# Patient Record
Sex: FEMALE | Race: WHITE | NOT HISPANIC OR LATINO | Employment: OTHER | URBAN - METROPOLITAN AREA
[De-identification: names, ages, dates, MRNs, and addresses within clinical notes are randomized per-mention and may not be internally consistent; named-entity substitution may affect disease eponyms.]

---

## 2017-03-06 ENCOUNTER — HOSPITAL ENCOUNTER (EMERGENCY)
Facility: HOSPITAL | Age: 72
Discharge: HOME | End: 2017-03-06
Attending: EMERGENCY MEDICINE | Admitting: EMERGENCY MEDICINE

## 2017-03-06 VITALS
HEART RATE: 73 BPM | SYSTOLIC BLOOD PRESSURE: 128 MMHG | WEIGHT: 155 LBS | HEIGHT: 64 IN | TEMPERATURE: 98.7 F | BODY MASS INDEX: 26.46 KG/M2 | RESPIRATION RATE: 18 BRPM | OXYGEN SATURATION: 97 % | DIASTOLIC BLOOD PRESSURE: 84 MMHG

## 2017-03-06 DIAGNOSIS — E11.65 TYPE 2 DIABETES MELLITUS WITH HYPERGLYCEMIA, UNSPECIFIED LONG TERM INSULIN USE STATUS: Primary | ICD-10-CM

## 2017-03-06 DIAGNOSIS — F10.920 ALCOHOL INTOXICATION, UNCOMPLICATED (HCC): ICD-10-CM

## 2017-03-06 LAB
ALBUMIN SERPL-MCNC: 4 G/DL (ref 3.5–5.2)
ALBUMIN/GLOB SERPL: 1.3 G/DL
ALP SERPL-CCNC: 72 U/L (ref 39–117)
ALT SERPL W P-5'-P-CCNC: 13 U/L (ref 1–33)
ANION GAP SERPL CALCULATED.3IONS-SCNC: 18 MMOL/L
AST SERPL-CCNC: 19 U/L (ref 1–32)
BASOPHILS # BLD AUTO: 0.01 10*3/MM3 (ref 0–0.2)
BASOPHILS NFR BLD AUTO: 0.1 % (ref 0–1.5)
BILIRUB SERPL-MCNC: 0.2 MG/DL (ref 0.1–1.2)
BILIRUB UR QL STRIP: NEGATIVE
BUN BLD-MCNC: 13 MG/DL (ref 8–23)
BUN/CREAT SERPL: 15.3 (ref 7–25)
CALCIUM SPEC-SCNC: 9.6 MG/DL (ref 8.6–10.5)
CHLORIDE SERPL-SCNC: 102 MMOL/L (ref 98–107)
CLARITY UR: CLEAR
CO2 SERPL-SCNC: 24 MMOL/L (ref 22–29)
COLOR UR: YELLOW
CREAT BLD-MCNC: 0.85 MG/DL (ref 0.57–1)
DEPRECATED RDW RBC AUTO: 39.1 FL (ref 37–54)
EOSINOPHIL # BLD AUTO: 0.04 10*3/MM3 (ref 0–0.7)
EOSINOPHIL NFR BLD AUTO: 0.5 % (ref 0.3–6.2)
ERYTHROCYTE [DISTWIDTH] IN BLOOD BY AUTOMATED COUNT: 12.2 % (ref 11.7–13)
ETHANOL BLD-MCNC: 132 MG/DL (ref 0–10)
ETHANOL UR QL: 0.13 %
GFR SERPL CREATININE-BSD FRML MDRD: 66 ML/MIN/1.73
GLOBULIN UR ELPH-MCNC: 3 GM/DL
GLUCOSE BLD-MCNC: 289 MG/DL (ref 65–99)
GLUCOSE BLDC GLUCOMTR-MCNC: 165 MG/DL (ref 70–130)
GLUCOSE BLDC GLUCOMTR-MCNC: 272 MG/DL (ref 70–130)
GLUCOSE UR STRIP-MCNC: ABNORMAL MG/DL
HCT VFR BLD AUTO: 36.5 % (ref 35.6–45.5)
HGB BLD-MCNC: 12.2 G/DL (ref 11.9–15.5)
HGB UR QL STRIP.AUTO: NEGATIVE
IMM GRANULOCYTES # BLD: 0 10*3/MM3 (ref 0–0.03)
IMM GRANULOCYTES NFR BLD: 0 % (ref 0–0.5)
KETONES UR QL STRIP: ABNORMAL
LEUKOCYTE ESTERASE UR QL STRIP.AUTO: NEGATIVE
LYMPHOCYTES # BLD AUTO: 1.85 10*3/MM3 (ref 0.9–4.8)
LYMPHOCYTES NFR BLD AUTO: 23.9 % (ref 19.6–45.3)
MCH RBC QN AUTO: 29.1 PG (ref 26.9–32)
MCHC RBC AUTO-ENTMCNC: 33.4 G/DL (ref 32.4–36.3)
MCV RBC AUTO: 87.1 FL (ref 80.5–98.2)
MONOCYTES # BLD AUTO: 0.51 10*3/MM3 (ref 0.2–1.2)
MONOCYTES NFR BLD AUTO: 6.6 % (ref 5–12)
NEUTROPHILS # BLD AUTO: 5.32 10*3/MM3 (ref 1.9–8.1)
NEUTROPHILS NFR BLD AUTO: 68.9 % (ref 42.7–76)
NITRITE UR QL STRIP: NEGATIVE
PH UR STRIP.AUTO: 6 [PH] (ref 5–8)
PLATELET # BLD AUTO: 306 10*3/MM3 (ref 140–500)
PMV BLD AUTO: 10.1 FL (ref 6–12)
POTASSIUM BLD-SCNC: 3.8 MMOL/L (ref 3.5–5.2)
PROT SERPL-MCNC: 7 G/DL (ref 6–8.5)
PROT UR QL STRIP: NEGATIVE
RBC # BLD AUTO: 4.19 10*6/MM3 (ref 3.9–5.2)
SODIUM BLD-SCNC: 144 MMOL/L (ref 136–145)
SP GR UR STRIP: 1.01 (ref 1–1.03)
UROBILINOGEN UR QL STRIP: ABNORMAL
WBC NRBC COR # BLD: 7.73 10*3/MM3 (ref 4.5–10.7)

## 2017-03-06 PROCEDURE — 96360 HYDRATION IV INFUSION INIT: CPT

## 2017-03-06 PROCEDURE — 96361 HYDRATE IV INFUSION ADD-ON: CPT

## 2017-03-06 PROCEDURE — 82962 GLUCOSE BLOOD TEST: CPT

## 2017-03-06 PROCEDURE — 85025 COMPLETE CBC W/AUTO DIFF WBC: CPT | Performed by: EMERGENCY MEDICINE

## 2017-03-06 PROCEDURE — 81003 URINALYSIS AUTO W/O SCOPE: CPT | Performed by: EMERGENCY MEDICINE

## 2017-03-06 PROCEDURE — 99283 EMERGENCY DEPT VISIT LOW MDM: CPT

## 2017-03-06 PROCEDURE — 80053 COMPREHEN METABOLIC PANEL: CPT | Performed by: EMERGENCY MEDICINE

## 2017-03-06 PROCEDURE — 80307 DRUG TEST PRSMV CHEM ANLYZR: CPT | Performed by: EMERGENCY MEDICINE

## 2017-03-06 PROCEDURE — 90791 PSYCH DIAGNOSTIC EVALUATION: CPT

## 2017-03-06 RX ORDER — FLUOXETINE HYDROCHLORIDE 20 MG/1
20 CAPSULE ORAL DAILY
COMMUNITY
End: 2022-12-01 | Stop reason: SDUPTHER

## 2017-03-06 RX ORDER — GLIPIZIDE 10 MG/1
10 TABLET ORAL
COMMUNITY
End: 2021-04-10

## 2017-03-06 RX ORDER — SODIUM CHLORIDE 9 MG/ML
125 INJECTION, SOLUTION INTRAVENOUS CONTINUOUS
Status: DISCONTINUED | OUTPATIENT
Start: 2017-03-06 | End: 2017-03-06 | Stop reason: HOSPADM

## 2017-03-06 RX ORDER — ATORVASTATIN CALCIUM 80 MG/1
80 TABLET, FILM COATED ORAL DAILY
COMMUNITY
End: 2021-04-10

## 2017-03-06 RX ORDER — ALENDRONATE SODIUM 70 MG/1
70 TABLET ORAL
COMMUNITY
End: 2021-04-10

## 2017-03-06 RX ORDER — LISINOPRIL AND HYDROCHLOROTHIAZIDE 20; 12.5 MG/1; MG/1
1 TABLET ORAL DAILY
COMMUNITY
End: 2021-04-10

## 2017-03-06 RX ORDER — ALPRAZOLAM 2 MG/1
2 TABLET ORAL 3 TIMES DAILY PRN
Status: ON HOLD | COMMUNITY
End: 2022-03-04 | Stop reason: SDUPTHER

## 2017-03-06 RX ORDER — METOPROLOL TARTRATE 25 MG/1
25 TABLET, FILM COATED ORAL 2 TIMES DAILY
COMMUNITY
End: 2021-04-10

## 2017-03-06 RX ORDER — GEMFIBROZIL 600 MG/1
600 TABLET, FILM COATED ORAL 2 TIMES DAILY
COMMUNITY
End: 2021-04-10

## 2017-03-06 RX ADMIN — SODIUM CHLORIDE 125 ML/HR: 9 INJECTION, SOLUTION INTRAVENOUS at 04:10

## 2017-03-06 RX ADMIN — SODIUM CHLORIDE 1000 ML: 9 INJECTION, SOLUTION INTRAVENOUS at 01:40
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
## 2017-03-06 NOTE — ED NOTES
Pt to ER via EMS from home. Pt's daughter called EMS reporting that her mom has high blood sugar, is intoxicated and has also taken her nightly xanax.      Olga Mcgee RN  03/06/17 0024

## 2018-01-10 ENCOUNTER — HOSPITAL ENCOUNTER (OUTPATIENT)
Dept: OTHER | Facility: HOSPITAL | Age: 73
Setting detail: SPECIMEN
Discharge: HOME | End: 2018-01-10
Attending: NURSE PRACTITIONER | Admitting: NURSE PRACTITIONER

## 2018-01-10 LAB
ALBUMIN SERPL-MCNC: 3.8 G/DL (ref 3.5–4.8)
ALBUMIN/GLOB SERPL: 1.1 {RATIO} (ref 1–1.7)
ALP SERPL-CCNC: 50 IU/L (ref 32–91)
ALT SERPL-CCNC: 14 IU/L (ref 14–54)
ANION GAP SERPL CALC-SCNC: 12.8 MMOL/L (ref 10–20)
AST SERPL-CCNC: 19 IU/L (ref 15–41)
BACTERIA SPEC AEROBE CULT: NORMAL
BILIRUB SERPL-MCNC: 0.8 MG/DL (ref 0.3–1.2)
BUN SERPL-MCNC: 15 MG/DL (ref 8–20)
BUN/CREAT SERPL: 18.8 (ref 5.4–26.2)
CALCIUM SERPL-MCNC: 9.7 MG/DL (ref 8.9–10.3)
CHLORIDE SERPL-SCNC: 106 MMOL/L (ref 101–111)
CHOLEST SERPL-MCNC: 217 MG/DL
CHOLEST/HDLC SERPL: 3.5 {RATIO}
CONV CO2: 25 MMOL/L (ref 22–32)
CONV LDL CHOLESTEROL DIRECT: 128 MG/DL (ref 0–100)
CONV MICROALBUM.,U,RANDOM: 5 MG/L
CONV TOTAL PROTEIN: 7.2 G/DL (ref 6.1–7.9)
CREAT 24H UR-MCNC: 72.9 MG/DL
CREAT UR-MCNC: 0.8 MG/DL (ref 0.4–1)
GLOBULIN UR ELPH-MCNC: 3.4 G/DL (ref 2.5–3.8)
GLUCOSE SERPL-MCNC: ABNORMAL MG/DL (ref 65–99)
HDLC SERPL-MCNC: 63 MG/DL
LDLC/HDLC SERPL: 2 {RATIO}
LIPID INTERPRETATION: ABNORMAL
Lab: NORMAL
MICRO REPORT STATUS: NORMAL
MICROALBUMIN/CREAT UR: 6.9 UG/MG
POTASSIUM SERPL-SCNC: 3.8 MMOL/L (ref 3.6–5.1)
SODIUM SERPL-SCNC: 140 MMOL/L (ref 136–144)
SPECIMEN SOURCE: NORMAL
TRIGL SERPL-MCNC: 104 MG/DL
VLDLC SERPL CALC-MCNC: 26.3 MG/DL

## 2018-06-21 ENCOUNTER — HOSPITAL ENCOUNTER (OUTPATIENT)
Dept: OTHER | Facility: HOSPITAL | Age: 73
Setting detail: SPECIMEN
Discharge: HOME | End: 2018-06-21
Attending: NURSE PRACTITIONER | Admitting: NURSE PRACTITIONER

## 2018-06-21 LAB
BACTERIA SPEC AEROBE CULT: NORMAL
Lab: NORMAL
MICRO REPORT STATUS: NORMAL
SPECIMEN SOURCE: NORMAL

## 2020-02-01 ENCOUNTER — HOSPITAL ENCOUNTER (EMERGENCY)
Facility: HOSPITAL | Age: 75
Discharge: HOME | End: 2020-02-01
Attending: EMERGENCY MEDICINE | Admitting: EMERGENCY MEDICINE

## 2020-02-01 VITALS
OXYGEN SATURATION: 96 % | WEIGHT: 170 LBS | DIASTOLIC BLOOD PRESSURE: 70 MMHG | HEART RATE: 78 BPM | SYSTOLIC BLOOD PRESSURE: 145 MMHG | RESPIRATION RATE: 16 BRPM | TEMPERATURE: 98 F | HEIGHT: 63 IN | BODY MASS INDEX: 30.12 KG/M2

## 2020-02-01 DIAGNOSIS — L81.9 DISCOLORATION OF SKIN OF HAND: ICD-10-CM
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
## 2020-02-01 LAB
ANION GAP SERPL CALCULATED.3IONS-SCNC: 12.7 MMOL/L (ref 5–15)
APTT PPP: 27.2 SECONDS (ref 22.7–35.4)
BASOPHILS # BLD AUTO: 0.04 10*3/MM3 (ref 0–0.2)
BASOPHILS NFR BLD AUTO: 0.4 % (ref 0–1.5)
BUN BLD-MCNC: 27 MG/DL (ref 8–23)
BUN/CREAT SERPL: 25.2 (ref 7–25)
CALCIUM SPEC-SCNC: 8.8 MG/DL (ref 8.6–10.5)
CHLORIDE SERPL-SCNC: 105 MMOL/L (ref 98–107)
CO2 SERPL-SCNC: 27.3 MMOL/L (ref 22–29)
CREAT BLD-MCNC: 1.07 MG/DL (ref 0.57–1)
DEPRECATED RDW RBC AUTO: 37.2 FL (ref 37–54)
EOSINOPHIL # BLD AUTO: 0.28 10*3/MM3 (ref 0–0.4)
EOSINOPHIL NFR BLD AUTO: 3 % (ref 0.3–6.2)
ERYTHROCYTE [DISTWIDTH] IN BLOOD BY AUTOMATED COUNT: 12 % (ref 12.3–15.4)
GFR SERPL CREATININE-BSD FRML MDRD: 50 ML/MIN/1.73
GLUCOSE BLD-MCNC: 81 MG/DL (ref 65–99)
GLUCOSE BLDC GLUCOMTR-MCNC: 106 MG/DL (ref 70–130)
HCT VFR BLD AUTO: 31.9 % (ref 34–46.6)
HGB BLD-MCNC: 10.7 G/DL (ref 12–15.9)
IMM GRANULOCYTES # BLD AUTO: 0.03 10*3/MM3 (ref 0–0.05)
IMM GRANULOCYTES NFR BLD AUTO: 0.3 % (ref 0–0.5)
INR PPP: 1 (ref 0.9–1.1)
LYMPHOCYTES # BLD AUTO: 3.01 10*3/MM3 (ref 0.7–3.1)
LYMPHOCYTES NFR BLD AUTO: 32.4 % (ref 19.6–45.3)
MCH RBC QN AUTO: 28.7 PG (ref 26.6–33)
MCHC RBC AUTO-ENTMCNC: 33.5 G/DL (ref 31.5–35.7)
MCV RBC AUTO: 85.5 FL (ref 79–97)
MONOCYTES # BLD AUTO: 0.8 10*3/MM3 (ref 0.1–0.9)
MONOCYTES NFR BLD AUTO: 8.6 % (ref 5–12)
NEUTROPHILS # BLD AUTO: 5.14 10*3/MM3 (ref 1.7–7)
NEUTROPHILS NFR BLD AUTO: 55.3 % (ref 42.7–76)
NRBC BLD AUTO-RTO: 0 /100 WBC (ref 0–0.2)
PLATELET # BLD AUTO: 266 10*3/MM3 (ref 140–450)
PMV BLD AUTO: 10 FL (ref 6–12)
POTASSIUM BLD-SCNC: 3.8 MMOL/L (ref 3.5–5.2)
PROTHROMBIN TIME: 12.9 SECONDS (ref 11.7–14.2)
RBC # BLD AUTO: 3.73 10*6/MM3 (ref 3.77–5.28)
SODIUM BLD-SCNC: 145 MMOL/L (ref 136–145)
WBC NRBC COR # BLD: 9.3 10*3/MM3 (ref 3.4–10.8)

## 2020-02-01 PROCEDURE — 25010000002 TDAP 5-2.5-18.5 LF-MCG/0.5 SUSPENSION: Performed by: EMERGENCY MEDICINE

## 2020-02-01 PROCEDURE — 90715 TDAP VACCINE 7 YRS/> IM: CPT | Performed by: EMERGENCY MEDICINE

## 2020-02-01 PROCEDURE — 90471 IMMUNIZATION ADMIN: CPT | Performed by: EMERGENCY MEDICINE

## 2020-02-01 PROCEDURE — 82962 GLUCOSE BLOOD TEST: CPT

## 2020-02-01 PROCEDURE — 99283 EMERGENCY DEPT VISIT LOW MDM: CPT

## 2020-02-01 PROCEDURE — 85610 PROTHROMBIN TIME: CPT | Performed by: EMERGENCY MEDICINE

## 2020-02-01 PROCEDURE — 85730 THROMBOPLASTIN TIME PARTIAL: CPT | Performed by: EMERGENCY MEDICINE

## 2020-02-01 PROCEDURE — 85025 COMPLETE CBC W/AUTO DIFF WBC: CPT | Performed by: EMERGENCY MEDICINE

## 2020-02-01 PROCEDURE — 80048 BASIC METABOLIC PNL TOTAL CA: CPT | Performed by: EMERGENCY MEDICINE

## 2020-02-01 RX ORDER — FUROSEMIDE 20 MG/1
20 TABLET ORAL DAILY
Status: ON HOLD | COMMUNITY
End: 2022-09-16 | Stop reason: SDUPTHER

## 2020-02-01 RX ORDER — LISINOPRIL 20 MG/1
20 TABLET ORAL DAILY
Status: ON HOLD | COMMUNITY
End: 2022-02-27

## 2020-02-01 RX ORDER — ARIPIPRAZOLE 5 MG/1
5 TABLET ORAL DAILY
COMMUNITY
End: 2021-04-10

## 2020-02-01 RX ORDER — ATORVASTATIN CALCIUM 40 MG/1
40 TABLET, FILM COATED ORAL NIGHTLY
COMMUNITY

## 2020-02-01 RX ORDER — CEPHALEXIN 500 MG/1
500 CAPSULE ORAL ONCE
Status: COMPLETED | OUTPATIENT
Start: 2020-02-01 | End: 2020-02-01

## 2020-02-01 RX ORDER — ASPIRIN 81 MG/1
81 TABLET ORAL DAILY
COMMUNITY
End: 2022-08-10 | Stop reason: HOSPADM

## 2020-02-01 RX ORDER — CEPHALEXIN 500 MG/1
500 CAPSULE ORAL 3 TIMES DAILY
Qty: 21 CAPSULE | Refills: 0 | Status: SHIPPED | OUTPATIENT
Start: 2020-02-01 | End: 2021-04-10

## 2020-02-01 RX ORDER — POTASSIUM CHLORIDE 1500 MG/1
20 TABLET, FILM COATED, EXTENDED RELEASE ORAL DAILY
COMMUNITY
End: 2022-12-09

## 2020-02-01 RX ORDER — METOPROLOL TARTRATE 50 MG/1
50 TABLET, FILM COATED ORAL 2 TIMES DAILY
Status: ON HOLD | COMMUNITY
End: 2022-02-27

## 2020-02-01 RX ORDER — PRIMIDONE 50 MG/1
25 TABLET ORAL NIGHTLY
COMMUNITY
End: 2021-04-10

## 2020-02-01 RX ADMIN — TETANUS TOXOID, REDUCED DIPHTHERIA TOXOID AND ACELLULAR PERTUSSIS VACCINE, ADSORBED 0.5 ML: 5; 2.5; 8; 8; 2.5 SUSPENSION INTRAMUSCULAR at 17:45

## 2020-02-01 RX ADMIN — CEPHALEXIN 500 MG: 500 CAPSULE ORAL at 20:00
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
## 2020-02-01 NOTE — ED NOTES
Pt daughter reports pt hasn't eaten since breakfast and she is afraid her blood sugar is low     Degonda, Janet, RN  02/01/20 [id number]

## 2020-02-01 NOTE — ED TRIAGE NOTES
Pt reports  She was scratched by a dog yesterday and reports today she has noticed a discoloration in her right hand. Pt denies pain.

## 2020-02-02 NOTE — DISCHARGE INSTRUCTIONS
Take medication as prescribed.  Follow-up with your primary care provider in the next 2 to 3 days.  Return to the emergency department for fever, chills, increased redness around wound, numbness/tingling in your hand, or other concern.

## 2021-04-10 ENCOUNTER — APPOINTMENT (OUTPATIENT)
Dept: GENERAL RADIOLOGY | Facility: HOSPITAL | Age: 76
End: 2021-04-10

## 2021-04-10 ENCOUNTER — APPOINTMENT (OUTPATIENT)
Dept: CT IMAGING | Facility: HOSPITAL | Age: 76
End: 2021-04-10

## 2021-04-10 ENCOUNTER — HOSPITAL ENCOUNTER (OUTPATIENT)
Facility: HOSPITAL | Age: 76
Setting detail: OBSERVATION
Discharge: HOME | End: 2021-04-11
Attending: EMERGENCY MEDICINE | Admitting: INTERNAL MEDICINE

## 2021-04-10 DIAGNOSIS — R07.9 CHEST PAIN, UNSPECIFIED TYPE: Primary | ICD-10-CM

## 2021-04-10 PROBLEM — R07.1 CHEST PAIN ON BREATHING: Status: ACTIVE | Noted: 2021-04-10

## 2021-04-10 LAB
ANION GAP SERPL CALCULATED.3IONS-SCNC: 12 MMOL/L (ref 5–15)
APTT PPP: 22.1 SECONDS (ref 24–31)
BASOPHILS # BLD AUTO: 0.1 10*3/MM3 (ref 0–0.2)
BASOPHILS NFR BLD AUTO: 0.4 % (ref 0–1.5)
BUN SERPL-MCNC: 31 MG/DL (ref 8–23)
BUN/CREAT SERPL: 31.6 (ref 7–25)
CALCIUM SPEC-SCNC: 8.8 MG/DL (ref 8.6–10.5)
CHLORIDE SERPL-SCNC: 101 MMOL/L (ref 98–107)
CO2 SERPL-SCNC: 23 MMOL/L (ref 22–29)
CREAT SERPL-MCNC: 0.98 MG/DL (ref 0.57–1)
D DIMER PPP FEU-MCNC: 1.04 MG/L (FEU) (ref 0–0.59)
DEPRECATED RDW RBC AUTO: 40.7 FL (ref 37–54)
EOSINOPHIL # BLD AUTO: 0 10*3/MM3 (ref 0–0.4)
EOSINOPHIL NFR BLD AUTO: 0 % (ref 0.3–6.2)
ERYTHROCYTE [DISTWIDTH] IN BLOOD BY AUTOMATED COUNT: 13.3 % (ref 12.3–15.4)
GFR SERPL CREATININE-BSD FRML MDRD: 55 ML/MIN/1.73
GLUCOSE BLDC GLUCOMTR-MCNC: 169 MG/DL (ref 70–105)
GLUCOSE BLDC GLUCOMTR-MCNC: 183 MG/DL (ref 70–105)
GLUCOSE BLDC GLUCOMTR-MCNC: 263 MG/DL (ref 70–105)
GLUCOSE SERPL-MCNC: 256 MG/DL (ref 65–99)
HCT VFR BLD AUTO: 36.5 % (ref 34–46.6)
HGB BLD-MCNC: 12.3 G/DL (ref 12–15.9)
HOLD SPECIMEN: NORMAL
INR PPP: 0.93 (ref 0.93–1.1)
LYMPHOCYTES # BLD AUTO: 1.7 10*3/MM3 (ref 0.7–3.1)
LYMPHOCYTES NFR BLD AUTO: 11.8 % (ref 19.6–45.3)
MCH RBC QN AUTO: 29.1 PG (ref 26.6–33)
MCHC RBC AUTO-ENTMCNC: 33.8 G/DL (ref 31.5–35.7)
MCV RBC AUTO: 86.2 FL (ref 79–97)
MONOCYTES # BLD AUTO: 1.3 10*3/MM3 (ref 0.1–0.9)
MONOCYTES NFR BLD AUTO: 9.3 % (ref 5–12)
NEUTROPHILS NFR BLD AUTO: 11 10*3/MM3 (ref 1.7–7)
NEUTROPHILS NFR BLD AUTO: 78.5 % (ref 42.7–76)
NRBC BLD AUTO-RTO: 0.1 /100 WBC (ref 0–0.2)
PLATELET # BLD AUTO: 302 10*3/MM3 (ref 140–450)
PMV BLD AUTO: 8.8 FL (ref 6–12)
POTASSIUM SERPL-SCNC: 4 MMOL/L (ref 3.5–5.2)
PROTHROMBIN TIME: 10.3 SECONDS (ref 9.6–11.7)
RBC # BLD AUTO: 4.23 10*6/MM3 (ref 3.77–5.28)
SARS-COV-2 ORF1AB RESP QL NAA+PROBE: NOT DETECTED
SODIUM SERPL-SCNC: 136 MMOL/L (ref 136–145)
TROPONIN T SERPL-MCNC: <0.01 NG/ML (ref 0–0.03)
TROPONIN T SERPL-MCNC: <0.01 NG/ML (ref 0–0.03)
WBC # BLD AUTO: 14 10*3/MM3 (ref 3.4–10.8)

## 2021-04-10 PROCEDURE — 96374 THER/PROPH/DIAG INJ IV PUSH: CPT

## 2021-04-10 PROCEDURE — 63710000001 INSULIN LISPRO (HUMAN) PER 5 UNITS: Performed by: INTERNAL MEDICINE

## 2021-04-10 PROCEDURE — 84484 ASSAY OF TROPONIN QUANT: CPT | Performed by: INTERNAL MEDICINE

## 2021-04-10 PROCEDURE — 85025 COMPLETE CBC W/AUTO DIFF WBC: CPT | Performed by: EMERGENCY MEDICINE

## 2021-04-10 PROCEDURE — 85730 THROMBOPLASTIN TIME PARTIAL: CPT | Performed by: EMERGENCY MEDICINE

## 2021-04-10 PROCEDURE — G0378 HOSPITAL OBSERVATION PER HR: HCPCS

## 2021-04-10 PROCEDURE — 0 IOPAMIDOL PER 1 ML: Performed by: INTERNAL MEDICINE

## 2021-04-10 PROCEDURE — C9803 HOPD COVID-19 SPEC COLLECT: HCPCS

## 2021-04-10 PROCEDURE — 85379 FIBRIN DEGRADATION QUANT: CPT | Performed by: INTERNAL MEDICINE

## 2021-04-10 PROCEDURE — 25010000002 KETOROLAC TROMETHAMINE PER 15 MG: Performed by: INTERNAL MEDICINE

## 2021-04-10 PROCEDURE — 82962 GLUCOSE BLOOD TEST: CPT

## 2021-04-10 PROCEDURE — 63710000001 INSULIN ISOPHANE & REGULAR PER 5 UNITS: Performed by: INTERNAL MEDICINE

## 2021-04-10 PROCEDURE — 96376 TX/PRO/DX INJ SAME DRUG ADON: CPT

## 2021-04-10 PROCEDURE — 85610 PROTHROMBIN TIME: CPT | Performed by: EMERGENCY MEDICINE

## 2021-04-10 PROCEDURE — 71045 X-RAY EXAM CHEST 1 VIEW: CPT

## 2021-04-10 PROCEDURE — 93005 ELECTROCARDIOGRAM TRACING: CPT | Performed by: EMERGENCY MEDICINE

## 2021-04-10 PROCEDURE — 84484 ASSAY OF TROPONIN QUANT: CPT | Performed by: EMERGENCY MEDICINE

## 2021-04-10 PROCEDURE — U0004 COV-19 TEST NON-CDC HGH THRU: HCPCS | Performed by: EMERGENCY MEDICINE

## 2021-04-10 PROCEDURE — 99284 EMERGENCY DEPT VISIT MOD MDM: CPT

## 2021-04-10 PROCEDURE — 80048 BASIC METABOLIC PNL TOTAL CA: CPT | Performed by: EMERGENCY MEDICINE

## 2021-04-10 PROCEDURE — 99220 PR INITIAL OBSERVATION CARE/DAY 70 MINUTES: CPT | Performed by: INTERNAL MEDICINE

## 2021-04-10 PROCEDURE — 71275 CT ANGIOGRAPHY CHEST: CPT

## 2021-04-10 RX ORDER — LIDOCAINE 50 MG/G
1 PATCH TOPICAL
Status: DISCONTINUED | OUTPATIENT
Start: 2021-04-10 | End: 2021-04-11 | Stop reason: HOSPADM

## 2021-04-10 RX ORDER — INSULIN LISPRO 100 [IU]/ML
0-7 INJECTION, SOLUTION INTRAVENOUS; SUBCUTANEOUS
Status: DISCONTINUED | OUTPATIENT
Start: 2021-04-10 | End: 2021-04-11 | Stop reason: HOSPADM

## 2021-04-10 RX ORDER — KETOROLAC TROMETHAMINE 15 MG/ML
15 INJECTION, SOLUTION INTRAMUSCULAR; INTRAVENOUS EVERY 6 HOURS PRN
Status: COMPLETED | OUTPATIENT
Start: 2021-04-10 | End: 2021-04-11

## 2021-04-10 RX ORDER — SODIUM CHLORIDE 9 MG/ML
50 INJECTION, SOLUTION INTRAVENOUS CONTINUOUS
Status: DISCONTINUED | OUTPATIENT
Start: 2021-04-10 | End: 2021-04-11 | Stop reason: HOSPADM

## 2021-04-10 RX ORDER — SODIUM CHLORIDE 0.9 % (FLUSH) 0.9 %
10 SYRINGE (ML) INJECTION AS NEEDED
Status: DISCONTINUED | OUTPATIENT
Start: 2021-04-10 | End: 2021-04-11 | Stop reason: HOSPADM

## 2021-04-10 RX ORDER — EMPAGLIFLOZIN 10 MG/1
1 TABLET, FILM COATED ORAL EVERY MORNING
COMMUNITY

## 2021-04-10 RX ORDER — INSULIN LISPRO 100 [IU]/ML
0-7 INJECTION, SOLUTION INTRAVENOUS; SUBCUTANEOUS AS NEEDED
Status: DISCONTINUED | OUTPATIENT
Start: 2021-04-10 | End: 2021-04-11 | Stop reason: HOSPADM

## 2021-04-10 RX ORDER — METOPROLOL TARTRATE 50 MG/1
50 TABLET, FILM COATED ORAL EVERY 12 HOURS SCHEDULED
Status: DISCONTINUED | OUTPATIENT
Start: 2021-04-10 | End: 2021-04-11 | Stop reason: HOSPADM

## 2021-04-10 RX ORDER — CHOLECALCIFEROL (VITAMIN D3) 125 MCG
5 CAPSULE ORAL NIGHTLY PRN
Status: DISCONTINUED | OUTPATIENT
Start: 2021-04-10 | End: 2021-04-11 | Stop reason: HOSPADM

## 2021-04-10 RX ORDER — NICOTINE POLACRILEX 4 MG
15 LOZENGE BUCCAL
Status: DISCONTINUED | OUTPATIENT
Start: 2021-04-10 | End: 2021-04-11 | Stop reason: HOSPADM

## 2021-04-10 RX ORDER — POTASSIUM CHLORIDE 20 MEQ/1
20 TABLET, EXTENDED RELEASE ORAL DAILY
Status: DISCONTINUED | OUTPATIENT
Start: 2021-04-10 | End: 2021-04-11 | Stop reason: HOSPADM

## 2021-04-10 RX ORDER — ONDANSETRON 4 MG/1
4 TABLET, FILM COATED ORAL EVERY 6 HOURS PRN
Status: DISCONTINUED | OUTPATIENT
Start: 2021-04-10 | End: 2021-04-11 | Stop reason: HOSPADM

## 2021-04-10 RX ORDER — METHOCARBAMOL 500 MG/1
500 TABLET, FILM COATED ORAL EVERY 12 HOURS PRN
Status: DISCONTINUED | OUTPATIENT
Start: 2021-04-10 | End: 2021-04-11 | Stop reason: HOSPADM

## 2021-04-10 RX ORDER — DEXAMETHASONE 0.5 MG/1
1 TABLET ORAL
Status: DISCONTINUED | OUTPATIENT
Start: 2021-04-11 | End: 2021-04-11 | Stop reason: HOSPADM

## 2021-04-10 RX ORDER — ACETAMINOPHEN 325 MG/1
650 TABLET ORAL EVERY 4 HOURS PRN
Status: DISCONTINUED | OUTPATIENT
Start: 2021-04-10 | End: 2021-04-11 | Stop reason: HOSPADM

## 2021-04-10 RX ORDER — ACETAMINOPHEN 650 MG/1
650 SUPPOSITORY RECTAL EVERY 4 HOURS PRN
Status: DISCONTINUED | OUTPATIENT
Start: 2021-04-10 | End: 2021-04-11 | Stop reason: HOSPADM

## 2021-04-10 RX ORDER — DEXAMETHASONE 1 MG
1 TABLET ORAL 2 TIMES DAILY WITH MEALS
Status: ON HOLD | COMMUNITY
End: 2022-02-27

## 2021-04-10 RX ORDER — FUROSEMIDE 40 MG/1
40 TABLET ORAL
Status: DISCONTINUED | OUTPATIENT
Start: 2021-04-10 | End: 2021-04-11 | Stop reason: HOSPADM

## 2021-04-10 RX ORDER — DEXTROSE MONOHYDRATE 25 G/50ML
25 INJECTION, SOLUTION INTRAVENOUS
Status: DISCONTINUED | OUTPATIENT
Start: 2021-04-10 | End: 2021-04-11 | Stop reason: HOSPADM

## 2021-04-10 RX ORDER — ALPRAZOLAM 1 MG/1
2 TABLET ORAL 3 TIMES DAILY PRN
Status: DISCONTINUED | OUTPATIENT
Start: 2021-04-10 | End: 2021-04-11 | Stop reason: HOSPADM

## 2021-04-10 RX ORDER — ACETAMINOPHEN 160 MG/5ML
650 SOLUTION ORAL EVERY 4 HOURS PRN
Status: DISCONTINUED | OUTPATIENT
Start: 2021-04-10 | End: 2021-04-11 | Stop reason: HOSPADM

## 2021-04-10 RX ORDER — CEPHALEXIN 500 MG/1
500 CAPSULE ORAL 2 TIMES DAILY
COMMUNITY
End: 2021-04-11 | Stop reason: HOSPADM

## 2021-04-10 RX ORDER — SODIUM CHLORIDE 0.9 % (FLUSH) 0.9 %
10 SYRINGE (ML) INJECTION EVERY 12 HOURS SCHEDULED
Status: DISCONTINUED | OUTPATIENT
Start: 2021-04-10 | End: 2021-04-11 | Stop reason: HOSPADM

## 2021-04-10 RX ORDER — LISINOPRIL 20 MG/1
20 TABLET ORAL DAILY
Status: DISCONTINUED | OUTPATIENT
Start: 2021-04-10 | End: 2021-04-11 | Stop reason: HOSPADM

## 2021-04-10 RX ORDER — ASPIRIN 81 MG/1
81 TABLET ORAL DAILY
Status: DISCONTINUED | OUTPATIENT
Start: 2021-04-10 | End: 2021-04-11 | Stop reason: HOSPADM

## 2021-04-10 RX ORDER — FLUOXETINE HYDROCHLORIDE 20 MG/1
40 CAPSULE ORAL DAILY
Status: DISCONTINUED | OUTPATIENT
Start: 2021-04-10 | End: 2021-04-11 | Stop reason: HOSPADM

## 2021-04-10 RX ORDER — ATORVASTATIN CALCIUM 40 MG/1
40 TABLET, FILM COATED ORAL NIGHTLY
Status: DISCONTINUED | OUTPATIENT
Start: 2021-04-10 | End: 2021-04-11 | Stop reason: HOSPADM

## 2021-04-10 RX ORDER — HYDRALAZINE HYDROCHLORIDE 10 MG/1
10 TABLET, FILM COATED ORAL EVERY 6 HOURS PRN
Status: DISCONTINUED | OUTPATIENT
Start: 2021-04-10 | End: 2021-04-11 | Stop reason: HOSPADM

## 2021-04-10 RX ORDER — DEXAMETHASONE 0.5 MG/1
1 TABLET ORAL 2 TIMES DAILY WITH MEALS
Status: COMPLETED | OUTPATIENT
Start: 2021-04-10 | End: 2021-04-10

## 2021-04-10 RX ORDER — IBUPROFEN 200 MG
200 TABLET ORAL EVERY 6 HOURS PRN
COMMUNITY
End: 2022-03-04 | Stop reason: HOSPADM

## 2021-04-10 RX ORDER — CEPHALEXIN 500 MG/1
500 CAPSULE ORAL EVERY 12 HOURS SCHEDULED
Status: COMPLETED | OUTPATIENT
Start: 2021-04-10 | End: 2021-04-11

## 2021-04-10 RX ADMIN — LISINOPRIL 20 MG: 20 TABLET ORAL at 13:18

## 2021-04-10 RX ADMIN — ALPRAZOLAM 2 MG: 1 TABLET ORAL at 13:17

## 2021-04-10 RX ADMIN — FLUOXETINE 40 MG: 20 CAPSULE ORAL at 13:18

## 2021-04-10 RX ADMIN — Medication 10 ML: at 13:19

## 2021-04-10 RX ADMIN — INSULIN HUMAN 10 UNITS: 100 INJECTION, SUSPENSION SUBCUTANEOUS at 17:31

## 2021-04-10 RX ADMIN — CEPHALEXIN 500 MG: 500 CAPSULE ORAL at 20:15

## 2021-04-10 RX ADMIN — ALPRAZOLAM 2 MG: 1 TABLET ORAL at 23:18

## 2021-04-10 RX ADMIN — POTASSIUM CHLORIDE 20 MEQ: 1500 TABLET, EXTENDED RELEASE ORAL at 13:18

## 2021-04-10 RX ADMIN — DEXAMETHASONE 1 MG: 0.5 TABLET ORAL at 14:22

## 2021-04-10 RX ADMIN — CEPHALEXIN 500 MG: 500 CAPSULE ORAL at 14:22

## 2021-04-10 RX ADMIN — TICAGRELOR 90 MG: 90 TABLET ORAL at 20:15

## 2021-04-10 RX ADMIN — FUROSEMIDE 40 MG: 40 TABLET ORAL at 13:17

## 2021-04-10 RX ADMIN — ATORVASTATIN CALCIUM 40 MG: 40 TABLET, FILM COATED ORAL at 20:15

## 2021-04-10 RX ADMIN — METOPROLOL TARTRATE 50 MG: 50 TABLET, FILM COATED ORAL at 13:17

## 2021-04-10 RX ADMIN — Medication 10 ML: at 10:10

## 2021-04-10 RX ADMIN — DEXAMETHASONE 1 MG: 0.5 TABLET ORAL at 17:30

## 2021-04-10 RX ADMIN — LIDOCAINE 1 PATCH: 50 PATCH TOPICAL at 13:19

## 2021-04-10 RX ADMIN — INSULIN LISPRO 2 UNITS: 100 INJECTION, SOLUTION INTRAVENOUS; SUBCUTANEOUS at 13:18

## 2021-04-10 RX ADMIN — ASPIRIN 81 MG: 81 TABLET, COATED ORAL at 13:18

## 2021-04-10 RX ADMIN — KETOROLAC TROMETHAMINE 15 MG: 15 INJECTION, SOLUTION INTRAMUSCULAR; INTRAVENOUS at 13:18

## 2021-04-10 RX ADMIN — FUROSEMIDE 40 MG: 40 TABLET ORAL at 17:30

## 2021-04-10 RX ADMIN — KETOROLAC TROMETHAMINE 15 MG: 15 INJECTION, SOLUTION INTRAMUSCULAR; INTRAVENOUS at 20:15

## 2021-04-10 RX ADMIN — TICAGRELOR 90 MG: 90 TABLET ORAL at 13:17

## 2021-04-10 RX ADMIN — SODIUM CHLORIDE 50 ML/HR: 9 INJECTION, SOLUTION INTRAVENOUS at 13:17

## 2021-04-10 RX ADMIN — IOPAMIDOL 81 ML: 755 INJECTION, SOLUTION INTRAVENOUS at 15:11

## 2021-04-10 RX ADMIN — Medication 10 ML: at 20:15

## 2021-04-10 RX ADMIN — METHOCARBAMOL 500 MG: 500 TABLET, FILM COATED ORAL at 13:18

## 2021-04-10 RX ADMIN — METOPROLOL TARTRATE 50 MG: 50 TABLET, FILM COATED ORAL at 20:15

## 2021-04-10 RX ADMIN — INSULIN LISPRO 4 UNITS: 100 INJECTION, SOLUTION INTRAVENOUS; SUBCUTANEOUS at 17:31

## 2021-04-10 NOTE — ED PROVIDER NOTES
"Subjective   Chief complaint: Chest pain    75-year-old female with a history of coronary artery disease and CHF presents with chest pain.  Patient states the pain has been intermittent over the past 2 months.  She has had associated shortness of breath.  She denies any diaphoresis, dizziness, palpitations, nausea.  She denies any alleviating or exacerbating factors.  Pain is in the right side of the chest and radiates to her back.  Pain is described as moderate in intensity.      History provided by:  Patient      Review of Systems   Constitutional: Negative for fever.   HENT: Negative for congestion and sore throat.    Eyes: Negative for redness.   Respiratory: Positive for shortness of breath. Negative for cough.    Cardiovascular: Positive for chest pain.   Gastrointestinal: Negative for abdominal pain, diarrhea and vomiting.   Genitourinary: Negative for dysuria.   Musculoskeletal: Negative for back pain.   Skin: Negative for rash.   Neurological: Negative for dizziness and headaches.   Psychiatric/Behavioral: Negative for confusion.       Past Medical History:   Diagnosis Date   • Anxiety    • CHF (congestive heart failure) (CMS/[facility])    • Depression    • Diabetes mellitus (CMS/[facility])    • Hyperlipidemia    • Hypertension    • Panic disorder     \"panic attacks\"   • Tremors of nervous system     \"essential tremors\"       Allergies   Allergen Reactions   • Gabapentin Hallucinations   • Morphine Hallucinations       Past Surgical History:   Procedure Laterality Date   • CORONARY ANGIOPLASTY WITH STENT PLACEMENT         Family History   Problem Relation Age of Onset   • Depression Sister    • Suicidality Other         suicided   • Alcohol abuse Other    • Alcohol abuse Daughter    • Depression Other        Social History     Socioeconomic History   • Marital status:      Spouse name: Not on file   • Number of children: Not on file   • Years of education: Not on file   • Highest education level: Not on file " "  Tobacco Use   • Smoking status: Never Smoker   Substance and Sexual Activity   • Alcohol use: Yes     Comment: occasional, says she found a bottle of vodka that her  had hidden tonight and drank some with her beditime medications so she could go to sleep.    • Drug use: No   • Sexual activity: Defer       /50   Pulse 58   Temp 97.6 °F (36.4 °C) (Oral)   Resp 14   Ht 160 cm (63\")   Wt 77.6 kg (171 lb)   SpO2 94%   BMI 30.29 kg/m²       Objective   Physical Exam  Vitals and nursing note reviewed.   Constitutional:       Appearance: She is well-developed.   HENT:      Head: Normocephalic and atraumatic.   Eyes:      Extraocular Movements: Extraocular movements intact.      Pupils: Pupils are equal, round, and reactive to light.   Cardiovascular:      Rate and Rhythm: Normal rate and regular rhythm.      Heart sounds: Normal heart sounds.   Pulmonary:      Effort: Pulmonary effort is normal. No respiratory distress.      Breath sounds: Normal breath sounds.   Abdominal:      General: Bowel sounds are normal.      Palpations: Abdomen is soft.      Tenderness: There is no abdominal tenderness.   Musculoskeletal:         General: Normal range of motion.      Cervical back: Normal range of motion and neck supple.   Skin:     General: Skin is warm and dry.   Neurological:      Mental Status: She is alert and oriented to person, place, and time.         Procedures           ED Course      My interpretation of EKG shows sinus rhythm, rate of 61, left bundle branch block     Results for orders placed or performed during the hospital encounter of 04/10/21   Basic Metabolic Panel    Specimen: Hand, Left; Blood   Result Value Ref Range    Glucose 256 (H) 65 - 99 mg/dL    BUN 31 (H) 8 - 23 mg/dL    Creatinine 0.98 0.57 - 1.00 mg/dL    Sodium 136 136 - 145 mmol/L    Potassium 4.0 3.5 - 5.2 mmol/L    Chloride 101 98 - 107 mmol/L    CO2 23.0 22.0 - 29.0 mmol/L    Calcium 8.8 8.6 - 10.5 mg/dL    eGFR Non African " Amer 55 (L) >60 mL/min/1.73    BUN/Creatinine Ratio 31.6 (H) 7.0 - 25.0    Anion Gap 12.0 5.0 - 15.0 mmol/L   Protime-INR    Specimen: Hand, Left; Blood   Result Value Ref Range    Protime 10.3 9.6 - 11.7 Seconds    INR 0.93 0.93 - 1.10   aPTT    Specimen: Hand, Left; Blood   Result Value Ref Range    PTT 22.1 (L) 24.0 - 31.0 seconds   Troponin    Specimen: Hand, Left; Blood   Result Value Ref Range    Troponin T <0.010 0.000 - 0.030 ng/mL   CBC Auto Differential    Specimen: Hand, Left; Blood   Result Value Ref Range    WBC 14.00 (H) 3.40 - 10.80 10*3/mm3    RBC 4.23 3.77 - 5.28 10*6/mm3    Hemoglobin 12.3 12.0 - 15.9 g/dL    Hematocrit 36.5 34.0 - 46.6 %    MCV 86.2 79.0 - 97.0 fL    MCH 29.1 26.6 - 33.0 pg    MCHC 33.8 31.5 - 35.7 g/dL    RDW 13.3 12.3 - 15.4 %    RDW-SD 40.7 37.0 - 54.0 fl    MPV 8.8 6.0 - 12.0 fL    Platelets 302 140 - 450 10*3/mm3    Neutrophil % 78.5 (H) 42.7 - 76.0 %    Lymphocyte % 11.8 (L) 19.6 - 45.3 %    Monocyte % 9.3 5.0 - 12.0 %    Eosinophil % 0.0 (L) 0.3 - 6.2 %    Basophil % 0.4 0.0 - 1.5 %    Neutrophils, Absolute 11.00 (H) 1.70 - 7.00 10*3/mm3    Lymphocytes, Absolute 1.70 0.70 - 3.10 10*3/mm3    Monocytes, Absolute 1.30 (H) 0.10 - 0.90 10*3/mm3    Eosinophils, Absolute 0.00 0.00 - 0.40 10*3/mm3    Basophils, Absolute 0.10 0.00 - 0.20 10*3/mm3    nRBC 0.1 0.0 - 0.2 /100 WBC   ECG 12 Lead   Result Value Ref Range    QT Interval 471 ms     XR Chest 1 View    Result Date: 4/10/2021   1. No acute disease in the chest  Electronically Signed By-Pramod Malik MD On:4/10/2021 10:21 AM This report was finalized on [id number] by  Pramod Malik MD.                                    MDM   Patient had the above evaluation.  Results were discussed with the patient.  Patient remained well-appearing in the emergency room.  Work-up has been unremarkable so far.  Chest x-ray shows no acute disease.  EKG shows no acute ischemia.  Troponin is negative.  Patient will be admitted  for further evaluation of her chest pain.  I discussed with the hospitalist who agreed to admit.      Final diagnoses:   Chest pain, unspecified type       ED Disposition  ED Disposition     ED Disposition Condition Comment    Decision to Admit  Level of Care: Telemetry [5]   Diagnosis: Chest pain, unspecified type [[id number]]   Admitting Physician: CASPER MATTHEW [[id number]]            No follow-up provider specified.       Medication List      No changes were made to your prescriptions during this visit.          Issa Taylor MD  04/10/21 1111

## 2021-04-10 NOTE — H&P
"[facility] HOSPITALIST     Kaela Layton, PAM    CHIEF COMPLAINT:     Chest pain with inspiration right-sided    HISTORY OF PRESENT ILLNESS:    75-year-old female past medical history of coronary disease has a remote history of stenting 4 years ago HFpEF presented to the ER for right-sided chest pain.  She reports that the right-sided chest pain has been ongoing for about 2 months.  Is mainly associated with taking deep breaths and moving her right arm.  She reports when she coughs the chest pain hurts worse.  She reports that she is very anxious about the chest pain.  She seems anxious during the examination.  She denies any really diaphoresis dizziness palpitations nausea.  Sometimes when the pain is severe it will make her short of breath. It became more predominant and nagging today so she came to the ER for evaluation.In the ER she had troponins checked and EKG that were normal and she was admitted for further evaluation.      Past Medical History:   Diagnosis Date   • Anxiety    • CHF (congestive heart failure) (CMS/[facility])    • Depression    • Diabetes mellitus (CMS/[facility])    • Hyperlipidemia    • Hypertension    • Panic disorder     \"panic attacks\"   • Tremors of nervous system     \"essential tremors\"     Past Surgical History:   Procedure Laterality Date   • CORONARY ANGIOPLASTY WITH STENT PLACEMENT       Family History   Problem Relation Age of Onset   • Depression Sister    • Suicidality Other         suicided   • Alcohol abuse Other    • Alcohol abuse Daughter    • Depression Other      Social History     Tobacco Use   • Smoking status: Never Smoker   Substance Use Topics   • Alcohol use: Not Currently   • Drug use: No     Medications Prior to Admission   Medication Sig Dispense Refill Last Dose   • ALPRAZolam (XANAX) 2 MG tablet Take 2 mg by mouth 3 (Three) Times a Day As Needed for anxiety.   4/10/2021 at 03:30   • aspirin 81 MG EC tablet Take 81 mg by mouth Daily.   4/9/2021 at Unknown " time   • atorvastatin (LIPITOR) 40 MG tablet Take 40 mg by mouth Daily.   4/9/2021 at Unknown time   • cephalexin (KEFLEX) 500 MG capsule Take 500 mg by mouth 2 (Two) Times a Day. Need 2 doses today and last dose due in am 4-11-21      • dexamethasone (DECADRON) 1 MG tablet Take 1 mg by mouth 2 (Two) Times a Day With Meals. Tapering dose needs 2 times today (4-10-21) then 1 daily x 3 days      • Empagliflozin (Jardiance) 10 MG tablet Take 1 tablet by mouth Daily.   4/9/2021 at Unknown time   • FLUoxetine (PROzac) 40 MG capsule Take 40 mg by mouth Daily.   4/9/2021 at Unknown time   • furosemide (LASIX) 40 MG tablet Take 40 mg by mouth 2 (Two) Times a Day.   4/9/2021 at Unknown time   • ibuprofen (ADVIL,MOTRIN) 200 MG tablet Take 200 mg by mouth Every 6 (Six) Hours As Needed for Mild Pain .      • insulin NPH-insulin regular (humuLIN 70/30,novoLIN 70/30) (70-30) 100 UNIT/ML injection Inject 20 Units under the skin into the appropriate area as directed 2 (Two) Times a Day With Meals.   4/9/2021 at Unknown time   • lisinopril (PRINIVIL,ZESTRIL) 20 MG tablet Take 20 mg by mouth Daily.   4/9/2021 at Unknown time   • metoprolol tartrate (LOPRESSOR) 50 MG tablet Take 50 mg by mouth 2 (Two) Times a Day.   4/9/2021 at Unknown time   • potassium chloride (K-DUR) 10 MEQ CR tablet Take 20 mEq by mouth Daily.   4/9/2021 at Unknown time   • ticagrelor (BRILINTA) 90 MG tablet tablet Take 90 mg by mouth 2 (Two) Times a Day.   4/9/2021 at Unknown time     Allergies:  Gabapentin and Morphine    Immunization History   Administered Date(s) Administered   • Tdap 02/01/2020           REVIEW OF SYSTEMS:  Please see the above history of present illness for pertinent positives and negatives.  The remainder of the patient's systems have been reviewed and are negative.     Objective     Vital Signs  Temp:  [97.6 °F (36.4 °C)-97.7 °F (36.5 °C)] 97.7 °F (36.5 °C)  Heart Rate:  [58-69] 60  Resp:  [14-18] 18  BP: (161-191)/(50-82)  "183/73    Flowsheet Rows      First Filed Value   Admission Height  160 cm (63\") Documented at 04/10/2021 0923   Admission Weight  77.6 kg (171 lb) Documented at 04/10/2021 0923           Physical Exam:  Physical Exam  Vitals reviewed.       Gen: NAD  HEENT: EOMI, no icterus, PERRL  Neck: No JVD  Heart: RRR, no murmur  Lung: CTA b/l, adequate air movement  ABD: soft, NT, ND, no rebound or guarding  MSK: Somewhat tender to palpate over the right chest wall and underneath right breast  Neuro: AO x 3, CN II-XII intact  Psych: Flat affect and seems moderately anxious  Skin: warm, dry, intact  Extremities:  No edema    Results Review:    I reviewed the patient's new clinical results.  Lab Results (most recent)     Procedure Component Value Units Date/Time    COVID PRE-OP / PRE-PROCEDURE SCREENING ORDER (NO ISOLATION) - Swab, Nasopharynx [[id number]] Collected: 04/10/21 1143    Specimen: Swab from Nasopharynx Updated: 04/10/21 1145    Narrative:      The following orders were created for panel order COVID PRE-OP / PRE-PROCEDURE SCREENING ORDER (NO ISOLATION) - Swab, Nasopharynx.  Procedure                               Abnormality         Status                     ---------                               -----------         ------                     COVID-19,APTIMA PANTHER,...[[id number]]                      In process                   Please view results for these tests on the individual orders.    COVID-19,APTIMA PANTHERTIKIU IN-HOUSE, NP/OP SWAB IN UTM/VTM/SALINE TRANSPORT MEDIA,24 HR TAT - Swab, Nasopharynx [[id number]] Collected: 04/10/21 1143    Specimen: Swab from Nasopharynx Updated: 04/10/21 1145    Extra Tubes [[id number]] Collected: 04/10/21 1011    Specimen: Blood from Hand, Left Updated: 04/10/21 1115    Narrative:      The following orders were created for panel order Extra Tubes.  Procedure                               Abnormality         Status                     ---------                               " -----------         ------                     Gold Top - SST[[id number]]                                   Final result                 Please view results for these tests on the individual orders.    Gold Top - SST [[id number]] Collected: 04/10/21 1011    Specimen: Blood from Hand, Left Updated: 04/10/21 1115     Extra Tube Hold for add-ons.     Comment: Auto resulted.       Basic Metabolic Panel [[id number]]  (Abnormal) Collected: 04/10/21 1011    Specimen: Blood from Hand, Left Updated: 04/10/21 1046     Glucose 256 mg/dL      BUN 31 mg/dL      Creatinine 0.98 mg/dL      Sodium 136 mmol/L      Potassium 4.0 mmol/L      Chloride 101 mmol/L      CO2 23.0 mmol/L      Calcium 8.8 mg/dL      eGFR Non African Amer 55 mL/min/1.73      BUN/Creatinine Ratio 31.6     Anion Gap 12.0 mmol/L     Narrative:      GFR Normal >60  Chronic Kidney Disease <60  Kidney Failure <15      Troponin [[id number]]  (Normal) Collected: 04/10/21 1011    Specimen: Blood from Hand, Left Updated: 04/10/21 1044     Troponin T <0.010 ng/mL     Narrative:      Troponin T Reference Range:  <= 0.03 ng/mL-   Negative for AMI  >0.03 ng/mL-     Abnormal for myocardial necrosis.  Clinicians would have to utilize clinical acumen, EKG, Troponin and serial changes to determine if it is an Acute Myocardial Infarction or myocardial injury due to an underlying chronic condition.       Results may be falsely decreased if patient taking Biotin.      Protime-INR [[id number]]  (Normal) Collected: 04/10/21 1011    Specimen: Blood from Hand, Left Updated: 04/10/21 1039     Protime 10.3 Seconds      INR 0.93    aPTT [[id number]]  (Abnormal) Collected: 04/10/21 1011    Specimen: Blood from Hand, Left Updated: 04/10/21 1039     PTT 22.1 seconds     CBC & Differential [[id number]]  (Abnormal) Collected: 04/10/21 1011    Specimen: Blood from Hand, Left Updated: 04/10/21 1029    Narrative:      The following orders were created for panel order CBC & Differential.  Procedure                                Abnormality         Status                     ---------                               -----------         ------                     CBC Auto Differential[[id number]]        Abnormal            Final result                 Please view results for these tests on the individual orders.    CBC Auto Differential [[id number]]  (Abnormal) Collected: 04/10/21 1011    Specimen: Blood from Hand, Left Updated: 04/10/21 1029     WBC 14.00 10*3/mm3      RBC 4.23 10*6/mm3      Hemoglobin 12.3 g/dL      Hematocrit 36.5 %      MCV 86.2 fL      MCH 29.1 pg      MCHC 33.8 g/dL      RDW 13.3 %      RDW-SD 40.7 fl      MPV 8.8 fL      Platelets 302 10*3/mm3      Neutrophil % 78.5 %      Lymphocyte % 11.8 %      Monocyte % 9.3 %      Eosinophil % 0.0 %      Basophil % 0.4 %      Neutrophils, Absolute 11.00 10*3/mm3      Lymphocytes, Absolute 1.70 10*3/mm3      Monocytes, Absolute 1.30 10*3/mm3      Eosinophils, Absolute 0.00 10*3/mm3      Basophils, Absolute 0.10 10*3/mm3      nRBC 0.1 /100 WBC           Imaging Results (Most Recent)     Procedure Component Value Units Date/Time    XR Chest 1 View [[id number]] Collected: 04/10/21 1021     Updated: 04/10/21 1023    Narrative:      DATE OF EXAM:  4/10/2021 10:00 AM     PROCEDURE:  XR CHEST 1 VW-     INDICATIONS:  chest pain today     COMPARISON:  No Comparisons Available     TECHNIQUE:   Single radiographic AP view of the chest was obtained.     FINDINGS:  Single frontal view chest reveals heart and mediastinum are normal. No  evidence of infiltrate or effusion or pneumothorax or mass right or left  lungs        Impression:         1. No acute disease in the chest     Electronically Signed By-Pramod Malik MD On:4/10/2021 10:21 AM  This report was finalized on [id number] by  Pramod Malik MD.        reviewed    ECG/EMG Results (most recent)     Procedure Component Value Units Date/Time    ECG 12 Lead [[id number]] Collected: 04/10/21 0925      Updated: 04/10/21 0937     QT Interval 471 ms     Narrative:      HEART RATE= 61  bpm  RR Interval= 980  ms  MI Interval= 170  ms  P Horizontal Axis= 2  deg  P Front Axis= 56  deg  QRSD Interval= 157  ms  QT Interval= 471  ms  QRS Axis= -26  deg  T Wave Axis= 79  deg  - ABNORMAL ECG -  Sinus rhythm  ST elevation secondary to IVCD  When compared with ECG of 01-Apr-2019 19:48:11,  No significant change  Electronically Signed By:   Date and Time of Study: 2021-04-10 09:35:18        reviewed      Assessment/Plan     Active Hospital Problems:  Active Hospital Problems    Diagnosis  POA   • **Chest pain on breathing [R07.1]  Yes      Resolved Hospital Problems   No resolved problems to display.     Atypical chest pain  -will rule out ACS but seems more consistent with musculoskeletal pain versus anxiety  -Serial troponin  -EKG reviewed personally  -Check D-dimer if abnormal will do CT PE protocol, if D-dimer normal will do CT chest without contrast  -Toradol, muscle relaxers, lidocaine patch for pain    Coronary artery disease status post stent hypertension  -Stent reported 4 years ago  -Continue aspirin Brilinta metoprolol atorvastatin    Type 2 diabetes  -Moderate control  -Continue home 70/30 insulin decreased to 10 units  -SSI, adjust prn    Anxiety  -Continue as needed Xanax    Recent procedure essential tremor  -Continue steroid taper    Recent knee cellulitis  -Continue Keflex should finish tomorrow    High risk    DVT ppx-SCD    This patient has been examined wearing appropriate Personal Protective Equipment . 04/10/21      Electronically signed by Jack Singh DO, 04/10/21, 12:39 EDT.

## 2021-04-10 NOTE — PLAN OF CARE
Goal Outcome Evaluation:  Plan of Care Reviewed With: patient, daughter  Progress: no change  Outcome Summary: new admit for CP with elevated D-Dimer, await CT PE protocol

## 2021-04-11 VITALS
HEART RATE: 66 BPM | BODY MASS INDEX: 30.86 KG/M2 | DIASTOLIC BLOOD PRESSURE: 70 MMHG | HEIGHT: 63 IN | OXYGEN SATURATION: 97 % | RESPIRATION RATE: 12 BRPM | TEMPERATURE: 97.6 F | WEIGHT: 174.16 LBS | SYSTOLIC BLOOD PRESSURE: 163 MMHG

## 2021-04-11 LAB
ANION GAP SERPL CALCULATED.3IONS-SCNC: 11 MMOL/L (ref 5–15)
BUN SERPL-MCNC: 34 MG/DL (ref 8–23)
BUN/CREAT SERPL: 30.9 (ref 7–25)
CALCIUM SPEC-SCNC: 8.1 MG/DL (ref 8.6–10.5)
CHLORIDE SERPL-SCNC: 100 MMOL/L (ref 98–107)
CO2 SERPL-SCNC: 24 MMOL/L (ref 22–29)
CREAT SERPL-MCNC: 1.1 MG/DL (ref 0.57–1)
DEPRECATED RDW RBC AUTO: 40.3 FL (ref 37–54)
ERYTHROCYTE [DISTWIDTH] IN BLOOD BY AUTOMATED COUNT: 13.1 % (ref 12.3–15.4)
GFR SERPL CREATININE-BSD FRML MDRD: 48 ML/MIN/1.73
GLUCOSE BLDC GLUCOMTR-MCNC: 185 MG/DL (ref 70–105)
GLUCOSE BLDC GLUCOMTR-MCNC: 211 MG/DL (ref 70–105)
GLUCOSE SERPL-MCNC: 210 MG/DL (ref 65–99)
HCT VFR BLD AUTO: 37.7 % (ref 34–46.6)
HGB BLD-MCNC: 12.4 G/DL (ref 12–15.9)
MCH RBC QN AUTO: 28.9 PG (ref 26.6–33)
MCHC RBC AUTO-ENTMCNC: 32.8 G/DL (ref 31.5–35.7)
MCV RBC AUTO: 88.2 FL (ref 79–97)
PLATELET # BLD AUTO: 253 10*3/MM3 (ref 140–450)
PMV BLD AUTO: 9.3 FL (ref 6–12)
POTASSIUM SERPL-SCNC: 4.2 MMOL/L (ref 3.5–5.2)
QT INTERVAL: 471 MS
RBC # BLD AUTO: 4.28 10*6/MM3 (ref 3.77–5.28)
SODIUM SERPL-SCNC: 135 MMOL/L (ref 136–145)
WBC # BLD AUTO: 11.9 10*3/MM3 (ref 3.4–10.8)

## 2021-04-11 PROCEDURE — 80048 BASIC METABOLIC PNL TOTAL CA: CPT | Performed by: INTERNAL MEDICINE

## 2021-04-11 PROCEDURE — G0378 HOSPITAL OBSERVATION PER HR: HCPCS

## 2021-04-11 PROCEDURE — 82962 GLUCOSE BLOOD TEST: CPT

## 2021-04-11 PROCEDURE — 63710000001 INSULIN ISOPHANE & REGULAR PER 5 UNITS: Performed by: INTERNAL MEDICINE

## 2021-04-11 PROCEDURE — 63710000001 INSULIN LISPRO (HUMAN) PER 5 UNITS: Performed by: INTERNAL MEDICINE

## 2021-04-11 PROCEDURE — 85027 COMPLETE CBC AUTOMATED: CPT | Performed by: INTERNAL MEDICINE

## 2021-04-11 PROCEDURE — 96376 TX/PRO/DX INJ SAME DRUG ADON: CPT

## 2021-04-11 PROCEDURE — 99217 PR OBSERVATION CARE DISCHARGE MANAGEMENT: CPT | Performed by: INTERNAL MEDICINE

## 2021-04-11 PROCEDURE — 25010000002 KETOROLAC TROMETHAMINE PER 15 MG: Performed by: INTERNAL MEDICINE

## 2021-04-11 RX ORDER — METHOCARBAMOL 500 MG/1
500 TABLET, FILM COATED ORAL NIGHTLY PRN
Qty: 6 TABLET | Refills: 0 | Status: ON HOLD | OUTPATIENT
Start: 2021-04-11 | End: 2022-02-27

## 2021-04-11 RX ORDER — SUCRALFATE 1 G/1
1 TABLET ORAL
Status: DISCONTINUED | OUTPATIENT
Start: 2021-04-11 | End: 2021-04-11 | Stop reason: HOSPADM

## 2021-04-11 RX ORDER — ONDANSETRON 4 MG/1
4 TABLET, ORALLY DISINTEGRATING ORAL EVERY 8 HOURS PRN
Qty: 20 TABLET | Refills: 0 | Status: ON HOLD | OUTPATIENT
Start: 2021-04-11 | End: 2022-02-27

## 2021-04-11 RX ORDER — LIDOCAINE 50 MG/G
1 PATCH TOPICAL
Qty: 6 EACH | Refills: 0 | Status: ON HOLD | OUTPATIENT
Start: 2021-04-12 | End: 2022-02-27

## 2021-04-11 RX ORDER — SUCRALFATE 1 G/1
1 TABLET ORAL
Qty: 90 TABLET | Refills: 0 | Status: ON HOLD | OUTPATIENT
Start: 2021-04-11 | End: 2022-02-27

## 2021-04-11 RX ADMIN — DEXAMETHASONE 1 MG: 0.5 TABLET ORAL at 08:54

## 2021-04-11 RX ADMIN — METOPROLOL TARTRATE 50 MG: 50 TABLET, FILM COATED ORAL at 08:54

## 2021-04-11 RX ADMIN — FLUOXETINE 40 MG: 20 CAPSULE ORAL at 08:54

## 2021-04-11 RX ADMIN — POTASSIUM CHLORIDE 20 MEQ: 1500 TABLET, EXTENDED RELEASE ORAL at 08:54

## 2021-04-11 RX ADMIN — METHOCARBAMOL 500 MG: 500 TABLET, FILM COATED ORAL at 08:54

## 2021-04-11 RX ADMIN — CEPHALEXIN 500 MG: 500 CAPSULE ORAL at 08:54

## 2021-04-11 RX ADMIN — LISINOPRIL 20 MG: 20 TABLET ORAL at 08:54

## 2021-04-11 RX ADMIN — Medication 10 ML: at 08:52

## 2021-04-11 RX ADMIN — TICAGRELOR 90 MG: 90 TABLET ORAL at 08:54

## 2021-04-11 RX ADMIN — FUROSEMIDE 40 MG: 40 TABLET ORAL at 08:54

## 2021-04-11 RX ADMIN — SUCRALFATE 1 G: 1 TABLET ORAL at 11:44

## 2021-04-11 RX ADMIN — INSULIN LISPRO 2 UNITS: 100 INJECTION, SOLUTION INTRAVENOUS; SUBCUTANEOUS at 11:44

## 2021-04-11 RX ADMIN — ASPIRIN 81 MG: 81 TABLET, COATED ORAL at 08:54

## 2021-04-11 RX ADMIN — INSULIN LISPRO 2 UNITS: 100 INJECTION, SOLUTION INTRAVENOUS; SUBCUTANEOUS at 08:53

## 2021-04-11 RX ADMIN — LIDOCAINE 1 PATCH: 50 PATCH TOPICAL at 08:58

## 2021-04-11 RX ADMIN — HYDRALAZINE HYDROCHLORIDE 10 MG: 10 TABLET, FILM COATED ORAL at 06:37

## 2021-04-11 RX ADMIN — ALPRAZOLAM 2 MG: 1 TABLET ORAL at 08:54

## 2021-04-11 RX ADMIN — INSULIN HUMAN 10 UNITS: 100 INJECTION, SUSPENSION SUBCUTANEOUS at 08:52

## 2021-04-11 RX ADMIN — KETOROLAC TROMETHAMINE 15 MG: 15 INJECTION, SOLUTION INTRAMUSCULAR; INTRAVENOUS at 06:37

## 2021-04-11 NOTE — PLAN OF CARE
Problem: Adult Inpatient Plan of Care  Goal: Plan of Care Review  Outcome: Ongoing, Progressing  Flowsheets (Taken 4/11/2021 0515)  Progress: improving  Plan of Care Reviewed With: patient  Outcome Summary: Patient has rested through the evening complaints of pain have been realted to chronic health issues, nothing acute at this time.   Goal Outcome Evaluation:  Plan of Care Reviewed With: patient  Progress: improving  Outcome Summary: Patient has rested through the evening complaints of pain have been realted to chronic health issues, nothing acute at this time.

## 2021-04-11 NOTE — PLAN OF CARE
Goal Outcome Evaluation:  Plan of Care Reviewed With: patient, daughter  Progress: improving  Outcome Summary: discharge pending

## 2021-04-11 NOTE — DISCHARGE SUMMARY
Date of Admission: 4/10/2021    Date of Discharge:  4/11/2021    Length of stay:  LOS: 0 days     Presenting Problem:   Chest pain, unspecified type [R07.9]      Principal and Active Diagnosis During Hospital Stay: Discharge diagnosis     Active Hospital Problems    Diagnosis  POA   • **Chest pain on breathing [R07.1]  Yes      Resolved Hospital Problems   No resolved problems to display.       Atypical right sided/epigastric chest pain  -ACS ruled out, seems more consistent with musculoskeletal pain versus anxiety versus possible GI  -Serial troponin  -EKG no acute ST changes  -CT PE protocol revealed no PE but did reveal some thickening of the esophagus, as well as gas in the stomach which could relate to the patient's symptoms  -Start Carafate for GI PPx  -Lidocaine patch pain control at home  -If persist can have further work-up as an outpatient     Coronary artery disease status post stent hypertension  -Stent reported 4 years ago  -Continue aspirin Brilinta metoprolol atorvastatin  -Stopped going to cardiologist recommend to resume cardiology follow-up as an outpatient     Type 2 diabetes  -Moderate control  -Continue home 70/30 insulin   -SSI, adjust prn     Anxiety  -Continue as needed Xanax     Recent procedure essential tremor  -Continue steroid taper     Recent knee cellulitis  -Has finished p.o. Keflex       Hospital Course  Patient is a 75 y.o. female presented with atypical right-sided chest pain as noted above.  Pain had actually been ongoing for several months.  She was admitted ACS was ruled out.  She underwent CT PE protocol that showed no pulmonary embolism.  At this point she was felt stable to discharge home on pain control regimen and will have close outpatient follow-up with her primary..        Procedures Performed:as above         Consults:   Consults     Date and Time Order Name Status Description    4/10/2021 10:48 AM Hospitalist (on-call MD unless specified) Completed            Pertinent Test Results:     Results from last 7 days   Lab Units 04/11/21  0318 04/10/21  1011   WBC 10*3/mm3 11.90* 14.00*   HEMOGLOBIN g/dL 12.4 12.3   HEMATOCRIT % 37.7 36.5   PLATELETS 10*3/mm3 253 302     Results from last 7 days   Lab Units 04/11/21  0318 04/10/21  1011   SODIUM mmol/L 135* 136   POTASSIUM mmol/L 4.2 4.0   CHLORIDE mmol/L 100 101   CO2 mmol/L 24.0 23.0   BUN mg/dL 34* 31*   CREATININE mg/dL 1.10* 0.98   CALCIUM mg/dL 8.1* 8.8   GLUCOSE mg/dL 210* 256*       Microbiology Results (last 10 days)     Procedure Component Value - Date/Time    COVID PRE-OP / PRE-PROCEDURE SCREENING ORDER (NO ISOLATION) - Swab, Nasopharynx [[id number]]  (Normal) Collected: 04/10/21 1143    Lab Status: Final result Specimen: Swab from Nasopharynx Updated: 04/10/21 2214    Narrative:      The following orders were created for panel order COVID PRE-OP / PRE-PROCEDURE SCREENING ORDER (NO ISOLATION) - Swab, Nasopharynx.  Procedure                               Abnormality         Status                     ---------                               -----------         ------                     COVID-19,APTIMA PANTHER,...[[id number]]  Normal              Final result                 Please view results for these tests on the individual orders.    COVID-19,APTIMA PANTHER,[facility] IN-HOUSE, NP/OP SWAB IN UTM/VTM/SALINE TRANSPORT MEDIA,24 HR TAT - Swab, Nasopharynx [[id number]]  (Normal) Collected: 04/10/21 1143    Lab Status: Final result Specimen: Swab from Nasopharynx Updated: 04/10/21 2214     COVID19 Not Detected    Narrative:      Fact sheet for providers: https://www.fda.gov/media/191610/download     Fact sheet for patients: https://www.fda.gov/media/439833/download    Test performed by RT PCR.              Imaging Results (All)     Procedure Component Value Units Date/Time    CT Chest Pulmonary Embolism [[id number]] Collected: 04/10/21 1650     Updated: 04/10/21 1656    Narrative:         DATE OF EXAM:  4/10/2021 3:04 PM      PROCEDURE:  CT CHEST PULMONARY EMBOLISM-     INDICATIONS:   PE suspected, low/intermediate prob, positive D-dimer; R07.9-Chest pain,  unspecified chest pain today. Shortness of breath today     COMPARISON:   No Comparisons Available     TECHNIQUE:  Routine transaxial slices were obtained through chest after  administration of intravenous 81 ml of Isovue 370. Reconstructed coronal  and sagittal images were also obtained. Automated exposure control and  iterative reconstruction methods were used.      FINDINGS:  CT the chest with contrast PE protocol reveals no evidence of mass or  adenopathy in the base the neck or in the periclavicular soft tissues or  the axillary soft tissues. No evidence of mass or adenopathy in the  mediastinum or in the right or left pulmonary kathy. The heart size  normal. No evidence of pericardial effusion. There is a questionable  thickening of the wall the mid and inferior thoracic esophagus. This  might be caused by contraction or possibly inflammation or infection.  Clinical correlation would BE helpful. The stomach is moderately  distended with gas and food and fluid. No evidence of mass or adenopathy  or fluid in the superior aspect of the abdomen. No evidence of thrombus  or embolus in the right or left pulmonary artery branches. No evidence  of infiltrate or effusion or pneumothorax or mass in the right or left  lungs.        Impression:         1. No evidence of thrombus or embolus in the right or left pulmonary  artery branches.  2. No acute disease in the chest.  3. Questionable thickening of the wall of the midthoracic esophagus and  inferior thoracic esophagus of uncertain etiology might be caused by  anatomical variation or contraction or inflammation or infection.  Clinical correlation would BE helpful.  4. The stomach is moderately distended with gas and food and fluid.     Electronically Signed By-Pramod Malik MD On:4/10/2021 4:54 PM  This report was finalized on  [id number] by  Pramod Malik MD.    XR Chest 1 View [[id number]] Collected: 04/10/21 1021     Updated: 04/10/21 1023    Narrative:      DATE OF EXAM:  4/10/2021 10:00 AM     PROCEDURE:  XR CHEST 1 VW-     INDICATIONS:  chest pain today     COMPARISON:  No Comparisons Available     TECHNIQUE:   Single radiographic AP view of the chest was obtained.     FINDINGS:  Single frontal view chest reveals heart and mediastinum are normal. No  evidence of infiltrate or effusion or pneumothorax or mass right or left  lungs        Impression:         1. No acute disease in the chest     Electronically Signed By-Pramod Malik MD On:4/10/2021 10:21 AM  This report was finalized on [id number] by  Pramod Malik MD.            Condition on Discharge: Chronically ill but stable    Vital Signs  Temp:  [97.5 °F (36.4 °C)-97.8 °F (36.6 °C)] 97.7 °F (36.5 °C)  Heart Rate:  [60-69] 60  Resp:  [12-18] 18  BP: (160-191)/(66-83) 178/78    Physical Exam:  Physical Exam  Constitutional:       General: She is not in acute distress.  Cardiovascular:      Rate and Rhythm: Normal rate.   Pulmonary:      Effort: Pulmonary effort is normal.   Abdominal:      General: There is no distension.      Palpations: Abdomen is soft.   Musculoskeletal:      Comments: Somewhat tender to palpate over the right chest wall   Skin:     General: Skin is warm.   Neurological:      Mental Status: She is alert and oriented to person, place, and time.   Psychiatric:      Comments: Extremely anxious         Discharge Disposition  Home or Self Care    Discharge Medications     Discharge Medications      New Medications      Instructions Start Date   lidocaine 5 %  Commonly known as: LIDODERM   1 patch, Transdermal, Every 24 Hours Scheduled, Remove & Discard patch within 12 hours or as directed by MD   Start Date: April 12, 2021     methocarbamol 500 MG tablet  Commonly known as: ROBAXIN   500 mg, Oral, Nightly PRN      ondansetron ODT 4 MG  disintegrating tablet  Commonly known as: Zofran ODT   4 mg, Translingual, Every 8 Hours PRN      sucralfate 1 g tablet  Commonly known as: CARAFATE   1 g, Oral, 3 Times Daily Before Meals         Continue These Medications      Instructions Start Date   ALPRAZolam 2 MG tablet  Commonly known as: XANAX   2 mg, Oral, 3 Times Daily PRN      aspirin 81 MG EC tablet   81 mg, Oral, Daily      atorvastatin 40 MG tablet  Commonly known as: LIPITOR   40 mg, Oral, Daily      dexamethasone 1 MG tablet  Commonly known as: DECADRON   1 mg, Oral, 2 Times Daily With Meals, Tapering dose needs 2 times today (4-10-21) then 1 daily x 3 days       FLUoxetine 40 MG capsule  Commonly known as: PROzac   40 mg, Oral, Daily      furosemide 40 MG tablet  Commonly known as: LASIX   40 mg, Oral, 2 Times Daily      ibuprofen 200 MG tablet  Commonly known as: ADVIL,MOTRIN   200 mg, Oral, Every 6 Hours PRN      insulin NPH-insulin regular (70-30) 100 UNIT/ML injection  Commonly known as: humuLIN 70/30,novoLIN 70/30   20 Units, Subcutaneous, 2 Times Daily With Meals      Jardiance 10 MG tablet  Generic drug: Empagliflozin   1 tablet, Oral, Daily      lisinopril 20 MG tablet  Commonly known as: PRINIVIL,ZESTRIL   20 mg, Oral, Daily      metoprolol tartrate 50 MG tablet  Commonly known as: LOPRESSOR   50 mg, Oral, 2 Times Daily      potassium chloride 10 MEQ CR tablet   20 mEq, Oral, Daily      ticagrelor 90 MG tablet tablet  Commonly known as: BRILINTA   90 mg, Oral, 2 Times Daily         Stop These Medications    cephalexin 500 MG capsule  Commonly known as: KEFLEX            Discharge Diet:   Diet Instructions     Diet: Cardiac      Discharge Diet: Cardiac          Activity at Discharge:   Activity Instructions     Gradually Increase Activity Until at Pre-Hospitalization Level            Follow-up Appointments  No future appointments.  Additional Instructions for the Follow-ups that You Need to Schedule     Discharge Follow-up with PCP   As  directed       Currently Documented PCP:    Kaela Layton APRN    PCP Phone Number:    [phone]     Follow Up Details: one week               Test Results Pending at Discharge       Risk for Readmission (LACE) Score: 1 (4/11/2021  6:01 AM)      This patient has been examined wearing appropriate Personal Protective Equipment . 04/11/21        Electronically signed by Jack Singh DO, 04/11/21, 10:16 EDT.

## 2021-04-12 NOTE — PROGRESS NOTES
Case Management Discharge Note                Selected Continued Care - Discharged on 4/11/2021 Admission date: 4/10/2021 - Discharge disposition: Home or Self Care                 Final Discharge Disposition Code: 01 - home or self-care

## 2021-09-26 ENCOUNTER — APPOINTMENT (OUTPATIENT)
Dept: GENERAL RADIOLOGY | Facility: HOSPITAL | Age: 76
End: 2021-09-26

## 2021-09-26 ENCOUNTER — HOSPITAL ENCOUNTER (EMERGENCY)
Facility: HOSPITAL | Age: 76
Discharge: HOME | End: 2021-09-26
Admitting: EMERGENCY MEDICINE

## 2021-09-26 VITALS
OXYGEN SATURATION: 90 % | TEMPERATURE: 98.1 F | WEIGHT: 160 LBS | HEART RATE: 87 BPM | DIASTOLIC BLOOD PRESSURE: 104 MMHG | SYSTOLIC BLOOD PRESSURE: 163 MMHG | BODY MASS INDEX: 28.35 KG/M2 | RESPIRATION RATE: 18 BRPM | HEIGHT: 63 IN

## 2021-09-26 DIAGNOSIS — Z91.14 VARIABLE COMPLIANCE WITH MEDICATION THERAPY: ICD-10-CM

## 2021-09-26 DIAGNOSIS — E16.2 HYPOGLYCEMIA: Primary | ICD-10-CM

## 2021-09-26 LAB
ALBUMIN SERPL-MCNC: 3.8 G/DL (ref 3.5–5.2)
ALBUMIN/GLOB SERPL: 1.5 G/DL
ALP SERPL-CCNC: 101 U/L (ref 39–117)
ALT SERPL W P-5'-P-CCNC: 11 U/L (ref 1–33)
ANION GAP SERPL CALCULATED.3IONS-SCNC: 13 MMOL/L (ref 5–15)
AST SERPL-CCNC: 17 U/L (ref 1–32)
BASOPHILS # BLD AUTO: 0.1 10*3/MM3 (ref 0–0.2)
BASOPHILS NFR BLD AUTO: 0.8 % (ref 0–1.5)
BILIRUB SERPL-MCNC: 0.4 MG/DL (ref 0–1.2)
BILIRUB UR QL STRIP: NEGATIVE
BUN SERPL-MCNC: 11 MG/DL (ref 8–23)
BUN/CREAT SERPL: 12.6 (ref 7–25)
CALCIUM SPEC-SCNC: 8.8 MG/DL (ref 8.6–10.5)
CHLORIDE SERPL-SCNC: 105 MMOL/L (ref 98–107)
CLARITY UR: CLEAR
CO2 SERPL-SCNC: 27 MMOL/L (ref 22–29)
COLOR UR: YELLOW
CREAT SERPL-MCNC: 0.87 MG/DL (ref 0.57–1)
DEPRECATED RDW RBC AUTO: 40.7 FL (ref 37–54)
EOSINOPHIL # BLD AUTO: 0.3 10*3/MM3 (ref 0–0.4)
EOSINOPHIL NFR BLD AUTO: 2.8 % (ref 0.3–6.2)
ERYTHROCYTE [DISTWIDTH] IN BLOOD BY AUTOMATED COUNT: 13.7 % (ref 12.3–15.4)
GFR SERPL CREATININE-BSD FRML MDRD: 63 ML/MIN/1.73
GLOBULIN UR ELPH-MCNC: 2.5 GM/DL
GLUCOSE BLDC GLUCOMTR-MCNC: 66 MG/DL (ref 70–105)
GLUCOSE BLDC GLUCOMTR-MCNC: 78 MG/DL (ref 70–105)
GLUCOSE BLDC GLUCOMTR-MCNC: 83 MG/DL (ref 70–105)
GLUCOSE SERPL-MCNC: 70 MG/DL (ref 65–99)
GLUCOSE UR STRIP-MCNC: ABNORMAL MG/DL
HCT VFR BLD AUTO: 36.5 % (ref 34–46.6)
HGB BLD-MCNC: 12.4 G/DL (ref 12–15.9)
HGB UR QL STRIP.AUTO: NEGATIVE
KETONES UR QL STRIP: NEGATIVE
LEUKOCYTE ESTERASE UR QL STRIP.AUTO: NEGATIVE
LYMPHOCYTES # BLD AUTO: 1.7 10*3/MM3 (ref 0.7–3.1)
LYMPHOCYTES NFR BLD AUTO: 18.7 % (ref 19.6–45.3)
MCH RBC QN AUTO: 28.9 PG (ref 26.6–33)
MCHC RBC AUTO-ENTMCNC: 34 G/DL (ref 31.5–35.7)
MCV RBC AUTO: 85 FL (ref 79–97)
MONOCYTES # BLD AUTO: 0.7 10*3/MM3 (ref 0.1–0.9)
MONOCYTES NFR BLD AUTO: 7.6 % (ref 5–12)
NEUTROPHILS NFR BLD AUTO: 6.5 10*3/MM3 (ref 1.7–7)
NEUTROPHILS NFR BLD AUTO: 70.1 % (ref 42.7–76)
NITRITE UR QL STRIP: NEGATIVE
NRBC BLD AUTO-RTO: 0 /100 WBC (ref 0–0.2)
PH UR STRIP.AUTO: 7 [PH] (ref 5–8)
PLATELET # BLD AUTO: 236 10*3/MM3 (ref 140–450)
PMV BLD AUTO: 9 FL (ref 6–12)
POTASSIUM SERPL-SCNC: 3.7 MMOL/L (ref 3.5–5.2)
PROT SERPL-MCNC: 6.3 G/DL (ref 6–8.5)
PROT UR QL STRIP: NEGATIVE
RBC # BLD AUTO: 4.29 10*6/MM3 (ref 3.77–5.28)
SARS-COV-2 RNA PNL SPEC NAA+PROBE: NOT DETECTED
SODIUM SERPL-SCNC: 145 MMOL/L (ref 136–145)
SP GR UR STRIP: 1.01 (ref 1–1.03)
UROBILINOGEN UR QL STRIP: ABNORMAL
WBC # BLD AUTO: 9.3 10*3/MM3 (ref 3.4–10.8)

## 2021-09-26 PROCEDURE — 96374 THER/PROPH/DIAG INJ IV PUSH: CPT

## 2021-09-26 PROCEDURE — 99283 EMERGENCY DEPT VISIT LOW MDM: CPT

## 2021-09-26 PROCEDURE — 25010000002 LORAZEPAM PER 2 MG: Performed by: NURSE PRACTITIONER

## 2021-09-26 PROCEDURE — 87635 SARS-COV-2 COVID-19 AMP PRB: CPT | Performed by: NURSE PRACTITIONER

## 2021-09-26 PROCEDURE — 71045 X-RAY EXAM CHEST 1 VIEW: CPT

## 2021-09-26 PROCEDURE — 82962 GLUCOSE BLOOD TEST: CPT

## 2021-09-26 PROCEDURE — 80053 COMPREHEN METABOLIC PANEL: CPT | Performed by: NURSE PRACTITIONER

## 2021-09-26 PROCEDURE — 81003 URINALYSIS AUTO W/O SCOPE: CPT | Performed by: NURSE PRACTITIONER

## 2021-09-26 PROCEDURE — 85025 COMPLETE CBC W/AUTO DIFF WBC: CPT | Performed by: NURSE PRACTITIONER

## 2021-09-26 RX ORDER — LORAZEPAM 2 MG/ML
1 INJECTION INTRAMUSCULAR ONCE
Status: COMPLETED | OUTPATIENT
Start: 2021-09-26 | End: 2021-09-26

## 2021-09-26 RX ADMIN — SODIUM CHLORIDE 500 ML: 9 INJECTION, SOLUTION INTRAVENOUS at 15:46

## 2021-09-26 RX ADMIN — LORAZEPAM 1 MG: 2 INJECTION INTRAMUSCULAR; INTRAVENOUS at 17:26

## 2021-09-26 NOTE — DISCHARGE INSTRUCTIONS
Please call primary care physician tomorrow to schedule an appointment to discuss and go over medication regimen.  Also please discuss medication administration assistance with [facility] and inquire whether they may help her administer her medications.  If they are unable to assist perhaps more skilled nursing care as needed to assist patient with medication administration and adherence.  Return to ER if worsening

## 2021-09-26 NOTE — ED NOTES
Pt says she feels like she has flegm in her throat. Pt says she used to wear O2 after she had a stent placed but guzman not wear it at home now.     Mario Pruitt RN  09/26/21 7119

## 2021-09-26 NOTE — ED NOTES
Pt given box lunch and water. Pt put on bedside toilet.      Mario Pruitt, ROXANNA  09/26/21 3498

## 2021-09-26 NOTE — ED PROVIDER NOTES
"Subjective   75-year-old  female presents to the emergency room with complaint of elevated and declining and very sporadic glucose levels.  Patient states she takes Metformin and insulin injections and patient admits that that sometimes she forgets to take her insulin or forgets that she is taken her insulin.  Patient denies fever, cough, congestion, vomiting, diarrhea, abdominal pain.  Onset: Last few days  Location: Generalized weakness  Duration: Few days  Character: Ups and downs  Aggravating/Alleviating Factors: noncompliance with medication/none  Radiation:none  Severity:moderate            Review of Systems   Constitutional: Negative for activity change, appetite change and fever.   HENT: Negative.    Eyes: Negative.    Respiratory: Negative for cough, chest tightness and shortness of breath.    Cardiovascular: Negative for chest pain, palpitations and leg swelling.   Gastrointestinal: Negative for abdominal pain, diarrhea, nausea and vomiting.   Endocrine: Negative.    Genitourinary: Negative for dysuria.   Musculoskeletal: Negative for arthralgias, gait problem, joint swelling and myalgias.   Skin: Negative for rash and wound.   Allergic/Immunologic: Negative.    Neurological: Positive for dizziness, weakness and light-headedness.   Hematological: Negative.    Psychiatric/Behavioral: The patient is nervous/anxious.        Past Medical History:   Diagnosis Date   • Anxiety    • CHF (congestive heart failure) (CMS/HCC)    • Depression    • Diabetes mellitus (CMS/HCC)    • Hyperlipidemia    • Hypertension    • Panic disorder     \"panic attacks\"   • Tremors of nervous system     \"essential tremors\"       Allergies   Allergen Reactions   • Gabapentin Hallucinations   • Morphine Hallucinations       Past Surgical History:   Procedure Laterality Date   • CORONARY ANGIOPLASTY WITH STENT PLACEMENT         Family History   Problem Relation Age of Onset   • Depression Sister    • Suicidality Other         " suicided   • Alcohol abuse Other    • Alcohol abuse Daughter    • Depression Other        Social History     Socioeconomic History   • Marital status:      Spouse name: Not on file   • Number of children: Not on file   • Years of education: Not on file   • Highest education level: Not on file   Tobacco Use   • Smoking status: Never Smoker   Substance and Sexual Activity   • Alcohol use: Not Currently   • Drug use: No   • Sexual activity: Defer           Objective   Physical Exam  Constitutional:       General: She is not in acute distress.     Appearance: She is not ill-appearing or toxic-appearing.   HENT:      Head: Normocephalic and atraumatic.      Mouth/Throat:      Mouth: Mucous membranes are moist.   Eyes:      Extraocular Movements: Extraocular movements intact.      Conjunctiva/sclera: Conjunctivae normal.      Pupils: Pupils are equal, round, and reactive to light.   Cardiovascular:      Rate and Rhythm: Normal rate.      Pulses: Normal pulses.   Pulmonary:      Effort: Pulmonary effort is normal.   Abdominal:      General: Bowel sounds are normal. There is no distension.      Palpations: Abdomen is soft. There is no mass.      Tenderness: There is no abdominal tenderness. There is no guarding or rebound.      Hernia: No hernia is present.   Musculoskeletal:         General: Normal range of motion.      Cervical back: Normal range of motion and neck supple.   Skin:     General: Skin is warm and dry.      Capillary Refill: Capillary refill takes less than 2 seconds.      Coloration: Skin is pale.   Neurological:      General: No focal deficit present.      Mental Status: She is alert and oriented to person, place, and time.   Psychiatric:         Mood and Affect: Mood normal.         Behavior: Behavior normal.         Thought Content: Thought content normal.         Judgment: Judgment normal.         Procedures           ED Course      Labs Reviewed   URINALYSIS W/ CULTURE IF INDICATED - Abnormal;  Notable for the following components:       Result Value    Glucose,  mg/dL (1+) (*)     All other components within normal limits    Narrative:     Urine microscopic not indicated.   CBC WITH AUTO DIFFERENTIAL - Abnormal; Notable for the following components:    Lymphocyte % 18.7 (*)     All other components within normal limits   POCT GLUCOSE FINGERSTICK - Abnormal; Notable for the following components:    Glucose 66 (*)     All other components within normal limits   COVID-19,CEPHEID/RODRIGUE/BDMAX,COR/CELIA/PAD/[facility] IN-HOUSE,NP SWAB IN TRANSPORT MEDIA 3-4 HR TAT, RT-PCR - Normal    Narrative:     Fact sheet for providers: https://www.fda.gov/media/234781/download     Fact sheet for patients: https://www.fda.gov/media/524556/download  Fact sheet for providers: https://www.fda.gov/media/296548/download    Fact sheet for patients: https://www.[employer].gov/media/733241/download    Test performed by PCR.   POCT GLUCOSE FINGERSTICK - Normal   POCT GLUCOSE FINGERSTICK - Normal   COMPREHENSIVE METABOLIC PANEL    Narrative:     GFR Normal >60  Chronic Kidney Disease <60  Kidney Failure <15     POCT GLUCOSE FINGERSTICK   CBC AND DIFFERENTIAL    Narrative:     The following orders were created for panel order CBC & Differential.  Procedure                               Abnormality         Status                     ---------                               -----------         ------                     CBC Auto Differential[[id number]]        Abnormal            Final result                 Please view results for these tests on the individual orders.     Medications   sodium chloride 0.9 % bolus 500 mL (0 mL Intravenous Stopped 9/26/21 1616)   LORazepam (ATIVAN) injection 1 mg (1 mg Intravenous Given 9/26/21 1726)     XR Chest 1 View    Result Date: 9/26/2021  There is no significant change when compared to the prior study. There is no evidence for acute cardiopulmonary process.  Electronically Signed By-Constantine Shrestha MD  On:9/26/2021 4:11 PM This report was finalized on [id number] by  Constantine Shrestha MD.                             Vitals:    09/26/21 1901   BP: (!) 163/104   Pulse: 87   Resp: 18   Temp:    SpO2: 90%     Patient placed on cardiac monitor duration level.  Patient remains nontoxic in appearance during ER stay and she is given 500 cc bolus of normal saline.  Patient presents with hypoglycemia and patient is given 2 cups of orange juice with added sugar and a meal while she is here and reports feeling better.  Patient also states that she has a history of anxiety and requests something for anxiety during ER visit.  1 mg of Ativan given via peripheral IV.  Otherwise normal work-up and ER stay.  Encouraged patient and family member that patient may need to upgrade care to skilled nursing to help patient administer her medications.  Both patient and daughter are amenable to this plan and daughter states that she plans to make an appointment for patient with her primary care physician for the next day or 2 and discussion with [facility] Case management about medication 's to patient        MDM  Number of Diagnoses or Management Options     Amount and/or Complexity of Data Reviewed  Clinical lab tests: reviewed  Tests in the radiology section of CPT®: reviewed    Discharge instructions:  Please call primary care physician tomorrow to schedule an appointment to discuss and go over medication regimen.  Also please discuss medication administration assistance with [facility] and inquire whether they may help her administer her medications.  If they are unable to assist perhaps more skilled nursing care as needed to assist patient with medication administration and adherence.  Return to ER if worsening      Final diagnoses:   Hypoglycemia   Variable compliance with medication therapy       ED Disposition  ED Disposition     ED Disposition Condition Comment    Discharge Stable           Bedan  Kaela Shah, APRN  [address] [address] [address]  [phone]    Schedule an appointment as soon as possible for a visit in 1 day  As needed, If symptoms worsen         Medication List      No changes were made to your prescriptions during this visit.          Marleny Lewis, APRN  09/26/21 2043

## 2021-09-26 NOTE — ED NOTES
Pt from [facility] and reports missing some doses of her insulin, usually taking insulin before breakfast and dinner. Pt says she's had diarrhea, denies nausea and vomiting. Pt says she felt bad last night and was hyperglycemic. Pt has a hard time remembering to take insulin some days and some days she takes insulin but doesn't eat.      Mario Pruitt, RN  09/26/21 3116

## 2021-09-26 NOTE — ED NOTES
Pt says she feels like she is having a panic attack, takes Xanax at home. Provider notified.     Mario Pruitt RN  09/26/21 4174

## 2022-01-01 ENCOUNTER — INPATIENT HOSPITAL (OUTPATIENT)
Dept: URBAN - METROPOLITAN AREA HOSPITAL 84 | Facility: HOSPITAL | Age: 77
End: 2022-01-01

## 2022-01-01 ENCOUNTER — ON CAMPUS - OUTPATIENT (OUTPATIENT)
Dept: URBAN - METROPOLITAN AREA HOSPITAL 85 | Facility: HOSPITAL | Age: 77
End: 2022-01-01
Payer: MEDICAID

## 2022-01-01 ENCOUNTER — ON CAMPUS - OUTPATIENT (OUTPATIENT)
Dept: URBAN - METROPOLITAN AREA HOSPITAL 85 | Facility: HOSPITAL | Age: 77
End: 2022-01-01

## 2022-01-01 DIAGNOSIS — R94.5 ABNORMAL RESULTS OF LIVER FUNCTION STUDIES: ICD-10-CM

## 2022-01-01 DIAGNOSIS — R10.11 RIGHT UPPER QUADRANT PAIN: ICD-10-CM

## 2022-01-01 DIAGNOSIS — K44.9 DIAPHRAGMATIC HERNIA WITHOUT OBSTRUCTION OR GANGRENE: ICD-10-CM

## 2022-01-01 DIAGNOSIS — K82.9 DISEASE OF GALLBLADDER, UNSPECIFIED: ICD-10-CM

## 2022-01-01 DIAGNOSIS — K59.00 CONSTIPATION, UNSPECIFIED: ICD-10-CM

## 2022-01-01 DIAGNOSIS — R10.31 RIGHT LOWER QUADRANT PAIN: ICD-10-CM

## 2022-01-01 DIAGNOSIS — K29.70 GASTRITIS, UNSPECIFIED, WITHOUT BLEEDING: ICD-10-CM

## 2022-01-01 DIAGNOSIS — K20.80 OTHER ESOPHAGITIS WITHOUT BLEEDING: ICD-10-CM

## 2022-01-01 DIAGNOSIS — R93.2 ABNORMAL FINDINGS ON DIAGNOSTIC IMAGING OF LIVER AND BILIARY: ICD-10-CM

## 2022-01-01 DIAGNOSIS — R93.3 ABNORMAL FINDINGS ON DIAGNOSTIC IMAGING OF OTHER PARTS OF DI: ICD-10-CM

## 2022-01-01 DIAGNOSIS — R19.4 CHANGE IN BOWEL HABIT: ICD-10-CM

## 2022-01-01 PROCEDURE — 43239 EGD BIOPSY SINGLE/MULTIPLE: CPT | Performed by: INTERNAL MEDICINE

## 2022-01-01 PROCEDURE — 99222 1ST HOSP IP/OBS MODERATE 55: CPT | Performed by: NURSE PRACTITIONER

## 2022-01-01 PROCEDURE — 99213 OFFICE O/P EST LOW 20 MIN: CPT | Performed by: NURSE PRACTITIONER

## 2022-02-26 ENCOUNTER — APPOINTMENT (OUTPATIENT)
Dept: GENERAL RADIOLOGY | Facility: HOSPITAL | Age: 77
End: 2022-02-26

## 2022-02-26 ENCOUNTER — HOSPITAL ENCOUNTER (INPATIENT)
Facility: HOSPITAL | Age: 77
LOS: 6 days | Discharge: NURSING, REHAB OR LONG-TERM CARE | End: 2022-03-04
Attending: EMERGENCY MEDICINE | Admitting: INTERNAL MEDICINE

## 2022-02-26 DIAGNOSIS — I50.9 ACUTE CONGESTIVE HEART FAILURE, UNSPECIFIED HEART FAILURE TYPE: ICD-10-CM

## 2022-02-26 DIAGNOSIS — F41.9 ANXIETY: ICD-10-CM

## 2022-02-26 DIAGNOSIS — J96.01 ACUTE RESPIRATORY FAILURE WITH HYPOXIA: Primary | ICD-10-CM

## 2022-02-26 DIAGNOSIS — I16.1 HYPERTENSIVE EMERGENCY: ICD-10-CM

## 2022-02-26 PROBLEM — I16.9 HYPERTENSIVE CRISIS: Status: ACTIVE | Noted: 2022-02-26

## 2022-02-26 LAB
ALBUMIN SERPL-MCNC: 3.8 G/DL (ref 3.5–5.2)
ALBUMIN/GLOB SERPL: 1.1 G/DL
ALP SERPL-CCNC: 128 U/L (ref 39–117)
ALT SERPL W P-5'-P-CCNC: 66 U/L (ref 1–33)
ANION GAP SERPL CALCULATED.3IONS-SCNC: 17 MMOL/L (ref 5–15)
APTT PPP: 24.7 SECONDS (ref 24–31)
ARTERIAL PATENCY WRIST A: NEGATIVE
AST SERPL-CCNC: 21 U/L (ref 1–32)
ATMOSPHERIC PRESS: ABNORMAL MM[HG]
BASE EXCESS BLDA CALC-SCNC: -2.2 MMOL/L (ref 0–3)
BDY SITE: ABNORMAL
BILIRUB SERPL-MCNC: 0.7 MG/DL (ref 0–1.2)
BUN SERPL-MCNC: 9 MG/DL (ref 8–23)
BUN/CREAT SERPL: 7.8 (ref 7–25)
CALCIUM SPEC-SCNC: 8.7 MG/DL (ref 8.6–10.5)
CHLORIDE SERPL-SCNC: 104 MMOL/L (ref 98–107)
CO2 BLDA-SCNC: 25.6 MMOL/L (ref 22–29)
CO2 SERPL-SCNC: 21 MMOL/L (ref 22–29)
CREAT SERPL-MCNC: 1.16 MG/DL (ref 0.57–1)
D-LACTATE SERPL-SCNC: 3.5 MMOL/L (ref 0.5–2)
DEPRECATED RDW RBC AUTO: 48.1 FL (ref 37–54)
ERYTHROCYTE [DISTWIDTH] IN BLOOD BY AUTOMATED COUNT: 15.3 % (ref 12.3–15.4)
GFR SERPL CREATININE-BSD FRML MDRD: 45 ML/MIN/1.73
GLOBULIN UR ELPH-MCNC: 3.4 GM/DL
GLUCOSE SERPL-MCNC: 223 MG/DL (ref 65–99)
HCO3 BLDA-SCNC: 24.2 MMOL/L (ref 21–28)
HCT VFR BLD AUTO: 41.7 % (ref 34–46.6)
HEMODILUTION: NO
HGB BLD-MCNC: 13.7 G/DL (ref 12–15.9)
INHALED O2 CONCENTRATION: 100 %
INR PPP: 1.07 (ref 0.93–1.1)
LYMPHOCYTES # BLD MANUAL: 8.47 10*3/MM3 (ref 0.7–3.1)
LYMPHOCYTES NFR BLD MANUAL: 1 % (ref 5–12)
MAGNESIUM SERPL-MCNC: 1.8 MG/DL (ref 1.6–2.4)
MCH RBC QN AUTO: 29.1 PG (ref 26.6–33)
MCHC RBC AUTO-ENTMCNC: 32.9 G/DL (ref 31.5–35.7)
MCV RBC AUTO: 88.5 FL (ref 79–97)
MODALITY: ABNORMAL
MONOCYTES # BLD: 0.17 10*3/MM3 (ref 0.1–0.9)
NEUTROPHILS # BLD AUTO: 7.8 10*3/MM3 (ref 1.7–7)
NEUTROPHILS NFR BLD MANUAL: 42 % (ref 42.7–76)
NEUTS BAND NFR BLD MANUAL: 5 % (ref 0–5)
NT-PROBNP SERPL-MCNC: 8134 PG/ML (ref 0–1800)
PATHOLOGY REVIEW: YES
PCO2 BLDA: 46.3 MM HG (ref 35–48)
PEEP RESPIRATORY: 8 CM[H2O]
PH BLDA: 7.33 PH UNITS (ref 7.35–7.45)
PLAT MORPH BLD: NORMAL
PLATELET # BLD AUTO: 395 10*3/MM3 (ref 140–450)
PMV BLD AUTO: 9 FL (ref 6–12)
PO2 BLDA: 244.1 MM HG (ref 83–108)
POTASSIUM SERPL-SCNC: 3.6 MMOL/L (ref 3.5–5.2)
PROLYMPHOCYTES NFR BLD MANUAL: 1 % (ref 0–0)
PROT SERPL-MCNC: 7.2 G/DL (ref 6–8.5)
PROTHROMBIN TIME: 11.8 SECONDS (ref 9.6–11.7)
RBC # BLD AUTO: 4.72 10*6/MM3 (ref 3.77–5.28)
RBC MORPH BLD: NORMAL
RESPIRATORY RATE: 24
SAO2 % BLDCOA: 99.8 % (ref 94–98)
SCAN SLIDE: NORMAL
SODIUM SERPL-SCNC: 142 MMOL/L (ref 136–145)
TOTAL RATE: 42 BREATHS/MINUTE
TROPONIN T SERPL-MCNC: <0.01 NG/ML (ref 0–0.03)
VARIANT LYMPHS NFR BLD MANUAL: 1 % (ref 0–5)
VARIANT LYMPHS NFR BLD MANUAL: 50 % (ref 19.6–45.3)
VT ON VENT VENT: 450 ML
WBC MORPH BLD: NORMAL
WBC NRBC COR # BLD: 16.6 10*3/MM3 (ref 3.4–10.8)

## 2022-02-26 PROCEDURE — 80143 DRUG ASSAY ACETAMINOPHEN: CPT | Performed by: NURSE PRACTITIONER

## 2022-02-26 PROCEDURE — 99222 1ST HOSP IP/OBS MODERATE 55: CPT | Performed by: NURSE PRACTITIONER

## 2022-02-26 PROCEDURE — 93005 ELECTROCARDIOGRAM TRACING: CPT | Performed by: EMERGENCY MEDICINE

## 2022-02-26 PROCEDURE — 99284 EMERGENCY DEPT VISIT MOD MDM: CPT

## 2022-02-26 PROCEDURE — 83880 ASSAY OF NATRIURETIC PEPTIDE: CPT | Performed by: EMERGENCY MEDICINE

## 2022-02-26 PROCEDURE — 36600 WITHDRAWAL OF ARTERIAL BLOOD: CPT

## 2022-02-26 PROCEDURE — 82803 BLOOD GASES ANY COMBINATION: CPT

## 2022-02-26 PROCEDURE — 84145 PROCALCITONIN (PCT): CPT | Performed by: NURSE PRACTITIONER

## 2022-02-26 PROCEDURE — 94799 UNLISTED PULMONARY SVC/PX: CPT

## 2022-02-26 PROCEDURE — 85025 COMPLETE CBC W/AUTO DIFF WBC: CPT | Performed by: EMERGENCY MEDICINE

## 2022-02-26 PROCEDURE — 83605 ASSAY OF LACTIC ACID: CPT

## 2022-02-26 PROCEDURE — 83735 ASSAY OF MAGNESIUM: CPT | Performed by: EMERGENCY MEDICINE

## 2022-02-26 PROCEDURE — 84484 ASSAY OF TROPONIN QUANT: CPT | Performed by: EMERGENCY MEDICINE

## 2022-02-26 PROCEDURE — 80053 COMPREHEN METABOLIC PANEL: CPT | Performed by: EMERGENCY MEDICINE

## 2022-02-26 PROCEDURE — 71045 X-RAY EXAM CHEST 1 VIEW: CPT

## 2022-02-26 PROCEDURE — 25010000002 FUROSEMIDE PER 20 MG

## 2022-02-26 PROCEDURE — 85007 BL SMEAR W/DIFF WBC COUNT: CPT | Performed by: EMERGENCY MEDICINE

## 2022-02-26 PROCEDURE — 25010000002 FENTANYL CITRATE (PF) 50 MCG/ML SOLUTION: Performed by: EMERGENCY MEDICINE

## 2022-02-26 PROCEDURE — 85730 THROMBOPLASTIN TIME PARTIAL: CPT | Performed by: EMERGENCY MEDICINE

## 2022-02-26 PROCEDURE — 85610 PROTHROMBIN TIME: CPT | Performed by: EMERGENCY MEDICINE

## 2022-02-26 PROCEDURE — 25010000002 ONDANSETRON PER 1 MG: Performed by: EMERGENCY MEDICINE

## 2022-02-26 PROCEDURE — 87040 BLOOD CULTURE FOR BACTERIA: CPT | Performed by: EMERGENCY MEDICINE

## 2022-02-26 PROCEDURE — 94660 CPAP INITIATION&MGMT: CPT

## 2022-02-26 RX ORDER — ONDANSETRON 2 MG/ML
4 INJECTION INTRAMUSCULAR; INTRAVENOUS ONCE
Status: COMPLETED | OUTPATIENT
Start: 2022-02-26 | End: 2022-02-26

## 2022-02-26 RX ORDER — NITROGLYCERIN 20 MG/100ML
10-50 INJECTION INTRAVENOUS
Status: DISCONTINUED | OUTPATIENT
Start: 2022-02-26 | End: 2022-02-27

## 2022-02-26 RX ORDER — FUROSEMIDE 10 MG/ML
INJECTION INTRAMUSCULAR; INTRAVENOUS
Status: COMPLETED
Start: 2022-02-26 | End: 2022-02-26

## 2022-02-26 RX ORDER — FUROSEMIDE 10 MG/ML
80 INJECTION INTRAMUSCULAR; INTRAVENOUS ONCE
Status: COMPLETED | OUTPATIENT
Start: 2022-02-26 | End: 2022-02-26

## 2022-02-26 RX ORDER — FENTANYL CITRATE 50 UG/ML
50 INJECTION, SOLUTION INTRAMUSCULAR; INTRAVENOUS ONCE
Status: COMPLETED | OUTPATIENT
Start: 2022-02-26 | End: 2022-02-26

## 2022-02-26 RX ORDER — NITROGLYCERIN 20 MG/100ML
INJECTION INTRAVENOUS
Status: COMPLETED
Start: 2022-02-26 | End: 2022-02-26

## 2022-02-26 RX ADMIN — FUROSEMIDE 80 MG: 10 INJECTION INTRAMUSCULAR; INTRAVENOUS at 21:59

## 2022-02-26 RX ADMIN — NITROGLYCERIN 10 MCG/MIN: 20 INJECTION INTRAVENOUS at 21:52

## 2022-02-26 RX ADMIN — FENTANYL CITRATE 50 MCG: 50 INJECTION, SOLUTION INTRAMUSCULAR; INTRAVENOUS at 22:32

## 2022-02-26 RX ADMIN — ONDANSETRON 4 MG: 2 INJECTION INTRAMUSCULAR; INTRAVENOUS at 22:32

## 2022-02-27 ENCOUNTER — APPOINTMENT (OUTPATIENT)
Dept: CARDIOLOGY | Facility: HOSPITAL | Age: 77
End: 2022-02-27

## 2022-02-27 PROBLEM — J18.9 PNEUMONIA, UNSPECIFIED ORGANISM: Status: ACTIVE | Noted: 2022-02-16

## 2022-02-27 PROBLEM — A41.9 SEPSIS, UNSPECIFIED ORGANISM (HCC): Status: ACTIVE | Noted: 2022-02-16

## 2022-02-27 PROBLEM — I25.5 ISCHEMIC CARDIOMYOPATHY: Chronic | Status: ACTIVE | Noted: 2022-02-27

## 2022-02-27 PROBLEM — I77.9 CAROTID ARTERY DISEASE (HCC): Status: ACTIVE | Noted: 2022-02-27

## 2022-02-27 PROBLEM — E78.2 HYPERLIPIDEMIA, MIXED: Status: ACTIVE | Noted: 2018-04-09

## 2022-02-27 PROBLEM — L03.119 CELLULITIS OF FOOT: Status: ACTIVE | Noted: 2018-05-09

## 2022-02-27 PROBLEM — J96.01 ACUTE RESPIRATORY FAILURE WITH HYPOXIA: Status: ACTIVE | Noted: 2022-02-16

## 2022-02-27 PROBLEM — H35.00 RETINOPATHY, BILATERAL: Status: ACTIVE | Noted: 2021-05-03

## 2022-02-27 PROBLEM — J18.9 SEPSIS DUE TO PNEUMONIA (HCC): Status: ACTIVE | Noted: 2022-02-16

## 2022-02-27 PROBLEM — I42.9 CARDIOMYOPATHY: Status: ACTIVE | Noted: 2018-07-23

## 2022-02-27 PROBLEM — I25.10 ATHEROSCLEROTIC HEART DISEASE OF NATIVE CORONARY ARTERY WITHOUT ANGINA PECTORIS: Status: ACTIVE | Noted: 2022-02-16

## 2022-02-27 PROBLEM — G25.0 DISABLING ESSENTIAL TREMOR: Status: ACTIVE | Noted: 2021-04-05

## 2022-02-27 PROBLEM — Z95.5 S/P RIGHT CORONARY ARTERY (RCA) STENT PLACEMENT: Status: ACTIVE | Noted: 2018-07-23

## 2022-02-27 PROBLEM — R79.89 ELEVATED LFTS: Status: ACTIVE | Noted: 2022-02-27

## 2022-02-27 PROBLEM — R53.83 FATIGUE: Status: ACTIVE | Noted: 2018-06-21

## 2022-02-27 PROBLEM — F03.90 DEMENTIA: Status: ACTIVE | Noted: 2022-02-27

## 2022-02-27 PROBLEM — D64.9 ANEMIA: Status: ACTIVE | Noted: 2022-02-16

## 2022-02-27 PROBLEM — I50.9 CONGESTIVE HEART FAILURE (HCC): Status: ACTIVE | Noted: 2018-06-12

## 2022-02-27 PROBLEM — J96.21 ACUTE AND CHRONIC RESPIRATORY FAILURE WITH HYPOXIA: Status: ACTIVE | Noted: 2022-02-16

## 2022-02-27 PROBLEM — F32.1 CURRENT MODERATE EPISODE OF MAJOR DEPRESSIVE DISORDER WITHOUT PRIOR EPISODE: Status: ACTIVE | Noted: 2021-02-01

## 2022-02-27 PROBLEM — F32.A DEPRESSION, UNSPECIFIED: Status: ACTIVE | Noted: 2022-02-16

## 2022-02-27 PROBLEM — M54.9 BACK PAIN: Status: ACTIVE | Noted: 2018-01-10

## 2022-02-27 PROBLEM — Z86.73 PERSONAL HISTORY OF TRANSIENT ISCHEMIC ATTACK (TIA), AND CEREBRAL INFARCTION WITHOUT RESIDUAL DEFICITS: Status: ACTIVE | Noted: 2022-02-16

## 2022-02-27 PROBLEM — H53.453 PERIPHERAL VISION LOSS, BILATERAL: Status: ACTIVE | Noted: 2021-05-03

## 2022-02-27 LAB
ALBUMIN SERPL-MCNC: 3.3 G/DL (ref 3.5–5.2)
ALBUMIN/GLOB SERPL: 1.1 G/DL
ALP SERPL-CCNC: 101 U/L (ref 39–117)
ALT SERPL W P-5'-P-CCNC: 51 U/L (ref 1–33)
AMMONIA BLD-SCNC: 14 UMOL/L (ref 11–51)
ANION GAP SERPL CALCULATED.3IONS-SCNC: 15 MMOL/L (ref 5–15)
APAP SERPL-MCNC: <5 MCG/ML (ref 0–30)
ARTERIAL PATENCY WRIST A: POSITIVE
AST SERPL-CCNC: 16 U/L (ref 1–32)
ATMOSPHERIC PRESS: ABNORMAL MM[HG]
B PARAPERT DNA SPEC QL NAA+PROBE: NOT DETECTED
B PERT DNA SPEC QL NAA+PROBE: NOT DETECTED
BACTERIA UR QL AUTO: ABNORMAL /HPF
BASE EXCESS BLDA CALC-SCNC: 2.4 MMOL/L (ref 0–3)
BASOPHILS # BLD AUTO: 0 10*3/MM3 (ref 0–0.2)
BASOPHILS NFR BLD AUTO: 0.2 % (ref 0–1.5)
BDY SITE: ABNORMAL
BH CV ECHO MEAS - ACS: 1.17 CM
BH CV ECHO MEAS - AO MAX PG: 18 MMHG
BH CV ECHO MEAS - AO MEAN PG: 7.9 MMHG
BH CV ECHO MEAS - AO ROOT DIAM: 2.6 CM
BH CV ECHO MEAS - AO V2 MAX: 212.1 CM/SEC
BH CV ECHO MEAS - AO V2 VTI: 40.8 CM
BH CV ECHO MEAS - AVA(I,D): 1.43 CM2
BH CV ECHO MEAS - EDV(CUBED): 72.1 ML
BH CV ECHO MEAS - EDV(MOD-SP4): 108.8 ML
BH CV ECHO MEAS - EF(MOD-SP4): 42.1 %
BH CV ECHO MEAS - ESV(CUBED): 41.8 ML
BH CV ECHO MEAS - ESV(MOD-SP4): 63 ML
BH CV ECHO MEAS - FS: 16.7 %
BH CV ECHO MEAS - IVS/LVPW: 1.19 CM
BH CV ECHO MEAS - IVSD: 1.28 CM
BH CV ECHO MEAS - LA DIMENSION(2D): 3.7 CM
BH CV ECHO MEAS - LV DIASTOLIC VOL/BSA (35-75): 62.6 CM2
BH CV ECHO MEAS - LV MASS(C)D: 170.4 GRAMS
BH CV ECHO MEAS - LV MAX PG: 3.1 MMHG
BH CV ECHO MEAS - LV MEAN PG: 1.5 MMHG
BH CV ECHO MEAS - LV SYSTOLIC VOL/BSA (12-30): 36.2 CM2
BH CV ECHO MEAS - LV V1 MAX: 87.8 CM/SEC
BH CV ECHO MEAS - LV V1 VTI: 18.5 CM
BH CV ECHO MEAS - LVIDD: 4.2 CM
BH CV ECHO MEAS - LVIDS: 3.5 CM
BH CV ECHO MEAS - LVOT AREA: 3.2 CM2
BH CV ECHO MEAS - LVOT DIAM: 2 CM
BH CV ECHO MEAS - LVPWD: 1.07 CM
BH CV ECHO MEAS - MR MAX PG: 76.9 MMHG
BH CV ECHO MEAS - MR MAX VEL: 438.5 CM/SEC
BH CV ECHO MEAS - MR MEAN PG: 46.5 MMHG
BH CV ECHO MEAS - MR MEAN VEL: 320 CM/SEC
BH CV ECHO MEAS - MR VTI: 168.1 CM
BH CV ECHO MEAS - MV A MAX VEL: 110.5 CM/SEC
BH CV ECHO MEAS - MV DEC SLOPE: 394.4 CM/SEC2
BH CV ECHO MEAS - MV DEC TIME: 0.21 MSEC
BH CV ECHO MEAS - MV E MAX VEL: 83.3 CM/SEC
BH CV ECHO MEAS - MV E/A: 0.75
BH CV ECHO MEAS - MV MAX PG: 5.9 MMHG
BH CV ECHO MEAS - MV MEAN PG: 2.38 MMHG
BH CV ECHO MEAS - MV V2 VTI: 24.2 CM
BH CV ECHO MEAS - MVA(VTI): 2.4 CM2
BH CV ECHO MEAS - PA ACC TIME: 0.09 SEC
BH CV ECHO MEAS - PA PR(ACCEL): 39.2 MMHG
BH CV ECHO MEAS - PA V2 MAX: 120.9 CM/SEC
BH CV ECHO MEAS - RAP SYSTOLE: 15 MMHG
BH CV ECHO MEAS - RV MAX PG: 4.8 MMHG
BH CV ECHO MEAS - RV V1 MAX: 109.3 CM/SEC
BH CV ECHO MEAS - RV V1 VTI: 19.3 CM
BH CV ECHO MEAS - RVDD: 2.9 CM
BH CV ECHO MEAS - RVSP: 40.5 MMHG
BH CV ECHO MEAS - SI(MOD-SP4): 26.3 ML/M2
BH CV ECHO MEAS - SV(LVOT): 58.3 ML
BH CV ECHO MEAS - SV(MOD-SP4): 45.8 ML
BH CV ECHO MEAS - TR MAX PG: 25.5 MMHG
BH CV ECHO MEAS - TR MAX VEL: 252.5 CM/SEC
BILIRUB SERPL-MCNC: 0.8 MG/DL (ref 0–1.2)
BILIRUB UR QL STRIP: NEGATIVE
BUN SERPL-MCNC: 11 MG/DL (ref 8–23)
BUN/CREAT SERPL: 10.4 (ref 7–25)
C PNEUM DNA NPH QL NAA+NON-PROBE: NOT DETECTED
CALCIUM SPEC-SCNC: 8.3 MG/DL (ref 8.6–10.5)
CHLORIDE SERPL-SCNC: 104 MMOL/L (ref 98–107)
CLARITY UR: CLEAR
CO2 BLDA-SCNC: 28.7 MMOL/L (ref 22–29)
CO2 SERPL-SCNC: 24 MMOL/L (ref 22–29)
COLOR UR: YELLOW
CREAT SERPL-MCNC: 1.06 MG/DL (ref 0.57–1)
D-LACTATE SERPL-SCNC: 2.2 MMOL/L (ref 0.5–2)
D-LACTATE SERPL-SCNC: 3.6 MMOL/L (ref 0.5–2)
D-LACTATE SERPL-SCNC: 4.9 MMOL/L (ref 0.5–2)
DEPRECATED RDW RBC AUTO: 45.1 FL (ref 37–54)
EOSINOPHIL # BLD AUTO: 0 10*3/MM3 (ref 0–0.4)
EOSINOPHIL NFR BLD AUTO: 0 % (ref 0.3–6.2)
ERYTHROCYTE [DISTWIDTH] IN BLOOD BY AUTOMATED COUNT: 14.8 % (ref 12.3–15.4)
ETHANOL UR QL: <0.01 %
FLUAV SUBTYP SPEC NAA+PROBE: NOT DETECTED
FLUBV RNA ISLT QL NAA+PROBE: NOT DETECTED
GFR SERPL CREATININE-BSD FRML MDRD: 50 ML/MIN/1.73
GLOBULIN UR ELPH-MCNC: 3.1 GM/DL
GLUCOSE BLDC GLUCOMTR-MCNC: 209 MG/DL (ref 70–105)
GLUCOSE BLDC GLUCOMTR-MCNC: 238 MG/DL (ref 70–105)
GLUCOSE BLDC GLUCOMTR-MCNC: 293 MG/DL (ref 70–105)
GLUCOSE BLDC GLUCOMTR-MCNC: 402 MG/DL (ref 70–105)
GLUCOSE SERPL-MCNC: 223 MG/DL (ref 65–99)
GLUCOSE UR STRIP-MCNC: ABNORMAL MG/DL
HADV DNA SPEC NAA+PROBE: NOT DETECTED
HAV IGM SERPL QL IA: NORMAL
HBV CORE IGM SERPL QL IA: NORMAL
HBV SURFACE AG SERPL QL IA: NORMAL
HCO3 BLDA-SCNC: 27.4 MMOL/L (ref 21–28)
HCOV 229E RNA SPEC QL NAA+PROBE: NOT DETECTED
HCOV HKU1 RNA SPEC QL NAA+PROBE: NOT DETECTED
HCOV NL63 RNA SPEC QL NAA+PROBE: NOT DETECTED
HCOV OC43 RNA SPEC QL NAA+PROBE: NOT DETECTED
HCT VFR BLD AUTO: 39.6 % (ref 34–46.6)
HCV AB SER DONR QL: NORMAL
HEMODILUTION: NO
HGB BLD-MCNC: 13.1 G/DL (ref 12–15.9)
HGB UR QL STRIP.AUTO: ABNORMAL
HMPV RNA NPH QL NAA+NON-PROBE: NOT DETECTED
HPIV1 RNA ISLT QL NAA+PROBE: NOT DETECTED
HPIV2 RNA SPEC QL NAA+PROBE: NOT DETECTED
HPIV3 RNA NPH QL NAA+PROBE: NOT DETECTED
HPIV4 P GENE NPH QL NAA+PROBE: NOT DETECTED
HYALINE CASTS UR QL AUTO: ABNORMAL /LPF
INHALED O2 CONCENTRATION: 50 %
KETONES UR QL STRIP: NEGATIVE
L PNEUMO1 AG UR QL IA: NEGATIVE
LEUKOCYTE ESTERASE UR QL STRIP.AUTO: ABNORMAL
LYMPHOCYTES # BLD AUTO: 0.7 10*3/MM3 (ref 0.7–3.1)
LYMPHOCYTES NFR BLD AUTO: 8 % (ref 19.6–45.3)
M PNEUMO IGG SER IA-ACNC: NOT DETECTED
MAGNESIUM SERPL-MCNC: 1.5 MG/DL (ref 1.6–2.4)
MAGNESIUM SERPL-MCNC: 2.6 MG/DL (ref 1.6–2.4)
MAXIMAL PREDICTED HEART RATE: 144 BPM
MCH RBC QN AUTO: 29.1 PG (ref 26.6–33)
MCHC RBC AUTO-ENTMCNC: 33 G/DL (ref 31.5–35.7)
MCV RBC AUTO: 88.1 FL (ref 79–97)
MODALITY: ABNORMAL
MONOCYTES # BLD AUTO: 0.1 10*3/MM3 (ref 0.1–0.9)
MONOCYTES NFR BLD AUTO: 0.6 % (ref 5–12)
MRSA DNA SPEC QL NAA+PROBE: ABNORMAL
NEUTROPHILS NFR BLD AUTO: 7.7 10*3/MM3 (ref 1.7–7)
NEUTROPHILS NFR BLD AUTO: 91.2 % (ref 42.7–76)
NITRITE UR QL STRIP: NEGATIVE
NRBC BLD AUTO-RTO: 0.1 /100 WBC (ref 0–0.2)
NT-PROBNP SERPL-MCNC: ABNORMAL PG/ML (ref 0–1800)
PCO2 BLDA: 42.9 MM HG (ref 35–48)
PEEP RESPIRATORY: 8 CM[H2O]
PH BLDA: 7.41 PH UNITS (ref 7.35–7.45)
PH UR STRIP.AUTO: 7 [PH] (ref 5–8)
PLATELET # BLD AUTO: 274 10*3/MM3 (ref 140–450)
PMV BLD AUTO: 8.9 FL (ref 6–12)
PO2 BLDA: 81.2 MM HG (ref 83–108)
POTASSIUM SERPL-SCNC: 3.2 MMOL/L (ref 3.5–5.2)
POTASSIUM SERPL-SCNC: 3.5 MMOL/L (ref 3.5–5.2)
PROCALCITONIN SERPL-MCNC: 0.04 NG/ML (ref 0–0.25)
PROT SERPL-MCNC: 6.4 G/DL (ref 6–8.5)
PROT UR QL STRIP: NEGATIVE
QT INTERVAL: 342 MS
QT INTERVAL: 477 MS
QT INTERVAL: 501 MS
RBC # BLD AUTO: 4.49 10*6/MM3 (ref 3.77–5.28)
RBC # UR STRIP: ABNORMAL /HPF
REF LAB TEST METHOD: ABNORMAL
RESPIRATORY RATE: 24
RHINOVIRUS RNA SPEC NAA+PROBE: NOT DETECTED
RSV RNA NPH QL NAA+NON-PROBE: NOT DETECTED
S PNEUM AG SPEC QL LA: NEGATIVE
SAO2 % BLDCOA: 96 % (ref 94–98)
SARS-COV-2 RNA NPH QL NAA+NON-PROBE: NOT DETECTED
SARS-COV-2 RNA PNL SPEC NAA+PROBE: NOT DETECTED
SODIUM SERPL-SCNC: 143 MMOL/L (ref 136–145)
SP GR UR STRIP: 1.01 (ref 1–1.03)
SQUAMOUS #/AREA URNS HPF: ABNORMAL /HPF
STRESS TARGET HR: 122 BPM
TOBRAMYCIN RAND SERPL-MCNC: 8.5 MCG/ML (ref 0.5–10)
TOTAL RATE: 24 BREATHS/MINUTE
TROPONIN T SERPL-MCNC: <0.01 NG/ML (ref 0–0.03)
TROPONIN T SERPL-MCNC: <0.01 NG/ML (ref 0–0.03)
UROBILINOGEN UR QL STRIP: ABNORMAL
VT ON VENT VENT: 450 ML
WBC # UR STRIP: ABNORMAL /HPF
WBC NRBC COR # BLD: 8.4 10*3/MM3 (ref 3.4–10.8)

## 2022-02-27 PROCEDURE — 93005 ELECTROCARDIOGRAM TRACING: CPT | Performed by: NURSE PRACTITIONER

## 2022-02-27 PROCEDURE — 25010000002 CEFEPIME PER 500 MG: Performed by: NURSE PRACTITIONER

## 2022-02-27 PROCEDURE — 63710000001 INSULIN ISOPHANE & REGULAR PER 5 UNITS: Performed by: HOSPITALIST

## 2022-02-27 PROCEDURE — 94660 CPAP INITIATION&MGMT: CPT

## 2022-02-27 PROCEDURE — 82962 GLUCOSE BLOOD TEST: CPT

## 2022-02-27 PROCEDURE — 85025 COMPLETE CBC W/AUTO DIFF WBC: CPT | Performed by: NURSE PRACTITIONER

## 2022-02-27 PROCEDURE — 25010000002 SULFUR HEXAFLUORIDE MICROSPH 60.7-25 MG RECONSTITUTED SUSPENSION: Performed by: HOSPITALIST

## 2022-02-27 PROCEDURE — 94799 UNLISTED PULMONARY SVC/PX: CPT

## 2022-02-27 PROCEDURE — 93306 TTE W/DOPPLER COMPLETE: CPT

## 2022-02-27 PROCEDURE — 83880 ASSAY OF NATRIURETIC PEPTIDE: CPT | Performed by: HOSPITALIST

## 2022-02-27 PROCEDURE — 87899 AGENT NOS ASSAY W/OPTIC: CPT | Performed by: NURSE PRACTITIONER

## 2022-02-27 PROCEDURE — 25010000002 METHYLPREDNISOLONE PER 40 MG: Performed by: NURSE PRACTITIONER

## 2022-02-27 PROCEDURE — 25010000002 CEFTRIAXONE PER 250 MG: Performed by: EMERGENCY MEDICINE

## 2022-02-27 PROCEDURE — 82140 ASSAY OF AMMONIA: CPT | Performed by: NURSE PRACTITIONER

## 2022-02-27 PROCEDURE — P9612 CATHETERIZE FOR URINE SPEC: HCPCS

## 2022-02-27 PROCEDURE — 25010000002 FUROSEMIDE PER 20 MG: Performed by: NURSE PRACTITIONER

## 2022-02-27 PROCEDURE — 25010000002 TOBRAMYCIN PER 80 MG: Performed by: NURSE PRACTITIONER

## 2022-02-27 PROCEDURE — 25010000002 VANCOMYCIN 10 G RECONSTITUTED SOLUTION: Performed by: HOSPITALIST

## 2022-02-27 PROCEDURE — 82803 BLOOD GASES ANY COMBINATION: CPT

## 2022-02-27 PROCEDURE — 36600 WITHDRAWAL OF ARTERIAL BLOOD: CPT

## 2022-02-27 PROCEDURE — 84484 ASSAY OF TROPONIN QUANT: CPT | Performed by: NURSE PRACTITIONER

## 2022-02-27 PROCEDURE — 84132 ASSAY OF SERUM POTASSIUM: CPT | Performed by: HOSPITALIST

## 2022-02-27 PROCEDURE — 83605 ASSAY OF LACTIC ACID: CPT | Performed by: NURSE PRACTITIONER

## 2022-02-27 PROCEDURE — 0 POTASSIUM CHLORIDE 10 MEQ/100ML SOLUTION: Performed by: NURSE PRACTITIONER

## 2022-02-27 PROCEDURE — 80053 COMPREHEN METABOLIC PANEL: CPT | Performed by: NURSE PRACTITIONER

## 2022-02-27 PROCEDURE — 83605 ASSAY OF LACTIC ACID: CPT

## 2022-02-27 PROCEDURE — 25010000002 MAGNESIUM SULFATE 2 GM/50ML SOLUTION: Performed by: NURSE PRACTITIONER

## 2022-02-27 PROCEDURE — 36415 COLL VENOUS BLD VENIPUNCTURE: CPT | Performed by: NURSE PRACTITIONER

## 2022-02-27 PROCEDURE — 80200 ASSAY OF TOBRAMYCIN: CPT | Performed by: NURSE PRACTITIONER

## 2022-02-27 PROCEDURE — 83735 ASSAY OF MAGNESIUM: CPT | Performed by: HOSPITALIST

## 2022-02-27 PROCEDURE — 81001 URINALYSIS AUTO W/SCOPE: CPT | Performed by: NURSE PRACTITIONER

## 2022-02-27 PROCEDURE — 63710000001 INSULIN LISPRO (HUMAN) PER 5 UNITS: Performed by: NURSE PRACTITIONER

## 2022-02-27 PROCEDURE — 83036 HEMOGLOBIN GLYCOSYLATED A1C: CPT | Performed by: NURSE PRACTITIONER

## 2022-02-27 PROCEDURE — 87641 MR-STAPH DNA AMP PROBE: CPT | Performed by: NURSE PRACTITIONER

## 2022-02-27 PROCEDURE — 0202U NFCT DS 22 TRGT SARS-COV-2: CPT | Performed by: NURSE PRACTITIONER

## 2022-02-27 PROCEDURE — 94640 AIRWAY INHALATION TREATMENT: CPT

## 2022-02-27 PROCEDURE — 87635 SARS-COV-2 COVID-19 AMP PRB: CPT | Performed by: EMERGENCY MEDICINE

## 2022-02-27 PROCEDURE — 99233 SBSQ HOSP IP/OBS HIGH 50: CPT | Performed by: HOSPITALIST

## 2022-02-27 PROCEDURE — 93306 TTE W/DOPPLER COMPLETE: CPT | Performed by: INTERNAL MEDICINE

## 2022-02-27 PROCEDURE — 63710000001 INSULIN LISPRO (HUMAN) PER 5 UNITS: Performed by: HOSPITALIST

## 2022-02-27 PROCEDURE — 83735 ASSAY OF MAGNESIUM: CPT | Performed by: NURSE PRACTITIONER

## 2022-02-27 PROCEDURE — 93010 ELECTROCARDIOGRAM REPORT: CPT | Performed by: INTERNAL MEDICINE

## 2022-02-27 PROCEDURE — 82077 ASSAY SPEC XCP UR&BREATH IA: CPT | Performed by: NURSE PRACTITIONER

## 2022-02-27 PROCEDURE — 25010000002 VANCOMYCIN PER 500 MG: Performed by: HOSPITALIST

## 2022-02-27 PROCEDURE — 80074 ACUTE HEPATITIS PANEL: CPT | Performed by: NURSE PRACTITIONER

## 2022-02-27 RX ORDER — PANTOPRAZOLE SODIUM 40 MG/10ML
40 INJECTION, POWDER, LYOPHILIZED, FOR SOLUTION INTRAVENOUS
Status: DISCONTINUED | OUTPATIENT
Start: 2022-02-27 | End: 2022-02-27

## 2022-02-27 RX ORDER — OLANZAPINE 10 MG/2ML
1 INJECTION, POWDER, LYOPHILIZED, FOR SOLUTION INTRAMUSCULAR AS NEEDED
Status: DISCONTINUED | OUTPATIENT
Start: 2022-02-27 | End: 2022-03-04 | Stop reason: HOSPADM

## 2022-02-27 RX ORDER — MAGNESIUM SULFATE HEPTAHYDRATE 40 MG/ML
2 INJECTION, SOLUTION INTRAVENOUS AS NEEDED
Status: DISCONTINUED | OUTPATIENT
Start: 2022-02-27 | End: 2022-03-04 | Stop reason: HOSPADM

## 2022-02-27 RX ORDER — MAGNESIUM SULFATE HEPTAHYDRATE 40 MG/ML
4 INJECTION, SOLUTION INTRAVENOUS AS NEEDED
Status: DISCONTINUED | OUTPATIENT
Start: 2022-02-27 | End: 2022-03-04 | Stop reason: HOSPADM

## 2022-02-27 RX ORDER — METOPROLOL SUCCINATE 50 MG/1
50 TABLET, EXTENDED RELEASE ORAL EVERY MORNING
COMMUNITY

## 2022-02-27 RX ORDER — FUROSEMIDE 10 MG/ML
40 INJECTION INTRAMUSCULAR; INTRAVENOUS EVERY 8 HOURS
Status: DISCONTINUED | OUTPATIENT
Start: 2022-02-27 | End: 2022-02-27

## 2022-02-27 RX ORDER — FLUDROCORTISONE ACETATE 0.1 MG/1
0.1 TABLET ORAL DAILY
Status: DISCONTINUED | OUTPATIENT
Start: 2022-02-27 | End: 2022-03-04 | Stop reason: HOSPADM

## 2022-02-27 RX ORDER — CALCIUM GLUCONATE 20 MG/ML
2 INJECTION, SOLUTION INTRAVENOUS AS NEEDED
Status: DISCONTINUED | OUTPATIENT
Start: 2022-02-27 | End: 2022-03-04 | Stop reason: HOSPADM

## 2022-02-27 RX ORDER — ONDANSETRON 4 MG/1
4 TABLET, FILM COATED ORAL EVERY 6 HOURS PRN
Status: DISCONTINUED | OUTPATIENT
Start: 2022-02-27 | End: 2022-03-04 | Stop reason: HOSPADM

## 2022-02-27 RX ORDER — CALCIUM GLUCONATE 20 MG/ML
1 INJECTION, SOLUTION INTRAVENOUS AS NEEDED
Status: DISCONTINUED | OUTPATIENT
Start: 2022-02-27 | End: 2022-03-04 | Stop reason: HOSPADM

## 2022-02-27 RX ORDER — IPRATROPIUM BROMIDE AND ALBUTEROL SULFATE 2.5; .5 MG/3ML; MG/3ML
3 SOLUTION RESPIRATORY (INHALATION)
Status: DISCONTINUED | OUTPATIENT
Start: 2022-02-27 | End: 2022-03-02

## 2022-02-27 RX ORDER — ONDANSETRON 2 MG/ML
4 INJECTION INTRAMUSCULAR; INTRAVENOUS EVERY 6 HOURS PRN
Status: DISCONTINUED | OUTPATIENT
Start: 2022-02-27 | End: 2022-03-04 | Stop reason: HOSPADM

## 2022-02-27 RX ORDER — NICOTINE POLACRILEX 4 MG
15 LOZENGE BUCCAL
Status: DISCONTINUED | OUTPATIENT
Start: 2022-02-27 | End: 2022-03-04 | Stop reason: HOSPADM

## 2022-02-27 RX ORDER — SODIUM CHLORIDE 0.9 % (FLUSH) 0.9 %
10 SYRINGE (ML) INJECTION AS NEEDED
Status: DISCONTINUED | OUTPATIENT
Start: 2022-02-27 | End: 2022-03-04 | Stop reason: HOSPADM

## 2022-02-27 RX ORDER — DEXTROSE MONOHYDRATE 25 G/50ML
25 INJECTION, SOLUTION INTRAVENOUS
Status: DISCONTINUED | OUTPATIENT
Start: 2022-02-27 | End: 2022-03-04 | Stop reason: HOSPADM

## 2022-02-27 RX ORDER — FENTANYL/ROPIVACAINE/NS/PF 2-625MCG/1
15 PLASTIC BAG, INJECTION (ML) EPIDURAL AS NEEDED
Status: DISCONTINUED | OUTPATIENT
Start: 2022-02-27 | End: 2022-03-04 | Stop reason: HOSPADM

## 2022-02-27 RX ORDER — INSULIN LISPRO 100 [IU]/ML
0-14 INJECTION, SOLUTION INTRAVENOUS; SUBCUTANEOUS AS NEEDED
Status: DISCONTINUED | OUTPATIENT
Start: 2022-02-27 | End: 2022-02-27

## 2022-02-27 RX ORDER — ACETAMINOPHEN 325 MG/1
650 TABLET ORAL EVERY 4 HOURS PRN
Status: DISCONTINUED | OUTPATIENT
Start: 2022-02-27 | End: 2022-03-04 | Stop reason: HOSPADM

## 2022-02-27 RX ORDER — FUROSEMIDE 10 MG/ML
40 INJECTION INTRAMUSCULAR; INTRAVENOUS EVERY 12 HOURS
Status: DISCONTINUED | OUTPATIENT
Start: 2022-02-28 | End: 2022-03-01

## 2022-02-27 RX ORDER — NITROGLYCERIN 20 MG/100ML
10-50 INJECTION INTRAVENOUS
Status: DISCONTINUED | OUTPATIENT
Start: 2022-02-27 | End: 2022-02-27

## 2022-02-27 RX ORDER — ASPIRIN 81 MG/1
81 TABLET ORAL DAILY
Status: DISCONTINUED | OUTPATIENT
Start: 2022-02-27 | End: 2022-03-04 | Stop reason: HOSPADM

## 2022-02-27 RX ORDER — ALPRAZOLAM 1 MG/1
2 TABLET ORAL 3 TIMES DAILY PRN
Status: DISCONTINUED | OUTPATIENT
Start: 2022-02-27 | End: 2022-03-04 | Stop reason: HOSPADM

## 2022-02-27 RX ORDER — SODIUM CHLORIDE 0.9 % (FLUSH) 0.9 %
10 SYRINGE (ML) INJECTION EVERY 12 HOURS SCHEDULED
Status: DISCONTINUED | OUTPATIENT
Start: 2022-02-27 | End: 2022-03-04 | Stop reason: HOSPADM

## 2022-02-27 RX ORDER — ACETAMINOPHEN 650 MG/1
650 SUPPOSITORY RECTAL EVERY 4 HOURS PRN
Status: DISCONTINUED | OUTPATIENT
Start: 2022-02-27 | End: 2022-03-04 | Stop reason: HOSPADM

## 2022-02-27 RX ORDER — INSULIN LISPRO 100 [IU]/ML
0-14 INJECTION, SOLUTION INTRAVENOUS; SUBCUTANEOUS EVERY 6 HOURS SCHEDULED
Status: DISCONTINUED | OUTPATIENT
Start: 2022-02-27 | End: 2022-02-27

## 2022-02-27 RX ORDER — POTASSIUM CHLORIDE 20 MEQ/1
20 TABLET, EXTENDED RELEASE ORAL DAILY
Status: DISCONTINUED | OUTPATIENT
Start: 2022-02-27 | End: 2022-03-01

## 2022-02-27 RX ORDER — NITROGLYCERIN 0.4 MG/1
0.4 TABLET SUBLINGUAL
Status: DISCONTINUED | OUTPATIENT
Start: 2022-02-27 | End: 2022-03-04 | Stop reason: HOSPADM

## 2022-02-27 RX ORDER — FLUOXETINE HYDROCHLORIDE 20 MG/1
20 CAPSULE ORAL DAILY
Status: DISCONTINUED | OUTPATIENT
Start: 2022-02-27 | End: 2022-03-01

## 2022-02-27 RX ORDER — METHYLPREDNISOLONE SODIUM SUCCINATE 40 MG/ML
40 INJECTION, POWDER, LYOPHILIZED, FOR SOLUTION INTRAMUSCULAR; INTRAVENOUS EVERY 8 HOURS
Status: DISCONTINUED | OUTPATIENT
Start: 2022-02-27 | End: 2022-02-27

## 2022-02-27 RX ORDER — TIZANIDINE 2 MG/1
2 TABLET ORAL EVERY 8 HOURS PRN
COMMUNITY
End: 2022-08-10 | Stop reason: HOSPADM

## 2022-02-27 RX ORDER — METOPROLOL SUCCINATE 50 MG/1
50 TABLET, EXTENDED RELEASE ORAL DAILY
Status: DISCONTINUED | OUTPATIENT
Start: 2022-02-27 | End: 2022-03-04 | Stop reason: HOSPADM

## 2022-02-27 RX ORDER — FUROSEMIDE 20 MG/1
20 TABLET ORAL DAILY
Status: DISCONTINUED | OUTPATIENT
Start: 2022-02-27 | End: 2022-02-27

## 2022-02-27 RX ORDER — FLUDROCORTISONE ACETATE 0.1 MG/1
0.1 TABLET ORAL DAILY
Status: ON HOLD | COMMUNITY
End: 2022-10-07

## 2022-02-27 RX ORDER — INSULIN LISPRO 100 [IU]/ML
0-14 INJECTION, SOLUTION INTRAVENOUS; SUBCUTANEOUS
Status: DISCONTINUED | OUTPATIENT
Start: 2022-02-27 | End: 2022-03-04 | Stop reason: HOSPADM

## 2022-02-27 RX ORDER — ATORVASTATIN CALCIUM 40 MG/1
40 TABLET, FILM COATED ORAL NIGHTLY
Status: DISCONTINUED | OUTPATIENT
Start: 2022-02-27 | End: 2022-03-04 | Stop reason: HOSPADM

## 2022-02-27 RX ORDER — INSULIN LISPRO 100 [IU]/ML
0-14 INJECTION, SOLUTION INTRAVENOUS; SUBCUTANEOUS AS NEEDED
Status: DISCONTINUED | OUTPATIENT
Start: 2022-02-27 | End: 2022-03-04 | Stop reason: HOSPADM

## 2022-02-27 RX ORDER — POTASSIUM CHLORIDE 7.45 MG/ML
10 INJECTION INTRAVENOUS
Status: DISCONTINUED | OUTPATIENT
Start: 2022-02-27 | End: 2022-03-01

## 2022-02-27 RX ORDER — TIZANIDINE 2 MG/1
2 TABLET ORAL EVERY 8 HOURS PRN
Status: DISCONTINUED | OUTPATIENT
Start: 2022-02-27 | End: 2022-03-04 | Stop reason: HOSPADM

## 2022-02-27 RX ORDER — ACETAMINOPHEN 160 MG/5ML
650 SOLUTION ORAL EVERY 4 HOURS PRN
Status: DISCONTINUED | OUTPATIENT
Start: 2022-02-27 | End: 2022-03-04 | Stop reason: HOSPADM

## 2022-02-27 RX ADMIN — NITROGLYCERIN 10 MCG/MIN: 20 INJECTION INTRAVENOUS at 04:55

## 2022-02-27 RX ADMIN — POTASSIUM CHLORIDE 10 MEQ: 10 INJECTION, SOLUTION INTRAVENOUS at 11:44

## 2022-02-27 RX ADMIN — MAGNESIUM SULFATE HEPTAHYDRATE 2 G: 2 INJECTION, SOLUTION INTRAVENOUS at 09:11

## 2022-02-27 RX ADMIN — ALPRAZOLAM 2 MG: 1 TABLET ORAL at 16:07

## 2022-02-27 RX ADMIN — INSULIN LISPRO 5 UNITS: 100 INJECTION, SOLUTION INTRAVENOUS; SUBCUTANEOUS at 19:43

## 2022-02-27 RX ADMIN — IPRATROPIUM BROMIDE AND ALBUTEROL SULFATE 3 ML: .5; 3 SOLUTION RESPIRATORY (INHALATION) at 23:13

## 2022-02-27 RX ADMIN — IPRATROPIUM BROMIDE AND ALBUTEROL SULFATE 3 ML: .5; 3 SOLUTION RESPIRATORY (INHALATION) at 14:52

## 2022-02-27 RX ADMIN — INSULIN LISPRO 8 UNITS: 100 INJECTION, SOLUTION INTRAVENOUS; SUBCUTANEOUS at 13:27

## 2022-02-27 RX ADMIN — TOBRAMYCIN SULFATE 300 MG: 40 INJECTION, SOLUTION INTRAMUSCULAR; INTRAVENOUS at 04:54

## 2022-02-27 RX ADMIN — MAGNESIUM SULFATE HEPTAHYDRATE 2 G: 2 INJECTION, SOLUTION INTRAVENOUS at 11:44

## 2022-02-27 RX ADMIN — METHYLPREDNISOLONE SODIUM SUCCINATE 40 MG: 40 INJECTION, POWDER, FOR SOLUTION INTRAMUSCULAR; INTRAVENOUS at 06:44

## 2022-02-27 RX ADMIN — EMPAGLIFLOZIN 10 MG: 10 TABLET, FILM COATED ORAL at 16:07

## 2022-02-27 RX ADMIN — POTASSIUM CHLORIDE 20 MEQ: 1500 TABLET, EXTENDED RELEASE ORAL at 21:56

## 2022-02-27 RX ADMIN — METOPROLOL SUCCINATE 50 MG: 50 TABLET, EXTENDED RELEASE ORAL at 16:07

## 2022-02-27 RX ADMIN — ALPRAZOLAM 2 MG: 1 TABLET ORAL at 22:00

## 2022-02-27 RX ADMIN — IPRATROPIUM BROMIDE AND ALBUTEROL SULFATE 3 ML: .5; 3 SOLUTION RESPIRATORY (INHALATION) at 11:23

## 2022-02-27 RX ADMIN — ATORVASTATIN CALCIUM 40 MG: 40 TABLET, FILM COATED ORAL at 20:35

## 2022-02-27 RX ADMIN — IPRATROPIUM BROMIDE AND ALBUTEROL SULFATE 3 ML: .5; 3 SOLUTION RESPIRATORY (INHALATION) at 06:46

## 2022-02-27 RX ADMIN — INSULIN HUMAN 20 UNITS: 100 INJECTION, SUSPENSION SUBCUTANEOUS at 17:09

## 2022-02-27 RX ADMIN — INSULIN LISPRO 14 UNITS: 100 INJECTION, SOLUTION INTRAVENOUS; SUBCUTANEOUS at 17:08

## 2022-02-27 RX ADMIN — CEFEPIME HYDROCHLORIDE 2 G: 2 INJECTION, POWDER, FOR SOLUTION INTRAVENOUS at 16:07

## 2022-02-27 RX ADMIN — FLUOXETINE 20 MG: 20 CAPSULE ORAL at 16:08

## 2022-02-27 RX ADMIN — POTASSIUM CHLORIDE 10 MEQ: 10 INJECTION, SOLUTION INTRAVENOUS at 09:12

## 2022-02-27 RX ADMIN — FLUDROCORTISONE ACETATE 0.1 MG: 0.1 TABLET ORAL at 16:07

## 2022-02-27 RX ADMIN — POTASSIUM CHLORIDE 10 MEQ: 10 INJECTION, SOLUTION INTRAVENOUS at 14:25

## 2022-02-27 RX ADMIN — Medication 10 ML: at 08:34

## 2022-02-27 RX ADMIN — Medication 10 ML: at 20:35

## 2022-02-27 RX ADMIN — IPRATROPIUM BROMIDE AND ALBUTEROL SULFATE 3 ML: .5; 3 SOLUTION RESPIRATORY (INHALATION) at 03:00

## 2022-02-27 RX ADMIN — MAGNESIUM SULFATE HEPTAHYDRATE 2 G: 2 INJECTION, SOLUTION INTRAVENOUS at 12:50

## 2022-02-27 RX ADMIN — PANTOPRAZOLE SODIUM 40 MG: 40 INJECTION, POWDER, FOR SOLUTION INTRAVENOUS at 06:45

## 2022-02-27 RX ADMIN — TICAGRELOR 90 MG: 90 TABLET ORAL at 20:35

## 2022-02-27 RX ADMIN — IPRATROPIUM BROMIDE AND ALBUTEROL SULFATE 3 ML: .5; 3 SOLUTION RESPIRATORY (INHALATION) at 20:15

## 2022-02-27 RX ADMIN — INSULIN LISPRO 5 UNITS: 100 INJECTION, SOLUTION INTRAVENOUS; SUBCUTANEOUS at 06:46

## 2022-02-27 RX ADMIN — ASPIRIN 81 MG: 81 TABLET, COATED ORAL at 16:07

## 2022-02-27 RX ADMIN — WATER 1 G: 1000 INJECTION, SOLUTION INTRAVENOUS at 00:13

## 2022-02-27 RX ADMIN — DOXYCYCLINE 100 MG: 100 INJECTION, POWDER, LYOPHILIZED, FOR SOLUTION INTRAVENOUS at 00:11

## 2022-02-27 RX ADMIN — SULFUR HEXAFLUORIDE 3 ML: KIT at 09:37

## 2022-02-27 RX ADMIN — POTASSIUM CHLORIDE 10 MEQ: 10 INJECTION, SOLUTION INTRAVENOUS at 12:50

## 2022-02-27 RX ADMIN — CEFEPIME HYDROCHLORIDE 2 G: 2 INJECTION, POWDER, FOR SOLUTION INTRAVENOUS at 06:42

## 2022-02-27 RX ADMIN — FUROSEMIDE 40 MG: 10 INJECTION, SOLUTION INTRAVENOUS at 06:44

## 2022-02-27 RX ADMIN — VANCOMYCIN HYDROCHLORIDE 1500 MG: 10 INJECTION, POWDER, LYOPHILIZED, FOR SOLUTION INTRAVENOUS at 14:10

## 2022-02-27 RX ADMIN — FUROSEMIDE 40 MG: 10 INJECTION, SOLUTION INTRAVENOUS at 13:27

## 2022-02-28 ENCOUNTER — APPOINTMENT (OUTPATIENT)
Dept: NUCLEAR MEDICINE | Facility: HOSPITAL | Age: 77
End: 2022-02-28

## 2022-02-28 LAB
ALBUMIN SERPL-MCNC: 3.2 G/DL (ref 3.5–5.2)
ALBUMIN/GLOB SERPL: 1.2 G/DL
ALP SERPL-CCNC: 87 U/L (ref 39–117)
ALT SERPL W P-5'-P-CCNC: 39 U/L (ref 1–33)
ANION GAP SERPL CALCULATED.3IONS-SCNC: 13 MMOL/L (ref 5–15)
ARTERIAL PATENCY WRIST A: POSITIVE
AST SERPL-CCNC: 16 U/L (ref 1–32)
ATMOSPHERIC PRESS: ABNORMAL MM[HG]
BASE EXCESS BLDA CALC-SCNC: 3.6 MMOL/L (ref 0–3)
BASOPHILS # BLD AUTO: 0 10*3/MM3 (ref 0–0.2)
BASOPHILS NFR BLD AUTO: 0.2 % (ref 0–1.5)
BDY SITE: ABNORMAL
BH CV NUCLEAR PRIOR STUDY: 3
BH CV REST NUCLEAR ISOTOPE DOSE: 7.5 MCI
BH CV STRESS BP STAGE 1: NORMAL
BH CV STRESS BP STAGE 2: NORMAL
BH CV STRESS COMMENTS STAGE 1: NORMAL
BH CV STRESS COMMENTS STAGE 2: NORMAL
BH CV STRESS DOSE REGADENOSON STAGE 1: 0.4
BH CV STRESS DURATION MIN STAGE 1: 0
BH CV STRESS DURATION MIN STAGE 2: 4
BH CV STRESS DURATION SEC STAGE 1: 10
BH CV STRESS DURATION SEC STAGE 2: 0
BH CV STRESS HR STAGE 1: 85
BH CV STRESS HR STAGE 2: 84
BH CV STRESS NUCLEAR ISOTOPE DOSE: 21.9 MCI
BH CV STRESS PROTOCOL 1: NORMAL
BH CV STRESS RECOVERY BP: NORMAL MMHG
BH CV STRESS RECOVERY HR: 84 BPM
BH CV STRESS STAGE 1: 1
BH CV STRESS STAGE 2: 2
BILIRUB SERPL-MCNC: 0.5 MG/DL (ref 0–1.2)
BUN SERPL-MCNC: 23 MG/DL (ref 8–23)
BUN/CREAT SERPL: 15 (ref 7–25)
CALCIUM SPEC-SCNC: 8 MG/DL (ref 8.6–10.5)
CHLORIDE SERPL-SCNC: 100 MMOL/L (ref 98–107)
CO2 BLDA-SCNC: 26.2 MMOL/L (ref 22–29)
CO2 SERPL-SCNC: 26 MMOL/L (ref 22–29)
CREAT SERPL-MCNC: 1.53 MG/DL (ref 0.57–1)
DEPRECATED RDW RBC AUTO: 46.8 FL (ref 37–54)
EOSINOPHIL # BLD AUTO: 0 10*3/MM3 (ref 0–0.4)
EOSINOPHIL NFR BLD AUTO: 0 % (ref 0.3–6.2)
ERYTHROCYTE [DISTWIDTH] IN BLOOD BY AUTOMATED COUNT: 15.3 % (ref 12.3–15.4)
GFR SERPL CREATININE-BSD FRML MDRD: 33 ML/MIN/1.73
GLOBULIN UR ELPH-MCNC: 2.6 GM/DL
GLUCOSE BLDC GLUCOMTR-MCNC: 114 MG/DL (ref 70–105)
GLUCOSE BLDC GLUCOMTR-MCNC: 118 MG/DL (ref 70–105)
GLUCOSE BLDC GLUCOMTR-MCNC: 119 MG/DL (ref 70–105)
GLUCOSE BLDC GLUCOMTR-MCNC: 165 MG/DL (ref 70–105)
GLUCOSE BLDC GLUCOMTR-MCNC: 172 MG/DL (ref 70–105)
GLUCOSE SERPL-MCNC: 168 MG/DL (ref 65–99)
HBA1C MFR BLD: 7.5 % (ref 3.5–5.6)
HCO3 BLDA-SCNC: 25.3 MMOL/L (ref 21–28)
HCT VFR BLD AUTO: 33.5 % (ref 34–46.6)
HEMODILUTION: NO
HGB BLD-MCNC: 11.2 G/DL (ref 12–15.9)
INHALED O2 CONCENTRATION: 45 %
LAB AP CASE REPORT: NORMAL
LV EF NUC BP: 39 %
LYMPHOCYTES # BLD AUTO: 1.2 10*3/MM3 (ref 0.7–3.1)
LYMPHOCYTES NFR BLD AUTO: 7.8 % (ref 19.6–45.3)
MAGNESIUM SERPL-MCNC: 2.4 MG/DL (ref 1.6–2.4)
MAXIMAL PREDICTED HEART RATE: 144 BPM
MCH RBC QN AUTO: 28.9 PG (ref 26.6–33)
MCHC RBC AUTO-ENTMCNC: 33.3 G/DL (ref 31.5–35.7)
MCV RBC AUTO: 86.8 FL (ref 79–97)
MODALITY: ABNORMAL
MONOCYTES # BLD AUTO: 0.6 10*3/MM3 (ref 0.1–0.9)
MONOCYTES NFR BLD AUTO: 4.3 % (ref 5–12)
NEUTROPHILS NFR BLD AUTO: 12.9 10*3/MM3 (ref 1.7–7)
NEUTROPHILS NFR BLD AUTO: 87.7 % (ref 42.7–76)
NRBC BLD AUTO-RTO: 0.1 /100 WBC (ref 0–0.2)
PATH REPORT.FINAL DX SPEC: NORMAL
PCO2 BLDA: 28.5 MM HG (ref 35–48)
PERCENT MAX PREDICTED HR: 59.03 %
PH BLDA: 7.55 PH UNITS (ref 7.35–7.45)
PHOSPHATE SERPL-MCNC: 4.2 MG/DL (ref 2.5–4.5)
PLATELET # BLD AUTO: 273 10*3/MM3 (ref 140–450)
PMV BLD AUTO: 9.1 FL (ref 6–12)
PO2 BLDA: 47.4 MM HG (ref 83–108)
POTASSIUM SERPL-SCNC: 3.2 MMOL/L (ref 3.5–5.2)
PROT SERPL-MCNC: 5.8 G/DL (ref 6–8.5)
RBC # BLD AUTO: 3.86 10*6/MM3 (ref 3.77–5.28)
SAO2 % BLDCOA: 88.9 % (ref 94–98)
SODIUM SERPL-SCNC: 139 MMOL/L (ref 136–145)
STRESS BASELINE BP: NORMAL MMHG
STRESS BASELINE HR: 82 BPM
STRESS PERCENT HR: 69 %
STRESS POST PEAK BP: NORMAL MMHG
STRESS POST PEAK HR: 85 BPM
STRESS TARGET HR: 122 BPM
VANCOMYCIN SERPL-MCNC: 9.2 MCG/ML (ref 5–40)
WBC NRBC COR # BLD: 14.8 10*3/MM3 (ref 3.4–10.8)

## 2022-02-28 PROCEDURE — 94660 CPAP INITIATION&MGMT: CPT

## 2022-02-28 PROCEDURE — A9502 TC99M TETROFOSMIN: HCPCS | Performed by: INTERNAL MEDICINE

## 2022-02-28 PROCEDURE — 82803 BLOOD GASES ANY COMBINATION: CPT

## 2022-02-28 PROCEDURE — 94799 UNLISTED PULMONARY SVC/PX: CPT

## 2022-02-28 PROCEDURE — 36415 COLL VENOUS BLD VENIPUNCTURE: CPT | Performed by: NURSE PRACTITIONER

## 2022-02-28 PROCEDURE — 25010000002 FUROSEMIDE PER 20 MG: Performed by: HOSPITALIST

## 2022-02-28 PROCEDURE — 85025 COMPLETE CBC W/AUTO DIFF WBC: CPT | Performed by: NURSE PRACTITIONER

## 2022-02-28 PROCEDURE — 93018 CV STRESS TEST I&R ONLY: CPT | Performed by: INTERNAL MEDICINE

## 2022-02-28 PROCEDURE — 94761 N-INVAS EAR/PLS OXIMETRY MLT: CPT

## 2022-02-28 PROCEDURE — 78452 HT MUSCLE IMAGE SPECT MULT: CPT | Performed by: INTERNAL MEDICINE

## 2022-02-28 PROCEDURE — 78452 HT MUSCLE IMAGE SPECT MULT: CPT

## 2022-02-28 PROCEDURE — 25010000002 VANCOMYCIN HCL 1.25 G RECONSTITUTED SOLUTION 1 EACH VIAL: Performed by: HOSPITALIST

## 2022-02-28 PROCEDURE — 82962 GLUCOSE BLOOD TEST: CPT

## 2022-02-28 PROCEDURE — 99233 SBSQ HOSP IP/OBS HIGH 50: CPT | Performed by: INTERNAL MEDICINE

## 2022-02-28 PROCEDURE — 36600 WITHDRAWAL OF ARTERIAL BLOOD: CPT

## 2022-02-28 PROCEDURE — 99222 1ST HOSP IP/OBS MODERATE 55: CPT | Performed by: INTERNAL MEDICINE

## 2022-02-28 PROCEDURE — 93017 CV STRESS TEST TRACING ONLY: CPT

## 2022-02-28 PROCEDURE — 25010000002 REGADENOSON 0.4 MG/5ML SOLUTION: Performed by: INTERNAL MEDICINE

## 2022-02-28 PROCEDURE — 0 TECHNETIUM TETROFOSMIN KIT: Performed by: INTERNAL MEDICINE

## 2022-02-28 PROCEDURE — 84100 ASSAY OF PHOSPHORUS: CPT | Performed by: HOSPITALIST

## 2022-02-28 PROCEDURE — 63710000001 INSULIN ISOPHANE & REGULAR PER 5 UNITS: Performed by: HOSPITALIST

## 2022-02-28 PROCEDURE — 63710000001 INSULIN LISPRO (HUMAN) PER 5 UNITS: Performed by: HOSPITALIST

## 2022-02-28 PROCEDURE — 80053 COMPREHEN METABOLIC PANEL: CPT | Performed by: NURSE PRACTITIONER

## 2022-02-28 PROCEDURE — 25010000002 CEFEPIME PER 500 MG: Performed by: NURSE PRACTITIONER

## 2022-02-28 PROCEDURE — 80202 ASSAY OF VANCOMYCIN: CPT | Performed by: HOSPITALIST

## 2022-02-28 PROCEDURE — 83735 ASSAY OF MAGNESIUM: CPT | Performed by: NURSE PRACTITIONER

## 2022-02-28 PROCEDURE — 97162 PT EVAL MOD COMPLEX 30 MIN: CPT

## 2022-02-28 RX ADMIN — FLUDROCORTISONE ACETATE 0.1 MG: 0.1 TABLET ORAL at 10:09

## 2022-02-28 RX ADMIN — FLUOXETINE 20 MG: 20 CAPSULE ORAL at 10:09

## 2022-02-28 RX ADMIN — Medication 10 ML: at 10:11

## 2022-02-28 RX ADMIN — FUROSEMIDE 40 MG: 10 INJECTION, SOLUTION INTRAVENOUS at 01:46

## 2022-02-28 RX ADMIN — EMPAGLIFLOZIN 10 MG: 10 TABLET, FILM COATED ORAL at 10:10

## 2022-02-28 RX ADMIN — ALPRAZOLAM 2 MG: 1 TABLET ORAL at 07:09

## 2022-02-28 RX ADMIN — METOPROLOL SUCCINATE 50 MG: 50 TABLET, EXTENDED RELEASE ORAL at 10:10

## 2022-02-28 RX ADMIN — VANCOMYCIN HYDROCHLORIDE 1250 MG: 1.25 INJECTION, POWDER, LYOPHILIZED, FOR SOLUTION INTRAVENOUS at 12:56

## 2022-02-28 RX ADMIN — CEFEPIME HYDROCHLORIDE 2 G: 2 INJECTION, POWDER, FOR SOLUTION INTRAVENOUS at 05:07

## 2022-02-28 RX ADMIN — IPRATROPIUM BROMIDE AND ALBUTEROL SULFATE 3 ML: .5; 3 SOLUTION RESPIRATORY (INHALATION) at 08:00

## 2022-02-28 RX ADMIN — INSULIN HUMAN 20 UNITS: 100 INJECTION, SUSPENSION SUBCUTANEOUS at 10:10

## 2022-02-28 RX ADMIN — ALPRAZOLAM 2 MG: 1 TABLET ORAL at 16:03

## 2022-02-28 RX ADMIN — REGADENOSON 0.4 MG: 0.08 INJECTION, SOLUTION INTRAVENOUS at 11:50

## 2022-02-28 RX ADMIN — TETROFOSMIN 1 DOSE: 1.38 INJECTION, POWDER, LYOPHILIZED, FOR SOLUTION INTRAVENOUS at 11:50

## 2022-02-28 RX ADMIN — Medication 10 ML: at 20:29

## 2022-02-28 RX ADMIN — ASPIRIN 81 MG: 81 TABLET, COATED ORAL at 10:09

## 2022-02-28 RX ADMIN — INSULIN LISPRO 3 UNITS: 100 INJECTION, SOLUTION INTRAVENOUS; SUBCUTANEOUS at 13:59

## 2022-02-28 RX ADMIN — INSULIN LISPRO 3 UNITS: 100 INJECTION, SOLUTION INTRAVENOUS; SUBCUTANEOUS at 10:10

## 2022-02-28 RX ADMIN — TICAGRELOR 90 MG: 90 TABLET ORAL at 10:09

## 2022-02-28 RX ADMIN — IPRATROPIUM BROMIDE AND ALBUTEROL SULFATE 3 ML: .5; 3 SOLUTION RESPIRATORY (INHALATION) at 03:35

## 2022-02-28 RX ADMIN — TICAGRELOR 90 MG: 90 TABLET ORAL at 20:29

## 2022-02-28 RX ADMIN — ATORVASTATIN CALCIUM 40 MG: 40 TABLET, FILM COATED ORAL at 20:29

## 2022-02-28 RX ADMIN — TETROFOSMIN 1 DOSE: 1.38 INJECTION, POWDER, LYOPHILIZED, FOR SOLUTION INTRAVENOUS at 10:38

## 2022-02-28 RX ADMIN — FUROSEMIDE 40 MG: 10 INJECTION, SOLUTION INTRAVENOUS at 12:56

## 2022-02-28 RX ADMIN — POTASSIUM CHLORIDE 20 MEQ: 1500 TABLET, EXTENDED RELEASE ORAL at 10:09

## 2022-03-01 ENCOUNTER — APPOINTMENT (OUTPATIENT)
Dept: GENERAL RADIOLOGY | Facility: HOSPITAL | Age: 77
End: 2022-03-01

## 2022-03-01 PROBLEM — F41.1 GENERALIZED ANXIETY DISORDER: Chronic | Status: ACTIVE | Noted: 2022-03-01

## 2022-03-01 PROBLEM — F13.20 BENZODIAZEPINE DEPENDENCE, CONTINUOUS: Chronic | Status: ACTIVE | Noted: 2022-03-01

## 2022-03-01 PROBLEM — F03.918: Chronic | Status: ACTIVE | Noted: 2022-02-27

## 2022-03-01 PROBLEM — F05: Chronic | Status: ACTIVE | Noted: 2022-02-27

## 2022-03-01 LAB
ALBUMIN SERPL-MCNC: 3.6 G/DL (ref 3.5–5.2)
ALBUMIN/GLOB SERPL: 1.2 G/DL
ALP SERPL-CCNC: 107 U/L (ref 39–117)
ALT SERPL W P-5'-P-CCNC: 37 U/L (ref 1–33)
ANION GAP SERPL CALCULATED.3IONS-SCNC: 12 MMOL/L (ref 5–15)
AST SERPL-CCNC: 18 U/L (ref 1–32)
BASOPHILS # BLD AUTO: 0 10*3/MM3 (ref 0–0.2)
BASOPHILS NFR BLD AUTO: 0.3 % (ref 0–1.5)
BILIRUB SERPL-MCNC: 0.6 MG/DL (ref 0–1.2)
BUN SERPL-MCNC: 26 MG/DL (ref 8–23)
BUN/CREAT SERPL: 16.8 (ref 7–25)
CALCIUM SPEC-SCNC: 8.6 MG/DL (ref 8.6–10.5)
CHLORIDE SERPL-SCNC: 98 MMOL/L (ref 98–107)
CO2 SERPL-SCNC: 32 MMOL/L (ref 22–29)
CREAT SERPL-MCNC: 1.55 MG/DL (ref 0.57–1)
DEPRECATED RDW RBC AUTO: 48.1 FL (ref 37–54)
EGFRCR SERPLBLD CKD-EPI 2021: 34.6 ML/MIN/1.73
EOSINOPHIL # BLD AUTO: 0.2 10*3/MM3 (ref 0–0.4)
EOSINOPHIL NFR BLD AUTO: 1.1 % (ref 0.3–6.2)
ERYTHROCYTE [DISTWIDTH] IN BLOOD BY AUTOMATED COUNT: 15.4 % (ref 12.3–15.4)
GLOBULIN UR ELPH-MCNC: 3 GM/DL
GLUCOSE BLDC GLUCOMTR-MCNC: 180 MG/DL (ref 70–105)
GLUCOSE BLDC GLUCOMTR-MCNC: 194 MG/DL (ref 70–105)
GLUCOSE BLDC GLUCOMTR-MCNC: 241 MG/DL (ref 70–105)
GLUCOSE BLDC GLUCOMTR-MCNC: 251 MG/DL (ref 70–105)
GLUCOSE SERPL-MCNC: 206 MG/DL (ref 65–99)
HCT VFR BLD AUTO: 37.1 % (ref 34–46.6)
HGB BLD-MCNC: 12.2 G/DL (ref 12–15.9)
LYMPHOCYTES # BLD AUTO: 2.3 10*3/MM3 (ref 0.7–3.1)
LYMPHOCYTES NFR BLD AUTO: 16.8 % (ref 19.6–45.3)
MAGNESIUM SERPL-MCNC: 2 MG/DL (ref 1.6–2.4)
MCH RBC QN AUTO: 28.6 PG (ref 26.6–33)
MCHC RBC AUTO-ENTMCNC: 32.8 G/DL (ref 31.5–35.7)
MCV RBC AUTO: 87.2 FL (ref 79–97)
MONOCYTES # BLD AUTO: 0.6 10*3/MM3 (ref 0.1–0.9)
MONOCYTES NFR BLD AUTO: 4.6 % (ref 5–12)
NEUTROPHILS NFR BLD AUTO: 10.6 10*3/MM3 (ref 1.7–7)
NEUTROPHILS NFR BLD AUTO: 77.2 % (ref 42.7–76)
NRBC BLD AUTO-RTO: 0.1 /100 WBC (ref 0–0.2)
PLATELET # BLD AUTO: 292 10*3/MM3 (ref 140–450)
PMV BLD AUTO: 8.8 FL (ref 6–12)
POTASSIUM SERPL-SCNC: 2.9 MMOL/L (ref 3.5–5.2)
PROT SERPL-MCNC: 6.6 G/DL (ref 6–8.5)
RBC # BLD AUTO: 4.26 10*6/MM3 (ref 3.77–5.28)
SODIUM SERPL-SCNC: 142 MMOL/L (ref 136–145)
VANCOMYCIN SERPL-MCNC: 6.2 MCG/ML (ref 5–40)
WBC NRBC COR # BLD: 13.8 10*3/MM3 (ref 3.4–10.8)

## 2022-03-01 PROCEDURE — 25010000002 CEFEPIME PER 500 MG: Performed by: INTERNAL MEDICINE

## 2022-03-01 PROCEDURE — 94799 UNLISTED PULMONARY SVC/PX: CPT

## 2022-03-01 PROCEDURE — 97116 GAIT TRAINING THERAPY: CPT

## 2022-03-01 PROCEDURE — 63710000001 INSULIN ISOPHANE & REGULAR PER 5 UNITS: Performed by: HOSPITALIST

## 2022-03-01 PROCEDURE — 92611 MOTION FLUOROSCOPY/SWALLOW: CPT

## 2022-03-01 PROCEDURE — 80053 COMPREHEN METABOLIC PANEL: CPT | Performed by: NURSE PRACTITIONER

## 2022-03-01 PROCEDURE — 80202 ASSAY OF VANCOMYCIN: CPT | Performed by: HOSPITALIST

## 2022-03-01 PROCEDURE — 99223 1ST HOSP IP/OBS HIGH 75: CPT

## 2022-03-01 PROCEDURE — 93005 ELECTROCARDIOGRAM TRACING: CPT

## 2022-03-01 PROCEDURE — 83735 ASSAY OF MAGNESIUM: CPT | Performed by: NURSE PRACTITIONER

## 2022-03-01 PROCEDURE — 93010 ELECTROCARDIOGRAM REPORT: CPT | Performed by: INTERNAL MEDICINE

## 2022-03-01 PROCEDURE — 94660 CPAP INITIATION&MGMT: CPT

## 2022-03-01 PROCEDURE — 25010000002 FUROSEMIDE PER 20 MG: Performed by: HOSPITALIST

## 2022-03-01 PROCEDURE — 25010000002 ONDANSETRON PER 1 MG: Performed by: NURSE PRACTITIONER

## 2022-03-01 PROCEDURE — 63710000001 INSULIN LISPRO (HUMAN) PER 5 UNITS: Performed by: HOSPITALIST

## 2022-03-01 PROCEDURE — 97166 OT EVAL MOD COMPLEX 45 MIN: CPT

## 2022-03-01 PROCEDURE — 92610 EVALUATE SWALLOWING FUNCTION: CPT

## 2022-03-01 PROCEDURE — 99233 SBSQ HOSP IP/OBS HIGH 50: CPT | Performed by: INTERNAL MEDICINE

## 2022-03-01 PROCEDURE — 99232 SBSQ HOSP IP/OBS MODERATE 35: CPT | Performed by: INTERNAL MEDICINE

## 2022-03-01 PROCEDURE — 82962 GLUCOSE BLOOD TEST: CPT

## 2022-03-01 PROCEDURE — 97530 THERAPEUTIC ACTIVITIES: CPT

## 2022-03-01 PROCEDURE — 93005 ELECTROCARDIOGRAM TRACING: CPT | Performed by: INTERNAL MEDICINE

## 2022-03-01 PROCEDURE — 36415 COLL VENOUS BLD VENIPUNCTURE: CPT | Performed by: NURSE PRACTITIONER

## 2022-03-01 PROCEDURE — 85025 COMPLETE CBC W/AUTO DIFF WBC: CPT | Performed by: NURSE PRACTITIONER

## 2022-03-01 PROCEDURE — 97535 SELF CARE MNGMENT TRAINING: CPT

## 2022-03-01 PROCEDURE — 74230 X-RAY XM SWLNG FUNCJ C+: CPT

## 2022-03-01 PROCEDURE — 94761 N-INVAS EAR/PLS OXIMETRY MLT: CPT

## 2022-03-01 RX ORDER — FLUOXETINE HYDROCHLORIDE 20 MG/1
40 CAPSULE ORAL DAILY
Status: DISCONTINUED | OUTPATIENT
Start: 2022-03-02 | End: 2022-03-04 | Stop reason: HOSPADM

## 2022-03-01 RX ORDER — BUSPIRONE HYDROCHLORIDE 5 MG/1
10 TABLET ORAL EVERY 12 HOURS SCHEDULED
Status: DISCONTINUED | OUTPATIENT
Start: 2022-03-01 | End: 2022-03-04 | Stop reason: HOSPADM

## 2022-03-01 RX ORDER — DOXYCYCLINE 100 MG/1
100 TABLET ORAL EVERY 12 HOURS SCHEDULED
Status: DISCONTINUED | OUTPATIENT
Start: 2022-03-01 | End: 2022-03-04 | Stop reason: HOSPADM

## 2022-03-01 RX ORDER — POTASSIUM CHLORIDE 7.45 MG/ML
10 INJECTION INTRAVENOUS
Status: DISCONTINUED | OUTPATIENT
Start: 2022-03-01 | End: 2022-03-04 | Stop reason: HOSPADM

## 2022-03-01 RX ORDER — POTASSIUM CHLORIDE 1.5 G/1.77G
40 POWDER, FOR SOLUTION ORAL AS NEEDED
Status: DISCONTINUED | OUTPATIENT
Start: 2022-03-01 | End: 2022-03-04 | Stop reason: HOSPADM

## 2022-03-01 RX ORDER — POTASSIUM CHLORIDE 20 MEQ/1
40 TABLET, EXTENDED RELEASE ORAL AS NEEDED
Status: DISCONTINUED | OUTPATIENT
Start: 2022-03-01 | End: 2022-03-04 | Stop reason: HOSPADM

## 2022-03-01 RX ORDER — POLYETHYLENE GLYCOL 3350 17 G/17G
17 POWDER, FOR SOLUTION ORAL DAILY
Status: DISCONTINUED | OUTPATIENT
Start: 2022-03-01 | End: 2022-03-04 | Stop reason: HOSPADM

## 2022-03-01 RX ORDER — ARIPIPRAZOLE 2 MG/1
2 TABLET ORAL NIGHTLY
Status: DISCONTINUED | OUTPATIENT
Start: 2022-03-01 | End: 2022-03-02

## 2022-03-01 RX ORDER — DOCUSATE SODIUM 100 MG/1
100 CAPSULE, LIQUID FILLED ORAL 2 TIMES DAILY
Status: DISCONTINUED | OUTPATIENT
Start: 2022-03-01 | End: 2022-03-04 | Stop reason: HOSPADM

## 2022-03-01 RX ORDER — CEFDINIR 300 MG/1
300 CAPSULE ORAL
Status: DISCONTINUED | OUTPATIENT
Start: 2022-03-01 | End: 2022-03-02

## 2022-03-01 RX ADMIN — BARIUM SULFATE 50 ML: 400 SUSPENSION ORAL at 12:58

## 2022-03-01 RX ADMIN — INSULIN LISPRO 8 UNITS: 100 INJECTION, SOLUTION INTRAVENOUS; SUBCUTANEOUS at 21:32

## 2022-03-01 RX ADMIN — EMPAGLIFLOZIN 10 MG: 10 TABLET, FILM COATED ORAL at 08:28

## 2022-03-01 RX ADMIN — DOCUSATE SODIUM 100 MG: 100 CAPSULE, LIQUID FILLED ORAL at 09:35

## 2022-03-01 RX ADMIN — TICAGRELOR 90 MG: 90 TABLET ORAL at 20:56

## 2022-03-01 RX ADMIN — DOCUSATE SODIUM 100 MG: 100 CAPSULE, LIQUID FILLED ORAL at 20:56

## 2022-03-01 RX ADMIN — POTASSIUM CHLORIDE 20 MEQ: 1500 TABLET, EXTENDED RELEASE ORAL at 08:28

## 2022-03-01 RX ADMIN — POTASSIUM CHLORIDE 40 MEQ: 1500 TABLET, EXTENDED RELEASE ORAL at 09:33

## 2022-03-01 RX ADMIN — Medication 10 ML: at 08:28

## 2022-03-01 RX ADMIN — FLUDROCORTISONE ACETATE 0.1 MG: 0.1 TABLET ORAL at 08:28

## 2022-03-01 RX ADMIN — DOXYCYCLINE 100 MG: 100 TABLET ORAL at 09:46

## 2022-03-01 RX ADMIN — DOXYCYCLINE 100 MG: 100 TABLET ORAL at 20:56

## 2022-03-01 RX ADMIN — Medication 10 ML: at 20:56

## 2022-03-01 RX ADMIN — POLYETHYLENE GLYCOL 3350 17 G: 17 POWDER, FOR SOLUTION ORAL at 09:46

## 2022-03-01 RX ADMIN — ALPRAZOLAM 2 MG: 1 TABLET ORAL at 16:10

## 2022-03-01 RX ADMIN — CEFEPIME HYDROCHLORIDE 2 G: 2 INJECTION, POWDER, FOR SOLUTION INTRAVENOUS at 04:41

## 2022-03-01 RX ADMIN — ALPRAZOLAM 2 MG: 1 TABLET ORAL at 08:27

## 2022-03-01 RX ADMIN — FLUOXETINE 20 MG: 20 CAPSULE ORAL at 08:28

## 2022-03-01 RX ADMIN — TICAGRELOR 90 MG: 90 TABLET ORAL at 08:28

## 2022-03-01 RX ADMIN — CEFDINIR 300 MG: 300 CAPSULE ORAL at 09:46

## 2022-03-01 RX ADMIN — METOPROLOL SUCCINATE 50 MG: 50 TABLET, EXTENDED RELEASE ORAL at 08:28

## 2022-03-01 RX ADMIN — IPRATROPIUM BROMIDE AND ALBUTEROL SULFATE 3 ML: .5; 3 SOLUTION RESPIRATORY (INHALATION) at 19:32

## 2022-03-01 RX ADMIN — FUROSEMIDE 40 MG: 10 INJECTION, SOLUTION INTRAVENOUS at 01:11

## 2022-03-01 RX ADMIN — ONDANSETRON 4 MG: 2 INJECTION INTRAMUSCULAR; INTRAVENOUS at 10:59

## 2022-03-01 RX ADMIN — ATORVASTATIN CALCIUM 40 MG: 40 TABLET, FILM COATED ORAL at 20:56

## 2022-03-01 RX ADMIN — INSULIN HUMAN 20 UNITS: 100 INJECTION, SUSPENSION SUBCUTANEOUS at 18:55

## 2022-03-01 RX ADMIN — ASPIRIN 81 MG: 81 TABLET, COATED ORAL at 08:28

## 2022-03-01 RX ADMIN — ALPRAZOLAM 2 MG: 1 TABLET ORAL at 20:56

## 2022-03-01 RX ADMIN — IPRATROPIUM BROMIDE AND ALBUTEROL SULFATE 3 ML: .5; 3 SOLUTION RESPIRATORY (INHALATION) at 08:50

## 2022-03-01 RX ADMIN — IPRATROPIUM BROMIDE AND ALBUTEROL SULFATE 3 ML: .5; 3 SOLUTION RESPIRATORY (INHALATION) at 12:11

## 2022-03-01 RX ADMIN — IPRATROPIUM BROMIDE AND ALBUTEROL SULFATE 3 ML: .5; 3 SOLUTION RESPIRATORY (INHALATION) at 16:47

## 2022-03-01 RX ADMIN — BUSPIRONE HYDROCHLORIDE 10 MG: 5 TABLET ORAL at 20:56

## 2022-03-01 RX ADMIN — IPRATROPIUM BROMIDE AND ALBUTEROL SULFATE 3 ML: .5; 3 SOLUTION RESPIRATORY (INHALATION) at 23:44

## 2022-03-01 RX ADMIN — INSULIN HUMAN 20 UNITS: 100 INJECTION, SUSPENSION SUBCUTANEOUS at 09:32

## 2022-03-01 RX ADMIN — ARIPIPRAZOLE 2 MG: 2 TABLET ORAL at 21:30

## 2022-03-01 RX ADMIN — BARIUM SULFATE 1 TEASPOON(S): 0.6 CREAM ORAL at 13:18

## 2022-03-01 NOTE — PLAN OF CARE
Goal Outcome Evaluation:  Plan of Care Reviewed With: patient           Outcome Summary: Pt is a 76-year-old female admitted complaints of dyspnea, hypertensive crisis. PMHX signicant for CAD, ischemic cardiomyopathy, disabling tremor status post left thalamic high-frequency MRI guided ultrasound ablation brain for tremor right upper extremity-4/4/2021 at [place], hyperlipidemia and TIA. Pt recently d/c from [facility] following admission for PNA. At baseline, patient resides at [facility] and has herminia twith IADLs. Pt utilizes cane and rollator intermittently, and states she is Mod I with ADLs within apartment. Patient supine in bed with sister-in-law present. Agreeable to work with OT. Oriented x4, but with questionable safety awareness. Mod A for bed mobility. Min A for sit<>stand and bed<>chair transfers. Pt with famliar tremor noted throughout session, primarily in LUE and BLEs. Max A LB ADLs secondary to impaired balance. Pt demos posterior lean throughout session, unable to maintain EOB sitting balance without assist. Pt up in chair when lunch tray arrived. NSG aware pt in chair with alarm in place. Will require IP rehab at discharge.

## 2022-03-01 NOTE — PLAN OF CARE
Goal Outcome Evaluation:  Plan of Care Reviewed With: patient        Progress: no change  Outcome Summary: Patient received pm medications, except insulin. Patient is on 3L nasal cannula with a sitter at bedside.

## 2022-03-01 NOTE — CONSULTS
"Referring Provider: Kraig Luis MD  Reason for Consultation: anxiety and hallucinations    Chief Complaint: Anxiety    Subjective .     History of Present Illness:  The patient is a 76 y.o. female who was admitted secondary to hypertensive crisis, and was recently diagnosed with dementia. Psychiatry was consulted due to significant anxiety and hallucinations after hypertension had been controlled. Pt presents very sedated. Doesn't feel depressed but admits to significant anxiety. Denies AVH, but nursing reports demonstrated AVH previously. Pt admits to hallucinating after her  passed away 3 years ago, but denies any since that time. She is very somnulent and becomes dysarthric and falls alseep when answering questions that are more than one word answers. She isn't able to confirm any orientation questions due to sedation, but nursing reports varying levels of orientation at different times. Pt denies SI/HI.    Review of Systems   Review of systems could not be obtained due to   patient sedation status.    History  -Past Psychiatric History: Anxiety and Depression with treatment including chronic benzodiazapine use.  -Psychiatric Hospitalizations: Unable to obtain due to sedation.  -Suicide Attempts: Unable to obtain due to sedation.  Past Medical History:   Diagnosis Date   • Anxiety    • CHF (congestive heart failure) ([facility])    • Depression    • Diabetes mellitus ([facility])    • Hyperlipidemia    • Hypertension    • Panic disorder     \"panic attacks\"   • Tremors of nervous system     \"essential tremors\"     Family History   Problem Relation Age of Onset   • Depression Sister    • Suicidality Other         suicided   • Alcohol abuse Other    • Alcohol abuse Daughter    • Depression Other      Social History     Tobacco Use   • Smoking status: Never Smoker   • Smokeless tobacco: Not on file   Substance Use Topics   • Alcohol use: Not Currently   • Drug use: No     Medications Prior to Admission   Medication Sig " Dispense Refill Last Dose   • fludrocortisone 0.1 MG tablet Take 0.1 mg by mouth Daily.      • linaclotide (LINZESS) 145 MCG capsule capsule Take 145 mcg by mouth Every Morning Before Breakfast.      • metoprolol succinate XL (TOPROL-XL) 50 MG 24 hr tablet Take 50 mg by mouth Daily.      • tiZANidine (ZANAFLEX) 2 MG half tablet Take 2 mg by mouth Every 8 (Eight) Hours As Needed for Muscle Spasms.      • ALPRAZolam (XANAX) 2 MG tablet Take 2 mg by mouth 3 (Three) Times a Day As Needed for anxiety.      • aspirin 81 MG EC tablet Take 81 mg by mouth Daily.      • atorvastatin (LIPITOR) 40 MG tablet Take 40 mg by mouth Daily.      • Empagliflozin (Jardiance) 10 MG tablet Take 1 tablet by mouth Daily.      • FLUoxetine (PROzac) 20 MG capsule Take 20 mg by mouth Daily.      • furosemide (LASIX) 20 MG tablet Take 20 mg by mouth Daily.      • ibuprofen (ADVIL,MOTRIN) 200 MG tablet Take 200 mg by mouth Every 6 (Six) Hours As Needed for Mild Pain .      • insulin NPH-insulin regular (humuLIN 70/30,novoLIN 70/30) (70-30) 100 UNIT/ML injection Inject 20 Units under the skin into the appropriate area as directed 2 (Two) Times a Day With Meals.      • potassium chloride ER (K-TAB) 20 MEQ tablet controlled-release ER tablet Take 20 mEq by mouth Daily.      • ticagrelor (BRILINTA) 90 MG tablet tablet Take 90 mg by mouth 2 (Two) Times a Day.         Scheduled Meds:  ARIPiprazole, 2 mg, Oral, Nightly  aspirin, 81 mg, Oral, Daily  atorvastatin, 40 mg, Oral, Nightly  busPIRone, 10 mg, Oral, Q12H  cefdinir, 300 mg, Oral, Q24H  docusate sodium, 100 mg, Oral, BID  doxycycline, 100 mg, Oral, Q12H  empagliflozin, 10 mg, Oral, Daily  fludrocortisone, 0.1 mg, Oral, Daily  [START ON 3/2/2022] FLUoxetine, 40 mg, Oral, Daily  insulin lispro, 0-14 Units, Subcutaneous, 4x Daily With Meals & Nightly  insulin NPH-insulin regular, 20 Units, Subcutaneous, BID With Meals  ipratropium-albuterol, 3 mL, Nebulization, Q4H - RT  metoprolol succinate XL, 50  "mg, Oral, Daily  polyethylene glycol, 17 g, Oral, Daily  sodium chloride, 10 mL, Intravenous, Q12H  ticagrelor, 90 mg, Oral, BID      Continuous Infusions:     PRN Meds:  •  acetaminophen **OR** acetaminophen **OR** acetaminophen  •  ALPRAZolam  •  Calcium Gluconate-NaCl **AND** calcium gluconate **AND** Calcium, Ionized  •  dextrose  •  dextrose  •  glucagon (human recombinant)  •  insulin lispro **AND** insulin lispro  •  magnesium sulfate **OR** magnesium sulfate **OR** magnesium sulfate  •  nitroglycerin  •  ondansetron **OR** ondansetron  •  phenol  •  potassium chloride **OR** potassium chloride **OR** potassium chloride  •  potassium phosphate infusion greater than 15 mMoles **OR** potassium phosphate infusion greater than 15 mMoles **OR** potassium phosphate **OR** sodium phosphate IVPB **OR** sodium phosphate IVPB **OR** sodium phosphate IVPB  •  sodium chloride  •  tiZANidine   Allergies:  Gabapentin and Morphine  Objective     Physical Exam:  Vital Signs:   /65   Pulse 79   Temp 98.8 °F (37.1 °C) (Oral)   Resp 16   Ht 157.5 cm (62\")   Wt 73.8 kg (162 lb 11.2 oz)   SpO2 94%   BMI 29.76 kg/m²     General Appearance:  Well-developed, well-nourished, with NAD and sedated.   Head:  Normocephalic, without obvious deformity, atraumatic.   Eyes:          Lids and lashes normal, conjunctivae and sclerae normal, no   icterus, no pallor, corneas clear.   Skin:  Neurologic:  Musculoskeletal: No bleeding, bruising or rash.  Cranial nerves 2 - 12 grossly intact, sensation intact.  Muscle strength/tone: Unable to determine due to sedation.  Abnormal Movements: No tremors or abnormal involuntary movements  Gait: Deferred, in bed     Mental Status Exam:   Orientation:  Unable to evaluate  Memory: Recent and remote memory Deficits  Mood/Affect: Depressed and Restricted  Hopelessness: Denies  Suicidal Ideations: None  Homicidal Ideations:  None  Hallucinations: Not demonstrated today  Delusions:  Unable to " demonstrate  Obsessions: None  Behavior and Psychomotor Activity: Slow  Speech:  Minimal, Monotone and Dysarthria when falling asleep.  Thought Process: Circum and Unable to demonstrate  Thought Content: Unable to assess  Associations:  Unable to assess  Language: Normal  Concentration and computation: Impaired  Attention Span: Impaired  Fund of Knowledge: Impaired  Reliability: fair  Insight into mental health: Fair  Judgement: Impaired  Impulse Control:  Impaired  Hygiene: fair  Cooperation:  Cooperative  Eye Contact:  Poor mostly closed  Physical/Medical Issues:  Yes    Medications and allergies reviewed.    Lab Results   Component Value Date    GLUCOSE 206 (H) 03/01/2022    CALCIUM 8.6 03/01/2022     03/01/2022    K 2.9 (L) 03/01/2022    CO2 32.0 (H) 03/01/2022    CL 98 03/01/2022    BUN 26 (H) 03/01/2022    CREATININE 1.55 (H) 03/01/2022    EGFRIFNONA 33 (L) 02/28/2022    BCR 16.8 03/01/2022    ANIONGAP 12.0 03/01/2022     Last Urine Toxicity     LAST URINE TOXICITY RESULTS Latest Ref Rng & Units 1/10/2018    CREATININE UR mg/dl 72.9        No results found for: PHENYTOIN, PHENOBARB, VALPROATE, CBMZ  Lab Results   Component Value Date     03/01/2022    BUN 26 (H) 03/01/2022    CREATININE 1.55 (H) 03/01/2022    TSH 1.600 02/01/2021    WBC 13.80 (H) 03/01/2022     Brief Urine Lab Results  (Last result in the past 365 days)      Color   Clarity   Blood   Leuk Est   Nitrite   Protein   CREAT   Urine HCG        02/27/22 0749 Yellow   Clear   Trace   Trace   Negative   Negative               Assessment/Plan     Hypertensive crisis    Acute respiratory failure with hypoxia (HCC)    Anxiety    Atherosclerotic heart disease of native coronary artery without angina pectoris    Acute on chronic congestive heart failure (HCC)    Dementia (HCC)    Depression, unspecified    Disabling essential tremor    Hyperlipidemia, mixed    Personal history of transient ischemic attack (TIA), and cerebral infarction without  residual deficits    HCAP (healthcare-associated pneumonia)    S/P right coronary artery (RCA) stent placement    Sepsis due to pneumonia (HCC)    Uncontrolled type II diabetes mellitus with peripheral artery disease (HCC)    Ischemic cardiomyopathy    Elevated LFTs     EKG Results: Reviewed.  QTc: 462 (3/1/22; AM); 464 (3/1/22, PM)    LABS: Reviewed.  Na: 142 (3/1/22)    Assessment:   Dementia related delirium with hallucinations  Generalized Anxiety Disorder  Benzodiazepine dependence    Treatment Plan: Pt excessively sedated, calm, able to answer some questions. Nursing reports previous episodes of confusion and demonstrated hallucinations. Nursing was even told by the pt she was hallucinating earlier today. QTc is borderline prolonged, but due to minimal Abilify risk at QTc prolongation and benefit most likely to outweigh the ris, it will be attempted.    -Start 2mg Abilify nightly for likely dementia associated hallucinations reported by nursing.  -^ to 40mg fluoxetine for anxiety.  -Start 10mg buspirone BID to be continued upon discharge for anxiety.  -AM EKG ordered for QTc monitoring.  -Will monitor Na for decreases.  -Will continue to follow and provide support.    Treatment Plan discussed with: Patient and nursing    I discussed the patients findings and my recommendations with patient and nursing staff    I have reviewed and approved the behavioral health treatment plans and problem list. Yes    Thank you for the consult  Referring MD has access to consult report and progress notes in EMR  Patient was examined wearing appropriate PPE.    Rick Alejo PA-C  03/01/22  19:29 EST    EMR Dragon transcription disclaimer:  Part of this note may be an electronic transcription/translation of spoken language to printed text using the Dragon Dictation System.

## 2022-03-01 NOTE — PLAN OF CARE
Goal Outcome Evaluation:  Pt observed upright in the lateral projection and was given trials of NTL by cup x2 NTL by cup w/ chin tuck x2, puree x2, mechanical ground x2, HTL by cup x2, HTL by cup w/ chin tuck x2, thin liquids by cp x2, and thin liquids by cup w/chin tuck x2. Reduced hyolaryngeal elevation and epiglottic deflection observed across all trials/consistenies resulting in silent aspiration w/ thin liquids, deep silent penetration of NTL and shallow penetration with HTL. Cued chin tuck with all liquid consistencies does not eliminate penetration. No penetration/aspiration observed with puree or mechanical ground trials. Recommend pt initiate a mechanical ground and HTL diet. Pt should follow safe swallow strategies/general aspiration precautions. ST will continue to follow and ensure diet tolerance.

## 2022-03-01 NOTE — CONSULTS
Pt was calm and pleasant during  visit. She stated that she experienced fear and confusion when she awoke believing she was somewhere else other than the hospital; she did not experience that confusion during the visit. She told  she did not experience these bouts of confusion before her   three years ago. After 54 years of marriage, she is still grieving his loss. She is [employer] and has not been to [employer] since her  passed and her limited physical health.   She enjoyed the  visit but would like visit from . Her ayah is important to her, especially during this time. She had mentioned two daughters she had an argument with, in passing, but did not elaborate if it was real or she dreamt it.    prayed according to her tradition and prayed the Our Father in Latin and then English and she was joyful.    will notify  to visit pt per her request. There were no other needs at this time.  will follow up tomorrow to assess further spiritual care and concerns.

## 2022-03-01 NOTE — PLAN OF CARE
Assessment: Nadege Barrow presents with functional mobility impairments which indicate the need for skilled intervention. Pt currently on 4L O2 via NC, O2 sats were stable. Pt required Judi with bed mobility. Upon each standing, pt had LOB posteriorly requiring assistance from PT to regain. Pt requires ModA with Rwx for STS and ambulation of 30 feet and 20 feet. Pt required therapeutic rest between walks. Pt demonstrates unsteadiness, balance and coordination deficits with ambulation. Pt voiced feeling fatigue and weak after ambulation. Pt remains a risk for falls and will benefit from IP rehab upon d/c to address stated deficits. Tolerating session today without incident. Will continue to follow and progress as tolerated.     Plan/Recommendations:   Pt would benefit from Inpatient Rehabilitation placement at discharge from facility and requires no DME at discharge.   Pt desires Inpatient Rehabilitation placement at discharge. Pt cooperative; agreeable to therapeutic recommendations and plan of care.

## 2022-03-01 NOTE — PLAN OF CARE
Goal Outcome Evaluation:      Pt appears more confused this shift.  Pt required one dose of zofran this AM after breakfast.  Pt VSS and will continue to monitor.

## 2022-03-01 NOTE — THERAPY EVALUATION
"Patient Name: Nadege Barrow  : 1945    MRN: [id number]                              Today's Date: 3/1/2022       Admit Date: 2022    Visit Dx:     ICD-10-CM ICD-9-CM   1. Acute respiratory failure with hypoxia ([facility])  J96.01 518.81   2. Hypertensive emergency  I16.1 401.9   3. Acute congestive heart failure, unspecified heart failure type ([facility])  I50.9 428.0     Patient Active Problem List   Diagnosis   • Chest pain on breathing   • Hypertensive crisis   • Acute respiratory failure with hypoxia ([facility])   • Anemia   • Anxiety   • Atherosclerotic heart disease of native coronary artery without angina pectoris   • Back pain   • Cardiomyopathy ([facility])   • Carotid artery disease ([facility])   • Cellulitis of foot   • Acute on chronic congestive heart failure ([facility])   • Current moderate episode of major depressive disorder without prior episode ([facility])   • Dementia ([facility])   • Depression, unspecified   • Disabling essential tremor   • Fatigue   • Hyperlipidemia, mixed   • Peripheral vision loss, bilateral   • Personal history of transient ischemic attack (TIA), and cerebral infarction without residual deficits   • HCAP (healthcare-associated pneumonia)   • Polypharmacy   • Retinopathy, bilateral   • S/P right coronary artery (RCA) stent placement   • Sepsis due to pneumonia ([facility])   • Uncontrolled type II diabetes mellitus with peripheral artery disease ([facility])   • Ischemic cardiomyopathy   • Elevated LFTs     Past Medical History:   Diagnosis Date   • Anxiety    • CHF (congestive heart failure) ([facility])    • Depression    • Diabetes mellitus ([facility])    • Hyperlipidemia    • Hypertension    • Panic disorder     \"panic attacks\"   • Tremors of nervous system     \"essential tremors\"     Past Surgical History:   Procedure Laterality Date   • CORONARY ANGIOPLASTY WITH STENT PLACEMENT        General Information     Row Name 22 1530          OT Time and Intention    Document Type evaluation  -ES     Mode of Treatment occupational " "therapy  -ES     Row Name 03/01/22 1530          General Information    Patient Profile Reviewed yes  -ES     Existing Precautions/Restrictions fall; oxygen therapy device and L/min; other (see comments)  Aspiration precaution  -ES     Row Name 03/01/22 1530          Occupational Profile    Reason for Services/Referral (Occupational Profile) Pt is a 76-year-old female admitted complaints of dyspnea, hypertensive crisis. PMHX signicant for CAD, ischemic cardiomyopathy, disabling tremor status post left thalamic high-frequency MRI guided ultrasound ablation brain for tremor right upper extremity-4/4/2021 at [place], hyperlipidemia and TIA.  Pt recently d/c from [facility] following admission for PNA. At baseline, patient resides at [facility] and has herminia twith IADLs. Pt utilizes cane and rollator intermittently, and states she is Mod I with ADLs within apartment.  -ES     Successful Occupations (Occupational Profile) 2 daughters - Divya \"Ana\" and Jena  -ES     Row Name 03/01/22 1530          Living Environment    Lives With facility resident  [facility] [address]  -ES     Row Name 03/01/22 1530          Cognition    Orientation Status (Cognition) oriented x 3; verbal cues/prompts needed for orientation  Not oriented to time  -ES     Row Name 03/01/22 1530          Safety Issues, Functional Mobility    Impairments Affecting Function (Mobility) balance; cognition; coordination; endurance/activity tolerance; strength; other (see comments)  Tremulous in all extremities  -ES     Cognitive Impairments, Mobility Safety/Performance attention; impulsivity; insight into deficits/self-awareness; problem-solving/reasoning  -ES           User Key  (r) = Recorded By, (t) = Taken By, (c) = Cosigned By    Initials Name Provider Type    ES Divya Lima OT Occupational Therapist                 Mobility/ADL's     Row Name 03/01/22 1532          Bed Mobility    Bed Mobility bed mobility (all) activities  -ES     All " Activities, [place] (Bed Mobility) minimum assist (75% patient effort)  -ES     Assistive Device (Bed Mobility) bed rails; draw sheet; head of bed elevated  -ES     Row Name 03/01/22 1532          Transfers    Transfers bed-chair transfer; sit-stand transfer  -ES     Bed-Chair [place] (Transfers) minimum assist (75% patient effort)  -ES     Sit-Stand [place] (Transfers) minimum assist (75% patient effort); verbal cues  -ES     Row Name 03/01/22 1532          Sit-Stand Transfer    Assistive Device (Sit-Stand Transfers) walker, front-wheeled  -ES     Row Name 03/01/22 1532          Activities of Daily Living    BADL Assessment/Intervention upper body dressing; lower body dressing  -ES     Row Name 03/01/22 1532          Upper Body Dressing Assessment/Training    [place] Level (Upper Body Dressing) minimum assist (75% patient effort)  -ES     Row Name 03/01/22 1532          Lower Body Dressing Assessment/Training    [place] Level (Lower Body Dressing) maximum assist (25% patient effort)  -ES           User Key  (r) = Recorded By, (t) = Taken By, (c) = Cosigned By    Initials Name Provider Type    ES Divya Lima OT Occupational Therapist               Obj/Interventions     Row Name 03/01/22 1533          Sensory Assessment (Somatosensory)    Sensory Assessment (Somatosensory) sensation intact  neuropathy  -ES     Row Name 03/01/22 1533          Vision Assessment/Intervention    Visual Impairment/Limitations corrective lenses full-time  -ES     Row Name 03/01/22 1533          Range of Motion Comprehensive    General Range of Motion bilateral upper extremity ROM WNL  -ES     Row Name 03/01/22 1533          Strength Comprehensive (MMT)    Comment, General Manual Muscle Testing (MMT) Assessment BUE 4-/5  -ES     Row Name 03/01/22 1533          Motor Skills    Motor Skills coordination  -ES     Coordination fine motor deficit; bilateral  Patient with familiar tremor.  -ES     Row Name  03/01/22 1533          Balance    Balance Assessment sitting static balance; sitting dynamic balance; standing static balance; standing dynamic balance  -ES     Static Sitting Balance mild impairment  -ES     Dynamic Sitting Balance moderate impairment  -ES     Static Standing Balance moderate impairment  -ES     Dynamic Standing Balance severe impairment  -ES     Balance Interventions sitting; standing; static; dynamic  -ES     Comment, Balance posterior lean  -ES           User Key  (r) = Recorded By, (t) = Taken By, (c) = Cosigned By    Initials Name Provider Type    ES Divya Lima, OT Occupational Therapist               Goals/Plan     Row Name 03/01/22 1538          Bathing Goal 1 (OT)    Activity/Device (Bathing Goal 1, OT) bathing skills, all  -ES     Kelliher Level/Cues Needed (Bathing Goal 1, OT) minimum assist (75% or more patient effort)  -ES     Time Frame (Bathing Goal 1, OT) 2 weeks  -ES     Row Name 03/01/22 1538          Dressing Goal 1 (OT)    Activity/Device (Dressing Goal 1, OT) dressing skills, all  -ES     Kelliher/Cues Needed (Dressing Goal 1, OT) minimum assist (75% or more patient effort)  -ES     Time Frame (Dressing Goal 1, OT) 2 weeks  -ES     Row Name 03/01/22 1538          Toileting Goal 1 (OT)    Activity/Device (Toileting Goal 1, OT) toileting skills, all  -ES     Kelliher Level/Cues Needed (Toileting Goal 1, OT) minimum assist (75% or more patient effort)  -ES     Time Frame (Toileting Goal 1, OT) 2 weeks  -ES     Row Name 03/01/22 1538          Therapy Assessment/Plan (OT)    Planned Therapy Interventions (OT) activity tolerance training; BADL retraining; cognitive/visual perception retraining; IADL retraining; neuromuscular control/coordination retraining; occupation/activity based interventions; passive ROM/stretching; patient/caregiver education/training; ROM/therapeutic exercise; strengthening exercise  -ES           User Key  (r) = Recorded By, (t) = Taken By,  (c) = Cosigned By    Initials Name Provider Type    Divya Ward OT Occupational Therapist               Clinical Impression     Row Name 03/01/22 1536          Pain Scale: Numbers Pre/Post-Treatment    Pretreatment Pain Rating 0/10 - no pain  -ES     Posttreatment Pain Rating 0/10 - no pain  -ES     Row Name 03/01/22 1536          Plan of Care Review    Plan of Care Reviewed With patient  -ES     Outcome Summary Pt is a 76-year-old female admitted complaints of dyspnea, hypertensive crisis. PMHX signicant for CAD, ischemic cardiomyopathy, disabling tremor status post left thalamic high-frequency MRI guided ultrasound ablation brain for tremor right upper extremity-4/4/2021 at [place], hyperlipidemia and TIA. Pt recently d/c from [facility] following admission for PNA. At baseline, patient resides at [facility] and has herminia twith IADLs. Pt utilizes cane and rollator intermittently, and states she is Mod I with ADLs within apartment. Patient supine in bed with sister-in-law present. Agreeable to work with OT. Oriented x4, but with questionable safety awareness. Mod A for bed mobility. Min A for sit<>stand and bed<>chair transfers. Pt with famliar tremor noted throughout session, primarily in LUE and BLEs. Max A LB ADLs secondary to impaired balance. Pt demos posterior lean throughout session, unable to maintain EOB sitting balance without assist. Pt up in chair when lunch tray arrived. NSG aware pt in chair with alarm in place. Will require IP rehab at discharge.  -ES     Row Name 03/01/22 1531          Therapy Assessment/Plan (OT)    Rehab Potential (OT) good, to achieve stated therapy goals  -ES     Therapy Frequency (OT) 3 times/wk  -ES     Row Name 03/01/22 0427          Therapy Plan Review/Discharge Plan (OT)    Anticipated Discharge Disposition (OT) inpatient rehabilitation facility  -ES     Row Name 03/01/22 6152          Vital Signs    Pre SpO2 (%) 98  -ES     O2 Delivery Pre Treatment  supplemental O2  3L  -ES     Intra SpO2 (%) 95  -ES     O2 Delivery Intra Treatment supplemental O2  -ES     Post SpO2 (%) 96  -ES     O2 Delivery Post Treatment supplemental O2  -ES     Pre Patient Position Supine  -ES     Intra Patient Position Standing  -ES     Post Patient Position Sitting  -ES     Row Name 03/01/22 1536          Positioning and Restraints    Pre-Treatment Position in bed  -ES     Post Treatment Position chair  -ES     In Chair notified nsg; call light within reach; exit alarm on; encouraged to call for assist  -ES           User Key  (r) = Recorded By, (t) = Taken By, (c) = Cosigned By    Initials Name Provider Type    Divya Ward OT Occupational Therapist               Outcome Measures     Row Name 03/01/22 1539          How much help from another is currently needed...    Putting on and taking off regular lower body clothing? 2  -ES     Bathing (including washing, rinsing, and drying) 2  -ES     Toileting (which includes using toilet bed pan or urinal) 2  -ES     Putting on and taking off regular upper body clothing 3  -ES     Taking care of personal grooming (such as brushing teeth) 3  -ES     Eating meals 3  -ES     AM-PAC 6 Clicks Score (OT) 15  -ES     Row Name 03/01/22 1539          Functional Assessment    Outcome Measure Options AM-PAC 6 Clicks Daily Activity (OT)  -ES           User Key  (r) = Recorded By, (t) = Taken By, (c) = Cosigned By    Initials Name Provider Type    Divya Ward OT Occupational Therapist                Occupational Therapy Education                 Title: PT OT SLP Therapies (In Progress)     Topic: Occupational Therapy (In Progress)     Point: ADL training (Done)     Description:   Instruct learner(s) on proper safety adaptation and remediation techniques during self care or transfers.   Instruct in proper use of assistive devices.              Learning Progress Summary           Patient Acceptance, E,TB, VU,NR by JOAN at 3/1/2022 5810    Family Acceptance, E,TB, VU,NR by ES at 3/1/2022 1541                   Point: Home exercise program (Not Started)     Description:   Instruct learner(s) on appropriate technique for monitoring, assisting and/or progressing therapeutic exercises/activities.              Learner Progress:  Not documented in this visit.          Point: Precautions (Done)     Description:   Instruct learner(s) on prescribed precautions during self-care and functional transfers.              Learning Progress Summary           Patient Acceptance, E,TB, VU,NR by ES at 3/1/2022 1541   Family Acceptance, E,TB, VU,NR by ES at 3/1/2022 1541                   Point: Body mechanics (Done)     Description:   Instruct learner(s) on proper positioning and spine alignment during self-care, functional mobility activities and/or exercises.              Learning Progress Summary           Patient Acceptance, E,TB, VU,NR by ES at 3/1/2022 1541   Family Acceptance, E,TB, VU,NR by ES at 3/1/2022 1541                               User Key     Initials Effective Dates Name Provider Type Discipline     06/16/21 -  Divya Lima OT Occupational Therapist OT              OT Recommendation and Plan  Planned Therapy Interventions (OT): activity tolerance training, BADL retraining, cognitive/visual perception retraining, IADL retraining, neuromuscular control/coordination retraining, occupation/activity based interventions, passive ROM/stretching, patient/caregiver education/training, ROM/therapeutic exercise, strengthening exercise  Therapy Frequency (OT): 3 times/wk  Plan of Care Review  Plan of Care Reviewed With: patient  Outcome Summary: Pt is a 76-year-old female admitted complaints of dyspnea, hypertensive crisis. PMHX signicant for CAD, ischemic cardiomyopathy, disabling tremor status post left thalamic high-frequency MRI guided ultrasound ablation brain for tremor right upper extremity-4/4/2021 at [place], hyperlipidemia and TIA. Pt  recently d/c from [facility] following admission for PNA. At baseline, patient resides at [facility] and has herminia twith IADLs. Pt utilizes cane and rollator intermittently, and states she is Mod I with ADLs within apartment. Patient supine in bed with sister-in-law present. Agreeable to work with OT. Oriented x4, but with questionable safety awareness. Mod A for bed mobility. Min A for sit<>stand and bed<>chair transfers. Pt with famliar tremor noted throughout session, primarily in LUE and BLEs. Max A LB ADLs secondary to impaired balance. Pt demos posterior lean throughout session, unable to maintain EOB sitting balance without assist. Pt up in chair when lunch tray arrived. NSG aware pt in chair with alarm in place. Will require IP rehab at discharge.     Time Calculation:    Time Calculation- OT     Row Name 03/01/22 1540             Time Calculation- OT    OT Start Time 1435  -ES      OT Stop Time 1514  -ES      OT Time Calculation (min) 39 min  -ES      Total Timed Code Minutes- OT 10 minute(s)  -ES      OT Received On 03/01/22  -ES      OT - Next Appointment 03/02/22  -ES      OT Goal Re-Cert Due Date 03/15/22  -ES            User Key  (r) = Recorded By, (t) = Taken By, (c) = Cosigned By    Initials Name Provider Type    Divya Ward OT Occupational Therapist              Therapy Charges for Today     Code Description Service Date Service Provider Modifiers Qty    12912255033 HC OT SELF CARE/MGMT/TRAIN EA 15 MIN 3/1/2022 Divya Lima OT GO 1    90924425609 HC OT EVAL MOD COMPLEXITY 3 3/1/2022 Divya Lima OT GO 1               Divya Lima OT  3/1/2022

## 2022-03-01 NOTE — PROGRESS NOTES
[facility] Medicine Services Daily Progress Note    Patient Name: Nadege Barrow  : 1945  MRN: [id number]  Primary Care Physician:  Kaela Layton APRN  Date of admission: 2022      Subjective      Chief Complaint:     Short of breath    Subjective   2022  Patient seen and examined  Denies any chest pain or shortness of breath  Had Myoview stress test today which was negative for ischemia    3/1/2022  Patient was reportedly confused over the night  Sitter placed in the room  On cefepime which was discontinued today as it can cause altered mental status    Review of Systems   Constitutional: Negative for chills and fever.   HENT: Negative for congestion.    Eyes: Negative for blurred vision.   Cardiovascular: Negative for chest pain.   Respiratory: Negative for shortness of breath.    Endocrine: Negative for cold intolerance and heat intolerance.   Gastrointestinal: Negative for abdominal pain, nausea and vomiting.   Genitourinary: Negative for dysuria.   Neurological: Positive for weakness.          Objective      Vitals:   Temp:  [97.5 °F (36.4 °C)-98.3 °F (36.8 °C)] 98 °F (36.7 °C)  Heart Rate:  [74-89] 84  Resp:  [16-20] 18  BP: (117-145)/() 132/59  Flow (L/min):  [2-3] 2    Physical Exam  Vitals reviewed.   Constitutional:       General: She is not in acute distress.     Appearance: She is well-developed.   HENT:      Head: Normocephalic and atraumatic.      Nose: Nose normal.      Mouth/Throat:      Mouth: Mucous membranes are moist.   Eyes:      Extraocular Movements: Extraocular movements intact.      Conjunctiva/sclera: Conjunctivae normal.      Pupils: Pupils are equal, round, and reactive to light.   Neck:      Thyroid: No thyromegaly.      Vascular: No JVD.      Trachea: No tracheal deviation.   Cardiovascular:      Rate and Rhythm: Normal rate and regular rhythm.   Pulmonary:      Breath sounds: No rales.   Abdominal:      General: Bowel sounds are normal.       Palpations: Abdomen is soft.      Tenderness: There is no abdominal tenderness.   Musculoskeletal:      Cervical back: Neck supple. No muscular tenderness.      Comments: Degenerative changes   Skin:     General: Skin is warm and dry.   Neurological:      Mental Status: She is alert. Mental status is at baseline.      Motor: No abnormal muscle tone.   Psychiatric:         Mood and Affect: Mood normal.         Behavior: Behavior normal.                 Result Review    Result Review:  I have personally reviewed the results from the time of this admission to 3/1/2022 09:31 EST and agree with these findings:  [x]  Laboratory  [x]  Microbiology  [x]  Radiology  []  EKG/Telemetry   []  Cardiology/Vascular   []  Pathology  []  Old records  []  Other:  Most notable findings include:           Assessment/Plan      Brief Patient Summary:  76-year-old female with multiple comorbidities including CAD, ischemic cardiomyopathy, disabling tremor status post left thalamic high-frequency MRI guided ultrasound ablation brain for tremor right upper extremity-4/4/2021 at [place], hyperlipidemia and TIA.  Presented to [facility] ED with complaints of dyspnea.  Of note is that patient was discharged a week from [facility] after treatment for pneumonia.    After evaluating in the ED, patient was admitted for further care    aspirin, 81 mg, Oral, Daily  atorvastatin, 40 mg, Oral, Nightly  cefdinir, 300 mg, Oral, Q24H  docusate sodium, 100 mg, Oral, BID  doxycycline, 100 mg, Oral, Q12H  empagliflozin, 10 mg, Oral, Daily  fludrocortisone, 0.1 mg, Oral, Daily  FLUoxetine, 20 mg, Oral, Daily  insulin lispro, 0-14 Units, Subcutaneous, 4x Daily With Meals & Nightly  insulin NPH-insulin regular, 20 Units, Subcutaneous, BID With Meals  ipratropium-albuterol, 3 mL, Nebulization, Q4H - RT  metoprolol succinate XL, 50 mg, Oral, Daily  polyethylene glycol, 17 g, Oral, Daily  sodium chloride, 10 mL, Intravenous,  Q12H  ticagrelor, 90 mg, Oral, BID             Active Hospital Problems:  Active Hospital Problems    Diagnosis    • **Hypertensive crisis    • Dementia (HCC)    • Ischemic cardiomyopathy    • Elevated LFTs    • Acute respiratory failure with hypoxia (HCC)    • Atherosclerotic heart disease of native coronary artery without angina pectoris    • Depression, unspecified    • Personal history of transient ischemic attack (TIA), and cerebral infarction without residual deficits    • HCAP (healthcare-associated pneumonia)    • Sepsis due to pneumonia (HCC)    • Disabling essential tremor    • S/P right coronary artery (RCA) stent placement    • Acute on chronic congestive heart failure (HCC)    • Hyperlipidemia, mixed    • Anxiety    • Uncontrolled type II diabetes mellitus with peripheral artery disease (HCC)      Plan:   Hypertensive crisis improved.  Upon arrival to the ED /146  Patient treated in the ED with nitroglycerin continuous IV infusion  d/c nitro drip  Blood pressure has improved  On  Metoprolol    Chest pain/shortness of breath  High blood pressure on presentation  History of CAD status post stent placement and on medical management with aspirin, beta-blocker, Brilinta and statin  Cardiologist following  Recommended Myoview stress test 2/20/2022-no evidence of ischemia-consistent with low risk study      Possible Healthcare acquired pneumonia/acute respiratory failure with hypoxia:  Patient recently discharged from [facility], admission for pneumonia  Chest x-ray reviewed, ABG reviewed  Patient treated in the ED with IV Rocephin, IV doxycycline, BiPAP  Blood culture-no growth   Respiratory viral panel including COVID-19-negative  Urine antigens negative  Was on on cefepime and vancomycin  Antibiotics deescalated to Omnicef and doxycycline  Respiratory care with bronchodilators  Oxygen therapy and titration     Acute systolic heart failure /ischemic cardiomyopathy  Secondary to ischemic  cardiomyopathy  Currently compensated  Echocardiogram revealed EF around 40%  On diuretic    Sepsis ruled out      Dementia:  Continue on home medical  Fall precautions     Disabling essential tremor:  S/P left thalamic high frequency MR guided ultrasound ablation brain for tremor RUE, done 4/4/21 in [place]     Mixed hyperlipidemia:  On statin     DM type II:  Continue on present insulin regimen  The blood sugar     Anxiety/depression:  On Xanax, Prozac     History of TIA:  On statin and Plavix    Hypokalemia-on replacement protocol  Check serum magnesium          DVT prophylaxis:  Mechanical DVT prophylaxis orders are present.       DVT prophylaxis:  Mechanical DVT prophylaxis orders are present.    CODE STATUS:    Code Status (Patient has no pulse and is not breathing): CPR (Attempt to Resuscitate)  Medical Interventions (Patient has pulse or is breathing): Full Support      Disposition: Pending clinical progress    This patient has been examined wearing appropriate Personal Protective Equipment and discussed with hospital infection control department. 03/01/22      Electronically signed by Kraig Luis MD, 03/01/22, 09:31 EST.  [facility] Hospitalist Team

## 2022-03-01 NOTE — PROGRESS NOTES
Referring Provider: Hospitalist    Reason for follow-up: Patient high blood pressure     Patient Care Team:  Kaela Layton APRN as PCP - General (Nurse Practitioner)  Hesham Acosta MD as PCP - Family Medicine    Subjective .  Mental status changes now    Objective  In bed with distress     Review of Systems   Unable to perform ROS: acuity of condition       Gabapentin and Morphine    Scheduled Meds:aspirin, 81 mg, Oral, Daily  atorvastatin, 40 mg, Oral, Nightly  cefdinir, 300 mg, Oral, Q24H  docusate sodium, 100 mg, Oral, BID  doxycycline, 100 mg, Oral, Q12H  empagliflozin, 10 mg, Oral, Daily  fludrocortisone, 0.1 mg, Oral, Daily  FLUoxetine, 20 mg, Oral, Daily  insulin lispro, 0-14 Units, Subcutaneous, 4x Daily With Meals & Nightly  insulin NPH-insulin regular, 20 Units, Subcutaneous, BID With Meals  ipratropium-albuterol, 3 mL, Nebulization, Q4H - RT  metoprolol succinate XL, 50 mg, Oral, Daily  polyethylene glycol, 17 g, Oral, Daily  sodium chloride, 10 mL, Intravenous, Q12H  ticagrelor, 90 mg, Oral, BID      Continuous Infusions:   PRN Meds:.•  acetaminophen **OR** acetaminophen **OR** acetaminophen  •  ALPRAZolam  •  Calcium Gluconate-NaCl **AND** calcium gluconate **AND** Calcium, Ionized  •  dextrose  •  dextrose  •  glucagon (human recombinant)  •  insulin lispro **AND** insulin lispro  •  magnesium sulfate **OR** magnesium sulfate **OR** magnesium sulfate  •  nitroglycerin  •  ondansetron **OR** ondansetron  •  phenol  •  potassium chloride **OR** potassium chloride **OR** potassium chloride  •  potassium phosphate infusion greater than 15 mMoles **OR** potassium phosphate infusion greater than 15 mMoles **OR** potassium phosphate **OR** sodium phosphate IVPB **OR** sodium phosphate IVPB **OR** sodium phosphate IVPB  •  sodium chloride  •  tiZANidine        VITAL SIGNS  Vitals:    03/01/22 0855 03/01/22 1100 03/01/22 1211 03/01/22 1213   BP:  138/84     Pulse: 84 83 85 85   Resp: 18 18 18 18   Temp:  " 99.1 °F (37.3 °C)     TempSrc:  Oral     SpO2: 93% 93% 93% 93%   Weight:       Height:           Flowsheet Rows      First Filed Value   Admission Height 157.5 cm (62\") Documented at 02/26/2022 2202   Admission Weight 72.6 kg (160 lb) Documented at 02/26/2022 2202           TELEMETRY: Sinus rhythm    Physical Exam:  Constitutional:       Appearance: Well-developed.   Eyes:      General: No scleral icterus.     Conjunctiva/sclera: Conjunctivae normal.   HENT:      Head: Normocephalic and atraumatic.   Neck:      Vascular: No carotid bruit or JVD.   Pulmonary:      Effort: Pulmonary effort is normal.      Breath sounds: Normal breath sounds. No wheezing. No rales.   Cardiovascular:      Normal rate. Regular rhythm.   Pulses:     Intact distal pulses.   Abdominal:      General: Bowel sounds are normal.      Palpations: Abdomen is soft.   Musculoskeletal:      Cervical back: Normal range of motion and neck supple. Skin:     General: Skin is warm and dry.      Findings: No rash.   Neurological:      Mental Status: Alert.          Results Review:   I reviewed the patient's new clinical results.  Lab Results (last 24 hours)     Procedure Component Value Units Date/Time    POC Glucose Once [[id number]]  (Abnormal) Collected: 03/01/22 1151    Specimen: Blood Updated: 03/01/22 1152     Glucose 180 mg/dL      Comment: Serial Number: [id number]:  [id number]       POC Glucose Once [[id number]]  (Abnormal) Collected: 03/01/22 0726    Specimen: Blood Updated: 03/01/22 0903     Glucose 241 mg/dL      Comment: Serial Number: [id number]:  [id number]       Vancomycin, Random [[id number]]  (Normal) Collected: 03/01/22 0430    Specimen: Blood Updated: 03/01/22 0538     Vancomycin Random 6.20 mcg/mL     Narrative:      Therapeutic Ranges for Vancomycin    Vancomycin Random   5.0-40.0 mcg/mL  Vancomycin Trough   5.0-20.0 mcg/mL  Vancomycin Peak     20.0-40.0 mcg/mL    Comprehensive Metabolic Panel [[id number]]  (Abnormal) " Collected: 03/01/22 0430    Specimen: Blood Updated: 03/01/22 0523     Glucose 206 mg/dL      BUN 26 mg/dL      Creatinine 1.55 mg/dL      Sodium 142 mmol/L      Potassium 2.9 mmol/L      Chloride 98 mmol/L      CO2 32.0 mmol/L      Calcium 8.6 mg/dL      Total Protein 6.6 g/dL      Albumin 3.60 g/dL      ALT (SGPT) 37 U/L      AST (SGOT) 18 U/L      Alkaline Phosphatase 107 U/L      Total Bilirubin 0.6 mg/dL      Globulin 3.0 gm/dL      A/G Ratio 1.2 g/dL      BUN/Creatinine Ratio 16.8     Anion Gap 12.0 mmol/L      eGFR 34.6 mL/min/1.73      Comment: National Kidney Foundation and American Society of Nephrology (ASN) Task Force recommended calculation based on the Chronic Kidney Disease Epidemiology Collaboration (CKD-EPI) equation refit without adjustment for race.       Narrative:      GFR Normal >60  Chronic Kidney Disease <60  Kidney Failure <15      Magnesium [[id number]]  (Normal) Collected: 03/01/22 0430    Specimen: Blood Updated: 03/01/22 0523     Magnesium 2.0 mg/dL     CBC & Differential [[id number]]  (Abnormal) Collected: 03/01/22 0430    Specimen: Blood Updated: 03/01/22 0503    Narrative:      The following orders were created for panel order CBC & Differential.  Procedure                               Abnormality         Status                     ---------                               -----------         ------                     CBC Auto Differential[[id number]]        Abnormal            Final result                 Please view results for these tests on the individual orders.    CBC Auto Differential [[id number]]  (Abnormal) Collected: 03/01/22 0430    Specimen: Blood Updated: 03/01/22 0503     WBC 13.80 10*3/mm3      RBC 4.26 10*6/mm3      Hemoglobin 12.2 g/dL      Hematocrit 37.1 %      MCV 87.2 fL      MCH 28.6 pg      MCHC 32.8 g/dL      RDW 15.4 %      RDW-SD 48.1 fl      MPV 8.8 fL      Platelets 292 10*3/mm3      Neutrophil % 77.2 %      Lymphocyte % 16.8 %      Monocyte % 4.6 %       Eosinophil % 1.1 %      Basophil % 0.3 %      Neutrophils, Absolute 10.60 10*3/mm3      Lymphocytes, Absolute 2.30 10*3/mm3      Monocytes, Absolute 0.60 10*3/mm3      Eosinophils, Absolute 0.20 10*3/mm3      Basophils, Absolute 0.00 10*3/mm3      nRBC 0.1 /100 WBC     Blood Culture - Blood, Hand, Left [[id number]]  (Normal) Collected: 02/26/22 2306    Specimen: Blood from Hand, Left Updated: 02/28/22 2315     Blood Culture No growth at 2 days    Blood Culture - Blood, Arm, Right [[id number]]  (Normal) Collected: 02/26/22 2155    Specimen: Blood from Arm, Right Updated: 02/28/22 2200     Blood Culture No growth at 2 days    POC Glucose Once [[id number]]  (Abnormal) Collected: 02/28/22 1944    Specimen: Blood Updated: 02/1945     Glucose 119 mg/dL      Comment: Serial Number: [id number]:  [id number]       POC Glucose Once [[id number]]  (Abnormal) Collected: 02/28/22 1737    Specimen: Blood Updated: 02/28/22 1738     Glucose 114 mg/dL      Comment: Serial Number: [id number]:  [id number]             Imaging Results (Last 24 Hours)     ** No results found for the last 24 hours. **          EKG      I personally viewed and interpreted the patient's EKG/Telemetry data:    ECHOCARDIOGRAM:    STRESS MYOVIEW:    CARDIAC CATHETERIZATION:    OTHER:         Assessment/Plan     Principal Problem:    Hypertensive crisis  Active Problems:    Acute respiratory failure with hypoxia (HCC)    Anxiety    Atherosclerotic heart disease of native coronary artery without angina pectoris    Acute on chronic congestive heart failure (HCC)    Dementia (HCC)    Depression, unspecified    Disabling essential tremor    Hyperlipidemia, mixed    Personal history of transient ischemic attack (TIA), and cerebral infarction without residual deficits    HCAP (healthcare-associated pneumonia)    S/P right coronary artery (RCA) stent placement    Sepsis due to pneumonia ([facility])    Uncontrolled type II diabetes mellitus with peripheral artery  disease (HCC)    Ischemic cardiomyopathy    Elevated LFTs       Presented with chest pain shortness of breath and is ruled out for MI by get enzymes  Patient had a stress Myoview did not show any ischemia  Patient blood pressure was high initially but is better now  Patient has mental status changes now and has history of dementia and is having work-up done  Patient had an echocardiogram which showed a EF of 40% and will continue her on medical therapy only for now    I discussed the patients findings and my recommendations with patient's nurse    Cristóbal Castillo MD  03/01/22  12:56 EST

## 2022-03-01 NOTE — THERAPY EVALUATION
"Acute Care - Speech Language Pathology   Swallow Initial Evaluation [facility]     Patient Name: Nadege Barrow  : 1945  MRN: [id number]  Today's Date: 3/1/2022               Admit Date: 2022    Visit Dx:     ICD-10-CM ICD-9-CM   1. Acute respiratory failure with hypoxia ([facility])  J96.01 518.81   2. Hypertensive emergency  I16.1 401.9   3. Acute congestive heart failure, unspecified heart failure type ([facility])  I50.9 428.0     Patient Active Problem List   Diagnosis   • Chest pain on breathing   • Hypertensive crisis   • Acute respiratory failure with hypoxia ([facility])   • Anemia   • Anxiety   • Atherosclerotic heart disease of native coronary artery without angina pectoris   • Back pain   • Cardiomyopathy ([facility])   • Carotid artery disease ([facility])   • Cellulitis of foot   • Acute on chronic congestive heart failure ([facility])   • Current moderate episode of major depressive disorder without prior episode ([facility])   • Dementia ([facility])   • Depression, unspecified   • Disabling essential tremor   • Fatigue   • Hyperlipidemia, mixed   • Peripheral vision loss, bilateral   • Personal history of transient ischemic attack (TIA), and cerebral infarction without residual deficits   • HCAP (healthcare-associated pneumonia)   • Polypharmacy   • Retinopathy, bilateral   • S/P right coronary artery (RCA) stent placement   • Sepsis due to pneumonia ([facility])   • Uncontrolled type II diabetes mellitus with peripheral artery disease ([facility])   • Ischemic cardiomyopathy   • Elevated LFTs     Past Medical History:   Diagnosis Date   • Anxiety    • CHF (congestive heart failure) ([facility])    • Depression    • Diabetes mellitus ([facility])    • Hyperlipidemia    • Hypertension    • Panic disorder     \"panic attacks\"   • Tremors of nervous system     \"essential tremors\"     Past Surgical History:   Procedure Laterality Date   • CORONARY ANGIOPLASTY WITH STENT PLACEMENT         SLP Recommendation and Plan  SLP Swallowing Diagnosis: functional oral phase, R/O pharyngeal " dysphagia (03/01/22 1300)  SLP Diet Recommendation: NPO (03/01/22 1300)              Recommended Diagnostics: reassess via VFSS (MBS) (03/01/22 1300)        Rehab Potential/Prognosis, Swallowing: good, to achieve stated therapy goals (03/01/22 1300)  Therapy Frequency (Swallow): PRN (03/01/22 1300)  Predicted Duration Therapy Intervention (Days): until discharge (03/01/22 1300)                         PPE: gown, gloves, surgical mask, eye protection                SWALLOW EVALUATION (last 72 hours)     SLP Adult Swallow Evaluation     Row Name 03/01/22 1300       Rehab Evaluation    Document Type evaluation  -EC    Subjective Information complains of  globus sensation, frequent choking at home  -EC    Patient Observations alert; cooperative; agree to therapy  -EC    Patient Effort good  -EC    Symptoms Noted During/After Treatment none  -EC            General Information    Patient Profile Reviewed yes  -EC    Pertinent History Of Current Problem 76-year-old female with multiple comorbidities including CAD, ischemic cardiomyopathy, disabling tremor status post left thalamic high-frequency MRI guided ultrasound ablation brain for tremor right upper extremity-4/4/2021 at [place], hyperlipidemia and TIA. Pt presented to [facility] ED with complaints of dyspnea. Of note is that patient was discharged a week from [facility] after treatment for pneumonia. Swallow eval ordered today after RN reported pt choking on oatmeal.  -EC    Current Method of Nutrition NPO  made NPO after choking incident  -EC    Precautions/Limitations, Vision WFL; for purposes of eval  -EC    Precautions/Limitations, Hearing WFL; for purposes of eval  -EC    Prior Level of Function-Communication WFL  -EC    Prior Level of Function-Swallowing regular textures; thin liquids  -EC    Plans/Goals Discussed with patient  -EC    Patient's Goals for Discharge patient did not state  -EC            Pain    Additional Documentation  --  no pain reported at eval  -EC            Oral Motor Structure and Function    Dentition Assessment natural, present and adequate  -EC    Secretion Management WNL/WFL  -EC    Mucosal Quality moist, healthy  -EC            Oral Musculature and Cranial Nerve Assessment    Oral Motor General Assessment WFL  -EC            General Eating/Swallowing Observations    Respiratory Support Currently in Use nasal cannula  4L, increased from 2L after choking incident, per RN  -EC    Eating/Swallowing Skills self-fed; fed by SLP; appropriate self-feeding skills observed  -EC    Positioning During Eating upright 90 degree; upright in bed  -EC    Utensils Used spoon; straw  -EC    Consistencies Trialed regular textures; pureed; chopped; mixed consistency; thin liquids  -EC            Clinical Swallow Eval    Clinical Swallow Evaluation Summary Pt awake, alert and amenable to PO trials for evaluation. Pt c/o globus sensation and frequent choking at home. Pt administered trials of thin liquid, puree applesauce, mechanical soft mixed consistency peaches and regular solid peanut butter crackers. Oral transit appears WFL for all consistencies. No overt clinical s/s of aspiration demonstrated though will proceed w/rec of instrumental evaluation of swallow d/t pt c/o globus sensation, frequent choking, RN witnessed choking incident this AM, recent tx for pna and CXR 2/26 showing possible multifocal pna.  -EC            SLP Evaluation Clinical Impression    SLP Swallowing Diagnosis functional oral phase; R/O pharyngeal dysphagia  -EC    Functional Impact risk of aspiration/pneumonia  -EC    Rehab Potential/Prognosis, Swallowing good, to achieve stated therapy goals  -EC            SLP Treatment Clinical Impressions    Care Plan Review evaluation/treatment results reviewed; care plan/treatment goals reviewed; patient/other agree to care plan  -EC            Recommendations    Therapy Frequency (Swallow) PRN  -EC    Predicted Duration  Therapy Intervention (Days) until discharge  -EC    SLP Diet Recommendation NPO  -EC    Recommended Diagnostics reassess via VFSS (MBS)  -EC            Swallow Goals (SLP)    Oral Nutrition/Hydration Goal Selection (SLP) oral nutrition/hydration, SLP goal 1; oral nutrition/hydration, SLP goal 2  -EC            Oral Nutrition/Hydration Goal 1 (SLP)    Oral Nutrition/Hydration Goal 1, SLP Pt to have instrumental assessment of swallow w/further recommendations as indicated.  -EC    Time Frame (Oral Nutrition/Hydration Goal 1, SLP) short term goal (STG)  -EC            Oral Nutrition/Hydration Goal 2 (SLP)    Oral Nutrition/Hydration Goal 2, SLP Pt will maximixe swallow function for least restrictive PO diet, exhibiting no complication associated with dysphagia, adequate PO intake, and demonstrating independent use of swallow compensations  -EC    Time Frame (Oral Nutrition/Hydration Goal 2, SLP) by discharge  -EC          User Key  (r) = Recorded By, (t) = Taken By, (c) = Cosigned By    Initials Name Effective Dates    EC Divya Benson 06/16/21 -                 EDUCATION  The patient has been educated in the following areas:   Dysphagia (Swallowing Impairment).        SLP GOALS     Row Name 03/01/22 1300             Oral Nutrition/Hydration Goal 1 (SLP)    Oral Nutrition/Hydration Goal 1, SLP Pt to have instrumental assessment of swallow w/further recommendations as indicated.  -EC      Time Frame (Oral Nutrition/Hydration Goal 1, SLP) short term goal (STG)  -EC              Oral Nutrition/Hydration Goal 2 (SLP)    Oral Nutrition/Hydration Goal 2, SLP Pt will maximixe swallow function for least restrictive PO diet, exhibiting no complication associated with dysphagia, adequate PO intake, and demonstrating independent use of swallow compensations  -EC      Time Frame (Oral Nutrition/Hydration Goal 2, SLP) by discharge  -EC            User Key  (r) = Recorded By, (t) = Taken By, (c) = Cosigned By    Initials Name  Provider Type    Divya Ford Speech and Language Pathologist                   Time Calculation:                Divya Benson  3/1/2022

## 2022-03-01 NOTE — CASE MANAGEMENT/SOCIAL WORK
Continued Stay Note  DAVIDE Guicho     Patient Name: Nadege Barrow  MRN: [id number]  Today's Date: 3/1/2022    Admit Date: 2/26/2022     Discharge Plan     Row Name 03/01/22 1425       Plan    Plan D/C Plan: [facility] accepted pending precert. Precert started 3/1. No PASRR required for [facility].    Plan Comments CM spoke to patient's daughter, Ana, on the phone who requested [facility] referral. Per [facility] liaison, will submit for precert for acute. CM called patient's daughter and updated per on rehab precert pending. CM updated bedside RN. Barrier to D/C: psych consult, IV abx.                Expected Discharge Date and Time     Expected Discharge Date Expected Discharge Time    Mar 3, 2022             Marissa Madison

## 2022-03-01 NOTE — THERAPY TREATMENT NOTE
Subjective: Pt agreeable to therapeutic plan of care. Pt sitting on bsc with nsg. Pt eager to walk.    Objective:     Bed mobility - Min-A  Transfers - Mod-A and with rolling walker  Ambulation - 30 feet and 20 feet Mod-A and with rolling walker. Therapeutic rest between walks.    Pain: 0 VAS  Education: Provided education on importance of mobility and skilled verbal / tactile cueing throughout intervention.     Assessment: Nadege Barrow presents with functional mobility impairments which indicate the need for skilled intervention. Pt currently on 4L O2 via NC, O2 sats were stable. Pt required Judi with bed mobility. Upon each standing, pt had LOB posteriorly requiring assistance from PT to regain. Pt requires ModA with Rwx for STS and ambulation of 30 feet and 20 feet. Pt required therapeutic rest between walks. Pt demonstrates unsteadiness, balance and coordination deficits with ambulation. Pt voiced feeling fatigue and weak after ambulation. Pt remains a risk for falls and will benefit from IP rehab upon d/c to address stated deficits. Tolerating session today without incident. Will continue to follow and progress as tolerated.     Plan/Recommendations:   Pt would benefit from Inpatient Rehabilitation placement at discharge from facility and requires no DME at discharge.   Pt desires Inpatient Rehabilitation placement at discharge. Pt cooperative; agreeable to therapeutic recommendations and plan of care.     Basic Mobility 6-click:  Rollin = Total, A lot = 2, A little = 3; 4 = None  Supine>Sit:   1 = Total, A lot = 2, A little = 3; 4 = None   Sit>Stand with arms:  1 = Total, A lot = 2, A little = 3; 4 = None  Bed>Chair:   1 = Total, A lot = 2, A little = 3; 4 = None  Ambulate in room:  1 = Total, A lot = 2, A little = 3; 4 = None  3-5 Steps with railin = Total, A lot = 2, A little = 3; 4 = None  Score: 13    Modified Judah: 4 = Moderately severe disability (Unable to attend to own bodily needs  without assistance, and unable to walk unassisted)     Post-Tx Position: Supine with HOB Elevated, Staff Present, Alarms activated and Call light and personal items within reach  PPE: gloves, surgical mask, eyewear protection

## 2022-03-01 NOTE — MBS/VFSS/FEES
"Acute Care - Speech Language Pathology   Swallow Initial Evaluation  [place]     Patient Name: Nadege Barrow  : 1945  MRN: [id number]  Today's Date: 3/1/2022               Admit Date: 2022    Visit Dx:     ICD-10-CM ICD-9-CM   1. Acute respiratory failure with hypoxia ([facility])  J96.01 518.81   2. Hypertensive emergency  I16.1 401.9   3. Acute congestive heart failure, unspecified heart failure type ([facility])  I50.9 428.0     Patient Active Problem List   Diagnosis   • Chest pain on breathing   • Hypertensive crisis   • Acute respiratory failure with hypoxia ([facility])   • Anemia   • Anxiety   • Atherosclerotic heart disease of native coronary artery without angina pectoris   • Back pain   • Cardiomyopathy ([facility])   • Carotid artery disease ([facility])   • Cellulitis of foot   • Acute on chronic congestive heart failure ([facility])   • Current moderate episode of major depressive disorder without prior episode ([facility])   • Dementia ([facility])   • Depression, unspecified   • Disabling essential tremor   • Fatigue   • Hyperlipidemia, mixed   • Peripheral vision loss, bilateral   • Personal history of transient ischemic attack (TIA), and cerebral infarction without residual deficits   • HCAP (healthcare-associated pneumonia)   • Polypharmacy   • Retinopathy, bilateral   • S/P right coronary artery (RCA) stent placement   • Sepsis due to pneumonia ([facility])   • Uncontrolled type II diabetes mellitus with peripheral artery disease ([facility])   • Ischemic cardiomyopathy   • Elevated LFTs     Past Medical History:   Diagnosis Date   • Anxiety    • CHF (congestive heart failure) ([facility])    • Depression    • Diabetes mellitus ([facility])    • Hyperlipidemia    • Hypertension    • Panic disorder     \"panic attacks\"   • Tremors of nervous system     \"essential tremors\"     Past Surgical History:   Procedure Laterality Date   • CORONARY ANGIOPLASTY WITH STENT PLACEMENT         SLP Recommendation and Plan  SLP Swallowing Diagnosis: functional oral phase, mod-severe, " pharyngeal dysphagia (03/01/22 1500)  SLP Diet Recommendation: ground, honey thick liquids (03/01/22 1500)  Recommended Precautions and Strategies: upright posture during/after eating, small bites of food and sips of liquid, general aspiration precautions (03/01/22 1500)  SLP Rec. for Method of Medication Administration: meds whole, meds crushed, with pudding or applesauce (03/01/22 1500)     Monitor for Signs of Aspiration: yes, notify SLP if any concerns (03/01/22 1500)  Recommended Diagnostics: reassess via VFSS (MBS) (03/01/22 1500)  Swallow Criteria for Skilled Therapeutic Interventions Met: demonstrates skilled criteria (03/01/22 1500)     Rehab Potential/Prognosis, Swallowing: good, to achieve stated therapy goals (03/01/22 1500)  Therapy Frequency (Swallow): PRN (03/01/22 1500)  Predicted Duration Therapy Intervention (Days): until discharge (03/01/22 1500)          Patient was not wearing a face mask during this therapy encounter. Therapist used appropriate personal protective equipment including mask, eye protection and gloves.  Mask used was standard procedure mask. Appropriate PPE was worn during the entire therapy session. Hand hygiene was completed before and after therapy session. Patient is not in enhanced droplet precautions.               SWALLOW EVALUATION (last 72 hours)     SLP Adult Swallow Evaluation     Row Name 03/01/22 1500       Rehab Evaluation    Document Type evaluation  -LF    Patient Observations alert; cooperative; agree to therapy  -LF    Patient Effort good  -LF    Symptoms Noted During/After Treatment none  -LF            General Information    Patient Profile Reviewed yes  -LF          MBS/VFSS    Utensils Used spoon; cup; straw  -LF    Consistencies Trialed mixed consistency; pureed; thin liquids; nectar/syrup-thick liquids; honey-thick liquids  -LF            MBS/VFSS Interpretation    VFSS Summary Pt observed upright in the lateral projection and was given trials of NTL by cup  x2 NTL by cup w/ chin tuck x2, puree x2, mechanical ground x2, HTL by cup x2, HTL by cup w/ chin tuck x2, thin liquids by cp x2, and thin liquids by cup w/chin tuck x2.    NECTAR THICK LIQUIDS  Reduced bolus control with premature spillage to the pyriforms. Deep,silent, penetration to the vocal cords observed during the swallow. Mild to moderate pharyngeal residue. Pt cued to implement chin tuck with NTL by cup x2. Chin tuck eliminated penetration with 1 out of 2 trials. Deep penetration during the swallow observed with second trial by chin tuck.     HONEY THICK LIQUIDS  Reduced bolus control with premature spillage to the vallecula. Shallow penetration observed during the swallow with initial trial. Penetration appears to clear after the swallow. No penetration or aspiration observed with second trial. Pt cued to implement chin tuck with HTL by cup x2. Reduced bolus control with premature spillage to the vallecula. No penetration or aspiration observed at any time. Trace pharyngeal residue.    THIN LIQUIDS  Reduced bolus control w/ premature spillage to the pyriforms with all trials. Deep penetration during the swallow with rapid consecutive sips with eventual aspiration. Pt senses aspirated material with cough reaction. Pt cued to implement chin tuck with thins by cup x2. Deep penetration observed during the swallow with both trials.    PUREE  Reduced bolus control with premature spillage to the vallecula. No penetration or aspiration observed at any time. Mild pharyngeal residue.    MECHANICAL GROUND  Adequate mastication with reduced bolus control and premature spillage of peach juice to the pyriforms. No penetration or aspiration observed at any time. Trace pharyngeal residue.     OVERALL  Reduced hyolaryngeal elevation and epiglottic deflection observed across all trials/consistenies.    -LF            Initiation of Pharyngeal Swallow    Pharyngeal Phase impaired pharyngeal phase of swallowing  -LF     Penetration During the Swallow thin liquids; nectar-thick liquids; honey-thick liquids  -    Aspiration During the Swallow thin liquids  -LF    Response to Penetration deep; shallow; no response  -LF    Response to Aspiration cough  -LF    Rosenbek's Scale thin:; 7--->level 7; nectar:; 4--->level 4; 5--->level 5; honey:; 2--->level 2  -LF            SLP Evaluation Clinical Impression    SLP Swallowing Diagnosis functional oral phase; mod-severe; pharyngeal dysphagia  -    Functional Impact risk of aspiration/pneumonia  -    Rehab Potential/Prognosis, Swallowing good, to achieve stated therapy goals  -    Swallow Criteria for Skilled Therapeutic Interventions Met demonstrates skilled criteria  -LF            SLP Treatment Clinical Impressions    Care Plan Review evaluation/treatment results reviewed; care plan/treatment goals reviewed; risks/benefits reviewed; current/potential barriers reviewed; patient/other agree to care plan  -            Recommendations    Therapy Frequency (Swallow) PRN  -    Predicted Duration Therapy Intervention (Days) until discharge  -    SLP Diet Recommendation ground; honey thick liquids  -    Recommended Diagnostics reassess via VFSS (MBS)  -    Recommended Precautions and Strategies upright posture during/after eating; small bites of food and sips of liquid; general aspiration precautions  -    Oral Care Recommendations Oral Care BID/PRN; Oral Care before breakfast, after meals and PRN  -    SLP Rec. for Method of Medication Administration meds whole; meds crushed; with pudding or applesauce  -    Monitor for Signs of Aspiration yes; notify SLP if any concerns  -            Swallow Goals (SLP)    Oral Nutrition/Hydration Goal Selection (SLP) oral nutrition/hydration, SLP goal 1; oral nutrition/hydration, SLP goal 2; oral nutrition/hydration, SLP goal (free text)  -            Oral Nutrition/Hydration Goal 1 (SLP)    Oral Nutrition/Hydration Goal 1, SLP Pt to  have instrumental assessment of swallow w/further recommendations as indicated.  -LF    Time Frame (Oral Nutrition/Hydration Goal 1, SLP) short term goal (STG)  -LF    Barriers (Oral Nutrition/Hydration Goal 1, SLP) see above note  -LF    Progress/Outcomes (Oral Nutrition/Hydration Goal 1, SLP) goal met  -LF            Oral Nutrition/Hydration Goal 2 (SLP)    Oral Nutrition/Hydration Goal 2, SLP Pt will maximixe swallow function for least restrictive PO diet, exhibiting no complication associated with dysphagia, adequate PO intake, and demonstrating independent use of swallow compensations  -LF    Time Frame (Oral Nutrition/Hydration Goal 2, SLP) by discharge  -LF    Barriers (Oral Nutrition/Hydration Goal 2, SLP) see above note  -LF    Progress/Outcomes (Oral Nutrition/Hydration Goal 2, SLP) goal ongoing  -LF            Oral Nutrition/Hydration Goal (SLP)    Oral Nutrition/Hydration Goal, SLP The patient will participate in a full meal assessment to determine safety and adequacy of recommended diet, independent use of safe swallow compensations, and additional goals/recommendations to follow  -LF    Time Frame (Oral Nutrition/Hydration Goal, SLP) 2 days  -LF          User Key  (r) = Recorded By, (t) = Taken By, (c) = Cosigned By    Initials Name Effective Dates    EC Divya Benson 06/16/21 -     LF Iris Perla SLP 06/16/21 -                 EDUCATION  The patient has been educated in the following areas:   Dysphagia (Swallowing Impairment) Oral Care/Hydration Modified Diet Instruction.     VFSS review and education was conducted this date. Individuals educated included: patient. Education methods/means included verbal review face to face.  Education barriers: none identified Further follow up recommended while in house and at next level of care.     SLP GOALS     Row Name 03/01/22 1500       Oral Nutrition/Hydration Goal 1 (SLP)    Oral Nutrition/Hydration Goal 1, SLP Pt to have instrumental assessment of  swallow w/further recommendations as indicated.  -LF    Time Frame (Oral Nutrition/Hydration Goal 1, SLP) short term goal (STG)  -LF    Barriers (Oral Nutrition/Hydration Goal 1, SLP) see above note  -LF    Progress/Outcomes (Oral Nutrition/Hydration Goal 1, SLP) goal met  -LF           Oral Nutrition/Hydration Goal 2 (SLP)    Oral Nutrition/Hydration Goal 2, SLP Pt will maximixe swallow function for least restrictive PO diet, exhibiting no complication associated with dysphagia, adequate PO intake, and demonstrating independent use of swallow compensations  -LF    Time Frame (Oral Nutrition/Hydration Goal 2, SLP) by discharge  -LF    Barriers (Oral Nutrition/Hydration Goal 2, SLP) see above note  -LF    Progress/Outcomes (Oral Nutrition/Hydration Goal 2, SLP) goal ongoing  -LF           Oral Nutrition/Hydration Goal (SLP)    Oral Nutrition/Hydration Goal, SLP The patient will participate in a full meal assessment to determine safety and adequacy of recommended diet, independent use of safe swallow compensations, and additional goals/recommendations to follow  -LF    Time Frame (Oral Nutrition/Hydration Goal, SLP) 2 days  -LF          User Key  (r) = Recorded By, (t) = Taken By, (c) = Cosigned By    Initials Name Provider Type    Divya Ford Speech and Language Pathologist    Iris Marshall, SLP Speech and Language Pathologist                   Time Calculation:                RACHEL Baca  3/1/2022

## 2022-03-01 NOTE — DISCHARGE PLACEMENT REQUEST
"Nadege Barrow (76 y.o. Female)             Date of Birth Social Security Number Address Home Phone MRN    1945  [address] [address] [phone] [id number]    Druze Marital Status             Worship        Admission Date Admission Type Admitting Provider Attending Provider Department, Room/Bed    2/26/22 Emergency Kraig Luis MD Ozor, [place]chen[place], MD [facility], 2128/1    Discharge Date Discharge Disposition Discharge Destination                         Attending Provider: Kraig Luis MD    Allergies: Gabapentin, Morphine    Isolation: Contact   Infection: MRSA (02/27/22)   Code Status: CPR   Advance Care Planning Activity    Ht: 157.5 cm (62\")   Wt: 73.8 kg (162 lb 11.2 oz)    Admission Cmt: None   Principal Problem: Hypertensive crisis [I16.9]                 Active Insurance as of 2/26/2022     Primary Coverage     Payor Plan Insurance Group Employer/Plan Group    HUMANA MEDICARE REPLACEMENT HU[employer]NA MEDICARE REPLACEMENT [id number]     Payor Plan Address Payor Plan Phone Number Payor Plan Fax Number Effective Dates    [address] [address] [phone]  1/1/2016 - None Entered    [facility] [id number]       Subscriber Name Subscriber Birth Date Member ID       NADEGE BARROW 1945 [id number]                 Emergency Contacts      (Rel.) Home Phone Work Phone Mobile Phone    Ana Barrow (Daughter) [phone] -- [phone]          "

## 2022-03-02 LAB
ALBUMIN SERPL-MCNC: 3 G/DL (ref 3.5–5.2)
ALBUMIN/GLOB SERPL: 1 G/DL
ALP SERPL-CCNC: 80 U/L (ref 39–117)
ALT SERPL W P-5'-P-CCNC: 25 U/L (ref 1–33)
ANION GAP SERPL CALCULATED.3IONS-SCNC: 9 MMOL/L (ref 5–15)
AST SERPL-CCNC: 15 U/L (ref 1–32)
BASOPHILS # BLD AUTO: 0.1 10*3/MM3 (ref 0–0.2)
BASOPHILS NFR BLD AUTO: 0.6 % (ref 0–1.5)
BILIRUB SERPL-MCNC: 1.2 MG/DL (ref 0–1.2)
BUN SERPL-MCNC: 21 MG/DL (ref 8–23)
BUN/CREAT SERPL: 22.8 (ref 7–25)
CALCIUM SPEC-SCNC: 8.4 MG/DL (ref 8.6–10.5)
CHLORIDE SERPL-SCNC: 102 MMOL/L (ref 98–107)
CO2 SERPL-SCNC: 30 MMOL/L (ref 22–29)
CREAT SERPL-MCNC: 0.92 MG/DL (ref 0.57–1)
DEPRECATED RDW RBC AUTO: 46.8 FL (ref 37–54)
EGFRCR SERPLBLD CKD-EPI 2021: 64.7 ML/MIN/1.73
EOSINOPHIL # BLD AUTO: 0.3 10*3/MM3 (ref 0–0.4)
EOSINOPHIL NFR BLD AUTO: 2.2 % (ref 0.3–6.2)
ERYTHROCYTE [DISTWIDTH] IN BLOOD BY AUTOMATED COUNT: 15.2 % (ref 12.3–15.4)
GLOBULIN UR ELPH-MCNC: 3 GM/DL
GLUCOSE BLDC GLUCOMTR-MCNC: 107 MG/DL (ref 70–105)
GLUCOSE BLDC GLUCOMTR-MCNC: 133 MG/DL (ref 70–105)
GLUCOSE BLDC GLUCOMTR-MCNC: 139 MG/DL (ref 70–105)
GLUCOSE BLDC GLUCOMTR-MCNC: 84 MG/DL (ref 70–105)
GLUCOSE BLDC GLUCOMTR-MCNC: 92 MG/DL (ref 70–105)
GLUCOSE SERPL-MCNC: 62 MG/DL (ref 65–99)
HCT VFR BLD AUTO: 34.4 % (ref 34–46.6)
HGB BLD-MCNC: 11.2 G/DL (ref 12–15.9)
LYMPHOCYTES # BLD AUTO: 2.3 10*3/MM3 (ref 0.7–3.1)
LYMPHOCYTES NFR BLD AUTO: 16.1 % (ref 19.6–45.3)
MCH RBC QN AUTO: 28.4 PG (ref 26.6–33)
MCHC RBC AUTO-ENTMCNC: 32.6 G/DL (ref 31.5–35.7)
MCV RBC AUTO: 87.1 FL (ref 79–97)
MONOCYTES # BLD AUTO: 1.1 10*3/MM3 (ref 0.1–0.9)
MONOCYTES NFR BLD AUTO: 7.7 % (ref 5–12)
NEUTROPHILS NFR BLD AUTO: 10.3 10*3/MM3 (ref 1.7–7)
NEUTROPHILS NFR BLD AUTO: 73.4 % (ref 42.7–76)
NRBC BLD AUTO-RTO: 0.1 /100 WBC (ref 0–0.2)
PLATELET # BLD AUTO: 269 10*3/MM3 (ref 140–450)
PMV BLD AUTO: 8.9 FL (ref 6–12)
POTASSIUM SERPL-SCNC: 2.9 MMOL/L (ref 3.5–5.2)
POTASSIUM SERPL-SCNC: 4 MMOL/L (ref 3.5–5.2)
PROT SERPL-MCNC: 6 G/DL (ref 6–8.5)
QT INTERVAL: 460 MS
RBC # BLD AUTO: 3.95 10*6/MM3 (ref 3.77–5.28)
SODIUM SERPL-SCNC: 141 MMOL/L (ref 136–145)
WBC NRBC COR # BLD: 14.1 10*3/MM3 (ref 3.4–10.8)

## 2022-03-02 PROCEDURE — 80053 COMPREHEN METABOLIC PANEL: CPT | Performed by: NURSE PRACTITIONER

## 2022-03-02 PROCEDURE — 94660 CPAP INITIATION&MGMT: CPT

## 2022-03-02 PROCEDURE — 99233 SBSQ HOSP IP/OBS HIGH 50: CPT | Performed by: INTERNAL MEDICINE

## 2022-03-02 PROCEDURE — 85025 COMPLETE CBC W/AUTO DIFF WBC: CPT | Performed by: NURSE PRACTITIONER

## 2022-03-02 PROCEDURE — 94799 UNLISTED PULMONARY SVC/PX: CPT

## 2022-03-02 PROCEDURE — 63710000001 INSULIN ISOPHANE & REGULAR PER 5 UNITS: Performed by: HOSPITALIST

## 2022-03-02 PROCEDURE — 97530 THERAPEUTIC ACTIVITIES: CPT

## 2022-03-02 PROCEDURE — 93005 ELECTROCARDIOGRAM TRACING: CPT

## 2022-03-02 PROCEDURE — 82962 GLUCOSE BLOOD TEST: CPT

## 2022-03-02 PROCEDURE — 25010000002 ENOXAPARIN PER 10 MG: Performed by: INTERNAL MEDICINE

## 2022-03-02 PROCEDURE — 99231 SBSQ HOSP IP/OBS SF/LOW 25: CPT | Performed by: PSYCHIATRY & NEUROLOGY

## 2022-03-02 PROCEDURE — 92526 ORAL FUNCTION THERAPY: CPT

## 2022-03-02 PROCEDURE — 97116 GAIT TRAINING THERAPY: CPT

## 2022-03-02 PROCEDURE — 99232 SBSQ HOSP IP/OBS MODERATE 35: CPT | Performed by: INTERNAL MEDICINE

## 2022-03-02 PROCEDURE — 84132 ASSAY OF SERUM POTASSIUM: CPT | Performed by: INTERNAL MEDICINE

## 2022-03-02 PROCEDURE — 93010 ELECTROCARDIOGRAM REPORT: CPT | Performed by: INTERNAL MEDICINE

## 2022-03-02 RX ORDER — CEFDINIR 300 MG/1
300 CAPSULE ORAL EVERY 12 HOURS SCHEDULED
Status: DISCONTINUED | OUTPATIENT
Start: 2022-03-02 | End: 2022-03-04 | Stop reason: HOSPADM

## 2022-03-02 RX ORDER — IPRATROPIUM BROMIDE AND ALBUTEROL SULFATE 2.5; .5 MG/3ML; MG/3ML
3 SOLUTION RESPIRATORY (INHALATION)
Status: DISCONTINUED | OUTPATIENT
Start: 2022-03-02 | End: 2022-03-04 | Stop reason: HOSPADM

## 2022-03-02 RX ADMIN — BUSPIRONE HYDROCHLORIDE 10 MG: 5 TABLET ORAL at 09:23

## 2022-03-02 RX ADMIN — Medication 10 ML: at 09:27

## 2022-03-02 RX ADMIN — CEFDINIR 300 MG: 300 CAPSULE ORAL at 09:23

## 2022-03-02 RX ADMIN — DOCUSATE SODIUM 100 MG: 100 CAPSULE, LIQUID FILLED ORAL at 09:23

## 2022-03-02 RX ADMIN — EMPAGLIFLOZIN 10 MG: 10 TABLET, FILM COATED ORAL at 09:23

## 2022-03-02 RX ADMIN — BUSPIRONE HYDROCHLORIDE 10 MG: 5 TABLET ORAL at 21:16

## 2022-03-02 RX ADMIN — INSULIN HUMAN 20 UNITS: 100 INJECTION, SUSPENSION SUBCUTANEOUS at 18:34

## 2022-03-02 RX ADMIN — ATORVASTATIN CALCIUM 40 MG: 40 TABLET, FILM COATED ORAL at 21:16

## 2022-03-02 RX ADMIN — POTASSIUM CHLORIDE 40 MEQ: 1500 TABLET, EXTENDED RELEASE ORAL at 14:33

## 2022-03-02 RX ADMIN — INSULIN HUMAN 20 UNITS: 100 INJECTION, SUSPENSION SUBCUTANEOUS at 09:23

## 2022-03-02 RX ADMIN — DOXYCYCLINE 100 MG: 100 TABLET ORAL at 21:16

## 2022-03-02 RX ADMIN — DOCUSATE SODIUM 100 MG: 100 CAPSULE, LIQUID FILLED ORAL at 21:16

## 2022-03-02 RX ADMIN — TICAGRELOR 90 MG: 90 TABLET ORAL at 21:16

## 2022-03-02 RX ADMIN — DOXYCYCLINE 100 MG: 100 TABLET ORAL at 09:23

## 2022-03-02 RX ADMIN — ALPRAZOLAM 2 MG: 1 TABLET ORAL at 21:26

## 2022-03-02 RX ADMIN — ENOXAPARIN SODIUM 40 MG: 40 INJECTION SUBCUTANEOUS at 15:20

## 2022-03-02 RX ADMIN — POTASSIUM CHLORIDE 40 MEQ: 1500 TABLET, EXTENDED RELEASE ORAL at 09:26

## 2022-03-02 RX ADMIN — CEFDINIR 300 MG: 300 CAPSULE ORAL at 21:16

## 2022-03-02 RX ADMIN — ASPIRIN 81 MG: 81 TABLET, COATED ORAL at 09:23

## 2022-03-02 RX ADMIN — IPRATROPIUM BROMIDE AND ALBUTEROL SULFATE 3 ML: .5; 3 SOLUTION RESPIRATORY (INHALATION) at 07:01

## 2022-03-02 RX ADMIN — IPRATROPIUM BROMIDE AND ALBUTEROL SULFATE 3 ML: 2.5; .5 SOLUTION RESPIRATORY (INHALATION) at 11:05

## 2022-03-02 RX ADMIN — Medication 10 ML: at 21:18

## 2022-03-02 RX ADMIN — FLUOXETINE 40 MG: 20 CAPSULE ORAL at 09:23

## 2022-03-02 RX ADMIN — METOPROLOL SUCCINATE 50 MG: 50 TABLET, EXTENDED RELEASE ORAL at 09:23

## 2022-03-02 RX ADMIN — FLUDROCORTISONE ACETATE 0.1 MG: 0.1 TABLET ORAL at 09:23

## 2022-03-02 RX ADMIN — TICAGRELOR 90 MG: 90 TABLET ORAL at 09:23

## 2022-03-02 RX ADMIN — POLYETHYLENE GLYCOL 3350 17 G: 17 POWDER, FOR SOLUTION ORAL at 09:23

## 2022-03-02 NOTE — PROGRESS NOTES
[facility] Daily Progress Note    Patient Name: Nadege Barrow  : 1945  MRN: [id number]  Primary Care Physician:  Kaela Layton APRN  Date of admission: 2022      Subjective      Chief Complaint:     Short of breath    Subjective   2022  Patient seen and examined  Denies any chest pain or shortness of breath  Had Myoview stress test today which was negative for ischemia    3/1/2022  Patient was reportedly confused over the night  Sitter placed in the room  On cefepime which was discontinued today as it can cause altered mental status    3/2/2022  Patient seen and examined  No new complaints  Awaiting rehab placement  Very much alert and oriented x3.      Review of Systems   Constitutional: Negative for chills and fever.   HENT: Negative for congestion.    Eyes: Negative for blurred vision.   Cardiovascular: Negative for chest pain.   Respiratory: Negative for shortness of breath.    Endocrine: Negative for cold intolerance and heat intolerance.   Gastrointestinal: Negative for abdominal pain, nausea and vomiting.   Genitourinary: Negative for dysuria.   Neurological: Positive for weakness.          Objective      Vitals:   Temp:  [98 °F (36.7 °C)-99.6 °F (37.6 °C)] 98.5 °F (36.9 °C)  Heart Rate:  [70-83] 82  Resp:  [16-21] 18  BP: (104-143)/(49-72) 122/49  Flow (L/min):  [3-4] 3    Physical Exam  Vitals reviewed.   Constitutional:       General: She is not in acute distress.     Appearance: She is well-developed.   HENT:      Head: Normocephalic and atraumatic.      Nose: Nose normal.      Mouth/Throat:      Mouth: Mucous membranes are moist.   Eyes:      Extraocular Movements: Extraocular movements intact.      Conjunctiva/sclera: Conjunctivae normal.      Pupils: Pupils are equal, round, and reactive to light.   Neck:      Thyroid: No thyromegaly.      Vascular: No JVD.      Trachea: No tracheal deviation.   Cardiovascular:      Rate and Rhythm: Normal  rate and regular rhythm.   Pulmonary:      Breath sounds: No rales.   Abdominal:      General: Bowel sounds are normal.      Palpations: Abdomen is soft.      Tenderness: There is no abdominal tenderness.   Musculoskeletal:      Cervical back: Neck supple. No muscular tenderness.      Comments: Degenerative changes   Skin:     General: Skin is warm and dry.   Neurological:      Mental Status: She is alert. Mental status is at baseline.      Motor: No abnormal muscle tone.   Psychiatric:         Mood and Affect: Mood normal.         Behavior: Behavior normal.                 Result Review    Result Review:  I have personally reviewed the results from the time of this admission to 3/2/2022 13:38 EST and agree with these findings:  [x]  Laboratory  [x]  Microbiology  [x]  Radiology  []  EKG/Telemetry   []  Cardiology/Vascular   []  Pathology  []  Old records  []  Other:  Most notable findings include:           Assessment/Plan      Brief Patient Summary:  76-year-old female with multiple comorbidities including CAD, ischemic cardiomyopathy, disabling tremor status post left thalamic high-frequency MRI guided ultrasound ablation brain for tremor right upper extremity-4/4/2021 at [place], hyperlipidemia and TIA.  Presented to [facility] ED with complaints of dyspnea.  Of note is that patient was discharged a week from [facility] after treatment for pneumonia.    After evaluating in the ED, patient was admitted for further care    ARIPiprazole, 2 mg, Oral, Nightly  aspirin, 81 mg, Oral, Daily  atorvastatin, 40 mg, Oral, Nightly  busPIRone, 10 mg, Oral, Q12H  cefdinir, 300 mg, Oral, Q24H  docusate sodium, 100 mg, Oral, BID  doxycycline, 100 mg, Oral, Q12H  empagliflozin, 10 mg, Oral, Daily  enoxaparin, 40 mg, Subcutaneous, Q24H  fludrocortisone, 0.1 mg, Oral, Daily  FLUoxetine, 40 mg, Oral, Daily  insulin lispro, 0-14 Units, Subcutaneous, 4x Daily With Meals & Nightly  insulin NPH-insulin  regular, 20 Units, Subcutaneous, BID With Meals  ipratropium-albuterol, 3 mL, Nebulization, Q6H - RT  metoprolol succinate XL, 50 mg, Oral, Daily  polyethylene glycol, 17 g, Oral, Daily  sodium chloride, 10 mL, Intravenous, Q12H  ticagrelor, 90 mg, Oral, BID             Active Hospital Problems:  Active Hospital Problems    Diagnosis    • Generalized anxiety disorder    • Benzodiazepine dependence, continuous (HCC)    • Senile dementia, delirium, with behavioral disturbance (HCC)    • Ischemic cardiomyopathy    • Elevated LFTs    • Hypertensive crisis    • Acute respiratory failure with hypoxia (HCC)    • Atherosclerotic heart disease of native coronary artery without angina pectoris    • Depression, unspecified    • Personal history of transient ischemic attack (TIA), and cerebral infarction without residual deficits    • HCAP (healthcare-associated pneumonia)    • Sepsis due to pneumonia ([facility])    • Disabling essential tremor    • S/P right coronary artery (RCA) stent placement    • Acute on chronic congestive heart failure (HCC)    • Hyperlipidemia, mixed    • Anxiety    • Uncontrolled type II diabetes mellitus with peripheral artery disease (HCC)      Plan:   Hypertensive crisis improved.  Upon arrival to the ED /146  Patient treated in the ED with nitroglycerin continuous IV infusion  d/c nitro drip  Blood pressure has improved  On  Metoprolol    Chest pain/shortness of breath  High blood pressure on presentation  History of CAD status post stent placement and on medical management with aspirin, beta-blocker, Brilinta and statin  Cardiologist following  Recommended Myoview stress test 2/20/2022-no evidence of ischemia-consistent with low risk study      Possible Healthcare acquired pneumonia/acute respiratory failure with hypoxia:  Patient recently discharged from [facility], admission for pneumonia  Chest x-ray reviewed, ABG reviewed  Patient treated in the ED with IV Rocephin, IV  doxycycline, BiPAP  Blood culture-no growth   Respiratory viral panel including COVID-19-negative  Urine antigens negative  Was on on cefepime and vancomycin  Antibiotics deescalated to Omnicef and doxycycline  Respiratory care with bronchodilators  Oxygen therapy and titration     Acute systolic heart failure /ischemic cardiomyopathy  Secondary to ischemic cardiomyopathy  Currently compensated  Echocardiogram revealed EF around 40%    Sepsis ruled out      Dementia:  Continue on home medical  Fall precautions     Disabling essential tremor:  S/P left thalamic high frequency MR guided ultrasound ablation brain for tremor RUE, done 4/4/21 in [place]     Mixed hyperlipidemia:  On statin     DM type II:  Continue on present insulin regimen  Monitor blood sugar and adjust insulin as needed     Anxiety/depression:  On Xanax, Prozac     History of TIA:  On statin and Plavix    Hypokalemia-on replacement protocol  serum magnesium okay           DVT prophylaxis:  Medical and mechanical DVT prophylaxis orders are present.    CODE STATUS:    Code Status (Patient has no pulse and is not breathing): CPR (Attempt to Resuscitate)  Medical Interventions (Patient has pulse or is breathing): Full Support      Disposition: Pending clinical progress    This patient has been examined wearing appropriate Personal Protective Equipment and discussed with hospital infection control department. 03/02/22      Electronically signed by Kraig Luis MD, 03/02/22, 13:38 EST.  [facility] Hospitalist Team

## 2022-03-02 NOTE — PROGRESS NOTES
"Heart Failure Program  Nurse Navigator  Discharge Planning    Patient Name:Nadege Barrow  :1945  Cardiologist:Jonathan  Current Admission Date: 2022   Previous Admission: [facility] 1 week ago  Admission frequency: 1 admissions in 6 months    Heart Failure history per record:    Symptoms on admission:c/o SOA, edema. Pt advises she was recently in [facility] for pneumonia.       Admission Weight:  Flowsheet Rows      First Filed Value   Admission Height 157.5 cm (62\") Documented at 2022   Admission Weight 72.6 kg (160 lb) Documented at 2022            Current Home Medications:  Prior to Admission medications    Medication Sig Start Date End Date Taking? Authorizing Provider   fludrocortisone 0.1 MG tablet Take 0.1 mg by mouth Daily.   Yes Shira Rockwell MD   linaclotide (LINZESS) 145 MCG capsule capsule Take 145 mcg by mouth Every Morning Before Breakfast.   Yes Shira Rockwell MD   metoprolol succinate XL (TOPROL-XL) 50 MG 24 hr tablet Take 50 mg by mouth Daily.   Yes Shira Rockwell MD   tiZANidine (ZANAFLEX) 2 MG half tablet Take 2 mg by mouth Every 8 (Eight) Hours As Needed for Muscle Spasms.   Yes Shira Rockwell MD   ALPRAZolam (XANAX) 2 MG tablet Take 2 mg by mouth 3 (Three) Times a Day As Needed for anxiety.    Shira Rockwell MD   aspirin 81 MG EC tablet Take 81 mg by mouth Daily.    Shira Rockwell MD   atorvastatin (LIPITOR) 40 MG tablet Take 40 mg by mouth Daily.    Shira Rockwell MD   Empagliflozin (Jardiance) 10 MG tablet Take 1 tablet by mouth Daily.    Shira Rockwell MD   FLUoxetine (PROzac) 20 MG capsule Take 20 mg by mouth Daily.    Shira Rockwell MD   furosemide (LASIX) 20 MG tablet Take 20 mg by mouth Daily.    Shira Rockwell MD   ibuprofen (ADVIL,MOTRIN) 200 MG tablet Take 200 mg by mouth Every 6 (Six) Hours As Needed for Mild Pain .    Shira Rockwell MD   insulin NPH-insulin " regular (humuLIN 70/30,novoLIN 70/30) (70-30) 100 UNIT/ML injection Inject 20 Units under the skin into the appropriate area as directed 2 (Two) Times a Day With Meals.    Provider, MD Shira   potassium chloride ER (K-TAB) 20 MEQ tablet controlled-release ER tablet Take 20 mEq by mouth Daily.    Provider, MD Shira   ticagrelor (BRILINTA) 90 MG tablet tablet Take 90 mg by mouth 2 (Two) Times a Day.    Provider, MD Shira       Social history:   Pt resident at [facility], was recently at [facility].     Smoking status:     Diagnostics Testing:  proBNP level: 10284    Echocardiogram:Results for orders placed during the hospital encounter of 02/26/22    Adult Transthoracic Echo Complete w/ Color, Spectral and Contrast if necessary per protocol    Interpretation Summary  · Left ventricular ejection fraction appears to be 41 - 45%.  · The right ventricular cavity is mildly dilated.  · Mild aortic valve stenosis is present.  · Moderate tricuspid valve regurgitation is present.  · No pericardial effusion noted        Patient Assessment:   Py lyiing bed, resp even and unlabored, resp even and unlabored. denies SOA, no edema legs currently. Pt alert and oriented, rambles in speech at times unsure if completely comprehending    Current O2: 2L NC  Home O2:      Education provided to patient:  yes- Heart Failure disease education  yes -Symptom identification/management  yes -Daily Weights  yes- Diet education  na- Fluid restriction (if ordered)  yes- Activity education  yes- Medication education  na- Smoking cessation  yes- Follow-up Appointments  yes-Provided information on how to access [facility] My HF Guide/Heart Failure Interactive workbook    Acceptance of learning: acceptance, ask questions, teachback    Heart Failure education interactive teaching session time: 30 minutes    GWTG: >40%    Identified needs/barriers:   Pending further, I&O, daily weights,    Intervention:   RN

## 2022-03-02 NOTE — THERAPY TREATMENT NOTE
Acute Care - Speech Language Pathology   Swallow Treatment Note  [place]     Patient Name: Nadege Barrow  : 1945  MRN: [id number]  Today's Date: 3/2/2022               Admit Date: 2022  Patient was not wearing a face mask during this therapy encounter. Therapist used appropriate personal protective equipment including gown, mask and gloves.  Mask used was standard procedure mask. Appropriate PPE was worn during the entire therapy session. Hand hygiene was completed before and after therapy session. Patient is not in enhanced droplet precautions.             Visit Dx:     ICD-10-CM ICD-9-CM   1. Acute respiratory failure with hypoxia ([facility])  J96.01 518.81   2. Hypertensive emergency  I16.1 401.9   3. Acute congestive heart failure, unspecified heart failure type ([facility])  I50.9 428.0     Patient Active Problem List   Diagnosis   • Chest pain on breathing   • Hypertensive crisis   • Acute respiratory failure with hypoxia ([facility])   • Anemia   • Anxiety   • Atherosclerotic heart disease of native coronary artery without angina pectoris   • Back pain   • Cardiomyopathy ([facility])   • Carotid artery disease ([facility])   • Cellulitis of foot   • Acute on chronic congestive heart failure ([facility])   • Current moderate episode of major depressive disorder without prior episode ([facility])   • Senile dementia, delirium, with behavioral disturbance ([facility])   • Depression, unspecified   • Disabling essential tremor   • Fatigue   • Hyperlipidemia, mixed   • Peripheral vision loss, bilateral   • Personal history of transient ischemic attack (TIA), and cerebral infarction without residual deficits   • HCAP (healthcare-associated pneumonia)   • Polypharmacy   • Retinopathy, bilateral   • S/P right coronary artery (RCA) stent placement   • Sepsis due to pneumonia ([facility])   • Uncontrolled type II diabetes mellitus with peripheral artery disease ([facility])   • Ischemic cardiomyopathy   • Elevated LFTs   • Generalized anxiety disorder   • Benzodiazepine  "dependence, continuous (HCC)     Past Medical History:   Diagnosis Date   • Anxiety    • CHF (congestive heart failure) ([facility])    • Depression    • Diabetes mellitus ([facility])    • Hyperlipidemia    • Hypertension    • Panic disorder     \"panic attacks\"   • Tremors of nervous system     \"essential tremors\"     Past Surgical History:   Procedure Laterality Date   • CORONARY ANGIOPLASTY WITH STENT PLACEMENT         SLP Recommendation and Plan            EDUCATION  The patient has been educated in the following areas:   Dysphagia (Swallowing Impairment) Oral Care/Hydration.        SLP GOALS     Row Name 03/02/22 1400          Oral Nutrition/Hydration Goal 1, SLP Pt to have instrumental assessment of swallow w/further recommendations as indicated.  -CB    Time Frame (Oral Nutrition/Hydration Goal 1, SLP) short term goal (STG)  -CB    Barriers (Oral Nutrition/Hydration Goal 1, SLP) --    Progress/Outcomes (Oral Nutrition/Hydration Goal 1, SLP) --          Oral Nutrition/Hydration Goal 2, SLP Pt will maximixe swallow function for least restrictive PO diet, exhibiting no complication associated with dysphagia, adequate PO intake, and demonstrating independent use of swallow compensations  -CB    Time Frame (Oral Nutrition/Hydration Goal 2, SLP) by discharge  -CB    Barriers (Oral Nutrition/Hydration Goal 2, SLP) --    Progress/Outcomes (Oral Nutrition/Hydration Goal 2, SLP) goal ongoing  -CB          Oral Nutrition/Hydration Goal, SLP The patient will participate in a full meal assessment to determine safety and adequacy of recommended diet, independent use of safe swallow compensations, and additional goals/recommendations to follow  -CB    Time Frame (Oral Nutrition/Hydration Goal, SLP) 2 days  -CB    Barriers (Oral Nutrition/Hydration Goal, SLP) Patient was seen for DT/meal at lunch. Patient currently receives mechanical ground with honey thick liquids. Recent VFSS was completed. Results indicated aspiration of thins, deep " silent  penetration with nectar and shallow penetration with honey thick. Chin tuck did not help with minimizing aspiration or penetration risk. Patient is on 5L of oxygen. Patient has her own natural teeth with some missing. Patient was properly positioned upright in bed for meal.  Patient was observed to demonstrate adequate rotary chewing with ground meat. Patient cleared oral cavity effectively. Patient was observed drinking honey thick liquids given successive swallows via straw with no overt s/s of aspiration. Provided trials with soft to chew item. Patient demonstrated adequate rotary chewing with only trace residue at times. Patient kept c/o thickened liquids and the inability to have ice with it. Explained the rationale for that.  Encouraged her to ask nursing to refrigerate drinks for her if necessary. ST will continue to follow to assure safety and tolerance of recommended diet. ST will continue to provide trials of upgrade as well during treatments.  -CB    Progress/Outcomes (Oral Nutrition/Hydration Goal, SLP) goal ongoing  -CB          User Key  (r) = Recorded By, (t) = Taken By, (c) = Cosigned By    Initials Name Provider Type                CB Nicky Beebe SLP Speech and Language Pathologist                   Time Calculation:                RACHEL Boateng  3/2/2022

## 2022-03-02 NOTE — PLAN OF CARE
Nadege Barrow presents with functional mobility impairments which indicate the need for skilled intervention. Tolerating session today without incident. Pt exhibits poor awareness of impaired balance and high risk for falls due to impaired balance with functional mobility.  Pt requiring Blessing-modA for safety with ambulation due to poor ability to safely navigate RW in straight line during ambulation and impaired balance espeically with directional turns.  Pt not safe to return to [facility] and will need IP rehab.  Will continue to follow and progress as tolerated.     Plan/Recommendations:   Pt would benefit from Inpatient Rehabilitation placement at discharge from facility and requires no DME at discharge.   Pt desires Inpatient Rehabilitation placement at discharge. Pt cooperative; agreeable to therapeutic recommendations and plan of care.

## 2022-03-02 NOTE — CASE MANAGEMENT/SOCIAL WORK
Continued Stay Note  [facility]     Patient Name: Nadege Barrow  MRN: [id number]  Today's Date: 3/2/2022    Admit Date: 2/26/2022     Discharge Plan     Row Name 03/02/22 1411       Plan    Plan Referral to [facility], pending acceptance. Precert required. 2nd choice [place]. Cannot return to [facility] [place] until she goes through IP rehab.    Plan Comments Met with pt in room.  Pt not agreeable to pay $275/day for first 7 days at [facility].  Informed [facility] by text message.  She would like referral to [facility] first and [place] second.  [facility] referral sent in Epic, and Roman sandoval notified by text message.  Pending acceptance.  Pt is from [place] [address] and she cannot return until she goes through IP rehab, per conversation via phone with Bee RIVER at [facility].                    Expected Discharge Date and Time     Expected Discharge Date Expected Discharge Time    Mar 4, 2022             Olga De La Cruz RN

## 2022-03-02 NOTE — PROGRESS NOTES
Referring Provider: Hospitalist    Reason for follow-up: Patient high blood pressure     Patient Care Team:  Kaela Layton APRN as PCP - General (Nurse Practitioner)  Hesham Acosta MD as PCP - Family Medicine    Subjective .  Mental status changes now    Objective  In bed with distress     Review of Systems   Unable to perform ROS: acuity of condition       Gabapentin and Morphine    Scheduled Meds:ARIPiprazole, 2 mg, Oral, Nightly  aspirin, 81 mg, Oral, Daily  atorvastatin, 40 mg, Oral, Nightly  busPIRone, 10 mg, Oral, Q12H  cefdinir, 300 mg, Oral, Q24H  docusate sodium, 100 mg, Oral, BID  doxycycline, 100 mg, Oral, Q12H  empagliflozin, 10 mg, Oral, Daily  fludrocortisone, 0.1 mg, Oral, Daily  FLUoxetine, 40 mg, Oral, Daily  insulin lispro, 0-14 Units, Subcutaneous, 4x Daily With Meals & Nightly  insulin NPH-insulin regular, 20 Units, Subcutaneous, BID With Meals  ipratropium-albuterol, 3 mL, Nebulization, Q6H - RT  metoprolol succinate XL, 50 mg, Oral, Daily  polyethylene glycol, 17 g, Oral, Daily  sodium chloride, 10 mL, Intravenous, Q12H  ticagrelor, 90 mg, Oral, BID      Continuous Infusions:   PRN Meds:.•  acetaminophen **OR** acetaminophen **OR** acetaminophen  •  ALPRAZolam  •  Calcium Gluconate-NaCl **AND** calcium gluconate **AND** Calcium, Ionized  •  dextrose  •  dextrose  •  glucagon (human recombinant)  •  insulin lispro **AND** insulin lispro  •  magnesium sulfate **OR** magnesium sulfate **OR** magnesium sulfate  •  nitroglycerin  •  ondansetron **OR** ondansetron  •  phenol  •  potassium chloride **OR** potassium chloride **OR** potassium chloride  •  potassium phosphate infusion greater than 15 mMoles **OR** potassium phosphate infusion greater than 15 mMoles **OR** potassium phosphate **OR** sodium phosphate IVPB **OR** sodium phosphate IVPB **OR** sodium phosphate IVPB  •  sodium chloride  •  tiZANidine        VITAL SIGNS  Vitals:    03/02/22 0704 03/02/22 0923 03/02/22 1105 03/02/22 1108  "  BP:  143/62     BP Location:       Patient Position:       Pulse: 70  83 82   Resp: 20  18 18   Temp:       TempSrc:       SpO2: 96%  96% 98%   Weight:       Height:           Flowsheet Rows      First Filed Value   Admission Height 157.5 cm (62\") Documented at 02/26/2022 2202   Admission Weight 72.6 kg (160 lb) Documented at 02/26/2022 2202           TELEMETRY: Sinus rhythm    Physical Exam:  Constitutional:       Appearance: Well-developed.   Eyes:      General: No scleral icterus.     Conjunctiva/sclera: Conjunctivae normal.   HENT:      Head: Normocephalic and atraumatic.   Neck:      Vascular: No carotid bruit or JVD.   Pulmonary:      Effort: Pulmonary effort is normal.      Breath sounds: Normal breath sounds. No wheezing. No rales.   Cardiovascular:      Normal rate. Regular rhythm.   Pulses:     Intact distal pulses.   Abdominal:      General: Bowel sounds are normal.      Palpations: Abdomen is soft.   Musculoskeletal:      Cervical back: Normal range of motion and neck supple. Skin:     General: Skin is warm and dry.      Findings: No rash.   Neurological:      Mental Status: Alert.          Results Review:   I reviewed the patient's new clinical results.  Lab Results (last 24 hours)     Procedure Component Value Units Date/Time    POC Glucose Once [[id number]]  (Normal) Collected: 03/02/22 0727    Specimen: Blood Updated: 03/02/22 0728     Glucose 92 mg/dL      Comment: Serial Number: [id number]:  [id number]       POC Glucose Once [[id number]]  (Normal) Collected: 03/02/22 0547    Specimen: Blood Updated: 03/02/22 0549     Glucose 84 mg/dL      Comment: Serial Number: [id number]:  [id number]       Comprehensive Metabolic Panel [[id number]]  (Abnormal) Collected: 03/02/22 0418    Specimen: Blood Updated: 03/02/22 0545     Glucose 62 mg/dL      BUN 21 mg/dL      Creatinine 0.92 mg/dL      Sodium 141 mmol/L      Potassium 2.9 mmol/L      Comment: Slight hemolysis detected by analyzer. Results may " be affected.        Chloride 102 mmol/L      CO2 30.0 mmol/L      Calcium 8.4 mg/dL      Total Protein 6.0 g/dL      Albumin 3.00 g/dL      ALT (SGPT) 25 U/L      AST (SGOT) 15 U/L      Alkaline Phosphatase 80 U/L      Total Bilirubin 1.2 mg/dL      Globulin 3.0 gm/dL      A/G Ratio 1.0 g/dL      BUN/Creatinine Ratio 22.8     Anion Gap 9.0 mmol/L      eGFR 64.7 mL/min/1.73      Comment: National Kidney Foundation and American Society of Nephrology (ASN) Task Force recommended calculation based on the Chronic Kidney Disease Epidemiology Collaboration (CKD-EPI) equation refit without adjustment for race.       Narrative:      GFR Normal >60  Chronic Kidney Disease <60  Kidney Failure <15      CBC & Differential [[id number]]  (Abnormal) Collected: 03/02/22 0418    Specimen: Blood Updated: 03/02/22 0504    Narrative:      The following orders were created for panel order CBC & Differential.  Procedure                               Abnormality         Status                     ---------                               -----------         ------                     CBC Auto Differential[[id number]]        Abnormal            Final result                 Please view results for these tests on the individual orders.    CBC Auto Differential [[id number]]  (Abnormal) Collected: 03/02/22 0418    Specimen: Blood Updated: 03/02/22 0504     WBC 14.10 10*3/mm3      RBC 3.95 10*6/mm3      Hemoglobin 11.2 g/dL      Hematocrit 34.4 %      MCV 87.1 fL      MCH 28.4 pg      MCHC 32.6 g/dL      RDW 15.2 %      RDW-SD 46.8 fl      MPV 8.9 fL      Platelets 269 10*3/mm3      Neutrophil % 73.4 %      Lymphocyte % 16.1 %      Monocyte % 7.7 %      Eosinophil % 2.2 %      Basophil % 0.6 %      Neutrophils, Absolute 10.30 10*3/mm3      Lymphocytes, Absolute 2.30 10*3/mm3      Monocytes, Absolute 1.10 10*3/mm3      Eosinophils, Absolute 0.30 10*3/mm3      Basophils, Absolute 0.10 10*3/mm3      nRBC 0.1 /100 WBC     Blood Culture - Blood, Hand,  Left [[id number]]  (Normal) Collected: 02/26/22 2306    Specimen: Blood from Hand, Left Updated: 03/01/22 2315     Blood Culture No growth at 3 days    SCANNED - LABS [[id number]] Resulted: 02/26/22     Updated: 03/01/22 2308    Blood Culture - Blood, Arm, Right [[id number]]  (Normal) Collected: 02/26/22 2155    Specimen: Blood from Arm, Right Updated: 03/01/22 2200     Blood Culture No growth at 3 days    POC Glucose Once [[id number]]  (Abnormal) Collected: 03/01/22 2130    Specimen: Blood Updated: 03/01/22 2131     Glucose 251 mg/dL      Comment: Serial Number: [id number]:  [id number]       POC Glucose Once [[id number]]  (Abnormal) Collected: 03/01/22 1823    Specimen: Blood Updated: 03/01/22 1824     Glucose 194 mg/dL      Comment: Serial Number: [id number]:  [id number]       POC Glucose Once [[id number]]  (Abnormal) Collected: 03/01/22 1151    Specimen: Blood Updated: 03/01/22 1152     Glucose 180 mg/dL      Comment: Serial Number: [id number]:  [id number]             Imaging Results (Last 24 Hours)     Procedure Component Value Units Date/Time    SCANNED - IMAGING [[id number]] Resulted: 02/26/22     Updated: 03/01/22 2220    SCANNED - IMAGING [[id number]] Resulted: 02/26/22     Updated: 03/01/22 2220    SCANNED - IMAGING [[id number]] Resulted: 02/26/22     Updated: 03/01/22 2220    SCANNED - IMAGING [[id number]] Resulted: 02/26/22     Updated: 03/01/22 2220    SCANNED - IMAGING [[id number]] Resulted: 02/26/22     Updated: 03/01/22 2220    SCANNED - IMAGING [[id number]] Resulted: 02/26/22     Updated: 03/01/22 2220    FL Video Swallow With Speech Single Contrast [[id number]] Collected: 03/01/22 1306     Updated: 03/01/22 1309    Narrative:      DATE OF EXAM:  3/1/2022 12:30 PM     PROCEDURE:  FL VIDEO SWALLOW W SPEECH SINGLE-CONTRAST-     INDICATIONS:  dysphagia; J96.01-Acute respiratory failure with hypoxia;  I16.1-Hypertensive emergency; I50.9-Heart failure, unspecified     COMPARISON:  No Comparisons  Available     TECHNIQUE:   This examination was performed in conjunction with speech pathology.  Lateral video fluoroscopic evaluation of the swallowing mechanism was  performed while correlate administering to the patient various  consistency food items mixed with barium.     Fluoroscopic Time:   3.2 minutes     FINDINGS:  Patient ingested a variety of consistencies mixed with barium with  speech therapist present. No spot fluoroscopic images were performed. 15  cine images were performed. Aspiration occurred with thin liquid.  Penetration was seen with other consistencies. There was some  improvement with chin tuck positioning. Please see speech therapist's  recommendations.        Impression:      Please see speech therapist's recommendations. There was aspiration with  thin consistency.     Electronically Signed By-Mckenzie Murillo MD On:3/1/2022 1:07 PM  This report was finalized on [id number] by  Mckenzie Murillo MD.          EKG      I personally viewed and interpreted the patient's EKG/Telemetry data:    ECHOCARDIOGRAM:    STRESS MYOVIEW:    CARDIAC CATHETERIZATION:    OTHER:         Assessment/Plan     Active Problems:    Hypertensive crisis    Acute respiratory failure with hypoxia (HCC)    Anxiety    Atherosclerotic heart disease of native coronary artery without angina pectoris    Acute on chronic congestive heart failure (HCC)    Senile dementia, delirium, with behavioral disturbance (HCC)    Depression, unspecified    Disabling essential tremor    Hyperlipidemia, mixed    Personal history of transient ischemic attack (TIA), and cerebral infarction without residual deficits    HCAP (healthcare-associated pneumonia)    S/P right coronary artery (RCA) stent placement    Sepsis due to pneumonia ([facility])    Uncontrolled type II diabetes mellitus with peripheral artery disease (HCC)    Ischemic cardiomyopathy    Elevated LFTs    Generalized anxiety disorder    Benzodiazepine dependence, continuous (HCC)        Presented with chest pain shortness of breath and is ruled out for MI by get enzymes  Patient had a stress Myoview did not show any ischemia  Patient blood pressure was high initially but is better now  Patient has mental status changes now and has history of dementia and is having work-up done  Patient had an echocardiogram which showed a EF of 40% and will continue her on medical therapy only for now    I discussed the patients findings and my recommendations with patient's nurse    Cristóbal Castillo MD  03/02/22  11:47 EST

## 2022-03-02 NOTE — DISCHARGE PLACEMENT REQUEST
"Nadege Barrow (76 y.o. Female)             Date of Birth Social Security Number Address Home Phone MRN    1945  [id number] [phone] [id number]    [employer] Marital Status             Jew        Admission Date Admission Type Admitting Provider Attending Provider Department, Room/Bed    2/26/22 Emergency Kraig Luis MD Ozor, [place], MD [facility], 2128/1    Discharge Date Discharge Disposition Discharge Destination                         Attending Provider: Kraig Luis MD    Allergies: Gabapentin, Morphine    Isolation: Contact   Infection: MRSA (02/27/22)   Code Status: CPR   Advance Care Planning Activity    Ht: 157.5 cm (62\")   Wt: 77 kg (169 lb 12.1 oz)    Admission Cmt: None   Principal Problem: None                Active Insurance as of 2/26/2022     Primary Coverage     Payor Plan Insurance Group Employer/Plan Group    HUMANA MEDICARE REPLACEMENT HUMANA MEDICARE REPLACEMENT [id number]     Payor Plan Address Payor Plan Phone Number Payor Plan Fax Number Effective Dates    [address] [address] [phone]  1/1/2016 - None Entered    [facility] [address]       Subscriber Name Subscriber Birth Date Member ID       NADEGE BARROW 1945 [id number]                 Emergency Contacts      (Rel.) Home Phone Work Phone Mobile Phone    Ana Barrow (Daughter) [phone] -- [phone]              "

## 2022-03-02 NOTE — THERAPY TREATMENT NOTE
Subjective: Pt agreeable to therapeutic plan of care.    Orientation: Pt oriented to month, year, place; Confusion noted throughout treatment session, poor safety awareness    Objective:     Bed mobility - Min-A and Mod-A  Transfers - Min-A, Mod-A and with rolling walker  Ambulation - 40 ft, 50 ft Min-A, Mod-A and with rolling walker   Sitting balance: Pt able to sit EOB with SBA but requires continuous verbal cues due to impaired balance with right lateral and posterior lean.  Standing balance: Pt exhibiting right lateral lean requiring Blessing for postural cues.    O2 on 3L and measured 94-96% after each ambulation distance     Pain: 0 VAS  Education: Provided education on importance of mobility and skilled verbal / tactile cueing throughout intervention.     Assessment: Nadege Barrow presents with functional mobility impairments which indicate the need for skilled intervention. Tolerating session today without incident. Pt exhibits poor awareness of impaired balance and high risk for falls due to impaired balance with functional mobility.  Pt requiring Blessing-modA for safety with ambulation due to poor ability to safely navigate RW in straight line during ambulation and impaired balance espeically with directional turns.  Pt not safe to return to [facility] and will need IP rehab.  Will continue to follow and progress as tolerated.     Plan/Recommendations:   Pt would benefit from Inpatient Rehabilitation placement at discharge from facility and requires no DME at discharge.   Pt desires Inpatient Rehabilitation placement at discharge. Pt cooperative; agreeable to therapeutic recommendations and plan of care.     Basic Mobility 6-click:  Rollin = Total, A lot = 2, A little = 3; 4 = None  Supine>Sit:   1 = Total, A lot = 2, A little = 3; 4 = None   Sit>Stand with arms:  1 = Total, A lot = 2, A little = 3; 4 = None  Bed>Chair:   1 = Total, A lot = 2, A little = 3; 4 = None  Ambulate in room:  1 = Total, A lot = 2, A  little = 3; 4 = None  3-5 Steps with railin = Total, A lot = 2, A little = 3; 4 = None  Score: 11    Modified [place]: 4 = Moderately severe disability (Unable to attend to own bodily needs without assistance, and unable to walk unassisted)     Post-Tx Position: Supine with HOB Elevated, Alarms activated and Call light and personal items within reach  PPE: gloves, surgical mask, eyewear protection

## 2022-03-02 NOTE — DISCHARGE PLACEMENT REQUEST
"Nadege Barrow (76 y.o. Female)             Date of Birth Social Security Number Address Home Phone MRN    1945  [id number] [id number] [phone] [id number]    Spiritism Marital Status             Yazidism        Admission Date Admission Type Admitting Provider Attending Provider Department, Room/Bed    2/26/22 Emergency Kraig Luis MD Ozor, Uchenna, MD [facility], 2128/1    Discharge Date Discharge Disposition Discharge Destination                         Attending Provider: Kraig Luis MD    Allergies: Gabapentin, Morphine    Isolation: Contact   Infection: MRSA (02/27/22)   Code Status: CPR   Advance Care Planning Activity    Ht: 157.5 cm (62\")   Wt: 77 kg (169 lb 12.1 oz)    Admission Cmt: None   Principal Problem: None                Active Insurance as of 2/26/2022     Primary Coverage     Payor Plan Insurance Group Employer/Plan Group    HUMANA MEDICARE REPLACEMENT HUMANA MEDICARE REPLACEMENT [id number]     Payor Plan Address Payor Plan Phone Number Payor Plan Fax Number Effective Dates    [address] [address] [phone]  1/1/2016 - None Entered    [facility] [id number]       Subscriber Name Subscriber Birth Date Member ID       NADEGE BARROW 1945 [id number]                 Emergency Contacts      (Rel.) Home Phone Work Phone Mobile Phone    Ana Barrow (Daughter) [phone] -- [phone]          "

## 2022-03-02 NOTE — CASE MANAGEMENT/SOCIAL WORK
Continued Stay Note  [facility]     Patient Name: Nadege Barrow  MRN: [id number]  Today's Date: 3/2/2022    Admit Date: 2/26/2022     Discharge Plan     Row Name 03/02/22 1614       Plan    Plan Referral to [facility], pending acceptance. Precert required. Cannot return to [facility] [address] [place] until she goes through IP rehab.    Plan Comments Pt OON with [facility].  Referral sent in [employer] to [facility], and lori Leone notified by text.    Row Name 03/02/22 1411       Plan    Plan Referral to [facility], pending acceptance. Precert required. 2nd choice [facility]. Cannot return to [facility] [address] [facility] until she goes through IP rehab.    Plan Comments Met with pt in room.  Pt not agreeable to pay $275/day for first 7 days at [facility].  Informed [facility] by text message.  She would like referral to [facility] first and [facility] second.  [facility] referral sent in Epic, and Roman sandoval notified by text message.  Pending acceptance.  Pt is from [place] on [facility] and she cannot return until she goes through IP rehab, per conversation via phone with Bee RIVER at [facility].                    Expected Discharge Date and Time     Expected Discharge Date Expected Discharge Time    Mar 4, 2022             Olga De La Cruz RN

## 2022-03-02 NOTE — PROGRESS NOTES
"  Chief complaint \" fatigue but slept well\"     Subjective .     History of present illness:  The patient is a 76 y.o. female who was admitted secondary to hypertensive crisis, and was recently diagnosed with dementia.   PMHx: CHF, DM, depression, anxiety, essential tremor.  Psych consult was requested due to significant anxiety and hallucinations after hypertension had been controlled.   Today the patient acknowledged history of depression and anxiety, insomnia,  denies AVH, however the patient reported good sleep last night when she was feeling more rested in the morning.  The patient  had been hallucinating since her  passed away 3 years ago, but denies any since that time. Pt denies SI/HI.      Review of Systems   All systems were reviewed and negative except for:  Constitution:  positive for fevers  Respiratory: positive for  shortness of air  Musculoskeletal: positive for  muscle weakness  Behavioral/Psych: positive for  anxiety and depression    History       Medications Prior to Admission   Medication Sig Dispense Refill Last Dose   • fludrocortisone 0.1 MG tablet Take 0.1 mg by mouth Daily.      • linaclotide (LINZESS) 145 MCG capsule capsule Take 145 mcg by mouth Every Morning Before Breakfast.      • metoprolol succinate XL (TOPROL-XL) 50 MG 24 hr tablet Take 50 mg by mouth Daily.      • tiZANidine (ZANAFLEX) 2 MG half tablet Take 2 mg by mouth Every 8 (Eight) Hours As Needed for Muscle Spasms.      • ALPRAZolam (XANAX) 2 MG tablet Take 2 mg by mouth 3 (Three) Times a Day As Needed for anxiety.      • aspirin 81 MG EC tablet Take 81 mg by mouth Daily.      • atorvastatin (LIPITOR) 40 MG tablet Take 40 mg by mouth Daily.      • Empagliflozin (Jardiance) 10 MG tablet Take 1 tablet by mouth Daily.      • FLUoxetine (PROzac) 20 MG capsule Take 20 mg by mouth Daily.      • furosemide (LASIX) 20 MG tablet Take 20 mg by mouth Daily.      • ibuprofen (ADVIL,MOTRIN) 200 MG tablet Take 200 mg by mouth Every " "6 (Six) Hours As Needed for Mild Pain .      • insulin NPH-insulin regular (humuLIN 70/30,novoLIN 70/30) (70-30) 100 UNIT/ML injection Inject 20 Units under the skin into the appropriate area as directed 2 (Two) Times a Day With Meals.      • potassium chloride ER (K-TAB) 20 MEQ tablet controlled-release ER tablet Take 20 mEq by mouth Daily.      • ticagrelor (BRILINTA) 90 MG tablet tablet Take 90 mg by mouth 2 (Two) Times a Day.           Scheduled Meds:  ARIPiprazole, 2 mg, Oral, Nightly  aspirin, 81 mg, Oral, Daily  atorvastatin, 40 mg, Oral, Nightly  busPIRone, 10 mg, Oral, Q12H  cefdinir, 300 mg, Oral, Q12H  docusate sodium, 100 mg, Oral, BID  doxycycline, 100 mg, Oral, Q12H  empagliflozin, 10 mg, Oral, Daily  enoxaparin, 40 mg, Subcutaneous, Q24H  fludrocortisone, 0.1 mg, Oral, Daily  FLUoxetine, 40 mg, Oral, Daily  insulin lispro, 0-14 Units, Subcutaneous, 4x Daily With Meals & Nightly  insulin NPH-insulin regular, 20 Units, Subcutaneous, BID With Meals  ipratropium-albuterol, 3 mL, Nebulization, Q6H - RT  metoprolol succinate XL, 50 mg, Oral, Daily  polyethylene glycol, 17 g, Oral, Daily  sodium chloride, 10 mL, Intravenous, Q12H  ticagrelor, 90 mg, Oral, BID            Allergies:  Gabapentin and Morphine      Objective     Vital Signs   /81   Pulse 86   Temp 98.1 °F (36.7 °C) (Oral)   Resp 20   Ht 157.5 cm (62\")   Wt 77 kg (169 lb 12.1 oz)   SpO2 98%   BMI 31.05 kg/m²     Physical Exam:     General Appearance:   Somnolent, but arousable     Mental Status Exam:    Hygiene:   good  Cooperation:  Cooperative  Eye Contact:  Good  Psychomotor Behavior:  Appropriate  Affect:  Blunted   Mood : \"fair enough\"   Hopelessness: Denies  Speech:  Monotone  Thought Progress:  Goal directed and Linear  Thought Content:  Mood congruent  Suicidal:  None  Homicidal:  None  Hallucinations:  Not demonstrated today  Delusion:  None  Memory:  decreased   Orientation:  Person, Place and Situation  Reliability:  " fair  Insight:  Fair  Judgement:  Fair  Impulse Control:  Fair  Physical/Medical Issues:  Yes      Medications and allergies reviewed     Lab Results   Component Value Date    GLUCOSE 62 (L) 03/02/2022    CALCIUM 8.4 (L) 03/02/2022     03/02/2022    K 2.9 (L) 03/02/2022    CO2 30.0 (H) 03/02/2022     03/02/2022    BUN 21 03/02/2022    CREATININE 0.92 03/02/2022    EGFRIFNONA 33 (L) 02/28/2022    BCR 22.8 03/02/2022    ANIONGAP 9.0 03/02/2022       Last Urine Toxicity     LAST URINE TOXICITY RESULTS Latest Ref Rng & Units 1/10/2018    CREATININE UR mg/dl 72.9          No results found for: PHENYTOIN, PHENOBARB, VALPROATE, CBMZ    Lab Results   Component Value Date     03/02/2022    BUN 21 03/02/2022    CREATININE 0.92 03/02/2022    TSH 1.600 02/01/2021    WBC 14.10 (H) 03/02/2022       Brief Urine Lab Results  (Last result in the past 365 days)      Color   Clarity   Blood   Leuk Est   Nitrite   Protein   CREAT   Urine HCG        02/27/22 0749 Yellow   Clear   Trace   Trace   Negative   Negative              on 3/2/22 QTc 524 (HR 78)     Assessment/Plan       Hypertensive crisis    Acute respiratory failure with hypoxia (HCC)    Anxiety    Atherosclerotic heart disease of native coronary artery without angina pectoris    Acute on chronic congestive heart failure (HCC)    Senile dementia, delirium, with behavioral disturbance (HCC)    Depression, unspecified    Disabling essential tremor    Hyperlipidemia, mixed    Personal history of transient ischemic attack (TIA), and cerebral infarction without residual deficits    HCAP (healthcare-associated pneumonia)    S/P right coronary artery (RCA) stent placement    Sepsis due to pneumonia (HCC)    Uncontrolled type II diabetes mellitus with peripheral artery disease (HCC)    Ischemic cardiomyopathy    Elevated LFTs    Generalized anxiety disorder    Benzodiazepine dependence, continuous ([facility])           Assessment: delirium due to medical condition  (TME)   Generalized Anxiety Disorder  Benzodiazepine dependence  Treatment Plan:   Cont prozac and buspirone on current dose, Na is 141 0n 3/2/22    qTc is 524 - d/c abilify   EKG tomorrow AM   Will follow     Treatment Plan discussed with: Patient and nursing         I discussed the patients findings and my recommendations with patient and nursing staff    I have reviewed and approved the behavioral health treatment plans and problem list. Yes     Referring MD has access to consult report and progress notes in EMR     Cristal Alvarez MD  03/02/22  15:10 EST

## 2022-03-02 NOTE — PLAN OF CARE
Goal Outcome Evaluation:    Pt able to answer orientation questions appropriately but is pleasantly confused this shift. Replacing potassium per protocol. Remains in contact isolation; MRSA nares. Vitals stable; falls precautions in place.

## 2022-03-03 LAB
ANION GAP SERPL CALCULATED.3IONS-SCNC: 8 MMOL/L (ref 5–15)
BACTERIA SPEC AEROBE CULT: NORMAL
BACTERIA SPEC AEROBE CULT: NORMAL
BUN SERPL-MCNC: 17 MG/DL (ref 8–23)
BUN/CREAT SERPL: 18.7 (ref 7–25)
CALCIUM SPEC-SCNC: 8.7 MG/DL (ref 8.6–10.5)
CHLORIDE SERPL-SCNC: 105 MMOL/L (ref 98–107)
CO2 SERPL-SCNC: 27 MMOL/L (ref 22–29)
CREAT SERPL-MCNC: 0.91 MG/DL (ref 0.57–1)
EGFRCR SERPLBLD CKD-EPI 2021: 65.5 ML/MIN/1.73
GLUCOSE BLDC GLUCOMTR-MCNC: 100 MG/DL (ref 70–105)
GLUCOSE BLDC GLUCOMTR-MCNC: 207 MG/DL (ref 70–105)
GLUCOSE BLDC GLUCOMTR-MCNC: 63 MG/DL (ref 70–105)
GLUCOSE BLDC GLUCOMTR-MCNC: 91 MG/DL (ref 70–105)
GLUCOSE BLDC GLUCOMTR-MCNC: 94 MG/DL (ref 70–105)
GLUCOSE SERPL-MCNC: 114 MG/DL (ref 65–99)
POTASSIUM SERPL-SCNC: 3.8 MMOL/L (ref 3.5–5.2)
QT INTERVAL: 432 MS
SODIUM SERPL-SCNC: 140 MMOL/L (ref 136–145)

## 2022-03-03 PROCEDURE — 97116 GAIT TRAINING THERAPY: CPT

## 2022-03-03 PROCEDURE — 63710000001 INSULIN ISOPHANE & REGULAR PER 5 UNITS: Performed by: HOSPITALIST

## 2022-03-03 PROCEDURE — 92526 ORAL FUNCTION THERAPY: CPT

## 2022-03-03 PROCEDURE — 94799 UNLISTED PULMONARY SVC/PX: CPT

## 2022-03-03 PROCEDURE — 93010 ELECTROCARDIOGRAM REPORT: CPT | Performed by: INTERNAL MEDICINE

## 2022-03-03 PROCEDURE — 99232 SBSQ HOSP IP/OBS MODERATE 35: CPT | Performed by: INTERNAL MEDICINE

## 2022-03-03 PROCEDURE — 63710000001 INSULIN LISPRO (HUMAN) PER 5 UNITS: Performed by: HOSPITALIST

## 2022-03-03 PROCEDURE — 94660 CPAP INITIATION&MGMT: CPT

## 2022-03-03 PROCEDURE — 80048 BASIC METABOLIC PNL TOTAL CA: CPT | Performed by: INTERNAL MEDICINE

## 2022-03-03 PROCEDURE — 82962 GLUCOSE BLOOD TEST: CPT

## 2022-03-03 PROCEDURE — 25010000002 ENOXAPARIN PER 10 MG: Performed by: INTERNAL MEDICINE

## 2022-03-03 PROCEDURE — 93005 ELECTROCARDIOGRAM TRACING: CPT | Performed by: PSYCHIATRY & NEUROLOGY

## 2022-03-03 RX ADMIN — FLUOXETINE 40 MG: 20 CAPSULE ORAL at 08:52

## 2022-03-03 RX ADMIN — DOXYCYCLINE 100 MG: 100 TABLET ORAL at 20:36

## 2022-03-03 RX ADMIN — METOPROLOL SUCCINATE 50 MG: 50 TABLET, EXTENDED RELEASE ORAL at 08:52

## 2022-03-03 RX ADMIN — FLUDROCORTISONE ACETATE 0.1 MG: 0.1 TABLET ORAL at 08:52

## 2022-03-03 RX ADMIN — INSULIN HUMAN 20 UNITS: 100 INJECTION, SUSPENSION SUBCUTANEOUS at 08:52

## 2022-03-03 RX ADMIN — CEFDINIR 300 MG: 300 CAPSULE ORAL at 20:36

## 2022-03-03 RX ADMIN — ALPRAZOLAM 2 MG: 1 TABLET ORAL at 20:36

## 2022-03-03 RX ADMIN — DOCUSATE SODIUM 100 MG: 100 CAPSULE, LIQUID FILLED ORAL at 08:52

## 2022-03-03 RX ADMIN — ENOXAPARIN SODIUM 40 MG: 40 INJECTION SUBCUTANEOUS at 15:32

## 2022-03-03 RX ADMIN — POLYETHYLENE GLYCOL 3350 17 G: 17 POWDER, FOR SOLUTION ORAL at 08:52

## 2022-03-03 RX ADMIN — ALPRAZOLAM 2 MG: 1 TABLET ORAL at 09:04

## 2022-03-03 RX ADMIN — TICAGRELOR 90 MG: 90 TABLET ORAL at 08:52

## 2022-03-03 RX ADMIN — Medication 10 ML: at 08:53

## 2022-03-03 RX ADMIN — TICAGRELOR 90 MG: 90 TABLET ORAL at 20:36

## 2022-03-03 RX ADMIN — EMPAGLIFLOZIN 10 MG: 10 TABLET, FILM COATED ORAL at 08:52

## 2022-03-03 RX ADMIN — BUSPIRONE HYDROCHLORIDE 10 MG: 5 TABLET ORAL at 20:36

## 2022-03-03 RX ADMIN — Medication 10 ML: at 20:36

## 2022-03-03 RX ADMIN — ATORVASTATIN CALCIUM 40 MG: 40 TABLET, FILM COATED ORAL at 20:36

## 2022-03-03 RX ADMIN — IPRATROPIUM BROMIDE AND ALBUTEROL SULFATE 3 ML: 2.5; .5 SOLUTION RESPIRATORY (INHALATION) at 01:19

## 2022-03-03 RX ADMIN — ASPIRIN 81 MG: 81 TABLET, COATED ORAL at 08:52

## 2022-03-03 RX ADMIN — IPRATROPIUM BROMIDE AND ALBUTEROL SULFATE 3 ML: 2.5; .5 SOLUTION RESPIRATORY (INHALATION) at 11:34

## 2022-03-03 RX ADMIN — IPRATROPIUM BROMIDE AND ALBUTEROL SULFATE 3 ML: 2.5; .5 SOLUTION RESPIRATORY (INHALATION) at 20:42

## 2022-03-03 RX ADMIN — BUSPIRONE HYDROCHLORIDE 10 MG: 5 TABLET ORAL at 08:52

## 2022-03-03 RX ADMIN — INSULIN HUMAN 20 UNITS: 100 INJECTION, SUSPENSION SUBCUTANEOUS at 17:16

## 2022-03-03 RX ADMIN — CEFDINIR 300 MG: 300 CAPSULE ORAL at 08:52

## 2022-03-03 RX ADMIN — DOCUSATE SODIUM 100 MG: 100 CAPSULE, LIQUID FILLED ORAL at 20:36

## 2022-03-03 RX ADMIN — DOXYCYCLINE 100 MG: 100 TABLET ORAL at 08:52

## 2022-03-03 RX ADMIN — INSULIN LISPRO 5 UNITS: 100 INJECTION, SOLUTION INTRAVENOUS; SUBCUTANEOUS at 12:25

## 2022-03-03 NOTE — PROGRESS NOTES
Heart Failure Program  Nurse Navigator  Discharge Planning: Follow-up Note    Patient Name:Nadege Barrow  :1945  Current Admission Date: 2022       Last 3 Weights:  Wt Readings from Last 3 Encounters:   22 77.6 kg (171 lb 1.2 oz)   21 72.6 kg (160 lb)   21 79 kg (174 lb 2.6 oz)       Intake and Output totals: I/O last 3 completed shifts:  In: 780 [P.O.:780]  Out: 1600 [Urine:1600]  I/O this shift:  In: 240 [P.O.:240]  Out: -           Patient Assessment:   pt sitting up in chair, resp even and unalbored, no SOA with conversation. No edema. Pt pleasantly confused at times with conversation.      Patient Education:   pt able to recall part of HF education from yesterday    Review HF Education provided to patient:  yes-Symptoms worsening  yes-Prescribed medications  yes-HF self-care  yes-Follow-up Appointments       Acceptance of learning: acceptance, cooperative    Heart Failure education interactive teaching session time: 15 minutes      Identified needs/barriers:   New lower EF - followed by Dr. Castillo, I&O, daily weights.     Intervention follow-up:

## 2022-03-03 NOTE — PLAN OF CARE
Assessment: Nadege Barrow presents with functional mobility impairments which indicate the need for skilled intervention. Pt requires Min/ModA with most functional mobility. Pt able to ambulate 30 feet and 50 feet Judi with Rwx and required therapeutic rest breaks between each walk. No LOB episodes despite noticeable tremors and balance deficits. Pt remains confused at times and constant redirection of task at hand. Pt is currently on 2L O2, sats were stable. Pt remains a risk for falls and far from baseline, will require IP rehab at d/c. Tolerating session today without incident. Will continue to follow and progress as tolerated.     Plan/Recommendations:   Pt would benefit from Inpatient Rehabilitation placement at discharge from facility and requires no DME at discharge.   Pt desires Inpatient Rehabilitation placement at discharge. Pt cooperative; agreeable to therapeutic recommendations and plan of care.

## 2022-03-03 NOTE — PLAN OF CARE
Goal Outcome Evaluation:    Pt pleasantly confused and able to be reoriented. Video swallow tomorrow per ST; changed to [place] water protocol. Plan for [facility] for rehab at discharge. Falls precautions in place for safety. VSS.

## 2022-03-03 NOTE — THERAPY TREATMENT NOTE
Acute Care - Speech Language Pathology   Swallow Treatment Note  [place]     Patient Name: Nadege Barrow  : 1945  MRN: [id number]  Today's Date: 3/3/2022               Admit Date: 2022  Patient was not wearing a face mask during this therapy encounter. Therapist used appropriate personal protective equipment including mask, eye protection and gloves.  Mask used was standard procedure mask. Appropriate PPE was worn during the entire therapy session. Hand hygiene was completed before and after therapy session. Patient is not in enhanced droplet precautions.     Visit Dx:     ICD-10-CM ICD-9-CM   1. Acute respiratory failure with hypoxia ([facility])  J96.01 518.81   2. Hypertensive emergency  I16.1 401.9   3. Acute congestive heart failure, unspecified heart failure type ([facility])  I50.9 428.0     Patient Active Problem List   Diagnosis   • Chest pain on breathing   • Hypertensive crisis   • Acute respiratory failure with hypoxia ([facility])   • Anemia   • Anxiety   • Atherosclerotic heart disease of native coronary artery without angina pectoris   • Back pain   • Cardiomyopathy ([facility])   • Carotid artery disease ([facility])   • Cellulitis of foot   • Acute on chronic congestive heart failure ([facility])   • Current moderate episode of major depressive disorder without prior episode ([facility])   • Senile dementia, delirium, with behavioral disturbance ([facility])   • Depression, unspecified   • Disabling essential tremor   • Fatigue   • Hyperlipidemia, mixed   • Peripheral vision loss, bilateral   • Personal history of transient ischemic attack (TIA), and cerebral infarction without residual deficits   • HCAP (healthcare-associated pneumonia)   • Polypharmacy   • Retinopathy, bilateral   • S/P right coronary artery (RCA) stent placement   • Sepsis due to pneumonia ([facility])   • Uncontrolled type II diabetes mellitus with peripheral artery disease ([facility])   • Ischemic cardiomyopathy   • Elevated LFTs   • Generalized anxiety disorder   • Benzodiazepine  "dependence, continuous (HCC)     Past Medical History:   Diagnosis Date   • Anxiety    • CHF (congestive heart failure) (HCC)    • Depression    • Diabetes mellitus (HCC)    • Hyperlipidemia    • Hypertension    • Panic disorder     \"panic attacks\"   • Tremors of nervous system     \"essential tremors\"     Past Surgical History:   Procedure Laterality Date   • CORONARY ANGIOPLASTY WITH STENT PLACEMENT            SWALLOW EVALUATION (last 72 hours)     SLP Adult Swallow Evaluation     Row Name 03/03/22 1300          Document Type therapy note (daily note)  -CP    Subjective Information complains of  thirst  -CP    Patient Observations alert; cooperative  -CP    Patient/Family/Caregiver Comments/Observations Pt was alert and responsive. She was able to follwo all commands and answer questions appropriately. Pt c/o disliking HTL and not really drinking them.  -CP    Patient Effort good  -CP    Comment Skilled st targeting dysphagia was conducted today. Pt given trials of water by cup for possible addition of the FWP as pt is disliking HTL. Pt had no difficulty holding cup and pulling water from the cup edge. Digital palpation suggests timely swallow. After the swallow, pt had clear vocal quality and no overt s/s of aspiration. Pt has a hx of silent aspiration, however feels stronger per her report. Discussed follow-up VFSS with pt in the am and she agreed that she feels she is ready for liquid upgrade. Itis rec that pt continue HTL at meals but will add FWP b/t meals after oral care. It is also rec that pt have VFSS in am for further eval of swallow. Education provided to pt on the FWP, and she verbalized understanding. ST will follow up with recs from VFSS.  -CP          User Key  (r) = Recorded By, (t) = Taken By, (c) = Cosigned By    Initials Name Effective Dates    Payton Hartley, RACHEL 06/16/21 -                 EDUCATION  The patient has been educated in the following areas:   Dysphagia (Swallowing Impairment) " Modified Diet Instruction.        SLP GOALS     Row Name 03/03/22 1400 03/02/22 1400 03/01/22 1500       Oral Nutrition/Hydration Goal 1 (SLP)    Oral Nutrition/Hydration Goal 1, SLP Pt to have instrumental assessment of swallow w/further recommendations as indicated.  -CP Pt to have instrumental assessment of swallow w/further recommendations as indicated.  -CB Pt to have instrumental assessment of swallow w/further recommendations as indicated.  -LF    Time Frame (Oral Nutrition/Hydration Goal 1, SLP) short term goal (STG)  -CP short term goal (STG)  -CB short term goal (STG)  -LF    Barriers (Oral Nutrition/Hydration Goal 1, SLP) Will have follow-up VFSS in am  -CP -- see above note  -LF    Progress/Outcomes (Oral Nutrition/Hydration Goal 1, SLP) goal ongoing  -CP -- goal met  -LF       Oral Nutrition/Hydration Goal 2 (SLP)    Oral Nutrition/Hydration Goal 2, SLP Pt will maximixe swallow function for least restrictive PO diet, exhibiting no complication associated with dysphagia, adequate PO intake, and demonstrating independent use of swallow compensations  -CP Pt will maximixe swallow function for least restrictive PO diet, exhibiting no complication associated with dysphagia, adequate PO intake, and demonstrating independent use of swallow compensations  -CB Pt will maximixe swallow function for least restrictive PO diet, exhibiting no complication associated with dysphagia, adequate PO intake, and demonstrating independent use of swallow compensations  -LF    Time Frame (Oral Nutrition/Hydration Goal 2, SLP) by discharge  -CP by discharge  -CB by discharge  -LF    Barriers (Oral Nutrition/Hydration Goal 2, SLP) see above note  -CP -- see above note  -LF    Progress/Outcomes (Oral Nutrition/Hydration Goal 2, SLP) goal ongoing  -CP goal ongoing  -CB goal ongoing  -LF       Oral Nutrition/Hydration Goal (SLP)    Oral Nutrition/Hydration Goal, SLP The patient will participate in a full meal assessment to  determine safety and adequacy of recommended diet, independent use of safe swallow compensations, and additional goals/recommendations to follow  -CP The patient will participate in a full meal assessment to determine safety and adequacy of recommended diet, independent use of safe swallow compensations, and additional goals/recommendations to follow  -CB The patient will participate in a full meal assessment to determine safety and adequacy of recommended diet, independent use of safe swallow compensations, and additional goals/recommendations to follow  -LF    Time Frame (Oral Nutrition/Hydration Goal, SLP) 2 days  -CP 2 days  -CB 2 days  -LF    Barriers (Oral Nutrition/Hydration Goal, SLP) See above note  -CP Patient was seen for DT/meal at lunch. Patient currently receives mechanical ground with honey thick liquids. Recent VFSS was completed. Results indicated aspiration of thins, deep silent  penetration with nectar and shallow penetration with honey thick. Chin tuck did not help with minimizing aspiration or penetration risk. Patient is on 5L of oxygen. Patient has her own natural teeth with some missing. Patient was observed to demonstrate adequate rotary chewing with ground meat. Patient cleared oral cavity effectively. Patient was observed drinking honey thick liquids given successive swallows via straw with no overt s/s of aspiration. Provided trials with soft to chew item. Patient demonstrated adequate rotary chewing with only trace residue at times. Patient kept c/o thickened liquids and the inability to have ice with it. Encouraged her to ask nursing to refrigerate drinks for her if necessary. ST will continue to follow to assure safety and tolerance of recommended diet. ST will continue to provide trials of upgrade as well during treatments.  -CB --    Progress/Outcomes (Oral Nutrition/Hydration Goal, SLP) goal ongoing  -CP goal ongoing  -CB --    Row Name 03/01/22 1300             Oral  Nutrition/Hydration Goal 1 (SLP)    Oral Nutrition/Hydration Goal 1, SLP Pt to have instrumental assessment of swallow w/further recommendations as indicated.  -EC      Time Frame (Oral Nutrition/Hydration Goal 1, SLP) short term goal (STG)  -EC              Oral Nutrition/Hydration Goal 2 (SLP)    Oral Nutrition/Hydration Goal 2, SLP Pt will maximixe swallow function for least restrictive PO diet, exhibiting no complication associated with dysphagia, adequate PO intake, and demonstrating independent use of swallow compensations  -EC      Time Frame (Oral Nutrition/Hydration Goal 2, SLP) by discharge  -EC            User Key  (r) = Recorded By, (t) = Taken By, (c) = Cosigned By    Initials Name Provider Type    Payton Hartley, SLP Speech and Language Pathologist    Divya Ford Speech and Language Pathologist    Iris Marshall, SLP Speech and Language Pathologist    Nicky Lane, SLP Speech and Language Pathologist                   Time Calculation:                RACHEL Castrejon  3/3/2022

## 2022-03-03 NOTE — PLAN OF CARE
Goal Outcome Evaluation:   Skilled st targeting dysphagia was conducted today. Pt given trials of water by cup for possible addition of the FWP as pt is disliking HTL. Pt had no difficulty holding cup and pulling water from the cup edge. Digital palpation suggests timely swallow. After the swallow, pt had clear vocal quality and no overt s/s of aspiration. Pt has a hx of silent aspiration, however feels stronger per her report. Discussed follow-up VFSS with pt in the am and she agreed that she feels she is ready for liquid upgrade. Itis rec that pt continue HTL at meals but will add FWP b/t meals after oral care. It is also rec that pt have VFSS in am for further eval of swallow. Education provided to pt on the FWP, and she verbalized understanding. ST will follow up with recs from VFSS.

## 2022-03-03 NOTE — CASE MANAGEMENT/SOCIAL WORK
Continued Stay Note   [place]     Patient Name: Nadege Barrow  MRN: [id number]  Today's Date: 3/3/2022    Admit Date: 2/26/2022     Discharge Plan     Row Name 03/03/22 1541       Plan    Plan Referral to [place] pending acceptance , precert requried.    Plan Comments Referral to [place] pending acceptance and precert.  Cannot return to [facility] on [facility] until she goes through IP rehab.                         Expected Discharge Date and Time     Expected Discharge Date Expected Discharge Time    Mar 4, 2022             Divya Lewis RN   Phone communication or documentation only - no physical contact with patient or family.

## 2022-03-03 NOTE — PROGRESS NOTES
Referring Provider: Hospitalist    Reason for follow-up: Patient high blood pressure     Patient Care Team:  Kaela Layton APRN as PCP - General (Nurse Practitioner)  Hesham Acosta MD as PCP - Family Medicine    Subjective .  Mental status changes now    Objective  In bed with distress     Review of Systems   Unable to perform ROS: acuity of condition       Gabapentin and Morphine    Scheduled Meds:aspirin, 81 mg, Oral, Daily  atorvastatin, 40 mg, Oral, Nightly  busPIRone, 10 mg, Oral, Q12H  cefdinir, 300 mg, Oral, Q12H  docusate sodium, 100 mg, Oral, BID  doxycycline, 100 mg, Oral, Q12H  empagliflozin, 10 mg, Oral, Daily  enoxaparin, 40 mg, Subcutaneous, Q24H  fludrocortisone, 0.1 mg, Oral, Daily  FLUoxetine, 40 mg, Oral, Daily  insulin lispro, 0-14 Units, Subcutaneous, 4x Daily With Meals & Nightly  insulin NPH-insulin regular, 20 Units, Subcutaneous, BID With Meals  ipratropium-albuterol, 3 mL, Nebulization, Q6H - RT  metoprolol succinate XL, 50 mg, Oral, Daily  polyethylene glycol, 17 g, Oral, Daily  sodium chloride, 10 mL, Intravenous, Q12H  ticagrelor, 90 mg, Oral, BID      Continuous Infusions:   PRN Meds:.•  acetaminophen **OR** acetaminophen **OR** acetaminophen  •  ALPRAZolam  •  Calcium Gluconate-NaCl **AND** calcium gluconate **AND** Calcium, Ionized  •  dextrose  •  dextrose  •  glucagon (human recombinant)  •  insulin lispro **AND** insulin lispro  •  magnesium sulfate **OR** magnesium sulfate **OR** magnesium sulfate  •  nitroglycerin  •  ondansetron **OR** ondansetron  •  phenol  •  potassium chloride **OR** potassium chloride **OR** potassium chloride  •  potassium phosphate infusion greater than 15 mMoles **OR** potassium phosphate infusion greater than 15 mMoles **OR** potassium phosphate **OR** sodium phosphate IVPB **OR** sodium phosphate IVPB **OR** sodium phosphate IVPB  •  sodium chloride  •  tiZANidine        VITAL SIGNS  Vitals:    03/03/22 0605 03/03/22 1036 03/03/22 1134 03/03/22  "1139   BP: 159/82 125/52     BP Location:       Patient Position:       Pulse: 83 72 88 77   Resp: 17 22 18 18   Temp: 98.5 °F (36.9 °C) 98 °F (36.7 °C)     TempSrc: Oral Oral     SpO2: 98% 96% 96% 99%   Weight:       Height:           Flowsheet Rows      First Filed Value   Admission Height 157.5 cm (62\") Documented at 02/26/2022 2202   Admission Weight 72.6 kg (160 lb) Documented at 02/26/2022 2202           TELEMETRY: Sinus rhythm    Physical Exam:  Constitutional:       Appearance: Well-developed.   Eyes:      General: No scleral icterus.     Conjunctiva/sclera: Conjunctivae normal.   HENT:      Head: Normocephalic and atraumatic.   Neck:      Vascular: No carotid bruit or JVD.   Pulmonary:      Effort: Pulmonary effort is normal.      Breath sounds: Normal breath sounds. No wheezing. No rales.   Cardiovascular:      Normal rate. Regular rhythm.   Pulses:     Intact distal pulses.   Abdominal:      General: Bowel sounds are normal.      Palpations: Abdomen is soft.   Musculoskeletal:      Cervical back: Normal range of motion and neck supple. Skin:     General: Skin is warm and dry.      Findings: No rash.   Neurological:      Mental Status: Alert.          Results Review:   I reviewed the patient's new clinical results.  Lab Results (last 24 hours)     Procedure Component Value Units Date/Time    POC Glucose Once [[id number]]  (Abnormal) Collected: 03/03/22 1147    Specimen: Blood Updated: 03/03/22 1200     Glucose 207 mg/dL      Comment: Serial Number: [id number]:  [id number]       POC Glucose Once [[id number]]  (Normal) Collected: 03/03/22 0655    Specimen: Blood Updated: 03/03/22 0656     Glucose 100 mg/dL      Comment: Serial Number: [id number]:  [id number]       Basic Metabolic Panel [[id number]]  (Abnormal) Collected: 03/03/22 0441    Specimen: Blood Updated: 03/03/22 0510     Glucose 114 mg/dL      BUN 17 mg/dL      Creatinine 0.91 mg/dL      Sodium 140 mmol/L      Potassium 3.8 mmol/L      " Chloride 105 mmol/L      CO2 27.0 mmol/L      Calcium 8.7 mg/dL      BUN/Creatinine Ratio 18.7     Anion Gap 8.0 mmol/L      eGFR 65.5 mL/min/1.73      Comment: National Kidney Foundation and American Society of Nephrology (ASN) Task Force recommended calculation based on the Chronic Kidney Disease Epidemiology Collaboration (CKD-EPI) equation refit without adjustment for race.       Narrative:      GFR Normal >60  Chronic Kidney Disease <60  Kidney Failure <15      Blood Culture - Blood, Hand, Left [[id number]]  (Normal) Collected: 02/26/22 2306    Specimen: Blood from Hand, Left Updated: 03/02/22 2315     Blood Culture No growth at 4 days    Blood Culture - Blood, Arm, Right [[id number]]  (Normal) Collected: 02/26/22 2155    Specimen: Blood from Arm, Right Updated: 03/02/22 2200     Blood Culture No growth at 4 days    POC Glucose Once [[id number]]  (Abnormal) Collected: 03/02/22 2040    Specimen: Blood Updated: 03/02/22 2046     Glucose 139 mg/dL      Comment: Serial Number: [id number]:  [id number]       Potassium [[id number]]  (Normal) Collected: 03/02/22 1948    Specimen: Blood Updated: 03/02/22 2033     Potassium 4.0 mmol/L     POC Glucose Once [[id number]]  (Abnormal) Collected: 03/02/22 1751    Specimen: Blood Updated: 03/02/22 1803     Glucose 133 mg/dL      Comment: Serial Number: [id number]:  [id number]             Imaging Results (Last 24 Hours)     ** No results found for the last 24 hours. **          EKG      I personally viewed and interpreted the patient's EKG/Telemetry data:    ECHOCARDIOGRAM:    STRESS MYOVIEW:    CARDIAC CATHETERIZATION:    OTHER:         Assessment/Plan     Active Problems:    Hypertensive crisis    Acute respiratory failure with hypoxia (HCC)    Anxiety    Atherosclerotic heart disease of native coronary artery without angina pectoris    Acute on chronic congestive heart failure (HCC)    Senile dementia, delirium, with behavioral disturbance (HCC)    Depression,  unspecified    Disabling essential tremor    Hyperlipidemia, mixed    Personal history of transient ischemic attack (TIA), and cerebral infarction without residual deficits    HCAP (healthcare-associated pneumonia)    S/P right coronary artery (RCA) stent placement    Sepsis due to pneumonia (HCC)    Uncontrolled type II diabetes mellitus with peripheral artery disease (HCC)    Ischemic cardiomyopathy    Elevated LFTs    Generalized anxiety disorder    Benzodiazepine dependence, continuous (HCC)       Presented with chest pain shortness of breath and is ruled out for MI by get enzymes  Patient had a stress Myoview did not show any ischemia  Patient blood pressure was high initially but is better now  Patient has mental status changes now and has history of dementia and is having work-up done  Patient had an echocardiogram which showed a EF of 40% and will continue her on medical therapy only for now  We will follow as an outpatient    I discussed the patients findings and my recommendations with patient's nurse    Cristóbal Castillo MD  03/03/22  12:21 EST

## 2022-03-03 NOTE — THERAPY TREATMENT NOTE
Subjective: Pt agreeable to therapeutic plan of care. Pt supine in bed, no signs of distress.    Objective:     Bed mobility - Min-A and Mod-A  Transfers - Min-A and Mod-A  Ambulation - 30 feet and 50 Min-A and with rolling walker     Sitting EOB - x3 minutes, SBA/CGA with VC/TCs for upright posture.     Pain: 0 VAS  Education: Provided education on importance of mobility and skilled verbal / tactile cueing throughout intervention.     Assessment: Nadege Barrow presents with functional mobility impairments which indicate the need for skilled intervention. Pt requires Min/ModA with most functional mobility. Pt able to ambulate 30 feet and 50 feet Judi with Rwx and required therapeutic rest breaks between each walk. No LOB episodes despite noticeable tremors and balance deficits. Pt remains confused at times and constant redirection of task at hand. Pt is currently on 2L O2, sats were stable. Pt remains a risk for falls and far from baseline, will require IP rehab at d/c. Tolerating session today without incident. Will continue to follow and progress as tolerated.     Plan/Recommendations:   Pt would benefit from Inpatient Rehabilitation placement at discharge from facility and requires no DME at discharge.   Pt desires Inpatient Rehabilitation placement at discharge. Pt cooperative; agreeable to therapeutic recommendations and plan of care.     Basic Mobility 6-click:  Rollin = Total, A lot = 2, A little = 3; 4 = None  Supine>Sit:   1 = Total, A lot = 2, A little = 3; 4 = None   Sit>Stand with arms:  1 = Total, A lot = 2, A little = 3; 4 = None  Bed>Chair:   1 = Total, A lot = 2, A little = 3; 4 = None  Ambulate in room:  1 = Total, A lot = 2, A little = 3; 4 = None  3-5 Steps with railin = Total, A lot = 2, A little = 3; 4 = None  Score: 12    Modified [place]: 4 = Moderately severe disability (Unable to attend to own bodily needs without assistance, and unable to walk unassisted)     Post-Tx Position:  Up in Chair, Alarms activated and Call light and personal items within reach  PPE: gloves, surgical mask, eyewear protection

## 2022-03-03 NOTE — PROGRESS NOTES
[facility] Medicine Services Daily Progress Note    Patient Name: Nadege Barrow  : 1945  MRN: [id number]  Primary Care Physician:  aKela Layton APRN  Date of admission: 2022      Subjective      Chief Complaint:     Short of breath    Subjective   2022  Patient seen and examined  Denies any chest pain or shortness of breath  Had Myoview stress test today which was negative for ischemia    3/1/2022  Patient was reportedly confused over the night  Sitter placed in the room  On cefepime which was discontinued today as it can cause altered mental status    3/2/2022  Patient seen and examined  No new complaints  Awaiting rehab placement  Very much alert and oriented x3.    3/3/2022  No new complaints  Awaiting rehab placement      Review of Systems   Constitutional: Negative for chills and fever.   HENT: Negative for congestion.    Eyes: Negative for blurred vision.   Cardiovascular: Negative for chest pain.   Respiratory: Negative for shortness of breath.    Endocrine: Negative for cold intolerance and heat intolerance.   Gastrointestinal: Negative for abdominal pain, nausea and vomiting.   Genitourinary: Negative for dysuria.   Neurological: Positive for weakness.          Objective      Vitals:   Temp:  [97.9 °F (36.6 °C)-98.5 °F (36.9 °C)] 98.5 °F (36.9 °C)  Heart Rate:  [76-86] 83  Resp:  [17-20] 17  BP: (122-159)/(49-82) 159/82  Flow (L/min):  [3] 3    Physical Exam  Vitals reviewed.   Constitutional:       General: She is not in acute distress.     Appearance: She is well-developed.   HENT:      Head: Normocephalic and atraumatic.      Nose: Nose normal.      Mouth/Throat:      Mouth: Mucous membranes are moist.   Eyes:      Extraocular Movements: Extraocular movements intact.      Conjunctiva/sclera: Conjunctivae normal.      Pupils: Pupils are equal, round, and reactive to light.   Neck:      Thyroid: No thyromegaly.      Vascular: No JVD.      Trachea: No tracheal  deviation.   Cardiovascular:      Rate and Rhythm: Normal rate and regular rhythm.   Pulmonary:      Breath sounds: No rales.   Abdominal:      General: Bowel sounds are normal.      Palpations: Abdomen is soft.      Tenderness: There is no abdominal tenderness.   Musculoskeletal:      Cervical back: Neck supple. No muscular tenderness.      Comments: Degenerative changes   Skin:     General: Skin is warm and dry.   Neurological:      Mental Status: She is alert. Mental status is at baseline.      Motor: No abnormal muscle tone.   Psychiatric:         Mood and Affect: Mood normal.         Behavior: Behavior normal.                 Result Review    Result Review:  I have personally reviewed the results from the time of this admission to 3/3/2022 09:07 EST and agree with these findings:  [x]  Laboratory  [x]  Microbiology  [x]  Radiology  []  EKG/Telemetry   []  Cardiology/Vascular   []  Pathology  []  Old records  []  Other:  Most notable findings include:           Assessment/Plan      Brief Patient Summary:  76-year-old female with multiple comorbidities including CAD, ischemic cardiomyopathy, disabling tremor status post left thalamic high-frequency MRI guided ultrasound ablation brain for tremor right upper extremity-4/4/2021 at [place], hyperlipidemia and TIA.  Presented to [facility] ED with complaints of dyspnea.  Of note is that patient was discharged a week from [facility] after treatment for pneumonia.    After evaluating in the ED, patient was admitted for further care    aspirin, 81 mg, Oral, Daily  atorvastatin, 40 mg, Oral, Nightly  busPIRone, 10 mg, Oral, Q12H  cefdinir, 300 mg, Oral, Q12H  docusate sodium, 100 mg, Oral, BID  doxycycline, 100 mg, Oral, Q12H  empagliflozin, 10 mg, Oral, Daily  enoxaparin, 40 mg, Subcutaneous, Q24H  fludrocortisone, 0.1 mg, Oral, Daily  FLUoxetine, 40 mg, Oral, Daily  insulin lispro, 0-14 Units, Subcutaneous, 4x Daily With Meals &  Nightly  insulin NPH-insulin regular, 20 Units, Subcutaneous, BID With Meals  ipratropium-albuterol, 3 mL, Nebulization, Q6H - RT  metoprolol succinate XL, 50 mg, Oral, Daily  polyethylene glycol, 17 g, Oral, Daily  sodium chloride, 10 mL, Intravenous, Q12H  ticagrelor, 90 mg, Oral, BID             Active Hospital Problems:  Active Hospital Problems    Diagnosis    • Generalized anxiety disorder    • Benzodiazepine dependence, continuous (HCC)    • Senile dementia, delirium, with behavioral disturbance (HCC)    • Ischemic cardiomyopathy    • Elevated LFTs    • Hypertensive crisis    • Acute respiratory failure with hypoxia ([facility])    • Atherosclerotic heart disease of native coronary artery without angina pectoris    • Depression, unspecified    • Personal history of transient ischemic attack (TIA), 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  Upon arrival to the ED /146  Patient treated in the ED with nitroglycerin continuous IV infusion  d/c nitro drip  Blood pressure has improved  On  Metoprolol    Chest pain/shortness of breath  High blood pressure on presentation  History of CAD status post stent placement and on medical management with aspirin, beta-blocker, Brilinta and statin  Cardiologist following  Recommended Myoview stress test 2/20/2022-no evidence of ischemia-consistent with low risk study      Possible Healthcare acquired pneumonia/acute respiratory failure with hypoxia:  Patient recently discharged from [facility], admission for pneumonia  Chest x-ray reviewed, ABG reviewed  Patient treated in the ED  with IV Rocephin, IV doxycycline, BiPAP  Blood culture-no growth   Respiratory viral panel including COVID-19-negative  Urine antigens negative  Was on on cefepime and vancomycin  Antibiotics deescalated to Omnicef and doxycycline  Respiratory care with bronchodilators  Oxygen therapy and titration     Acute systolic heart failure /ischemic cardiomyopathy  Secondary to ischemic cardiomyopathy  Currently compensated  Echocardiogram revealed EF around 40%    Sepsis ruled out      Dementia:  Continue on home medical  Fall precautions     Disabling essential tremor:  S/P left thalamic high frequency MR guided ultrasound ablation brain for tremor RUE, done 4/4/21 in [place]     Mixed hyperlipidemia:  On statin     DM type II:  Continue on present insulin regimen  Monitor blood sugar and adjust insulin as needed     Anxiety/depression:  On Xanax, Prozac     History of TIA:  On statin and Plavix    Hypokalemia-on replacement protocol  serum magnesium okay           DVT prophylaxis:  Medical and mechanical DVT prophylaxis orders are present.    CODE STATUS:    Code Status (Patient has no pulse and is not breathing): CPR (Attempt to Resuscitate)  Medical Interventions (Patient has pulse or is breathing): Full Support      Disposition: Pending clinical progress    This patient has been examined wearing appropriate Personal Protective Equipment and discussed with hospital infection control department. 03/03/22      Electronically signed by Kraig Luis MD, 03/03/22, 09:07 EST.  [facility] Hospitalist Team

## 2022-03-04 ENCOUNTER — APPOINTMENT (OUTPATIENT)
Dept: GENERAL RADIOLOGY | Facility: HOSPITAL | Age: 77
End: 2022-03-04

## 2022-03-04 VITALS
OXYGEN SATURATION: 100 % | WEIGHT: 170.86 LBS | SYSTOLIC BLOOD PRESSURE: 148 MMHG | BODY MASS INDEX: 31.44 KG/M2 | RESPIRATION RATE: 20 BRPM | HEIGHT: 62 IN | TEMPERATURE: 97.6 F | DIASTOLIC BLOOD PRESSURE: 83 MMHG | HEART RATE: 88 BPM

## 2022-03-04 LAB
GLUCOSE BLDC GLUCOMTR-MCNC: 113 MG/DL (ref 70–105)
GLUCOSE BLDC GLUCOMTR-MCNC: 214 MG/DL (ref 70–105)
GLUCOSE BLDC GLUCOMTR-MCNC: 270 MG/DL (ref 70–105)
GLUCOSE BLDC GLUCOMTR-MCNC: 54 MG/DL (ref 70–105)
GLUCOSE BLDC GLUCOMTR-MCNC: 84 MG/DL (ref 70–105)

## 2022-03-04 PROCEDURE — 25010000002 ENOXAPARIN PER 10 MG: Performed by: INTERNAL MEDICINE

## 2022-03-04 PROCEDURE — 94799 UNLISTED PULMONARY SVC/PX: CPT

## 2022-03-04 PROCEDURE — 63710000001 INSULIN LISPRO (HUMAN) PER 5 UNITS: Performed by: HOSPITALIST

## 2022-03-04 PROCEDURE — 97530 THERAPEUTIC ACTIVITIES: CPT

## 2022-03-04 PROCEDURE — 97116 GAIT TRAINING THERAPY: CPT

## 2022-03-04 PROCEDURE — 74230 X-RAY XM SWLNG FUNCJ C+: CPT

## 2022-03-04 PROCEDURE — 97535 SELF CARE MNGMENT TRAINING: CPT

## 2022-03-04 PROCEDURE — 82962 GLUCOSE BLOOD TEST: CPT

## 2022-03-04 PROCEDURE — 99239 HOSP IP/OBS DSCHRG MGMT >30: CPT | Performed by: INTERNAL MEDICINE

## 2022-03-04 PROCEDURE — 94660 CPAP INITIATION&MGMT: CPT

## 2022-03-04 PROCEDURE — 63710000001 INSULIN ISOPHANE & REGULAR PER 5 UNITS: Performed by: HOSPITALIST

## 2022-03-04 PROCEDURE — 92611 MOTION FLUOROSCOPY/SWALLOW: CPT

## 2022-03-04 RX ORDER — ALPRAZOLAM 2 MG/1
2 TABLET ORAL 3 TIMES DAILY PRN
Qty: 15 TABLET | Refills: 0 | Status: SHIPPED | OUTPATIENT
Start: 2022-03-04 | End: 2022-05-02 | Stop reason: SDUPTHER

## 2022-03-04 RX ORDER — DOXYCYCLINE 100 MG/1
100 TABLET ORAL EVERY 12 HOURS SCHEDULED
Qty: 9 TABLET | Refills: 0 | Status: SHIPPED | OUTPATIENT
Start: 2022-03-04 | End: 2022-03-09

## 2022-03-04 RX ORDER — CEFDINIR 300 MG/1
300 CAPSULE ORAL EVERY 12 HOURS SCHEDULED
Qty: 9 CAPSULE | Refills: 0 | Status: SHIPPED | OUTPATIENT
Start: 2022-03-04 | End: 2022-03-09

## 2022-03-04 RX ORDER — BUSPIRONE HYDROCHLORIDE 10 MG/1
10 TABLET ORAL EVERY 12 HOURS SCHEDULED
Start: 2022-03-04 | End: 2022-06-07 | Stop reason: SINTOL

## 2022-03-04 RX ADMIN — CEFDINIR 300 MG: 300 CAPSULE ORAL at 09:21

## 2022-03-04 RX ADMIN — POLYETHYLENE GLYCOL 3350 17 G: 17 POWDER, FOR SOLUTION ORAL at 09:21

## 2022-03-04 RX ADMIN — IPRATROPIUM BROMIDE AND ALBUTEROL SULFATE 3 ML: 2.5; .5 SOLUTION RESPIRATORY (INHALATION) at 06:56

## 2022-03-04 RX ADMIN — DOXYCYCLINE 100 MG: 100 TABLET ORAL at 09:21

## 2022-03-04 RX ADMIN — BUSPIRONE HYDROCHLORIDE 10 MG: 5 TABLET ORAL at 09:21

## 2022-03-04 RX ADMIN — TICAGRELOR 90 MG: 90 TABLET ORAL at 09:21

## 2022-03-04 RX ADMIN — INSULIN LISPRO 8 UNITS: 100 INJECTION, SOLUTION INTRAVENOUS; SUBCUTANEOUS at 12:43

## 2022-03-04 RX ADMIN — Medication 10 ML: at 09:22

## 2022-03-04 RX ADMIN — FLUOXETINE 40 MG: 20 CAPSULE ORAL at 09:21

## 2022-03-04 RX ADMIN — ASPIRIN 81 MG: 81 TABLET, COATED ORAL at 09:21

## 2022-03-04 RX ADMIN — METOPROLOL SUCCINATE 50 MG: 50 TABLET, EXTENDED RELEASE ORAL at 09:21

## 2022-03-04 RX ADMIN — BARIUM SULFATE 50 ML: 400 SUSPENSION ORAL at 10:52

## 2022-03-04 RX ADMIN — INSULIN HUMAN 20 UNITS: 100 INJECTION, SUSPENSION SUBCUTANEOUS at 17:10

## 2022-03-04 RX ADMIN — ENOXAPARIN SODIUM 40 MG: 40 INJECTION SUBCUTANEOUS at 17:09

## 2022-03-04 RX ADMIN — ALPRAZOLAM 2 MG: 1 TABLET ORAL at 09:21

## 2022-03-04 RX ADMIN — DOCUSATE SODIUM 100 MG: 100 CAPSULE, LIQUID FILLED ORAL at 09:21

## 2022-03-04 RX ADMIN — INSULIN LISPRO 5 UNITS: 100 INJECTION, SOLUTION INTRAVENOUS; SUBCUTANEOUS at 17:10

## 2022-03-04 RX ADMIN — EMPAGLIFLOZIN 10 MG: 10 TABLET, FILM COATED ORAL at 09:21

## 2022-03-04 RX ADMIN — FLUDROCORTISONE ACETATE 0.1 MG: 0.1 TABLET ORAL at 09:21

## 2022-03-04 NOTE — THERAPY TREATMENT NOTE
Subjective: Pt agreeable to therapeutic plan of care.  Cognition: disoriented to Situation, arousal/alertness: Distracted, safety/judgement: fair and awareness of deficits: fair awareness of safety precautions and fair awareness of deficits    Objective:   Bed Mobility: Mod-A  Functional Transfers: Min-A, Mod-A, with rolling walker and utilizing compensatory strategy  Functional Ambulation: Min-A and with rolling walker    Lower Body Dressing: Max-A  ADL Position: edge of bed sitting    Toileting: Mod-A, with adaptive equipment and with rolling walker  ADL Position: supported standing and unsupported sitting    Pain: 2 VAS    Education: Provided education on importance of mobility and skilled verbal / tactile cueing throughout intervention.     Assessment: Pt tolerated OT tx well this date, req min A to stand from bed, mod A for commode transfer with RW and grab bars, very anxious and upset about [facility]. Pt completed toileting with mod A. Pt req mod  A for static sitting balance x10 min lean to the R and unable to self correct. Cont OT POC. REC JERAMIE at DC.    Modified [place]: 4 = Moderately severe disability (Unable to attend to own bodily needs without assistance, and unable to walk unassisted)     Post-Tx Position: Supine with HOB Elevated, Alarms activated and Call light and personal items within reach

## 2022-03-04 NOTE — MBS/VFSS/FEES
"Acute Care - Speech Language Pathology   Swallow Initial Evaluation  [place]     Patient Name: Nadege Barrow  : 1945  MRN: [id number]  Today's Date: 3/4/2022               Admit Date: 2022    Visit Dx:     ICD-10-CM ICD-9-CM   1. Acute respiratory failure with hypoxia (Formerly [facility])  J96.01 518.81   2. Hypertensive emergency  I16.1 401.9   3. Acute congestive heart failure, unspecified heart failure type (Formerly [facility])  I50.9 428.0     Patient Active Problem List   Diagnosis   • Chest pain on breathing   • Hypertensive crisis   • Acute respiratory failure with hypoxia (Formerly [facility])   • Anemia   • Anxiety   • Atherosclerotic heart disease of native coronary artery without angina pectoris   • Back pain   • Cardiomyopathy (Formerly [facility])   • Carotid artery disease (Formerly [facility])   • Cellulitis of foot   • Acute on chronic congestive heart failure (Formerly [facility])   • Current moderate episode of major depressive disorder without prior episode (Formerly [facility])   • Senile dementia, delirium, with behavioral disturbance (Formerly [facility])   • Depression, unspecified   • Disabling essential tremor   • Fatigue   • Hyperlipidemia, mixed   • Peripheral vision loss, bilateral   • Personal history of transient ischemic attack (TIA), and cerebral infarction without residual deficits   • HCAP (healthcare-associated pneumonia)   • Polypharmacy   • Retinopathy, bilateral   • S/P right coronary artery (RCA) stent placement   • Sepsis due to pneumonia (Formerly [facility])   • Uncontrolled type II diabetes mellitus with peripheral artery disease (Formerly [facility])   • Ischemic cardiomyopathy   • Elevated LFTs   • Generalized anxiety disorder   • Benzodiazepine dependence, continuous (Formerly [facility])     Past Medical History:   Diagnosis Date   • Anxiety    • CHF (congestive heart failure) (Formerly [facility])    • Depression    • Diabetes mellitus (Formerly [facility])    • Hyperlipidemia    • Hypertension    • Panic disorder     \"panic attacks\"   • Tremors of nervous system     \"essential tremors\"     Past Surgical History:   Procedure Laterality Date   • CORONARY " ANGIOPLASTY WITH STENT PLACEMENT         SLP Recommendation and Plan  SLP Swallowing Diagnosis: (P) mild, oral dysphagia, moderate, pharyngeal dysphagia (03/04/22 1100)  SLP Diet Recommendation: (P) ground, nectar thick liquids (03/04/22 1100)  Recommended Precautions and Strategies: (P) upright posture during/after eating, small bites of food and sips of liquid, general aspiration precautions (03/04/22 1100)  SLP Rec. for Method of Medication Administration: (P) meds whole, meds crushed, with pudding or applesauce (03/04/22 1100)     Monitor for Signs of Aspiration: (P) yes, notify SLP if any concerns, cough, elevated WBC count, gurgly voice, throat clearing, fever, upper respiratory, pneumonia, right lower lobe infiltrates (03/04/22 1100)  Recommended Diagnostics: (P) reassess via VFSS ([facility]), other (see comments) (if indicated) (03/04/22 1100)  Swallow Criteria for Skilled Therapeutic Interventions Met: (P) demonstrates skilled criteria (03/04/22 1100)     Rehab Potential/Prognosis, Swallowing: (P) good, to achieve stated therapy goals (03/04/22 1100)  Therapy Frequency (Swallow): (P) PRN (03/04/22 1100)  Predicted Duration Therapy Intervention (Days): (P) until discharge (03/04/22 1100)       Patient was not wearing a face mask during this therapy encounter. The patient's mask was removed to allow po trials to be administered for purposes of this assessment. The patient's mask was replaced prior to being transported back up to their room. Therapist used appropriate personal protective equipment including mask, eye protection and gloves.  Mask used was standard procedure mask. Appropriate PPE was worn during the entire therapy session. Hand hygiene was completed before and after therapy session. Patient is not in enhanced droplet precautions.       SWALLOW EVALUATION (last 72 hours)     SLP Adult Swallow Evaluation     Row Name 03/04/22 1100          Document Type evaluation (P)   -RZ    Subjective Information  complains of (P)   sore throat (has been going on for a couple weeks)  -RZ    Patient Observations alert; cooperative (P)   -RZ    Patient/Family/Caregiver Comments/Observations pt was alert and responsive. She was able to follow all commands and answer questions. (P)   -RZ    Patient Effort good (P)   -RZ    Comment --    Symptoms Noted During/After Treatment --              Patient Profile Reviewed yes (P)   -RZ    Pertinent History Of Current Problem pt is a 76 y.o. female with multiple comorbidities including CAD, ischemic cardiomyopathy, disabling tremor status post left thalamic high-frequency MRI guided ultrasound ablation brain for tremor right upper extremity-4/4/2021 at [facility], hyperlipidemia and TIA. Pt presented to [facility] ED with complaints of dyspnea. Of note is that patient was discharged a week from [facility] after treatment for pneumonia. Swallow eval ordered today after RN reported pt choking on oatmeal.(P)   -RZ    Current Method of Nutrition mechanical soft, no mixed consistencies; honey-thick liquids (P)   -RZ    Precautions/Limitations, Vision WFL; for purposes of eval (P)   -RZ    Precautions/Limitations, Hearing WFL; for purposes of eval (P)   -RZ    Prior Level of Function-Communication WFL (P)   -RZ    Prior Level of Function-Swallowing regular textures; thin liquids (P)   -RZ    Plans/Goals Discussed with patient; other (see comments) (P)   pt's nurse  -RZ    Patient's Goals for Discharge --              Additional Documentation Pain Scale: FACES Pre/Post-Treatment (Group) (P)   -RZ              Pain: FACES Scale, Pretreatment 2-->hurts little bit (P)   -RZ    Posttreatment Pain Rating 2-->hurts little bit (P)   -RZ              Dentition Assessment natural, present and adequate; missing teeth (P)   -RZ    Secretion Management WNL/WFL (P)   -RZ    Mucosal Quality moist, healthy (P)   -RZ    Volitional Swallow delayed (P)   -RZ              Oral Motor  "General Assessment --              Respiratory Support Currently in Use nasal cannula; other (see comments) (P)   2L  -RZ    Eating/Swallowing Skills self-fed; other (see comments) (P)   mimimal assistance from ST student  -RZ    Positioning During Eating upright 90 degree; upright in chair (P)   -RZ    Utensils Used --    Consistencies Trialed --              Clinical Swallow Evaluation Summary --              Utensils Used spoon; cup (P)   -RZ    Consistencies Trialed nectar/syrup-thick liquids; thin liquids; pureed; mixed consistency (P)   -RZ              VFSS Summary VFSS was completed on today's date with pt seated upright at 90 degrees in a chair and viewed in lateral projection. Pt was pleasant and cooperative throughout the whole evaluation. Pt was able to follow all commands and answer questions. Pt is currently on 2L of O2 via NC. Pt's current diet is mechanical soft and honey thick liquids.Pt complains of honey thick liquid and says it \"coats my throat and makes me choke more\". The following consistencies were trialed during this instrumental evaluation: Nectar thick liquid (by cup x2), puree (applesauce x2), mixed mech soft (peaches x2), and thin liquids (single sips , single sip with compensation, and consecutive sips). Pt was independent with feeding on all trials. Pt only required minimal assistance from SLP student to hand her the spoon.     Labial seal and ability to pull from spoon and cup were adequate across all trials completed.     NTL: (x2 by cup)   During these trials premature spillage to the pyriform sinuses on the first trials was noted and premature spillage to the vallecular space was noted on the second trial. No penetration or aspiration noted (a 1 on Roasenbek Penetration Aspiration Scale). Adequate epiglottic deflections, laryngeal vestibular closure, and anterior hyoid excursion was noted. No residue was noted.     PUREE: (applesauce x2)  During these trials premature spillage to " the vallecular space and the laryngeal surface of the epiglottis was noted. D/t this premature spillage with part of the bolus on the laryngeal surface, when the swallow was initiate, transient penetration was noted. Penetration cleared spontaneously. (a 2 Rosenbek Penetration Aspiration Scale). Mild to moderate residue noted after these trials. Pt was cued to dry swallow. Pt had trouble completing a dry swallow, however was able to finally initiate one. After this delayed dry swallow, some residue cleared by mild residue remained.    MIXED MECH SOFT: (peaches x2)  Mastication appeared timely and functional. Premature spillage to the pyriform sinuses was noted. No penetration/aspiration was noted on these trials (a 1 on Rosebek Penetration Aspiration Scale). Adequate epiglottic deflections, laryngeal vestibular closure, and anterior hyoid excursion was noted.    THIN LIQUIDS: (thin x4 by cup, x2 by cup with chin tuck, x1 rapid consecutive sips)  During thins by cup deep silent penetration before an initiation of swallow was noted, indicating a mistimed swallow. Premature spillage to the pyriforms was noted. (a min of 2 max of 5  Rosenbek Penetration Aspiration Scale).    Chin tuck was trailed with thins by cup to see if this would eliminate deep penetration. During these compensation trials, penetration appeared more shallow; however it was not eliminated (a min of 2 max of 5  Rosenbek Penetration Aspiration Scale).     During rapid consecutive sips trial pt displayed with deep silent aspiration during the swallow  (a 8 Rosenbek Penetration Aspiration Scale).    IMPRESSION: Pt displays with mild-moderate oral dysphagia and moderate pharyngeal dysphagia as noted with above deficits. Base of tongue appears weak which leads to reduced bolus control/premature spillage.     ST RECOMMENDATIONS:  ST recommends that pt initiate a nectar thick liquid and mech soft diet at this time. Pt should remain upright during  meals/medications, monitor s/s of aspiration, and take small bites/sips. [place] Protocol between meals and after oral care. ST will continue to follow pt to ensure that this is the safest/least restrictive diet for pt. Plan discussed with pt and pt's nurse. All in agreement. Further goals/recommendations to follow.(P)   -RZ              Pharyngeal Phase --    Penetration During the Swallow --    Aspiration During the Swallow --    Response to Penetration --    Response to Aspiration --    Rosenbek's Scale --              SLP Swallowing Diagnosis mild; oral dysphagia; moderate; pharyngeal dysphagia (P)   -RZ    Functional Impact risk of aspiration/pneumonia (P)   -RZ    Rehab Potential/Prognosis, Swallowing good, to achieve stated therapy goals (P)   -RZ    Swallow Criteria for Skilled Therapeutic Interventions Met demonstrates skilled criteria (P)   -RZ              Care Plan Review --              Therapy Frequency (Swallow) PRN (P)   -RZ    Predicted Duration Therapy Intervention (Days) until discharge (P)   -RZ    SLP Diet Recommendation ground; nectar thick liquids (P)   -RZ    Recommended Diagnostics reassess via VFSS ([facility]); other (see comments) (P)   if indicated  -RZ    Recommended Precautions and Strategies upright posture during/after eating; small bites of food and sips of liquid; general aspiration precautions (P)   -RZ    Oral Care Recommendations Before ice/water (P)   -RZ    SLP Rec. for Method of Medication Administration meds whole; meds crushed; with pudding or applesauce (P)   -RZ    Monitor for Signs of Aspiration yes; notify SLP if any concerns; cough; elevated WBC count; gurgly voice; throat clearing; fever; upper respiratory; pneumonia; right lower lobe infiltrates (P)   -RZ              Oral Nutrition/Hydration Goal Selection (SLP) oral nutrition/hydration, SLP goal 1; oral nutrition/hydration, SLP goal 2; oral nutrition/hydration, SLP goal (free text) (P)   -RZ              Oral  Nutrition/Hydration Goal 1, SLP Pt to have instrumental assessment of swallow w/further recommendations as indicated. (P)   -RZ    Time Frame (Oral Nutrition/Hydration Goal 1, SLP) short term goal (STG) (P)   -RZ    Barriers (Oral Nutrition/Hydration Goal 1, SLP) see above VFSS eval note (P)   -RZ    Progress/Outcomes (Oral Nutrition/Hydration Goal 1, SLP) goal ongoing (P)   -RZ              Oral Nutrition/Hydration Goal 2, SLP Pt will maximixe swallow function for least restrictive PO diet, exhibiting no complication associated with dysphagia, adequate PO intake, and demonstrating independent use of swallow compensations (P)   -RZ    Time Frame (Oral Nutrition/Hydration Goal 2, SLP) by discharge (P)   -RZ    Barriers (Oral Nutrition/Hydration Goal 2, SLP) see above VFSS eval note (P)   -RZ    Progress/Outcomes (Oral Nutrition/Hydration Goal 2, SLP) goal ongoing (P)   -RZ              Oral Nutrition/Hydration Goal, SLP The patient will participate in a full meal assessment to determine safety and adequacy of recommended diet, independent use of safe swallow compensations, and additional goals/recommendations to follow (P)   -RZ    Time Frame (Oral Nutrition/Hydration Goal, SLP) --          User Key  (r) = Recorded By, (t) = Taken By, (c) = Cosigned By    Initials Name Effective Dates    CP Payton Lizama, SLP 06/16/21 -     Divya Ford 06/16/21 -     Iris Marshall, SLP 06/16/21 -     Darci Jerry, Speech Therapy Student 01/10/22 -                 EDUCATION  The patient has been educated in the following areas:   Dysphagia (Swallowing Impairment).        SLP GOALS     Row Name 03/04/22 1100          Oral Nutrition/Hydration Goal 1, SLP Pt to have instrumental assessment of swallow w/further recommendations as indicated. (P)   -RZ    Time Frame (Oral Nutrition/Hydration Goal 1, SLP) short term goal (STG) (P)   -RZ    Barriers (Oral Nutrition/Hydration Goal 1, SLP) see above VFSS eval note (P)    -RZ    Progress/Outcomes (Oral Nutrition/Hydration Goal 1, SLP) goal ongoing (P)   -RZ          Oral Nutrition/Hydration Goal 2, SLP Pt will maximixe swallow function for least restrictive PO diet, exhibiting no complication associated with dysphagia, adequate PO intake, and demonstrating independent use of swallow compensations (P)   -RZ    Time Frame (Oral Nutrition/Hydration Goal 2, SLP) by discharge (P)   -RZ    Barriers (Oral Nutrition/Hydration Goal 2, SLP) see above VFSS eval note (P)   -RZ    Progress/Outcomes (Oral Nutrition/Hydration Goal 2, SLP) goal ongoing (P)   -RZ          Oral Nutrition/Hydration Goal, SLP The patient will participate in a full meal assessment to determine safety and adequacy of recommended diet, independent use of safe swallow compensations, and additional goals/recommendations to follow (P)   -RZ    Time Frame (Oral Nutrition/Hydration Goal, SLP) --    Barriers (Oral Nutrition/Hydration Goal, SLP) --    Progress/Outcomes (Oral Nutrition/Hydration Goal, SLP) --    Row Name 03/01/22 1500 03/01/22 1300          Oral Nutrition/Hydration Goal 1 (SLP)    Oral Nutrition/Hydration Goal 1, SLP Pt to have instrumental assessment of swallow w/further recommendations as indicated.  -LF Pt to have instrumental assessment of swallow w/further recommendations as indicated.  -EC     Time Frame (Oral Nutrition/Hydration Goal 1, SLP) short term goal (STG)  -LF short term goal (STG)  -EC     Barriers (Oral Nutrition/Hydration Goal 1, SLP) see above note  -LF --     Progress/Outcomes (Oral Nutrition/Hydration Goal 1, SLP) goal met  -LF --            Oral Nutrition/Hydration Goal 2 (SLP)    Oral Nutrition/Hydration Goal 2, SLP Pt will maximixe swallow function for least restrictive PO diet, exhibiting no complication associated with dysphagia, adequate PO intake, and demonstrating independent use of swallow compensations  -LF Pt will maximixe swallow function for least restrictive PO diet, exhibiting  no complication associated with dysphagia, adequate PO intake, and demonstrating independent use of swallow compensations  -EC     Time Frame (Oral Nutrition/Hydration Goal 2, SLP) by discharge  -LF by discharge  -EC     Barriers (Oral Nutrition/Hydration Goal 2, SLP) see above note  -LF --     Progress/Outcomes (Oral Nutrition/Hydration Goal 2, SLP) goal ongoing  -LF --            Oral Nutrition/Hydration Goal (SLP)    Oral Nutrition/Hydration Goal, SLP The patient will participate in a full meal assessment to determine safety and adequacy of recommended diet, independent use of safe swallow compensations, and additional goals/recommendations to follow  -LF --     Time Frame (Oral Nutrition/Hydration Goal, SLP) 2 days  -LF --           User Key  (r) = Recorded By, (t) = Taken By, (c) = Cosigned By    Initials Name Provider Type    CP Payton Lizama, SLP Speech and Language Pathologist    Divya Ford Speech and Language Pathologist    Iris Marshall, SLP Speech and Language Pathologist    Nicky Lane, SLP Speech and Language Pathologist    Darci Jerry, Speech Therapy Student Speech Therapy Student                   Time Calculation:                Darci Barber, Speech Therapy Student  3/4/2022

## 2022-03-04 NOTE — PLAN OF CARE
Goal Outcome Evaluation:    Pt stable, on room air or 2L as needed. [facility] Pt to be discharged to [place]. VSS. Falls precautions in place.

## 2022-03-04 NOTE — DISCHARGE SUMMARY
[facility] Medicine Services  DISCHARGE SUMMARY    Patient Name: Nadege Barrow  : 1945  MRN: [id number]    Date of Admission: 2022  Discharge Diagnosis:     Hypertensive crisis improved.  Upon arrival to the ED /146  Patient treated in the ED with nitroglycerin continuous IV infusion  d/c nitro drip  Blood pressure has improved  On  Metoprolol     Chest pain/shortness of breath  High blood pressure on presentation  History of CAD status post stent placement and on medical management with aspirin, beta-blocker, Brilinta and statin  Cardiologist followed while inpatient  Recommended Myoview stress test 2022-no evidence of ischemia-consistent with low risk study        Possible Healthcare acquired pneumonia/acute respiratory failure with hypoxia:  Patient recently discharged from [facility], admission for pneumonia  Chest x-ray reviewed, ABG reviewed  Patient treated in the ED with IV Rocephin, IV doxycycline, BiPAP  Blood culture-no growth   Respiratory viral panel including COVID-19-negative  Urine antigens negative  Was on on cefepime and vancomycin  Antibiotics deescalated to Omnicef and doxycycline  Respiratory care with bronchodilators  Oxygen therapy and titration     Acute systolic heart failure /ischemic cardiomyopathy  Secondary to ischemic cardiomyopathy  Currently compensated  Echocardiogram revealed EF around 40%  On diuretic     Sepsis ruled out      Dementia:  Continue on home medical  Fall precautions     Disabling essential tremor:  S/P left thalamic high frequency MR guided ultrasound ablation brain for tremor RUE, done 21 in [place]     Mixed hyperlipidemia:  On statin     DM type II:  Continue home insulin regimen NPI/insulin regular and Jardiance    Anxiety/depression:  On Xanax, Prozac     History of TIA:  On statin and Plavix     Hypokalemia-replaced  Serum magnesium okay              Date of Discharge: 3/4/2022.  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] Yes   • Ischemic cardiomyopathy [I25.5] Yes   • Elevated LFTs [R79.89] Yes   • Hypertensive crisis [I16.9] Yes   • Acute respiratory failure with hypoxia (HCC) [J96.01] Yes   • Atherosclerotic heart disease of native coronary artery without angina pectoris [I25.10] Yes   • Depression, unspecified [F32.A] Yes   • Personal history of transient ischemic attack (TIA), and cerebral infarction without residual deficits [Z86.73] Not Applicable   • HCAP (healthcare-associated pneumonia) [J18.9] Yes   • Sepsis due to pneumonia (HCC) [J18.9, A41.9] Yes   • Disabling essential tremor [G25.0] Yes   • S/P right coronary artery (RCA) stent placement [Z95.5] Not Applicable   • Acute on chronic congestive heart failure (HCC) [I50.9] Yes   • Hyperlipidemia, mixed [E78.2] Yes   • Anxiety [F41.9] Yes   • Uncontrolled type II diabetes mellitus with peripheral artery disease (HCC) [E11.51, E11.65] Yes      Resolved Hospital Problems   No resolved problems to display.         Hospital Course     Hospital Course:  76-year-old female with multiple comorbidities including CAD, ischemic cardiomyopathy, disabling tremor status post left thalamic high-frequency MRI guided ultrasound ablation brain for tremor right upper extremity-4/4/2021 at [place], hyperlipidemia and TIA.  Presented to [facility] ED with complaints of dyspnea.  Of note is that patient was discharged a week from [facility] after  treatment for pneumonia.     After evaluating in the ED, patient was admitted for further care     Conditions addressed while inpatient are as stated below    Hypertensive crisis improved.  Upon arrival to the ED /146  Patient treated in the ED with nitroglycerin continuous IV infusion  d/c nitro drip  Blood pressure has improved  On  Metoprolol     Chest pain/shortness of breath  High blood pressure on presentation  History of CAD status post stent placement and on medical management with aspirin, beta-blocker, Brilinta and statin  Cardiologist followed while inpatient  Recommended Myoview stress test 2/28/2022-no evidence of ischemia-consistent with low risk study        Possible Healthcare acquired pneumonia/acute respiratory failure with hypoxia:  Patient recently discharged from [facility], admission for pneumonia  Chest x-ray reviewed, ABG reviewed  Patient treated in the ED with IV Rocephin, IV doxycycline, BiPAP  Blood culture-no growth   Respiratory viral panel including COVID-19-negative  Urine antigens negative  Was on on cefepime and vancomycin  Antibiotics deescalated to Omnicef and doxycycline  Respiratory care with bronchodilators  Oxygen therapy and titration     Acute systolic heart failure /ischemic cardiomyopathy  Secondary to ischemic cardiomyopathy  Currently compensated  Echocardiogram revealed EF around 40%  On diuretic     Sepsis ruled out      Dementia:  Continue on home medical  Fall precautions     Disabling essential tremor:  S/P left thalamic high frequency MR guided ultrasound ablation brain for tremor RUE, done 4/4/21 in [place]     Mixed hyperlipidemia:  On statin     DM type II:  Continue home insulin regimen NPI/insulin regular and Jardiance    Anxiety/depression:  On Xanax, Prozac     History of TIA:  On statin and Plavix     Hypokalemia-replaced  Serum magnesium okay      Condition at discharge-stable       DISCHARGE Follow Up Recommendations for labs and  diagnostics:       Reasons For Change In Medications and Indications for New Medications:      Day of Discharge     Vital Signs:  Temp:  [97.7 °F (36.5 °C)-98.5 °F (36.9 °C)] 97.7 °F (36.5 °C)  Heart Rate:  [78-91] 84  Resp:  [16-20] 20  BP: (128-155)/(50-93) 147/71  Flow (L/min):  [2] 2    Physical Exam:  Physical Exam  Vitals reviewed.   Constitutional:       General: She is not in acute distress.  HENT:      Head: Normocephalic.      Nose: Nose normal.      Mouth/Throat:      Mouth: Mucous membranes are moist.   Eyes:      Extraocular Movements: Extraocular movements intact.      Conjunctiva/sclera: Conjunctivae normal.      Pupils: Pupils are equal, round, and reactive to light.   Cardiovascular:      Rate and Rhythm: Normal rate and regular rhythm.   Pulmonary:      Effort: No respiratory distress.      Breath sounds: Normal breath sounds.   Abdominal:      General: Bowel sounds are normal.      Palpations: Abdomen is soft.      Tenderness: There is no abdominal tenderness.   Musculoskeletal:      Cervical back: Neck supple.      Comments: Degenerative changes   Skin:     General: Skin is warm and dry.   Neurological:      Mental Status: She is alert. Mental status is at baseline.   Psychiatric:         Mood and Affect: Mood normal.           Pertinent  and/or Most Recent Results     LAB RESULTS:      Lab 03/02/22  0418 03/01/22  0430 02/28/22  0419 02/27/22 2014 02/27/22  1342 02/27/22  0828 02/27/22  0749 02/26/22  2156 02/26/22  2155   WBC 14.10* 13.80* 14.80*  --   --  8.40  --   --  16.60*   HEMOGLOBIN 11.2* 12.2 11.2*  --   --  13.1  --   --  13.7   HEMATOCRIT 34.4 37.1 33.5*  --   --  39.6  --   --  41.7   PLATELETS 269 292 273  --   --  274  --   --  395   NEUTROS ABS 10.30* 10.60* 12.90*  --   --  7.70*  --   --  7.80*   LYMPHS ABS 2.30 2.30 1.20  --   --  0.70  --   --   --    MONOS ABS 1.10* 0.60 0.60  --   --  0.10  --   --   --    EOS ABS 0.30 0.20 0.00  --   --  0.00  --   --   --    MCV 87.1  87.2 86.8  --   --  88.1  --   --  88.5   PROCALCITONIN  --   --   --   --   --   --   --   --  0.04   LACTATE  --   --   --  4.9* 3.6*  --  2.2* 3.5*  --    PROTIME  --   --   --   --   --   --   --   --  11.8*   APTT  --   --   --   --   --   --   --   --  24.7         Lab 03/03/22 0441 03/02/22 1948 03/02/22 0418 03/01/22 0430 02/28/22 0419 02/27/22 2014 02/27/22 2014 02/27/22 0828 02/27/22 0828 02/26/22 2155 02/26/22 2155   SODIUM 140  --  141 142 139  --   --   --  143   < > 142   POTASSIUM 3.8 4.0 2.9* 2.9* 3.2*   < > 3.5   < > 3.2*   < > 3.6   CHLORIDE 105  --  102 98 100  --   --   --  104   < > 104   CO2 27.0  --  30.0* 32.0* 26.0  --   --   --  24.0   < > 21.0*   ANION GAP 8.0  --  9.0 12.0 13.0  --   --   --  15.0   < > 17.0*   BUN 17  --  21 26* 23  --   --   --  11   < > 9   CREATININE 0.91  --  0.92 1.55* 1.53*  --   --   --  1.06*   < > 1.16*   EGFR 65.5  --  64.7 34.6*  --   --   --   --   --   --   --    GLUCOSE 114*  --  62* 206* 168*  --   --   --  223*   < > 223*   CALCIUM 8.7  --  8.4* 8.6 8.0*  --   --   --  8.3*   < > 8.7   MAGNESIUM  --   --   --  2.0 2.4  --  2.6*  --  1.5*  --  1.8   PHOSPHORUS  --   --   --   --  4.2  --   --   --   --   --   --    HEMOGLOBIN A1C  --   --   --   --   --   --   --   --  7.5*  --   --     < > = values in this interval not displayed.         Lab 03/02/22  0418 03/01/22  0430 02/28/22  0419 02/27/22  0828 02/26/22  2155   TOTAL PROTEIN 6.0 6.6 5.8* 6.4 7.2   ALBUMIN 3.00* 3.60 3.20* 3.30* 3.80   GLOBULIN 3.0 3.0 2.6 3.1 3.4   ALT (SGPT) 25 37* 39* 51* 66*   AST (SGOT) 15 18 16 16 21   BILIRUBIN 1.2 0.6 0.5 0.8 0.7   ALK PHOS 80 107 87 101 128*         Lab 02/27/22  1342 02/27/22  0828 02/26/22  2155   PROBNP 16,090.0*  --  8,134.0*   TROPONIN T <0.010 <0.010 <0.010   PROTIME  --   --  11.8*   INR  --   --  1.07                 Lab 02/28/22  0817 02/27/22  0303 02/26/22  2201   PH, ARTERIAL 7.555* 7.414 7.327*   PCO2, ARTERIAL 28.5* 42.9 46.3   PO2  ART 47.4* 81.2* 244.1*   O2 SATURATION ART 88.9* 96.0 99.8*   FIO2 45 50 100   HCO3 ART 25.3 27.4 24.2   BASE EXCESS ART 3.6* 2.4 -2.2*     Brief Urine Lab Results  (Last result in the past 365 days)      Color   Clarity   Blood   Leuk Est   Nitrite   Protein   CREAT   Urine HCG        02/27/22 0749 Yellow   Clear   Trace   Trace   Negative   Negative               Microbiology Results (last 10 days)     Procedure Component Value - Date/Time    Legionella Antigen, Urine - Urine, Urine, Catheter [[id number]]  (Normal) Collected: 02/27/22 0749    Lab Status: Final result Specimen: Urine, Catheter Updated: 02/27/22 0812     LEGIONELLA ANTIGEN, URINE Negative    S. Pneumo Ag Urine or CSF - Urine, Urine, Catheter [[id number]]  (Normal) Collected: 02/27/22 0749    Lab Status: Final result Specimen: Urine, Catheter Updated: 02/27/22 0812     Strep Pneumo Ag Negative    Respiratory Panel PCR w/COVID-19(SARS-CoV-2) BEBA/WILFRED/CELIA/PAD/COR/MAD/MIGUEL In-House, NP Swab in UTM/VTM, 3-4 HR TAT - Swab, Nasopharynx [[id number]]  (Normal) Collected: 02/27/22 0749    Lab Status: Final result Specimen: Swab from Nasopharynx Updated: 02/27/22 0849     ADENOVIRUS, PCR Not Detected     Coronavirus 229E Not Detected     Coronavirus HKU1 Not Detected     Coronavirus NL63 Not Detected     Coronavirus OC43 Not Detected     COVID19 Not Detected     Human Metapneumovirus Not Detected     Human Rhinovirus/Enterovirus Not Detected     Influenza A PCR Not Detected     Influenza B PCR Not Detected     Parainfluenza Virus 1 Not Detected     Parainfluenza Virus 2 Not Detected     Parainfluenza Virus 3 Not Detected     Parainfluenza Virus 4 Not Detected     RSV, PCR Not Detected     Bordetella pertussis pcr Not Detected     Bordetella parapertussis PCR Not Detected     Chlamydophila pneumoniae PCR Not Detected     Mycoplasma pneumo by PCR Not Detected    Narrative:      In the setting of a positive respiratory panel with a viral infection PLUS a negative  procalcitonin without other underlying concern for bacterial infection, consider observing off antibiotics or discontinuation of antibiotics and continue supportive care. If the respiratory panel is positive for atypical bacterial infection (Bordetella pertussis, Chlamydophila pneumoniae, or Mycoplasma pneumoniae), consider antibiotic de-escalation to target atypical bacterial infection.    MRSA Screen, PCR (Inpatient) - Swab, Nares [[id number]]  (Abnormal) Collected: 02/27/22 0749    Lab Status: Final result Specimen: Swab from Nares Updated: 02/27/22 0905     MRSA PCR MRSA Detected    COVID-19,CEPHEID/RODRIGUE,COR/CELIA/PAD/KATIE IN-HOUSE(OR EMERGENT/ADD-ON),NP SWAB IN TRANSPORT MEDIA 3-4 HR TAT, RT-PCR - Swab, Nasopharynx [[id number]]  (Normal) Collected: 02/27/22 0016    Lab Status: Final result Specimen: Swab from Nasopharynx Updated: 02/27/22 0100     COVID19 Not Detected    Narrative:      Fact sheet for providers: https://www.fda.gov/media/123883/download     Fact sheet for patients: https://www.fda.gov/media/991461/download  Fact sheet for providers: https://www.fda.gov/media/699230/download    Fact sheet for patients: https://www.fda.gov/media/703560/download    Test performed by PCR.    Blood Culture - Blood, Hand, Left [[id number]]  (Normal) Collected: 02/26/22 2306    Lab Status: Final result Specimen: Blood from Hand, Left Updated: 03/03/22 2315     Blood Culture No growth at 5 days    Blood Culture - Blood, Arm, Right [[id number]]  (Normal) Collected: 02/26/22 2155    Lab Status: Final result Specimen: Blood from Arm, Right Updated: 03/03/22 2200     Blood Culture No growth at 5 days          FL Video Swallow With Speech Single Contrast    Result Date: 3/4/2022  Impression: Modified barium swallow study with fluoroscopy. Please refer to the speech pathologist report for findings and dietary recommendations.  Electronically Signed By-María Lauren MD On:3/4/2022 10:56 AM This report was finalized on  [id number] by  María Lauren MD.    FL Video Swallow With Speech Single Contrast    Result Date: 3/1/2022  Impression: Please see speech therapist's recommendations. There was aspiration with thin consistency.  Electronically Signed By-Mckenzie Murillo MD On:3/1/2022 1:07 PM This report was finalized on [id number] by  Mckenzie Murillo MD.    XR Chest 1 View    Result Date: 2/27/2022  Impression: Multifocal ill-defined infiltrates bilaterally with associated interstitial changes. The findings suggest developing atypical/viral infection or multifocal pneumonia. Covid 19 may be considered in the differential. Changes of pulmonary edema could also be considered. Recommend correlation for signs or symptoms of acute infection and follow-up to ensure improvement/resolution.  Electronically Signed By-Constantine Shrestha MD On:2/27/2022 8:28 AM This report was finalized on [id number] by  Constantine Shrestha MD.              Results for orders placed during the hospital encounter of 02/26/22    Adult Transthoracic Echo Complete w/ Color, Spectral and Contrast if necessary per protocol    Interpretation Summary  · Left ventricular ejection fraction appears to be 41 - 45%.  · The right ventricular cavity is mildly dilated.  · Mild aortic valve stenosis is present.  · Moderate tricuspid valve regurgitation is present.  · No pericardial effusion noted      Labs Pending at Discharge:      Procedures Performed           Consults:   Consults     Date and Time Order Name Status Description    3/1/2022 10:05 AM Inpatient Psychiatrist Consult Completed     2/27/2022  1:33 AM Inpatient Cardiology Consult Completed     2/26/2022 11:36 PM Hospitalist (on-call MD unless specified)              Discharge Details        Discharge Medications      New Medications      Instructions Start Date   busPIRone 10 MG tablet  Commonly known as: BUSPAR   10 mg, Oral, Every 12 Hours Scheduled      cefdinir 300 MG capsule  Commonly known as: OMNICEF   300 mg,  Oral, Every 12 Hours Scheduled      doxycycline 100 MG tablet  Commonly known as: ADOXA   100 mg, Oral, Every 12 Hours Scheduled         Continue These Medications      Instructions Start Date   ALPRAZolam 2 MG tablet  Commonly known as: XANAX   2 mg, Oral, 3 Times Daily PRN      aspirin 81 MG EC tablet   81 mg, Oral, Daily      atorvastatin 40 MG tablet  Commonly known as: LIPITOR   40 mg, Oral, Daily      fludrocortisone 0.1 MG tablet   0.1 mg, Oral, Daily      FLUoxetine 20 MG capsule  Commonly known as: PROzac   20 mg, Oral, Daily      furosemide 20 MG tablet  Commonly known as: LASIX   20 mg, Oral, Daily      insulin NPH-insulin regular (70-30) 100 UNIT/ML injection  Commonly known as: humuLIN 70/30,novoLIN 70/30   20 Units, Subcutaneous, 2 Times Daily With Meals      Jardiance 10 MG tablet tablet  Generic drug: empagliflozin   1 tablet, Oral, Daily      linaclotide 145 MCG capsule capsule  Commonly known as: LINZESS   145 mcg, Oral, Every Morning Before Breakfast      metoprolol succinate XL 50 MG 24 hr tablet  Commonly known as: TOPROL-XL   50 mg, Oral, Daily      potassium chloride ER 20 MEQ tablet controlled-release ER tablet  Commonly known as: K-TAB   20 mEq, Oral, Daily      ticagrelor 90 MG tablet tablet  Commonly known as: BRILINTA   90 mg, Oral, 2 Times Daily      tiZANidine 2 MG half tablet  Commonly known as: ZANAFLEX   2 mg, Oral, Every 8 Hours PRN         Stop These Medications    ibuprofen 200 MG tablet  Commonly known as: ADVIL,MOTRIN            Allergies   Allergen Reactions   • Gabapentin Hallucinations   • Morphine Hallucinations         Discharge Disposition: Rehab Facility or Unit (DC - External)    Diet:  Hospital:  Diet Order   Procedures   • Diet Texture; Mechanical Ground; Thickened Liquids (Nectar)         Discharge Activity:   Activity Instructions     Gradually Increase Activity Until at Pre-Hospitalization Level              CODE STATUS:  Code Status and Medical Interventions:    Ordered at: 02/27/22 0133     Code Status (Patient has no pulse and is not breathing):    CPR (Attempt to Resuscitate)     Medical Interventions (Patient has pulse or is breathing):    Full Support         No future appointments.    Additional Instructions for the Follow-ups that You Need to Schedule     Discharge Follow-up with PCP   As directed       Currently Documented PCP:    Kaela Layton APRN    PCP Phone Number:    [phone]     Follow Up Details: Follow-up with PCP in 1 to 2 weeks               Time spent on Discharge including face to face service:  35 minutes    This patient has been examined appropriate PPE . 03/04/22      Signature: Electronically signed by Kraig Luis MD, 03/04/22, 3:50 PM EST.

## 2022-03-04 NOTE — PROGRESS NOTES
[facility] Medicine Services Daily Progress Note    Patient Name: Nadege Barrow  : 1945  MRN: [id number]  Primary Care Physician:  Kaela Layton APRN  Date of admission: 2022      Subjective      Chief Complaint:     Short of breath    Subjective   2022  Patient seen and examined  Denies any chest pain or shortness of breath  Had Myoview stress test today which was negative for ischemia    3/1/2022  Patient was reportedly confused over the night  Sitter placed in the room  On cefepime which was discontinued today as it can cause altered mental status    3/2/2022  Patient seen and examined  No new complaints  Awaiting rehab placement  Very much alert and oriented x3.    3/3/2022  No new complaints  Awaiting rehab placement    3/4/2022  Patient seen and examined  Awaiting placement    Review of Systems   Constitutional: Negative for chills and fever.   HENT: Negative for congestion.    Eyes: Negative for blurred vision.   Cardiovascular: Negative for chest pain.   Respiratory: Negative for shortness of breath.    Endocrine: Negative for cold intolerance and heat intolerance.   Gastrointestinal: Negative for abdominal pain, nausea and vomiting.   Genitourinary: Negative for dysuria.   Neurological: Positive for weakness.          Objective      Vitals:   Temp:  [98 °F (36.7 °C)-98.5 °F (36.9 °C)] 98.2 °F (36.8 °C)  Heart Rate:  [78-91] 84  Resp:  [16-20] 20  BP: (128-155)/(50-93) 128/50  Flow (L/min):  [2] 2    Physical Exam  Vitals reviewed.   Constitutional:       General: She is not in acute distress.     Appearance: She is well-developed.   HENT:      Head: Normocephalic and atraumatic.      Nose: Nose normal.      Mouth/Throat:      Mouth: Mucous membranes are moist.   Eyes:      Extraocular Movements: Extraocular movements intact.      Conjunctiva/sclera: Conjunctivae normal.      Pupils: Pupils are equal, round, and reactive to light.   Neck:      Thyroid: No  thyromegaly.      Vascular: No JVD.      Trachea: No tracheal deviation.   Cardiovascular:      Rate and Rhythm: Normal rate and regular rhythm.   Pulmonary:      Breath sounds: No rales.   Abdominal:      General: Bowel sounds are normal.      Palpations: Abdomen is soft.      Tenderness: There is no abdominal tenderness.   Musculoskeletal:      Cervical back: Neck supple. No muscular tenderness.      Comments: Degenerative changes   Skin:     General: Skin is warm and dry.   Neurological:      Mental Status: She is alert. Mental status is at baseline.      Motor: No abnormal muscle tone.   Psychiatric:         Mood and Affect: Mood normal.         Behavior: Behavior normal.                 Result Review    Result Review:  I have personally reviewed the results from the time of this admission to 3/4/2022 14:38 EST and agree with these findings:  [x]  Laboratory  [x]  Microbiology  [x]  Radiology  []  EKG/Telemetry   []  Cardiology/Vascular   []  Pathology  []  Old records  []  Other:  Most notable findings include:           Assessment/Plan      Brief Patient Summary:  76-year-old female with multiple comorbidities including CAD, ischemic cardiomyopathy, disabling tremor status post left thalamic high-frequency MRI guided ultrasound ablation brain for tremor right upper extremity-4/4/2021 at [place], hyperlipidemia and TIA.  Presented to [facility] ED with complaints of dyspnea.  Of note is that patient was discharged a week from [facility] after treatment for pneumonia.    After evaluating in the ED, patient was admitted for further care    aspirin, 81 mg, Oral, Daily  atorvastatin, 40 mg, Oral, Nightly  busPIRone, 10 mg, Oral, Q12H  cefdinir, 300 mg, Oral, Q12H  docusate sodium, 100 mg, Oral, BID  doxycycline, 100 mg, Oral, Q12H  empagliflozin, 10 mg, Oral, Daily  enoxaparin, 40 mg, Subcutaneous, Q24H  fludrocortisone, 0.1 mg, Oral, Daily  FLUoxetine, 40 mg, Oral, Daily  insulin  lispro, 0-14 Units, Subcutaneous, 4x Daily With Meals & Nightly  insulin NPH-insulin regular, 20 Units, Subcutaneous, BID With Meals  ipratropium-albuterol, 3 mL, Nebulization, Q6H - RT  metoprolol succinate XL, 50 mg, Oral, Daily  polyethylene glycol, 17 g, Oral, Daily  sodium chloride, 10 mL, Intravenous, Q12H  ticagrelor, 90 mg, Oral, BID             Active Hospital Problems:  Active Hospital Problems    Diagnosis    • Generalized anxiety disorder    • Benzodiazepine dependence, continuous (HCC)    • Senile dementia, delirium, with behavioral disturbance (HCC)    • Ischemic cardiomyopathy    • Elevated LFTs    • Hypertensive crisis    • Acute respiratory failure with hypoxia ([facility])    • Atherosclerotic heart disease of native coronary artery without angina pectoris    • Depression, unspecified    • Personal history of transient ischemic attack (TIA), and cerebral infarction without residual deficits    • HCAP (healthcare-associated pneumonia)    • Sepsis due to pneumonia ([facility])    • Disabling essential tremor    • S/P right coronary artery (RCA) stent placement    • Acute on chronic congestive heart failure (HCC)    • Hyperlipidemia, mixed    • Anxiety    • Uncontrolled type II diabetes mellitus with peripheral artery disease ([facility])      Plan:   Hypertensive crisis improved.  Upon arrival to the ED /146  Patient treated in the ED with nitroglycerin continuous IV infusion  d/c nitro drip  Blood pressure has improved  On  Metoprolol    Chest pain/shortness of breath  High blood pressure on presentation  History of CAD status post stent placement and on medical management with aspirin, beta-blocker, Brilinta and statin  Cardiologist following  Recommended Myoview stress test 2/20/2022-no evidence of ischemia-consistent with low risk study      Possible Healthcare acquired pneumonia/acute respiratory failure with hypoxia:  Patient recently discharged from [facility], admission for pneumonia  Chest  x-ray reviewed, ABG reviewed  Patient treated in the ED with IV Rocephin, IV doxycycline, BiPAP  Blood culture-no growth   Respiratory viral panel including COVID-19-negative  Urine antigens negative  Was on on cefepime and vancomycin  Antibiotics deescalated to Omnicef and doxycycline  Respiratory care with bronchodilators  Oxygen therapy and titration     Acute systolic heart failure /ischemic cardiomyopathy  Secondary to ischemic cardiomyopathy  Currently compensated  Echocardiogram revealed EF around 40%    Sepsis ruled out      Dementia:  Continue on home medical  Fall precautions     Disabling essential tremor:  S/P left thalamic high frequency MR guided ultrasound ablation brain for tremor RUE, done 4/4/21 in [place]     Mixed hyperlipidemia:  On statin     DM type II:  Continue on present insulin regimen  Monitor blood sugar and adjust insulin as needed     Anxiety/depression:  On Xanax, Prozac     History of TIA:  On statin and Plavix    Hypokalemia-on replacement protocol  serum magnesium okay           DVT prophylaxis:  Medical and mechanical DVT prophylaxis orders are present.    CODE STATUS:    Code Status (Patient has no pulse and is not breathing): CPR (Attempt to Resuscitate)  Medical Interventions (Patient has pulse or is breathing): Full Support      Disposition: Pending clinical progress    This patient has been examined wearing appropriate Personal Protective Equipment and discussed with hospital infection control department. 03/04/22      Electronically signed by Kraig Luis MD, 03/04/22, 14:38 EST.  [facility] Hospitalist Team

## 2022-03-04 NOTE — THERAPY TREATMENT NOTE
Subjective: Pt agreeable to therapeutic plan of care.    Objective:     Bed mobility - N/A or Not attempted.  Pt up on bs commode upon arrival  Transfers - Min-A and with rolling walker  Ambulation - 38 feet Min-A and with rolling walker    Pain: 0 VAS  Education: Provided education on importance of mobility and skilled verbal / tactile cueing throughout intervention.     Assessment: Nadege Barrow presents with functional mobility impairments which indicate the need for skilled intervention. Pt gets very distracted and needs a lot of cues to stay on task.  Pt is make slow but consistent progress toward goals.  Pt has good potential to recover her mobility with continued physical therapy services. Tolerating session today without incident. Will continue to follow and progress as tolerated.     Plan/Recommendations:   Pt would benefit from Inpatient Rehabilitation placement at discharge from facility  Pt desires Inpatient Rehabilitation placement at discharge. Pt cooperative; agreeable to therapeutic recommendations and plan of care.     Basic Mobility 6-click:  Rollin = Total, A lot = 2, A little = 3; 4 = None  Supine>Sit:   1 = Total, A lot = 2, A little = 3; 4 = None   Sit>Stand with arms:  1 = Total, A lot = 2, A little = 3; 4 = None  Bed>Chair:   1 = Total, A lot = 2, A little = 3; 4 = None  Ambulate in room:  1 = Total, A lot = 2, A little = 3; 4 = None  3-5 Steps with railin = Total, A lot = 2, A little = 3; 4 = None  Score: 12    Modified Dorris: 4 = Moderately severe disability (Unable to attend to own bodily needs without assistance, and unable to walk unassisted)     Post-Tx Position: Up in Chair, Alarms activated and Call light and personal items within reach  PPE: gloves, surgical mask, eyewear protection and gown

## 2022-03-04 NOTE — PLAN OF CARE
Pt tolerated OT tx well this date, req min A to stand from bed, mod A for commode transfer with RW and grab bars, very anxious and upset [facility]. Pt completed toileting with mod A. Pt req mod  A for static sitting balance x10 min lean to the R and unable to self correct. Cont OT POC. REC JERAMIE at SD.    Jennifer Barrera, OTD, OTR

## 2022-03-04 NOTE — PLAN OF CARE
Goal Outcome Evaluation:   Pt seen for VFSS for further evaluation of swallow:  IMPRESSION: Pt displays with mild-moderate oral dysphagia and moderate pharyngeal dysphagia as noted with above deficits. Base of tongue appears weak which leads to reduced bolus control/premature spillage.      ST RECOMMENDATIONS:  ST recommends that pt initiate a nectar thick liquid and mech soft diet at this time. Pt should remain upright during meals/medications, monitor s/s of aspiration, and take small bites/sips. [place] Protocol between meals and after oral care. ST will continue to follow pt to ensure that this is the safest/least restrictive diet for pt. Plan discussed with pt and pt's nurse. All in agreement. Further goals/recommendations to follow.

## 2022-03-04 NOTE — CASE MANAGEMENT/SOCIAL WORK
Continued Stay Note   [facility]     Patient Name: Nadege Barrow  MRN: [id number]  Today's Date: 3/4/2022    Admit Date: 2/26/2022     Discharge Plan     Row Name 03/04/22 1458       Plan    Plan [facility] accepted, pending precert. Precert started 3/3. Pt cannot return to [facility] per DON until going through rehab.    Plan Comments Per lori Leone with [facility], precert is still pending.                    Expected Discharge Date and Time     Expected Discharge Date Expected Discharge Time    Mar 7, 2022             Olga De La Cruz RN

## 2022-03-04 NOTE — CASE MANAGEMENT/SOCIAL WORK
Continued Stay Note  [facility]     Patient Name: Nadege Barrow  MRN: [id number]  Today's Date: 3/4/2022    Admit Date: 2/26/2022     Discharge Plan     Row Name 03/04/22 1529       Plan    Plan [facility] accepted, precert approved 3/4. Patient can transfer today. Pt can't return to [facility] per DON until attending rehab.    Plan Comments Received text message from lori Leone with [facility] that precert approved and can transfer to facility today.  MD and RN notified by secure chat.  Pharmacy udpated in Face-Me for [facility].  Called pt room to update and spoke with patient daughter, and updated her on acceptance.    Row Name 03/04/22 6742       Plan    Plan [facility] accepted, pending precert. Precert started 3/3. Pt cannot return to [facility] per DON until going through rehab.    Plan Comments Per lori Leone with [facility], precert is still pending.                    Expected Discharge Date and Time     Expected Discharge Date Expected Discharge Time    Mar 4, 2022             Olga De La Cruz RN

## 2022-03-04 NOTE — PLAN OF CARE
Goal Outcome Evaluation:        Bed mobility - N/A or Not attempted.  Pt up on bs commode upon arrival  Transfers - Min-A and with rolling walker  Ambulation - 38 feet Min-A and with rolling walker       Pt would benefit from Inpatient Rehabilitation placement at discharge from facility  Pt desires Inpatient Rehabilitation placement at discharge. Pt cooperative; agreeable to therapeutic recommendations and plan of care.

## 2022-03-04 NOTE — PLAN OF CARE
Goal Outcome Evaluation:  Pt currently on 2L NC and satting in upper 90s/100%. Pt experiencing mild confusion but responds appropriately to neuro assessment questions. Pt frequently climbing out of bed despite being repeatedly asked to hit call light for assistance. Blood sugar required correction after two hypoglycemic episodes w/levels dropping into the 50s. Pt w/no c/o pain or discomfort and resting well throughout course of shift. Other vitals stable. Will continue to monitor.

## 2022-03-05 LAB — QT INTERVAL: 436 MS

## 2022-03-07 ENCOUNTER — APPOINTMENT (OUTPATIENT)
Dept: GENERAL RADIOLOGY | Facility: HOSPITAL | Age: 77
End: 2022-03-07

## 2022-03-07 ENCOUNTER — HOSPITAL ENCOUNTER (EMERGENCY)
Facility: HOSPITAL | Age: 77
Discharge: HOME | End: 2022-03-07
Attending: EMERGENCY MEDICINE | Admitting: EMERGENCY MEDICINE

## 2022-03-07 VITALS
RESPIRATION RATE: 18 BRPM | OXYGEN SATURATION: 95 % | TEMPERATURE: 97.8 F | HEIGHT: 62 IN | DIASTOLIC BLOOD PRESSURE: 74 MMHG | HEART RATE: 82 BPM | WEIGHT: 175 LBS | BODY MASS INDEX: 32.2 KG/M2 | SYSTOLIC BLOOD PRESSURE: 161 MMHG

## 2022-03-07 DIAGNOSIS — R06.00 DYSPNEA, UNSPECIFIED TYPE: Primary | ICD-10-CM

## 2022-03-07 DIAGNOSIS — J98.01 BRONCHOSPASM: ICD-10-CM

## 2022-03-07 LAB
ALBUMIN SERPL-MCNC: 3.6 G/DL (ref 3.5–5.2)
ALBUMIN/GLOB SERPL: 1.2 G/DL
ALP SERPL-CCNC: 97 U/L (ref 39–117)
ALT SERPL W P-5'-P-CCNC: 19 U/L (ref 1–33)
ANION GAP SERPL CALCULATED.3IONS-SCNC: 14 MMOL/L (ref 5–15)
ARTERIAL PATENCY WRIST A: POSITIVE
AST SERPL-CCNC: 28 U/L (ref 1–32)
ATMOSPHERIC PRESS: ABNORMAL MM[HG]
B PARAPERT DNA SPEC QL NAA+PROBE: NOT DETECTED
B PERT DNA SPEC QL NAA+PROBE: NOT DETECTED
BASE EXCESS BLDA CALC-SCNC: -0.8 MMOL/L (ref 0–3)
BASOPHILS # BLD AUTO: 0.1 10*3/MM3 (ref 0–0.2)
BASOPHILS NFR BLD AUTO: 0.6 % (ref 0–1.5)
BDY SITE: ABNORMAL
BILIRUB SERPL-MCNC: 0.6 MG/DL (ref 0–1.2)
BUN SERPL-MCNC: 16 MG/DL (ref 8–23)
BUN/CREAT SERPL: 14.4 (ref 7–25)
C PNEUM DNA NPH QL NAA+NON-PROBE: NOT DETECTED
CALCIUM SPEC-SCNC: 8.7 MG/DL (ref 8.6–10.5)
CHLORIDE SERPL-SCNC: 106 MMOL/L (ref 98–107)
CO2 BLDA-SCNC: 25.5 MMOL/L (ref 22–29)
CO2 SERPL-SCNC: 22 MMOL/L (ref 22–29)
CREAT SERPL-MCNC: 1.11 MG/DL (ref 0.57–1)
DEPRECATED RDW RBC AUTO: 45.1 FL (ref 37–54)
EGFRCR SERPLBLD CKD-EPI 2021: 51.6 ML/MIN/1.73
EOSINOPHIL # BLD AUTO: 0.3 10*3/MM3 (ref 0–0.4)
EOSINOPHIL NFR BLD AUTO: 2.6 % (ref 0.3–6.2)
ERYTHROCYTE [DISTWIDTH] IN BLOOD BY AUTOMATED COUNT: 14.9 % (ref 12.3–15.4)
FLUAV SUBTYP SPEC NAA+PROBE: NOT DETECTED
FLUBV RNA ISLT QL NAA+PROBE: NOT DETECTED
GLOBULIN UR ELPH-MCNC: 3 GM/DL
GLUCOSE SERPL-MCNC: 128 MG/DL (ref 65–99)
HADV DNA SPEC NAA+PROBE: NOT DETECTED
HCO3 BLDA-SCNC: 24.2 MMOL/L (ref 21–28)
HCOV 229E RNA SPEC QL NAA+PROBE: NOT DETECTED
HCOV HKU1 RNA SPEC QL NAA+PROBE: NOT DETECTED
HCOV NL63 RNA SPEC QL NAA+PROBE: NOT DETECTED
HCOV OC43 RNA SPEC QL NAA+PROBE: NOT DETECTED
HCT VFR BLD AUTO: 36.9 % (ref 34–46.6)
HEMODILUTION: NO
HGB BLD-MCNC: 12.4 G/DL (ref 12–15.9)
HMPV RNA NPH QL NAA+NON-PROBE: NOT DETECTED
HPIV1 RNA ISLT QL NAA+PROBE: NOT DETECTED
HPIV2 RNA SPEC QL NAA+PROBE: NOT DETECTED
HPIV3 RNA NPH QL NAA+PROBE: NOT DETECTED
HPIV4 P GENE NPH QL NAA+PROBE: NOT DETECTED
INHALED O2 CONCENTRATION: <21 %
LYMPHOCYTES # BLD AUTO: 2.1 10*3/MM3 (ref 0.7–3.1)
LYMPHOCYTES NFR BLD AUTO: 18.5 % (ref 19.6–45.3)
M PNEUMO IGG SER IA-ACNC: NOT DETECTED
MCH RBC QN AUTO: 29.1 PG (ref 26.6–33)
MCHC RBC AUTO-ENTMCNC: 33.5 G/DL (ref 31.5–35.7)
MCV RBC AUTO: 86.8 FL (ref 79–97)
MODALITY: ABNORMAL
MONOCYTES # BLD AUTO: 1.1 10*3/MM3 (ref 0.1–0.9)
MONOCYTES NFR BLD AUTO: 9.4 % (ref 5–12)
NEUTROPHILS NFR BLD AUTO: 68.9 % (ref 42.7–76)
NEUTROPHILS NFR BLD AUTO: 8 10*3/MM3 (ref 1.7–7)
NRBC BLD AUTO-RTO: 0 /100 WBC (ref 0–0.2)
NT-PROBNP SERPL-MCNC: ABNORMAL PG/ML (ref 0–1800)
PCO2 BLDA: 40.6 MM HG (ref 35–48)
PH BLDA: 7.38 PH UNITS (ref 7.35–7.45)
PLATELET # BLD AUTO: 285 10*3/MM3 (ref 140–450)
PMV BLD AUTO: 9.2 FL (ref 6–12)
PO2 BLDA: 101.1 MM HG (ref 83–108)
POTASSIUM SERPL-SCNC: 3.6 MMOL/L (ref 3.5–5.2)
PROT SERPL-MCNC: 6.6 G/DL (ref 6–8.5)
RBC # BLD AUTO: 4.25 10*6/MM3 (ref 3.77–5.28)
RHINOVIRUS RNA SPEC NAA+PROBE: NOT DETECTED
RSV RNA NPH QL NAA+NON-PROBE: NOT DETECTED
SAO2 % BLDCOA: 97.7 % (ref 94–98)
SARS-COV-2 RNA NPH QL NAA+NON-PROBE: NOT DETECTED
SODIUM SERPL-SCNC: 142 MMOL/L (ref 136–145)
TROPONIN T SERPL-MCNC: <0.01 NG/ML (ref 0–0.03)
WBC NRBC COR # BLD: 11.6 10*3/MM3 (ref 3.4–10.8)

## 2022-03-07 PROCEDURE — 96375 TX/PRO/DX INJ NEW DRUG ADDON: CPT

## 2022-03-07 PROCEDURE — 83880 ASSAY OF NATRIURETIC PEPTIDE: CPT | Performed by: EMERGENCY MEDICINE

## 2022-03-07 PROCEDURE — 85025 COMPLETE CBC W/AUTO DIFF WBC: CPT | Performed by: EMERGENCY MEDICINE

## 2022-03-07 PROCEDURE — 25010000002 FUROSEMIDE PER 20 MG: Performed by: EMERGENCY MEDICINE

## 2022-03-07 PROCEDURE — 96374 THER/PROPH/DIAG INJ IV PUSH: CPT

## 2022-03-07 PROCEDURE — 36600 WITHDRAWAL OF ARTERIAL BLOOD: CPT

## 2022-03-07 PROCEDURE — 94640 AIRWAY INHALATION TREATMENT: CPT

## 2022-03-07 PROCEDURE — 82803 BLOOD GASES ANY COMBINATION: CPT

## 2022-03-07 PROCEDURE — 93005 ELECTROCARDIOGRAM TRACING: CPT | Performed by: EMERGENCY MEDICINE

## 2022-03-07 PROCEDURE — 25010000002 METHYLPREDNISOLONE PER 125 MG: Performed by: EMERGENCY MEDICINE

## 2022-03-07 PROCEDURE — 94799 UNLISTED PULMONARY SVC/PX: CPT

## 2022-03-07 PROCEDURE — 71045 X-RAY EXAM CHEST 1 VIEW: CPT

## 2022-03-07 PROCEDURE — 84484 ASSAY OF TROPONIN QUANT: CPT | Performed by: EMERGENCY MEDICINE

## 2022-03-07 PROCEDURE — 99284 EMERGENCY DEPT VISIT MOD MDM: CPT

## 2022-03-07 PROCEDURE — 0202U NFCT DS 22 TRGT SARS-COV-2: CPT | Performed by: EMERGENCY MEDICINE

## 2022-03-07 PROCEDURE — 80053 COMPREHEN METABOLIC PANEL: CPT | Performed by: EMERGENCY MEDICINE

## 2022-03-07 RX ORDER — IPRATROPIUM BROMIDE AND ALBUTEROL SULFATE 2.5; .5 MG/3ML; MG/3ML
3 SOLUTION RESPIRATORY (INHALATION) ONCE
Status: COMPLETED | OUTPATIENT
Start: 2022-03-07 | End: 2022-03-07

## 2022-03-07 RX ORDER — FUROSEMIDE 10 MG/ML
80 INJECTION INTRAMUSCULAR; INTRAVENOUS ONCE
Status: COMPLETED | OUTPATIENT
Start: 2022-03-07 | End: 2022-03-07

## 2022-03-07 RX ORDER — SODIUM CHLORIDE 0.9 % (FLUSH) 0.9 %
10 SYRINGE (ML) INJECTION AS NEEDED
Status: DISCONTINUED | OUTPATIENT
Start: 2022-03-07 | End: 2022-03-07 | Stop reason: HOSPADM

## 2022-03-07 RX ORDER — ACETAMINOPHEN 500 MG
1000 TABLET ORAL ONCE
Status: COMPLETED | OUTPATIENT
Start: 2022-03-07 | End: 2022-03-07

## 2022-03-07 RX ORDER — METHYLPREDNISOLONE SODIUM SUCCINATE 125 MG/2ML
125 INJECTION, POWDER, LYOPHILIZED, FOR SOLUTION INTRAMUSCULAR; INTRAVENOUS ONCE
Status: COMPLETED | OUTPATIENT
Start: 2022-03-07 | End: 2022-03-07

## 2022-03-07 RX ORDER — PREDNISONE 20 MG/1
20 TABLET ORAL DAILY
Qty: 5 TABLET | Refills: 0 | Status: SHIPPED | OUTPATIENT
Start: 2022-03-07 | End: 2022-08-10 | Stop reason: HOSPADM

## 2022-03-07 RX ADMIN — IPRATROPIUM BROMIDE AND ALBUTEROL SULFATE 3 ML: .5; 3 SOLUTION RESPIRATORY (INHALATION) at 03:55

## 2022-03-07 RX ADMIN — ACETAMINOPHEN 1000 MG: 500 TABLET, FILM COATED ORAL at 05:43

## 2022-03-07 RX ADMIN — FUROSEMIDE 80 MG: 10 INJECTION, SOLUTION INTRAMUSCULAR; INTRAVENOUS at 04:08

## 2022-03-07 RX ADMIN — METHYLPREDNISOLONE SODIUM SUCCINATE 125 MG: 125 INJECTION, POWDER, FOR SOLUTION INTRAMUSCULAR; INTRAVENOUS at 04:08

## 2022-03-07 NOTE — ED PROVIDER NOTES
"Subjective   Patient is a 76-year-old female complaint of shortness of breath that developed over the past several hours.  The shortness of breath is mild to moderate and constant.  Denies cough fever chest pain vomiting or other associated complaints          Review of Systems   Negative for headache earache sore throat cough fever chest pain abdominal pain vomiting diarrhea dysuria achiness weight loss or other complaint.  A complete review system was obtained and is otherwise negative    Past Medical History:   Diagnosis Date   • Anxiety    • CHF (congestive heart failure) ([facility], [facility])    • Depression    • Diabetes mellitus (HCC)    • Hyperlipidemia    • Hypertension    • Panic disorder     \"panic attacks\"   • Tremors of nervous system     \"essential tremors\"       Allergies   Allergen Reactions   • Gabapentin Hallucinations   • Morphine Hallucinations       Past Surgical History:   Procedure Laterality Date   • CORONARY ANGIOPLASTY WITH STENT PLACEMENT         Family History   Problem Relation Age of Onset   • Depression Sister    • Suicidality Other         suicided   • Alcohol abuse Other    • Alcohol abuse Daughter    • Depression Other        Social History     Socioeconomic History   • Marital status:    Tobacco Use   • Smoking status: Never Smoker   Substance and Sexual Activity   • Alcohol use: Not Currently   • Drug use: No   • Sexual activity: Defer           Objective   Physical Exam  HEENT exam shows TMs to be clear.  Oropharynx clear moist.  Sclera nonicteric.  Neck has no adenopathy JVD or bruits.  Lungs have expiratory wheezes at the base.  Heart has a regular rate rhythm without murmur rub or gallop.  Chest is nontender.  Abdomen is soft nontender.  Extremity exam is no cyanosis or edema.  Procedures     My EKG interpretation shows normal sinus rhythm at a rate of 87 no acute ST change.  Patient has left bundle branch block which is unchanged from previous tracing.      ED Course          "   Results for orders placed or performed during the hospital encounter of 03/07/22   Respiratory Panel PCR w/COVID-19(SARS-CoV-2) [facility]/CELIA/PAD/COR/MAD/MIGUEL In-House, NP Swab in UTM/VTM, 3-4 HR TAT - Swab, Nasopharynx    Specimen: Nasopharynx; Swab   Result Value Ref Range    ADENOVIRUS, PCR Not Detected Not Detected    Coronavirus 229E Not Detected Not Detected    Coronavirus HKU1 Not Detected Not Detected    Coronavirus NL63 Not Detected Not Detected    Coronavirus OC43 Not Detected Not Detected    COVID19 Not Detected Not Detected - Ref. Range    Human Metapneumovirus Not Detected Not Detected    Human Rhinovirus/Enterovirus Not Detected Not Detected    Influenza A PCR Not Detected Not Detected    Influenza B PCR Not Detected Not Detected    Parainfluenza Virus 1 Not Detected Not Detected    Parainfluenza Virus 2 Not Detected Not Detected    Parainfluenza Virus 3 Not Detected Not Detected    Parainfluenza Virus 4 Not Detected Not Detected    RSV, PCR Not Detected Not Detected    Bordetella pertussis pcr Not Detected Not Detected    Bordetella parapertussis PCR Not Detected Not Detected    Chlamydophila pneumoniae PCR Not Detected Not Detected    Mycoplasma pneumo by PCR Not Detected Not Detected   Comprehensive Metabolic Panel    Specimen: Blood   Result Value Ref Range    Glucose 128 (H) 65 - 99 mg/dL    BUN 16 8 - 23 mg/dL    Creatinine 1.11 (H) 0.57 - 1.00 mg/dL    Sodium 142 136 - 145 mmol/L    Potassium 3.6 3.5 - 5.2 mmol/L    Chloride 106 98 - 107 mmol/L    CO2 22.0 22.0 - 29.0 mmol/L    Calcium 8.7 8.6 - 10.5 mg/dL    Total Protein 6.6 6.0 - 8.5 g/dL    Albumin 3.60 3.50 - 5.20 g/dL    ALT (SGPT) 19 1 - 33 U/L    AST (SGOT) 28 1 - 32 U/L    Alkaline Phosphatase 97 39 - 117 U/L    Total Bilirubin 0.6 0.0 - 1.2 mg/dL    Globulin 3.0 gm/dL    A/G Ratio 1.2 g/dL    BUN/Creatinine Ratio 14.4 7.0 - 25.0    Anion Gap 14.0 5.0 - 15.0 mmol/L    eGFR 51.6 (L) >60.0 mL/min/1.73   BNP    Specimen: Blood   Result  Value Ref Range    proBNP 14,752.0 (H) 0.0 - 1,800.0 pg/mL   Troponin    Specimen: Blood   Result Value Ref Range    Troponin T <0.010 0.000 - 0.030 ng/mL   CBC Auto Differential    Specimen: Blood   Result Value Ref Range    WBC 11.60 (H) 3.40 - 10.80 10*3/mm3    RBC 4.25 3.77 - 5.28 10*6/mm3    Hemoglobin 12.4 12.0 - 15.9 g/dL    Hematocrit 36.9 34.0 - 46.6 %    MCV 86.8 79.0 - 97.0 fL    MCH 29.1 26.6 - 33.0 pg    MCHC 33.5 31.5 - 35.7 g/dL    RDW 14.9 12.3 - 15.4 %    RDW-SD 45.1 37.0 - 54.0 fl    MPV 9.2 6.0 - 12.0 fL    Platelets 285 140 - 450 10*3/mm3    Neutrophil % 68.9 42.7 - 76.0 %    Lymphocyte % 18.5 (L) 19.6 - 45.3 %    Monocyte % 9.4 5.0 - 12.0 %    Eosinophil % 2.6 0.3 - 6.2 %    Basophil % 0.6 0.0 - 1.5 %    Neutrophils, Absolute 8.00 (H) 1.70 - 7.00 10*3/mm3    Lymphocytes, Absolute 2.10 0.70 - 3.10 10*3/mm3    Monocytes, Absolute 1.10 (H) 0.10 - 0.90 10*3/mm3    Eosinophils, Absolute 0.30 0.00 - 0.40 10*3/mm3    Basophils, Absolute 0.10 0.00 - 0.20 10*3/mm3    nRBC 0.0 0.0 - 0.2 /100 WBC   Blood Gas, Arterial -    Specimen: Arterial Blood   Result Value Ref Range    Site Right Radial     Shayan's Test Positive     pH, Arterial 7.384 7.350 - 7.450 pH units    pCO2, Arterial 40.6 35.0 - 48.0 mm Hg    pO2, Arterial 101.1 83.0 - 108.0 mm Hg    HCO3, Arterial 24.2 21.0 - 28.0 mmol/L    Base Excess, Arterial -0.8 (L) 0.0 - 3.0 mmol/L    O2 Saturation, Arterial 97.7 94.0 - 98.0 %    CO2 Content 25.5 22 - 29 mmol/L    Barometric Pressure for Blood Gas      Modality Cannula     FIO2 <21 %    Hemodilution No      No radiology results for the last day                                          MDM  Number of Diagnoses or Management Options  Diagnosis management comments: My chest x-rays are interpretation shows no cardiomegaly fusion or infiltrate.  Patient has no evidence acute infectious process including pneumonia or viral syndrome based on respiratory panel.  Patient was wheezing initially given a breathing  treatment with steroids.  On reexam her lungs are clear and she feels much improved.  Patient has no evidence of acute cardiac abnormality.  Her BNP is at her baseline.  She will be discharged patient will be placed on prednisone.  She will see MD for recheck.       Amount and/or Complexity of Data Reviewed  Clinical lab tests: reviewed    Risk of Complications, Morbidity, and/or Mortality  Presenting problems: high  Diagnostic procedures: high  Management options: high    Patient Progress  Patient progress: stable      Final diagnoses:   Dyspnea, unspecified type   Bronchospasm       ED Disposition  ED Disposition     ED Disposition   Discharge    Condition   Stable    Comment   --             No follow-up provider specified.       Medication List      New Prescriptions    predniSONE 20 MG tablet  Commonly known as: DELTASONE  Take 1 tablet by mouth Daily.           Where to Get Your Medications      Information about where to get these medications is not yet available    Ask your nurse or doctor about these medications  · predniSONE 20 MG tablet          Dwayne Madsen MD  03/07/22 [id number]

## 2022-03-07 NOTE — CASE MANAGEMENT/SOCIAL WORK
Case Management Discharge Note      Final Note: [place]    Provided Post Acute Provider List?: Yes  Post Acute Provider List: Inpatient Rehab  Delivered To: Patient  Method of Delivery: In person    Selected Continued Care - Discharged on 3/4/2022 Admission date: 2/26/2022 - Discharge disposition: Rehab Facility or Unit (DC - External)    Destination Coordination complete.    Service Provider Selected Services Address Phone Fax Patient Preferred    [facility] [address], located within [facility] [place] [address] [phone] --              Final Discharge Disposition Code: 03 - skilled nursing facility (SNF)

## 2022-03-07 NOTE — NURSING NOTE
Tried calling report back to the facility [facility]  was on hold for five minutes will try back in a few minutes

## 2022-03-08 LAB — QT INTERVAL: 427 MS

## 2022-03-09 LAB — QT INTERVAL: 425 MS

## 2022-05-02 ENCOUNTER — HOSPITAL ENCOUNTER (EMERGENCY)
Facility: HOSPITAL | Age: 77
Discharge: HOME | End: 2022-05-02
Attending: EMERGENCY MEDICINE | Admitting: EMERGENCY MEDICINE

## 2022-05-02 VITALS
WEIGHT: 165.5 LBS | OXYGEN SATURATION: 97 % | BODY MASS INDEX: 29.32 KG/M2 | TEMPERATURE: 97.5 F | DIASTOLIC BLOOD PRESSURE: 82 MMHG | HEIGHT: 63 IN | SYSTOLIC BLOOD PRESSURE: 179 MMHG | RESPIRATION RATE: 17 BRPM | HEART RATE: 79 BPM

## 2022-05-02 DIAGNOSIS — F41.9 ANXIETY: ICD-10-CM

## 2022-05-02 DIAGNOSIS — F41.9 ANXIETY RELATED TREMOR: Primary | ICD-10-CM

## 2022-05-02 DIAGNOSIS — Z76.0 ENCOUNTER FOR MEDICATION REFILL: ICD-10-CM

## 2022-05-02 DIAGNOSIS — R25.1 ANXIETY RELATED TREMOR: Primary | ICD-10-CM

## 2022-05-02 PROCEDURE — 99283 EMERGENCY DEPT VISIT LOW MDM: CPT

## 2022-05-02 RX ORDER — ALPRAZOLAM 1 MG/1
2 TABLET ORAL ONCE
Status: COMPLETED | OUTPATIENT
Start: 2022-05-02 | End: 2022-05-02

## 2022-05-02 RX ORDER — ALPRAZOLAM 2 MG/1
2 TABLET ORAL 3 TIMES DAILY PRN
Qty: 12 TABLET | Refills: 0 | Status: SHIPPED | OUTPATIENT
Start: 2022-05-02 | End: 2022-06-07 | Stop reason: DRUGHIGH

## 2022-05-02 RX ADMIN — ALPRAZOLAM 2 MG: 1 TABLET ORAL at 16:09

## 2022-05-02 NOTE — ED PROVIDER NOTES
"Subjective   76-year-old anxious female presents with daughter at bedside for complaint of tremors due to missing 2 of her psychiatry appointments and \"being cut off\" for her Xanax refills.  She reports she has been out of her Xanax since 4/28/2022.  She reports she takes this for a tremor that she has had since the age of 30.  She denies history of epilepsy.  She reports that she does have a headache at the vertex described as \"sore\".  She denies head trauma.  She does take Brilinta due to MI with stent placement.  She denies being followed by neurology for her tremor.  She does have primary care established and she has not followed up with her for refills.    1. Location: Generalized  2. Quality: Anxious  3. Severity: Severe  4. Worsening factors: Out of Xanax  5. Alleviating factors: Xanax  6. Onset: 4 days  7. Radiation: Denies  8. Frequency: Constant  9. Co-morbidities: Past Medical History:  No date: Anxiety  No date: CHF (congestive heart failure) ([facility])  No date: Depression  No date: Diabetes mellitus ([facility])  No date: Hyperlipidemia  No date: Hypertension  No date: Panic disorder      Comment:  \"panic attacks\"  No date: Tremors of nervous system      Comment:  \"essential tremors\"  10. Source: Patient and daughter            Review of Systems   Constitutional: Negative for chills, diaphoresis and fever.   HENT: Negative for ear discharge and rhinorrhea.    Eyes: Negative for photophobia, pain and visual disturbance.   Respiratory: Negative for shortness of breath.    Cardiovascular: Negative for chest pain.   Gastrointestinal: Negative for nausea and vomiting.   Musculoskeletal: Negative for gait problem.   Skin: Negative for color change, pallor and rash.   Neurological: Positive for headaches. Negative for dizziness, speech difficulty, weakness and numbness.   Psychiatric/Behavioral: Negative for agitation and confusion. The patient is nervous/anxious.    All other systems reviewed and are negative.      Past " "Medical History:   Diagnosis Date   • Anxiety    • CHF (congestive heart failure) ([facility])    • Depression    • Diabetes mellitus ([facility])    • Hyperlipidemia    • Hypertension    • Panic disorder     \"panic attacks\"   • Tremors of nervous system     \"essential tremors\"       Allergies   Allergen Reactions   • Gabapentin Hallucinations   • Morphine Hallucinations       Past Surgical History:   Procedure Laterality Date   • CORONARY ANGIOPLASTY WITH STENT PLACEMENT         Family History   Problem Relation Age of Onset   • Depression Sister    • Suicidality Other         suicided   • Alcohol abuse Other    • Alcohol abuse Daughter    • Depression Other        Social History     Socioeconomic History   • Marital status:    Tobacco Use   • Smoking status: Never Smoker   Substance and Sexual Activity   • Alcohol use: Not Currently   • Drug use: No   • Sexual activity: Defer           Objective   Physical Exam  Vitals and nursing note reviewed.   Constitutional:       General: She is awake. She is in acute distress (Very anxious).      Appearance: Normal appearance. She is well-developed and normal weight.   HENT:      Head: Normocephalic and atraumatic. No raccoon eyes or Venegas's sign.      Right Ear: External ear normal. No drainage.      Left Ear: External ear normal. No drainage.      Nose: Nose normal. No rhinorrhea.      Mouth/Throat:      Lips: Pink. No lesions.      Pharynx: Oropharynx is clear. Uvula midline.   Eyes:      General: Lids are normal. Vision grossly intact. Gaze aligned appropriately.      Extraocular Movements: Extraocular movements intact.      Conjunctiva/sclera: Conjunctivae normal.      Pupils: Pupils are equal, round, and reactive to light.      Slit lamp exam:     Right eye: No hyphema.      Left eye: No hyphema.   Neck:      Trachea: Trachea and phonation normal.   Cardiovascular:      Rate and Rhythm: Normal rate and regular rhythm.      Pulses: Normal pulses.           Radial pulses are " 2+ on the right side and 2+ on the left side.        Dorsalis pedis pulses are 2+ on the right side and 2+ on the left side.        Posterior tibial pulses are 2+ on the right side and 2+ on the left side.      Heart sounds: Normal heart sounds, S1 normal and S2 normal. Heart sounds not distant. No murmur heard.    No friction rub. No gallop.   Pulmonary:      Effort: Pulmonary effort is normal.      Breath sounds: Normal breath sounds and air entry.   Abdominal:      General: Abdomen is flat. Bowel sounds are normal.      Palpations: Abdomen is soft.   Musculoskeletal:         General: Normal range of motion.      Cervical back: Full passive range of motion without pain, normal range of motion and neck supple. No rigidity. No spinous process tenderness. Normal range of motion.   Skin:     General: Skin is warm and dry.      Capillary Refill: Capillary refill takes less than 2 seconds.   Neurological:      General: No focal deficit present.      Mental Status: She is alert and oriented to person, place, and time.      GCS: GCS eye subscore is 4. GCS verbal subscore is 5. GCS motor subscore is 6.      Cranial Nerves: Cranial nerves are intact. No cranial nerve deficit.      Sensory: Sensation is intact. No sensory deficit.      Motor: Tremor present. No weakness or abnormal muscle tone.      Coordination: Coordination is intact. Coordination normal. Finger-Nose-Finger Test and Heel to Shin Test normal.      Deep Tendon Reflexes: Reflexes normal.   Psychiatric:         Attention and Perception: She is inattentive.         Mood and Affect: Mood is anxious.         Speech: Speech is tangential.         Behavior: Behavior normal. Behavior is cooperative.         Thought Content: Thought content normal.         Judgment: Judgment normal.         Procedures           ED Course    No radiology results for the last day  Medications   ALPRAZolam (XANAX) tablet 2 mg (2 mg Oral Given 5/2/22 4208)     Labs Reviewed - No data to  "display                                               MDM  Number of Diagnoses or Management Options  Anxiety related tremor  Encounter for medication refill  Diagnosis management comments: Chart Review: 4/26/2022 patient was seen by primary care for follow-up from pneumonia and anxiety.  Comorbidity:Past Medical History:  No date: Anxiety  No date: CHF (congestive heart failure) ([facility])  No date: Depression  No date: Diabetes mellitus ([facility])  No date: Hyperlipidemia  No date: Hypertension  No date: Panic disorder      Comment:  \"panic attacks\"  No date: Tremors of nervous system      Comment:  \"essential tremors\"    Patient undressed and placed in gown for exam.  Appropriate PPE worn during patient exam.  Patient is very anxious on exam.  Nothing focal on exam.  Offered lab work-up and imaging, patient declined requesting only to have medication administration, and refill of medication.  Patient was given 1 dose of Xanax 2 mg p.o.  She is given a prescription for the same.  She was given referral for neurology.  She was given referral for psychiatry by her primary care physician and was encouraged to keep this scheduled follow-up.    Disposition/Treatment: Discussed results with patient, verbalized understanding.  Discussed reasons to return to the ER, patient verbalized understanding.  Agreeable with plan of care.  Patient was stable upon discharge.       Part of this note may be an electronic transcription/translation of spoken language to printed text using the Dragon Dictation System.            Amount and/or Complexity of Data Reviewed  Obtain history from someone other than the patient: (Inspect performed per this provider yielding the following results: Xanax 2 mg quantity of 45 from 3/14/2022)  Discuss the patient with other providers: (Spoke with Briseyda Prajapati,  for Kaela Purdy NP.  She reviewed chart and confirmed that patient did not receive the prescription for Xanax.)    Patient " Progress  Patient progress: stable      Final diagnoses:   Anxiety related tremor   Encounter for medication refill       ED Disposition  ED Disposition     ED Disposition   Discharge    Condition   Stable    Comment   --             Darci Wilkerson MD  [address] [id number]  [phone]    Schedule an appointment as soon as possible for a visit       Cristal Alvarez MD  [address]  [place] [address]  [phone]    Call today      Piper Oconnell, APRN  [address]  [place] [address]  [phone]    Schedule an appointment as soon as possible for a visit   if they do not call you first    [facility] EMERGENCY DEPARTMENT  [address] [address] [phone]  Go to   If symptoms worsen         Where to Get Your Medications      These medications were sent to [facility] - [place], [place] - [address] - [phone]  - [phone] FX  [address] [place] [address]    Phone: [phone]   · ALPRAZolam 2 MG tablet        Medication List      No changes were made to your prescriptions during this visit.          Kylah Muse, APRN  05/02/22 6029

## 2022-05-02 NOTE — DISCHARGE INSTRUCTIONS
Take medication as prescribed.  Schedule follow-up with psychiatry, as referred by your primary care physician.  Schedule follow-up with neurology if they do not call you first.  Make sure you are drinking 80 ounces of water daily, eat a healthy diet, and get plenty of rest.  Return to the ER for new or worsening symptoms.

## 2022-05-03 ENCOUNTER — TELEPHONE (OUTPATIENT)
Dept: NEUROLOGY | Facility: OTHER | Age: 77
End: 2022-05-03

## 2022-05-03 NOTE — TELEPHONE ENCOUNTER
PT CALLED ABOUT REF I INFORMED WE HAVE AND IT IS BEING WORKED . SHE WANTED APPT RIGHT AWAY . I INFORMED AGAIN WE ARE IN PROCESS OF WORKING . IF SHE NEED IMMEDIATE CARE SHE CAN GO TO E.R.

## 2022-05-12 ENCOUNTER — APPOINTMENT (OUTPATIENT)
Dept: GENERAL RADIOLOGY | Facility: HOSPITAL | Age: 77
End: 2022-05-12

## 2022-05-12 ENCOUNTER — HOSPITAL ENCOUNTER (OUTPATIENT)
Facility: HOSPITAL | Age: 77
Setting detail: OBSERVATION
Discharge: NURSING, REHAB OR LONG-TERM CARE | End: 2022-05-16
Attending: EMERGENCY MEDICINE | Admitting: INTERNAL MEDICINE

## 2022-05-12 ENCOUNTER — APPOINTMENT (OUTPATIENT)
Dept: CT IMAGING | Facility: HOSPITAL | Age: 77
End: 2022-05-12

## 2022-05-12 DIAGNOSIS — I50.9 CONGESTIVE HEART FAILURE, UNSPECIFIED HF CHRONICITY, UNSPECIFIED HEART FAILURE TYPE: ICD-10-CM

## 2022-05-12 DIAGNOSIS — R07.9 CHEST PAIN, UNSPECIFIED TYPE: ICD-10-CM

## 2022-05-12 DIAGNOSIS — I50.23 ACUTE ON CHRONIC SYSTOLIC CHF (CONGESTIVE HEART FAILURE): ICD-10-CM

## 2022-05-12 DIAGNOSIS — B34.8 PARAINFLUENZA VIRUS INFECTION: ICD-10-CM

## 2022-05-12 DIAGNOSIS — G25.0 DISABLING ESSENTIAL TREMOR: Primary | ICD-10-CM

## 2022-05-12 LAB
ALBUMIN SERPL-MCNC: 3.4 G/DL (ref 3.5–5.2)
ALBUMIN/GLOB SERPL: 1.3 G/DL
ALP SERPL-CCNC: 69 U/L (ref 39–117)
ALT SERPL W P-5'-P-CCNC: 13 U/L (ref 1–33)
ANION GAP SERPL CALCULATED.3IONS-SCNC: 12 MMOL/L (ref 5–15)
AST SERPL-CCNC: 15 U/L (ref 1–32)
B PARAPERT DNA SPEC QL NAA+PROBE: NOT DETECTED
B PERT DNA SPEC QL NAA+PROBE: NOT DETECTED
BASOPHILS # BLD AUTO: 0 10*3/MM3 (ref 0–0.2)
BASOPHILS NFR BLD AUTO: 0.5 % (ref 0–1.5)
BILIRUB SERPL-MCNC: 0.6 MG/DL (ref 0–1.2)
BUN SERPL-MCNC: 13 MG/DL (ref 8–23)
BUN/CREAT SERPL: 14 (ref 7–25)
C PNEUM DNA NPH QL NAA+NON-PROBE: NOT DETECTED
CALCIUM SPEC-SCNC: 8.7 MG/DL (ref 8.6–10.5)
CHLORIDE SERPL-SCNC: 103 MMOL/L (ref 98–107)
CO2 SERPL-SCNC: 26 MMOL/L (ref 22–29)
CREAT SERPL-MCNC: 0.93 MG/DL (ref 0.57–1)
D DIMER PPP FEU-MCNC: 0.51 MG/L (FEU) (ref 0–0.59)
DEPRECATED RDW RBC AUTO: 46.4 FL (ref 37–54)
EGFRCR SERPLBLD CKD-EPI 2021: 63.8 ML/MIN/1.73
EOSINOPHIL # BLD AUTO: 0 10*3/MM3 (ref 0–0.4)
EOSINOPHIL NFR BLD AUTO: 0.1 % (ref 0.3–6.2)
ERYTHROCYTE [DISTWIDTH] IN BLOOD BY AUTOMATED COUNT: 15.5 % (ref 12.3–15.4)
FLUAV SUBTYP SPEC NAA+PROBE: NOT DETECTED
FLUBV RNA ISLT QL NAA+PROBE: NOT DETECTED
GLOBULIN UR ELPH-MCNC: 2.7 GM/DL
GLUCOSE SERPL-MCNC: 88 MG/DL (ref 65–99)
HADV DNA SPEC NAA+PROBE: NOT DETECTED
HCOV 229E RNA SPEC QL NAA+PROBE: NOT DETECTED
HCOV HKU1 RNA SPEC QL NAA+PROBE: NOT DETECTED
HCOV NL63 RNA SPEC QL NAA+PROBE: NOT DETECTED
HCOV OC43 RNA SPEC QL NAA+PROBE: NOT DETECTED
HCT VFR BLD AUTO: 38.4 % (ref 34–46.6)
HGB BLD-MCNC: 12.6 G/DL (ref 12–15.9)
HMPV RNA NPH QL NAA+NON-PROBE: NOT DETECTED
HOLD SPECIMEN: NORMAL
HOLD SPECIMEN: NORMAL
HPIV1 RNA ISLT QL NAA+PROBE: NOT DETECTED
HPIV2 RNA SPEC QL NAA+PROBE: NOT DETECTED
HPIV3 RNA NPH QL NAA+PROBE: DETECTED
HPIV4 P GENE NPH QL NAA+PROBE: NOT DETECTED
LYMPHOCYTES # BLD AUTO: 1.7 10*3/MM3 (ref 0.7–3.1)
LYMPHOCYTES NFR BLD AUTO: 17.6 % (ref 19.6–45.3)
M PNEUMO IGG SER IA-ACNC: NOT DETECTED
MAGNESIUM SERPL-MCNC: 1.6 MG/DL (ref 1.6–2.4)
MCH RBC QN AUTO: 27.7 PG (ref 26.6–33)
MCHC RBC AUTO-ENTMCNC: 32.8 G/DL (ref 31.5–35.7)
MCV RBC AUTO: 84.4 FL (ref 79–97)
MONOCYTES # BLD AUTO: 0.6 10*3/MM3 (ref 0.1–0.9)
MONOCYTES NFR BLD AUTO: 6.1 % (ref 5–12)
NEUTROPHILS NFR BLD AUTO: 7.5 10*3/MM3 (ref 1.7–7)
NEUTROPHILS NFR BLD AUTO: 75.7 % (ref 42.7–76)
NRBC BLD AUTO-RTO: 0.1 /100 WBC (ref 0–0.2)
NT-PROBNP SERPL-MCNC: ABNORMAL PG/ML (ref 0–1800)
PLATELET # BLD AUTO: 273 10*3/MM3 (ref 140–450)
PMV BLD AUTO: 8.4 FL (ref 6–12)
POTASSIUM SERPL-SCNC: 2.9 MMOL/L (ref 3.5–5.2)
PROT SERPL-MCNC: 6.1 G/DL (ref 6–8.5)
RBC # BLD AUTO: 4.54 10*6/MM3 (ref 3.77–5.28)
RHINOVIRUS RNA SPEC NAA+PROBE: NOT DETECTED
RSV RNA NPH QL NAA+NON-PROBE: NOT DETECTED
SARS-COV-2 RNA NPH QL NAA+NON-PROBE: NOT DETECTED
SODIUM SERPL-SCNC: 141 MMOL/L (ref 136–145)
TROPONIN T SERPL-MCNC: <0.01 NG/ML (ref 0–0.03)
WBC NRBC COR # BLD: 9.9 10*3/MM3 (ref 3.4–10.8)
WHOLE BLOOD HOLD COAG: NORMAL
WHOLE BLOOD HOLD SPECIMEN: NORMAL

## 2022-05-12 PROCEDURE — 93005 ELECTROCARDIOGRAM TRACING: CPT

## 2022-05-12 PROCEDURE — 99284 EMERGENCY DEPT VISIT MOD MDM: CPT

## 2022-05-12 PROCEDURE — 83735 ASSAY OF MAGNESIUM: CPT | Performed by: EMERGENCY MEDICINE

## 2022-05-12 PROCEDURE — 85025 COMPLETE CBC W/AUTO DIFF WBC: CPT | Performed by: EMERGENCY MEDICINE

## 2022-05-12 PROCEDURE — 0 IOPAMIDOL PER 1 ML: Performed by: EMERGENCY MEDICINE

## 2022-05-12 PROCEDURE — 93005 ELECTROCARDIOGRAM TRACING: CPT | Performed by: EMERGENCY MEDICINE

## 2022-05-12 PROCEDURE — 0202U NFCT DS 22 TRGT SARS-COV-2: CPT | Performed by: EMERGENCY MEDICINE

## 2022-05-12 PROCEDURE — 71046 X-RAY EXAM CHEST 2 VIEWS: CPT

## 2022-05-12 PROCEDURE — 83880 ASSAY OF NATRIURETIC PEPTIDE: CPT | Performed by: EMERGENCY MEDICINE

## 2022-05-12 PROCEDURE — 71275 CT ANGIOGRAPHY CHEST: CPT

## 2022-05-12 PROCEDURE — 84484 ASSAY OF TROPONIN QUANT: CPT | Performed by: EMERGENCY MEDICINE

## 2022-05-12 PROCEDURE — 85379 FIBRIN DEGRADATION QUANT: CPT | Performed by: EMERGENCY MEDICINE

## 2022-05-12 PROCEDURE — 80053 COMPREHEN METABOLIC PANEL: CPT | Performed by: EMERGENCY MEDICINE

## 2022-05-12 RX ORDER — POTASSIUM CHLORIDE 7.45 MG/ML
10 INJECTION INTRAVENOUS ONCE
Status: DISCONTINUED | OUTPATIENT
Start: 2022-05-12 | End: 2022-05-12

## 2022-05-12 RX ORDER — SODIUM CHLORIDE 0.9 % (FLUSH) 0.9 %
10 SYRINGE (ML) INJECTION AS NEEDED
Status: DISCONTINUED | OUTPATIENT
Start: 2022-05-12 | End: 2022-05-16 | Stop reason: HOSPADM

## 2022-05-12 RX ORDER — POTASSIUM CHLORIDE 20 MEQ/1
40 TABLET, EXTENDED RELEASE ORAL ONCE
Status: COMPLETED | OUTPATIENT
Start: 2022-05-12 | End: 2022-05-13

## 2022-05-12 RX ADMIN — IOPAMIDOL 100 ML: 755 INJECTION, SOLUTION INTRAVENOUS at 23:51

## 2022-05-13 ENCOUNTER — APPOINTMENT (OUTPATIENT)
Dept: GENERAL RADIOLOGY | Facility: HOSPITAL | Age: 77
End: 2022-05-13

## 2022-05-13 PROBLEM — E11.9 TYPE 2 DIABETES MELLITUS: Status: ACTIVE | Noted: 2022-05-13

## 2022-05-13 PROBLEM — J18.9 HCAP (HEALTHCARE-ASSOCIATED PNEUMONIA): Status: RESOLVED | Noted: 2022-02-16 | Resolved: 2022-05-13

## 2022-05-13 PROBLEM — R07.9 CHEST PAIN, UNSPECIFIED TYPE: Status: ACTIVE | Noted: 2022-05-13

## 2022-05-13 PROBLEM — I16.9 HYPERTENSIVE CRISIS: Status: RESOLVED | Noted: 2022-02-26 | Resolved: 2022-05-13

## 2022-05-13 PROBLEM — Z86.73 PERSONAL HISTORY OF TRANSIENT ISCHEMIC ATTACK (TIA), AND CEREBRAL INFARCTION WITHOUT RESIDUAL DEFICITS: Status: RESOLVED | Noted: 2022-02-16 | Resolved: 2022-05-13

## 2022-05-13 PROBLEM — E78.2 HYPERLIPIDEMIA, MIXED: Chronic | Status: ACTIVE | Noted: 2018-04-09

## 2022-05-13 PROBLEM — I50.23 ACUTE ON CHRONIC SYSTOLIC CHF (CONGESTIVE HEART FAILURE) (HCC): Status: ACTIVE | Noted: 2022-05-13

## 2022-05-13 PROBLEM — A41.9 SEPSIS DUE TO PNEUMONIA (HCC): Status: RESOLVED | Noted: 2022-02-16 | Resolved: 2022-05-13

## 2022-05-13 PROBLEM — E11.9 TYPE 2 DIABETES MELLITUS (HCC): Chronic | Status: ACTIVE | Noted: 2022-05-13

## 2022-05-13 PROBLEM — J18.9 SEPSIS DUE TO PNEUMONIA (HCC): Status: RESOLVED | Noted: 2022-02-16 | Resolved: 2022-05-13

## 2022-05-13 PROBLEM — F32.A DEPRESSION, UNSPECIFIED: Chronic | Status: ACTIVE | Noted: 2022-02-16

## 2022-05-13 LAB
GLUCOSE BLDC GLUCOMTR-MCNC: 176 MG/DL (ref 70–105)
GLUCOSE BLDC GLUCOMTR-MCNC: 234 MG/DL (ref 70–105)
GLUCOSE BLDC GLUCOMTR-MCNC: 253 MG/DL (ref 70–105)
GLUCOSE BLDC GLUCOMTR-MCNC: 87 MG/DL (ref 70–105)
MAGNESIUM SERPL-MCNC: 1.8 MG/DL (ref 1.6–2.4)
NT-PROBNP SERPL-MCNC: 9562 PG/ML (ref 0–1800)
TROPONIN T SERPL-MCNC: <0.01 NG/ML (ref 0–0.03)

## 2022-05-13 PROCEDURE — 83735 ASSAY OF MAGNESIUM: CPT | Performed by: INTERNAL MEDICINE

## 2022-05-13 PROCEDURE — 93005 ELECTROCARDIOGRAM TRACING: CPT | Performed by: INTERNAL MEDICINE

## 2022-05-13 PROCEDURE — 97162 PT EVAL MOD COMPLEX 30 MIN: CPT

## 2022-05-13 PROCEDURE — 74230 X-RAY XM SWLNG FUNCJ C+: CPT

## 2022-05-13 PROCEDURE — 63710000001 INSULIN ISOPHANE & REGULAR PER 5 UNITS: Performed by: INTERNAL MEDICINE

## 2022-05-13 PROCEDURE — 97166 OT EVAL MOD COMPLEX 45 MIN: CPT

## 2022-05-13 PROCEDURE — G0378 HOSPITAL OBSERVATION PER HR: HCPCS

## 2022-05-13 PROCEDURE — 92611 MOTION FLUOROSCOPY/SWALLOW: CPT

## 2022-05-13 PROCEDURE — 96375 TX/PRO/DX INJ NEW DRUG ADDON: CPT

## 2022-05-13 PROCEDURE — 83880 ASSAY OF NATRIURETIC PEPTIDE: CPT | Performed by: INTERNAL MEDICINE

## 2022-05-13 PROCEDURE — 84484 ASSAY OF TROPONIN QUANT: CPT

## 2022-05-13 PROCEDURE — 25010000002 MAGNESIUM SULFATE IN D5W 1G/100ML (PREMIX) 1-5 GM/100ML-% SOLUTION

## 2022-05-13 PROCEDURE — 63710000001 INSULIN LISPRO (HUMAN) PER 5 UNITS: Performed by: INTERNAL MEDICINE

## 2022-05-13 PROCEDURE — 25010000002 FUROSEMIDE PER 20 MG: Performed by: EMERGENCY MEDICINE

## 2022-05-13 PROCEDURE — 82962 GLUCOSE BLOOD TEST: CPT

## 2022-05-13 PROCEDURE — 99219 PR INITIAL OBSERVATION CARE/DAY 50 MINUTES: CPT | Performed by: INTERNAL MEDICINE

## 2022-05-13 PROCEDURE — 63710000001 PREDNISONE PER 1 MG: Performed by: INTERNAL MEDICINE

## 2022-05-13 PROCEDURE — 25010000002 FUROSEMIDE PER 20 MG

## 2022-05-13 PROCEDURE — 92610 EVALUATE SWALLOWING FUNCTION: CPT

## 2022-05-13 PROCEDURE — 93010 ELECTROCARDIOGRAM REPORT: CPT | Performed by: INTERNAL MEDICINE

## 2022-05-13 PROCEDURE — 97535 SELF CARE MNGMENT TRAINING: CPT

## 2022-05-13 PROCEDURE — 96376 TX/PRO/DX INJ SAME DRUG ADON: CPT

## 2022-05-13 PROCEDURE — 84484 ASSAY OF TROPONIN QUANT: CPT | Performed by: INTERNAL MEDICINE

## 2022-05-13 RX ORDER — NICOTINE POLACRILEX 4 MG
15 LOZENGE BUCCAL
Status: DISCONTINUED | OUTPATIENT
Start: 2022-05-13 | End: 2022-05-16 | Stop reason: HOSPADM

## 2022-05-13 RX ORDER — BENZONATATE 100 MG/1
100 CAPSULE ORAL 3 TIMES DAILY
Status: DISCONTINUED | OUTPATIENT
Start: 2022-05-13 | End: 2022-05-16 | Stop reason: HOSPADM

## 2022-05-13 RX ORDER — FLUOXETINE HYDROCHLORIDE 20 MG/1
20 CAPSULE ORAL DAILY
Status: DISCONTINUED | OUTPATIENT
Start: 2022-05-13 | End: 2022-05-16 | Stop reason: HOSPADM

## 2022-05-13 RX ORDER — INSULIN LISPRO 100 [IU]/ML
0-9 INJECTION, SOLUTION INTRAVENOUS; SUBCUTANEOUS AS NEEDED
Status: DISCONTINUED | OUTPATIENT
Start: 2022-05-13 | End: 2022-05-13

## 2022-05-13 RX ORDER — POTASSIUM CHLORIDE 20 MEQ/1
20 TABLET, EXTENDED RELEASE ORAL DAILY
Status: DISCONTINUED | OUTPATIENT
Start: 2022-05-13 | End: 2022-05-16 | Stop reason: HOSPADM

## 2022-05-13 RX ORDER — INSULIN LISPRO 100 [IU]/ML
0-9 INJECTION, SOLUTION INTRAVENOUS; SUBCUTANEOUS EVERY 6 HOURS SCHEDULED
Status: DISCONTINUED | OUTPATIENT
Start: 2022-05-13 | End: 2022-05-13

## 2022-05-13 RX ORDER — ALPRAZOLAM 1 MG/1
1 TABLET ORAL 2 TIMES DAILY
Status: DISCONTINUED | OUTPATIENT
Start: 2022-05-13 | End: 2022-05-16 | Stop reason: HOSPADM

## 2022-05-13 RX ORDER — DEXTROSE MONOHYDRATE 25 G/50ML
25 INJECTION, SOLUTION INTRAVENOUS
Status: DISCONTINUED | OUTPATIENT
Start: 2022-05-13 | End: 2022-05-16 | Stop reason: HOSPADM

## 2022-05-13 RX ORDER — METOPROLOL SUCCINATE 50 MG/1
50 TABLET, EXTENDED RELEASE ORAL DAILY
Status: DISCONTINUED | OUTPATIENT
Start: 2022-05-13 | End: 2022-05-16 | Stop reason: HOSPADM

## 2022-05-13 RX ORDER — TIZANIDINE 2 MG/1
2 TABLET ORAL EVERY 8 HOURS PRN
Status: DISCONTINUED | OUTPATIENT
Start: 2022-05-13 | End: 2022-05-16 | Stop reason: HOSPADM

## 2022-05-13 RX ORDER — SODIUM CHLORIDE 0.9 % (FLUSH) 0.9 %
10 SYRINGE (ML) INJECTION EVERY 12 HOURS SCHEDULED
Status: DISCONTINUED | OUTPATIENT
Start: 2022-05-13 | End: 2022-05-16 | Stop reason: HOSPADM

## 2022-05-13 RX ORDER — ASPIRIN 81 MG/1
81 TABLET ORAL DAILY
Status: DISCONTINUED | OUTPATIENT
Start: 2022-05-13 | End: 2022-05-16 | Stop reason: HOSPADM

## 2022-05-13 RX ORDER — PREDNISONE 20 MG/1
20 TABLET ORAL DAILY
Status: DISCONTINUED | OUTPATIENT
Start: 2022-05-13 | End: 2022-05-16 | Stop reason: HOSPADM

## 2022-05-13 RX ORDER — MAGNESIUM SULFATE 1 G/100ML
1 INJECTION INTRAVENOUS AS NEEDED
Status: DISCONTINUED | OUTPATIENT
Start: 2022-05-13 | End: 2022-05-16 | Stop reason: HOSPADM

## 2022-05-13 RX ORDER — BENZONATATE 100 MG/1
100 CAPSULE ORAL 3 TIMES DAILY PRN
Status: DISCONTINUED | OUTPATIENT
Start: 2022-05-13 | End: 2022-05-16 | Stop reason: HOSPADM

## 2022-05-13 RX ORDER — FUROSEMIDE 10 MG/ML
40 INJECTION INTRAMUSCULAR; INTRAVENOUS EVERY 12 HOURS
Status: DISCONTINUED | OUTPATIENT
Start: 2022-05-13 | End: 2022-05-15

## 2022-05-13 RX ORDER — INSULIN LISPRO 100 [IU]/ML
0-9 INJECTION, SOLUTION INTRAVENOUS; SUBCUTANEOUS AS NEEDED
Status: DISCONTINUED | OUTPATIENT
Start: 2022-05-13 | End: 2022-05-14

## 2022-05-13 RX ORDER — OLANZAPINE 10 MG/2ML
1 INJECTION, POWDER, LYOPHILIZED, FOR SOLUTION INTRAMUSCULAR
Status: DISCONTINUED | OUTPATIENT
Start: 2022-05-13 | End: 2022-05-16 | Stop reason: HOSPADM

## 2022-05-13 RX ORDER — GUAIFENESIN/DEXTROMETHORPHAN 100-10MG/5
5 SYRUP ORAL EVERY 4 HOURS PRN
Status: DISCONTINUED | OUTPATIENT
Start: 2022-05-13 | End: 2022-05-16 | Stop reason: HOSPADM

## 2022-05-13 RX ORDER — SODIUM CHLORIDE 0.9 % (FLUSH) 0.9 %
10 SYRINGE (ML) INJECTION AS NEEDED
Status: DISCONTINUED | OUTPATIENT
Start: 2022-05-13 | End: 2022-05-16 | Stop reason: HOSPADM

## 2022-05-13 RX ORDER — NITROGLYCERIN 0.4 MG/1
0.4 TABLET SUBLINGUAL
Status: DISCONTINUED | OUTPATIENT
Start: 2022-05-13 | End: 2022-05-16 | Stop reason: HOSPADM

## 2022-05-13 RX ORDER — INSULIN LISPRO 100 [IU]/ML
0-9 INJECTION, SOLUTION INTRAVENOUS; SUBCUTANEOUS
Status: DISCONTINUED | OUTPATIENT
Start: 2022-05-13 | End: 2022-05-14

## 2022-05-13 RX ORDER — POTASSIUM CHLORIDE 20 MEQ/1
40 TABLET, EXTENDED RELEASE ORAL AS NEEDED
Status: DISCONTINUED | OUTPATIENT
Start: 2022-05-13 | End: 2022-05-16 | Stop reason: HOSPADM

## 2022-05-13 RX ORDER — POTASSIUM CHLORIDE 1.5 G/1.77G
40 POWDER, FOR SOLUTION ORAL AS NEEDED
Status: DISCONTINUED | OUTPATIENT
Start: 2022-05-13 | End: 2022-05-16 | Stop reason: HOSPADM

## 2022-05-13 RX ORDER — ALPRAZOLAM 1 MG/1
2 TABLET ORAL 3 TIMES DAILY PRN
Status: DISCONTINUED | OUTPATIENT
Start: 2022-05-13 | End: 2022-05-13

## 2022-05-13 RX ORDER — FUROSEMIDE 10 MG/ML
40 INJECTION INTRAMUSCULAR; INTRAVENOUS ONCE
Status: COMPLETED | OUTPATIENT
Start: 2022-05-13 | End: 2022-05-13

## 2022-05-13 RX ORDER — POTASSIUM CHLORIDE 7.45 MG/ML
10 INJECTION INTRAVENOUS
Status: DISCONTINUED | OUTPATIENT
Start: 2022-05-13 | End: 2022-05-16 | Stop reason: HOSPADM

## 2022-05-13 RX ORDER — BUSPIRONE HYDROCHLORIDE 5 MG/1
10 TABLET ORAL EVERY 12 HOURS SCHEDULED
Status: DISCONTINUED | OUTPATIENT
Start: 2022-05-13 | End: 2022-05-16 | Stop reason: HOSPADM

## 2022-05-13 RX ORDER — ATORVASTATIN CALCIUM 40 MG/1
40 TABLET, FILM COATED ORAL DAILY
Status: DISCONTINUED | OUTPATIENT
Start: 2022-05-13 | End: 2022-05-16 | Stop reason: HOSPADM

## 2022-05-13 RX ORDER — MAGNESIUM SULFATE HEPTAHYDRATE 40 MG/ML
2 INJECTION, SOLUTION INTRAVENOUS AS NEEDED
Status: DISCONTINUED | OUTPATIENT
Start: 2022-05-13 | End: 2022-05-16 | Stop reason: HOSPADM

## 2022-05-13 RX ORDER — FLUDROCORTISONE ACETATE 0.1 MG/1
0.1 TABLET ORAL DAILY
Status: DISCONTINUED | OUTPATIENT
Start: 2022-05-13 | End: 2022-05-14

## 2022-05-13 RX ADMIN — POTASSIUM CHLORIDE 40 MEQ: 1500 TABLET, EXTENDED RELEASE ORAL at 05:14

## 2022-05-13 RX ADMIN — BUSPIRONE HYDROCHLORIDE 10 MG: 5 TABLET ORAL at 10:55

## 2022-05-13 RX ADMIN — Medication 10 ML: at 11:16

## 2022-05-13 RX ADMIN — BUSPIRONE HYDROCHLORIDE 10 MG: 5 TABLET ORAL at 22:33

## 2022-05-13 RX ADMIN — METOPROLOL SUCCINATE 50 MG: 50 TABLET, EXTENDED RELEASE ORAL at 10:55

## 2022-05-13 RX ADMIN — PREDNISONE 20 MG: 20 TABLET ORAL at 10:54

## 2022-05-13 RX ADMIN — FUROSEMIDE 40 MG: 10 INJECTION, SOLUTION INTRAMUSCULAR; INTRAVENOUS at 18:32

## 2022-05-13 RX ADMIN — POTASSIUM CHLORIDE 40 MEQ: 1500 TABLET, EXTENDED RELEASE ORAL at 00:08

## 2022-05-13 RX ADMIN — BENZONATATE 100 MG: 100 CAPSULE ORAL at 12:52

## 2022-05-13 RX ADMIN — ATORVASTATIN CALCIUM 40 MG: 40 TABLET, FILM COATED ORAL at 10:54

## 2022-05-13 RX ADMIN — TICAGRELOR 90 MG: 90 TABLET ORAL at 22:33

## 2022-05-13 RX ADMIN — ALPRAZOLAM 1 MG: 1 TABLET ORAL at 22:33

## 2022-05-13 RX ADMIN — FUROSEMIDE 40 MG: 10 INJECTION, SOLUTION INTRAMUSCULAR; INTRAVENOUS at 05:14

## 2022-05-13 RX ADMIN — EMPAGLIFLOZIN 10 MG: 10 TABLET, FILM COATED ORAL at 10:55

## 2022-05-13 RX ADMIN — GUAIFENESIN SYRUP AND DEXTROMETHORPHAN 5 ML: 100; 10 SYRUP ORAL at 22:56

## 2022-05-13 RX ADMIN — INSULIN HUMAN 20 UNITS: 100 INJECTION, SUSPENSION SUBCUTANEOUS at 18:33

## 2022-05-13 RX ADMIN — BENZONATATE 100 MG: 100 CAPSULE ORAL at 22:33

## 2022-05-13 RX ADMIN — BENZONATATE 100 MG: 100 CAPSULE ORAL at 06:27

## 2022-05-13 RX ADMIN — MAGNESIUM SULFATE 1 G: 1 INJECTION INTRAVENOUS at 05:14

## 2022-05-13 RX ADMIN — POTASSIUM CHLORIDE 20 MEQ: 1500 TABLET, EXTENDED RELEASE ORAL at 10:55

## 2022-05-13 RX ADMIN — INSULIN LISPRO 2 UNITS: 100 INJECTION, SOLUTION INTRAVENOUS; SUBCUTANEOUS at 12:38

## 2022-05-13 RX ADMIN — ASPIRIN 81 MG: 81 TABLET, COATED ORAL at 10:55

## 2022-05-13 RX ADMIN — BENZONATATE 100 MG: 100 CAPSULE ORAL at 18:33

## 2022-05-13 RX ADMIN — FLUOXETINE 20 MG: 20 CAPSULE ORAL at 10:54

## 2022-05-13 RX ADMIN — INSULIN LISPRO 6 UNITS: 100 INJECTION, SOLUTION INTRAVENOUS; SUBCUTANEOUS at 19:25

## 2022-05-13 RX ADMIN — Medication 10 ML: at 22:34

## 2022-05-13 RX ADMIN — INSULIN LISPRO 4 UNITS: 100 INJECTION, SOLUTION INTRAVENOUS; SUBCUTANEOUS at 22:56

## 2022-05-13 RX ADMIN — ALPRAZOLAM 1 MG: 1 TABLET ORAL at 12:38

## 2022-05-13 RX ADMIN — TICAGRELOR 90 MG: 90 TABLET ORAL at 10:54

## 2022-05-13 RX ADMIN — FLUDROCORTISONE ACETATE 0.1 MG: 0.1 TABLET ORAL at 10:55

## 2022-05-13 RX ADMIN — MAGNESIUM SULFATE 1 G: 1 INJECTION INTRAVENOUS at 18:32

## 2022-05-13 NOTE — PLAN OF CARE
Goal Outcome Evaluation:   Video Swallow Study completed this date. The patient was seated up at a 90 degree angle and viewed in the lateral projection. She was assessed with the following consistencies, all consumed independently: NTL by cup (x 2 trials), puree (applesauce x 2), mechanical soft (peaches x 2 trials), regular consistency diet (cracker) and thin liquids by cup x 3 trials (single sips initially then larger, successive swallows). Tremulous quality noted with hand to mouth however she was able to self feed all trials for this procedure. Adequate labial seal made on cup and spoon. Functional oral transit/bolus control noted. She is able to form a cohesive bolus and propel posteriorly functionally. Slight, intermittent swallow delay noted with bolus head reaching the valleculae and/or laryngeal surface of the epiglottis with all consistencies. Upon initiation of the swallow adequate anterior hyoid excursion and laryngeal elevation noted. Decreased epiglottic inversion noted intermittently, as well as reduced laryngeal vestibular closure. Trace, transient penetration above the level of the vocal folds noted with all consistencies, however all clears and does not result in aspiration (a 2 on the Rosenbeck Penetration-Aspiration Scale). Penetration more pronounced with thin liquids, however she is stimulable to perform a chin tuck technique with does prevent penetration. She clears the oral/pharyngeal cavities well with no significant residue following. No cricopharyngeal dysfunction noted, with no obstruction to the bolus flow.    RECOMMENDATIONS: It is recommended that the patient continue her current diet of regular consistency, thin liquids. She may benefit from use of 3 second prep, and chin tuck for most consistencies (particularly thin liquids). She was cued to take smaller, single sips at a time and clear oral cavity before next bite/sip. The results/recommendations of this procedure were reviewed in  detail following and she verbalized/demonstrated understanding. ST will follow during her hospitalization to insure safest/least restrictive diet and independent use of safe swallow compensations/recommendations.

## 2022-05-13 NOTE — MBS/VFSS/FEES
"Acute Care - Speech Language Pathology   Swallow Initial Evaluation  [place]     Patient Name: Nadege Barrow  : 1945  MRN: [id number]  Today's Date: 2022               Admit Date: 2022    Visit Dx:     ICD-10-CM ICD-9-CM   1. Chest pain, unspecified type  R07.9 786.50   2. Parainfluenza virus infection  B34.8 078.89   3. Congestive heart failure, unspecified HF chronicity, unspecified heart failure type ([facility])  I50.9 428.0     Patient Active Problem List   Diagnosis   • Chest pain on breathing   • Acute respiratory failure with hypoxia ([facility])   • Anemia   • Anxiety   • Atherosclerotic heart disease of native coronary artery without angina pectoris   • Back pain   • Cardiomyopathy ([facility])   • Carotid artery disease ([facility])   • Cellulitis of foot   • Acute on chronic congestive heart failure ([facility])   • Current moderate episode of major depressive disorder without prior episode ([facility])   • Senile dementia, delirium, with behavioral disturbance ([facility])   • Depression, unspecified   • Disabling essential tremor   • Fatigue   • Hyperlipidemia, mixed   • Peripheral vision loss, bilateral   • Retinopathy, bilateral   • S/P right coronary artery (RCA) stent placement   • Ischemic cardiomyopathy   • Elevated LFTs   • Generalized anxiety disorder   • Benzodiazepine dependence, continuous ([facility])   • Type 2 diabetes mellitus ([facility])   • Chest pain, unspecified type   • Acute on chronic systolic CHF (congestive heart failure) ([facility])     Past Medical History:   Diagnosis Date   • Anxiety    • CHF (congestive heart failure) ([facility])    • Depression    • Diabetes mellitus ([facility])    • Hyperlipidemia    • Hypertension    • Panic disorder     \"panic attacks\"   • Tremors of nervous system     \"essential tremors\"     Past Surgical History:   Procedure Laterality Date   • CORONARY ANGIOPLASTY WITH STENT PLACEMENT         SLP Recommendation and Plan  SLP Swallowing Diagnosis: mild, pharyngeal dysphagia (22 1100)  SLP Diet " Recommendation: regular textures, thin liquids (05/13/22 1100)  Recommended Precautions and Strategies: upright posture during/after eating, small bites of food and sips of liquid, alternate between small bites of food and sips of liquid, chin tuck, general aspiration precautions (05/13/22 1100)  SLP Rec. for Method of Medication Administration: meds whole, with thin liquids, with pudding or applesauce, as tolerated (05/13/22 1100)     Monitor for Signs of Aspiration: yes, notify SLP if any concerns, cough, elevated WBC count, gurgly voice, throat clearing, fever, upper respiratory, pneumonia, right lower lobe infiltrates (05/13/22 1100)  Recommended Diagnostics: reassess via clinical swallow evaluation (05/13/22 1100)  Swallow Criteria for Skilled Therapeutic Interventions Met: demonstrates skilled criteria (05/13/22 1100)  Anticipated Discharge Disposition (SLP): assisted living facility ([facility]) (05/13/22 1100)  Rehab Potential/Prognosis, Swallowing: good, to achieve stated therapy goals (05/13/22 1100)  Therapy Frequency (Swallow): 2 days per week, 3 days per week (05/13/22 1100)  Predicted Duration Therapy Intervention (Days): 1 week (05/13/22 1100)                                         SWALLOW EVALUATION (last 72 hours)     SLP Adult Swallow Evaluation     Row Name 05/13/22 1100          Document Type evaluation  -SM    Subjective Information complains of  Cough  -SM    Patient Observations alert;cooperative;agree to therapy  -SM    Patient/Family/Caregiver Comments/Observations No family/caregiver present  -SM    Patient Effort good  -SM    Comment Video Swallow Study completed this date. Patient was wearing a face mask during this therapy encounter. The patient's mask was removed to allow po trials to be administered for purposes of this assessment. The patient's mask was replaced prior to being transported back up to their room. Therapist used appropriate personal protective equipment including mask, eye  "protection and gloves.  Mask used was standard procedure mask. Appropriate PPE was worn during the entire therapy session. Hand hygiene was completed before and after therapy session. Patient is not in enhanced droplet precautions.      -SM    Symptoms Noted During/After Treatment other (see comments)  Patient did present with a dry cough prior to initiation of the procedure.  -SM               Patient Profile Reviewed yes  -SM    Pertinent History Of Current Problem Pt is a 77 y/o female who presents to [facility] w/ c/o a cough that started 1 week ago and generalized fatigue. CT of chest revealed \"1. Moderate tree-in-bud nodular opacities throughout the left lung. Primary consideration is infectious bronchiolitis. Alternative considerations are inflammatory bronchiolitis and chronic aspiration. 2.Moderate airways disease. 3.  Trace bilateral pleural effusions. Minor pulmonary interstitial edema. 4.  No pulmonary embolus.\" ST department familiar w/ this pt w/ most recent recommendation of mechanical soft and NTL d/t silent aspiration observed on VFSS on 3/4/22. ST orders placed for swallowing this admission d/t pt's hx of dysphagia. Pt reports she currently eats a regular and thin liquid diet. Clinical swallow evaluation completed earlier this date with recommendations for repeat video swallow study. This was conducted today.  -SM    Current Method of Nutrition regular textures;thin liquids  -SM    Precautions/Limitations, Vision WFL;for purposes of eval  -SM    Precautions/Limitations, Hearing WFL;for purposes of eval  -SM    Prior Level of Function-Communication WFL  -SM    Prior Level of Function-Swallowing mechanical soft textures;nectar thick liquids;other (see comments)  Patient has a history of dysphagia and receiving an alerted diet in the past.  -SM    Plans/Goals Discussed with patient;agreed upon  -SM    Barriers to Rehab none identified  -SM               Additional Documentation Pain Scale: FACES " Pre/Post-Treatment (Group)  -               Pretreatment Pain Rating 0/10 - no pain  -SM    Posttreatment Pain Rating 0/10 - no pain  -SM               Dentition Assessment natural, present and adequate;missing teeth  -    Secretion Management WNL/WFL  -SM    Mucosal Quality moist, healthy  -               Oral Motor General Assessment WFL  -SM    Oral Motor, Comment Lingual protrusion, lateralization and elevation all WFL. She is able to protrude/retract lips with no weakness/asymmetry noted. Dentition are natural and in excellent condition. She is verbal and presents with excellent intelligibility. No hoarse/wet vocal quality noted.  -               Respiratory Support Currently in Use room air  -    Eating/Swallowing Skills self-fed;appropriate self-feeding skills observed  -    Positioning During Eating upright 90 degree;upright in chair  -    Utensils Used --    Consistencies Trialed --               Oral Prep Phase --    Oral Transit --    Oral Residue --    Pharyngeal Phase --    Clinical Swallow Evaluation Summary --               Oral Residue Concerns --    Diffuse Residue Throughout Oral Cavity --               Utensils Used cup;spoon  -    Consistencies Trialed nectar/syrup-thick liquids;pureed;soft textures;regular textures;thin liquids  -               VFSS Summary Video Swallow Study completed this date. The patient was seated up at a 90 degree angle and viewed in the lateral projection. She was assessed with the following consistencies, all consumed independently: NTL by cup (x 2 trials), puree (applesauce x 2), mechanical soft (peaches x 2 trials), regular consistency diet (cracker) and thin liquids by cup x 3 trials (single sips initially then larger, successive swallows). Tremulous quality noted with hand to mouth however she was able to self feed all trials for this procedure. Adequate labial seal made on cup and spoon. Functional oral transit/bolus control noted. She is able to  form a cohesive bolus and propel posteriorly functionally. Slight, intermittent swallow delay noted with bolus head reaching the valleculae and/or laryngeal surface of the epiglottis with all consistencies. Upon initiation of the swallow adequate anterior hyoid excursion and laryngeal elevation noted. Decreased epiglottic inversion noted intermittently, as well as reduced laryngeal vestibular closure. Trace, transient penetration above the level of the vocal folds noted with all consistencies, however all clears and does not result in aspiration (a 2 on the Rosenbeck Penetration-Aspiration Scale). Penetration more pronounced with thin liquids, however she is stimulable to perform a chin tuck technique with does prevent penetration. She clears the oral/pharyngeal cavities well with no significant residue following. No cricopharyngeal dysfunction noted, with no obstruction to the bolus flow.    RECOMMENDATIONS: It is recommended that the patient continue her current diet of regular consistency, thin liquids. She may benefit from use of 3 second prep, and chin tuck for most consistencies (particularly thin liquids). She was cued to take smaller, single sips at a time and clear oral cavity before next bite/sip. The results/recommendations of this procedure were reviewed in detail following and she verbalized/demonstrated understanding. ST will follow during her hospitalization to insure safest/least restrictive diet and independent use of safe swallow compensations/recommendations.        -               SLP Swallowing Diagnosis mild;pharyngeal dysphagia  -    Functional Impact risk of aspiration/pneumonia  -    Rehab Potential/Prognosis, Swallowing good, to achieve stated therapy goals  -    Swallow Criteria for Skilled Therapeutic Interventions Met demonstrates skilled criteria  -               Care Plan Review evaluation/treatment results reviewed  -               Therapy Frequency (Swallow) 2 days per  week;3 days per week  -    Predicted Duration Therapy Intervention (Days) 1 week  -SM    SLP Diet Recommendation regular textures;thin liquids  -    Recommended Diagnostics reassess via clinical swallow evaluation  -    Recommended Precautions and Strategies upright posture during/after eating;small bites of food and sips of liquid;alternate between small bites of food and sips of liquid;chin tuck;general aspiration precautions  -SM    Oral Care Recommendations Oral Care BID/PRN  -SM    SLP Rec. for Method of Medication Administration meds whole;with thin liquids;with pudding or applesauce;as tolerated  -SM    Monitor for Signs of Aspiration yes;notify SLP if any concerns;cough;elevated WBC count;gurgly voice;throat clearing;fever;upper respiratory;pneumonia;right lower lobe infiltrates  -    Anticipated Discharge Disposition (SLP) assisted living facility (group home)  -SM               Oral Nutrition/Hydration Goal Selection (SLP) oral nutrition/hydration, SLP goal 1;oral nutrition/hydration, SLP goal 2;oral nutrition/hydration, SLP goal (free text)  -               Oral Nutrition/Hydration Goal 1, SLP The pt will maximize swallow function for least restricitve PO diet, no complications associated with dysphagia, adequate PO intake, and demonstrating independent use of swallow compensations.  -SM    Time Frame (Oral Nutrition/Hydration Goal 1, SLP) by discharge  -SM    Barriers (Oral Nutrition/Hydration Goal 1, SLP) ST recommends that the patient continue her current diet of regular consistency, thin liquid, with use of chin tuck for all thin liquid trials (consumed by cup).  -SM    Progress/Outcomes (Oral Nutrition/Hydration Goal 1, SLP) goal ongoing  -SM               Oral Nutrition/Hydration Goal 2, SLP The patient will participate in ongoing assessment of swallow, including re-evaluation clinically and/or including instrumental assessment of swallow if indicated, to further assess swallow function in  anticipation to initiate a po diet  -SM    Time Frame (Oral Nutrition/Hydration Goal 2, SLP) 1 day  -SM    Barriers (Oral Nutrition/Hydration Goal 2, SLP) VFSS procedure completed this date. See above for results/recommendations.  -SM    Progress/Outcomes (Oral Nutrition/Hydration Goal 2, SLP) goal met  -SM               Oral Nutrition/Hydration Goal, SLP The patient will participate in a full meal assessment to determine safety and adequacy of recommended diet, independent use of safe swallow compensations, and additional goals/recommendations to follow  -SM    Time Frame (Oral Nutrition/Hydration Goal, SLP) 2 days;3 days;4 days  -SM          User Key  (r) = Recorded By, (t) = Taken By, (c) = Cosigned By    Initials Name Effective Dates    SM Rae Gutierrez, RACHEL 06/16/21 -     LF Iris Perla, RACHEL 06/16/21 -                 EDUCATION  The patient has been educated in the following areas:   Dysphagia (Swallowing Impairment).        SLP GOALS     Row Name 05/13/22 1100          Oral Nutrition/Hydration Goal 1, SLP The pt will maximize swallow function for least restricitve PO diet, no complications associated with dysphagia, adequate PO intake, and demonstrating independent use of swallow compensations.  -SM    Time Frame (Oral Nutrition/Hydration Goal 1, SLP) by discharge  -SM    Barriers (Oral Nutrition/Hydration Goal 1, SLP) ST recommends that the patient continue her current diet of regular consistency, thin liquid, with use of chin tuck for all thin liquid trials (consumed by cup).  -SM    Progress/Outcomes (Oral Nutrition/Hydration Goal 1, SLP) goal ongoing  -SM              Oral Nutrition/Hydration Goal 2, SLP The patient will participate in ongoing assessment of swallow, including re-evaluation clinically and/or including instrumental assessment of swallow if indicated, to further assess swallow function in anticipation to initiate a po diet  -SM    Time Frame (Oral Nutrition/Hydration Goal 2, SLP) 1 day   -SM    Barriers (Oral Nutrition/Hydration Goal 2, SLP) VFSS procedure completed this date. See above for results/recommendations.  -SM    Progress/Outcomes (Oral Nutrition/Hydration Goal 2, SLP) goal met  -SM              Oral Nutrition/Hydration Goal, SLP The patient will participate in a full meal assessment to determine safety and adequacy of recommended diet, independent use of safe swallow compensations, and additional goals/recommendations to follow  -SM    Time Frame (Oral Nutrition/Hydration Goal, SLP) 2 days;3 days;4 days  -SM          User Key  (r) = Recorded By, (t) = Taken By, (c) = Cosigned By    Initials Name Provider Type    Rae Rodriguez, SLP Speech and Language Pathologist     Iris Perla, SLP Speech and Language Pathologist                   Time Calculation:                RACHEL Rosario  5/13/2022

## 2022-05-13 NOTE — PLAN OF CARE
Goal Outcome Evaluation:  Plan of Care Reviewed With: patient        Progress: improving  Outcome Evaluation: Patient reported feeling better, hydrated well, anxiety under control by prn. still having nonprodcutive cough and reporting pain in the midsternal area when coughing. STAT EKG showed negative results. will continue to monitor

## 2022-05-13 NOTE — PLAN OF CARE
Goal Outcome Evaluation:  Plan of Care Reviewed With: patient        Progress: no change   Pt c/o weakness, shortness of breath, and cough. Pt on room air. Will continue to monitor...

## 2022-05-13 NOTE — THERAPY EVALUATION
"Patient Name: Nadege Barrow  : 1945    MRN: [id number]                              Today's Date: 2022       Admit Date: 2022    Visit Dx:     ICD-10-CM ICD-9-CM   1. Chest pain, unspecified type  R07.9 786.50   2. Parainfluenza virus infection  B34.8 078.89   3. Congestive heart failure, unspecified HF chronicity, unspecified heart failure type ([facility])  I50.9 428.0     Patient Active Problem List   Diagnosis   • Chest pain on breathing   • Acute respiratory failure with hypoxia ([facility])   • Anemia   • Anxiety   • Atherosclerotic heart disease of native coronary artery without angina pectoris   • Back pain   • Cardiomyopathy ([facility])   • Carotid artery disease ([facility])   • Cellulitis of foot   • Acute on chronic congestive heart failure ([facility])   • Current moderate episode of major depressive disorder without prior episode ([facility])   • Senile dementia, delirium, with behavioral disturbance ([facility])   • Depression, unspecified   • Disabling essential tremor   • Fatigue   • Hyperlipidemia, mixed   • Peripheral vision loss, bilateral   • Retinopathy, bilateral   • S/P right coronary artery (RCA) stent placement   • Ischemic cardiomyopathy   • Elevated LFTs   • Generalized anxiety disorder   • Benzodiazepine dependence, continuous ([facility])   • Type 2 diabetes mellitus ([facility])   • Chest pain, unspecified type   • Acute on chronic systolic CHF (congestive heart failure) ([facility])     Past Medical History:   Diagnosis Date   • Anxiety    • CHF (congestive heart failure) ([facility])    • Depression    • Diabetes mellitus ([facility])    • Hyperlipidemia    • Hypertension    • Panic disorder     \"panic attacks\"   • Tremors of nervous system     \"essential tremors\"     Past Surgical History:   Procedure Laterality Date   • CORONARY ANGIOPLASTY WITH STENT PLACEMENT        General Information     Row Name 22 1556          OT Time and Intention    Document Type evaluation  -SR     Row Name 22 1556          Occupational Profile    Reason for " Services/Referral (Occupational Profile) 77 y/o F who presented from [facility] with productive cough for 1 week, fatigue, chest pain, cough. (-) troponin, hypokalemia. Dx with parainfluenza virus.  She was recently admitted for PNA 1 month ago. Patient has a history of essential tremor.  Typically independent with all ADLs and mobility using rollator.  -SR     Row Name 05/13/22 1556          Living Environment    People in Home facility resident  [facility]  -SR     Row Name 05/13/22 1556          Cognition    Orientation Status (Cognition) oriented x 4  -SR     Row Name 05/13/22 1556          Safety Issues, Functional Mobility    Impairments Affecting Function (Mobility) balance;shortness of breath  -SR           User Key  (r) = Recorded By, (t) = Taken By, (c) = Cosigned By    Initials Name Provider Type    SR Oma Ashley, OT Occupational Therapist                 Mobility/ADL's     Row Name 05/13/22 1600          Bed Mobility    Bed Mobility bed mobility (all) activities  -SR     All Activities, [place] (Bed Mobility) minimum assist (75% patient effort)  -SR     Row Name 05/13/22 1600          Transfers    Sit-Stand [place] (Transfers) contact guard  -SR     Row Name 05/13/22 1600          Sit-Stand Transfer    Assistive Device (Sit-Stand Transfers) walker, front-wheeled  -SR     Row Name 05/13/22 1600          Activities of Daily Living    BADL Assessment/Intervention lower body dressing;grooming  -SR     Row Name 05/13/22 1600          Lower Body Dressing Assessment/Training    [place] Level (Lower Body Dressing) supervision  -SR     Row Name 05/13/22 1600          Grooming Assessment/Training    [place] Level (Grooming) supervision  -SR           User Key  (r) = Recorded By, (t) = Taken By, (c) = Cosigned By    Initials Name Provider Type    SR Oma Ashley, OT Occupational Therapist               Obj/Interventions     Row Name 05/13/22 9195           Range of Motion Comprehensive    General Range of Motion no range of motion deficits identified  -SR     [facility] Name 05/13/22 1603          Strength Comprehensive (MMT)    General Manual Muscle Testing (MMT) Assessment no strength deficits identified  -SR     Row Name 05/13/22 1603          Balance    Balance Interventions sitting;standing;sit to stand;supported;static;dynamic;minimal challenge  -SR           User Key  (r) = Recorded By, (t) = Taken By, (c) = Cosigned By    Initials Name Provider Type    SR Oma Ashley OT Occupational Therapist               Goals/Plan     Row Name 05/13/22 1606          Bathing Goal 1 (OT)    Activity/Device (Bathing Goal 1, OT) bathing skills, all  -SR     [place] Level/Cues Needed (Bathing Goal 1, OT) supervision required  -SR     Time Frame (Bathing Goal 1, OT) 2 weeks  -SR     Row Name 05/13/22 1606          Toileting Goal 1 (OT)    Activity/Device (Toileting Goal 1, OT) toileting skills, all  -SR     [place] Level/Cues Needed (Toileting Goal 1, OT) supervision required  -SR     Time Frame (Toileting Goal 1, OT) 2 weeks  -SR     Row Name 05/13/22 1606          Therapy Assessment/Plan (OT)    Planned Therapy Interventions (OT) activity tolerance training;adaptive equipment training;BADL retraining;functional balance retraining;occupation/activity based interventions;transfer/mobility retraining  -SR           User Key  (r) = Recorded By, (t) = Taken By, (c) = Cosigned By    Initials Name Provider Type    SR Oma Ashley OT Occupational Therapist               Clinical Impression     Row Name 05/13/22 1604          Pain Assessment    Pretreatment Pain Rating 0/10 - no pain  -SR     Posttreatment Pain Rating 0/10 - no pain  -SR     Row Name 05/13/22 1605          Plan of Care Review    Outcome Evaluation 75 y/o F who presented from [facility] on [facility] with productive cough for 1 week, fatigue, chest pain, cough. (-) troponin, hypokalemia. Dx with  parainfluenza virus. She was recently admitted for PNA 1 month ago. Patient has a history of essential tremor. Typically independent with all ADLs and mobility using rollator.  She appears to be near her baseline, though with decreased activity tolerance and requiring assist for bed mobility.  Anticipate return to [facility] with HH therapy.  -SR     Row Name 05/13/22 1605          Therapy Assessment/Plan (OT)    Rehab Potential (OT) good, to achieve stated therapy goals  -SR     Criteria for Skilled Therapeutic Interventions Met (OT) yes  -SR     Therapy Frequency (OT) 3 times/wk  -SR     Predicted Duration of Therapy Intervention (OT) Until discharge  -SR     Row Name 05/13/22 1605          Therapy Plan Review/Discharge Plan (OT)    Anticipated Discharge Disposition (OT) home with home health  -SR     Row Name 05/13/22 1605          Positioning and Restraints    Pre-Treatment Position in bed  -SR     Post Treatment Position chair  -SR           User Key  (r) = Recorded By, (t) = Taken By, (c) = Cosigned By    Initials Name Provider Type    SR Oma Ashley OT Occupational Therapist               Outcome Measures     Row Name 05/13/22 1607          How much help from another is currently needed...    Putting on and taking off regular lower body clothing? 4  -SR     Bathing (including washing, rinsing, and drying) 3  -SR     Toileting (which includes using toilet bed pan or urinal) 4  -SR     Putting on and taking off regular upper body clothing 4  -SR     Taking care of personal grooming (such as brushing teeth) 4  -SR     Eating meals 4  -SR     AM-PAC 6 Clicks Score (OT) 23  -SR     Row Name 05/13/22 1607          Functional Assessment    Outcome Measure Options AM-PAC 6 Clicks Daily Activity (OT)  -SR           User Key  (r) = Recorded By, (t) = Taken By, (c) = Cosigned By    Initials Name Provider Type    Oma Nathan OT Occupational Therapist                Occupational Therapy Education                  Title: PT OT SLP Therapies (In Progress)     Topic: Occupational Therapy (In Progress)     Point: ADL training (In Progress)     Description:   Instruct learner(s) on proper safety adaptation and remediation techniques during self care or transfers.   Instruct in proper use of assistive devices.              Learning Progress Summary           Patient Acceptance, E,TB, NR by SR at 5/13/2022 2655                   Point: Home exercise program (Not Started)     Description:   Instruct learner(s) on appropriate technique for monitoring, assisting and/or progressing therapeutic exercises/activities.              Learner Progress:  Not documented in this visit.          Point: Precautions (Not Started)     Description:   Instruct learner(s) on prescribed precautions during self-care and functional transfers.              Learner Progress:  Not documented in this visit.          Point: Body mechanics (Not Started)     Description:   Instruct learner(s) on proper positioning and spine alignment during self-care, functional mobility activities and/or exercises.              Learner Progress:  Not documented in this visit.                      User Key     Initials Effective Dates Name Provider Type Discipline     06/16/21 -  Oma Ashley, OT Occupational Therapist OT              OT Recommendation and Plan  Planned Therapy Interventions (OT): activity tolerance training, adaptive equipment training, BADL retraining, functional balance retraining, occupation/activity based interventions, transfer/mobility retraining  Therapy Frequency (OT): 3 times/wk  Plan of Care Review  Outcome Evaluation: 75 y/o F who presented from [facility] with productive cough for 1 week, fatigue, chest pain, cough. (-) troponin, hypokalemia. Dx with parainfluenza virus. She was recently admitted for PNA 1 month ago. Patient has a history of essential tremor. Typically independent with all ADLs and mobility using rollator.   She appears to be near her baseline, though with decreased activity tolerance and requiring assist for bed mobility.  Anticipate return to [facility] with HH therapy.     Time Calculation:    Time Calculation- OT     Row Name 05/13/22 1608             Time Calculation- OT    OT Start Time 1135  -SR      OT Stop Time 1205  -SR      OT Time Calculation (min) 30 min  -SR      Total Timed Code Minutes- OT 10 minute(s)  -SR      OT Received On 05/13/22  -SR      OT - Next Appointment 05/16/22  -SR      OT Goal Re-Cert Due Date 05/27/22  -SR            User Key  (r) = Recorded By, (t) = Taken By, (c) = Cosigned By    Initials Name Provider Type    SR Oma Ashley OT Occupational Therapist              Therapy Charges for Today     Code Description Service Date Service Provider Modifiers Qty    [id number]  OT EVAL MOD COMPLEXITY 4 5/13/2022 Oma Ashley OT GO 1    [id number]  OT SELF CARE/MGMT/TRAIN EA 15 MIN 5/13/2022 Oma Ashley OT GO 1               Oma Ashley OT  5/13/2022

## 2022-05-13 NOTE — ED PROVIDER NOTES
"Subjective   Chief complaint: Patient is a pleasant 76-year-old.  She came in with her daughter.  Her daughter has had a cough over the last couple of weeks.    She has been coughing for the last week.  She has been fatigued.  She is having some upper back pain.  It does hurt when she breathes in.  She is coughing up \"creamy\" mucus.  She does notes some intermittent nondescript  chest pain.  She does not feel significant short of breath.  No pain or swelling in her legs.  No abdominal pain or vomiting or diarrhea.  No fever.    Context: Patient had a recent admission to the hospital for pneumonia a month ago.    Duration: 1 week    Timing: As above gradually over a week.    Severity: She has no severe pain.  She states her back does not hurt at this point time.    Associated Symptoms: As noted above.        PCP:  LMP:          Review of Systems   Constitutional: Positive for fatigue. Negative for fever.   HENT: Positive for congestion.    Respiratory: Positive for cough and shortness of breath.    Cardiovascular: Negative for chest pain.   Gastrointestinal: Negative for abdominal pain.   Genitourinary: Negative.    Musculoskeletal: Positive for back pain.   Skin: Negative.    Neurological: Negative for headaches.       Past Medical History:   Diagnosis Date   • Anxiety    • CHF (congestive heart failure) ([facility])    • Depression    • Diabetes mellitus ([facility])    • Hyperlipidemia    • Hypertension    • Panic disorder     \"panic attacks\"   • Tremors of nervous system     \"essential tremors\"       Allergies   Allergen Reactions   • Gabapentin Hallucinations   • Morphine Hallucinations       Past Surgical History:   Procedure Laterality Date   • CORONARY ANGIOPLASTY WITH STENT PLACEMENT         Family History   Problem Relation Age of Onset   • Depression Sister    • Suicidality Other         suicided   • Alcohol abuse Other    • Alcohol abuse Daughter    • Depression Other        Social History     Socioeconomic History   • " Marital status:    Tobacco Use   • Smoking status: Never Smoker   Substance and Sexual Activity   • Alcohol use: Not Currently   • Drug use: No   • Sexual activity: Defer           Objective   Physical Exam  Vitals and nursing note reviewed.   Constitutional:       Appearance: She is well-developed.   HENT:      Head: Normocephalic and atraumatic.   Cardiovascular:      Rate and Rhythm: Normal rate and regular rhythm.   Pulmonary:      Effort: Pulmonary effort is normal.      Breath sounds: Decreased breath sounds present.   Abdominal:      Palpations: Abdomen is soft.      Tenderness: There is no abdominal tenderness.   Musculoskeletal:      Cervical back: Neck supple.      Right lower leg: No tenderness. No edema.      Left lower leg: No tenderness. No edema.   Skin:     General: Skin is warm and dry.      Capillary Refill: Capillary refill takes less than 2 seconds.   Neurological:      General: No focal deficit present.      Mental Status: She is alert.   Psychiatric:         Mood and Affect: Mood normal.         Behavior: Behavior normal.         Procedures           ED Course      Results for orders placed or performed during the hospital encounter of 05/12/22   Respiratory Panel PCR w/COVID-19(SARS-CoV-2) BEBA/WILFRED/CELIA/PAD/COR/MAD/MIGUEL In-House, NP Swab in UTM/VTM, 3-4 HR TAT - Swab, Nasopharynx    Specimen: Nasopharynx; Swab   Result Value Ref Range    ADENOVIRUS, PCR Not Detected Not Detected    Coronavirus 229E Not Detected Not Detected    Coronavirus HKU1 Not Detected Not Detected    Coronavirus NL63 Not Detected Not Detected    Coronavirus OC43 Not Detected Not Detected    COVID19 Not Detected Not Detected - Ref. Range    Human Metapneumovirus Not Detected Not Detected    Human Rhinovirus/Enterovirus Not Detected Not Detected    Influenza A PCR Not Detected Not Detected    Influenza B PCR Not Detected Not Detected    Parainfluenza Virus 1 Not Detected Not Detected    Parainfluenza Virus 2 Not  Detected Not Detected    Parainfluenza Virus 3 Detected (A) Not Detected    Parainfluenza Virus 4 Not Detected Not Detected    RSV, PCR Not Detected Not Detected    Bordetella pertussis pcr Not Detected Not Detected    Bordetella parapertussis PCR Not Detected Not Detected    Chlamydophila pneumoniae PCR Not Detected Not Detected    Mycoplasma pneumo by PCR Not Detected Not Detected   Comprehensive Metabolic Panel    Specimen: Blood   Result Value Ref Range    Glucose 88 65 - 99 mg/dL    BUN 13 8 - 23 mg/dL    Creatinine 0.93 0.57 - 1.00 mg/dL    Sodium 141 136 - 145 mmol/L    Potassium 2.9 (L) 3.5 - 5.2 mmol/L    Chloride 103 98 - 107 mmol/L    CO2 26.0 22.0 - 29.0 mmol/L    Calcium 8.7 8.6 - 10.5 mg/dL    Total Protein 6.1 6.0 - 8.5 g/dL    Albumin 3.40 (L) 3.50 - 5.20 g/dL    ALT (SGPT) 13 1 - 33 U/L    AST (SGOT) 15 1 - 32 U/L    Alkaline Phosphatase 69 39 - 117 U/L    Total Bilirubin 0.6 0.0 - 1.2 mg/dL    Globulin 2.7 gm/dL    A/G Ratio 1.3 g/dL    BUN/Creatinine Ratio 14.0 7.0 - 25.0    Anion Gap 12.0 5.0 - 15.0 mmol/L    eGFR 63.8 >60.0 mL/min/1.73   BNP    Specimen: Blood   Result Value Ref Range    proBNP 31,131.0 (H) 0.0 - 1,800.0 pg/mL   Troponin    Specimen: Blood   Result Value Ref Range    Troponin T <0.010 0.000 - 0.030 ng/mL   CBC Auto Differential    Specimen: Blood   Result Value Ref Range    WBC 9.90 3.40 - 10.80 10*3/mm3    RBC 4.54 3.77 - 5.28 10*6/mm3    Hemoglobin 12.6 12.0 - 15.9 g/dL    Hematocrit 38.4 34.0 - 46.6 %    MCV 84.4 79.0 - 97.0 fL    MCH 27.7 26.6 - 33.0 pg    MCHC 32.8 31.5 - 35.7 g/dL    RDW 15.5 (H) 12.3 - 15.4 %    RDW-SD 46.4 37.0 - 54.0 fl    MPV 8.4 6.0 - 12.0 fL    Platelets 273 140 - 450 10*3/mm3    Neutrophil % 75.7 42.7 - 76.0 %    Lymphocyte % 17.6 (L) 19.6 - 45.3 %    Monocyte % 6.1 5.0 - 12.0 %    Eosinophil % 0.1 (L) 0.3 - 6.2 %    Basophil % 0.5 0.0 - 1.5 %    Neutrophils, Absolute 7.50 (H) 1.70 - 7.00 10*3/mm3    Lymphocytes, Absolute 1.70 0.70 - 3.10 10*3/mm3     Monocytes, Absolute 0.60 0.10 - 0.90 10*3/mm3    Eosinophils, Absolute 0.00 0.00 - 0.40 10*3/mm3    Basophils, Absolute 0.00 0.00 - 0.20 10*3/mm3    nRBC 0.1 0.0 - 0.2 /100 WBC   D-dimer, Quantitative    Specimen: Blood   Result Value Ref Range    D-Dimer, Quantitative 0.51 0.00 - 0.59 mg/L (FEU)   Magnesium    Specimen: Blood   Result Value Ref Range    Magnesium 1.6 1.6 - 2.4 mg/dL   ECG 12 Lead   Result Value Ref Range    QT Interval 472 ms   Green Top (Gel)   Result Value Ref Range    Extra Tube Hold for add-ons.    Lavender Top   Result Value Ref Range    Extra Tube hold for add-on    Gold Top - SST   Result Value Ref Range    Extra Tube Hold for add-ons.    Light Blue Top   Result Value Ref Range    Extra Tube Hold for add-ons.          XR Chest 2 View    Result Date: 5/12/2022  No acute process.  Electronically Signed By-Finn Ashton MD On:5/12/2022 9:13 PM This report was finalized on [id number] by  Finn Ashton MD.    CT Angiogram Chest Pulmonary Embolism    Result Date: 5/13/2022  1.  Moderate tree-in-bud nodular opacities throughout the left lung. Primary consideration is infectious bronchiolitis. Alternative considerations are inflammatory bronchiolitis and chronic aspiration. 2.  Moderate airways disease. 3.  Trace bilateral pleural effusions. Minor pulmonary interstitial edema. 4.  No pulmonary embolus. Electronically signed by:  Chris Frausto M.D.  5/12/2022 10:08 PM      EKG shows sinus rhythm left bundle branch block rate is 69                                      MDM  Number of Diagnoses or Management Options  Chest pain, unspecified type  Congestive heart failure, unspecified HF chronicity, unspecified heart failure type ([facility])  Parainfluenza virus infection  Diagnosis management comments: Patient did develop some chest pain intermittently.  She has a history of ischemic cardiomyopathy.  No elevation of troponin here.  However will admit as observation for chest pain rule out.  She also  has parainfluenza and does have a cough for the last week.  This could be viral induced as well.  No signs of pulmonary embolism.  She has some mild interstitial edema present on CT chest.  Lasix started here in the emergency department.  Discussed with Tosin on-call for Dr. Lopez who agreed to accept patient.       Amount and/or Complexity of Data Reviewed  Clinical lab tests: reviewed  Tests in the radiology section of CPT®: reviewed  Tests in the medicine section of CPT®: reviewed  Discussion of test results with the performing providers: yes  Decide to obtain previous medical records or to obtain history from someone other than the patient: yes  Discuss the patient with other providers: yes  Independent visualization of images, tracings, or specimens: yes    Patient Progress  Patient progress: stable      Final diagnoses:   None   Chest pain  Parainfluenza virus  CHF    ED Disposition  ED Disposition     None          No follow-up provider specified.       Medication List      No changes were made to your prescriptions during this visit.          Sunday Cordero DO  05/13/22 0040

## 2022-05-13 NOTE — PLAN OF CARE
Goal Outcome Evaluation:  Plan of Care Reviewed With: patient      75 y/o F who presented from [facility] on [facility] with productive cough for 1 week, fatigue, chest pain. Found to have chest pain, (-) troponin, hypokalemia. Recent admission for PNA 1 month ago. Patient has a history of essential tremor. She recently moved to [facility] on [facility]. She is typically independent with ADLs and ambulates with RW. She appears to be functioning near her baseline today. She performs bed mobility with min A, transfers with CGA to min A and ambulates 60' with CGA. No LOB. Patient is tangential and increased time to obtain history. Recommending return to [facility] and [facility].

## 2022-05-13 NOTE — THERAPY EVALUATION
"Acute Care - Speech Language Pathology   Swallow Initial Evaluation  [place]     Patient Name: Nadege Barrow  : 1945  MRN: [id number]  Today's Date: 2022               Admit Date: 2022    Visit Dx:     ICD-10-CM ICD-9-CM   1. Chest pain, unspecified type  R07.9 786.50   2. Parainfluenza virus infection  B34.8 078.89   3. Congestive heart failure, unspecified HF chronicity, unspecified heart failure type ([facility])  I50.9 428.0     Patient Active Problem List   Diagnosis   • Chest pain on breathing   • Acute respiratory failure with hypoxia ([facility])   • Anemia   • Anxiety   • Atherosclerotic heart disease of native coronary artery without angina pectoris   • Back pain   • Cardiomyopathy ([facility])   • Carotid artery disease ([facility])   • Cellulitis of foot   • Acute on chronic congestive heart failure ([facility])   • Current moderate episode of major depressive disorder without prior episode ([facility])   • Senile dementia, delirium, with behavioral disturbance ([facility])   • Depression, unspecified   • Disabling essential tremor   • Fatigue   • Hyperlipidemia, mixed   • Peripheral vision loss, bilateral   • Retinopathy, bilateral   • S/P right coronary artery (RCA) stent placement   • Ischemic cardiomyopathy   • Elevated LFTs   • Generalized anxiety disorder   • Benzodiazepine dependence, continuous ([facility])   • Type 2 diabetes mellitus ([facility])   • Chest pain, unspecified type   • Acute on chronic systolic CHF (congestive heart failure) ([facility])     Past Medical History:   Diagnosis Date   • Anxiety    • CHF (congestive heart failure) ([facility])    • Depression    • Diabetes mellitus ([facility])    • Hyperlipidemia    • Hypertension    • Panic disorder     \"panic attacks\"   • Tremors of nervous system     \"essential tremors\"     Past Surgical History:   Procedure Laterality Date   • CORONARY ANGIOPLASTY WITH STENT PLACEMENT         SLP Recommendation and Plan  SLP Swallowing Diagnosis: R/O pharyngeal dysphagia (22 1000)     Recommended " "Diagnostics: VFSS ([facility]) (05/13/22 1000)  Swallow Criteria for Skilled Therapeutic Interventions Met: demonstrates skilled criteria (05/13/22 1000)     Rehab Potential/Prognosis, Swallowing: good, to achieve stated therapy goals (05/13/22 1000)  Therapy Frequency (Swallow): PRN (05/13/22 1000)  Predicted Duration Therapy Intervention (Days): until discharge (05/13/22 1000)        Patient was not wearing a face mask during this therapy encounter. Therapist used appropriate personal protective equipment including mask, eye protection and gloves.  Mask used was standard procedure mask. Appropriate PPE was worn during the entire therapy session. Hand hygiene was completed before and after therapy session. Patient is not in enhanced droplet precautions.                 SWALLOW EVALUATION (last 72 hours)     SLP Adult Swallow Evaluation     Row Name 05/13/22 1000       Rehab Evaluation    Document Type evaluation  -LF    Subjective Information complains of  cough  -LF    Patient Observations alert;cooperative;agree to therapy  -LF    Patient Effort good  -LF    Symptoms Noted During/After Treatment none  -LF            General Information    Patient Profile Reviewed yes  -LF    Pertinent History Of Current Problem Pt is a 77 y/o female who presents to [facility] w/ c/o a cough that started 1 week ago and generalized fatigue. CT of chest revealed \"1. Moderate tree-in-bud nodular opacities throughout the left lung. Primary consideration is infectious bronchiolitis. Alternative considerations are inflammatory bronchiolitis and chronic aspiration. 2.Moderate airways disease. 3.  Trace bilateral pleural effusions. Minor pulmonary interstitial edema. 4.  No pulmonary embolus.\" ST department familiar w/ this pt w/ most recent recommendation of mechanical soft and NTL d/t silent aspiration observed on VFSS on 3/4/22. ST orders placed for swallowing this admission d/t pt's hx of dysphagia. Pt reports she currently eats a regular and thin " liquid diet.   -LF    Current Method of Nutrition regular textures;thin liquids  -LF    Prior Level of Function-Swallowing mechanical soft textures;nectar thick liquids;other (see comments)  s/p VFSS on 3/4/22  -LF    Plans/Goals Discussed with patient;agreed upon  -LF    Barriers to Rehab none identified  -LF            Pain    Additional Documentation Pain Scale: Numbers Pre/Post-Treatment (Group)  -LF            Pain Scale: Numbers Pre/Post-Treatment    Pretreatment Pain Rating 0/10 - no pain  -LF    Posttreatment Pain Rating 0/10 - no pain  -LF            Oral Motor Structure and Function    Dentition Assessment natural, present and adequate;missing teeth  -LF    Secretion Management WNL/WFL  -LF    Mucosal Quality moist, healthy  -LF            Oral Musculature and Cranial Nerve Assessment    Oral Motor General Assessment WFL  -LF            General Eating/Swallowing Observations    Respiratory Support Currently in Use room air  -LF    Eating/Swallowing Skills self-fed;appropriate self-feeding skills observed  -LF    Positioning During Eating upright 90 degree;upright in bed  -LF    Utensils Used spoon;straw  -LF    Consistencies Trialed regular textures;soft textures;thin liquids  -LF            Clinical Swallow Eval    Oral Prep Phase WFL  -LF    Oral Transit WFL  -LF    Oral Residue impaired  -LF    Pharyngeal Phase no overt signs/symptoms of pharyngeal impairment  -LF    Clinical Swallow Evaluation Summary Pt observed w/ breakfast tray this date to assess swallow function. Upon entry, pt awake, alert, and sitting urpight in bed. Meal tray included eggs, turkey mcdermott, and coffee. Pt self fed and independently took small bites and sips. Oral phase characterized by adequate mastication, labial seal, and bolus control with no anterior loss. No overt s/s of aspiration observed at any time. Pt's vocal quality remained clear w/ all PO. Diffuse oral cavity reside following scrambled eggs. Pt able to clear oral  residue w/ thin liquid wash. D/t pt's hx of silent aspiration, recommend pt participate in a VFSS to objectively assess swallow function and determine safest and L/R diet. Pt expressed agreement and understanding.  -LF            Oral Residue Concerns    Oral Residue Concerns diffuse residue throughout oral cavity  -LF    Diffuse Residue Throughout Oral Cavity mechanical soft  -LF            SLP Evaluation Clinical Impression    SLP Swallowing Diagnosis R/O pharyngeal dysphagia  -LF    Functional Impact risk of aspiration/pneumonia  -LF    Rehab Potential/Prognosis, Swallowing good, to achieve stated therapy goals  -LF    Swallow Criteria for Skilled Therapeutic Interventions Met demonstrates skilled criteria  -LF            SLP Treatment Clinical Impressions    Care Plan Review evaluation/treatment results reviewed;care plan/treatment goals reviewed;risks/benefits reviewed;current/potential barriers reviewed;patient/other agree to care plan  -            Recommendations    Therapy Frequency (Swallow) PRN  -LF    Predicted Duration Therapy Intervention (Days) until discharge  -LF    Recommended Diagnostics VFSS ([facility])  -            Swallow Goals (SLP)    Oral Nutrition/Hydration Goal Selection (SLP) oral nutrition/hydration, SLP goal 1;oral nutrition/hydration, SLP goal 2  -LF            Oral Nutrition/Hydration Goal 1 (SLP)    Oral Nutrition/Hydration Goal 1, SLP The pt will maximize swallow function for least restricitve PO diet, no complications associated with dysphagia, adequate PO intake, and demonstrating independent use of swallow compensations.  -    Time Frame (Oral Nutrition/Hydration Goal 1, SLP) by discharge  -LF            Oral Nutrition/Hydration Goal 2 (SLP)    Oral Nutrition/Hydration Goal 2, SLP The patient will participate in ongoing assessment of swallow, including re-evaluation clinically and/or including instrumental assessment of swallow if indicated, to further assess swallow function in  anticipation to initiate a po diet  -LF    Time Frame (Oral Nutrition/Hydration Goal 2, SLP) 1 day  -LF          User Key  (r) = Recorded By, (t) = Taken By, (c) = Cosigned By    Initials Name Effective Dates    LF Iris Perla SLP 06/16/21 -                 EDUCATION  The patient has been educated in the following areas:   Dysphagia (Swallowing Impairment) Oral Care/Hydration.              Time Calculation:                RACHEL Baca  5/13/2022

## 2022-05-13 NOTE — PLAN OF CARE
Goal Outcome Evaluation:              Outcome Evaluation: 75 y/o F who presented from [facility] with productive cough for 1 week, fatigue, chest pain, cough. (-) troponin, hypokalemia. Dx with parainfluenza virus. She was recently admitted for PNA 1 month ago. Patient has a history of essential tremor. Typically independent with all ADLs and mobility using rollator.  She appears to be near her baseline, though with decreased activity tolerance and requiring assist for bed mobility.  Anticipate return to [facility] with HH therapy.

## 2022-05-13 NOTE — H&P
"    [facility] Medicine Services      Patient Name: Nadege Barrow  : 1945  MRN: [id number]  Primary Care Physician:  Darci Wilkerson MD  Date of admission: 2022      Subjective      Chief Complaint: Cough, fatigue, chest pain    History of Present Illness: Nadege Barrow is a 76 y.o. female with a past medical history of senile dementia, chronic CAD, ischemic cardiomyopathy, chronic systolic CHF, hyperlipidemia, type 2 diabetes mellitus, depression, and generalized anxiety disorder who presented to [facility] on 2022 complaining of a cough which began approximately 1 week ago.  She states that at times she is able to cough up light yellow mucus.  She also complains of generalized fatigue.  Her intermittent chest pain is is worse with inhalation.  She describes the location of her chest pain as at \"the bottom of her ribs\".  She denies any fever, chills, nausea, vomiting, shortness of breath, palpitations, dizziness.  She also denies any weight gain or swelling.    In the ED, EKG showed sinus rhythm with a left BBB.  Chest x-ray showed no acute process.  Her CT angiogram of the chest showed moderate tree-in-bud nodular opacities throughout the left lung with consideration of infectious bronchiolitis, inflammatory bronchiolitis or chronic aspiration.  It also showed moderate airway disease and trace bilateral pleural effusions with minor pulmonary interstitial edema.  No PE noted.  The troponin was negative, proBNP 31,131, potassium 2.9, magnesium 1.6, D-dimer is negative.  All other labs are unremarkable.  Patient is afebrile, blood pressure is elevated with a max BP of 174/86, other vital signs are stable.    Today the patient is having less cough and less pain.  She is not bringing up any sputum.  She is not having fever either.  She has been seen by speech therapy and their recommendations have been followed.    Review of Systems   Constitutional: Positive for " "malaise/fatigue.   HENT: Negative.    Eyes: Negative.    Cardiovascular: Positive for chest pain.   Respiratory: Positive for cough.    Hematologic/Lymphatic: Negative.    Skin: Negative.    Musculoskeletal: Negative.    Gastrointestinal: Negative.    Genitourinary: Negative.    Neurological: Negative.    Psychiatric/Behavioral: Negative.    Allergic/Immunologic: Negative.         Personal History     Past Medical History:   Diagnosis Date   • Anxiety    • CHF (congestive heart failure) (HCC)    • Depression    • Diabetes mellitus (HCC)    • Hyperlipidemia    • Hypertension    • Panic disorder     \"panic attacks\"   • Tremors of nervous system     \"essential tremors\"       Past Surgical History:   Procedure Laterality Date   • CORONARY ANGIOPLASTY WITH STENT PLACEMENT         Family History: family history includes Alcohol abuse in her daughter and another family member; Depression in her sister and another family member; Suicidality in an other family member. Otherwise pertinent FHx was reviewed and not pertinent to current issue.    Social History:  reports that she has never smoked. She does not have any smokeless tobacco history on file. She reports previous alcohol use. She reports that she does not use drugs.    Home Medications:  Prior to Admission Medications     Prescriptions Last Dose Informant Patient Reported? Taking?    ALPRAZolam (XANAX) 2 MG tablet   No No    Take 1 tablet by mouth 3 (Three) Times a Day As Needed for Anxiety.    aspirin 81 MG EC tablet   Yes No    Take 81 mg by mouth Daily.    atorvastatin (LIPITOR) 40 MG tablet   Yes No    Take 40 mg by mouth Daily.    busPIRone (BUSPAR) 10 MG tablet   No No    Take 1 tablet by mouth Every 12 (Twelve) Hours.    Empagliflozin (Jardiance) 10 MG tablet   Yes No    Take 1 tablet by mouth Daily.    fludrocortisone 0.1 MG tablet   Yes No    Take 0.1 mg by mouth Daily.    FLUoxetine (PROzac) 20 MG capsule   Yes No    Take 20 mg by mouth Daily.    furosemide " (LASIX) 20 MG tablet   Yes No    Take 20 mg by mouth Daily.    insulin NPH-insulin regular (humuLIN 70/30,novoLIN 70/30) (70-30) 100 UNIT/ML injection   Yes No    Inject 20 Units under the skin into the appropriate area as directed 2 (Two) Times a Day With Meals.    linaclotide (LINZESS) 145 MCG capsule capsule   Yes No    Take 145 mcg by mouth Every Morning Before Breakfast.    metoprolol succinate XL (TOPROL-XL) 50 MG 24 hr tablet   Yes No    Take 50 mg by mouth Daily.    potassium chloride ER (K-TAB) 20 MEQ tablet controlled-release ER tablet   Yes No    Take 20 mEq by mouth Daily.    predniSONE (DELTASONE) 20 MG tablet   No No    Take 1 tablet by mouth Daily.    ticagrelor (BRILINTA) 90 MG tablet tablet   Yes No    Take 90 mg by mouth 2 (Two) Times a Day.    tiZANidine (ZANAFLEX) 2 MG half tablet   Yes No    Take 2 mg by mouth Every 8 (Eight) Hours As Needed for Muscle Spasms.            Allergies:  Allergies   Allergen Reactions   • Gabapentin Hallucinations   • Morphine Hallucinations       Objective      Vitals:   Temp:  [97.1 °F (36.2 °C)] 97.1 °F (36.2 °C)  Heart Rate:  [69-72] 72  Resp:  [18] 18  BP: (132-174)/(68-86) 174/86    Physical Exam  Vitals and nursing note reviewed.   Constitutional:       Appearance: Normal appearance.   HENT:      Head: Normocephalic and atraumatic.      Nose: Nose normal.      Mouth/Throat:      Mouth: Mucous membranes are moist.   Eyes:      Extraocular Movements: Extraocular movements intact.      Pupils: Pupils are equal, round, and reactive to light.   Cardiovascular:      Rate and Rhythm: Normal rate and regular rhythm.      Pulses: Normal pulses.      Heart sounds: Normal heart sounds.   Pulmonary:      Effort: Pulmonary effort is normal.      Breath sounds: Rhonchi present.      Comments: Decreased breath sounds in the bases bilaterally.  No wheezing but some scattered rhonchi.  Abdominal:      General: Bowel sounds are normal.      Palpations: Abdomen is soft.    Musculoskeletal:         General: Normal range of motion.      Cervical back: Normal range of motion.   Skin:     General: Skin is warm and dry.   Neurological:      Mental Status: She is alert and oriented to person, place, and time.   Psychiatric:         Mood and Affect: Mood normal.         Behavior: Behavior normal.       Result Review    Result Review:  I have personally reviewed the results from the time of this admission to 5/13/2022 00:39 EDT and agree with these findings:  [x]  Laboratory  []  Microbiology  [x]  Radiology  [x]  EKG/Telemetry   []  Cardiology/Vascular   []  Pathology  []  Old records  []  Other:   Most notable findings include:  The troponin was negative, proBNP 31,131    CT angiogram of the chest showed moderate tree-in-bud nodular opacities throughout the left lung with consideration of infectious bronchiolitis, inflammatory bronchiolitis or chronic aspiration.  It also showed moderate airway disease and trace bilateral pleural effusions with minor pulmonary interstitial edema.       Assessment & Plan        Active Hospital Problems:  There are no active hospital problems to display for this patient.    Plan:   Acute on chronic systolic heart failure  -proBNP 31,131  -2D echo, EF 41 to 46% 2/27/2022  -CT angiogram of the chest reviewed  -Chest x-ray reviewed  -IV Lasix given in ED  -IV Lasix ordered 40 mg BID  -Continue home metoprolol succinate  -Hold home p.o. Lasix     Hypokalemia   -Potassium 2.9, monitor  -Electrolyte replacement protocol ordered    Chest pain  -Troponin negative, trend  -EKG reviewed  -Continuous cardiac monitoring  -Nitrostat as needed  -Likely reflective of the intense coughing she has been doing recently.    Chronic CAD   -Continuous cardiac monitoring  -Continue home aspirin, Brilinta    Diabetes mellitus type 2  -Hemoglobin A1c 7.5 2/27/2022  -Accu checks before meals and at bedtime  -Continue home NPH at a decreased dose due to dietary changes while  hospitalized  -SSI ordered  -Hold home Jardiance    Hyperlipidemia  -Lipid panel reviewed  -Continue home atorvastatin    Depression with generalized anxiety disorder  -Stable  -Continue home BuSpar, Prozac, Xanax (INSPECT VERIFIED)    DVT prophylaxis:  No DVT prophylaxis order currently exists.    CODE STATUS:       Admission Status:  I believe this patient meets OBSERVATION status.    I discussed the patient's findings and my recommendations with patient.    This patient has been examined wearing appropriate Personal Protective Equipment  05/13/22      Signature: Electronically signed by Tosin Cramer DNP, APRN, 05/13/22, 3:33 AM EDT.

## 2022-05-14 LAB
ANION GAP SERPL CALCULATED.3IONS-SCNC: 13 MMOL/L (ref 5–15)
BASOPHILS # BLD AUTO: 0.1 10*3/MM3 (ref 0–0.2)
BASOPHILS NFR BLD AUTO: 0.6 % (ref 0–1.5)
BUN SERPL-MCNC: 17 MG/DL (ref 8–23)
BUN/CREAT SERPL: 18.9 (ref 7–25)
CALCIUM SPEC-SCNC: 8.7 MG/DL (ref 8.6–10.5)
CHLORIDE SERPL-SCNC: 99 MMOL/L (ref 98–107)
CO2 SERPL-SCNC: 27 MMOL/L (ref 22–29)
CREAT SERPL-MCNC: 0.9 MG/DL (ref 0.57–1)
DEPRECATED RDW RBC AUTO: 45.1 FL (ref 37–54)
EGFRCR SERPLBLD CKD-EPI 2021: 66.4 ML/MIN/1.73
EOSINOPHIL # BLD AUTO: 0 10*3/MM3 (ref 0–0.4)
EOSINOPHIL NFR BLD AUTO: 0.3 % (ref 0.3–6.2)
ERYTHROCYTE [DISTWIDTH] IN BLOOD BY AUTOMATED COUNT: 15.1 % (ref 12.3–15.4)
GLUCOSE BLDC GLUCOMTR-MCNC: 299 MG/DL (ref 70–105)
GLUCOSE BLDC GLUCOMTR-MCNC: 332 MG/DL (ref 70–105)
GLUCOSE BLDC GLUCOMTR-MCNC: 91 MG/DL (ref 70–105)
GLUCOSE SERPL-MCNC: 76 MG/DL (ref 65–99)
HCT VFR BLD AUTO: 41.1 % (ref 34–46.6)
HGB BLD-MCNC: 13 G/DL (ref 12–15.9)
LYMPHOCYTES # BLD AUTO: 2.3 10*3/MM3 (ref 0.7–3.1)
LYMPHOCYTES NFR BLD AUTO: 23.5 % (ref 19.6–45.3)
MAGNESIUM SERPL-MCNC: 1.9 MG/DL (ref 1.6–2.4)
MCH RBC QN AUTO: 26.9 PG (ref 26.6–33)
MCHC RBC AUTO-ENTMCNC: 31.7 G/DL (ref 31.5–35.7)
MCV RBC AUTO: 84.9 FL (ref 79–97)
MONOCYTES # BLD AUTO: 0.7 10*3/MM3 (ref 0.1–0.9)
MONOCYTES NFR BLD AUTO: 7.6 % (ref 5–12)
NEUTROPHILS NFR BLD AUTO: 6.7 10*3/MM3 (ref 1.7–7)
NEUTROPHILS NFR BLD AUTO: 68 % (ref 42.7–76)
NRBC BLD AUTO-RTO: 0.1 /100 WBC (ref 0–0.2)
PLATELET # BLD AUTO: 278 10*3/MM3 (ref 140–450)
PMV BLD AUTO: 9.1 FL (ref 6–12)
POTASSIUM SERPL-SCNC: 2.8 MMOL/L (ref 3.5–5.2)
POTASSIUM SERPL-SCNC: 3.8 MMOL/L (ref 3.5–5.2)
QT INTERVAL: 421 MS
RBC # BLD AUTO: 4.84 10*6/MM3 (ref 3.77–5.28)
SODIUM SERPL-SCNC: 139 MMOL/L (ref 136–145)
TROPONIN T SERPL-MCNC: <0.01 NG/ML (ref 0–0.03)
WBC NRBC COR # BLD: 9.9 10*3/MM3 (ref 3.4–10.8)

## 2022-05-14 PROCEDURE — 82962 GLUCOSE BLOOD TEST: CPT

## 2022-05-14 PROCEDURE — 25010000002 PIPERACILLIN SOD-TAZOBACTAM PER 1 G: Performed by: INTERNAL MEDICINE

## 2022-05-14 PROCEDURE — 83735 ASSAY OF MAGNESIUM: CPT

## 2022-05-14 PROCEDURE — 96367 TX/PROPH/DG ADDL SEQ IV INF: CPT

## 2022-05-14 PROCEDURE — 25010000002 MAGNESIUM SULFATE IN D5W 1G/100ML (PREMIX) 1-5 GM/100ML-% SOLUTION

## 2022-05-14 PROCEDURE — 25010000002 FUROSEMIDE PER 20 MG

## 2022-05-14 PROCEDURE — 93005 ELECTROCARDIOGRAM TRACING: CPT | Performed by: INTERNAL MEDICINE

## 2022-05-14 PROCEDURE — 96366 THER/PROPH/DIAG IV INF ADDON: CPT

## 2022-05-14 PROCEDURE — 99225 PR SBSQ OBSERVATION CARE/DAY 25 MINUTES: CPT | Performed by: INTERNAL MEDICINE

## 2022-05-14 PROCEDURE — 93010 ELECTROCARDIOGRAM REPORT: CPT | Performed by: INTERNAL MEDICINE

## 2022-05-14 PROCEDURE — 63710000001 INSULIN ISOPHANE & REGULAR PER 5 UNITS: Performed by: INTERNAL MEDICINE

## 2022-05-14 PROCEDURE — 84132 ASSAY OF SERUM POTASSIUM: CPT | Performed by: INTERNAL MEDICINE

## 2022-05-14 PROCEDURE — G0378 HOSPITAL OBSERVATION PER HR: HCPCS

## 2022-05-14 PROCEDURE — 96365 THER/PROPH/DIAG IV INF INIT: CPT

## 2022-05-14 PROCEDURE — 96376 TX/PRO/DX INJ SAME DRUG ADON: CPT

## 2022-05-14 PROCEDURE — 63710000001 PREDNISONE PER 1 MG: Performed by: INTERNAL MEDICINE

## 2022-05-14 PROCEDURE — 80048 BASIC METABOLIC PNL TOTAL CA: CPT | Performed by: INTERNAL MEDICINE

## 2022-05-14 PROCEDURE — 84484 ASSAY OF TROPONIN QUANT: CPT | Performed by: INTERNAL MEDICINE

## 2022-05-14 PROCEDURE — 99214 OFFICE O/P EST MOD 30 MIN: CPT | Performed by: INTERNAL MEDICINE

## 2022-05-14 PROCEDURE — 85025 COMPLETE CBC W/AUTO DIFF WBC: CPT | Performed by: INTERNAL MEDICINE

## 2022-05-14 PROCEDURE — 63710000001 INSULIN LISPRO (HUMAN) PER 5 UNITS: Performed by: INTERNAL MEDICINE

## 2022-05-14 RX ORDER — POTASSIUM CHLORIDE 20 MEQ/1
40 TABLET, EXTENDED RELEASE ORAL ONCE
Status: DISCONTINUED | OUTPATIENT
Start: 2022-05-14 | End: 2022-05-14

## 2022-05-14 RX ORDER — LOSARTAN POTASSIUM 25 MG/1
12.5 TABLET ORAL
Status: DISCONTINUED | OUTPATIENT
Start: 2022-05-15 | End: 2022-05-15

## 2022-05-14 RX ORDER — SPIRONOLACTONE 25 MG/1
25 TABLET ORAL DAILY
Status: DISCONTINUED | OUTPATIENT
Start: 2022-05-14 | End: 2022-05-16

## 2022-05-14 RX ORDER — POTASSIUM CHLORIDE 20 MEQ/1
40 TABLET, EXTENDED RELEASE ORAL ONCE
Status: DISCONTINUED | OUTPATIENT
Start: 2022-05-14 | End: 2022-05-16 | Stop reason: HOSPADM

## 2022-05-14 RX ORDER — FLUCONAZOLE 100 MG/1
200 TABLET ORAL EVERY 24 HOURS
Status: DISCONTINUED | OUTPATIENT
Start: 2022-05-14 | End: 2022-05-16 | Stop reason: HOSPADM

## 2022-05-14 RX ORDER — DIPHENHYDRAMINE HYDROCHLORIDE AND LIDOCAINE HYDROCHLORIDE AND ALUMINUM HYDROXIDE AND MAGNESIUM HYDRO
5 KIT 2 TIMES DAILY
Status: DISCONTINUED | OUTPATIENT
Start: 2022-05-14 | End: 2022-05-16 | Stop reason: HOSPADM

## 2022-05-14 RX ORDER — INSULIN LISPRO 100 [IU]/ML
0-9 INJECTION, SOLUTION INTRAVENOUS; SUBCUTANEOUS AS NEEDED
Status: DISCONTINUED | OUTPATIENT
Start: 2022-05-14 | End: 2022-05-16 | Stop reason: HOSPADM

## 2022-05-14 RX ORDER — INSULIN LISPRO 100 [IU]/ML
0-9 INJECTION, SOLUTION INTRAVENOUS; SUBCUTANEOUS
Status: DISCONTINUED | OUTPATIENT
Start: 2022-05-15 | End: 2022-05-16 | Stop reason: HOSPADM

## 2022-05-14 RX ADMIN — BENZONATATE 100 MG: 100 CAPSULE ORAL at 16:56

## 2022-05-14 RX ADMIN — DIPHENHYDRAMINE HYDROCHLORIDE AND LIDOCAINE HYDROCHLORIDE AND ALUMINUM HYDROXIDE AND MAGNESIUM HYDRO 5 ML: KIT at 22:31

## 2022-05-14 RX ADMIN — INSULIN HUMAN 20 UNITS: 100 INJECTION, SUSPENSION SUBCUTANEOUS at 19:11

## 2022-05-14 RX ADMIN — BUSPIRONE HYDROCHLORIDE 10 MG: 5 TABLET ORAL at 08:26

## 2022-05-14 RX ADMIN — POTASSIUM CHLORIDE 20 MEQ: 1500 TABLET, EXTENDED RELEASE ORAL at 10:12

## 2022-05-14 RX ADMIN — NYSTATIN 500000 UNITS: 100000 SUSPENSION ORAL at 22:32

## 2022-05-14 RX ADMIN — GUAIFENESIN SYRUP AND DEXTROMETHORPHAN 5 ML: 100; 10 SYRUP ORAL at 16:57

## 2022-05-14 RX ADMIN — Medication 10 ML: at 21:13

## 2022-05-14 RX ADMIN — FLUDROCORTISONE ACETATE 0.1 MG: 0.1 TABLET ORAL at 08:26

## 2022-05-14 RX ADMIN — POTASSIUM CHLORIDE 40 MEQ: 1500 TABLET, EXTENDED RELEASE ORAL at 13:09

## 2022-05-14 RX ADMIN — EMPAGLIFLOZIN 10 MG: 10 TABLET, FILM COATED ORAL at 08:27

## 2022-05-14 RX ADMIN — BENZONATATE 100 MG: 100 CAPSULE ORAL at 21:13

## 2022-05-14 RX ADMIN — TICAGRELOR 90 MG: 90 TABLET ORAL at 08:26

## 2022-05-14 RX ADMIN — PIPERACILLIN AND TAZOBACTAM 3.38 G: 3; .375 INJECTION, POWDER, LYOPHILIZED, FOR SOLUTION INTRAVENOUS at 22:32

## 2022-05-14 RX ADMIN — ALPRAZOLAM 1 MG: 1 TABLET ORAL at 08:26

## 2022-05-14 RX ADMIN — GUAIFENESIN SYRUP AND DEXTROMETHORPHAN 5 ML: 100; 10 SYRUP ORAL at 04:57

## 2022-05-14 RX ADMIN — BENZONATATE 100 MG: 100 CAPSULE ORAL at 08:27

## 2022-05-14 RX ADMIN — ATORVASTATIN CALCIUM 40 MG: 40 TABLET, FILM COATED ORAL at 08:27

## 2022-05-14 RX ADMIN — POTASSIUM CHLORIDE 40 MEQ: 1500 TABLET, EXTENDED RELEASE ORAL at 08:26

## 2022-05-14 RX ADMIN — PIPERACILLIN AND TAZOBACTAM 3.38 G: 3; .375 INJECTION, POWDER, LYOPHILIZED, FOR SOLUTION INTRAVENOUS at 16:58

## 2022-05-14 RX ADMIN — FUROSEMIDE 40 MG: 10 INJECTION, SOLUTION INTRAMUSCULAR; INTRAVENOUS at 04:57

## 2022-05-14 RX ADMIN — METOPROLOL SUCCINATE 50 MG: 50 TABLET, EXTENDED RELEASE ORAL at 08:27

## 2022-05-14 RX ADMIN — FUROSEMIDE 40 MG: 10 INJECTION, SOLUTION INTRAMUSCULAR; INTRAVENOUS at 17:03

## 2022-05-14 RX ADMIN — MAGNESIUM SULFATE 1 G: 1 INJECTION INTRAVENOUS at 08:26

## 2022-05-14 RX ADMIN — SPIRONOLACTONE 25 MG: 25 TABLET ORAL at 16:57

## 2022-05-14 RX ADMIN — INSULIN LISPRO 6 UNITS: 100 INJECTION, SOLUTION INTRAVENOUS; SUBCUTANEOUS at 12:43

## 2022-05-14 RX ADMIN — INSULIN LISPRO 7 UNITS: 100 INJECTION, SOLUTION INTRAVENOUS; SUBCUTANEOUS at 19:09

## 2022-05-14 RX ADMIN — TICAGRELOR 90 MG: 90 TABLET ORAL at 21:13

## 2022-05-14 RX ADMIN — Medication 10 ML: at 08:25

## 2022-05-14 RX ADMIN — FLUCONAZOLE 200 MG: 100 TABLET ORAL at 22:32

## 2022-05-14 RX ADMIN — PREDNISONE 20 MG: 20 TABLET ORAL at 08:27

## 2022-05-14 RX ADMIN — INSULIN HUMAN 20 UNITS: 100 INJECTION, SUSPENSION SUBCUTANEOUS at 10:13

## 2022-05-14 RX ADMIN — BUSPIRONE HYDROCHLORIDE 10 MG: 5 TABLET ORAL at 21:13

## 2022-05-14 RX ADMIN — ASPIRIN 81 MG: 81 TABLET, COATED ORAL at 08:26

## 2022-05-14 RX ADMIN — POTASSIUM CHLORIDE 40 MEQ: 1500 TABLET, EXTENDED RELEASE ORAL at 16:57

## 2022-05-14 RX ADMIN — FLUOXETINE 20 MG: 20 CAPSULE ORAL at 08:26

## 2022-05-14 RX ADMIN — ALPRAZOLAM 1 MG: 1 TABLET ORAL at 19:29

## 2022-05-14 NOTE — PROGRESS NOTES
[facility] Daily Progress Note    Patient Name: Nadege Barrow  : 1945  MRN: [id number]  Primary Care Physician:  Darci Wilkerson MD  Date of admission: 2022      Subjective      Chief Complaint: Shortness of breath with increased edema peripherally      Review of Systems   Constitutional: Negative.   HENT: Negative.    Eyes: Negative.    Cardiovascular: Negative.    Respiratory: Negative.         The patient reports that she is feeling better in terms of her breathing.   Endocrine: Negative.    Hematologic/Lymphatic: Negative.    Skin: Negative.    Musculoskeletal: Negative.    Gastrointestinal: Negative.    Genitourinary: Negative.    Neurological: Negative.    Psychiatric/Behavioral: Negative.    Allergic/Immunologic: Negative.    All other systems reviewed and are negative.         Objective      Vitals:   Temp:  [98.1 °F (36.7 °C)-99.3 °F (37.4 °C)] 99.1 °F (37.3 °C)  Heart Rate:  [52-83] 67  Resp:  [18] 18  BP: (129-171)/(70-88) 155/70    Physical Exam  Vitals and nursing note reviewed.   Constitutional:       General: She is not in acute distress.     Appearance: Normal appearance. She is well-developed. She is not ill-appearing, toxic-appearing or diaphoretic.   HENT:      Head: Normocephalic and atraumatic.      Right Ear: Ear canal and external ear normal.      Left Ear: Ear canal and external ear normal.      Nose: Nose normal. No congestion or rhinorrhea.      Mouth/Throat:      Mouth: Mucous membranes are moist.      Pharynx: No oropharyngeal exudate.   Eyes:      General: No scleral icterus.        Right eye: No discharge.         Left eye: No discharge.      Extraocular Movements: Extraocular movements intact.      Conjunctiva/sclera: Conjunctivae normal.      Pupils: Pupils are equal, round, and reactive to light.   Neck:      Thyroid: No thyromegaly.      Vascular: No carotid bruit or JVD.      Trachea: No tracheal deviation.   Cardiovascular:       Rate and Rhythm: Normal rate and regular rhythm.      Pulses: Normal pulses.      Heart sounds: Normal heart sounds. No murmur heard.    No friction rub. No gallop.   Pulmonary:      Effort: Pulmonary effort is normal. No respiratory distress.      Breath sounds: Normal breath sounds. No stridor. No wheezing, rhonchi or rales.   Chest:      Chest wall: No tenderness.   Abdominal:      General: Bowel sounds are normal. There is no distension.      Palpations: Abdomen is soft. There is no mass.      Tenderness: There is no abdominal tenderness. There is no guarding or rebound.      Hernia: No hernia is present.   Musculoskeletal:         General: No swelling, tenderness, deformity or signs of injury. Normal range of motion.      Cervical back: Normal range of motion and neck supple. No rigidity. No muscular tenderness.      Right lower leg: No edema.      Left lower leg: No edema.   Lymphadenopathy:      Cervical: No cervical adenopathy.   Skin:     General: Skin is warm and dry.      Coloration: Skin is not jaundiced or pale.      Findings: No bruising, erythema or rash.   Neurological:      General: No focal deficit present.      Mental Status: She is alert and oriented to person, place, and time. Mental status is at baseline.      Cranial Nerves: No cranial nerve deficit.      Sensory: No sensory deficit.      Motor: No weakness or abnormal muscle tone.      Coordination: Coordination normal.   Psychiatric:         Mood and Affect: Mood normal.         Behavior: Behavior normal.         Thought Content: Thought content normal.         Judgment: Judgment normal.             Result Review    Result Review:  I have personally reviewed the results from the time of this admission to 5/14/2022 16:09 EDT and agree with these findings:  [x]  Laboratory  [x]  Microbiology  [x]  Radiology  [x]  EKG/Telemetry   []  Cardiology/Vascular   []  Pathology  []  Old records  []  Other:  Most notable findings include:     Wounds  (last 24 hours)     LDA Wound     Row Name 05/14/22 0700 05/13/22 1930          Wound 05/13/22 0233 anus    Wound - Properties Group Placement Date: 05/13/22  -TA Placement Time: 0233 -TA Location: anus  -TA     Pressure Injury Stage -- --  moisture excoriation  -CN     Dressing Appearance open to air  -DW open to air  -CN     Retired Wound - Properties Group Placement Date: 05/13/22  -TA Placement Time: 0233 -TA Location: anus  -TA     Retired Wound - Properties Group Date first assessed: 05/13/22  -TA Time first assessed: 0233 -TA Location: anus  -TA           User Key  (r) = Recorded By, (t) = Taken By, (c) = Cosigned By    Initials Name Provider Type    Odilon Henao RN Registered Nurse    Josette Landaverde, RN Registered Nurse    Ivan Alvarado RN Registered Nurse                  Assessment & Plan      Brief Patient Summary:  Nadege Barrow is a 76 y.o. female who has a history of coronary artery disease status post cath at [facility] in February of 2022 which did not show any issues with ischemia.  The patient has some arrhythmias this morning and cardiology has been consulted.      ALPRAZolam, 1 mg, Oral, BID  aspirin, 81 mg, Oral, Daily  atorvastatin, 40 mg, Oral, Daily  benzonatate, 100 mg, Oral, TID  busPIRone, 10 mg, Oral, Q12H  empagliflozin, 10 mg, Oral, Daily  FLUoxetine, 20 mg, Oral, Daily  furosemide, 40 mg, Intravenous, Q12H  insulin lispro, 0-9 Units, Subcutaneous, 4x Daily With Meals & Nightly  insulin NPH-insulin regular, 20 Units, Subcutaneous, BID With Meals  [START ON 5/15/2022] losartan, 12.5 mg, Oral, Q24H  metoprolol succinate XL, 50 mg, Oral, Daily  potassium chloride, 20 mEq, Oral, Daily  potassium chloride, 40 mEq, Oral, Once  predniSONE, 20 mg, Oral, Daily  sodium chloride, 10 mL, Intravenous, Q12H  spironolactone, 25 mg, Oral, Daily  ticagrelor, 90 mg, Oral, BID             Active Hospital Problems:  Active Hospital Problems    Diagnosis    • **Acute on chronic  systolic CHF (congestive heart failure) (HCC)    • Type 2 diabetes mellitus (HCC)    • Chest pain, unspecified type    • Generalized anxiety disorder    • Carotid artery disease (HCC)    • Senile dementia, delirium, with behavioral disturbance (HCC)    • Depression, unspecified    • Cardiomyopathy (HCC)    • Hyperlipidemia, mixed      Plan:   Acute on chronic systolic heart failure  -proBNP 31,131  -2D echo, EF 41 to 46% 2/27/2022  -CT angiogram of the chest reviewed  -Chest x-ray reviewed  -IV Lasix given in ED  -IV Lasix ordered 40 mg BID  -Continue home metoprolol succinate  -Hold home p.o. Lasix      Hypokalemia   -Potassium 2.9, monitor  -Electrolyte replacement protocol ordered     Chest pain  -Troponin negative, trend  -EKG reviewed  -Continuous cardiac monitoring  -Nitrostat as needed  -Likely reflective of the intense coughing she has been doing recently.     Chronic CAD   -Continuous cardiac monitoring  -Continue home aspirin, Brilinta     Diabetes mellitus type 2  -Hemoglobin A1c 7.5 2/27/2022  -Accu checks before meals and at bedtime  -Continue home NPH at a decreased dose due to dietary changes while hospitalized  -SSI ordered  -Hold home Jardiance     Hyperlipidemia  -Lipid panel reviewed  -Continue home atorvastatin     Depression with generalized anxiety disorder  -Stable  -Continue home BuSpar, Prozac, Xanax (INSPECT VERIFIED)     PVCs and PACs  -Likely secondary to hypokalemia and hypomagnesemia  -Patient has been placed on electrolyte protocol and this has been corrected  -Contacted cardiology for their opinion on further work-up if necessary    DVT prophylaxis:  No DVT prophylaxis order currently exists.    CODE STATUS:    Level Of Support Discussed With: Patient  Code Status (Patient has no pulse and is not breathing): CPR (Attempt to Resuscitate)  Medical Interventions (Patient has pulse or is breathing): Full Support      Disposition:  I expect patient to be discharged when clinically  improved.    This patient has been examined wearing appropriate Personal Protective Equipment and discussed with hospital infection control department. 05/14/22      Electronically signed by Niya Lopez MD, 05/14/22, 16:09 EDT.  [facility] Hospitalist Team

## 2022-05-14 NOTE — PLAN OF CARE
Goal Outcome Evaluation:  Plan of Care Reviewed With: patient        Progress: improving  Outcome Evaluation: Patient reported the sore throat got better with hot tea and cough syrup. she rested well, hydrated well. did not report anxiety. finished 3 doses of potassium replacement and 1 dose of magnesium replacement. Cardiologist came and cheked on patient, made some changes to medications. will continue to monitor.

## 2022-05-14 NOTE — DISCHARGE PLACEMENT REQUEST
"Mariano Barrow (76 y.o. Female)             Date of Birth   1945    Social Security Number       Address   [address] [place] [address]    Home Phone   [phone]    MRN   [id number]       [employer]    Marital Status                               Admission Date   5/12/22    Admission Type   Emergency    Admitting Provider   Niya Lopez MD    Attending Provider   Niya Lopez MD    Department, Room/Bed   [facility] 2B MEDICAL INPATIENT, 230/1       Discharge Date       Discharge Disposition       Discharge Destination                               Attending Provider: Niya Lopez MD    Allergies: Gabapentin, Morphine    Isolation: Contact Drop   Infection: MRSA (02/27/22), Other (05/13/22)   Code Status: CPR   Advance Care Planning Activity    Ht: 160 cm (62.99\")   Wt: 72.1 kg (159 lb)    Admission Cmt: None   Principal Problem: Acute on chronic systolic CHF (congestive heart failure) (HCC) [I50.23]                 Active Insurance as of 5/12/2022     Primary Coverage     Payor Plan Insurance Group Employer/Plan Group    HUMANA MEDICARE REPLACEMENT HUMANA MEDICARE REPLACEMENT [id number]     Payor Plan Address Payor Plan Phone Number Payor Plan Fax Number Effective Dates    [address] [address] [phone]  1/1/2016 - None Entered    [facility] [address]       Subscriber Name Subscriber Birth Date Member ID       MARIANO BARROW 1945 [id number]           Secondary Coverage     Payor Plan Insurance Group Employer/Plan Group    [place]      Payor Plan Address Payor Plan Phone Number Payor Plan Fax Number Effective Dates    [address]   3/7/2022 - None Entered    [place] [address]       Subscriber Name Subscriber Birth Date Member ID       MARIANO BARROW 1945 [id number]                 Emergency Contacts      (Rel.) Home Phone Work Phone Mobile Phone    Ana Barrow (Daughter) [phone] -- [phone]             " "  History & Physical      Niya Lopez MD at 22 0039              [facility]      Patient Name: Nadege Barrow  : 1945  MRN: [id number]  Primary Care Physician:  Darci Wilkerson MD  Date of admission: 2022      Subjective      Chief Complaint: Cough, fatigue, chest pain    History of Present Illness: Nadege Barrow is a 76 y.o. female with a past medical history of senile dementia, chronic CAD, ischemic cardiomyopathy, chronic systolic CHF, hyperlipidemia, type 2 diabetes mellitus, depression, and generalized anxiety disorder who presented to [facility] on 2022 complaining of a cough which began approximately 1 week ago.  She states that at times she is able to cough up light yellow mucus.  She also complains of generalized fatigue.  Her intermittent chest pain is is worse with inhalation.  She describes the location of her chest pain as at \"the bottom of her ribs\".  She denies any fever, chills, nausea, vomiting, shortness of breath, palpitations, dizziness.  She also denies any weight gain or swelling.    In the ED, EKG showed sinus rhythm with a left BBB.  Chest x-ray showed no acute process.  Her CT angiogram of the chest showed moderate tree-in-bud nodular opacities throughout the left lung with consideration of infectious bronchiolitis, inflammatory bronchiolitis or chronic aspiration.  It also showed moderate airway disease and trace bilateral pleural effusions with minor pulmonary interstitial edema.  No PE noted.  The troponin was negative, proBNP 31,131, potassium 2.9, magnesium 1.6, D-dimer is negative.  All other labs are unremarkable.  Patient is afebrile, blood pressure is elevated with a max BP of 174/86, other vital signs are stable.    Today the patient is having less cough and less pain.  She is not bringing up any sputum.  She is not having fever either.  She has been seen by speech therapy and their recommendations have been " "followed.    Review of Systems   Constitutional: Positive for malaise/fatigue.   HENT: Negative.    Eyes: Negative.    Cardiovascular: Positive for chest pain.   Respiratory: Positive for cough.    Hematologic/Lymphatic: Negative.    Skin: Negative.    Musculoskeletal: Negative.    Gastrointestinal: Negative.    Genitourinary: Negative.    Neurological: Negative.    Psychiatric/Behavioral: Negative.    Allergic/Immunologic: Negative.         Personal History     Past Medical History:   Diagnosis Date   • Anxiety    • CHF (congestive heart failure) (HCC)    • Depression    • Diabetes mellitus (HCC)    • Hyperlipidemia    • Hypertension    • Panic disorder     \"panic attacks\"   • Tremors of nervous system     \"essential tremors\"       Past Surgical History:   Procedure Laterality Date   • CORONARY ANGIOPLASTY WITH STENT PLACEMENT         Family History: family history includes Alcohol abuse in her daughter and another family member; Depression in her sister and another family member; Suicidality in an other family member. Otherwise pertinent FHx was reviewed and not pertinent to current issue.    Social History:  reports that she has never smoked. She does not have any smokeless tobacco history on file. She reports previous alcohol use. She reports that she does not use drugs.    Home Medications:  Prior to Admission Medications     Prescriptions Last Dose Informant Patient Reported? Taking?    ALPRAZolam (XANAX) 2 MG tablet   No No    Take 1 tablet by mouth 3 (Three) Times a Day As Needed for Anxiety.    aspirin 81 MG EC tablet   Yes No    Take 81 mg by mouth Daily.    atorvastatin (LIPITOR) 40 MG tablet   Yes No    Take 40 mg by mouth Daily.    busPIRone (BUSPAR) 10 MG tablet   No No    Take 1 tablet by mouth Every 12 (Twelve) Hours.    Empagliflozin (Jardiance) 10 MG tablet   Yes No    Take 1 tablet by mouth Daily.    fludrocortisone 0.1 MG tablet   Yes No    Take 0.1 mg by mouth Daily.    FLUoxetine (PROzac) 20 MG " capsule   Yes No    Take 20 mg by mouth Daily.    furosemide (LASIX) 20 MG tablet   Yes No    Take 20 mg by mouth Daily.    insulin NPH-insulin regular (humuLIN 70/30,novoLIN 70/30) (70-30) 100 UNIT/ML injection   Yes No    Inject 20 Units under the skin into the appropriate area as directed 2 (Two) Times a Day With Meals.    linaclotide (LINZESS) 145 MCG capsule capsule   Yes No    Take 145 mcg by mouth Every Morning Before Breakfast.    metoprolol succinate XL (TOPROL-XL) 50 MG 24 hr tablet   Yes No    Take 50 mg by mouth Daily.    potassium chloride ER (K-TAB) 20 MEQ tablet controlled-release ER tablet   Yes No    Take 20 mEq by mouth Daily.    predniSONE (DELTASONE) 20 MG tablet   No No    Take 1 tablet by mouth Daily.    ticagrelor (BRILINTA) 90 MG tablet tablet   Yes No    Take 90 mg by mouth 2 (Two) Times a Day.    tiZANidine (ZANAFLEX) 2 MG half tablet   Yes No    Take 2 mg by mouth Every 8 (Eight) Hours As Needed for Muscle Spasms.            Allergies:  Allergies   Allergen Reactions   • Gabapentin Hallucinations   • Morphine Hallucinations       Objective      Vitals:   Temp:  [97.1 °F (36.2 °C)] 97.1 °F (36.2 °C)  Heart Rate:  [69-72] 72  Resp:  [18] 18  BP: (132-174)/(68-86) 174/86    Physical Exam  Vitals and nursing note reviewed.   Constitutional:       Appearance: Normal appearance.   HENT:      Head: Normocephalic and atraumatic.      Nose: Nose normal.      Mouth/Throat:      Mouth: Mucous membranes are moist.   Eyes:      Extraocular Movements: Extraocular movements intact.      Pupils: Pupils are equal, round, and reactive to light.   Cardiovascular:      Rate and Rhythm: Normal rate and regular rhythm.      Pulses: Normal pulses.      Heart sounds: Normal heart sounds.   Pulmonary:      Effort: Pulmonary effort is normal.      Breath sounds: Rhonchi present.      Comments: Decreased breath sounds in the bases bilaterally.  No wheezing but some scattered rhonchi.  Abdominal:      General: Bowel  sounds are normal.      Palpations: Abdomen is soft.   Musculoskeletal:         General: Normal range of motion.      Cervical back: Normal range of motion.   Skin:     General: Skin is warm and dry.   Neurological:      Mental Status: She is alert and oriented to person, place, and time.   Psychiatric:         Mood and Affect: Mood normal.         Behavior: Behavior normal.       Result Review    Result Review:  I have personally reviewed the results from the time of this admission to 5/13/2022 00:39 EDT and agree with these findings:  [x]  Laboratory  []  Microbiology  [x]  Radiology  [x]  EKG/Telemetry   []  Cardiology/Vascular   []  Pathology  []  Old records  []  Other:   Most notable findings include:  The troponin was negative, proBNP 31,131    CT angiogram of the chest showed moderate tree-in-bud nodular opacities throughout the left lung with consideration of infectious bronchiolitis, inflammatory bronchiolitis or chronic aspiration.  It also showed moderate airway disease and trace bilateral pleural effusions with minor pulmonary interstitial edema.       Assessment & Plan        Active Hospital Problems:  There are no active hospital problems to display for this patient.    Plan:   Acute on chronic systolic heart failure  -proBNP 31,131  -2D echo, EF 41 to 46% 2/27/2022  -CT angiogram of the chest reviewed  -Chest x-ray reviewed  -IV Lasix given in ED  -IV Lasix ordered 40 mg BID  -Continue home metoprolol succinate  -Hold home p.o. Lasix     Hypokalemia   -Potassium 2.9, monitor  -Electrolyte replacement protocol ordered    Chest pain  -Troponin negative, trend  -EKG reviewed  -Continuous cardiac monitoring  -Nitrostat as needed  -Likely reflective of the intense coughing she has been doing recently.    Chronic CAD   -Continuous cardiac monitoring  -Continue home aspirin, Brilinta    Diabetes mellitus type 2  -Hemoglobin A1c 7.5 2/27/2022  -Accu checks before meals and at bedtime  -Continue home NPH at a  decreased dose due to dietary changes while hospitalized  -SSI ordered  -Hold home Jardiance    Hyperlipidemia  -Lipid panel reviewed  -Continue home atorvastatin    Depression with generalized anxiety disorder  -Stable  -Continue home BuSpar, Prozac, Xanax (INSPECT VERIFIED)    DVT prophylaxis:  No DVT prophylaxis order currently exists.    CODE STATUS:       Admission Status:  I believe this patient meets OBSERVATION status.    I discussed the patient's findings and my recommendations with patient.    This patient has been examined wearing appropriate Personal Protective Equipment  22      Signature: Electronically signed by Tosni Cramer DNP, APRN, 22, 3:33 AM EDT.    Electronically signed by Niya Lopez MD at 22 1330       Physician Progress Notes (last 24 hours)  Notes from 22 1453 through 22 1453   No notes of this type exist for this encounter.            Physical Therapy Notes (last 48 hours)      Haylee Winkler, PT at 22 1426  Version 1 of 1       Goal Outcome Evaluation:  Plan of Care Reviewed With: patient      77 y/o F who presented from [facility] with productive cough for 1 week, fatigue, chest pain. Found to have chest pain, (-) troponin, hypokalemia. Recent admission for PNA 1 month ago. Patient has a history of essential tremor. She recently moved to [facility]. She is typically independent with ADLs and ambulates with RW. She appears to be functioning near her baseline today. She performs bed mobility with min A, transfers with CGA to min A and ambulates 60' with CGA. No LOB. Patient is tangential and increased time to obtain history. Recommending return to [facility].           Electronically signed by Haylee Winkler PT at 22 3383          Occupational Therapy Notes (last 48 hours)      Oma Ashley OT at 22 1607          Patient Name: Nadege [place]  : 1945    MRN: [id number]                               "Today's Date: 5/13/2022       Admit Date: 5/12/2022    Visit Dx:     ICD-10-CM ICD-9-CM   1. Chest pain, unspecified type  R07.9 786.50   2. Parainfluenza virus infection  B34.8 078.89   3. Congestive heart failure, unspecified HF chronicity, unspecified heart failure type ([facility])  I50.9 428.0     Patient Active Problem List   Diagnosis   • Chest pain on breathing   • Acute respiratory failure with hypoxia ([facility])   • Anemia   • Anxiety   • Atherosclerotic heart disease of native coronary artery without angina pectoris   • Back pain   • Cardiomyopathy ([facility])   • Carotid artery disease ([facility])   • Cellulitis of foot   • Acute on chronic congestive heart failure ([facility])   • Current moderate episode of major depressive disorder without prior episode ([facility])   • Senile dementia, delirium, with behavioral disturbance ([facility])   • Depression, unspecified   • Disabling essential tremor   • Fatigue   • Hyperlipidemia, mixed   • Peripheral vision loss, bilateral   • Retinopathy, bilateral   • S/P right coronary artery (RCA) stent placement   • Ischemic cardiomyopathy   • Elevated LFTs   • Generalized anxiety disorder   • Benzodiazepine dependence, continuous ([facility])   • Type 2 diabetes mellitus ([facility])   • Chest pain, unspecified type   • Acute on chronic systolic CHF (congestive heart failure) ([facility])     Past Medical History:   Diagnosis Date   • Anxiety    • CHF (congestive heart failure) ([facility])    • Depression    • Diabetes mellitus ([facility])    • Hyperlipidemia    • Hypertension    • Panic disorder     \"panic attacks\"   • Tremors of nervous system     \"essential tremors\"     Past Surgical History:   Procedure Laterality Date   • CORONARY ANGIOPLASTY WITH STENT PLACEMENT        General Information     Row Name 05/13/22 1556          OT Time and Intention    Document Type evaluation  -SR     Row Name 05/13/22 1556          Occupational Profile    Reason for Services/Referral (Occupational Profile) 77 y/o F who presented from [facility] with " productive cough for 1 week, fatigue, chest pain, cough. (-) troponin, hypokalemia. Dx with parainfluenza virus.  She was recently admitted for PNA 1 month ago. Patient has a history of essential tremor.  Typically independent with all ADLs and mobility using rollator.  -SR     Row Name 05/13/22 1556          Living Environment    People in Home facility resident  [place] [address]  -SR     Row Name 05/13/22 1556          Cognition    Orientation Status (Cognition) oriented x 4  -SR     Row Name 05/13/22 1556          Safety Issues, Functional Mobility    Impairments Affecting Function (Mobility) balance;shortness of breath  -SR           User Key  (r) = Recorded By, (t) = Taken By, (c) = Cosigned By    Initials Name Provider Type    SR Oma Ashley OT Occupational Therapist                 Mobility/ADL's     Row Name 05/13/22 1600          Bed Mobility    Bed Mobility bed mobility (all) activities  -SR     All Activities, [place] (Bed Mobility) minimum assist (75% patient effort)  -SR     Row Name 05/13/22 1600          Transfers    Sit-Stand [place] (Transfers) contact guard  -SR     Row Name 05/13/22 1600          Sit-Stand Transfer    Assistive Device (Sit-Stand Transfers) walker, front-wheeled  -SR     Row Name 05/13/22 1600          Activities of Daily Living    BADL Assessment/Intervention lower body dressing;grooming  -SR     Row Name 05/13/22 1600          Lower Body Dressing Assessment/Training    [place] Level (Lower Body Dressing) supervision  -SR     Row Name 05/13/22 1600          Grooming Assessment/Training    [place] Level (Grooming) supervision  -SR           User Key  (r) = Recorded By, (t) = Taken By, (c) = Cosigned By    Initials Name Provider Type    Oma Nathan OT Occupational Therapist               Obj/Interventions     Row Name 05/13/22 1603          Range of Motion Comprehensive    General Range of Motion no range of motion deficits  identified  -SR     [facility] Name 05/13/22 1603          Strength Comprehensive (MMT)    General Manual Muscle Testing (MMT) Assessment no strength deficits identified  -SR     [facility] Name 05/13/22 1603          Balance    Balance Interventions sitting;standing;sit to stand;supported;static;dynamic;minimal challenge  -SR           User Key  (r) = Recorded By, (t) = Taken By, (c) = Cosigned By    Initials Name Provider Type    SR Oma Ashley, OT Occupational Therapist               Goals/Plan     [facility] Name 05/13/22 1606          Bathing Goal 1 (OT)    Activity/Device (Bathing Goal 1, OT) bathing skills, all  -SR     [place] Level/Cues Needed (Bathing Goal 1, OT) supervision required  -SR     Time Frame (Bathing Goal 1, OT) 2 weeks  -SR     [facility] Name 05/13/22 1606          Toileting Goal 1 (OT)    Activity/Device (Toileting Goal 1, OT) toileting skills, all  -SR     [place] Level/Cues Needed (Toileting Goal 1, OT) supervision required  -SR     Time Frame (Toileting Goal 1, OT) 2 weeks  -SR     Row Name 05/13/22 1606          Therapy Assessment/Plan (OT)    Planned Therapy Interventions (OT) activity tolerance training;adaptive equipment training;BADL retraining;functional balance retraining;occupation/activity based interventions;transfer/mobility retraining  -SR           User Key  (r) = Recorded By, (t) = Taken By, (c) = Cosigned By    Initials Name Provider Type    SR Oma Ashley, OT Occupational Therapist               Clinical Impression     [facility] Name 05/13/22 1604          Pain Assessment    Pretreatment Pain Rating 0/10 - no pain  -SR     Posttreatment Pain Rating 0/10 - no pain  -SR     Row Name 05/13/22 1605          Plan of Care Review    Outcome Evaluation 75 y/o F who presented from [facility] on [facility] with productive cough for 1 week, fatigue, chest pain, cough. (-) troponin, hypokalemia. Dx with parainfluenza virus. She was recently admitted for PNA 1 month ago. Patient has a history of  essential tremor. Typically independent with all ADLs and mobility using rollator.  She appears to be near her baseline, though with decreased activity tolerance and requiring assist for bed mobility.  Anticipate return to [facility] with  therapy.  -SR     Row Name 05/13/22 1605          Therapy Assessment/Plan (OT)    Rehab Potential (OT) good, to achieve stated therapy goals  -SR     Criteria for Skilled Therapeutic Interventions Met (OT) yes  -SR     Therapy Frequency (OT) 3 times/wk  -SR     Predicted Duration of Therapy Intervention (OT) Until discharge  -SR     Row Name 05/13/22 1605          Therapy Plan Review/Discharge Plan (OT)    Anticipated Discharge Disposition (OT) home with home health  -SR     Row Name 05/13/22 1605          Positioning and Restraints    Pre-Treatment Position in bed  -SR     Post Treatment Position chair  -SR           User Key  (r) = Recorded By, (t) = Taken By, (c) = Cosigned By    Initials Name Provider Type    SR Oma Ashley OT Occupational Therapist               Outcome Measures     Row Name 05/13/22 1607          How much help from another is currently needed...    Putting on and taking off regular lower body clothing? 4  -SR     Bathing (including washing, rinsing, and drying) 3  -SR     Toileting (which includes using toilet bed pan or urinal) 4  -SR     Putting on and taking off regular upper body clothing 4  -SR     Taking care of personal grooming (such as brushing teeth) 4  -SR     Eating meals 4  -SR     AM-PAC 6 Clicks Score (OT) 23  -SR     Row Name 05/13/22 1607          Functional Assessment    Outcome Measure Options AM-PAC 6 Clicks Daily Activity (OT)  -SR           User Key  (r) = Recorded By, (t) = Taken By, (c) = Cosigned By    Initials Name Provider Type    Oma Nathan OT Occupational Therapist                Occupational Therapy Education                 Title: PT OT SLP Therapies (In Progress)     Topic: Occupational Therapy (In  Progress)     Point: ADL training (In Progress)     Description:   Instruct learner(s) on proper safety adaptation and remediation techniques during self care or transfers.   Instruct in proper use of assistive devices.              Learning Progress Summary           Patient Acceptance, E,TB, NR by SR at 5/13/2022 6190                   Point: Home exercise program (Not Started)     Description:   Instruct learner(s) on appropriate technique for monitoring, assisting and/or progressing therapeutic exercises/activities.              Learner Progress:  Not documented in this visit.          Point: Precautions (Not Started)     Description:   Instruct learner(s) on prescribed precautions during self-care and functional transfers.              Learner Progress:  Not documented in this visit.          Point: Body mechanics (Not Started)     Description:   Instruct learner(s) on proper positioning and spine alignment during self-care, functional mobility activities and/or exercises.              Learner Progress:  Not documented in this visit.                      User Key     Initials Effective Dates Name Provider Type Discipline     06/16/21 -  Oma Ashley, OT Occupational Therapist OT              OT Recommendation and Plan  Planned Therapy Interventions (OT): activity tolerance training, adaptive equipment training, BADL retraining, functional balance retraining, occupation/activity based interventions, transfer/mobility retraining  Therapy Frequency (OT): 3 times/wk  Plan of Care Review  Outcome Evaluation: 75 y/o F who presented from [facility] with productive cough for 1 week, fatigue, chest pain, cough. (-) troponin, hypokalemia. Dx with parainfluenza virus. She was recently admitted for PNA 1 month ago. Patient has a history of essential tremor. Typically independent with all ADLs and mobility using rollator.  She appears to be near her baseline, though with decreased activity tolerance and  requiring assist for bed mobility.  Anticipate return to [facility] with HH therapy.     Time Calculation:    Time Calculation- OT     Row Name 05/13/22 1608             Time Calculation- OT    OT Start Time 1135  -SR      OT Stop Time 1205  -SR      OT Time Calculation (min) 30 min  -SR      Total Timed Code Minutes- OT 10 minute(s)  -SR      OT Received On 05/13/22  -SR      OT - Next Appointment 05/16/22  -SR      OT Goal Re-Cert Due Date 05/27/22  -SR            User Key  (r) = Recorded By, (t) = Taken By, (c) = Cosigned By    Initials Name Provider Type    SR Oma Ashley OT Occupational Therapist              Therapy Charges for Today     Code Description Service Date Service Provider Modifiers Qty    [id number]  OT EVAL MOD COMPLEXITY 4 5/13/2022 Oma Ashley OT GO 1    [id number]  OT SELF CARE/MGMT/TRAIN EA 15 MIN 5/13/2022 Oma Ashley OT GO 1               Oma Ashley OT  5/13/2022    Electronically signed by Oma Ashley OT at 05/13/22 1609     Oma Ashley OT at 05/13/22 1608        Goal Outcome Evaluation:              Outcome Evaluation: 77 y/o F who presented from [facility] with productive cough for 1 week, fatigue, chest pain, cough. (-) troponin, hypokalemia. Dx with parainfluenza virus. She was recently admitted for PNA 1 month ago. Patient has a history of essential tremor. Typically independent with all ADLs and mobility using rollator.  She appears to be near her baseline, though with decreased activity tolerance and requiring assist for bed mobility.  Anticipate return to [facility] with HH therapy.    Electronically signed by Oma Ashley OT at 05/13/22 1608

## 2022-05-14 NOTE — CONSULTS
Cardiology Consult Note        REQUESTING PHYSICIAN    Niya Lopez MD    PATIENT IDENTIFICATION  Name: Nadege Barrow  Age: 76 y.o.  Sex: female  :  1945  MRN: [id number]             REASON FOR CONSULTATION:  76-year-old female who was previously followed by Dr. Mancilla with known history of coronary artery disease status post PCI/RCA May 2018, history of ischemic cardiomyopathy, history of systolic heart failure, essential hypertension, dyslipidemia, diabetes mellitus 2, carotid artery stenosis, anemia, antiplatelet therapy.    Left heart cath 2018 at [facility]: 80-90% distal RCA with FFR positive.  PCI to distal RCA.    TTE 2022: EF 41-45%, mild AS, moderate TR    Nuclear stress testing 2028: Normal myocardial perfusion study with no evidence of ischemia, low risk study.    CC:  Chest pain  Shortness of breath      HISTORY OF PRESENT ILLNESS:  Agree with narrative as discussed with nurse practitioner face-to-face encounter with scribed findings and narrative below  Patient presented to the emergency department at [facility] 2022 from the [facility] where she resides with complaint of cough, fatigue, intermittent chest discomfort with inhalation and mild shortness of breath.  Work-up in the ED included chest x-ray that showed no acute findings.  CT chest with moderate tree-in-bud nodular opacities throughout the left lung-consideration of infectious bronchiolitis, inflammatory bronchiolitis or chronic aspiration.  Minor pulmonary interstitial edema.  No PE.  Troponin negative.  proBNP elevated.  She was admitted for further evaluation and treatment options.  She is lying in bed comfortable on my encounter on room air.  She denies any actual chest pain but continues to have occasional nonproductive cough.  She says her cough and shortness of breath over the last 1 week which was not paid attention to by her care staff is the primary reason that she is here.  She  "has no increase in frequency severity or duration of any anginal chest pain.  Patient mentions several times about trying to wean herself off of benzodiazepine that she takes for chronic tremor.  She denies any lower extremity edema.  When she takes deep breaths she can feel issues on the right lower portion of the chest on deep breathing.    Patient denies any cardiology follow-up since her stent in 2018    Review of systems otherwise negative x14 point review of systems except as mentioned above  Historical data copied forward from previous encounters in EMR is unchanged  REVIEW OF SYSTEMS:  Pertinent items are noted in HPI, all other systems reviewed and negative    OBJECTIVE   proBNP 31,131  Potassium 2.9    ASSESSMENT  Acute on chronic heart failure with reduced ejection fraction  Hypokalemia  Chest discomfort-atypical  Coronary artery disease status post PCI  Diabetes mellitus 2  Dyslipidemia      PLAN  Continue dual antiplatelet therapy with aspirin and ticagrelor  Continue BB, statin  Patient receiving Lasix 40 mg every 12 hours, will continue gently, monitor renal function electrolytes, get to dry weight  Defer treatment of any underlying pneumonia or bronchitis to primary  Patient had recent ischemic work-up February 2022 with negative nuclear stress testing for ischemia and echocardiogram as above  Continue to diurese as renal function allows  Further recommendations as patient's hospital course progresses        Vital Signs  Visit Vitals  /88   Pulse 74   Temp 99.3 °F (37.4 °C) (Axillary)   Resp 18   Ht 160 cm (62.99\")   Wt 72.1 kg (159 lb)   SpO2 96%   BMI 28.17 kg/m²     Oxygen Therapy  SpO2: 96 %  Pulse Oximetry Type: Intermittent  Device (Oxygen Therapy): room air  Flowsheet Rows    Flowsheet Row First Filed Value   Admission Height 160 cm (63\") Documented at 05/12/2022 1933   Admission Weight 74.1 kg (163 lb 5.8 oz) Documented at 05/12/2022 1933        Intake & Output (last 3 days)       05/11 " "0701  05/12 0700 05/12 0701  05/13 0700 05/13 0701  05/14 0700 05/14 0701  05/15 0700    P.O.   1400 1680    Total Intake(mL/kg)   1400 (19.4) 1680 (23.3)    Urine (mL/kg/hr)   2350 (1.4) 700 (2)    Total Output   2350 700    Net   -950 +980            Urine Unmeasured Occurrence  1 x 1 x         Lines, Drains & Airways     Active LDAs     Name Placement date Placement time Site Days    Peripheral IV 05/12/22 2116 Right Forearm 05/12/22 2116  Forearm  1    External Urinary Catheter 05/13/22 1942  --  less than 1                MEDICAL HISTORY    Past Medical History:   Diagnosis Date   • Anxiety    • CHF (congestive heart failure) ([facility])    • Depression    • Diabetes mellitus ([facility])    • Hyperlipidemia    • Hypertension    • Panic disorder     \"panic attacks\"   • Tremors of nervous system     \"essential tremors\"        SURGICAL HISTORY    Past Surgical History:   Procedure Laterality Date   • CORONARY ANGIOPLASTY WITH STENT PLACEMENT          FAMILY HISTORY    Family History   Problem Relation Age of Onset   • Depression Sister    • Suicidality Other         suicided   • Alcohol abuse Other    • Alcohol abuse Daughter    • Depression Other        SOCIAL HISTORY    Social History     Tobacco Use   • Smoking status: Never Smoker   • Smokeless tobacco: Not on file   Substance Use Topics   • Alcohol use: Not Currently        ALLERGIES    Allergies   Allergen Reactions   • Gabapentin Hallucinations   • Morphine Hallucinations              /88   Pulse 74   Temp 99.3 °F (37.4 °C) (Axillary)   Resp 18   Ht 160 cm (62.99\")   Wt 72.1 kg (159 lb)   SpO2 96%   BMI 28.17 kg/m²   Intake/Output last 3 shifts:  I/O last 3 completed shifts:  In: 1400 [P.O.:1400]  Out: 2350 [Urine:2350]  Intake/Output this shift:  I/O this shift:  In: 1680 [P.O.:1680]  Out: 700 [Urine:700]    PHYSICAL EXAM:    General: Alert, cooperative, no distress, appears stated age  Head:  Normocephalic, atraumatic, mucous membranes " moist  Eyes:  Conjunctivae/corneas clear, EOM's intact     Neck:  Supple,  no adenopathy; no JVD or bruit  Lungs: Clear to auscultation bilaterally, no wheezes, rhonchi or rales are noted  Chest wall: No tenderness  Heart::  Regular rate and rhythm, S1 and S2 normal, 2/6 murmur to the right sternal border  Abdomen: Soft, nontender, nondistended, bowel sounds active  Extremities: No cyanosis, clubbing, or edema   Pulses: 2+ and symmetric all extremities  Skin:  No rashes or lesions  Neuro/psych: A&O x3.  Patient appears to have tremor left lower extremity  Agree with exam as discussed with nurse practitioner after my face-to-face encounter scribed findings above  Scheduled Meds:      ALPRAZolam, 1 mg, Oral, BID  aspirin, 81 mg, Oral, Daily  atorvastatin, 40 mg, Oral, Daily  benzonatate, 100 mg, Oral, TID  busPIRone, 10 mg, Oral, Q12H  empagliflozin, 10 mg, Oral, Daily  fludrocortisone, 0.1 mg, Oral, Daily  FLUoxetine, 20 mg, Oral, Daily  furosemide, 40 mg, Intravenous, Q12H  insulin lispro, 0-9 Units, Subcutaneous, 4x Daily With Meals & Nightly  insulin NPH-insulin regular, 20 Units, Subcutaneous, BID With Meals  metoprolol succinate XL, 50 mg, Oral, Daily  potassium chloride, 20 mEq, Oral, Daily  predniSONE, 20 mg, Oral, Daily  sodium chloride, 10 mL, Intravenous, Q12H  ticagrelor, 90 mg, Oral, BID        Continuous Infusions:         PRN Meds:    benzonatate  •  dextrose  •  dextrose  •  glucagon (human recombinant)  •  guaiFENesin-dextromethorphan  •  insulin lispro **AND** insulin lispro  •  magnesium sulfate **OR** magnesium sulfate in D5W 1g/100mL (PREMIX)  •  nitroglycerin  •  potassium chloride **OR** potassium chloride **OR** potassium chloride  •  sodium chloride  •  sodium chloride  •  tiZANidine        Results Review:     I reviewed the patient's new clinical results.    CBC    Results from last 7 days   Lab Units 05/14/22  0440 05/12/22  2116   WBC 10*3/mm3 9.90 9.90   HEMOGLOBIN g/dL 13.0 12.6    PLATELETS 10*3/mm3 278 273     Cr Clearance Estimated Creatinine Clearance: 50.6 mL/min (by C-G formula based on SCr of 0.9 mg/dL).  Coag     HbA1C   Lab Results   Component Value Date    HGBA1C 7.5 (H) 02/27/2022    HGBA1C 7.9 (H) 12/13/2021     Blood Glucose   Glucose   Date/Time Value Ref Range Status   05/14/2022 1104 299 (H) 70 - 105 mg/dL Final     Comment:     Serial Number: [id number]:  [id number]   05/14/2022 0744 91 70 - 105 mg/dL Final     Comment:     Serial Number: [id number]:  [id number]   05/13/2022 2241 234 (H) 70 - 105 mg/dL Final     Comment:     Serial Number: [id number]:  [id number]   05/13/2022 1834 253 (H) 70 - 105 mg/dL Final     Comment:     Serial Number: [id number]:  [id number]   05/13/2022 1107 176 (H) 70 - 105 mg/dL Final     Comment:     Serial Number: [id number]:  [id number]   05/13/2022 0611 87 70 - 105 mg/dL Final     Comment:     Serial Number: [id number]:  [id number]     Infection     CMP   Results from last 7 days   Lab Units 05/14/22 0440 05/12/22  2116   SODIUM mmol/L 139 141   POTASSIUM mmol/L 2.8* 2.9*   CHLORIDE mmol/L 99 103   CO2 mmol/L 27.0 26.0   BUN mg/dL 17 13   CREATININE mg/dL 0.90 0.93   GLUCOSE mg/dL 76 88   ALBUMIN g/dL  --  3.40*   BILIRUBIN mg/dL  --  0.6   ALK PHOS U/L  --  69   AST (SGOT) U/L  --  15   ALT (SGPT) U/L  --  13     ABG      UA      DIANA  No results found for: POCMETH, POCAMPHET, POCBARBITUR, POCBENZO, POCCOCAINE, POCOPIATES, POCOXYCODO, POCPHENCYC, POCPROPOXY, POCTHC, POCTRICYC  Lysis Labs   Results from last 7 days   Lab Units 05/14/22 0440 05/12/22  2116   HEMOGLOBIN g/dL 13.0 12.6   PLATELETS 10*3/mm3 278 273   CREATININE mg/dL 0.90 0.93     Radiology(recent) XR Chest 2 View    Result Date: 5/12/2022  No acute process.  Electronically Signed By-Finn Ashton MD On:5/12/2022 9:13 PM This report was finalized on [id number] by  Finn Ashton MD.    FL Video Swallow With Speech Single Contrast    Result Date:  5/13/2022  Modified barium swallow study with fluoroscopy. Please refer to the speech pathology report for findings and dietary recommendations.  Electronically Signed By-María Lauren MD On:5/13/2022 11:14 AM This report was finalized on [id number] by  María Lauren MD.    CT Angiogram Chest Pulmonary Embolism    Result Date: 5/13/2022  1.  Moderate tree-in-bud nodular opacities throughout the left lung. Primary consideration is infectious bronchiolitis. Alternative considerations are inflammatory bronchiolitis and chronic aspiration. 2.  Moderate airways disease. 3.  Trace bilateral pleural effusions. Minor pulmonary interstitial edema. 4.  No pulmonary embolus. Electronically signed by:  Chris Frausto M.D.  5/12/2022 10:08 PM        Results from last 7 days   Lab Units 05/14/22  0802   TROPONIN T ng/mL <0.010       Xrays, labs reviewed personally by physician.    ECG/EMG Results (most recent)     Procedure Component Value Units Date/Time    ECG 12 Lead [[id number]] Collected: 05/12/22 1942     Updated: 05/12/22 1944     QT Interval 472 ms     Narrative:      HEART RATE= 69  bpm  RR Interval= 864  ms  MN Interval= 157  ms  P Horizontal Axis= -33  deg  P Front Axis= 8  deg  QRSD Interval= 159  ms  QT Interval= 472  ms  QRS Axis= -53  deg  T Wave Axis= 93  deg  - ABNORMAL ECG -  Sinus rhythm  Left bundle branch block  ST elevation secondary to IVCD  When compared with ECG of 07-Mar-2022 3:43:05,  Significant change in rhythm  Significant repolarization change  Electronically Signed By:   Date and Time of Study: 2022-05-12 19:42:53    ECG 12 Lead [[id number]] Collected: 05/13/22 1505     Updated: 05/14/22 0708     QT Interval 421 ms     Narrative:      HEART RATE= 86  bpm  RR Interval= 700  ms  MN Interval= 192  ms  P Horizontal Axis=   deg  P Front Axis= 31  deg  QRSD Interval= 148  ms  QT Interval= 421  ms  QRS Axis= -54  deg  T Wave Axis= 108  deg  - ABNORMAL ECG -  Sinus rhythm  Ventricular premature  complex  Left bundle branch block  ST elevation secondary to IVCD  When compared with ECG of 12-May-2022 19:42:53,  No significant change  Electronically Signed By: Rick Hernandez ([facility]) 14-May-2022 07:07:56  Date and Time of Study: 2022-05-13 15:05:00    ECG 12 Lead [[id number]] Collected: 05/14/22 0807     Updated: 05/14/22 0809     QT Interval 487 ms     Narrative:      HEART RATE= 78  bpm  RR Interval= 772  ms  AZ Interval=   ms  P Horizontal Axis=   deg  P Front Axis=   deg  QRSD Interval= 157  ms  QT Interval= 487  ms  QRS Axis= -22  deg  T Wave Axis= 109  deg  - ABNORMAL ECG -  Atrial fibrillation  Ventricular premature complex  Left bundle branch block  LVH with secondary repolarization abnormality  Anterior Q waves, possibly due to LVH  When compared with ECG of 13-May-2022 15:05:00,  Significant change in rhythm: previously sinus  New or worsened ischemia or infarction  Significant repolarization change  Electronically Signed By:   Date and Time of Study: 2022-05-14 08:07:11            Medication Review:   I have reviewed the patient's current medication list  Scheduled Meds:ALPRAZolam, 1 mg, Oral, BID  aspirin, 81 mg, Oral, Daily  atorvastatin, 40 mg, Oral, Daily  benzonatate, 100 mg, Oral, TID  busPIRone, 10 mg, Oral, Q12H  empagliflozin, 10 mg, Oral, Daily  fludrocortisone, 0.1 mg, Oral, Daily  FLUoxetine, 20 mg, Oral, Daily  furosemide, 40 mg, Intravenous, Q12H  insulin lispro, 0-9 Units, Subcutaneous, 4x Daily With Meals & Nightly  insulin NPH-insulin regular, 20 Units, Subcutaneous, BID With Meals  metoprolol succinate XL, 50 mg, Oral, Daily  potassium chloride, 20 mEq, Oral, Daily  predniSONE, 20 mg, Oral, Daily  sodium chloride, 10 mL, Intravenous, Q12H  ticagrelor, 90 mg, Oral, BID      Continuous Infusions:   PRN Meds:.benzonatate  •  dextrose  •  dextrose  •  glucagon (human recombinant)  •  guaiFENesin-dextromethorphan  •  insulin lispro **AND** insulin lispro  •  magnesium sulfate **OR**  magnesium sulfate in D5W 1g/100mL (PREMIX)  •  nitroglycerin  •  potassium chloride **OR** potassium chloride **OR** potassium chloride  •  sodium chloride  •  sodium chloride  •  tiZANidine    Imaging:  Imaging Results (Last 72 Hours)     Procedure Component Value Units Date/Time    FL Video Swallow With Speech Single Contrast [[id number]] Collected: 05/13/22 1112     Updated: 05/13/22 1116    Narrative:      DATE OF EXAM:  5/13/2022 10:19 AM     PROCEDURE:  FL VIDEO SWALLOW W SPEECH SINGLE-CONTRAST-     INDICATIONS:  dysphagia; R07.9-Chest pain, unspecified; B34.8-Other viral infections  of unspecified site; I50.9-Heart failure, unspecified     COMPARISON:  Modified barium swallow study 3/4/2022.     TECHNIQUE:   This examination was performed in conjunction with speech pathology.  Lateral video fluoroscopic evaluation of the swallowing mechanism was  performed while correlate administering to the patient various  consistency food items mixed with barium.     Fluoroscopic Time:   2.0 minutes     Number of Images: 12 spot fluoroscopic series images        FINDINGS:  Modified barium swallow study was performed utilizing fluoroscopy. The  study was performed by dedicated speech pathologist. I was present  during the entire examination.        Impression:      Modified barium swallow study with fluoroscopy. Please refer to the  speech pathology report for findings and dietary recommendations.     Electronically Signed By-María Lauren MD On:5/13/2022 11:14 AM  This report was finalized on [id number] by  María Lauren MD.    CT Angiogram Chest Pulmonary Embolism [[id number]] Collected: 05/13/22 0005     Updated: 05/13/22 0009    Narrative:      EXAMINATION: CT ANGIOGRAM CHEST PULMONARY EMBOLISM      DATE:  5/12/2022 11:41 PM    INDICATION: Pulmonary embolism suspected, positive d-dimer. Cough. Generalized weakness for a few days ;    COMPARISON: February 12, 2022 unenhanced CT chest.    PROCEDURE: Iodinated  contrast material was administered intravenously during pulmonary arterial phase CT chest imaging.    3-D MIP images were performed under concurrent supervision not requiring an independent workstation, in order to better assess the vasculature.    CT dose lowering techniques were used, to include: automated exposure control, adjustment for patient size, and or use of iterative reconstruction.    FINDINGS:    Pulmonary artery: Well opacified. No filling defect.    Cardiovascular:  No thoracic aortic aneurysm. Minor atherosclerotic calcifications. Aorta not adequately opacified to assess dissection..    Mediastinum/[place]:  No mediastinal or hilar mass or significant lymphadenopathy identified.    Lungs/Pleura: Moderate diffuse peribronchial thickening. There is moderate nodular tree-in-bud opacities throughout the left lung. Minor dependent atelectasis. . Trace bilateral pleural effusions. Minor peripheral intralobular septal thickening..    Chest wall and Axilla: Normal.    Bones:  No acute abnormality. Old fracture deformity left clavicle. Degenerative changes in the spine..    Upper abdomen: Unremarkable.    3-D images corroborate 2-D findings.      Impression:        1.  Moderate tree-in-bud nodular opacities throughout the left lung. Primary consideration is infectious bronchiolitis. Alternative considerations are inflammatory bronchiolitis and chronic aspiration.  2.  Moderate airways disease.  3.  Trace bilateral pleural effusions. Minor pulmonary interstitial edema.  4.  No pulmonary embolus.          Electronically signed by:  Chris Frausto M.D.    5/12/2022 10:08 PM    XR Chest 2 View [[id number]] Collected: 05/12/22 2111     Updated: 05/12/22 2115    Narrative:         DATE OF EXAM:   5/12/2022 8:58 PM     PROCEDURE:   XR CHEST 2 VW-     INDICATIONS:   SOA Triage Protocol     COMPARISON:  03/07/2022, 02/26/2022, 09/26/2021     TECHNIQUE:   PA and lateral views of the chest were obtained.     FINDINGS:  "  The cardiomediastinal silhouette is within normal limits. The lungs are  clear. There is no pneumothorax, focal consolidation, or large pleural  effusion. Osseous structures grossly intact. Chronic fracture deformity  at the mid left clavicle.       Impression:      No acute process.     Electronically Signed By-Finn Ashton MD On:5/12/2022 9:13 PM  This report was finalized on [id number] by  Finn Ashton MD.               EMR Dragon/Transcription:   \"Dictated utilizing Dragon dictation\".           Rick Hernandez MD, PhD      Electronically signed by PAM Thorpe, 05/14/22, 11:55 AM EDT.    "

## 2022-05-14 NOTE — CASE MANAGEMENT/SOCIAL WORK
Continued Stay Note  [facility]     Patient Name: Nadege Barrow  MRN: [id number]  Today's Date: 5/14/2022    Admit Date: 5/12/2022     Discharge Plan     Row Name 05/14/22 1510       Plan    Plan Plan to return to [facility] with [facility]; pending acceptance. Need HH order.    Patient/Family in Agreement with Plan yes    Plan Comments Met with the patient at bedside to discuss PT recommendations. Patient agreeable to PT at [facility]. [facility]OM uses [facility]; referral placed. Acceptance pending. Will need HH order. Sticky note left for MD.              Met with patient in room wearing PPE: mask, face shield/goggles.    Maintained distance greater than six feet and spent less than 15 minutes in the room.        Marybel Houston RN  Weekend   [address] [address]  Office: [phone]  Fax: [phone]  [email or web address]

## 2022-05-14 NOTE — CONSULTS
Cardiology Consult Note        REQUESTING PHYSICIAN    Niya Lopez MD    PATIENT IDENTIFICATION  Name: Nadege Barrow  Age: 76 y.o.  Sex: female  :  1945  MRN: [id number]             REASON FOR CONSULTATION:  76-year-old female who was previously followed by Dr. Mancilla with known history of coronary artery disease status post PCI/RCA May 2018, history of ischemic cardiomyopathy, history of systolic heart failure, essential hypertension, dyslipidemia, diabetes mellitus 2, carotid artery stenosis, anemia, antiplatelet therapy.    Left heart cath 2018 at [facility]: 80-90% distal RCA with FFR positive.  PCI to distal RCA.    TTE 2022: EF 41-45%, mild AS, moderate TR    Nuclear stress testing 2028: Normal myocardial perfusion study with no evidence of ischemia, low risk study.    CC:  Chest pain  Shortness of breath      HISTORY OF PRESENT ILLNESS:  Agree with narrative as discussed with nurse practitioner face-to-face encounter with scribed findings and narrative below  Patient presented to the emergency department at [facility] 2022 from the [facility] where she resides with complaint of cough, fatigue, intermittent chest discomfort with inhalation and mild shortness of breath.  Work-up in the ED included chest x-ray that showed no acute findings.  CT chest with moderate tree-in-bud nodular opacities throughout the left lung-consideration of infectious bronchiolitis, inflammatory bronchiolitis or chronic aspiration.  Minor pulmonary interstitial edema.  No PE.  Troponin negative.  proBNP elevated.  She was admitted for further evaluation and treatment options.  She is lying in bed comfortable on my encounter on room air.  She denies any actual chest pain but continues to have occasional nonproductive cough.  She says her cough and shortness of breath over the last 1 week which was not paid attention to by her care staff is the primary reason that she is here.  She  has no increase in frequency severity or duration of any anginal chest pain.  Patient mentions several times about trying to wean herself off of benzodiazepine that she takes for chronic tremor.  She denies any lower extremity edema.  When she takes deep breaths she can feel issues on the right lower portion of the chest on deep breathing.    Patient denies any cardiology follow-up since her stent in 2018    Review of systems otherwise negative x14 point review of systems except as mentioned above  Historical data copied forward from previous encounters in EMR is unchanged    Medications reviewed, on essentially no heart failure therapies despite low EF of 40 to 45%    2D echo reviewed and interpreted me from a couple of months ago, which appears global with some dyssynchrony noting conduction abnormality, there might be more mild septal hypokinesis than other regions by my review  Atrial sizes are normal, mild calcium on the mitral and aortic valves    REVIEW OF SYSTEMS:  Pertinent items are noted in HPI, all other systems reviewed and negative    OBJECTIVE   proBNP 31,131  Potassium 2.9    ASSESSMENT  Acute on chronic heart failure with reduced ejection fraction  Hypokalemia  Chest discomfort-atypical  Coronary artery disease status post PCI  Diabetes mellitus 2  Dyslipidemia      PLAN  Continue dual antiplatelet therapy with aspirin and ticagrelor  Continue BB, statin  Patient receiving Lasix 40 mg every 12 hours, will continue gently, monitor renal function electrolytes, get to dry weight  Defer treatment of any underlying pneumonia or bronchitis to primary  Patient had recent ischemic work-up February 2022 with negative nuclear stress testing for ischemia and echocardiogram as above  Continue to diurese as renal function allows  Further recommendations as patient's hospital course progresses    Start Aldactone 25 daily for potassium sparing diuretic  Low-dose losartan with underlying ischemic versus nonischemic  "retinopathy EF of 40 to 45%  Continue low-dose beta-blocker heart rates in the 70s, sinus rhythm    Vital Signs  Visit Vitals  /70 (BP Location: Left arm, Patient Position: Lying)   Pulse 67   Temp 99.1 °F (37.3 °C) (Oral)   Resp 18   Ht 160 cm (62.99\")   Wt 72.1 kg (159 lb)   SpO2 95%   BMI 28.17 kg/m²     Oxygen Therapy  SpO2: 95 %  Pulse Oximetry Type: Intermittent  Device (Oxygen Therapy): room air  Flowsheet Rows    Flowsheet Row First Filed Value   Admission Height 160 cm (63\") Documented at 05/12/2022 1933   Admission Weight 74.1 kg (163 lb 5.8 oz) Documented at 05/12/2022 1933        Intake & Output (last 3 days)       05/11 0701  05/12 0700 05/12 0701  05/13 0700 05/13 0701  05/14 0700 05/14 0701  05/15 0700    P.O.   1400 2160    Total Intake(mL/kg)   1400 (19.4) 2160 (30)    Urine (mL/kg/hr)   2350 (1.4) 1100 (1.9)    Stool    0    Total Output   2350 1100    Net   -950 +1060            Urine Unmeasured Occurrence  1 x 1 x     Stool Unmeasured Occurrence    1 x        Lines, Drains & Airways     Active LDAs     Name Placement date Placement time Site Days    Peripheral IV 05/12/22 2116 Right Forearm 05/12/22 2116  Forearm  1    External Urinary Catheter 05/13/22 1942  --  less than 1                MEDICAL HISTORY    Past Medical History:   Diagnosis Date   • Anxiety    • CHF (congestive heart failure) ([facility])    • Depression    • Diabetes mellitus ([facility])    • Hyperlipidemia    • Hypertension    • Panic disorder     \"panic attacks\"   • Tremors of nervous system     \"essential tremors\"        SURGICAL HISTORY    Past Surgical History:   Procedure Laterality Date   • CORONARY ANGIOPLASTY WITH STENT PLACEMENT          FAMILY HISTORY    Family History   Problem Relation Age of Onset   • Depression Sister    • Suicidality Other         suicided   • Alcohol abuse Other    • Alcohol abuse Daughter    • Depression Other        SOCIAL HISTORY    Social History     Tobacco Use   • Smoking status: Never Smoker " "  • Smokeless tobacco: Not on file   Substance Use Topics   • Alcohol use: Not Currently        ALLERGIES    Allergies   Allergen Reactions   • Gabapentin Hallucinations   • Morphine Hallucinations              /70 (BP Location: Left arm, Patient Position: Lying)   Pulse 67   Temp 99.1 °F (37.3 °C) (Oral)   Resp 18   Ht 160 cm (62.99\")   Wt 72.1 kg (159 lb)   SpO2 95%   BMI 28.17 kg/m²   Intake/Output last 3 shifts:  I/O last 3 completed shifts:  In: 1400 [P.O.:1400]  Out: 2350 [Urine:2350]  Intake/Output this shift:  I/O this shift:  In: 2160 [P.O.:2160]  Out: 1100 [Urine:1100]    PHYSICAL EXAM:    General: Alert, cooperative, no distress, appears stated age  Head:  Normocephalic, atraumatic, mucous membranes moist  Eyes:  Conjunctivae/corneas clear, EOM's intact     Neck:  Supple,  no adenopathy; no JVD or bruit  Lungs: Clear to auscultation bilaterally, no wheezes, rhonchi or rales are noted  Chest wall: No tenderness  Heart::  Regular rate and rhythm, S1 and S2 normal, 2/6 murmur to the right sternal border  Abdomen: Soft, nontender, nondistended, bowel sounds active  Extremities: No cyanosis, clubbing, or edema   Pulses: 2+ and symmetric all extremities  Skin:  No rashes or lesions  Neuro/psych: A&O x3.  Patient appears to have tremor left lower extremity  Agree with exam as discussed with nurse practitioner after my face-to-face encounter scribed findings above  Scheduled Meds:      ALPRAZolam, 1 mg, Oral, BID  aspirin, 81 mg, Oral, Daily  atorvastatin, 40 mg, Oral, Daily  benzonatate, 100 mg, Oral, TID  busPIRone, 10 mg, Oral, Q12H  empagliflozin, 10 mg, Oral, Daily  fludrocortisone, 0.1 mg, Oral, Daily  FLUoxetine, 20 mg, Oral, Daily  furosemide, 40 mg, Intravenous, Q12H  insulin lispro, 0-9 Units, Subcutaneous, 4x Daily With Meals & Nightly  insulin NPH-insulin regular, 20 Units, Subcutaneous, BID With Meals  [START ON 5/15/2022] losartan, 12.5 mg, Oral, Q24H  metoprolol succinate XL, 50 mg, " Oral, Daily  potassium chloride, 20 mEq, Oral, Daily  potassium chloride, 40 mEq, Oral, Once  potassium chloride, 40 mEq, Oral, Once  predniSONE, 20 mg, Oral, Daily  sodium chloride, 10 mL, Intravenous, Q12H  spironolactone, 25 mg, Oral, Daily  ticagrelor, 90 mg, Oral, BID        Continuous Infusions:         PRN Meds:    benzonatate  •  dextrose  •  dextrose  •  glucagon (human recombinant)  •  guaiFENesin-dextromethorphan  •  insulin lispro **AND** insulin lispro  •  magnesium sulfate **OR** magnesium sulfate in D5W 1g/100mL (PREMIX)  •  nitroglycerin  •  potassium chloride **OR** potassium chloride **OR** potassium chloride  •  sodium chloride  •  sodium chloride  •  tiZANidine        Results Review:     I reviewed the patient's new clinical results.    CBC    Results from last 7 days   Lab Units 05/14/22  0440 05/12/22  2116   WBC 10*3/mm3 9.90 9.90   HEMOGLOBIN g/dL 13.0 12.6   PLATELETS 10*3/mm3 278 273     Cr Clearance Estimated Creatinine Clearance: 50.6 mL/min (by C-G formula based on SCr of 0.9 mg/dL).  Coag     HbA1C   Lab Results   Component Value Date    HGBA1C 7.5 (H) 02/27/2022    HGBA1C 7.9 (H) 12/13/2021     Blood Glucose   Glucose   Date/Time Value Ref Range Status   05/14/2022 1104 299 (H) 70 - 105 mg/dL Final     Comment:     Serial Number: [id number]:  [id number]   05/14/2022 0744 91 70 - 105 mg/dL Final     Comment:     Serial Number: [id number]:  [id number]   05/13/2022 2241 234 (H) 70 - 105 mg/dL Final     Comment:     Serial Number: [id number]:  [id number]   05/13/2022 1834 253 (H) 70 - 105 mg/dL Final     Comment:     Serial Number: [id number]:  [id number]   05/13/2022 1107 176 (H) 70 - 105 mg/dL Final     Comment:     Serial Number: [id number]:  [id number]   05/13/2022 0611 87 70 - 105 mg/dL Final     Comment:     Serial Number: [id number]:  [id number]     Infection     CMP   Results from last 7 days   Lab Units 05/14/22  0440 05/12/22  2116   SODIUM  mmol/L 139 141   POTASSIUM mmol/L 2.8* 2.9*   CHLORIDE mmol/L 99 103   CO2 mmol/L 27.0 26.0   BUN mg/dL 17 13   CREATININE mg/dL 0.90 0.93   GLUCOSE mg/dL 76 88   ALBUMIN g/dL  --  3.40*   BILIRUBIN mg/dL  --  0.6   ALK PHOS U/L  --  69   AST (SGOT) U/L  --  15   ALT (SGPT) U/L  --  13     ABG      UA      DIANA  No results found for: POCMETH, POCAMPHET, POCBARBITUR, POCBENZO, POCCOCAINE, POCOPIATES, POCOXYCODO, POCPHENCYC, POCPROPOXY, POCTHC, POCTRICYC  Lysis Labs   Results from last 7 days   Lab Units 05/14/22  0440 05/12/22  2116   HEMOGLOBIN g/dL 13.0 12.6   PLATELETS 10*3/mm3 278 273   CREATININE mg/dL 0.90 0.93     Radiology(recent) XR Chest 2 View    Result Date: 5/12/2022  No acute process.  Electronically Signed By-Finn Ashton MD On:5/12/2022 9:13 PM This report was finalized on [id number] by  Finn Ashton MD.    FL Video Swallow With Speech Single Contrast    Result Date: 5/13/2022  Modified barium swallow study with fluoroscopy. Please refer to the speech pathology report for findings and dietary recommendations.  Electronically Signed By-María Lauren MD On:5/13/2022 11:14 AM This report was finalized on [id number] by  María Lauren MD.    CT Angiogram Chest Pulmonary Embolism    Result Date: 5/13/2022  1.  Moderate tree-in-bud nodular opacities throughout the left lung. Primary consideration is infectious bronchiolitis. Alternative considerations are inflammatory bronchiolitis and chronic aspiration. 2.  Moderate airways disease. 3.  Trace bilateral pleural effusions. Minor pulmonary interstitial edema. 4.  No pulmonary embolus. Electronically signed by:  Chris Frausto M.D.  5/12/2022 10:08 PM        Results from last 7 days   Lab Units 05/14/22  0802   TROPONIN T ng/mL <0.010       Xrays, labs reviewed personally by physician.    ECG/EMG Results (most recent)     Procedure Component Value Units Date/Time    ECG 12 Lead [[id number]] Collected: 05/12/22 1942     Updated: 05/12/22 1944     QT  Interval 472 ms     Narrative:      HEART RATE= 69  bpm  RR Interval= 864  ms  DC Interval= 157  ms  P Horizontal Axis= -33  deg  P Front Axis= 8  deg  QRSD Interval= 159  ms  QT Interval= 472  ms  QRS Axis= -53  deg  T Wave Axis= 93  deg  - ABNORMAL ECG -  Sinus rhythm  Left bundle branch block  ST elevation secondary to IVCD  When compared with ECG of 07-Mar-2022 3:43:05,  Significant change in rhythm  Significant repolarization change  Electronically Signed By:   Date and Time of Study: 2022-05-12 19:42:53    ECG 12 Lead [[id number]] Collected: 05/13/22 1505     Updated: 05/14/22 0708     QT Interval 421 ms     Narrative:      HEART RATE= 86  bpm  RR Interval= 700  ms  DC Interval= 192  ms  P Horizontal Axis=   deg  P Front Axis= 31  deg  QRSD Interval= 148  ms  QT Interval= 421  ms  QRS Axis= -54  deg  T Wave Axis= 108  deg  - ABNORMAL ECG -  Sinus rhythm  Ventricular premature complex  Left bundle branch block  ST elevation secondary to IVCD  When compared with ECG of 12-May-2022 19:42:53,  No significant change  Electronically Signed By: Rick Hernandez ([facility]) 14-May-2022 07:07:56  Date and Time of Study: 2022-05-13 15:05:00    ECG 12 Lead [[id number]] Collected: 05/14/22 0807     Updated: 05/14/22 0809     QT Interval 487 ms     Narrative:      HEART RATE= 78  bpm  RR Interval= 772  ms  DC Interval=   ms  P Horizontal Axis=   deg  P Front Axis=   deg  QRSD Interval= 157  ms  QT Interval= 487  ms  QRS Axis= -22  deg  T Wave Axis= 109  deg  - ABNORMAL ECG -  Atrial fibrillation  Ventricular premature complex  Left bundle branch block  LVH with secondary repolarization abnormality  Anterior Q waves, possibly due to LVH  When compared with ECG of 13-May-2022 15:05:00,  Significant change in rhythm: previously sinus  New or worsened ischemia or infarction  Significant repolarization change  Electronically Signed By:   Date and Time of Study: 2022-05-14 08:07:11            Medication Review:   I have reviewed the  patient's current medication list  Scheduled Meds:ALPRAZolam, 1 mg, Oral, BID  aspirin, 81 mg, Oral, Daily  atorvastatin, 40 mg, Oral, Daily  benzonatate, 100 mg, Oral, TID  busPIRone, 10 mg, Oral, Q12H  empagliflozin, 10 mg, Oral, Daily  fludrocortisone, 0.1 mg, Oral, Daily  FLUoxetine, 20 mg, Oral, Daily  furosemide, 40 mg, Intravenous, Q12H  insulin lispro, 0-9 Units, Subcutaneous, 4x Daily With Meals & Nightly  insulin NPH-insulin regular, 20 Units, Subcutaneous, BID With Meals  [START ON 5/15/2022] losartan, 12.5 mg, Oral, Q24H  metoprolol succinate XL, 50 mg, Oral, Daily  potassium chloride, 20 mEq, Oral, Daily  potassium chloride, 40 mEq, Oral, Once  potassium chloride, 40 mEq, Oral, Once  predniSONE, 20 mg, Oral, Daily  sodium chloride, 10 mL, Intravenous, Q12H  spironolactone, 25 mg, Oral, Daily  ticagrelor, 90 mg, Oral, BID      Continuous Infusions:   PRN Meds:.benzonatate  •  dextrose  •  dextrose  •  glucagon (human recombinant)  •  guaiFENesin-dextromethorphan  •  insulin lispro **AND** insulin lispro  •  magnesium sulfate **OR** magnesium sulfate in D5W 1g/100mL (PREMIX)  •  nitroglycerin  •  potassium chloride **OR** potassium chloride **OR** potassium chloride  •  sodium chloride  •  sodium chloride  •  tiZANidine    Imaging:  Imaging Results (Last 72 Hours)     Procedure Component Value Units Date/Time    FL Video Swallow With Speech Single Contrast [[id number]] Collected: 05/13/22 1112     Updated: 05/13/22 1116    Narrative:      DATE OF EXAM:  5/13/2022 10:19 AM     PROCEDURE:  FL VIDEO SWALLOW W SPEECH SINGLE-CONTRAST-     INDICATIONS:  dysphagia; R07.9-Chest pain, unspecified; B34.8-Other viral infections  of unspecified site; I50.9-Heart failure, unspecified     COMPARISON:  Modified barium swallow study 3/4/2022.     TECHNIQUE:   This examination was performed in conjunction with speech pathology.  Lateral video fluoroscopic evaluation of the swallowing mechanism was  performed while  correlate administering to the patient various  consistency food items mixed with barium.     Fluoroscopic Time:   2.0 minutes     Number of Images: 12 spot fluoroscopic series images        FINDINGS:  Modified barium swallow study was performed utilizing fluoroscopy. The  study was performed by dedicated speech pathologist. I was present  during the entire examination.        Impression:      Modified barium swallow study with fluoroscopy. Please refer to the  speech pathology report for findings and dietary recommendations.     Electronically Signed By-María Lauren MD On:5/13/2022 11:14 AM  This report was finalized on [id number] by  María Lauren MD.    CT Angiogram Chest Pulmonary Embolism [[id number]] Collected: 05/13/22 0005     Updated: 05/13/22 0009    Narrative:      EXAMINATION: CT ANGIOGRAM CHEST PULMONARY EMBOLISM      DATE:  5/12/2022 11:41 PM    INDICATION: Pulmonary embolism suspected, positive d-dimer. Cough. Generalized weakness for a few days ;    COMPARISON: February 12, 2022 unenhanced CT chest.    PROCEDURE: Iodinated contrast material was administered intravenously during pulmonary arterial phase CT chest imaging.    3-D MIP images were performed under concurrent supervision not requiring an independent workstation, in order to better assess the vasculature.    CT dose lowering techniques were used, to include: automated exposure control, adjustment for patient size, and or use of iterative reconstruction.    FINDINGS:    Pulmonary artery: Well opacified. No filling defect.    Cardiovascular:  No thoracic aortic aneurysm. Minor atherosclerotic calcifications. Aorta not adequately opacified to assess dissection..    Mediastinum/[place]:  No mediastinal or hilar mass or significant lymphadenopathy identified.    Lungs/Pleura: Moderate diffuse peribronchial thickening. There is moderate nodular tree-in-bud opacities throughout the left lung. Minor dependent atelectasis. . Trace bilateral  "pleural effusions. Minor peripheral intralobular septal thickening..    Chest wall and Axilla: Normal.    Bones:  No acute abnormality. Old fracture deformity left clavicle. Degenerative changes in the spine..    Upper abdomen: Unremarkable.    3-D images corroborate 2-D findings.      Impression:        1.  Moderate tree-in-bud nodular opacities throughout the left lung. Primary consideration is infectious bronchiolitis. Alternative considerations are inflammatory bronchiolitis and chronic aspiration.  2.  Moderate airways disease.  3.  Trace bilateral pleural effusions. Minor pulmonary interstitial edema.  4.  No pulmonary embolus.          Electronically signed by:  Chris Frausto M.D.    5/12/2022 10:08 PM    XR Chest 2 View [[id number]] Collected: 05/12/22 2111     Updated: 05/12/22 2115    Narrative:         DATE OF EXAM:   5/12/2022 8:58 PM     PROCEDURE:   XR CHEST 2 VW-     INDICATIONS:   SOA Triage Protocol     COMPARISON:  03/07/2022, 02/26/2022, 09/26/2021     TECHNIQUE:   PA and lateral views of the chest were obtained.     FINDINGS:   The cardiomediastinal silhouette is within normal limits. The lungs are  clear. There is no pneumothorax, focal consolidation, or large pleural  effusion. Osseous structures grossly intact. Chronic fracture deformity  at the mid left clavicle.       Impression:      No acute process.     Electronically Signed By-Finn Ashton MD On:5/12/2022 9:13 PM  This report was finalized on [id number] by  Finn Ashton MD.               EMR Dragon/Transcription:   \"Dictated utilizing Dragon dictation\".           Rick Hernandez MD, PhD      Electronically signed by PAM Thorpe, 05/14/22, 11:55 AM EDT.    "

## 2022-05-15 LAB
ANION GAP SERPL CALCULATED.3IONS-SCNC: 12 MMOL/L (ref 5–15)
BASOPHILS # BLD AUTO: 0 10*3/MM3 (ref 0–0.2)
BASOPHILS NFR BLD AUTO: 0.3 % (ref 0–1.5)
BUN SERPL-MCNC: 24 MG/DL (ref 8–23)
BUN/CREAT SERPL: 23.5 (ref 7–25)
CALCIUM SPEC-SCNC: 8.4 MG/DL (ref 8.6–10.5)
CHLORIDE SERPL-SCNC: 101 MMOL/L (ref 98–107)
CO2 SERPL-SCNC: 26 MMOL/L (ref 22–29)
CREAT SERPL-MCNC: 1.02 MG/DL (ref 0.57–1)
DEPRECATED RDW RBC AUTO: 44.2 FL (ref 37–54)
EGFRCR SERPLBLD CKD-EPI 2021: 57.1 ML/MIN/1.73
EOSINOPHIL # BLD AUTO: 0.1 10*3/MM3 (ref 0–0.4)
EOSINOPHIL NFR BLD AUTO: 1.2 % (ref 0.3–6.2)
ERYTHROCYTE [DISTWIDTH] IN BLOOD BY AUTOMATED COUNT: 15.1 % (ref 12.3–15.4)
GLUCOSE BLDC GLUCOMTR-MCNC: 128 MG/DL (ref 70–105)
GLUCOSE BLDC GLUCOMTR-MCNC: 238 MG/DL (ref 70–105)
GLUCOSE BLDC GLUCOMTR-MCNC: 345 MG/DL (ref 70–105)
GLUCOSE SERPL-MCNC: 82 MG/DL (ref 65–99)
HCT VFR BLD AUTO: 42.7 % (ref 34–46.6)
HGB BLD-MCNC: 13.3 G/DL (ref 12–15.9)
LYMPHOCYTES # BLD AUTO: 2.6 10*3/MM3 (ref 0.7–3.1)
LYMPHOCYTES NFR BLD AUTO: 23 % (ref 19.6–45.3)
MAGNESIUM SERPL-MCNC: 1.9 MG/DL (ref 1.6–2.4)
MCH RBC QN AUTO: 26.4 PG (ref 26.6–33)
MCHC RBC AUTO-ENTMCNC: 31.1 G/DL (ref 31.5–35.7)
MCV RBC AUTO: 85 FL (ref 79–97)
MONOCYTES # BLD AUTO: 1.1 10*3/MM3 (ref 0.1–0.9)
MONOCYTES NFR BLD AUTO: 9.8 % (ref 5–12)
NEUTROPHILS NFR BLD AUTO: 65.7 % (ref 42.7–76)
NEUTROPHILS NFR BLD AUTO: 7.5 10*3/MM3 (ref 1.7–7)
NRBC BLD AUTO-RTO: 0 /100 WBC (ref 0–0.2)
PLATELET # BLD AUTO: 288 10*3/MM3 (ref 140–450)
PMV BLD AUTO: 9 FL (ref 6–12)
POTASSIUM SERPL-SCNC: 3.3 MMOL/L (ref 3.5–5.2)
POTASSIUM SERPL-SCNC: 4.5 MMOL/L (ref 3.5–5.2)
QT INTERVAL: 472 MS
RBC # BLD AUTO: 5.02 10*6/MM3 (ref 3.77–5.28)
SODIUM SERPL-SCNC: 139 MMOL/L (ref 136–145)
WBC NRBC COR # BLD: 11.4 10*3/MM3 (ref 3.4–10.8)

## 2022-05-15 PROCEDURE — 63710000001 PREDNISONE PER 1 MG: Performed by: INTERNAL MEDICINE

## 2022-05-15 PROCEDURE — 82962 GLUCOSE BLOOD TEST: CPT

## 2022-05-15 PROCEDURE — 25010000002 PIPERACILLIN SOD-TAZOBACTAM PER 1 G: Performed by: INTERNAL MEDICINE

## 2022-05-15 PROCEDURE — 85025 COMPLETE CBC W/AUTO DIFF WBC: CPT | Performed by: INTERNAL MEDICINE

## 2022-05-15 PROCEDURE — 25010000002 ENOXAPARIN PER 10 MG: Performed by: INTERNAL MEDICINE

## 2022-05-15 PROCEDURE — 25010000002 FUROSEMIDE PER 20 MG

## 2022-05-15 PROCEDURE — G0378 HOSPITAL OBSERVATION PER HR: HCPCS

## 2022-05-15 PROCEDURE — 63710000001 INSULIN LISPRO (HUMAN) PER 5 UNITS: Performed by: INTERNAL MEDICINE

## 2022-05-15 PROCEDURE — 99214 OFFICE O/P EST MOD 30 MIN: CPT | Performed by: INTERNAL MEDICINE

## 2022-05-15 PROCEDURE — 96372 THER/PROPH/DIAG INJ SC/IM: CPT

## 2022-05-15 PROCEDURE — 99225 PR SBSQ OBSERVATION CARE/DAY 25 MINUTES: CPT | Performed by: INTERNAL MEDICINE

## 2022-05-15 PROCEDURE — 63710000001 INSULIN ISOPHANE & REGULAR PER 5 UNITS: Performed by: INTERNAL MEDICINE

## 2022-05-15 PROCEDURE — 83735 ASSAY OF MAGNESIUM: CPT

## 2022-05-15 PROCEDURE — 96376 TX/PRO/DX INJ SAME DRUG ADON: CPT

## 2022-05-15 PROCEDURE — 84132 ASSAY OF SERUM POTASSIUM: CPT | Performed by: INTERNAL MEDICINE

## 2022-05-15 PROCEDURE — 96366 THER/PROPH/DIAG IV INF ADDON: CPT

## 2022-05-15 PROCEDURE — 80048 BASIC METABOLIC PNL TOTAL CA: CPT | Performed by: INTERNAL MEDICINE

## 2022-05-15 RX ORDER — POTASSIUM CHLORIDE 20 MEQ/1
40 TABLET, EXTENDED RELEASE ORAL ONCE
Status: DISCONTINUED | OUTPATIENT
Start: 2022-05-15 | End: 2022-05-15

## 2022-05-15 RX ORDER — FUROSEMIDE 40 MG/1
40 TABLET ORAL DAILY
Status: DISCONTINUED | OUTPATIENT
Start: 2022-05-15 | End: 2022-05-16

## 2022-05-15 RX ORDER — ENOXAPARIN SODIUM 100 MG/ML
40 INJECTION SUBCUTANEOUS EVERY 24 HOURS
Status: DISCONTINUED | OUTPATIENT
Start: 2022-05-15 | End: 2022-05-16 | Stop reason: HOSPADM

## 2022-05-15 RX ORDER — LOSARTAN POTASSIUM 25 MG/1
25 TABLET ORAL
Status: DISCONTINUED | OUTPATIENT
Start: 2022-05-16 | End: 2022-05-16 | Stop reason: HOSPADM

## 2022-05-15 RX ORDER — DOCUSATE SODIUM 100 MG/1
100 CAPSULE, LIQUID FILLED ORAL 2 TIMES DAILY
Status: DISCONTINUED | OUTPATIENT
Start: 2022-05-15 | End: 2022-05-16 | Stop reason: HOSPADM

## 2022-05-15 RX ORDER — NYSTATIN 100000 [USP'U]/G
POWDER TOPICAL EVERY 12 HOURS SCHEDULED
Status: DISCONTINUED | OUTPATIENT
Start: 2022-05-15 | End: 2022-05-16 | Stop reason: HOSPADM

## 2022-05-15 RX ORDER — LOSARTAN POTASSIUM 25 MG/1
12.5 TABLET ORAL ONCE
Status: COMPLETED | OUTPATIENT
Start: 2022-05-15 | End: 2022-05-15

## 2022-05-15 RX ADMIN — ATORVASTATIN CALCIUM 40 MG: 40 TABLET, FILM COATED ORAL at 08:52

## 2022-05-15 RX ADMIN — PIPERACILLIN AND TAZOBACTAM 3.38 G: 3; .375 INJECTION, POWDER, LYOPHILIZED, FOR SOLUTION INTRAVENOUS at 23:46

## 2022-05-15 RX ADMIN — TICAGRELOR 90 MG: 90 TABLET ORAL at 09:03

## 2022-05-15 RX ADMIN — ASPIRIN 81 MG: 81 TABLET, COATED ORAL at 08:52

## 2022-05-15 RX ADMIN — POTASSIUM CHLORIDE 40 MEQ: 1500 TABLET, EXTENDED RELEASE ORAL at 04:38

## 2022-05-15 RX ADMIN — BENZONATATE 100 MG: 100 CAPSULE ORAL at 21:05

## 2022-05-15 RX ADMIN — NYSTATIN 1 APPLICATION: 100000 POWDER TOPICAL at 21:07

## 2022-05-15 RX ADMIN — FLUOXETINE 20 MG: 20 CAPSULE ORAL at 09:03

## 2022-05-15 RX ADMIN — Medication 10 ML: at 21:06

## 2022-05-15 RX ADMIN — INSULIN HUMAN 20 UNITS: 100 INJECTION, SUSPENSION SUBCUTANEOUS at 09:02

## 2022-05-15 RX ADMIN — INSULIN HUMAN 20 UNITS: 100 INJECTION, SUSPENSION SUBCUTANEOUS at 18:15

## 2022-05-15 RX ADMIN — ALPRAZOLAM 1 MG: 1 TABLET ORAL at 08:52

## 2022-05-15 RX ADMIN — LOSARTAN POTASSIUM 12.5 MG: 25 TABLET, FILM COATED ORAL at 09:05

## 2022-05-15 RX ADMIN — EMPAGLIFLOZIN 10 MG: 10 TABLET, FILM COATED ORAL at 08:52

## 2022-05-15 RX ADMIN — PREDNISONE 20 MG: 20 TABLET ORAL at 09:04

## 2022-05-15 RX ADMIN — NYSTATIN 500000 UNITS: 100000 SUSPENSION ORAL at 09:03

## 2022-05-15 RX ADMIN — BUSPIRONE HYDROCHLORIDE 10 MG: 5 TABLET ORAL at 08:52

## 2022-05-15 RX ADMIN — INSULIN LISPRO 4 UNITS: 100 INJECTION, SOLUTION INTRAVENOUS; SUBCUTANEOUS at 13:13

## 2022-05-15 RX ADMIN — FLUCONAZOLE 200 MG: 100 TABLET ORAL at 21:05

## 2022-05-15 RX ADMIN — GUAIFENESIN SYRUP AND DEXTROMETHORPHAN 5 ML: 100; 10 SYRUP ORAL at 08:52

## 2022-05-15 RX ADMIN — BENZONATATE 100 MG: 100 CAPSULE ORAL at 15:48

## 2022-05-15 RX ADMIN — NYSTATIN 500000 UNITS: 100000 SUSPENSION ORAL at 13:12

## 2022-05-15 RX ADMIN — DIPHENHYDRAMINE HYDROCHLORIDE AND LIDOCAINE HYDROCHLORIDE AND ALUMINUM HYDROXIDE AND MAGNESIUM HYDRO 5 ML: KIT at 21:05

## 2022-05-15 RX ADMIN — INSULIN LISPRO 7 UNITS: 100 INJECTION, SOLUTION INTRAVENOUS; SUBCUTANEOUS at 18:14

## 2022-05-15 RX ADMIN — PIPERACILLIN AND TAZOBACTAM 3.38 G: 3; .375 INJECTION, POWDER, LYOPHILIZED, FOR SOLUTION INTRAVENOUS at 15:49

## 2022-05-15 RX ADMIN — BENZONATATE 100 MG: 100 CAPSULE ORAL at 08:52

## 2022-05-15 RX ADMIN — TICAGRELOR 90 MG: 90 TABLET ORAL at 21:05

## 2022-05-15 RX ADMIN — PIPERACILLIN AND TAZOBACTAM 3.38 G: 3; .375 INJECTION, POWDER, LYOPHILIZED, FOR SOLUTION INTRAVENOUS at 09:05

## 2022-05-15 RX ADMIN — BUSPIRONE HYDROCHLORIDE 10 MG: 5 TABLET ORAL at 21:05

## 2022-05-15 RX ADMIN — LOSARTAN POTASSIUM 12.5 MG: 25 TABLET, FILM COATED ORAL at 13:12

## 2022-05-15 RX ADMIN — POTASSIUM CHLORIDE 20 MEQ: 1500 TABLET, EXTENDED RELEASE ORAL at 09:04

## 2022-05-15 RX ADMIN — DIPHENHYDRAMINE HYDROCHLORIDE AND LIDOCAINE HYDROCHLORIDE AND ALUMINUM HYDROXIDE AND MAGNESIUM HYDRO 5 ML: KIT at 09:53

## 2022-05-15 RX ADMIN — Medication 10 ML: at 09:27

## 2022-05-15 RX ADMIN — ALPRAZOLAM 1 MG: 1 TABLET ORAL at 21:05

## 2022-05-15 RX ADMIN — FUROSEMIDE 40 MG: 40 TABLET ORAL at 15:48

## 2022-05-15 RX ADMIN — FUROSEMIDE 40 MG: 10 INJECTION, SOLUTION INTRAMUSCULAR; INTRAVENOUS at 04:35

## 2022-05-15 RX ADMIN — ENOXAPARIN SODIUM 40 MG: 100 INJECTION SUBCUTANEOUS at 15:48

## 2022-05-15 RX ADMIN — POTASSIUM CHLORIDE 40 MEQ: 1500 TABLET, EXTENDED RELEASE ORAL at 08:52

## 2022-05-15 RX ADMIN — DOCUSATE SODIUM 100 MG: 100 CAPSULE, LIQUID FILLED ORAL at 21:05

## 2022-05-15 RX ADMIN — NYSTATIN 500000 UNITS: 100000 SUSPENSION ORAL at 21:09

## 2022-05-15 RX ADMIN — NYSTATIN: 100000 POWDER TOPICAL at 13:12

## 2022-05-15 RX ADMIN — METOPROLOL SUCCINATE 50 MG: 50 TABLET, EXTENDED RELEASE ORAL at 09:05

## 2022-05-15 RX ADMIN — SPIRONOLACTONE 25 MG: 25 TABLET ORAL at 09:03

## 2022-05-15 NOTE — PROGRESS NOTES
[facility] Medicine Services Daily Progress Note    Patient Name: Nadege Barrow  : 1945  MRN: [id number]  Primary Care Physician:  Darci Wilkerson MD  Date of admission: 2022      Subjective      Chief Complaint: Shortness of breath with increased edema peripherally    5/15/2022  Overall today the patient is feeling much better.  She now reports that her chest congestion is located more centrally and that the pain is much improved.    Review of Systems   Constitutional: Negative.   HENT: Negative.    Eyes: Negative.    Cardiovascular: Negative.         Chest pain has improved as well.   Respiratory: Negative.         The patient reports that she is feeling better in terms of her breathing.   Endocrine: Negative.    Hematologic/Lymphatic: Negative.    Skin: Negative.    Musculoskeletal: Negative.    Gastrointestinal: Negative.    Genitourinary: Negative.    Neurological: Negative.    Psychiatric/Behavioral: Negative.    Allergic/Immunologic: Negative.    All other systems reviewed and are negative.         Objective      Vitals:   Temp:  [97.5 °F (36.4 °C)-98.4 °F (36.9 °C)] 97.5 °F (36.4 °C)  Heart Rate:  [74-83] 83  Resp:  [16-18] 16  BP: (114-158)/(75-96) 114/75  Flow (L/min):  [1] 1    Physical Exam  Vitals and nursing note reviewed.   Constitutional:       General: She is not in acute distress.     Appearance: Normal appearance. She is well-developed. She is not ill-appearing, toxic-appearing or diaphoretic.   HENT:      Head: Normocephalic and atraumatic.      Right Ear: Ear canal and external ear normal.      Left Ear: Ear canal and external ear normal.      Nose: Nose normal. No congestion or rhinorrhea.      Mouth/Throat:      Mouth: Mucous membranes are moist.      Pharynx: No oropharyngeal exudate.   Eyes:      General: No scleral icterus.        Right eye: No discharge.         Left eye: No discharge.      Extraocular Movements: Extraocular movements intact.       Conjunctiva/sclera: Conjunctivae normal.      Pupils: Pupils are equal, round, and reactive to light.   Neck:      Thyroid: No thyromegaly.      Vascular: No carotid bruit or JVD.      Trachea: No tracheal deviation.   Cardiovascular:      Rate and Rhythm: Normal rate and regular rhythm.      Pulses: Normal pulses.      Heart sounds: Normal heart sounds. No murmur heard.    No friction rub. No gallop.   Pulmonary:      Effort: Pulmonary effort is normal. No respiratory distress.      Breath sounds: Normal breath sounds. No stridor. No wheezing, rhonchi or rales.      Comments: Slight decrease in breath sounds in the bases without any wheezing detected.  Chest:      Chest wall: No tenderness.   Abdominal:      General: Bowel sounds are normal. There is no distension.      Palpations: Abdomen is soft. There is no mass.      Tenderness: There is no abdominal tenderness. There is no guarding or rebound.      Hernia: No hernia is present.   Musculoskeletal:         General: No swelling, tenderness, deformity or signs of injury. Normal range of motion.      Cervical back: Normal range of motion and neck supple. No rigidity. No muscular tenderness.      Right lower leg: No edema.      Left lower leg: No edema.   Lymphadenopathy:      Cervical: No cervical adenopathy.   Skin:     General: Skin is warm and dry.      Coloration: Skin is not jaundiced or pale.      Findings: No bruising, erythema or rash.   Neurological:      General: No focal deficit present.      Mental Status: She is alert and oriented to person, place, and time. Mental status is at baseline.      Cranial Nerves: No cranial nerve deficit.      Sensory: No sensory deficit.      Motor: No weakness or abnormal muscle tone.      Coordination: Coordination normal.   Psychiatric:         Mood and Affect: Mood normal.         Behavior: Behavior normal.         Thought Content: Thought content normal.         Judgment: Judgment normal.             Result Review     Result Review:  I have personally reviewed the results from the time of this admission to 5/15/2022 14:59 EDT and agree with these findings:  [x]  Laboratory  [x]  Microbiology  [x]  Radiology  [x]  EKG/Telemetry   []  Cardiology/Vascular   []  Pathology  []  Old records  []  Other:  Most notable findings include:     Wounds (last 24 hours)     LDA Wound     Row Name 05/15/22 0701 05/14/22 1938          Wound 05/13/22 0233 anus    Wound - Properties Group Placement Date: 05/13/22  -TA Placement Time: 0233 -TA Location: anus  -TA     Pressure Injury Stage --  skin abrasion due to moisture at perianal area  -DW --     Dressing Appearance open to air  -DW open to air  -MG     Retired Wound - Properties Group Placement Date: 05/13/22  -TA Placement Time: 0233 -TA Location: anus  -TA     Retired Wound - Properties Group Date first assessed: 05/13/22  -TA Time first assessed: 0233 -TA Location: anus  -TA           User Key  (r) = Recorded By, (t) = Taken By, (c) = Cosigned By    Initials Name Provider Type    MG Lili Brunson LPN Licensed Nurse    Josette Landaverde RN Registered Nurse    Ivan Alvarado RN Registered Nurse                  Assessment & Plan      Brief Patient Summary:  Nadgee Barrow is a 76 y.o. female who has a history of coronary artery disease status post cath at [facility] in February of 2022 which did not show any issues with ischemia.  The patient has some arrhythmias this morning and cardiology has been consulted.      ALPRAZolam, 1 mg, Oral, BID  aspirin, 81 mg, Oral, Daily  atorvastatin, 40 mg, Oral, Daily  benzonatate, 100 mg, Oral, TID  busPIRone, 10 mg, Oral, Q12H  docusate sodium, 100 mg, Oral, BID  empagliflozin, 10 mg, Oral, Daily  First Mouthwash (Magic Mouthwash), 5 mL, Swish & Spit, BID  fluconazole, 200 mg, Oral, Q24H  FLUoxetine, 20 mg, Oral, Daily  furosemide, 40 mg, Oral, Daily  insulin lispro, 0-9 Units, Subcutaneous, TID With Meals  insulin NPH-insulin regular,  20 Units, Subcutaneous, BID With Meals  [START ON 5/16/2022] losartan, 25 mg, Oral, Q24H  metoprolol succinate XL, 50 mg, Oral, Daily  nystatin, 5 mL, Oral, 4x Daily  nystatin, , Topical, Q12H  piperacillin-tazobactam, 3.375 g, Intravenous, Q8H  potassium chloride, 20 mEq, Oral, Daily  potassium chloride, 40 mEq, Oral, Once  predniSONE, 20 mg, Oral, Daily  sodium chloride, 10 mL, Intravenous, Q12H  spironolactone, 25 mg, Oral, Daily  ticagrelor, 90 mg, Oral, BID       Pharmacy to Dose Zosyn,          Active Hospital Problems:  Active Hospital Problems    Diagnosis    • **Acute on chronic systolic CHF (congestive heart failure) (HCC)    • Type 2 diabetes mellitus (HCC)    • Chest pain, unspecified type    • Generalized anxiety disorder    • Carotid artery disease (HCC)    • Senile dementia, delirium, with behavioral disturbance (HCC)    • Depression, unspecified    • Cardiomyopathy (HCC)    • Hyperlipidemia, mixed      Plan:   Acute on chronic systolic heart failure  -proBNP 31,131  -2D echo, EF 41 to 46% 2/27/2022  -CT angiogram of the chest reviewed  -Chest x-ray reviewed  -IV Lasix given in ED  -IV Lasix ordered 40 mg BID       -this is been decreased to daily by cardiology.  -Continue home metoprolol succinate  -Hold home p.o. Lasix      Hypokalemia   -Potassium 2.9, monitor  -Electrolyte replacement protocol ordered     Chest pain  -Troponin negative, trend  -EKG reviewed  -Continuous cardiac monitoring  -Nitrostat as needed  -Likely reflective of the intense coughing she has been doing recently.       -follow-up chest x-ray in a.m.    Chronic CAD   -Continuous cardiac monitoring  -Continue home aspirin, Brilinta     Diabetes mellitus type 2  -Hemoglobin A1c 7.5 2/27/2022  -Accu checks before meals and at bedtime  -Continue home NPH at a decreased dose due to dietary changes while hospitalized  -SSI ordered  -Hold home Jardiance     Hyperlipidemia  -Lipid panel reviewed  -Continue home  atorvastatin     Depression with generalized anxiety disorder  -Stable  -Continue home BuSpar, Prozac, Xanax (INSPECT VERIFIED)     PVCs and PACs  -Likely secondary to hypokalemia and hypomagnesemia  -Patient has been placed on electrolyte protocol and this has been corrected  -Contacted cardiology for their opinion on further work-up if necessary    DVT prophylaxis:  No DVT prophylaxis order currently exists.    CODE STATUS:    Level Of Support Discussed With: Patient  Code Status (Patient has no pulse and is not breathing): CPR (Attempt to Resuscitate)  Medical Interventions (Patient has pulse or is breathing): Full Support      Disposition:  I expect patient to be discharged when clinically improved.    This patient has been examined wearing appropriate Personal Protective Equipment and discussed with hospital infection control department. 05/15/22      Electronically signed by Niya Lopez MD, 05/15/22, 14:59 EDT.  [facility] Hospitalist Team

## 2022-05-15 NOTE — PROGRESS NOTES
Cardiology progress note        REQUESTING PHYSICIAN    Niya Lopez MD    PATIENT IDENTIFICATION  Name: Nadege Barrow  Age: 76 y.o.  Sex: female  :  1945  MRN: [id number]             REASON FOR CONSULTATION:  76-year-old female who was previously followed by Dr. Mancilla with known history of coronary artery disease status post PCI/RCA May 2018, history of ischemic cardiomyopathy, history of systolic heart failure, essential hypertension, dyslipidemia, diabetes mellitus 2, carotid artery stenosis, anemia, antiplatelet therapy.    Left heart cath 2018 at [facility]: 80-90% distal RCA with FFR positive.  PCI to distal RCA.    TTE 2022: EF 41-45%, mild AS, moderate TR    Nuclear stress testing 2028: Normal myocardial perfusion study with no evidence of ischemia, low risk study.    CC:  Chest pain  Shortness of breath      HISTORY OF PRESENT ILLNESS: Per H&P  Agree with narrative as discussed with nurse practitioner face-to-face encounter with scribed findings and narrative below  Patient presented to the emergency department at [facility] 2022 from the [facility] where she resides with complaint of cough, fatigue, intermittent chest discomfort with inhalation and mild shortness of breath.  Work-up in the ED included chest x-ray that showed no acute findings.  CT chest with moderate tree-in-bud nodular opacities throughout the left lung-consideration of infectious bronchiolitis, inflammatory bronchiolitis or chronic aspiration.  Minor pulmonary interstitial edema.  No PE.  Troponin negative.  proBNP elevated.  She was admitted for further evaluation and treatment options.  She is lying in bed comfortable on my encounter on room air.  She denies any actual chest pain but continues to have occasional nonproductive cough.  She says her cough and shortness of breath over the last 1 week which was not paid attention to by her care staff is the primary reason that she is  here.  She has no increase in frequency severity or duration of any anginal chest pain.  Patient mentions several times about trying to wean herself off of benzodiazepine that she takes for chronic tremor.  She denies any lower extremity edema.  When she takes deep breaths she can feel issues on the right lower portion of the chest on deep breathing.    Patient denies any cardiology follow-up since her stent in 2018  =================================  Seen and examined  Remains with cough and some shortness of air  Parainfluenza virus positive management per primary with continued cough  Mild volume overload, getting Lasix, labs look like she is getting slightly dry intravascularly, will slow diuretics, Lasix 40 once a day starting tomorrow, hold further IV Lasix today  Replace potassium  Started on Aldactone, will increase losartan to 25 secondary to underlying cardiomyopathy  Follow labs, replace electrolytes    Review of systems otherwise negative x14 point review of systems except as mentioned above  Historical data copied forward from previous encounters in EMR is unchanged  REVIEW OF SYSTEMS:  Pertinent items are noted in HPI, all other systems reviewed and negative    OBJECTIVE   proBNP 31,131  Potassium 2.9    ASSESSMENT  Acute on chronic heart failure with reduced ejection fraction  Hypokalemia  Chest discomfort-atypical  Coronary artery disease status post PCI  Diabetes mellitus 2  Dyslipidemia      PLAN  Continue dual antiplatelet therapy with aspirin and ticagrelor  Continue BB, statin  Lasix decreased to 40 oral daily  On Aldactone, increase losartan to 25 daily with cardiomyopathy  Defer treatment of any underlying pneumonia or bronchitis to primary with parainfluenza virus  Patient had recent ischemic work-up February 2022 with negative nuclear stress testing for ischemia and echocardiogram as above    Further recommendations as patient's hospital course progresses    Rick Hernandez MD, PhD    Vital  "Signs  Visit Vitals  /90   Pulse 77   Temp 97.5 °F (36.4 °C) (Oral)   Resp 16   Ht 160 cm (62.99\")   Wt 72.7 kg (160 lb 4.8 oz)   SpO2 96%   BMI 28.40 kg/m²     Oxygen Therapy  SpO2: 96 %  Pulse Oximetry Type: Intermittent  Device (Oxygen Therapy): room air  Flowsheet Rows    Flowsheet Row First Filed Value   Admission Height 160 cm (63\") Documented at 05/12/2022 1933   Admission Weight 74.1 kg (163 lb 5.8 oz) Documented at 05/12/2022 1933        Intake & Output (last 3 days)       05/12 0701  05/13 0700 05/13 0701  05/14 0700 05/14 0701  05/15 0700 05/15 0701  05/16 0700    P.O.  1400 2520 240    IV Piggyback   100 100    Total Intake(mL/kg)  1400 (19.4) 2620 (36) 340 (4.7)    Urine (mL/kg/hr)  2350 (1.4) 2900 (1.7) 1000 (2.9)    Stool   0     Total Output  2350 2900 1000    Net  -950 -280 -660            Urine Unmeasured Occurrence 1 x 1 x 1 x     Stool Unmeasured Occurrence   1 x         Lines, Drains & Airways     Active LDAs     Name Placement date Placement time Site Days    Peripheral IV 05/12/22 2116 Right Forearm 05/12/22 2116  Forearm  1    External Urinary Catheter 05/13/22 1942  --  less than 1                MEDICAL HISTORY    Past Medical History:   Diagnosis Date   • Anxiety    • CHF (congestive heart failure) ([facility])    • Depression    • Diabetes mellitus ([facility])    • Hyperlipidemia    • Hypertension    • Panic disorder     \"panic attacks\"   • Tremors of nervous system     \"essential tremors\"        SURGICAL HISTORY    Past Surgical History:   Procedure Laterality Date   • CORONARY ANGIOPLASTY WITH STENT PLACEMENT          FAMILY HISTORY    Family History   Problem Relation Age of Onset   • Depression Sister    • Suicidality Other         suicided   • Alcohol abuse Other    • Alcohol abuse Daughter    • Depression Other        SOCIAL HISTORY    Social History     Tobacco Use   • Smoking status: Never Smoker   • Smokeless tobacco: Not on file   Substance Use Topics   • Alcohol use: Not Currently " "       ALLERGIES    Allergies   Allergen Reactions   • Gabapentin Hallucinations   • Morphine Hallucinations              /90   Pulse 77   Temp 97.5 °F (36.4 °C) (Oral)   Resp 16   Ht 160 cm (62.99\")   Wt 72.7 kg (160 lb 4.8 oz)   SpO2 96%   BMI 28.40 kg/m²   Intake/Output last 3 shifts:  I/O last 3 completed shifts:  In: 3120 [P.O.:3020; IV Piggyback:100]  Out: 5250 [Urine:5250]  Intake/Output this shift:  I/O this shift:  In: 340 [P.O.:240; IV Piggyback:100]  Out: 1000 [Urine:1000]    PHYSICAL EXAM:    General: Alert, cooperative, no distress, appears stated age  Head:  Normocephalic, atraumatic, mucous membranes moist  Eyes:  Conjunctivae/corneas clear, EOM's intact     Neck:  Supple,  no adenopathy; no JVD or bruit  Lungs: Clear to auscultation bilaterally, no wheezes, rhonchi or rales are noted  Chest wall: No tenderness  Heart::  Regular rate and rhythm, S1 and S2 normal, 2/6 murmur to the right sternal border  Abdomen: Soft, nontender, nondistended, bowel sounds active  Extremities: No cyanosis, clubbing, or edema   Pulses: 2+ and symmetric all extremities  Skin:  No rashes or lesions  Neuro/psych: A&O x3.  Patient appears to have tremor left lower extremity  Agree with exam as discussed with nurse practitioner after my face-to-face encounter scribed findings above  Scheduled Meds:      ALPRAZolam, 1 mg, Oral, BID  aspirin, 81 mg, Oral, Daily  atorvastatin, 40 mg, Oral, Daily  benzonatate, 100 mg, Oral, TID  busPIRone, 10 mg, Oral, Q12H  empagliflozin, 10 mg, Oral, Daily  First Mouthwash (Magic Mouthwash), 5 mL, Swish & Spit, BID  fluconazole, 200 mg, Oral, Q24H  FLUoxetine, 20 mg, Oral, Daily  furosemide, 40 mg, Intravenous, Q12H  insulin lispro, 0-9 Units, Subcutaneous, TID With Meals  insulin NPH-insulin regular, 20 Units, Subcutaneous, BID With Meals  losartan, 12.5 mg, Oral, Q24H  metoprolol succinate XL, 50 mg, Oral, Daily  nystatin, 5 mL, Oral, 4x Daily  nystatin, , Topical, " Q12H  piperacillin-tazobactam, 3.375 g, Intravenous, Q8H  potassium chloride, 20 mEq, Oral, Daily  potassium chloride, 40 mEq, Oral, Once  predniSONE, 20 mg, Oral, Daily  sodium chloride, 10 mL, Intravenous, Q12H  spironolactone, 25 mg, Oral, Daily  ticagrelor, 90 mg, Oral, BID        Continuous Infusions:    Pharmacy to Dose Zosyn,         PRN Meds:    benzonatate  •  dextrose  •  dextrose  •  glucagon (human recombinant)  •  guaiFENesin-dextromethorphan  •  insulin lispro **AND** insulin lispro  •  magnesium sulfate **OR** magnesium sulfate in D5W 1g/100mL (PREMIX)  •  nitroglycerin  •  Pharmacy to Dose Zosyn  •  potassium chloride **OR** potassium chloride **OR** potassium chloride  •  sodium chloride  •  sodium chloride  •  tiZANidine        Results Review:     I reviewed the patient's new clinical results.    CBC    Results from last 7 days   Lab Units 05/15/22  0308 05/14/22  0440 05/12/22  2116   WBC 10*3/mm3 11.40* 9.90 9.90   HEMOGLOBIN g/dL 13.3 13.0 12.6   PLATELETS 10*3/mm3 288 278 273     Cr Clearance Estimated Creatinine Clearance: 44.8 mL/min (A) (by C-G formula based on SCr of 1.02 mg/dL (H)).  Coag     HbA1C   Lab Results   Component Value Date    HGBA1C 7.5 (H) 02/27/2022    HGBA1C 7.9 (H) 12/13/2021     Blood Glucose   Glucose   Date/Time Value Ref Range Status   05/15/2022 1129 238 (H) 70 - 105 mg/dL Final     Comment:     Serial Number: [id number]:  [id number]   05/15/2022 0724 128 (H) 70 - 105 mg/dL Final     Comment:     Serial Number: [id number]:  [id number]   05/14/2022 1550 332 (H) 70 - 105 mg/dL Final     Comment:     Serial Number: [id number]:  [id number]   05/14/2022 1104 299 (H) 70 - 105 mg/dL Final     Comment:     Serial Number: [id number]:  [id number]   05/14/2022 0744 91 70 - 105 mg/dL Final     Comment:     Serial Number: [id number]:  [id number]   05/13/2022 2241 234 (H) 70 - 105 mg/dL Final     Comment:     Serial Number: [id number]:  [id number]    05/13/2022 1834 253 (H) 70 - 105 mg/dL Final     Comment:     Serial Number: [id number]:  [id number]   05/13/2022 1107 176 (H) 70 - 105 mg/dL Final     Comment:     Serial Number: [id number]:  [id number]     Infection     CMP   Results from last 7 days   Lab Units 05/15/22  0308 05/14/22  2039 05/14/22  0440 05/12/22  2116   SODIUM mmol/L 139  --  139 141   POTASSIUM mmol/L 3.3* 3.8 2.8* 2.9*   CHLORIDE mmol/L 101  --  99 103   CO2 mmol/L 26.0  --  27.0 26.0   BUN mg/dL 24*  --  17 13   CREATININE mg/dL 1.02*  --  0.90 0.93   GLUCOSE mg/dL 82  --  76 88   ALBUMIN g/dL  --   --   --  3.40*   BILIRUBIN mg/dL  --   --   --  0.6   ALK PHOS U/L  --   --   --  69   AST (SGOT) U/L  --   --   --  15   ALT (SGPT) U/L  --   --   --  13     ABG      UA      DIANA  No results found for: POCMETH, POCAMPHET, POCBARBITUR, POCBENZO, POCCOCAINE, POCOPIATES, POCOXYCODO, POCPHENCYC, POCPROPOXY, POCTHC, POCTRICYC  Lysis Labs   Results from last 7 days   Lab Units 05/15/22  0308 05/14/22  0440 05/12/22  2116   HEMOGLOBIN g/dL 13.3 13.0 12.6   PLATELETS 10*3/mm3 288 278 273   CREATININE mg/dL 1.02* 0.90 0.93     Radiology(recent) No radiology results for the last day      Results from last 7 days   Lab Units 05/14/22  0802   TROPONIN T ng/mL <0.010       Xrays, labs reviewed personally by physician.    ECG/EMG Results (most recent)     Procedure Component Value Units Date/Time    ECG 12 Lead [[id number]] Collected: 05/12/22 1942     Updated: 05/12/22 1944     QT Interval 472 ms     Narrative:      HEART RATE= 69  bpm  RR Interval= 864  ms  MT Interval= 157  ms  P Horizontal Axis= -33  deg  P Front Axis= 8  deg  QRSD Interval= 159  ms  QT Interval= 472  ms  QRS Axis= -53  deg  T Wave Axis= 93  deg  - ABNORMAL ECG -  Sinus rhythm  Left bundle branch block  ST elevation secondary to IVCD  When compared with ECG of 07-Mar-2022 3:43:05,  Significant change in rhythm  Significant repolarization change  Electronically Signed By:    Date and Time of Study: 2022-05-12 19:42:53    ECG 12 Lead [[id number]] Collected: 05/13/22 1505     Updated: 05/14/22 0708     QT Interval 421 ms     Narrative:      HEART RATE= 86  bpm  RR Interval= 700  ms  TN Interval= 192  ms  P Horizontal Axis=   deg  P Front Axis= 31  deg  QRSD Interval= 148  ms  QT Interval= 421  ms  QRS Axis= -54  deg  T Wave Axis= 108  deg  - ABNORMAL ECG -  Sinus rhythm  Ventricular premature complex  Left bundle branch block  ST elevation secondary to IVCD  When compared with ECG of 12-May-2022 19:42:53,  No significant change  Electronically Signed By: Rick Hernandez ([facility]) 14-May-2022 07:07:56  Date and Time of Study: 2022-05-13 15:05:00    ECG 12 Lead [[id number]] Collected: 05/14/22 0807     Updated: 05/14/22 0809     QT Interval 487 ms     Narrative:      HEART RATE= 78  bpm  RR Interval= 772  ms  TN Interval=   ms  P Horizontal Axis=   deg  P Front Axis=   deg  QRSD Interval= 157  ms  QT Interval= 487  ms  QRS Axis= -22  deg  T Wave Axis= 109  deg  - ABNORMAL ECG -  Atrial fibrillation  Ventricular premature complex  Left bundle branch block  LVH with secondary repolarization abnormality  Anterior Q waves, possibly due to LVH  When compared with ECG of 13-May-2022 15:05:00,  Significant change in rhythm: previously sinus  New or worsened ischemia or infarction  Significant repolarization change  Electronically Signed By:   Date and Time of Study: 2022-05-14 08:07:11            Medication Review:   I have reviewed the patient's current medication list  Scheduled Meds:ALPRAZolam, 1 mg, Oral, BID  aspirin, 81 mg, Oral, Daily  atorvastatin, 40 mg, Oral, Daily  benzonatate, 100 mg, Oral, TID  busPIRone, 10 mg, Oral, Q12H  empagliflozin, 10 mg, Oral, Daily  First Mouthwash (Magic Mouthwash), 5 mL, Swish & Spit, BID  fluconazole, 200 mg, Oral, Q24H  FLUoxetine, 20 mg, Oral, Daily  furosemide, 40 mg, Intravenous, Q12H  insulin lispro, 0-9 Units, Subcutaneous, TID With Meals  insulin  NPH-insulin regular, 20 Units, Subcutaneous, BID With Meals  losartan, 12.5 mg, Oral, Q24H  metoprolol succinate XL, 50 mg, Oral, Daily  nystatin, 5 mL, Oral, 4x Daily  nystatin, , Topical, Q12H  piperacillin-tazobactam, 3.375 g, Intravenous, Q8H  potassium chloride, 20 mEq, Oral, Daily  potassium chloride, 40 mEq, Oral, Once  predniSONE, 20 mg, Oral, Daily  sodium chloride, 10 mL, Intravenous, Q12H  spironolactone, 25 mg, Oral, Daily  ticagrelor, 90 mg, Oral, BID      Continuous Infusions:Pharmacy to Dose Zosyn,       PRN Meds:.benzonatate  •  dextrose  •  dextrose  •  glucagon (human recombinant)  •  guaiFENesin-dextromethorphan  •  insulin lispro **AND** insulin lispro  •  magnesium sulfate **OR** magnesium sulfate in D5W 1g/100mL (PREMIX)  •  nitroglycerin  •  Pharmacy to Dose Zosyn  •  potassium chloride **OR** potassium chloride **OR** potassium chloride  •  sodium chloride  •  sodium chloride  •  tiZANidine    Imaging:  Imaging Results (Last 72 Hours)     Procedure Component Value Units Date/Time    FL Video Swallow With Speech Single Contrast [[id number]] Collected: 05/13/22 1112     Updated: 05/13/22 1116    Narrative:      DATE OF EXAM:  5/13/2022 10:19 AM     PROCEDURE:  FL VIDEO SWALLOW W SPEECH SINGLE-CONTRAST-     INDICATIONS:  dysphagia; R07.9-Chest pain, unspecified; B34.8-Other viral infections  of unspecified site; I50.9-Heart failure, unspecified     COMPARISON:  Modified barium swallow study 3/4/2022.     TECHNIQUE:   This examination was performed in conjunction with speech pathology.  Lateral video fluoroscopic evaluation of the swallowing mechanism was  performed while correlate administering to the patient various  consistency food items mixed with barium.     Fluoroscopic Time:   2.0 minutes     Number of Images: 12 spot fluoroscopic series images        FINDINGS:  Modified barium swallow study was performed utilizing fluoroscopy. The  study was performed by dedicated speech pathologist. I  was present  during the entire examination.        Impression:      Modified barium swallow study with fluoroscopy. Please refer to the  speech pathology report for findings and dietary recommendations.     Electronically Signed By-María Lauren MD On:5/13/2022 11:14 AM  This report was finalized on [id number] by  María Lauren MD.    CT Angiogram Chest Pulmonary Embolism [[id number]] Collected: 05/13/22 0005     Updated: 05/13/22 0009    Narrative:      EXAMINATION: CT ANGIOGRAM CHEST PULMONARY EMBOLISM      DATE:  5/12/2022 11:41 PM    INDICATION: Pulmonary embolism suspected, positive d-dimer. Cough. Generalized weakness for a few days ;    COMPARISON: February 12, 2022 unenhanced CT chest.    PROCEDURE: Iodinated contrast material was administered intravenously during pulmonary arterial phase CT chest imaging.    3-D MIP images were performed under concurrent supervision not requiring an independent workstation, in order to better assess the vasculature.    CT dose lowering techniques were used, to include: automated exposure control, adjustment for patient size, and or use of iterative reconstruction.    FINDINGS:    Pulmonary artery: Well opacified. No filling defect.    Cardiovascular:  No thoracic aortic aneurysm. Minor atherosclerotic calcifications. Aorta not adequately opacified to assess dissection..    Mediastinum/[place]:  No mediastinal or hilar mass or significant lymphadenopathy identified.    Lungs/Pleura: Moderate diffuse peribronchial thickening. There is moderate nodular tree-in-bud opacities throughout the left lung. Minor dependent atelectasis. . Trace bilateral pleural effusions. Minor peripheral intralobular septal thickening..    Chest wall and Axilla: Normal.    Bones:  No acute abnormality. Old fracture deformity left clavicle. Degenerative changes in the spine..    Upper abdomen: Unremarkable.    3-D images corroborate 2-D findings.      Impression:        1.  Moderate tree-in-bud  "nodular opacities throughout the left lung. Primary consideration is infectious bronchiolitis. Alternative considerations are inflammatory bronchiolitis and chronic aspiration.  2.  Moderate airways disease.  3.  Trace bilateral pleural effusions. Minor pulmonary interstitial edema.  4.  No pulmonary embolus.          Electronically signed by:  Chris Frausto M.D.    5/12/2022 10:08 PM    XR Chest 2 View [[id number]] Collected: 05/12/22 2111     Updated: 05/12/22 2115    Narrative:         DATE OF EXAM:   5/12/2022 8:58 PM     PROCEDURE:   XR CHEST 2 VW-     INDICATIONS:   SOA Triage Protocol     COMPARISON:  03/07/2022, 02/26/2022, 09/26/2021     TECHNIQUE:   PA and lateral views of the chest were obtained.     FINDINGS:   The cardiomediastinal silhouette is within normal limits. The lungs are  clear. There is no pneumothorax, focal consolidation, or large pleural  effusion. Osseous structures grossly intact. Chronic fracture deformity  at the mid left clavicle.       Impression:      No acute process.     Electronically Signed By-Finn Ashton MD On:5/12/2022 9:13 PM  This report was finalized on [id number] by  Finn Ashton MD.               EMR Dragon/Transcription:   \"Dictated utilizing Dragon dictation\".           Rick Hernandez MD, PhD      Electronically signed by PAM Thorpe, 05/14/22, 11:55 AM EDT.    "

## 2022-05-15 NOTE — PLAN OF CARE
Goal Outcome Evaluation:  Plan of Care Reviewed With: patient           Outcome Evaluation: Pt up on and off throughout the night, c/o discomfort in mouth d/t thrush. MD notified nystatin, mouth wash and diflucan given. Potassium replaced and now at 3.8. Pt getting IV abx and IV Lasix at this time. Cardio following, will continue to monitor.

## 2022-05-15 NOTE — PLAN OF CARE
Goal Outcome Evaluation:  Plan of Care Reviewed With: patient        Progress: improving  Outcome Evaluation: Patient c/o panic attack at the beginning of shift, put on 1L Oxygen for the purpose of comfort. Continued to complain of discomfort of throat due to cough. Hot teas provided for the comfort of throat. Potassium replacement finished and Potassium level was 4.5. Nystatin powder ordered for perianal redness due to moisture. will continue to monitor.

## 2022-05-16 ENCOUNTER — APPOINTMENT (OUTPATIENT)
Dept: GENERAL RADIOLOGY | Facility: HOSPITAL | Age: 77
End: 2022-05-16

## 2022-05-16 VITALS
HEART RATE: 73 BPM | BODY MASS INDEX: 28.2 KG/M2 | WEIGHT: 159.17 LBS | OXYGEN SATURATION: 98 % | SYSTOLIC BLOOD PRESSURE: 153 MMHG | RESPIRATION RATE: 18 BRPM | DIASTOLIC BLOOD PRESSURE: 94 MMHG | HEIGHT: 63 IN | TEMPERATURE: 97.5 F

## 2022-05-16 LAB
ANION GAP SERPL CALCULATED.3IONS-SCNC: 15 MMOL/L (ref 5–15)
BASOPHILS # BLD AUTO: 0.1 10*3/MM3 (ref 0–0.2)
BASOPHILS NFR BLD AUTO: 0.7 % (ref 0–1.5)
BUN SERPL-MCNC: 29 MG/DL (ref 8–23)
BUN/CREAT SERPL: 19.9 (ref 7–25)
CALCIUM SPEC-SCNC: 9.1 MG/DL (ref 8.6–10.5)
CHLORIDE SERPL-SCNC: 100 MMOL/L (ref 98–107)
CO2 SERPL-SCNC: 23 MMOL/L (ref 22–29)
CREAT SERPL-MCNC: 1.46 MG/DL (ref 0.57–1)
DEPRECATED RDW RBC AUTO: 46.8 FL (ref 37–54)
EGFRCR SERPLBLD CKD-EPI 2021: 37.2 ML/MIN/1.73
EOSINOPHIL # BLD AUTO: 0 10*3/MM3 (ref 0–0.4)
EOSINOPHIL NFR BLD AUTO: 0.3 % (ref 0.3–6.2)
ERYTHROCYTE [DISTWIDTH] IN BLOOD BY AUTOMATED COUNT: 15.4 % (ref 12.3–15.4)
GLUCOSE BLDC GLUCOMTR-MCNC: 161 MG/DL (ref 70–105)
GLUCOSE BLDC GLUCOMTR-MCNC: 98 MG/DL (ref 70–105)
GLUCOSE SERPL-MCNC: 129 MG/DL (ref 65–99)
HCT VFR BLD AUTO: 44.5 % (ref 34–46.6)
HGB BLD-MCNC: 14 G/DL (ref 12–15.9)
LYMPHOCYTES # BLD AUTO: 2.3 10*3/MM3 (ref 0.7–3.1)
LYMPHOCYTES NFR BLD AUTO: 19.8 % (ref 19.6–45.3)
MAGNESIUM SERPL-MCNC: 1.9 MG/DL (ref 1.6–2.4)
MCH RBC QN AUTO: 27 PG (ref 26.6–33)
MCHC RBC AUTO-ENTMCNC: 31.5 G/DL (ref 31.5–35.7)
MCV RBC AUTO: 85.8 FL (ref 79–97)
MONOCYTES # BLD AUTO: 0.7 10*3/MM3 (ref 0.1–0.9)
MONOCYTES NFR BLD AUTO: 6.1 % (ref 5–12)
NEUTROPHILS NFR BLD AUTO: 73.1 % (ref 42.7–76)
NEUTROPHILS NFR BLD AUTO: 8.4 10*3/MM3 (ref 1.7–7)
NRBC BLD AUTO-RTO: 0.1 /100 WBC (ref 0–0.2)
NT-PROBNP SERPL-MCNC: 2947 PG/ML (ref 0–1800)
PLATELET # BLD AUTO: 330 10*3/MM3 (ref 140–450)
PMV BLD AUTO: 9.1 FL (ref 6–12)
POTASSIUM SERPL-SCNC: 4.1 MMOL/L (ref 3.5–5.2)
QT INTERVAL: 487 MS
RBC # BLD AUTO: 5.19 10*6/MM3 (ref 3.77–5.28)
SODIUM SERPL-SCNC: 138 MMOL/L (ref 136–145)
WBC NRBC COR # BLD: 11.5 10*3/MM3 (ref 3.4–10.8)

## 2022-05-16 PROCEDURE — 63710000001 INSULIN ISOPHANE & REGULAR PER 5 UNITS: Performed by: INTERNAL MEDICINE

## 2022-05-16 PROCEDURE — 83880 ASSAY OF NATRIURETIC PEPTIDE: CPT | Performed by: INTERNAL MEDICINE

## 2022-05-16 PROCEDURE — 25010000002 PIPERACILLIN SOD-TAZOBACTAM PER 1 G: Performed by: INTERNAL MEDICINE

## 2022-05-16 PROCEDURE — 92526 ORAL FUNCTION THERAPY: CPT

## 2022-05-16 PROCEDURE — 97112 NEUROMUSCULAR REEDUCATION: CPT

## 2022-05-16 PROCEDURE — 82962 GLUCOSE BLOOD TEST: CPT

## 2022-05-16 PROCEDURE — 63710000001 INSULIN LISPRO (HUMAN) PER 5 UNITS: Performed by: INTERNAL MEDICINE

## 2022-05-16 PROCEDURE — 63710000001 PREDNISONE PER 1 MG: Performed by: INTERNAL MEDICINE

## 2022-05-16 PROCEDURE — 99217 PR OBSERVATION CARE DISCHARGE MANAGEMENT: CPT | Performed by: INTERNAL MEDICINE

## 2022-05-16 PROCEDURE — 85025 COMPLETE CBC W/AUTO DIFF WBC: CPT | Performed by: INTERNAL MEDICINE

## 2022-05-16 PROCEDURE — 97116 GAIT TRAINING THERAPY: CPT

## 2022-05-16 PROCEDURE — G0378 HOSPITAL OBSERVATION PER HR: HCPCS

## 2022-05-16 PROCEDURE — 71045 X-RAY EXAM CHEST 1 VIEW: CPT

## 2022-05-16 PROCEDURE — 99214 OFFICE O/P EST MOD 30 MIN: CPT | Performed by: INTERNAL MEDICINE

## 2022-05-16 PROCEDURE — 80048 BASIC METABOLIC PNL TOTAL CA: CPT | Performed by: INTERNAL MEDICINE

## 2022-05-16 PROCEDURE — 83735 ASSAY OF MAGNESIUM: CPT

## 2022-05-16 PROCEDURE — 96366 THER/PROPH/DIAG IV INF ADDON: CPT

## 2022-05-16 RX ORDER — FLUCONAZOLE 200 MG/1
200 TABLET ORAL EVERY 24 HOURS
Qty: 3 TABLET | Refills: 0 | Status: SHIPPED | OUTPATIENT
Start: 2022-05-16 | End: 2022-05-19

## 2022-05-16 RX ORDER — BENZONATATE 100 MG/1
100 CAPSULE ORAL 3 TIMES DAILY PRN
Qty: 30 CAPSULE | Refills: 0 | Status: SHIPPED | OUTPATIENT
Start: 2022-05-16 | End: 2022-08-10 | Stop reason: HOSPADM

## 2022-05-16 RX ORDER — AMOXICILLIN AND CLAVULANATE POTASSIUM 875; 125 MG/1; MG/1
1 TABLET, FILM COATED ORAL 2 TIMES DAILY
Qty: 6 TABLET | Refills: 0 | Status: SHIPPED | OUTPATIENT
Start: 2022-05-16 | End: 2022-05-19

## 2022-05-16 RX ORDER — NITROGLYCERIN 0.4 MG/1
0.4 TABLET SUBLINGUAL
Qty: 25 TABLET | Refills: 0 | Status: SHIPPED | OUTPATIENT
Start: 2022-05-16

## 2022-05-16 RX ORDER — SODIUM CHLORIDE 9 MG/ML
100 INJECTION, SOLUTION INTRAVENOUS CONTINUOUS
Status: DISPENSED | OUTPATIENT
Start: 2022-05-16 | End: 2022-05-16

## 2022-05-16 RX ORDER — NYSTATIN 100000 [USP'U]/G
POWDER TOPICAL EVERY 12 HOURS SCHEDULED
Qty: 1 G | Refills: 0 | Status: SHIPPED | OUTPATIENT
Start: 2022-05-16 | End: 2022-08-10 | Stop reason: HOSPADM

## 2022-05-16 RX ORDER — FUROSEMIDE 20 MG/1
20 TABLET ORAL DAILY
Status: DISCONTINUED | OUTPATIENT
Start: 2022-05-17 | End: 2022-05-16 | Stop reason: HOSPADM

## 2022-05-16 RX ORDER — DIPHENHYDRAMINE HYDROCHLORIDE AND LIDOCAINE HYDROCHLORIDE AND ALUMINUM HYDROXIDE AND MAGNESIUM HYDRO
5 KIT 2 TIMES DAILY
Qty: 300 ML | Refills: 0 | Status: SHIPPED | OUTPATIENT
Start: 2022-05-16 | End: 2022-06-15

## 2022-05-16 RX ORDER — LOSARTAN POTASSIUM 25 MG/1
25 TABLET ORAL
Qty: 30 TABLET | Refills: 0 | Status: SHIPPED | OUTPATIENT
Start: 2022-05-17

## 2022-05-16 RX ORDER — GUAIFENESIN/DEXTROMETHORPHAN 100-10MG/5
5 SYRUP ORAL EVERY 4 HOURS PRN
Qty: 240 ML | Refills: 0 | Status: SHIPPED | OUTPATIENT
Start: 2022-05-16 | End: 2022-08-10 | Stop reason: HOSPADM

## 2022-05-16 RX ADMIN — POTASSIUM CHLORIDE 20 MEQ: 1500 TABLET, EXTENDED RELEASE ORAL at 09:02

## 2022-05-16 RX ADMIN — SODIUM CHLORIDE 100 ML/HR: 9 INJECTION, SOLUTION INTRAVENOUS at 12:02

## 2022-05-16 RX ADMIN — NYSTATIN 500000 UNITS: 100000 SUSPENSION ORAL at 09:01

## 2022-05-16 RX ADMIN — ATORVASTATIN CALCIUM 40 MG: 40 TABLET, FILM COATED ORAL at 09:01

## 2022-05-16 RX ADMIN — INSULIN HUMAN 20 UNITS: 100 INJECTION, SUSPENSION SUBCUTANEOUS at 09:02

## 2022-05-16 RX ADMIN — EMPAGLIFLOZIN 10 MG: 10 TABLET, FILM COATED ORAL at 09:02

## 2022-05-16 RX ADMIN — BUSPIRONE HYDROCHLORIDE 10 MG: 5 TABLET ORAL at 09:02

## 2022-05-16 RX ADMIN — BENZONATATE 100 MG: 100 CAPSULE ORAL at 09:02

## 2022-05-16 RX ADMIN — FLUOXETINE 20 MG: 20 CAPSULE ORAL at 09:02

## 2022-05-16 RX ADMIN — NYSTATIN: 100000 POWDER TOPICAL at 09:04

## 2022-05-16 RX ADMIN — ASPIRIN 81 MG: 81 TABLET, COATED ORAL at 09:01

## 2022-05-16 RX ADMIN — TICAGRELOR 90 MG: 90 TABLET ORAL at 09:01

## 2022-05-16 RX ADMIN — NYSTATIN 500000 UNITS: 100000 SUSPENSION ORAL at 12:03

## 2022-05-16 RX ADMIN — LOSARTAN POTASSIUM 25 MG: 25 TABLET, FILM COATED ORAL at 09:02

## 2022-05-16 RX ADMIN — PIPERACILLIN AND TAZOBACTAM 3.38 G: 3; .375 INJECTION, POWDER, LYOPHILIZED, FOR SOLUTION INTRAVENOUS at 09:01

## 2022-05-16 RX ADMIN — DOCUSATE SODIUM 100 MG: 100 CAPSULE, LIQUID FILLED ORAL at 09:01

## 2022-05-16 RX ADMIN — Medication 10 ML: at 09:03

## 2022-05-16 RX ADMIN — SPIRONOLACTONE 25 MG: 25 TABLET ORAL at 09:02

## 2022-05-16 RX ADMIN — FUROSEMIDE 40 MG: 40 TABLET ORAL at 09:01

## 2022-05-16 RX ADMIN — METOPROLOL SUCCINATE 50 MG: 50 TABLET, EXTENDED RELEASE ORAL at 09:02

## 2022-05-16 RX ADMIN — ALPRAZOLAM 1 MG: 1 TABLET ORAL at 09:02

## 2022-05-16 RX ADMIN — PREDNISONE 20 MG: 20 TABLET ORAL at 09:02

## 2022-05-16 RX ADMIN — INSULIN LISPRO 2 UNITS: 100 INJECTION, SOLUTION INTRAVENOUS; SUBCUTANEOUS at 12:03

## 2022-05-16 NOTE — THERAPY TREATMENT NOTE
"Acute Care - Speech Language Pathology   Swallow Treatment Note  [place]     Patient Name: Nadeeg Barrow  : 1945  MRN: [id number]  Today's Date: 2022               Admit Date: 2022    Visit Dx:     ICD-10-CM ICD-9-CM   1. Disabling essential tremor  G25.0 333.1   2. Chest pain, unspecified type  R07.9 786.50   3. Parainfluenza virus infection  B34.8 078.89   4. Congestive heart failure, unspecified HF chronicity, unspecified heart failure type ([facility])  I50.9 428.0   5. Acute on chronic systolic CHF (congestive heart failure) ([facility])  I50.23 428.23     428.0     Patient Active Problem List   Diagnosis   • Chest pain on breathing   • Acute respiratory failure with hypoxia ([facility])   • Anemia   • Anxiety   • Atherosclerotic heart disease of native coronary artery without angina pectoris   • Back pain   • Cardiomyopathy ([facility])   • Carotid artery disease ([facility])   • Cellulitis of foot   • Acute on chronic congestive heart failure ([facility])   • Current moderate episode of major depressive disorder without prior episode ([facility])   • Senile dementia, delirium, with behavioral disturbance ([facility])   • Depression, unspecified   • Disabling essential tremor   • Fatigue   • Hyperlipidemia, mixed   • Peripheral vision loss, bilateral   • Retinopathy, bilateral   • S/P right coronary artery (RCA) stent placement   • Ischemic cardiomyopathy   • Elevated LFTs   • Generalized anxiety disorder   • Benzodiazepine dependence, continuous ([facility])   • Type 2 diabetes mellitus ([facility])   • Chest pain, unspecified type   • Acute on chronic systolic CHF (congestive heart failure) ([facility])     Past Medical History:   Diagnosis Date   • Anxiety    • CHF (congestive heart failure) ([facility])    • Depression    • Diabetes mellitus ([facility])    • Hyperlipidemia    • Hypertension    • Panic disorder     \"panic attacks\"   • Tremors of nervous system     \"essential tremors\"     Past Surgical History:   Procedure Laterality Date   • CORONARY ANGIOPLASTY WITH STENT " PLACEMENT         SLP Recommendation and Plan     SLP Diet Recommendation: regular textures, thin liquids (05/16/22 1300)  Recommended Precautions and Strategies: upright posture during/after eating, small bites of food and sips of liquid, alternate between small bites of food and sips of liquid, chin tuck, general aspiration precautions (05/16/22 1300)  SLP Rec. for Method of Medication Administration: meds whole, with thin liquids, with pudding or applesauce, as tolerated (05/16/22 1300)     Monitor for Signs of Aspiration: yes, notify SLP if any concerns, cough, elevated WBC count, gurgly voice, throat clearing, fever, upper respiratory, pneumonia, right lower lobe infiltrates (05/16/22 1300)        Anticipated Discharge Disposition (SLP): assisted living facility ([facility]) (05/16/22 1300)                 Plan for Continued Treatment (SLP): treatment no longer indicated as all goals met (05/16/22 1300)         SWALLOW EVALUATION (last 72 hours)     SLP Adult Swallow Evaluation     Row Name 05/16/22 1300       Rehab Evaluation    Document Type therapy note (daily note)  -LF    Subjective Information no complaints  -LF    Patient Observations alert;cooperative;agree to therapy  -LF    Patient Effort good  -LF    Comment Pt seen for skilled ST targeting dysphagia this date. Pt observed with meal tray to assess diet tolerance s/p VFSS. Pt awake, alert, and self fed meal tray. She independently demonstrated safe swallow strategies. Oral phase WFL. No overt s/s of aspiration observed at any time. No oral pocketing/residue noted. Pt independently uses chin tuck with thin liquids. Reviewed VFSS results and recs with pt. Recommend pt continue current diet. Pt tolerating safest and L/R diet with no complications from aspiration ST will sign off.  -LF            General Information    Patient Profile Reviewed yes  -LF            SLP Treatment Clinical Impressions    Plan for Continued Treatment (SLP) treatment no longer  indicated as all goals met  -LF    Care Plan Review evaluation/treatment results reviewed;care plan/treatment goals reviewed;risks/benefits reviewed;current/potential barriers reviewed;patient/other agree to care plan  -LF            Recommendations    SLP Diet Recommendation regular textures;thin liquids  -LF    Recommended Precautions and Strategies upright posture during/after eating;small bites of food and sips of liquid;alternate between small bites of food and sips of liquid;chin tuck;general aspiration precautions  -LF    Oral Care Recommendations Oral Care BID/PRN  -LF    SLP Rec. for Method of Medication Administration meds whole;with thin liquids;with pudding or applesauce;as tolerated  -LF    Monitor for Signs of Aspiration yes;notify SLP if any concerns;cough;elevated WBC count;gurgly voice;throat clearing;fever;upper respiratory;pneumonia;right lower lobe infiltrates  -LF    Anticipated Discharge Disposition (SLP) assisted living facility ([facility])  -LF            Swallow Goals (SLP)    Oral Nutrition/Hydration Goal Selection (SLP) oral nutrition/hydration, SLP goal 1;oral nutrition/hydration, SLP goal 2;oral nutrition/hydration, SLP goal (free text)  -LF            Oral Nutrition/Hydration Goal 1 (SLP)    Oral Nutrition/Hydration Goal 1, SLP The pt will maximize swallow function for least restricitve PO diet, no complications associated with dysphagia, adequate PO intake, and demonstrating independent use of swallow compensations.  -LF    Time Frame (Oral Nutrition/Hydration Goal 1, SLP) by discharge  -LF    Barriers (Oral Nutrition/Hydration Goal 1, SLP) ST recommends that the patient continue her current diet of regular consistency, thin liquid, with use of chin tuck for all thin liquid trials (consumed by cup).  -LF    Progress/Outcomes (Oral Nutrition/Hydration Goal 1, SLP) goal met  -LF            Oral Nutrition/Hydration Goal 2 (SLP)    Oral Nutrition/Hydration Goal 2, SLP The patient will  participate in ongoing assessment of swallow, including re-evaluation clinically and/or including instrumental assessment of swallow if indicated, to further assess swallow function in anticipation to initiate a po diet  -LF    Time Frame (Oral Nutrition/Hydration Goal 2, SLP) 1 day  -LF    Progress/Outcomes (Oral Nutrition/Hydration Goal 2, SLP) goal met  -LF            Oral Nutrition/Hydration Goal (SLP)    Oral Nutrition/Hydration Goal, SLP The patient will participate in a full meal assessment to determine safety and adequacy of recommended diet, independent use of safe swallow compensations, and additional goals/recommendations to follow  -LF    Time Frame (Oral Nutrition/Hydration Goal, SLP) 2 days;3 days;4 days  -LF    Barriers (Oral Nutrition/Hydration Goal, SLP) see above note  -LF    Progress/Outcomes (Oral Nutrition/Hydration Goal, SLP) goal met  -LF          User Key  (r) = Recorded By, (t) = Taken By, (c) = Cosigned By    Initials Name Effective Dates    LF Iris Perla SLP 06/16/21 -                 EDUCATION  The patient has been educated in the following areas:   Dysphagia (Swallowing Impairment).       Patient was not wearing a face mask during this therapy encounter. Therapist used appropriate personal protective equipment including gown, eye protection, mask and gloves.  Mask used was standard procedure mask. Appropriate PPE was worn during the entire therapy session. Hand hygiene was completed before and after therapy session. Patient is not in enhanced droplet precautions.           Time Calculation:                RACHEL Baca  5/16/2022

## 2022-05-16 NOTE — NURSING NOTE
6-year-old female with a history of dementia, CAD, ischemic cardiomyopathy, chronic systolic CHF, hyperlipidemia, and type 2 diabetes as well as depression and general anxiety disorder.  She presented to the hospital complaining of a cough which began about a week ago.  Consult received to assess perineal area.  Patient has a red papular rash consistent with yeast.  No denuded areas are noted.  Patient currently has nystatin ordered.  This is certainly appropriate.  Would recommend to continue the nystatin

## 2022-05-16 NOTE — THERAPY TREATMENT NOTE
"Subjective: Pt agreeable to therapeutic plan of care.     Objective:     Bed mobility - Independent  Transfers - Supervision and with rolling walker  Ambulation - 100 feet Supervision and with rolling walker    Pain: 0 VAS  Education: Provided education on importance of mobility and skilled verbal / tactile cueing throughout intervention.     Assessment: Nadege Barrow presents with functional mobility impairments which indicate the need for skilled intervention. Tolerating session today without incident. Pt amb in room using rwx with supervision only x 100'. No LOB noted. Plans are to dc back to [facility] once cleared by cardio.  Will continue to follow and progress as tolerated.     Plan/Recommendations:   Low Intensity Therapy recommended post-acute care - This is recommended as therapy feels this patient would require 2-3 visits per week. OP or HH would be the best option depending on patient's home bound status. Consider, if the patient has other  \"skilled\" needs such as wounds, IV antibiotics, etc. Combined with \"low intensity\" could also equate to a SNF. If patient is medically complex, consider LTAC.. Pt requires no DME at discharge.     Pt desires Home with Home Health at discharge. Pt cooperative; agreeable to therapeutic recommendations and plan of care.         Basic Mobility 6-click:  Rollin = Total, A lot = 2, A little = 3; 4 = None  Supine>Sit:   1 = Total, A lot = 2, A little = 3; 4 = None   Sit>Stand with arms:  1 = Total, A lot = 2, A little = 3; 4 = None  Bed>Chair:   1 = Total, A lot = 2, A little = 3; 4 = None  Ambulate in room:  1 = Total, A lot = 2, A little = 3; 4 = None  3-5 Steps with railin = Total, A lot = 2, A little = 3; 4 = None  Score: 22    Post-Tx Position: Supine with HOB Elevated, Alarms activated and Call light and personal items within reach  PPE: gloves, surgical mask, eyewear protection  "

## 2022-05-16 NOTE — DISCHARGE SUMMARY
[facility] Medicine Services  DISCHARGE SUMMARY    Patient Name: aNdege Barrow  : 1945  MRN: [id number]    Date of Admission: 2022  Discharge Diagnosis: Acute exacerbation of systolic congestive heart failure  Date of Discharge: 2022  Primary Care Physician: Darci Wilkerson MD      Presenting Problem:   Parainfluenza virus infection [B34.8]  Chest pain, unspecified type [R07.9]  Congestive heart failure, unspecified HF chronicity, unspecified heart failure type (HCC) [I50.9]    Active and Resolved Hospital Problems:  Active Hospital Problems    Diagnosis POA   • **Acute on chronic systolic CHF (congestive heart failure) (HCC) [I50.23] Yes     Priority: High   • Type 2 diabetes mellitus (HCC) [E11.9] Yes     Priority: High   • Chest pain, unspecified type [R07.9] Yes     Priority: High   • Carotid artery disease (HCC) [I77.9] Yes     Priority: High   • Cardiomyopathy (HCC) [I42.9] Yes     Priority: High   • Generalized anxiety disorder [F41.1] Yes     Priority: Medium   • Senile dementia, delirium, with behavioral disturbance (HCC) [F03.91, F05] Yes     Priority: Medium   • Depression, unspecified [F32.A] Yes     Priority: Medium   • Hyperlipidemia, mixed [E78.2] Yes     Priority: Medium      Resolved Hospital Problems   No resolved problems to display.         Hospital Course     Hospital Course:  Nadege Barrow is a 76 y.o. female with an extensive past medical history inclusive of senile dementia, chronic coronary artery disease and ischemic cardiomyopathy, chronic systolic congestive heart failure with hyperlipidemia, type 2 diabetes mellitus and depression and a generalized anxiety.  The patient presented complaining of a cough which began a week prior to her admission.  She states that at times she was able to cough up some yellow mucus but most of the time she just had a nagging cough without production.  She had some increased shortness of breath and pain at the  bottom of her ribs which then during her hospital course generated more centrally until it was resolved.  The patient was seen by cardiology who managed her diuretics and is well as her cardiac medications.  They changed her to oral Lasix and observed her BUN and creatinine.  She did have a bump in her creatinine which was treated with a saline infusion of 500 mL.  Her Lasix was decreased and changed to p.o.  She received cardiology clearance for discharge.    The patient's diabetes was managed well during her stay.  The patient was given some low-dose Xanax for her anxiety.  The patient overall seems to be doing well and was to go back to her home at the [facility].    The patient is a full code at the time of discharge.  The patient's medications are as listed in that section of this report.  The patient is on a cardiac consistent carbohydrate diet.  The patient has no pending studies at discharge.  The patient will be followed by home health care with [facility] and a basic metabolic profile will be drawn on Thursday and sent to Dr. Hernandez's office.  Patient should follow-up with Dr. Hernandez in 2 weeks and with her primary care provider in 1 week's time.  The patient was discharged in satisfactory condition.        DISCHARGE Follow Up Recommendations for labs and diagnostics:       Reasons For Change In Medications and Indications for New Medications:      Day of Discharge     Vital Signs:  Temp:  [97.5 °F (36.4 °C)-97.8 °F (36.6 °C)] 97.5 °F (36.4 °C)  Heart Rate:  [73-75] 73  Resp:  [18] 18  BP: (131-153)/(61-94) 153/94  Flow (L/min):  [0-3] 3    Physical Exam:  Physical Exam  Vitals and nursing note reviewed.   Constitutional:       General: She is not in acute distress.     Appearance: Normal appearance. She is well-developed. She is not ill-appearing, toxic-appearing or diaphoretic.   HENT:      Head: Normocephalic and atraumatic.      Right Ear: Ear canal and external ear normal.      Left Ear: Ear canal  and external ear normal.      Nose: Nose normal. No congestion or rhinorrhea.      Mouth/Throat:      Mouth: Mucous membranes are moist.      Pharynx: No oropharyngeal exudate.   Eyes:      General: No scleral icterus.        Right eye: No discharge.         Left eye: No discharge.      Extraocular Movements: Extraocular movements intact.      Conjunctiva/sclera: Conjunctivae normal.      Pupils: Pupils are equal, round, and reactive to light.   Neck:      Thyroid: No thyromegaly.      Vascular: No carotid bruit or JVD.      Trachea: No tracheal deviation.   Cardiovascular:      Rate and Rhythm: Normal rate and regular rhythm.      Pulses: Normal pulses.      Heart sounds: Normal heart sounds. No murmur heard.    No friction rub. No gallop.   Pulmonary:      Effort: Pulmonary effort is normal. No respiratory distress.      Breath sounds: Normal breath sounds. No stridor. No wheezing, rhonchi or rales.      Comments: Improved breath sounds bilaterally with improved air movement as well.  Chest:      Chest wall: No tenderness.   Abdominal:      General: Bowel sounds are normal. There is no distension.      Palpations: Abdomen is soft. There is no mass.      Tenderness: There is no abdominal tenderness. There is no guarding or rebound.      Hernia: No hernia is present.   Musculoskeletal:         General: No swelling, tenderness, deformity or signs of injury. Normal range of motion.      Cervical back: Normal range of motion and neck supple. No rigidity. No muscular tenderness.      Right lower leg: No edema.      Left lower leg: No edema.   Lymphadenopathy:      Cervical: No cervical adenopathy.   Skin:     General: Skin is warm and dry.      Coloration: Skin is not jaundiced or pale.      Findings: No bruising, erythema or rash.   Neurological:      General: No focal deficit present.      Mental Status: She is alert and oriented to person, place, and time. Mental status is at baseline.      Cranial Nerves: No cranial  nerve deficit.      Sensory: No sensory deficit.      Motor: No weakness or abnormal muscle tone.      Coordination: Coordination normal.   Psychiatric:         Mood and Affect: Mood normal.         Behavior: Behavior normal.         Thought Content: Thought content normal.         Judgment: Judgment normal.            Pertinent  and/or Most Recent Results     LAB RESULTS:      Lab 05/16/22  0053 05/15/22  0308 05/14/22 0440 05/12/22 2116   WBC 11.50* 11.40* 9.90 9.90   HEMOGLOBIN 14.0 13.3 13.0 12.6   HEMATOCRIT 44.5 42.7 41.1 38.4   PLATELETS 330 288 278 273   NEUTROS ABS 8.40* 7.50* 6.70 7.50*   LYMPHS ABS 2.30 2.60 2.30 1.70   MONOS ABS 0.70 1.10* 0.70 0.60   EOS ABS 0.00 0.10 0.00 0.00   MCV 85.8 85.0 84.9 84.4         Lab 05/16/22  0053 05/15/22  1311 05/15/22  0308 05/14/22  2039 05/14/22  0440 05/13/22  0933 05/12/22 2116   SODIUM 138  --  139  --  139  --  141   POTASSIUM 4.1 4.5 3.3* 3.8 2.8*  --  2.9*   CHLORIDE 100  --  101  --  99  --  103   CO2 23.0  --  26.0  --  27.0  --  26.0   ANION GAP 15.0  --  12.0  --  13.0  --  12.0   BUN 29*  --  24*  --  17  --  13   CREATININE 1.46*  --  1.02*  --  0.90  --  0.93   EGFR 37.2*  --  57.1*  --  66.4  --  63.8   GLUCOSE 129*  --  82  --  76  --  88   CALCIUM 9.1  --  8.4*  --  8.7  --  8.7   MAGNESIUM 1.9  --  1.9  --  1.9 1.8 1.6         Lab 05/12/22 2116   TOTAL PROTEIN 6.1   ALBUMIN 3.40*   GLOBULIN 2.7   ALT (SGPT) 13   AST (SGOT) 15   BILIRUBIN 0.6   ALK PHOS 69         Lab 05/16/22 1313 05/14/22  0802 05/13/22  1813 05/13/22  1338 05/13/22  0933 05/12/22  2116   PROBNP 2,947.0*  --   --  9,562.0*  --  31,131.0*   TROPONIN T  --  <0.010 <0.010 <0.010 <0.010 <0.010                 Brief Urine Lab Results  (Last result in the past 365 days)      Color   Clarity   Blood   Leuk Est   Nitrite   Protein   CREAT   Urine HCG        02/27/22 0749 Yellow   Clear   Trace   Trace   Negative   Negative               Microbiology Results (last 10 days)      Procedure Component Value - Date/Time    Respiratory Panel PCR w/COVID-19(SARS-CoV-2) [facility] In-House, NP Swab in UTM/VTM, 3-4 HR TAT - Swab, Nasopharynx [[id number]]  (Abnormal) Collected: 05/12/22 2142    Lab Status: Final result Specimen: Swab from Nasopharynx Updated: 05/12/22 2243     ADENOVIRUS, PCR Not Detected     Coronavirus 229E Not Detected     Coronavirus HKU1 Not Detected     Coronavirus NL63 Not Detected     Coronavirus OC43 Not Detected     COVID19 Not Detected     Human Metapneumovirus Not Detected     Human Rhinovirus/Enterovirus Not Detected     Influenza A PCR Not Detected     Influenza B PCR Not Detected     Parainfluenza Virus 1 Not Detected     Parainfluenza Virus 2 Not Detected     Parainfluenza Virus 3 Detected     Parainfluenza Virus 4 Not Detected     RSV, PCR Not Detected     Bordetella pertussis pcr Not Detected     Bordetella parapertussis PCR Not Detected     Chlamydophila pneumoniae PCR Not Detected     Mycoplasma pneumo by PCR Not Detected    Narrative:      In the setting of a positive respiratory panel with a viral infection PLUS a negative procalcitonin without other underlying concern for bacterial infection, consider observing off antibiotics or discontinuation of antibiotics and continue supportive care. If the respiratory panel is positive for atypical bacterial infection (Bordetella pertussis, Chlamydophila pneumoniae, or Mycoplasma pneumoniae), consider antibiotic de-escalation to target atypical bacterial infection.          XR Chest 2 View    Result Date: 5/12/2022  Impression: No acute process.  Electronically Signed By-Finn Ashton MD On:5/12/2022 9:13 PM This report was finalized on [id number] by  Finn Ashton MD.    FL Video Swallow With Speech Single Contrast    Result Date: 5/13/2022  Impression: Modified barium swallow study with fluoroscopy. Please refer to the speech pathology report for findings and dietary recommendations.  Electronically  Signed By-María Lauren MD On:5/13/2022 11:14 AM This report was finalized on [id number] by  María Lauren MD.    XR Chest 1 View    Result Date: 5/16/2022  Impression: No active cardiopulmonary disease  Electronically Signed By-Ole Alexis On:5/16/2022 7:55 AM This report was finalized on [id number] by  Ole Alexis, .    CT Angiogram Chest Pulmonary Embolism    Result Date: 5/13/2022  Impression: 1.  Moderate tree-in-bud nodular opacities throughout the left lung. Primary consideration is infectious bronchiolitis. Alternative considerations are inflammatory bronchiolitis and chronic aspiration. 2.  Moderate airways disease. 3.  Trace bilateral pleural effusions. Minor pulmonary interstitial edema. 4.  No pulmonary embolus. Electronically signed by:  Chris Frausto M.D.  5/12/2022 10:08 PM              Results for orders placed during the hospital encounter of 02/26/22    Adult Transthoracic Echo Complete w/ Color, Spectral and Contrast if necessary per protocol    Interpretation Summary  · Left ventricular ejection fraction appears to be 41 - 45%.  · The right ventricular cavity is mildly dilated.  · Mild aortic valve stenosis is present.  · Moderate tricuspid valve regurgitation is present.  · No pericardial effusion noted      Labs Pending at Discharge:      Procedures Performed           Consults:   Consults     Date and Time Order Name Status Description    5/14/2022  9:59 AM Inpatient Cardiology Consult Completed     5/13/2022 12:24 AM Hospitalist (on-call MD unless specified)              Discharge Details        Discharge Medications      New Medications      Instructions Start Date   amoxicillin-clavulanate 875-125 MG per tablet  Commonly known as: Augmentin   1 tablet, Oral, 2 Times Daily      benzonatate 100 MG capsule  Commonly known as: TESSALON   100 mg, Oral, 3 Times Daily PRN      First Mouthwash (Magic Mouthwash) suspension   5 mL, Swish & Spit, 2 Times Daily      fluconazole 200 MG  tablet  Commonly known as: DIFLUCAN   200 mg, Oral, Every 24 Hours      guaiFENesin-dextromethorphan 100-10 MG/5ML syrup  Commonly known as: ROBITUSSIN DM   5 mL, Oral, Every 4 Hours PRN      losartan 25 MG tablet  Commonly known as: COZAAR   25 mg, Oral, Every 24 Hours Scheduled   Start Date: May 17, 2022     nitroglycerin 0.4 MG SL tablet  Commonly known as: NITROSTAT   0.4 mg, Sublingual, Every 5 Minutes PRN, Take no more than 3 doses in 15 minutes.      nystatin 100,000 unit/mL suspension  Commonly known as: MYCOSTATIN   500,000 Units, Oral, 4 Times Daily      nystatin 876441 UNIT/GM powder  Commonly known as: MYCOSTATIN   Topical, Every 12 Hours Scheduled         Continue These Medications      Instructions Start Date   ALPRAZolam 2 MG tablet  Commonly known as: XANAX   2 mg, Oral, 3 Times Daily PRN      aspirin 81 MG EC tablet   81 mg, Oral, Daily      atorvastatin 40 MG tablet  Commonly known as: LIPITOR   40 mg, Oral, Daily      busPIRone 10 MG tablet  Commonly known as: BUSPAR   10 mg, Oral, Every 12 Hours Scheduled      fludrocortisone 0.1 MG tablet   0.1 mg, Oral, Daily      FLUoxetine 20 MG capsule  Commonly known as: PROzac   20 mg, Oral, Daily      furosemide 20 MG tablet  Commonly known as: LASIX   20 mg, Oral, Daily      insulin NPH-insulin regular (70-30) 100 UNIT/ML injection  Commonly known as: humuLIN 70/30,novoLIN 70/30   20 Units, Subcutaneous, 2 Times Daily With Meals      Jardiance 10 MG tablet tablet  Generic drug: empagliflozin   1 tablet, Oral, Daily      linaclotide 145 MCG capsule capsule  Commonly known as: LINZESS   145 mcg, Oral, Every Morning Before Breakfast      metoprolol succinate XL 50 MG 24 hr tablet  Commonly known as: TOPROL-XL   50 mg, Oral, Daily      potassium chloride ER 20 MEQ tablet controlled-release ER tablet  Commonly known as: K-TAB   20 mEq, Oral, Daily      predniSONE 20 MG tablet  Commonly known as: DELTASONE   20 mg, Oral, Daily      ticagrelor 90 MG tablet  tablet  Commonly known as: BRILINTA   90 mg, Oral, 2 Times Daily      tiZANidine 2 MG half tablet  Commonly known as: ZANAFLEX   2 mg, Oral, Every 8 Hours PRN             Allergies   Allergen Reactions   • Gabapentin Hallucinations   • Morphine Hallucinations         Discharge Disposition:   Skilled Nursing Facility (DC - External)    Diet:  Hospital:  Diet Order   Procedures   • Diet Cardiac, Diabetic/Consistent Carbs; Healthy Heart; Diabetic - Consistent Carb; Fluid Restriction; 2000cc/day         Discharge Activity:   Activity Instructions     Activity as Tolerated              CODE STATUS:  Code Status and Medical Interventions:   Ordered at: 05/13/22 0227     Level Of Support Discussed With:    Patient     Code Status (Patient has no pulse and is not breathing):    CPR (Attempt to Resuscitate)     Medical Interventions (Patient has pulse or is breathing):    Full Support         Future Appointments   Date Time Provider Department Center   6/7/2022 11:15 AM Cristal Alvarez MD [facility]   8/10/2022 11:00 AM Piper Oconnell APRN [facility]       Additional Instructions for the Follow-ups that You Need to Schedule     Ambulatory Referral to Home Health   As directed      [facility]    Order Comments: [facility]     Face to Face Visit Date: 5/16/2022    Follow-up provider for Plan of Care?: I treated the patient in an acute care facility and will not continue treatment after discharge.    Follow-up provider: JUANY GONZALEZ [[id number]]    Reason/Clinical Findings: renal insuffiency and chf    Describe mobility limitations that make leaving home difficult: Patient with chf and difficulty ambulating    Nursing/Therapeutic Services Requested: Skilled Nursing    Skilled nursing orders: CHF management (bmp on thursday called to Dr. Hernandez)    Frequency: 1 Week 1         Ambulatory Referral to Physical Therapy Evaluate and treat;  Strengthening   As directed      Specialty needed: Evaluate and treat    Exercises: Strengthening         Discharge Follow-up with PCP   As directed       Currently Documented PCP:    Darci Wilkerson MD    PCP Phone Number:    [phone]     Follow Up Details: in one week         Discharge Follow-up with Specialty: follow up with Dr. Hernandez in 2 weeks; 2 Weeks   As directed      Specialty: follow up with Dr. Hernandez in 2 weeks    Follow Up: 2 Weeks               Time spent on Discharge including face to face service:  40 minutes    This patient has been examined wearing appropriate Personal Protective Equipment and discussed with hospital infection control department. 05/16/22      Signature: Electronically signed by Niya Lopez MD, 05/16/22, 5:57 PM EDT.

## 2022-05-16 NOTE — PROGRESS NOTES
Cardiology progress note        REQUESTING PHYSICIAN    Niya Lopez MD    PATIENT IDENTIFICATION  Name: Nadege Barrow  Age: 76 y.o.  Sex: female  :  1945  MRN: [id number]             REASON FOR CONSULTATION:  76-year-old female who was previously followed by Dr. Mancilla with known history of coronary artery disease status post PCI/RCA May 2018, history of ischemic cardiomyopathy, history of systolic heart failure, essential hypertension, dyslipidemia, diabetes mellitus 2, carotid artery stenosis, anemia, antiplatelet therapy.    Left heart cath 2018 at [facility]: 80-90% distal RCA with FFR positive.  PCI to distal RCA.    TTE 2022: EF 41-45%, mild AS, moderate TR    Nuclear stress testing 2028: Normal myocardial perfusion study with no evidence of ischemia, low risk study.    CC:  Chest pain  Shortness of breath      HISTORY OF PRESENT ILLNESS: Per H&P  Agree with narrative as discussed with nurse practitioner face-to-face encounter with scribed findings and narrative below  Patient [facility] emergency [facility] from the [facility] where she resides with complaint of cough, fatigue, intermittent chest discomfort with inhalation and mild shortness of breath.  Work-up in the ED included chest x-ray that showed no acute findings.  CT chest with moderate tree-in-bud nodular opacities throughout the left lung-consideration of infectious bronchiolitis, inflammatory bronchiolitis or chronic aspiration.  Minor pulmonary interstitial edema.  No PE.  Troponin negative.  proBNP elevated.  She was admitted for further evaluation and treatment options.  She is lying in bed comfortable on my encounter on room air.  She denies any actual chest pain but continues to have occasional nonproductive cough.  She says her cough and shortness of breath over the last 1 week which was not paid attention to by her care staff is the primary reason that she is  here.  She has no increase in frequency severity or duration of any anginal chest pain.  Patient mentions several times about trying to wean herself off of benzodiazepine that she takes for chronic tremor.  She denies any lower extremity edema.  When she takes deep breaths she can feel issues on the right lower portion of the chest on deep breathing.    Patient denies any cardiology follow-up since her stent in 2018  =================================  Seen and examined  Remains with cough and some shortness of air  Labs demonstrate more elevated creatinine possibly secondary to overdiuresis  Stop Aldactone, will continue losartan, decrease Lasix to 20 daily  Will give back 500 cc of normal saline likely with overdiuresis gently    Parainfluenza virus positive management per primary with continued cough  Replace potassium      Review of systems otherwise negative x14 point review of systems except as mentioned above  Historical data copied forward from previous encounters in EMR is unchanged  REVIEW OF SYSTEMS:  Pertinent items are noted in HPI, all other systems reviewed and negative    OBJECTIVE   proBNP 31,131  Potassium 2.9    ASSESSMENT  Acute on chronic heart failure with reduced ejection fraction  Hypokalemia  Chest discomfort-atypical  Coronary artery disease status post PCI  Diabetes mellitus 2  Dyslipidemia      PLAN  Continue dual antiplatelet therapy with aspirin and ticagrelor  Continue BB, statin  Lasix decreased to 20 daily  Stop Aldactone, continue losartan for afterload reduction with underlying EF 40 to 45%  Defer treatment of any underlying pneumonia or bronchitis to primary with parainfluenza virus  Patient had recent ischemic work-up February 2022 with negative nuclear stress testing for ischemia and echocardiogram as above    Further recommendations as patient's hospital course progresses    Rick Hernandez MD, PhD    Vital Signs  Visit Vitals  /94 (BP Location: Right arm, Patient Position:  "Lying)   Pulse 73   Temp 97.5 °F (36.4 °C) (Oral)   Resp 18   Ht 160 cm (62.99\")   Wt 72.2 kg (159 lb 2.8 oz)   SpO2 98%   BMI 28.20 kg/m²     Oxygen Therapy  SpO2: 98 %  Pulse Oximetry Type: Intermittent  Device (Oxygen Therapy): room air  Flow (L/min): 0  Flowsheet Rows    Flowsheet Row First Filed Value   Admission Height 160 cm (63\") Documented at 05/12/2022 1933   Admission Weight 74.1 kg (163 lb 5.8 oz) Documented at 05/12/2022 1933        Intake & Output (last 3 days)       05/13 0701  05/14 0700 05/14 0701  05/15 0700 05/15 0701  05/16 0700 05/16 0701  05/17 0700    P.O. 1400 2520 720     IV Piggyback  100 300     Total Intake(mL/kg) 1400 (19.4) 2620 (36) 1020 (14.1)     Urine (mL/kg/hr) 2350 (1.4) 2900 (1.7) 2500 (1.4)     Stool  0 0     Total Output 2350 2900 2500     Net -950 -280 -1480             Urine Unmeasured Occurrence 1 x 1 x      Stool Unmeasured Occurrence  1 x 1 x         Lines, Drains & Airways     Active LDAs     Name Placement date Placement time Site Days    Peripheral IV 05/12/22 2116 Right Forearm 05/12/22 2116  Forearm  1    External Urinary Catheter 05/13/22 1942  --  less than 1                MEDICAL HISTORY    Past Medical History:   Diagnosis Date   • Anxiety    • CHF (congestive heart failure) ([facility])    • Depression    • Diabetes mellitus ([facility])    • Hyperlipidemia    • Hypertension    • Panic disorder     \"panic attacks\"   • Tremors of nervous system     \"essential tremors\"        SURGICAL HISTORY    Past Surgical History:   Procedure Laterality Date   • CORONARY ANGIOPLASTY WITH STENT PLACEMENT          FAMILY HISTORY    Family History   Problem Relation Age of Onset   • Depression Sister    • Suicidality Other         suicided   • Alcohol abuse Other    • Alcohol abuse Daughter    • Depression Other        SOCIAL HISTORY    Social History     Tobacco Use   • Smoking status: Never Smoker   • Smokeless tobacco: Not on file   Substance Use Topics   • Alcohol use: Not Currently    " "    ALLERGIES    Allergies   Allergen Reactions   • Gabapentin Hallucinations   • Morphine Hallucinations              /94 (BP Location: Right arm, Patient Position: Lying)   Pulse 73   Temp 97.5 °F (36.4 °C) (Oral)   Resp 18   Ht 160 cm (62.99\")   Wt 72.2 kg (159 lb 2.8 oz)   SpO2 98%   BMI 28.20 kg/m²   Intake/Output last 3 shifts:  I/O last 3 completed shifts:  In: 1380 [P.O.:1080; IV Piggyback:300]  Out: 3500 [Urine:3500]  Intake/Output this shift:  No intake/output data recorded.    PHYSICAL EXAM:    General: Alert, cooperative, no distress, appears stated age  Head:  Normocephalic, atraumatic, mucous membranes moist  Eyes:  Conjunctivae/corneas clear, EOM's intact     Neck:  Supple,  no adenopathy; no JVD or bruit  Lungs: Clear to auscultation bilaterally, no wheezes, rhonchi or rales are noted  Chest wall: No tenderness  Heart::  Regular rate and rhythm, S1 and S2 normal, 2/6 murmur to the right sternal border  Abdomen: Soft, nontender, nondistended, bowel sounds active  Extremities: No cyanosis, clubbing, or edema   Pulses: 2+ and symmetric all extremities  Skin:  No rashes or lesions  Neuro/psych: A&O x3.  Patient appears to have tremor left lower extremity  Agree with exam as discussed with nurse practitioner after my face-to-face encounter scribed findings above  Scheduled Meds:      ALPRAZolam, 1 mg, Oral, BID  aspirin, 81 mg, Oral, Daily  atorvastatin, 40 mg, Oral, Daily  benzonatate, 100 mg, Oral, TID  busPIRone, 10 mg, Oral, Q12H  docusate sodium, 100 mg, Oral, BID  empagliflozin, 10 mg, Oral, Daily  enoxaparin, 40 mg, Subcutaneous, Q24H  First Mouthwash (Magic Mouthwash), 5 mL, Swish & Spit, BID  fluconazole, 200 mg, Oral, Q24H  FLUoxetine, 20 mg, Oral, Daily  [START ON 5/17/2022] furosemide, 20 mg, Oral, Daily  insulin lispro, 0-9 Units, Subcutaneous, TID With Meals  insulin NPH-insulin regular, 20 Units, Subcutaneous, BID With Meals  losartan, 25 mg, Oral, Q24H  metoprolol succinate " XL, 50 mg, Oral, Daily  nystatin, 5 mL, Oral, 4x Daily  nystatin, , Topical, Q12H  piperacillin-tazobactam, 3.375 g, Intravenous, Q8H  potassium chloride, 20 mEq, Oral, Daily  potassium chloride, 40 mEq, Oral, Once  predniSONE, 20 mg, Oral, Daily  sodium chloride, 10 mL, Intravenous, Q12H  ticagrelor, 90 mg, Oral, BID        Continuous Infusions:    Pharmacy to Dose Zosyn,   sodium chloride, 100 mL/hr        PRN Meds:    benzonatate  •  dextrose  •  dextrose  •  glucagon (human recombinant)  •  guaiFENesin-dextromethorphan  •  insulin lispro **AND** insulin lispro  •  magnesium sulfate **OR** magnesium sulfate in D5W 1g/100mL (PREMIX)  •  nitroglycerin  •  Pharmacy to Dose Zosyn  •  potassium chloride **OR** potassium chloride **OR** potassium chloride  •  sodium chloride  •  sodium chloride  •  tiZANidine        Results Review:     I reviewed the patient's new clinical results.    CBC    Results from last 7 days   Lab Units 05/16/22  0053 05/15/22  0308 05/14/22  0440 05/12/22  2116   WBC 10*3/mm3 11.50* 11.40* 9.90 9.90   HEMOGLOBIN g/dL 14.0 13.3 13.0 12.6   PLATELETS 10*3/mm3 330 288 278 273     Cr Clearance Estimated Creatinine Clearance: 31.2 mL/min (A) (by C-G formula based on SCr of 1.46 mg/dL (H)).  Coag     HbA1C   Lab Results   Component Value Date    HGBA1C 7.5 (H) 02/27/2022    HGBA1C 7.9 (H) 12/13/2021     Blood Glucose   Glucose   Date/Time Value Ref Range Status   05/16/2022 0725 98 70 - 105 mg/dL Final     Comment:     Serial Number: [id number]:  [id number]   05/15/2022 1549 345 (H) 70 - 105 mg/dL Final     Comment:     Serial Number: [id number]:  [id number]   05/15/2022 1129 238 (H) 70 - 105 mg/dL Final     Comment:     Serial Number: [id number]:  [id number]   05/15/2022 0724 128 (H) 70 - 105 mg/dL Final     Comment:     Serial Number: [id number]:  [id number]   05/14/2022 1550 332 (H) 70 - 105 mg/dL Final     Comment:     Serial Number: [id number]:  [id number]    05/14/2022 1104 299 (H) 70 - 105 mg/dL Final     Comment:     Serial Number: [id number]:  [id number]   05/14/2022 0744 91 70 - 105 mg/dL Final     Comment:     Serial Number: [id number]:  [id number]   05/13/2022 2241 234 (H) 70 - 105 mg/dL Final     Comment:     Serial Number: [id number]:  [id number]     Infection     CMP   Results from last 7 days   Lab Units 05/16/22  0053 05/15/22  1311 05/15/22  0308 05/14/22 2039 05/14/22 0440 05/12/22  2116   SODIUM mmol/L 138  --  139  --  139 141   POTASSIUM mmol/L 4.1 4.5 3.3* 3.8 2.8* 2.9*   CHLORIDE mmol/L 100  --  101  --  99 103   CO2 mmol/L 23.0  --  26.0  --  27.0 26.0   BUN mg/dL 29*  --  24*  --  17 13   CREATININE mg/dL 1.46*  --  1.02*  --  0.90 0.93   GLUCOSE mg/dL 129*  --  82  --  76 88   ALBUMIN g/dL  --   --   --   --   --  3.40*   BILIRUBIN mg/dL  --   --   --   --   --  0.6   ALK PHOS U/L  --   --   --   --   --  69   AST (SGOT) U/L  --   --   --   --   --  15   ALT (SGPT) U/L  --   --   --   --   --  13     ABG      UA      DIANA  No results found for: POCMETH, POCAMPHET, POCBARBITUR, POCBENZO, POCCOCAINE, POCOPIATES, POCOXYCODO, POCPHENCYC, POCPROPOXY, POCTHC, POCTRICYC  Lysis Labs   Results from last 7 days   Lab Units 05/16/22  0053 05/15/22  0308 05/14/22  0440 05/12/22  2116   HEMOGLOBIN g/dL 14.0 13.3 13.0 12.6   PLATELETS 10*3/mm3 330 288 278 273   CREATININE mg/dL 1.46* 1.02* 0.90 0.93     Radiology(recent) XR Chest 1 View    Result Date: 5/16/2022  No active cardiopulmonary disease  Electronically Signed By-Ole Alexis On:5/16/2022 7:55 AM This report was finalized on [id number] by  Ole Alexis, .        Results from last 7 days   Lab Units 05/14/22  0802   TROPONIN T ng/mL <0.010       Xrays, labs reviewed personally by physician.    ECG/EMG Results (most recent)     Procedure Component Value Units Date/Time    ECG 12 Lead [[id number]] Collected: 05/13/22 1505     Updated: 05/14/22 0708     QT Interval 421 ms      Narrative:      HEART RATE= 86  bpm  RR Interval= 700  ms  UT Interval= 192  ms  P Horizontal Axis=   deg  P Front Axis= 31  deg  QRSD Interval= 148  ms  QT Interval= 421  ms  QRS Axis= -54  deg  T Wave Axis= 108  deg  - ABNORMAL ECG -  Sinus rhythm  Ventricular premature complex  Left bundle branch block  ST elevation secondary to IVCD  When compared with ECG of 12-May-2022 19:42:53,  No significant change  Electronically Signed By: Rick Hernandez ([facility]) 14-May-2022 07:07:56  Date and Time of Study: 2022-05-13 15:05:00    ECG 12 Lead [[id number]] Collected: 05/12/22 1942     Updated: 05/15/22 1314     QT Interval 472 ms     Narrative:      HEART RATE= 69  bpm  RR Interval= 864  ms  UT Interval= 157  ms  P Horizontal Axis= -33  deg  P Front Axis= 8  deg  QRSD Interval= 159  ms  QT Interval= 472  ms  QRS Axis= -53  deg  T Wave Axis= 93  deg  - ABNORMAL ECG -  Sinus rhythm  Left bundle branch block  ST elevation secondary to IVCD  When compared with ECG of 07-Mar-2022 3:43:05,  Significant change in rhythm  Significant repolarization change  Electronically Signed By: Sunday Cordero ([facility]) 15-May-2022 13:14:34  Date and Time of Study: 2022-05-12 19:42:53    ECG 12 Lead [[id number]] Collected: 05/14/22 0807     Updated: 05/16/22 0852     QT Interval 487 ms     Narrative:      HEART RATE= 78  bpm  RR Interval= 772  ms  UT Interval=   ms  P Horizontal Axis=   deg  P Front Axis=   deg  QRSD Interval= 157  ms  QT Interval= 487  ms  QRS Axis= -22  deg  T Wave Axis= 109  deg  - ABNORMAL ECG -  Atrial fibrillation  Ventricular premature complex  Left bundle branch block  LVH with secondary repolarization abnormality  Anterior Q waves, possibly due to LVH  When compared with ECG of 13-May-2022 15:05:00,  Significant change in rhythm: previously sinus  New or worsened ischemia or infarction  Significant repolarization change  Electronically Signed By: Rick Hernandez ([facility]) 16-May-2022 08:51:56  Date and Time of Study: 2022-05-14  08:07:11            Medication Review:   I have reviewed the patient's current medication list  Scheduled Meds:ALPRAZolam, 1 mg, Oral, BID  aspirin, 81 mg, Oral, Daily  atorvastatin, 40 mg, Oral, Daily  benzonatate, 100 mg, Oral, TID  busPIRone, 10 mg, Oral, Q12H  docusate sodium, 100 mg, Oral, BID  empagliflozin, 10 mg, Oral, Daily  enoxaparin, 40 mg, Subcutaneous, Q24H  First Mouthwash (Magic Mouthwash), 5 mL, Swish & Spit, BID  fluconazole, 200 mg, Oral, Q24H  FLUoxetine, 20 mg, Oral, Daily  [START ON 5/17/2022] furosemide, 20 mg, Oral, Daily  insulin lispro, 0-9 Units, Subcutaneous, TID With Meals  insulin NPH-insulin regular, 20 Units, Subcutaneous, BID With Meals  losartan, 25 mg, Oral, Q24H  metoprolol succinate XL, 50 mg, Oral, Daily  nystatin, 5 mL, Oral, 4x Daily  nystatin, , Topical, Q12H  piperacillin-tazobactam, 3.375 g, Intravenous, Q8H  potassium chloride, 20 mEq, Oral, Daily  potassium chloride, 40 mEq, Oral, Once  predniSONE, 20 mg, Oral, Daily  sodium chloride, 10 mL, Intravenous, Q12H  ticagrelor, 90 mg, Oral, BID      Continuous Infusions:Pharmacy to Dose Zosyn,   sodium chloride, 100 mL/hr      PRN Meds:.benzonatate  •  dextrose  •  dextrose  •  glucagon (human recombinant)  •  guaiFENesin-dextromethorphan  •  insulin lispro **AND** insulin lispro  •  magnesium sulfate **OR** magnesium sulfate in D5W 1g/100mL (PREMIX)  •  nitroglycerin  •  Pharmacy to Dose Zosyn  •  potassium chloride **OR** potassium chloride **OR** potassium chloride  •  sodium chloride  •  sodium chloride  •  tiZANidine    Imaging:  Imaging Results (Last 72 Hours)     Procedure Component Value Units Date/Time    XR Chest 1 View [[id number]] Collected: 05/16/22 0755     Updated: 05/16/22 0757    Narrative:      DATE OF EXAM:  5/16/2022 5:57 AM     PROCEDURE:  XR CHEST 1 VW-     INDICATIONS:  Follow-up dyspnea; G25.0-Essential tremor; R07.9-Chest pain,  unspecified; B34.8-Other viral infections of unspecified site;  I50.9-Heart  "failure, unspecified     COMPARISON:  5/12/2022     TECHNIQUE:   Single radiographic AP view of the chest was obtained.     FINDINGS:  Heart size and pulmonary vasculature are within normal limits. Lungs  clear. Costophrenic angles sharp. No pneumothorax        Impression:      No active cardiopulmonary disease     Electronically Signed By-Ole Alexis On:5/16/2022 7:55 AM  This report was finalized on [id number] by  Ole Alexis, .    FL Video Swallow With Speech Single Contrast [[id number]] Collected: 05/13/22 1112     Updated: 05/13/22 1116    Narrative:      DATE OF EXAM:  5/13/2022 10:19 AM     PROCEDURE:  FL VIDEO SWALLOW W SPEECH SINGLE-CONTRAST-     INDICATIONS:  dysphagia; R07.9-Chest pain, unspecified; B34.8-Other viral infections  of unspecified site; I50.9-Heart failure, unspecified     COMPARISON:  Modified barium swallow study 3/4/2022.     TECHNIQUE:   This examination was performed in conjunction with speech pathology.  Lateral video fluoroscopic evaluation of the swallowing mechanism was  performed while correlate administering to the patient various  consistency food items mixed with barium.     Fluoroscopic Time:   2.0 minutes     Number of Images: 12 spot fluoroscopic series images        FINDINGS:  Modified barium swallow study was performed utilizing fluoroscopy. The  study was performed by dedicated speech pathologist. I was present  during the entire examination.        Impression:      Modified barium swallow study with fluoroscopy. Please refer to the  speech pathology report for findings and dietary recommendations.     Electronically Signed By-María Lauren MD On:5/13/2022 11:14 AM  This report was finalized on [id number] by  María Lauren MD.               EMR Dragon/Transcription:   \"Dictated utilizing Dragon dictation\".           Rick Hernandez MD, PhD      Electronically signed by PAM Thorpe, 05/14/22, 11:55 AM EDT.    "

## 2022-05-16 NOTE — CONSULTS
Diabetes Education    Patient Name:  Nadege Barrow  YOB: 1945  MRN: [id number]  Admit Date:  5/12/2022    Pt seen due to diabetes on problem list. Last A1c in [facility] was from 2/27/2022 and result was 7.5%. Visited pt and daughter at bedside. Pt states she lives at [facility] on [address] [facility] and nursing takes care of checking bs and giving pt her medications. Per home med list, pt taking Jardiance 10 mg dialy and Humulin/Novolin 70/30 20 units bid. Pt states she occasionally will have a low bs. Discussed appropriate tx options. Encouraged daughter for pt to have tx in room if JAGUAR allows. Pt has PCP and usually sees every 3 months. Pt states she does eat 3 meals/day at the long term. Gave low bs handout and A1c info sheet. Reviewed pt's previous A1c result of 7.5%. Reviewed healthy bs range and healthy A1c target. Pt/daughter verbalized understanding and did not have additional questions.       Electronically signed by:  Berna Bains RN  05/16/22 19:50 EDT

## 2022-05-16 NOTE — PLAN OF CARE
Problem: Adult Inpatient Plan of Care  Goal: Plan of Care Review  Outcome: Ongoing, Progressing  Flowsheets  Taken 5/16/2022 0234 by Carmen Saldaña, RN  Progress: improving  Outcome Evaluation: Patient slept well through the night. No complaints or concerns. Falls precautions in place. Possible discharge to [facility] today.  Taken 5/15/2022 1359 by Ivan Fernández RN  Plan of Care Reviewed With: patient   Goal Outcome Evaluation:           Progress: improving  Outcome Evaluation: Patient slept well through the night. No complaints or concerns. Falls precautions in place. Possible discharge to [facility] today.

## 2022-05-16 NOTE — PLAN OF CARE
Assessment: Nadege Barrow presents with functional mobility impairments which indicate the need for skilled intervention. Tolerating session today without incident. Pt amb in room using rwx with supervision only x 100'. No LOB noted. Plans are to dc back to long-term once cleared by cardio.  Will continue to follow and progress as tolerated.

## 2022-05-16 NOTE — CASE MANAGEMENT/SOCIAL WORK
Continued Stay Note   [place]     Patient Name: Nadege Barrow  MRN: [id number]  Today's Date: 5/16/2022    Admit Date: 5/12/2022     Discharge Plan     Row Name 05/16/22 1638       Plan    Plan DC Plan Return to [facility].  Need HH order.    Plan Comments In rounds it was discussed that the patient was a potential return to [facility] today. CM requested HH order for the patient. CM called [facility] to inquire about how to set up tranport for the patient to return to [facility] using the facility transport van. CM was transferred to the nursing station. No one answered at the nursing station but there was an option to leave a VM. CM left VM with name and call back number . The bedside nurse later asked about how to set up transport with [facility]. CM called the [facility] back to inquire how to go about setting up transport back to the facility using the facility transport van. At this time the facility reported that their transport van was booked for the day and the patient's daughter would have to provide transport back to the facility. CM updated the bedside nurse via secure chat.              Expected Discharge Date and Time     Expected Discharge Date Expected Discharge Time    May 16, 2022    Phone communication or documentation only- no physical contact with patient or family.    Izabela Cárdenas RN     Office Phone: [phone]  Office Cell: [phone]

## 2022-05-17 NOTE — DISCHARGE PLACEMENT REQUEST
"  Marybel Bonner, RN       Case Management   Discharge Placement Request   Signed   Date of Service:  05/14/22 1452   Creation Time:  05/14/22 1452              Signed                Show:Clear all  []Manual[x]Template[]Copied    Added by:  [x]Marybel Bonner, RN      []Hover for details    Mariano Garcia (76 y.o. Female)                                    Date of Birth   1945    Social Security Number       Address   [address] [place] [address]    Home Phone   [phone]    MRN   [id number]         Church   J[employer]ism    Marital Status                                       Admission Date   5/12/22    Admission Type   Emergency    Admitting Provider   Niya Lopez MD    Attending Provider   Niya Lopez MD    Department, Room/Bed   [facility] 2B MEDICAL INPATIENT, 230/1         Discharge Date        Discharge Disposition        Discharge Destination                                         Attending Provider: Niya Lopez MD    Allergies: Gabapentin, Morphine    Isolation: Contact Drop   Infection: MRSA (02/27/22), Other (05/13/22)   Code Status: CPR   Advance Care Planning Activity    Ht: 160 cm (62.99\")   Wt: 72.1 kg (159 lb)    Admission Cmt: None   Principal Problem: Acute on chronic systolic CHF (congestive heart failure) (HCC) [I50.23]                        Active Insurance as of 5/12/2022              Primary Coverage              Payor Plan Insurance Group Employer/Plan Group      HUMANA MEDICARE REPLACEMENT HUMANA MEDICARE REPLACEMENT [id number]                Payor Plan Address Payor Plan Phone Number Payor Plan Fax Number Effective Dates      [address] [address] [phone]   1/1/2016 - None Entered      [facility] [id number]                    Subscriber Name Subscriber Birth Date Member ID           MARIANO GARCIA 1945 [id number]                       Secondary Coverage              Payor Plan Insurance Group Employer/Plan Group      [place] [place]                  Payor Plan Address Payor Plan Phone Number Payor Plan Fax Number Effective Dates      [address]     3/7/2022 - None Entered      [place] [address]                    Subscriber Name Subscriber Birth Date Member ID           NADEGE BARROW 1945 [id number]                               Emergency Contacts       (Rel.) Home Phone Work Phone Mobile Phone     Ana Barrow (Daughter) [phone] -- [phone]                        History & Physical           Niya Lopez MD at 22 0039                  [facility] Hospital Medicine Services        Patient Name: Nadege Barrow  : 1945  MRN: [id number]  Primary Care Physician:  Darci Wilkerson MD  Date of admission: 2022        Subjective       Chief Complaint: Cough, fatigue, chest pain     History of Present Illness: Nadege Barrow is a 76 y.o. female with a past medical history of senile dementia, chronic CAD, ischemic cardiomyopathy, chronic systolic CHF, hyperlipidemia, type 2 diabetes mellitus, depression, and generalized anxiety disorder who presented to [facility] on 2022 complaining of a cough which began approximately 1 week ago.  She states that at times she is able to cough up light yellow mucus.  She also complains of generalized fatigue.  Her intermittent chest pain is is worse with inhalation.  She describes the location of her chest pain as at \"the bottom of her ribs\".  She denies any fever, chills, nausea, vomiting, shortness of breath, palpitations, dizziness.  She also denies any weight gain or swelling.     In the ED, EKG showed sinus rhythm with a left BBB.  Chest x-ray showed no acute process.  Her CT angiogram of the chest showed moderate tree-in-bud nodular opacities throughout the left lung with consideration of infectious bronchiolitis, inflammatory bronchiolitis or chronic aspiration.  It also showed moderate airway disease and trace " "bilateral pleural effusions with minor pulmonary interstitial edema.  No PE noted.  The troponin was negative, proBNP 31,131, potassium 2.9, magnesium 1.6, D-dimer is negative.  All other labs are unremarkable.  Patient is afebrile, blood pressure is elevated with a max BP of 174/86, other vital signs are stable.     Today the patient is having less cough and less pain.  She is not bringing up any sputum.  She is not having fever either.  She has been seen by speech therapy and their recommendations have been followed.     Review of Systems   Constitutional: Positive for malaise/fatigue.   HENT: Negative.    Eyes: Negative.    Cardiovascular: Positive for chest pain.   Respiratory: Positive for cough.    Hematologic/Lymphatic: Negative.    Skin: Negative.    Musculoskeletal: Negative.    Gastrointestinal: Negative.    Genitourinary: Negative.    Neurological: Negative.    Psychiatric/Behavioral: Negative.    Allergic/Immunologic: Negative.          Personal History           Past Medical History:   Diagnosis Date   • Anxiety     • CHF (congestive heart failure) (HCC)     • Depression     • Diabetes mellitus (HCC)     • Hyperlipidemia     • Hypertension     • Panic disorder       \"panic attacks\"   • Tremors of nervous system       \"essential tremors\"               Past Surgical History:   Procedure Laterality Date   • CORONARY ANGIOPLASTY WITH STENT PLACEMENT             Family History: family history includes Alcohol abuse in her daughter and another family member; Depression in her sister and another family member; Suicidality in an other family member. Otherwise pertinent FHx was reviewed and not pertinent to current issue.     Social History:  reports that she has never smoked. She does not have any smokeless tobacco history on file. She reports previous alcohol use. She reports that she does not use drugs.     Home Medications:           Prior to Admission Medications      Prescriptions Last Dose Informant " Patient Reported? Taking?     ALPRAZolam (XANAX) 2 MG tablet     No No     Take 1 tablet by mouth 3 (Three) Times a Day As Needed for Anxiety.     aspirin 81 MG EC tablet     Yes No     Take 81 mg by mouth Daily.     atorvastatin (LIPITOR) 40 MG tablet     Yes No     Take 40 mg by mouth Daily.     busPIRone (BUSPAR) 10 MG tablet     No No     Take 1 tablet by mouth Every 12 (Twelve) Hours.     Empagliflozin (Jardiance) 10 MG tablet     Yes No     Take 1 tablet by mouth Daily.     fludrocortisone 0.1 MG tablet     Yes No     Take 0.1 mg by mouth Daily.     FLUoxetine (PROzac) 20 MG capsule     Yes No     Take 20 mg by mouth Daily.     furosemide (LASIX) 20 MG tablet     Yes No     Take 20 mg by mouth Daily.     insulin NPH-insulin regular (humuLIN 70/30,novoLIN 70/30) (70-30) 100 UNIT/ML injection     Yes No     Inject 20 Units under the skin into the appropriate area as directed 2 (Two) Times a Day With Meals.     linaclotide (LINZESS) 145 MCG capsule capsule     Yes No     Take 145 mcg by mouth Every Morning Before Breakfast.     metoprolol succinate XL (TOPROL-XL) 50 MG 24 hr tablet     Yes No     Take 50 mg by mouth Daily.     potassium chloride ER (K-TAB) 20 MEQ tablet controlled-release ER tablet     Yes No     Take 20 mEq by mouth Daily.     predniSONE (DELTASONE) 20 MG tablet     No No     Take 1 tablet by mouth Daily.     ticagrelor (BRILINTA) 90 MG tablet tablet     Yes No     Take 90 mg by mouth 2 (Two) Times a Day.     tiZANidine (ZANAFLEX) 2 MG half tablet     Yes No     Take 2 mg by mouth Every 8 (Eight) Hours As Needed for Muscle Spasms.                Allergies:       Allergies   Allergen Reactions   • Gabapentin Hallucinations   • Morphine Hallucinations         Objective       Vitals:   Temp:  [97.1 °F (36.2 °C)] 97.1 °F (36.2 °C)  Heart Rate:  [69-72] 72  Resp:  [18] 18  BP: (132-174)/(68-86) 174/86     Physical Exam  Vitals and nursing note reviewed.   Constitutional:       Appearance: Normal  appearance.   HENT:      Head: Normocephalic and atraumatic.      Nose: Nose normal.      Mouth/Throat:      Mouth: Mucous membranes are moist.   Eyes:      Extraocular Movements: Extraocular movements intact.      Pupils: Pupils are equal, round, and reactive to light.   Cardiovascular:      Rate and Rhythm: Normal rate and regular rhythm.      Pulses: Normal pulses.      Heart sounds: Normal heart sounds.   Pulmonary:      Effort: Pulmonary effort is normal.      Breath sounds: Rhonchi present.      Comments: Decreased breath sounds in the bases bilaterally.  No wheezing but some scattered rhonchi.  Abdominal:      General: Bowel sounds are normal.      Palpations: Abdomen is soft.   Musculoskeletal:         General: Normal range of motion.      Cervical back: Normal range of motion.   Skin:     General: Skin is warm and dry.   Neurological:      Mental Status: She is alert and oriented to person, place, and time.   Psychiatric:         Mood and Affect: Mood normal.         Behavior: Behavior normal.         Result Review    Result Review:  I have personally reviewed the results from the time of this admission to 5/13/2022 00:39 EDT and agree with these findings:  [x]?  Laboratory  []?  Microbiology  [x]?  Radiology  [x]?  EKG/Telemetry   []?  Cardiology/Vascular   []?  Pathology  []?  Old records  []?  Other:   Most notable findings include:  The troponin was negative, proBNP 31,131     CT angiogram of the chest showed moderate tree-in-bud nodular opacities throughout the left lung with consideration of infectious bronchiolitis, inflammatory bronchiolitis or chronic aspiration.  It also showed moderate airway disease and trace bilateral pleural effusions with minor pulmonary interstitial edema.         Assessment & Plan          Active Hospital Problems:  There are no active hospital problems to display for this patient.     Plan:   Acute on chronic systolic heart failure  -proBNP 31,131  -2D echo, EF 41 to 46%  2/27/2022  -CT angiogram of the chest reviewed  -Chest x-ray reviewed  -IV Lasix given in ED  -IV Lasix ordered 40 mg BID  -Continue home metoprolol succinate  -Hold home p.o. Lasix      Hypokalemia   -Potassium 2.9, monitor  -Electrolyte replacement protocol ordered     Chest pain  -Troponin negative, trend  -EKG reviewed  -Continuous cardiac monitoring  -Nitrostat as needed  -Likely reflective of the intense coughing she has been doing recently.     Chronic CAD   -Continuous cardiac monitoring  -Continue home aspirin, Brilinta     Diabetes mellitus type 2  -Hemoglobin A1c 7.5 2/27/2022  -Accu checks before meals and at bedtime  -Continue home NPH at a decreased dose due to dietary changes while hospitalized  -SSI ordered  -Hold home Jardiance     Hyperlipidemia  -Lipid panel reviewed  -Continue home atorvastatin     Depression with generalized anxiety disorder  -Stable  -Continue home BuSpar, Prozac, Xanax (INSPECT VERIFIED)     DVT prophylaxis:  No DVT prophylaxis order currently exists.     CODE STATUS:       Admission Status:  I believe this patient meets OBSERVATION status.     I discussed the patient's findings and my recommendations with patient.     This patient has been examined wearing appropriate Personal Protective Equipment  05/13/22        Signature: Electronically signed by Tosin Cramer DNP, APRN, 05/13/22, 3:33 AM EDT.     Electronically signed by Niya Lopez MD at 05/13/22 1330         Physician Progress Notes (last 24 hours)  Notes from 05/13/22 1453 through 05/14/22 1453   No notes of this type exist for this encounter.                     Physical Therapy Notes (last 48 hours)            Haylee Winkler, PT at 05/13/22 1426  Version 1 of 1         Goal Outcome Evaluation:  Plan of Care Reviewed With: patient      75 y/o F who presented from [facility] with productive cough for 1 week, fatigue, chest pain. Found to have chest pain, (-) troponin, hypokalemia. Recent admission for  PNA 1 month ago. Patient has a history of essential tremor. She recently moved to [facility] on [facility]. She is typically independent with ADLs and ambulates with RW. She appears to be functioning near her baseline today. She performs bed mobility with min A, transfers with CGA to min A and ambulates 60' with CGA. No LOB. Patient is tangential and increased time to obtain history. Recommending return to [facility] and HHPT.            Electronically signed by Haylee Winkler PT at 22 [phone]                 Occupational Therapy Notes (last 48 hours)           Oma Ashley, OT at 22 1609             Patient Name: Nadege Barrow               : 1945                      MRN: [id number]                              Today's Date: 2022                                   Admit Date: 2022                        Visit Dx:       ICD-10-CM ICD-9-CM   1. Chest pain, unspecified type  R07.9 786.50   2. Parainfluenza virus infection  B34.8 078.89   3. Congestive heart failure, unspecified HF chronicity, unspecified heart failure type (HCC)  I50.9 428.0          Patient Active Problem List   Diagnosis   • Chest pain on breathing   • Acute respiratory failure with hypoxia (HCC)   • Anemia   • Anxiety   • Atherosclerotic heart disease of native coronary artery without angina pectoris   • Back pain   • Cardiomyopathy (HCC)   • Carotid artery disease (HCC)   • Cellulitis of foot   • Acute on chronic congestive heart failure (HCC)   • Current moderate episode of major depressive disorder without prior episode (HCC)   • Senile dementia, delirium, with behavioral disturbance (HCC)   • Depression, unspecified   • Disabling essential tremor   • Fatigue   • Hyperlipidemia, mixed   • Peripheral vision loss, bilateral   • Retinopathy, bilateral   • S/P right coronary artery (RCA) stent placement   • Ischemic cardiomyopathy   • Elevated LFTs   • Generalized anxiety disorder   • Benzodiazepine dependence, continuous  "(HCC)   • Type 2 diabetes mellitus (HCC)   • Chest pain, unspecified type   • Acute on chronic systolic CHF (congestive heart failure) (HCC)           Past Medical History:   Diagnosis Date   • Anxiety     • CHF (congestive heart failure) (HCC)     • Depression     • Diabetes mellitus (HCC)     • Hyperlipidemia     • Hypertension     • Panic disorder       \"panic attacks\"   • Tremors of nervous system       \"essential tremors\"            Past Surgical History:   Procedure Laterality Date   • CORONARY ANGIOPLASTY WITH STENT PLACEMENT                   General Information             Row Name 05/13/22 1556                   OT Time and Intention      Document Type evaluation  -SR               Row Name 05/13/22 1556                   Occupational Profile      Reason for Services/Referral (Occupational Profile) 75 y/o F who presented from [facility] with productive cough for 1 week, fatigue, chest pain, cough. (-) troponin, hypokalemia. Dx with parainfluenza virus.  She was recently admitted for PNA 1 month ago. Patient has a history of essential tremor.  Typically independent with all ADLs and mobility using rollator.  -SR               Row Name 05/13/22 1556                   Living Environment      People in Home facility resident  [facility]  -SR               Row Name 05/13/22 1556                   Cognition      Orientation Status (Cognition) oriented x 4  -SR               Row Name 05/13/22 1556                   Safety Issues, Functional Mobility      Impairments Affecting Function (Mobility) balance;shortness of breath  -SR                        User Key  (r) = Recorded By, (t) = Taken By, (c) = Cosigned By             Initials Name Provider Type      SR Oma Ashley, OT Occupational Therapist                              Mobility/ADL's             Row Name 05/13/22 1600                   Bed Mobility      Bed Mobility bed mobility (all) activities  -SR        All Activities, " Nicolaus (Bed Mobility) minimum assist (75% patient effort)  -SR               [facility] Name 05/13/22 1600                   Transfers      Sit-Stand Nicolaus (Transfers) contact guard  -SR               Row Name 05/13/22 1600                   Sit-Stand Transfer      Assistive Device (Sit-Stand Transfers) walker, front-wheeled  -SR               Row Name 05/13/22 1600                   Activities of Daily Living      BADL Assessment/Intervention lower body dressing;grooming  -SR               Row Name 05/13/22 1600                   Lower Body Dressing Assessment/Training      Nicolaus Level (Lower Body Dressing) supervision  -SR               Row Name 05/13/22 1600                   Grooming Assessment/Training      Nicolaus Level (Grooming) supervision  -SR                        User Key  (r) = Recorded By, (t) = Taken By, (c) = Cosigned By             Initials Name Provider Type      SR Oma Ashley OT Occupational Therapist                           Obj/Interventions             Row Name 05/13/22 1603                   Range of Motion Comprehensive      General Range of Motion no range of motion deficits identified  -SR               Row Name 05/13/22 1603                   Strength Comprehensive (MMT)      General Manual Muscle Testing (MMT) Assessment no strength deficits identified  -SR               Row Name 05/13/22 1603                   Balance      Balance Interventions sitting;standing;sit to stand;supported;static;dynamic;minimal challenge  -SR                        User Key  (r) = Recorded By, (t) = Taken By, (c) = Cosigned By             Initials Name Provider Type      SR Oma Ashley OT Occupational Therapist                           Goals/Plan             Row Name 05/13/22 1606                   Bathing Goal 1 (OT)      Activity/Device (Bathing Goal 1, OT) bathing skills, all  -SR        Nicolaus Level/Cues Needed (Bathing Goal 1, OT) supervision required   -SR        Time Frame (Bathing Goal 1, OT) 2 weeks  -SR               Row Name 05/13/22 1606                   Toileting Goal 1 (OT)      Activity/Device (Toileting Goal 1, OT) toileting skills, all  -SR        Coffee Level/Cues Needed (Toileting Goal 1, OT) supervision required  -SR        Time Frame (Toileting Goal 1, OT) 2 weeks  -SR               Row Name 05/13/22 1606                   Therapy Assessment/Plan (OT)      Planned Therapy Interventions (OT) activity tolerance training;adaptive equipment training;BADL retraining;functional balance retraining;occupation/activity based interventions;transfer/mobility retraining  -SR                        User Key  (r) = Recorded By, (t) = Taken By, (c) = Cosigned By             Initials Name Provider Type      SR Oma Ashley, OT Occupational Therapist                           Clinical Impression             Row Name 05/13/22 1604                   Pain Assessment      Pretreatment Pain Rating 0/10 - no pain  -SR        Posttreatment Pain Rating 0/10 - no pain  -SR               Row Name 05/13/22 1605                   Plan of Care Review      Outcome Evaluation 77 y/o F who presented from [facility] on [facility] with productive cough for 1 week, fatigue, chest pain, cough. (-) troponin, hypokalemia. Dx with parainfluenza virus. She was recently admitted for PNA 1 month ago. Patient has a history of essential tremor. Typically independent with all ADLs and mobility using rollator.  She appears to be near her baseline, though with decreased activity tolerance and requiring assist for bed mobility.  Anticipate return to [facility] with HH therapy.  -SR               Row Name 05/13/22 160                   Therapy Assessment/Plan (OT)      Rehab Potential (OT) good, to achieve stated therapy goals  -SR        Criteria for Skilled Therapeutic Interventions Met (OT) yes  -SR        Therapy Frequency (OT) 3 times/wk  -SR        Predicted Duration of Therapy Intervention  (OT) Until discharge  -SR               Row Name 05/13/22 1605                   Therapy Plan Review/Discharge Plan (OT)      Anticipated Discharge Disposition (OT) home with home health  -SR               Row Name 05/13/22 1605                   Positioning and Restraints      Pre-Treatment Position in bed  -SR        Post Treatment Position chair  -SR                        User Key  (r) = Recorded By, (t) = Taken By, (c) = Cosigned By             Initials Name Provider Type      SR Oma Ashley OT Occupational Therapist                           Outcome Measures             Row Name 05/13/22 1607                   How much help from another is currently needed...      Putting on and taking off regular lower body clothing? 4  -SR        Bathing (including washing, rinsing, and drying) 3  -SR        Toileting (which includes using toilet bed pan or urinal) 4  -SR        Putting on and taking off regular upper body clothing 4  -SR        Taking care of personal grooming (such as brushing teeth) 4  -SR        Eating meals 4  -SR        AM-PAC 6 Clicks Score (OT) 23  -SR               Row Name 05/13/22 1607                   Functional Assessment      Outcome Measure Options AM-PAC 6 Clicks Daily Activity (OT)  -SR                        User Key  (r) = Recorded By, (t) = Taken By, (c) = Cosigned By             Initials Name Provider Type      SR Oma Ashley OT Occupational Therapist                          Occupational Therapy Education                           Title: PT OT SLP Therapies (In Progress)                Topic: Occupational Therapy (In Progress)                Point: ADL training (In Progress)           Description:   Instruct learner(s) on proper safety adaptation and remediation techniques during self care or transfers.   Instruct in proper use of assistive devices.                                  Learning Progress Summary                 Patient Acceptance, E,TB, NR by SR at  5/13/2022 1608                                     Point: Home exercise program (Not Started)             Description:   Instruct learner(s) on appropriate technique for monitoring, assisting and/or progressing therapeutic exercises/activities.                                Learner Progress:  Not documented in this visit.                          Point: Precautions (Not Started)             Description:   Instruct learner(s) on prescribed precautions during self-care and functional transfers.                                Learner Progress:  Not documented in this visit.                          Point: Body mechanics (Not Started)             Description:   Instruct learner(s) on proper positioning and spine alignment during self-care, functional mobility activities and/or exercises.                                Learner Progress:  Not documented in this visit.                                      User Key               Initials Effective Dates Name Provider Type Discipline       06/16/21 -  Oma Ashley OT Occupational Therapist OT                  OT Recommendation and Plan  Planned Therapy Interventions (OT): activity tolerance training, adaptive equipment training, BADL retraining, functional balance retraining, occupation/activity based interventions, transfer/mobility retraining  Therapy Frequency (OT): 3 times/wk  Plan of Care Review  Outcome Evaluation: 77 y/o F who presented from [facility] with productive cough for 1 week, fatigue, chest pain, cough. (-) troponin, hypokalemia. Dx with parainfluenza virus. She was recently admitted for PNA 1 month ago. Patient has a history of essential tremor. Typically independent with all ADLs and mobility using rollator.  She appears to be near her baseline, though with decreased activity tolerance and requiring assist for bed mobility.  Anticipate return to [facility] with HH therapy.      Time Calculation:             Time Calculation- OT              Row  Name 05/13/22 1608                         Time Calculation- OT      OT Start Time 1135  -SR          OT Stop Time 1205  -SR          OT Time Calculation (min) 30 min  -SR          Total Timed Code Minutes- OT 10 minute(s)  -SR          OT Received On 05/13/22  -SR          OT - Next Appointment 05/16/22  -SR          OT Goal Re-Cert Due Date 05/27/22  -SR                          User Key  (r) = Recorded By, (t) = Taken By, (c) = Cosigned By             Initials Name Provider Type      SR Oma Ashley OT Occupational Therapist                            Therapy Charges for Today      Code Description Service Date Service Provider Modifiers Qty     [id number]  OT EVAL MOD COMPLEXITY 4 5/13/2022 Oma Ashley OT GO 1     [id number]  OT SELF CARE/MGMT/TRAIN EA 15 MIN 5/13/2022 Oma Ashley OT GO 1                   Oma Ashley OT              5/13/2022     Electronically signed by Oma Ashley OT at 05/13/22 1609              Oma Ashley OT at 05/13/22 1608          Goal Outcome Evaluation:              Outcome Evaluation: 77 y/o F who presented from [facility] with productive cough for 1 week, fatigue, chest pain, cough. (-) troponin, hypokalemia. Dx with parainfluenza virus. She was recently admitted for PNA 1 month ago. Patient has a history of essential tremor. Typically independent with all ADLs and mobility using rollator.  She appears to be near her baseline, though with decreased activity tolerance and requiring assist for bed mobility.  Anticipate return to [facility] with  therapy.     Electronically signed by Oma Ashley OT at 05/13/22 1606

## 2022-05-17 NOTE — CASE MANAGEMENT/SOCIAL WORK
Case Management Discharge Note      Final Note: [facility]          Selected Continued Care - Discharged on 5/16/2022 Admission date: 5/12/2022 - Discharge disposition: Skilled Nursing Facility (DC - External)   Transportation Services  Private: Car    Final Discharge Disposition Code: 06 - home with home health care

## 2022-05-25 ENCOUNTER — HOSPITAL ENCOUNTER (EMERGENCY)
Facility: HOSPITAL | Age: 77
Discharge: HOME | End: 2022-05-25
Attending: EMERGENCY MEDICINE | Admitting: EMERGENCY MEDICINE

## 2022-05-25 ENCOUNTER — APPOINTMENT (OUTPATIENT)
Dept: GENERAL RADIOLOGY | Facility: HOSPITAL | Age: 77
End: 2022-05-25

## 2022-05-25 VITALS
HEART RATE: 89 BPM | BODY MASS INDEX: 26.58 KG/M2 | TEMPERATURE: 98.1 F | SYSTOLIC BLOOD PRESSURE: 175 MMHG | WEIGHT: 150 LBS | DIASTOLIC BLOOD PRESSURE: 68 MMHG | OXYGEN SATURATION: 99 % | RESPIRATION RATE: 16 BRPM | HEIGHT: 63 IN

## 2022-05-25 DIAGNOSIS — J18.9 PNEUMONIA DUE TO INFECTIOUS ORGANISM, UNSPECIFIED LATERALITY, UNSPECIFIED PART OF LUNG: ICD-10-CM

## 2022-05-25 DIAGNOSIS — D72.829 LEUKOCYTOSIS, UNSPECIFIED TYPE: ICD-10-CM

## 2022-05-25 DIAGNOSIS — F03.90 DEMENTIA WITHOUT BEHAVIORAL DISTURBANCE, UNSPECIFIED DEMENTIA TYPE: ICD-10-CM

## 2022-05-25 DIAGNOSIS — F41.9 ANXIETY: Primary | ICD-10-CM

## 2022-05-25 LAB
ALBUMIN SERPL-MCNC: 3.6 G/DL (ref 3.5–5.2)
ALBUMIN/GLOB SERPL: 1.3 G/DL
ALP SERPL-CCNC: 74 U/L (ref 39–117)
ALT SERPL W P-5'-P-CCNC: 22 U/L (ref 1–33)
ANION GAP SERPL CALCULATED.3IONS-SCNC: 15 MMOL/L (ref 5–15)
AST SERPL-CCNC: 22 U/L (ref 1–32)
BASOPHILS # BLD AUTO: 0.1 10*3/MM3 (ref 0–0.2)
BASOPHILS NFR BLD AUTO: 0.9 % (ref 0–1.5)
BILIRUB SERPL-MCNC: 0.9 MG/DL (ref 0–1.2)
BUN SERPL-MCNC: 17 MG/DL (ref 8–23)
BUN/CREAT SERPL: 18.5 (ref 7–25)
CALCIUM SPEC-SCNC: 8.6 MG/DL (ref 8.6–10.5)
CHLORIDE SERPL-SCNC: 102 MMOL/L (ref 98–107)
CO2 SERPL-SCNC: 20 MMOL/L (ref 22–29)
CREAT SERPL-MCNC: 0.92 MG/DL (ref 0.57–1)
DEPRECATED RDW RBC AUTO: 45.5 FL (ref 37–54)
EGFRCR SERPLBLD CKD-EPI 2021: 64.7 ML/MIN/1.73
EOSINOPHIL # BLD AUTO: 0 10*3/MM3 (ref 0–0.4)
EOSINOPHIL NFR BLD AUTO: 0.1 % (ref 0.3–6.2)
ERYTHROCYTE [DISTWIDTH] IN BLOOD BY AUTOMATED COUNT: 15.4 % (ref 12.3–15.4)
GLOBULIN UR ELPH-MCNC: 2.8 GM/DL
GLUCOSE SERPL-MCNC: 280 MG/DL (ref 65–99)
HCT VFR BLD AUTO: 41.6 % (ref 34–46.6)
HGB BLD-MCNC: 13.5 G/DL (ref 12–15.9)
HOLD SPECIMEN: NORMAL
LYMPHOCYTES # BLD AUTO: 1.4 10*3/MM3 (ref 0.7–3.1)
LYMPHOCYTES NFR BLD AUTO: 9.8 % (ref 19.6–45.3)
MCH RBC QN AUTO: 27.4 PG (ref 26.6–33)
MCHC RBC AUTO-ENTMCNC: 32.3 G/DL (ref 31.5–35.7)
MCV RBC AUTO: 84.8 FL (ref 79–97)
MONOCYTES # BLD AUTO: 0.6 10*3/MM3 (ref 0.1–0.9)
MONOCYTES NFR BLD AUTO: 4.1 % (ref 5–12)
NEUTROPHILS NFR BLD AUTO: 12 10*3/MM3 (ref 1.7–7)
NEUTROPHILS NFR BLD AUTO: 85.1 % (ref 42.7–76)
NRBC BLD AUTO-RTO: 0 /100 WBC (ref 0–0.2)
NT-PROBNP SERPL-MCNC: 7496 PG/ML (ref 0–1800)
PLATELET # BLD AUTO: 302 10*3/MM3 (ref 140–450)
PMV BLD AUTO: 9.5 FL (ref 6–12)
POTASSIUM SERPL-SCNC: 3.5 MMOL/L (ref 3.5–5.2)
PROCALCITONIN SERPL-MCNC: 0.03 NG/ML (ref 0–0.25)
PROT SERPL-MCNC: 6.4 G/DL (ref 6–8.5)
RBC # BLD AUTO: 4.91 10*6/MM3 (ref 3.77–5.28)
SARS-COV-2 RNA PNL SPEC NAA+PROBE: NOT DETECTED
SODIUM SERPL-SCNC: 137 MMOL/L (ref 136–145)
TROPONIN T SERPL-MCNC: <0.01 NG/ML (ref 0–0.03)
TROPONIN T SERPL-MCNC: <0.01 NG/ML (ref 0–0.03)
WBC NRBC COR # BLD: 14.1 10*3/MM3 (ref 3.4–10.8)

## 2022-05-25 PROCEDURE — 84484 ASSAY OF TROPONIN QUANT: CPT | Performed by: PHYSICIAN ASSISTANT

## 2022-05-25 PROCEDURE — 83880 ASSAY OF NATRIURETIC PEPTIDE: CPT | Performed by: PHYSICIAN ASSISTANT

## 2022-05-25 PROCEDURE — 71045 X-RAY EXAM CHEST 1 VIEW: CPT

## 2022-05-25 PROCEDURE — 99284 EMERGENCY DEPT VISIT MOD MDM: CPT

## 2022-05-25 PROCEDURE — 80053 COMPREHEN METABOLIC PANEL: CPT | Performed by: PHYSICIAN ASSISTANT

## 2022-05-25 PROCEDURE — 87635 SARS-COV-2 COVID-19 AMP PRB: CPT | Performed by: PHYSICIAN ASSISTANT

## 2022-05-25 PROCEDURE — 36415 COLL VENOUS BLD VENIPUNCTURE: CPT

## 2022-05-25 PROCEDURE — 93010 ELECTROCARDIOGRAM REPORT: CPT | Performed by: INTERNAL MEDICINE

## 2022-05-25 PROCEDURE — 85025 COMPLETE CBC W/AUTO DIFF WBC: CPT | Performed by: PHYSICIAN ASSISTANT

## 2022-05-25 PROCEDURE — 93005 ELECTROCARDIOGRAM TRACING: CPT | Performed by: PHYSICIAN ASSISTANT

## 2022-05-25 PROCEDURE — 84145 PROCALCITONIN (PCT): CPT | Performed by: PHYSICIAN ASSISTANT

## 2022-05-25 RX ORDER — SODIUM CHLORIDE 0.9 % (FLUSH) 0.9 %
10 SYRINGE (ML) INJECTION AS NEEDED
Status: DISCONTINUED | OUTPATIENT
Start: 2022-05-25 | End: 2022-05-25 | Stop reason: HOSPADM

## 2022-05-25 RX ORDER — AZITHROMYCIN 250 MG/1
250 TABLET, FILM COATED ORAL DAILY
Qty: 5 TABLET | Refills: 0 | Status: SHIPPED | OUTPATIENT
Start: 2022-05-25 | End: 2022-05-30

## 2022-05-25 RX ORDER — LORAZEPAM 1 MG/1
1 TABLET ORAL ONCE
Status: COMPLETED | OUTPATIENT
Start: 2022-05-25 | End: 2022-05-25

## 2022-05-25 RX ORDER — AZITHROMYCIN 250 MG/1
500 TABLET, FILM COATED ORAL ONCE
Status: COMPLETED | OUTPATIENT
Start: 2022-05-25 | End: 2022-05-25

## 2022-05-25 RX ORDER — ASPIRIN 81 MG/1
324 TABLET, CHEWABLE ORAL ONCE
Status: COMPLETED | OUTPATIENT
Start: 2022-05-25 | End: 2022-05-25

## 2022-05-25 RX ORDER — ALPRAZOLAM 0.5 MG/1
0.5 TABLET ORAL ONCE
Status: COMPLETED | OUTPATIENT
Start: 2022-05-25 | End: 2022-05-25

## 2022-05-25 RX ADMIN — ASPIRIN 81 MG CHEWABLE TABLET 324 MG: 81 TABLET CHEWABLE at 16:52

## 2022-05-25 RX ADMIN — AZITHROMYCIN MONOHYDRATE 500 MG: 250 TABLET ORAL at 18:59

## 2022-05-25 RX ADMIN — LORAZEPAM 1 MG: 1 TABLET ORAL at 16:52

## 2022-05-25 RX ADMIN — ALPRAZOLAM 0.5 MG: 0.5 TABLET ORAL at 19:01

## 2022-05-26 LAB — QT INTERVAL: 454 MS

## 2022-06-07 ENCOUNTER — OFFICE VISIT (OUTPATIENT)
Dept: PSYCHIATRY | Facility: CLINIC | Age: 77
End: 2022-06-07

## 2022-06-07 DIAGNOSIS — G25.0 DISABLING ESSENTIAL TREMOR: ICD-10-CM

## 2022-06-07 DIAGNOSIS — F33.1 MAJOR DEPRESSIVE DISORDER, RECURRENT EPISODE, MODERATE: Chronic | ICD-10-CM

## 2022-06-07 DIAGNOSIS — F13.20 BENZODIAZEPINE DEPENDENCE, CONTINUOUS: Chronic | ICD-10-CM

## 2022-06-07 DIAGNOSIS — F41.1 GENERALIZED ANXIETY DISORDER: Primary | Chronic | ICD-10-CM

## 2022-06-07 PROCEDURE — 90792 PSYCH DIAG EVAL W/MED SRVCS: CPT | Performed by: PSYCHIATRY & NEUROLOGY

## 2022-06-07 RX ORDER — ALPRAZOLAM 1 MG/1
1 TABLET ORAL 3 TIMES DAILY PRN
Qty: 90 TABLET | Refills: 2 | Status: SHIPPED | OUTPATIENT
Start: 2022-06-07 | End: 2022-09-27

## 2022-08-08 ENCOUNTER — APPOINTMENT (OUTPATIENT)
Dept: CT IMAGING | Facility: HOSPITAL | Age: 77
End: 2022-08-08

## 2022-08-08 ENCOUNTER — HOSPITAL ENCOUNTER (OUTPATIENT)
Facility: HOSPITAL | Age: 77
Setting detail: OBSERVATION
Discharge: HOME | End: 2022-08-10
Attending: EMERGENCY MEDICINE | Admitting: HOSPITALIST

## 2022-08-08 DIAGNOSIS — M25.551 ACUTE RIGHT HIP PAIN: ICD-10-CM

## 2022-08-08 DIAGNOSIS — M48.07 SPINAL STENOSIS OF LUMBOSACRAL REGION: ICD-10-CM

## 2022-08-08 DIAGNOSIS — R29.898 WEAKNESS OF BOTH LEGS: Primary | ICD-10-CM

## 2022-08-08 DIAGNOSIS — M48.061 SPINAL STENOSIS OF LUMBAR REGION WITHOUT NEUROGENIC CLAUDICATION: ICD-10-CM

## 2022-08-08 PROBLEM — I10 HYPERTENSION: Status: ACTIVE | Noted: 2022-02-26

## 2022-08-08 LAB
ALBUMIN SERPL-MCNC: 3.7 G/DL (ref 3.5–5.2)
ALBUMIN/GLOB SERPL: 1.5 G/DL
ALP SERPL-CCNC: 107 U/L (ref 39–117)
ALT SERPL W P-5'-P-CCNC: 11 U/L (ref 1–33)
ANION GAP SERPL CALCULATED.3IONS-SCNC: 15 MMOL/L (ref 5–15)
AST SERPL-CCNC: 17 U/L (ref 1–32)
BASOPHILS # BLD AUTO: 0.1 10*3/MM3 (ref 0–0.2)
BASOPHILS NFR BLD AUTO: 0.7 % (ref 0–1.5)
BILIRUB SERPL-MCNC: 1 MG/DL (ref 0–1.2)
BUN SERPL-MCNC: 14 MG/DL (ref 8–23)
BUN/CREAT SERPL: 13.2 (ref 7–25)
CALCIUM SPEC-SCNC: 9 MG/DL (ref 8.6–10.5)
CHLORIDE SERPL-SCNC: 103 MMOL/L (ref 98–107)
CO2 SERPL-SCNC: 23 MMOL/L (ref 22–29)
CREAT SERPL-MCNC: 1.06 MG/DL (ref 0.57–1)
DEPRECATED RDW RBC AUTO: 48.1 FL (ref 37–54)
EGFRCR SERPLBLD CKD-EPI 2021: 54.6 ML/MIN/1.73
EOSINOPHIL # BLD AUTO: 0.1 10*3/MM3 (ref 0–0.4)
EOSINOPHIL NFR BLD AUTO: 1.2 % (ref 0.3–6.2)
ERYTHROCYTE [DISTWIDTH] IN BLOOD BY AUTOMATED COUNT: 16.1 % (ref 12.3–15.4)
GLOBULIN UR ELPH-MCNC: 2.5 GM/DL
GLUCOSE BLDC GLUCOMTR-MCNC: 212 MG/DL (ref 70–105)
GLUCOSE BLDC GLUCOMTR-MCNC: 75 MG/DL (ref 70–105)
GLUCOSE SERPL-MCNC: 103 MG/DL (ref 65–99)
HBA1C MFR BLD: 8 % (ref 3.5–5.6)
HCT VFR BLD AUTO: 42 % (ref 34–46.6)
HGB BLD-MCNC: 13.5 G/DL (ref 12–15.9)
LYMPHOCYTES # BLD AUTO: 2.8 10*3/MM3 (ref 0.7–3.1)
LYMPHOCYTES NFR BLD AUTO: 23.9 % (ref 19.6–45.3)
MAGNESIUM SERPL-MCNC: 1.8 MG/DL (ref 1.6–2.4)
MCH RBC QN AUTO: 27.7 PG (ref 26.6–33)
MCHC RBC AUTO-ENTMCNC: 32.3 G/DL (ref 31.5–35.7)
MCV RBC AUTO: 85.8 FL (ref 79–97)
MONOCYTES # BLD AUTO: 1.2 10*3/MM3 (ref 0.1–0.9)
MONOCYTES NFR BLD AUTO: 10.1 % (ref 5–12)
NEUTROPHILS NFR BLD AUTO: 64.1 % (ref 42.7–76)
NEUTROPHILS NFR BLD AUTO: 7.6 10*3/MM3 (ref 1.7–7)
NRBC BLD AUTO-RTO: 0 /100 WBC (ref 0–0.2)
PLATELET # BLD AUTO: 315 10*3/MM3 (ref 140–450)
PMV BLD AUTO: 8.9 FL (ref 6–12)
POTASSIUM SERPL-SCNC: 3 MMOL/L (ref 3.5–5.2)
PROT SERPL-MCNC: 6.2 G/DL (ref 6–8.5)
RBC # BLD AUTO: 4.89 10*6/MM3 (ref 3.77–5.28)
SARS-COV-2 RNA RESP QL NAA+PROBE: NOT DETECTED
SODIUM SERPL-SCNC: 141 MMOL/L (ref 136–145)
TROPONIN T SERPL-MCNC: <0.01 NG/ML (ref 0–0.03)
TSH SERPL DL<=0.05 MIU/L-ACNC: 1.63 UIU/ML (ref 0.27–4.2)
WBC NRBC COR # BLD: 11.9 10*3/MM3 (ref 3.4–10.8)

## 2022-08-08 PROCEDURE — 80053 COMPREHEN METABOLIC PANEL: CPT | Performed by: NURSE PRACTITIONER

## 2022-08-08 PROCEDURE — C9803 HOPD COVID-19 SPEC COLLECT: HCPCS

## 2022-08-08 PROCEDURE — U0003 INFECTIOUS AGENT DETECTION BY NUCLEIC ACID (DNA OR RNA); SEVERE ACUTE RESPIRATORY SYNDROME CORONAVIRUS 2 (SARS-COV-2) (CORONAVIRUS DISEASE [COVID-19]), AMPLIFIED PROBE TECHNIQUE, MAKING USE OF HIGH THROUGHPUT TECHNOLOGIES AS DESCRIBED BY CMS-2020-01-R: HCPCS | Performed by: EMERGENCY MEDICINE

## 2022-08-08 PROCEDURE — 99284 EMERGENCY DEPT VISIT MOD MDM: CPT

## 2022-08-08 PROCEDURE — 85025 COMPLETE CBC W/AUTO DIFF WBC: CPT | Performed by: NURSE PRACTITIONER

## 2022-08-08 PROCEDURE — G0378 HOSPITAL OBSERVATION PER HR: HCPCS

## 2022-08-08 PROCEDURE — 84484 ASSAY OF TROPONIN QUANT: CPT | Performed by: NURSE PRACTITIONER

## 2022-08-08 PROCEDURE — 99220 PR INITIAL OBSERVATION CARE/DAY 70 MINUTES: CPT | Performed by: HOSPITALIST

## 2022-08-08 PROCEDURE — 96374 THER/PROPH/DIAG INJ IV PUSH: CPT

## 2022-08-08 PROCEDURE — 83036 HEMOGLOBIN GLYCOSYLATED A1C: CPT | Performed by: NURSE PRACTITIONER

## 2022-08-08 PROCEDURE — 63710000001 INSULIN LISPRO (HUMAN) PER 5 UNITS: Performed by: NURSE PRACTITIONER

## 2022-08-08 PROCEDURE — 82962 GLUCOSE BLOOD TEST: CPT

## 2022-08-08 PROCEDURE — 25010000002 MIDAZOLAM PER 1 MG: Performed by: NURSE PRACTITIONER

## 2022-08-08 PROCEDURE — 93005 ELECTROCARDIOGRAM TRACING: CPT | Performed by: NURSE PRACTITIONER

## 2022-08-08 PROCEDURE — 87102 FUNGUS ISOLATION CULTURE: CPT | Performed by: NURSE PRACTITIONER

## 2022-08-08 PROCEDURE — 84443 ASSAY THYROID STIM HORMONE: CPT | Performed by: NURSE PRACTITIONER

## 2022-08-08 PROCEDURE — 72131 CT LUMBAR SPINE W/O DYE: CPT

## 2022-08-08 PROCEDURE — 83735 ASSAY OF MAGNESIUM: CPT | Performed by: NURSE PRACTITIONER

## 2022-08-08 RX ORDER — INSULIN LISPRO 100 [IU]/ML
0-7 INJECTION, SOLUTION INTRAVENOUS; SUBCUTANEOUS
Status: DISCONTINUED | OUTPATIENT
Start: 2022-08-08 | End: 2022-08-10 | Stop reason: HOSPADM

## 2022-08-08 RX ORDER — ACETAMINOPHEN 160 MG/5ML
650 SOLUTION ORAL EVERY 4 HOURS PRN
Status: DISCONTINUED | OUTPATIENT
Start: 2022-08-08 | End: 2022-08-10 | Stop reason: HOSPADM

## 2022-08-08 RX ORDER — ALUMINA, MAGNESIA, AND SIMETHICONE 2400; 2400; 240 MG/30ML; MG/30ML; MG/30ML
15 SUSPENSION ORAL EVERY 6 HOURS PRN
Status: DISCONTINUED | OUTPATIENT
Start: 2022-08-08 | End: 2022-08-10 | Stop reason: HOSPADM

## 2022-08-08 RX ORDER — HYDRALAZINE HYDROCHLORIDE 20 MG/ML
10 INJECTION INTRAMUSCULAR; INTRAVENOUS EVERY 6 HOURS PRN
Status: DISCONTINUED | OUTPATIENT
Start: 2022-08-08 | End: 2022-08-10 | Stop reason: HOSPADM

## 2022-08-08 RX ORDER — POTASSIUM CHLORIDE 20 MEQ/1
40 TABLET, EXTENDED RELEASE ORAL AS NEEDED
Status: DISCONTINUED | OUTPATIENT
Start: 2022-08-08 | End: 2022-08-10 | Stop reason: HOSPADM

## 2022-08-08 RX ORDER — SODIUM CHLORIDE 0.9 % (FLUSH) 0.9 %
10 SYRINGE (ML) INJECTION AS NEEDED
Status: DISCONTINUED | OUTPATIENT
Start: 2022-08-08 | End: 2022-08-10 | Stop reason: HOSPADM

## 2022-08-08 RX ORDER — DEXTROSE MONOHYDRATE 25 G/50ML
25 INJECTION, SOLUTION INTRAVENOUS
Status: DISCONTINUED | OUTPATIENT
Start: 2022-08-08 | End: 2022-08-10 | Stop reason: HOSPADM

## 2022-08-08 RX ORDER — ACETAMINOPHEN 325 MG/1
650 TABLET ORAL EVERY 4 HOURS PRN
Status: DISCONTINUED | OUTPATIENT
Start: 2022-08-08 | End: 2022-08-10 | Stop reason: HOSPADM

## 2022-08-08 RX ORDER — SODIUM CHLORIDE 0.9 % (FLUSH) 0.9 %
10 SYRINGE (ML) INJECTION EVERY 12 HOURS SCHEDULED
Status: DISCONTINUED | OUTPATIENT
Start: 2022-08-08 | End: 2022-08-10 | Stop reason: HOSPADM

## 2022-08-08 RX ORDER — LIDOCAINE 50 MG/G
2 PATCH TOPICAL
Status: DISCONTINUED | OUTPATIENT
Start: 2022-08-08 | End: 2022-08-10 | Stop reason: HOSPADM

## 2022-08-08 RX ORDER — NICOTINE POLACRILEX 4 MG
15 LOZENGE BUCCAL
Status: DISCONTINUED | OUTPATIENT
Start: 2022-08-08 | End: 2022-08-10 | Stop reason: HOSPADM

## 2022-08-08 RX ORDER — ASPIRIN 81 MG/1
81 TABLET, CHEWABLE ORAL DAILY
Status: DISCONTINUED | OUTPATIENT
Start: 2022-08-09 | End: 2022-08-10 | Stop reason: HOSPADM

## 2022-08-08 RX ORDER — ONDANSETRON 4 MG/1
4 TABLET, FILM COATED ORAL EVERY 6 HOURS PRN
Status: DISCONTINUED | OUTPATIENT
Start: 2022-08-08 | End: 2022-08-10 | Stop reason: HOSPADM

## 2022-08-08 RX ORDER — CHOLECALCIFEROL (VITAMIN D3) 125 MCG
5 CAPSULE ORAL NIGHTLY PRN
Status: DISCONTINUED | OUTPATIENT
Start: 2022-08-08 | End: 2022-08-10 | Stop reason: HOSPADM

## 2022-08-08 RX ORDER — ACETAMINOPHEN 650 MG/1
650 SUPPOSITORY RECTAL EVERY 4 HOURS PRN
Status: DISCONTINUED | OUTPATIENT
Start: 2022-08-08 | End: 2022-08-10 | Stop reason: HOSPADM

## 2022-08-08 RX ORDER — ALPRAZOLAM 1 MG/1
1 TABLET ORAL 3 TIMES DAILY PRN
Status: DISCONTINUED | OUTPATIENT
Start: 2022-08-08 | End: 2022-08-10 | Stop reason: HOSPADM

## 2022-08-08 RX ORDER — MIDAZOLAM HYDROCHLORIDE 1 MG/ML
2 INJECTION INTRAMUSCULAR; INTRAVENOUS ONCE
Status: COMPLETED | OUTPATIENT
Start: 2022-08-08 | End: 2022-08-08

## 2022-08-08 RX ORDER — POTASSIUM CHLORIDE 1.5 G/1.77G
40 POWDER, FOR SOLUTION ORAL AS NEEDED
Status: DISCONTINUED | OUTPATIENT
Start: 2022-08-08 | End: 2022-08-10 | Stop reason: HOSPADM

## 2022-08-08 RX ORDER — ONDANSETRON 2 MG/ML
4 INJECTION INTRAMUSCULAR; INTRAVENOUS EVERY 6 HOURS PRN
Status: DISCONTINUED | OUTPATIENT
Start: 2022-08-08 | End: 2022-08-10 | Stop reason: HOSPADM

## 2022-08-08 RX ORDER — OLANZAPINE 10 MG/2ML
1 INJECTION, POWDER, LYOPHILIZED, FOR SOLUTION INTRAMUSCULAR AS NEEDED
Status: DISCONTINUED | OUTPATIENT
Start: 2022-08-08 | End: 2022-08-10 | Stop reason: HOSPADM

## 2022-08-08 RX ORDER — METOPROLOL SUCCINATE 50 MG/1
50 TABLET, EXTENDED RELEASE ORAL
Status: DISCONTINUED | OUTPATIENT
Start: 2022-08-08 | End: 2022-08-10 | Stop reason: HOSPADM

## 2022-08-08 RX ADMIN — MIDAZOLAM 2 MG: 1 INJECTION INTRAMUSCULAR; INTRAVENOUS at 13:07

## 2022-08-08 RX ADMIN — SODIUM CHLORIDE, POTASSIUM CHLORIDE, SODIUM LACTATE AND CALCIUM CHLORIDE 1000 ML: 600; 310; 30; 20 INJECTION, SOLUTION INTRAVENOUS at 13:07

## 2022-08-08 RX ADMIN — TICAGRELOR 90 MG: 90 TABLET ORAL at 20:35

## 2022-08-08 RX ADMIN — INSULIN LISPRO 3 UNITS: 100 INJECTION, SOLUTION INTRAVENOUS; SUBCUTANEOUS at 19:20

## 2022-08-08 RX ADMIN — ALPRAZOLAM 1 MG: 1 TABLET ORAL at 19:20

## 2022-08-08 RX ADMIN — METOPROLOL SUCCINATE 50 MG: 50 TABLET, EXTENDED RELEASE ORAL at 19:20

## 2022-08-08 RX ADMIN — Medication 10 ML: at 20:36

## 2022-08-08 NOTE — CASE MANAGEMENT/SOCIAL WORK
Discharge Planning Assessment  [facility]     Patient Name: Nadege Barrow  MRN: [id number]  Today's Date: 8/8/2022    Admit Date: 8/8/2022     Discharge Needs Assessment     Row Name 08/08/22 1746       Living Environment    People in Home child(kimberley), adult    Current Living Arrangements home    Primary Care Provided by self    Provides Primary Care For no one    Family Caregiver if Needed child(kimberley), adult    Family Caregiver Names Daughter Ana    Quality of Family Relationships supportive    Able to Return to Prior Arrangements yes       Resource/Environmental Concerns    Resource/Environmental Concerns none    Transportation Concerns none       Transition Planning    Patient/Family Anticipates Transition to home with family    Patient/Family Anticipated Services at Transition none    Transportation Anticipated family or friend will provide       Discharge Needs Assessment    Readmission Within the Last 30 Days no previous admission in last 30 days    Equipment Currently Used at Home rollator;glucometer    Concerns to be Addressed discharge planning    Anticipated Changes Related to Illness none    Equipment Needed After Discharge none               Discharge Plan     Row Name 08/08/22 1744       Plan    Plan Anticipate Routine Home with daughter, Pending PT eval    Patient/Family in Agreement with Plan yes    Plan Comments Met with Patient at bedside Lives at home with Daughter who assist patient with care and will provide transportation. PCP and Pharmacy verified, able to afford medications. Denies any additional DME needs at this time, Declined HH, would consider if PT recommends it. d/c barriers:PT eval                Expected Discharge Date and Time     Expected Discharge Date Expected Discharge Time    Aug 9, 2022          Demographic Summary     Row Name 08/08/22 1743       General Information    Admission Type observation    Arrived From emergency department    Referral Source admission list    Reason for  Consult discharge planning    Preferred Language English               Functional Status     Row Name 08/08/22 1746       Functional Status    Usual Activity Tolerance good    Current Activity Tolerance good       Functional Status, IADL    Medications independent    Meal Preparation assistive person    Housekeeping assistive person    Laundry assistive person    Shopping assistive person       Mental Status    General Appearance WDL WDL       Mental Status Summary    Recent Changes in Mental Status/Cognitive Functioning no changes              Met with patient at bedside wearing mask and goggles, Spent less than 15 minutes in room at greater than 6 feet distance.              Oma Ramirez RN

## 2022-08-08 NOTE — H&P
[facility]      Patient Name: Nadege Barrow  : 1945  MRN: [id number]  Primary Care Physician:  Maggie Irving APRN  Date of admission: 2022      Subjective      Chief Complaint: back pain, leg pain, weakness    History of Present Illness: Nadege Barrow is a 76 y.o. female with PMH of essential tremor, CAD s/p PCI, hypertension, hyperlipidemia, DM type II, anxiety/depression who presented to [facility] ED 2022 with complaints of lower back pain with radiation into her hips down her legs. The pain is worse in her right hip and radiates down her right leg. The pain has been progressively worsening and has caused her to have difficulty ambulating. She fell last week. She has to shuffle when she walks because the pain shoots down her right leg. She has a walker but is even having difficulty walking with the walker due to the pain. She has neuropathy from her diabetes but she denied any increased paresthesia. She has not had any loss of bowel or bladder. She was living at [facility] but moved back in with her daughter a few weeks ago. She denied any recent illness or fever.     In the ED the patient had CT lumbar spine that showed no acute lumbar spine fracture or subluxation.  Chronic compression deformities of T12, L3 and L4, prior L4 vertebroplasty.  Severe canal stenosis at L3-4 and L4-5.  Suspected moderate bilateral L3-4, moderate right and moderate to severe left L4-5 neuroforaminal stenosis.  Labs showed WBC 11.9, potassium 3.0.  She was given 1 L IV fluids and admitted for further neurosurgical evaluation of severe spinal stenosis.      Review of Systems   HENT: Negative.    Eyes: Negative.    Cardiovascular: Negative.    Respiratory: Negative.    Endocrine: Negative.    Hematologic/Lymphatic: Negative.    Skin: Negative.    Musculoskeletal: Positive for back pain and joint pain.   Gastrointestinal: Negative.    Genitourinary: Negative.    Neurological: Positive  "for weakness.   Psychiatric/Behavioral: Negative.    Allergic/Immunologic: Negative.    All other systems reviewed and are negative.       Personal History     Past Medical History:   Diagnosis Date   • Anxiety    • CHF (congestive heart failure) (HCC)    • Depression    • Diabetes mellitus (HCC)    • Hyperlipidemia    • Hypertension    • Panic disorder     \"panic attacks\"   • Tremors of nervous system     \"essential tremors\"       Past Surgical History:   Procedure Laterality Date   • CORONARY ANGIOPLASTY WITH STENT PLACEMENT         Family History: family history includes Alcohol abuse in her daughter and another family member; Depression in her sister and another family member; Suicidality in an other family member. Otherwise pertinent FHx was reviewed and not pertinent to current issue.    Social History:  reports that she has never smoked. She has never used smokeless tobacco. She reports previous alcohol use. She reports that she does not use drugs.    Home Medications:  Prior to Admission Medications     Prescriptions Last Dose Informant Patient Reported? Taking?    ALPRAZolam (Xanax) 1 MG tablet   No No    Take 1 tablet by mouth 3 (Three) Times a Day As Needed for Anxiety.    aspirin 81 MG EC tablet   Yes No    Take 81 mg by mouth Daily.    atorvastatin (LIPITOR) 40 MG tablet   Yes No    Take 40 mg by mouth Daily.    benzonatate (TESSALON) 100 MG capsule   No No    Take 1 capsule by mouth 3 (Three) Times a Day As Needed for Cough.    Empagliflozin (Jardiance) 10 MG tablet   Yes No    Take 1 tablet by mouth Daily.    fludrocortisone 0.1 MG tablet   Yes No    Take 0.1 mg by mouth Daily.    FLUoxetine (PROzac) 20 MG capsule   Yes No    Take 20 mg by mouth Daily.    furosemide (LASIX) 20 MG tablet   Yes No    Take 20 mg by mouth Daily.    guaiFENesin-dextromethorphan (ROBITUSSIN DM) 100-10 MG/5ML syrup   No No    Take 5 mL by mouth Every 4 (Four) Hours As Needed for Cough.    insulin NPH-insulin regular (humuLIN " 70/30,novoLIN 70/30) (70-30) 100 UNIT/ML injection   Yes No    Inject 20 Units under the skin into the appropriate area as directed 2 (Two) Times a Day With Meals.    linaclotide (LINZESS) 145 MCG capsule capsule   Yes No    Take 145 mcg by mouth Every Morning Before Breakfast.    losartan (COZAAR) 25 MG tablet   No No    Take 1 tablet by mouth Daily.    metoprolol succinate XL (TOPROL-XL) 50 MG 24 hr tablet   Yes No    Take 50 mg by mouth Daily.    nitroglycerin (NITROSTAT) 0.4 MG SL tablet   No No    Place 1 tablet under the tongue Every 5 (Five) Minutes As Needed for Chest Pain. Take no more than 3 doses in 15 minutes.    nystatin (MYCOSTATIN) 363915 UNIT/GM powder   No No    Apply  topically to the appropriate area as directed Every 12 (Twelve) Hours.    potassium chloride ER (K-TAB) 20 MEQ tablet controlled-release ER tablet   Yes No    Take 20 mEq by mouth Daily.    predniSONE (DELTASONE) 20 MG tablet   No No    Take 1 tablet by mouth Daily.    ticagrelor (BRILINTA) 90 MG tablet tablet   Yes No    Take 90 mg by mouth 2 (Two) Times a Day.    tiZANidine (ZANAFLEX) 2 MG half tablet   Yes No    Take 2 mg by mouth Every 8 (Eight) Hours As Needed for Muscle Spasms.            Allergies:  Allergies   Allergen Reactions   • Gabapentin Hallucinations   • Morphine Hallucinations       Objective      Vitals:   Temp:  [98 °F (36.7 °C)] 98 °F (36.7 °C)  Heart Rate:  [73-85] 78  Resp:  [17] 17  BP: (119-176)/(62-99) 153/85    Physical Exam  Vitals and nursing note reviewed.   HENT:      Head: Normocephalic and atraumatic.   Eyes:      Extraocular Movements: Extraocular movements intact.      Pupils: Pupils are equal, round, and reactive to light.   Cardiovascular:      Rate and Rhythm: Normal rate and regular rhythm.      Pulses: Normal pulses.      Heart sounds: Normal heart sounds.   Pulmonary:      Effort: Pulmonary effort is normal.      Breath sounds: Normal breath sounds.   Abdominal:      General: Bowel sounds are  normal.      Palpations: Abdomen is soft.      Tenderness: There is no abdominal tenderness.   Musculoskeletal:         General: Normal range of motion.      Comments: Patient moves all extremities with normal range of motion  Negative straight leg raise bilaterally  Pt points to pain in her right lumbar paraspinal region, not tender to palpation. Pt reports pain mostly when she walks   Skin:     General: Skin is warm and dry.   Neurological:      Mental Status: She is alert and oriented to person, place, and time.      Comments: Chronic left-sided tremor otherwise normal neuro exam.   Psychiatric:         Mood and Affect: Mood normal.         Behavior: Behavior normal.        Result Review    Result Review:  I have personally reviewed the results from the time of this admission to 8/8/2022 18:16 EDT and agree with these findings:  [x]  Laboratory  []  Microbiology  [x]  Radiology  []  EKG/Telemetry   []  Cardiology/Vascular   []  Pathology  [x]  Old records    Assessment & Plan        Active Hospital Problems:  Active Hospital Problems    Diagnosis    • **Lumbar back pain with radiculopathy affecting lower extremity    • Weakness of both legs    • Hypokalemia    • Spinal stenosis of lumbar region    • Hypertension    • Depression, unspecified    • Disabling essential tremor    • S/P right coronary artery (RCA) stent placement    • Hyperlipidemia, mixed      Plan:     Lumbar pain with radiculopathy  Severe spinal stenosis  -CT lumbar: no acute lumbar spine fracture or subluxation.  Chronic compression deformities of T12, L3 and L4, prior L4 vertebroplasty.  Severe canal stenosis at L3-4 and L4-5.  Suspected moderate bilateral L3-4, moderate right and moderate to severe left L4-5 neuroforaminal stenosis.  -prn tylenol, lidocaine patch  -BETO consulted  -PT evaluation     Hypokalemia  -K 3.0   -replacement protocol    Chronic essential tremor  -Resume home Xanax when verified    CAD s/p  PCI  Hypertension  Hyperlipidemia  -Resume home aspirin, losartan, metoprolol, Brilinta, statin when verified    Diabetes mellitus type 2  -glucose only 75  -Sliding scale insulin AC/HS    Anxiety/depression  -Continue home Prozac, Xanax when verified      DVT prophylaxis:  Mechanical DVT prophylaxis orders are present.    CODE STATUS:    Level Of Support Discussed With: Patient  Code Status (Patient has no pulse and is not breathing): CPR (Attempt to Resuscitate)  Medical Interventions (Patient has pulse or is breathing): Full Support    Admission Status:  I believe this patient meets observation status.    I discussed the patient's findings and my recommendations with patient.    This patient has been examined wearing appropriate Personal Protective Mrkwrcaoj02/08/22      Signature: Electronically signed by PAM Campos, 08/08/22, 6:18 PM EDT.    hospitalist / Attending note.  Patient seen and examined, chart reviewed, agree with above. Pt coming in with lower back pain and weakness and fall here and there. Patient caries medical diagnosis of lumbar stenosis.     Vitals reviewed  Chest .... bilateral entry, NVB  CVS ... s1s2 no murmur  Abdomen ..... soft non tender gut sound audible.    Impression  Lumbar pain with radiculopathy  Spinal stenosis  Hypokalemia  CAD  DM type 2    Plan  Agree with above.  Replace as per protocol  Monitor accu check ac plus hs  SS  MRI lumbar spine  Neurosurgery consult    Vivian finn MD.

## 2022-08-08 NOTE — ED PROVIDER NOTES
"Subjective   76-year-old female presents with 1 month history of bilateral hip pain right greater than left that radiates down both legs to her feet.  She denies associated back pain or injury.  She reports that she has had some falls \"if I walk too fast\".  She reports being generally weak for the last month as well and she is moved in with her sister.  She reports that she has had incontinence since starting Lasix but this is not worse than usual.  She also reports tremors that are related to her anxiety she is never been seen by neurology, only psychiatry.    1. Location: Bilateral hip  2. Quality: Shooting  3. Severity: Moderate   4. Worsening factors: Weight-bearing  5. Alleviating factors: Rest  6. Onset: 1-mon  7. Radiation: Feet  8. Frequency: Constant with periods of intensity  9. Co-morbidities: Past Medical History:  No date: Anxiety  No date: CHF (congestive heart failure) ([facility])  No date: Depression  No date: Diabetes mellitus ([facility])  No date: Hyperlipidemia  No date: Hypertension  No date: Panic disorder      Comment:  \"panic attacks\"  No date: Tremors of nervous system      Comment:  \"essential tremors\"  10. Source: Patient and sister            Review of Systems   Constitutional: Negative for unexpected weight change.   Respiratory: Negative for shortness of breath.    Cardiovascular: Negative for chest pain.   Gastrointestinal: Negative for diarrhea, nausea and vomiting.   Genitourinary: Negative for enuresis.   Musculoskeletal: Positive for arthralgias and back pain. Negative for gait problem, myalgias and neck pain.   Skin: Negative for color change, pallor, rash and wound.   Allergic/Immunologic: Negative for immunocompromised state.   Neurological: Positive for tremors and weakness. Negative for numbness.   Hematological: Does not bruise/bleed easily.   Psychiatric/Behavioral: Negative for confusion.   All other systems reviewed and are negative.      Past Medical History:   Diagnosis Date   • " "Anxiety    • CHF (congestive heart failure) ([facility])    • Depression    • Diabetes mellitus ([facility])    • Hyperlipidemia    • Hypertension    • Panic disorder     \"panic attacks\"   • Tremors of nervous system     \"essential tremors\"       Allergies   Allergen Reactions   • Gabapentin Hallucinations   • Morphine Hallucinations       Past Surgical History:   Procedure Laterality Date   • CORONARY ANGIOPLASTY WITH STENT PLACEMENT         Family History   Problem Relation Age of Onset   • Depression Sister    • Suicidality Other         suicided   • Alcohol abuse Other    • Alcohol abuse Daughter    • Depression Other        Social History     Socioeconomic History   • Marital status:    Tobacco Use   • Smoking status: Never Smoker   • Smokeless tobacco: Never Used   Vaping Use   • Vaping Use: Never used   Substance and Sexual Activity   • Alcohol use: Not Currently   • Drug use: No   • Sexual activity: Defer           Objective   Physical Exam  Vitals and nursing note reviewed.   Constitutional:       General: She is awake. She is not in acute distress.     Appearance: Normal appearance. She is well-developed and normal weight. She is not ill-appearing or diaphoretic.   HENT:      Head: Normocephalic and atraumatic.      Right Ear: External ear normal. No drainage.      Left Ear: External ear normal. No drainage.      Nose: Nose normal. No rhinorrhea.      Mouth/Throat:      Lips: Pink. No lesions.      Mouth: Mucous membranes are moist.      Pharynx: Oropharynx is clear. Uvula midline.   Eyes:      General: Lids are normal. Vision grossly intact. Gaze aligned appropriately.      Extraocular Movements: Extraocular movements intact.      Conjunctiva/sclera: Conjunctivae normal.      Pupils: Pupils are equal, round, and reactive to light.   Neck:      Trachea: Trachea and phonation normal.   Cardiovascular:      Rate and Rhythm: Normal rate and regular rhythm.      Pulses: Normal pulses.           Radial pulses are 2+ " on the right side and 2+ on the left side.        Dorsalis pedis pulses are 2+ on the right side and 2+ on the left side.        Posterior tibial pulses are 2+ on the right side and 2+ on the left side.      Heart sounds: Normal heart sounds, S1 normal and S2 normal. Heart sounds not distant. No murmur heard.    No friction rub. No gallop.   Pulmonary:      Effort: Pulmonary effort is normal.      Breath sounds: Normal breath sounds and air entry.   Musculoskeletal:         General: Tenderness present. No swelling, deformity or signs of injury. Normal range of motion.      Cervical back: Full passive range of motion without pain, normal range of motion and neck supple. No tenderness.      Right hip: Normal. No tenderness or bony tenderness. Normal strength.      Left hip: Normal. No tenderness or bony tenderness. Normal strength.      Right lower leg: No edema.      Left lower leg: No edema.      Right foot: Normal pulse.      Left foot: Normal pulse.        Legs:    Skin:     General: Skin is warm and dry.      Capillary Refill: Capillary refill takes less than 2 seconds.      Coloration: Skin is not pale.   Neurological:      General: No focal deficit present.      Mental Status: She is alert and oriented to person, place, and time.      GCS: GCS eye subscore is 4. GCS verbal subscore is 5. GCS motor subscore is 6.      Cranial Nerves: No cranial nerve deficit.      Sensory: No sensory deficit.      Motor: No weakness or abnormal muscle tone.      Coordination: Coordination normal.      Deep Tendon Reflexes: Reflexes normal.   Psychiatric:         Mood and Affect: Mood normal.         Behavior: Behavior normal. Behavior is cooperative.         Thought Content: Thought content normal.         Judgment: Judgment normal.         Procedures    Sinus rhythm with PVC, left bundle branch block, and ST elevation due to IVCD rate of 80.  Compared to previous EKG from 5/25/2022 sinus rhythm with PVCs LAD left bundle branch  block ST elevation secondary to IVCD rate of 71.  Interpreted by Dr. Cordreo and reviewed by me.             ED Course    CT Lumbar Spine Without Contrast    Result Date: 8/8/2022  1. No acute lumbar spine fracture or subluxation is seen. 2. Chronic compression deformities of T12, L3 and L4. Prior L4 vertebroplasty. 3. Severe canal stenosis at L3-4 and L4-5. 3. Suspected moderate bilateral L3-4, moderate right and moderate to severe left L4-5 neural foraminal stenosis.  Electronically Signed By-María Lauren MD On:8/8/2022 2:13 PM This report was finalized on [id number] by  María Lauren MD.    Medications   sodium chloride 0.9 % flush 10 mL (has no administration in time range)   lactated ringers bolus 1,000 mL (0 mL Intravenous Stopped 8/8/22 1337)   midazolam (VERSED) injection 2 mg (2 mg Intravenous Given 8/8/22 1307)     Labs Reviewed   COMPREHENSIVE METABOLIC PANEL - Abnormal; Notable for the following components:       Result Value    Glucose 103 (*)     Creatinine 1.06 (*)     Potassium 3.0 (*)     eGFR 54.6 (*)     All other components within normal limits    Narrative:     GFR Normal >60  Chronic Kidney Disease <60  Kidney Failure <15     CBC WITH AUTO DIFFERENTIAL - Abnormal; Notable for the following components:    WBC 11.90 (*)     RDW 16.1 (*)     Neutrophils, Absolute 7.60 (*)     Monocytes, Absolute 1.20 (*)     All other components within normal limits   COVID-19,CEPHEID/RODRIGUE,COR/CELIA/PAD/KATIE IN-HOUSE,NP SWAB IN TRANSPORT MEDIA 3-4 HR TAT, RT-PCR - Normal    Narrative:     Fact sheet for providers: https://www.fda.gov/media/317375/download     Fact sheet for patients: https://www.fda.gov/media/033083/download  Fact sheet for providers: https://www.fda.gov/media/188567/download     Fact sheet for patients: https://www.fda.gov/media/615760/download   TROPONIN (IN-HOUSE) - Normal    Narrative:     Troponin T Reference Range:  <= 0.03 ng/mL-   Negative for AMI  >0.03 ng/mL-     Abnormal for  "myocardial necrosis.  Clinicians would have to utilize clinical acumen, EKG, Troponin and serial changes to determine if it is an Acute Myocardial Infarction or myocardial injury due to an underlying chronic condition.       Results may be falsely decreased if patient taking Biotin.     TSH - Normal   MAGNESIUM - Normal   POCT GLUCOSE FINGERSTICK - Normal   COVID PRE-OP / PRE-PROCEDURE SCREENING ORDER (NO ISOLATION)    Narrative:     The following orders were created for panel order COVID PRE-OP / PRE-PROCEDURE SCREENING ORDER (NO ISOLATION) - Swab, Nasopharynx.  Procedure                               Abnormality         Status                     ---------                               -----------         ------                     COVID-19,CEPHEID/RODRIGUE,CO...[[id number]]  Normal              Final result                 Please view results for these tests on the individual orders.   CBC AND DIFFERENTIAL    Narrative:     The following orders were created for panel order CBC & Differential.  Procedure                               Abnormality         Status                     ---------                               -----------         ------                     CBC Auto Differential[[id number]]        Abnormal            Final result                 Please view results for these tests on the individual orders.                                            MDM  Number of Diagnoses or Management Options  Acute right hip pain  Spinal stenosis of lumbosacral region  Weakness of both legs  Diagnosis management comments: Chart Review: 6/7/2022 patient was seen by psychiatry for follow-up on anxiety and tremors.  Comorbidity: Past Medical History:  No date: Anxiety  No date: CHF (congestive heart failure) ([facility])  No date: Depression  No date: Diabetes mellitus ([facility])  No date: Hyperlipidemia  No date: Hypertension  No date: Panic disorder      Comment:  \"panic attacks\"  No date: Tremors of nervous system      Comment:  " "\"essential tremors\"  Imaging: Was interpreted by physician and reviewed by myself: CT Lumbar Spine Without Contrast   Final Result    1. No acute lumbar spine fracture or subluxation is seen.    2. Chronic compression deformities of T12, L3 and L4. Prior L4    vertebroplasty.    3. Severe canal stenosis at L3-4 and L4-5.    3. Suspected moderate bilateral L3-4, moderate right and moderate to    severe left L4-5 neural foraminal stenosis.         Electronically Signed By-María Lauren MD On:8/8/2022 2:13 PM    This report was finalized on [id number] by  María Lauren MD.    Patient undressed and placed in gown for exam.  Appropriate PPE worn during patient exam.  Patient is alert and oriented x3.  Nothing focal on exam.  Patient is noted to have tremors she reports that this is not new for her.  Upon reviewing her last psychiatry visit, notes noted that she has had these since childhood.  She had been well controlled on Xanax but missed an appointment and this medication was DC'd.  She is noted to be tremulous.  Unable to reproduce pain on exam.  IV established and labs obtained. CBC CMP essentially unremarkable troponin within normal limits.  Magnesium TSH within normal limits. CT of the lumbar spine obtained with the above findings.  CT was significant for stenosis.  Given this and difficulty ambulating with multiple falls, my attending feels that patient should be admitted for neurosurgical consult.  Spoke with CASSIDY Muse who accepted admission on behalf of Dr. Cuellar.     Disposition/Treatment: Discussed results with patient, verbalized understanding. Agreeable with plan of care.  Patient was stable upon admission.    Part of this note may be an electronic transcription/translation of spoken language to printed text using the Dragon Dictation System.            Amount and/or Complexity of Data Reviewed  Clinical lab tests: reviewed  Tests in the radiology section of CPT®: reviewed  Tests in the medicine " section of CPT®: reviewed    Risk of Complications, Morbidity, and/or Mortality  General comments: This case was discussed with my attending, Dr. Cordero who independently evaluated patient.    Patient Progress  Patient progress: stable      Final diagnoses:   Weakness of both legs   Spinal stenosis of lumbosacral region   Acute right hip pain       ED Disposition  ED Disposition     ED Disposition   Decision to Admit    Condition   --    Comment   Level of Care: Med/Surg [1]   Admitting Physician: NATHALY BANERJEE [[id number]]   Attending Physician: NATHALY BANERJEE [[id number]]               No follow-up provider specified.       Medication List      No changes were made to your prescriptions during this visit.          Kylah Muse, APRN  08/08/22 0021

## 2022-08-09 ENCOUNTER — APPOINTMENT (OUTPATIENT)
Dept: MRI IMAGING | Facility: HOSPITAL | Age: 77
End: 2022-08-09

## 2022-08-09 LAB
ANION GAP SERPL CALCULATED.3IONS-SCNC: 11 MMOL/L (ref 5–15)
BASOPHILS # BLD AUTO: 0 10*3/MM3 (ref 0–0.2)
BASOPHILS NFR BLD AUTO: 0.5 % (ref 0–1.5)
BUN SERPL-MCNC: 13 MG/DL (ref 8–23)
BUN/CREAT SERPL: 12.6 (ref 7–25)
CALCIUM SPEC-SCNC: 8.8 MG/DL (ref 8.6–10.5)
CHLORIDE SERPL-SCNC: 105 MMOL/L (ref 98–107)
CO2 SERPL-SCNC: 27 MMOL/L (ref 22–29)
CREAT SERPL-MCNC: 1.03 MG/DL (ref 0.57–1)
DEPRECATED RDW RBC AUTO: 48.1 FL (ref 37–54)
EGFRCR SERPLBLD CKD-EPI 2021: 56.5 ML/MIN/1.73
EOSINOPHIL # BLD AUTO: 0.2 10*3/MM3 (ref 0–0.4)
EOSINOPHIL NFR BLD AUTO: 2.2 % (ref 0.3–6.2)
ERYTHROCYTE [DISTWIDTH] IN BLOOD BY AUTOMATED COUNT: 15.8 % (ref 12.3–15.4)
GLUCOSE BLDC GLUCOMTR-MCNC: 156 MG/DL (ref 70–105)
GLUCOSE BLDC GLUCOMTR-MCNC: 167 MG/DL (ref 70–105)
GLUCOSE BLDC GLUCOMTR-MCNC: 186 MG/DL (ref 70–105)
GLUCOSE BLDC GLUCOMTR-MCNC: 206 MG/DL (ref 70–105)
GLUCOSE SERPL-MCNC: 160 MG/DL (ref 65–99)
HCT VFR BLD AUTO: 40.7 % (ref 34–46.6)
HGB BLD-MCNC: 13.2 G/DL (ref 12–15.9)
LYMPHOCYTES # BLD AUTO: 2.1 10*3/MM3 (ref 0.7–3.1)
LYMPHOCYTES NFR BLD AUTO: 25.6 % (ref 19.6–45.3)
MCH RBC QN AUTO: 28.1 PG (ref 26.6–33)
MCHC RBC AUTO-ENTMCNC: 32.5 G/DL (ref 31.5–35.7)
MCV RBC AUTO: 86.5 FL (ref 79–97)
MONOCYTES # BLD AUTO: 1 10*3/MM3 (ref 0.1–0.9)
MONOCYTES NFR BLD AUTO: 11.8 % (ref 5–12)
NEUTROPHILS NFR BLD AUTO: 5 10*3/MM3 (ref 1.7–7)
NEUTROPHILS NFR BLD AUTO: 59.9 % (ref 42.7–76)
NRBC BLD AUTO-RTO: 0.1 /100 WBC (ref 0–0.2)
PLATELET # BLD AUTO: 279 10*3/MM3 (ref 140–450)
PMV BLD AUTO: 8.9 FL (ref 6–12)
POTASSIUM SERPL-SCNC: 3.4 MMOL/L (ref 3.5–5.2)
RBC # BLD AUTO: 4.7 10*6/MM3 (ref 3.77–5.28)
SODIUM SERPL-SCNC: 143 MMOL/L (ref 136–145)
WBC NRBC COR # BLD: 8.4 10*3/MM3 (ref 3.4–10.8)

## 2022-08-09 PROCEDURE — G0378 HOSPITAL OBSERVATION PER HR: HCPCS

## 2022-08-09 PROCEDURE — 82962 GLUCOSE BLOOD TEST: CPT

## 2022-08-09 PROCEDURE — 96374 THER/PROPH/DIAG INJ IV PUSH: CPT

## 2022-08-09 PROCEDURE — 80048 BASIC METABOLIC PNL TOTAL CA: CPT | Performed by: NURSE PRACTITIONER

## 2022-08-09 PROCEDURE — 97162 PT EVAL MOD COMPLEX 30 MIN: CPT

## 2022-08-09 PROCEDURE — 72148 MRI LUMBAR SPINE W/O DYE: CPT

## 2022-08-09 PROCEDURE — 25010000002 HYDROMORPHONE 1 MG/ML SOLUTION: Performed by: HOSPITALIST

## 2022-08-09 PROCEDURE — 36415 COLL VENOUS BLD VENIPUNCTURE: CPT | Performed by: NURSE PRACTITIONER

## 2022-08-09 PROCEDURE — 96375 TX/PRO/DX INJ NEW DRUG ADDON: CPT

## 2022-08-09 PROCEDURE — 85025 COMPLETE CBC W/AUTO DIFF WBC: CPT | Performed by: NURSE PRACTITIONER

## 2022-08-09 PROCEDURE — 99203 OFFICE O/P NEW LOW 30 MIN: CPT

## 2022-08-09 PROCEDURE — 99225 PR SBSQ OBSERVATION CARE/DAY 25 MINUTES: CPT | Performed by: HOSPITALIST

## 2022-08-09 PROCEDURE — 63710000001 INSULIN LISPRO (HUMAN) PER 5 UNITS: Performed by: NURSE PRACTITIONER

## 2022-08-09 RX ADMIN — TICAGRELOR 90 MG: 90 TABLET ORAL at 09:18

## 2022-08-09 RX ADMIN — ALPRAZOLAM 1 MG: 1 TABLET ORAL at 09:17

## 2022-08-09 RX ADMIN — POTASSIUM CHLORIDE 40 MEQ: 1500 TABLET, EXTENDED RELEASE ORAL at 06:03

## 2022-08-09 RX ADMIN — INSULIN LISPRO 2 UNITS: 100 INJECTION, SOLUTION INTRAVENOUS; SUBCUTANEOUS at 16:30

## 2022-08-09 RX ADMIN — Medication 10 ML: at 09:19

## 2022-08-09 RX ADMIN — TICAGRELOR 90 MG: 90 TABLET ORAL at 20:13

## 2022-08-09 RX ADMIN — Medication 10 ML: at 20:13

## 2022-08-09 RX ADMIN — ASPIRIN 81 MG CHEWABLE TABLET 81 MG: 81 TABLET CHEWABLE at 09:18

## 2022-08-09 RX ADMIN — Medication 5 MG: at 20:13

## 2022-08-09 RX ADMIN — INSULIN LISPRO 2 UNITS: 100 INJECTION, SOLUTION INTRAVENOUS; SUBCUTANEOUS at 09:18

## 2022-08-09 RX ADMIN — HYDROMORPHONE HYDROCHLORIDE 0.5 MG: 1 INJECTION, SOLUTION INTRAMUSCULAR; INTRAVENOUS; SUBCUTANEOUS at 16:29

## 2022-08-09 RX ADMIN — METOPROLOL SUCCINATE 50 MG: 50 TABLET, EXTENDED RELEASE ORAL at 09:18

## 2022-08-09 RX ADMIN — INSULIN LISPRO 3 UNITS: 100 INJECTION, SOLUTION INTRAVENOUS; SUBCUTANEOUS at 12:00

## 2022-08-09 RX ADMIN — POTASSIUM CHLORIDE 40 MEQ: 1500 TABLET, EXTENDED RELEASE ORAL at 09:18

## 2022-08-09 RX ADMIN — ALPRAZOLAM 1 MG: 1 TABLET ORAL at 20:13

## 2022-08-09 NOTE — PLAN OF CARE
Neurosurgical plan of care    Based on the documentation the differential diagnosis includes neurogenic claudication however neurogenic claudication cannot be adequately evaluated without an MRI of the lumbar spine.  Please order an MRI of the lumbar spine without contrast and once this is completed we will evaluate the patient.  Please call with any questions or concerns.

## 2022-08-09 NOTE — PLAN OF CARE
Goal Outcome Evaluation:  Plan of Care Reviewed With: patient        Progress: improving  Outcome Evaluation: Patient admitted with hip pain and weakness in legs that has been causing falls.  Is awaiting MRI to confirm diagnosis of spinal stenosis.  She is alert but disoriented at times.  Can answer simple questions but repeats several statesment over and over in conversation.  She lives with daughter in 1 br apartment and is left alone when daughter works.  She demonstrates LE weakness and balance issues that lead to increased fall risk.  Amb this date 2 x 25' with r- wx with narrow konstantin and shuffle steps and r wx,  No complains of increaed pain with ambulation in hip area.  LE grossly graded 3+/5

## 2022-08-09 NOTE — PROGRESS NOTES
[facility] Medicine Services Daily Progress Note    Patient Name: Nadege Barrow  : 1945  MRN: [id number]  Primary Care Physician:  Maggie Irving APRN  Date of admission: 2022      Subjective      Chief Complaint: Back pain    Subjective   Patient denies for any nausea or vomiting, no nausea or vomiting , no chest pain    ROS       Objective      Vitals:   Temp:  [98 °F (36.7 °C)] 98 °F (36.7 °C)  Heart Rate:  [75-84] 83  Resp:  [16-18] 16  BP: (123-147)/(77-83) 123/77    Physical Exam  Vitals and nursing note reviewed.   Constitutional:       General: She is not in acute distress.     Appearance: Normal appearance. She is well-developed. She is not ill-appearing, toxic-appearing or diaphoretic.   HENT:      Head: Normocephalic and atraumatic.      Right Ear: Ear canal and external ear normal.      Left Ear: Ear canal and external ear normal.      Nose: Nose normal. No congestion or rhinorrhea.      Mouth/Throat:      Mouth: Mucous membranes are moist.      Pharynx: No oropharyngeal exudate.   Eyes:      General: No scleral icterus.        Right eye: No discharge.         Left eye: No discharge.      Extraocular Movements: Extraocular movements intact.      Conjunctiva/sclera: Conjunctivae normal.      Pupils: Pupils are equal, round, and reactive to light.   Neck:      Thyroid: No thyromegaly.      Vascular: No carotid bruit or JVD.      Trachea: No tracheal deviation.   Cardiovascular:      Rate and Rhythm: Normal rate and regular rhythm.      Pulses: Normal pulses.      Heart sounds: Normal heart sounds. No murmur heard.    No friction rub. No gallop.   Pulmonary:      Effort: Pulmonary effort is normal. No respiratory distress.      Breath sounds: Normal breath sounds. No stridor. No wheezing, rhonchi or rales.   Chest:      Chest wall: No tenderness.   Abdominal:      General: Bowel sounds are normal. There is no distension.      Palpations: Abdomen is soft. There is no mass.       Tenderness: There is no abdominal tenderness. There is no guarding or rebound.      Hernia: No hernia is present.   Musculoskeletal:         General: No swelling, tenderness, deformity or signs of injury. Normal range of motion.      Cervical back: Normal range of motion and neck supple. No rigidity. No muscular tenderness.      Right lower leg: No edema.      Left lower leg: No edema.   Lymphadenopathy:      Cervical: No cervical adenopathy.   Skin:     General: Skin is warm and dry.      Coloration: Skin is not jaundiced or pale.      Findings: No bruising, erythema or rash.   Neurological:      General: No focal deficit present.      Mental Status: She is alert and oriented to person, place, and time. Mental status is at baseline.      Cranial Nerves: No cranial nerve deficit.      Sensory: No sensory deficit.      Motor: No weakness or abnormal muscle tone.      Coordination: Coordination normal.   Psychiatric:         Mood and Affect: Mood normal.         Behavior: Behavior normal.         Thought Content: Thought content normal.         Judgment: Judgment normal.               Result Review    Result Review:  I have personally reviewed the results from the time of this admission to 8/9/2022 18:25 EDT and agree with these findings:  [x]  Laboratory  [x]  Microbiology  [x]  Radiology  []  EKG/Telemetry   []  Cardiology/Vascular   []  Pathology  []  Old records  []  Other:  Most notable findings include:     Wounds (last 24 hours)     LDA Wound     Row Name               [REMOVED] Wound    Wound - Properties Group Placement Date: --  -MJ Placement Time: --  -MJ Removal Date: 08/09/22  -MM Removal Time: 0711  -MM      Retired Wound - Properties Group Placement Date: --  -MJ Placement Time: --  -MJ Removal Date: 08/09/22  -MM Removal Time: 0711  -MM      Retired Wound - Properties Group Date first assessed: --  -MJ Time first assessed: --  -MJ Resolution Date: 08/09/22  -MM Resolution Time: 0711  -MM             User Key  (r) = Recorded By, (t) = Taken By, (c) = Cosigned By    Initials Name Provider Type    Geeta Juarez, RN Registered Nurse    Maida Colon LPN Licensed Nurse                  Assessment & Plan      Brief Patient Summary:  Nadege Barrow is a 76 y.o. female who       aspirin, 81 mg, Oral, Daily  insulin lispro, 0-7 Units, Subcutaneous, TID AC  lidocaine, 2 patch, Transdermal, Q24H  metoprolol succinate XL, 50 mg, Oral, Q24H  sodium chloride, 10 mL, Intravenous, Q12H  ticagrelor, 90 mg, Oral, BID             Active Hospital Problems:  Active Hospital Problems    Diagnosis    • **Lumbar back pain with radiculopathy affecting lower extremity    • Weakness of both legs    • Hypokalemia    • Spinal stenosis of lumbar region    • Hypertension    • Depression, unspecified    • Disabling essential tremor    • S/P right coronary artery (RCA) stent placement    • Hyperlipidemia, mixed      Plan:      Lumbar pain with radiculopathy  Severe spinal stenosis  -CT lumbar: no acute lumbar spine fracture or subluxation.  Chronic compression deformities of T12, L3 and L4, prior L4 vertebroplasty.  Severe canal stenosis at L3-4 and L4-5.  Suspected moderate bilateral L3-4, moderate right and moderate to severe left L4-5 neuroforaminal stenosis.  -prn tylenol, lidocaine patch  -BETO consulted  -PT evaluation   MRI lumbar region  Consulted spine surgery     Hypokalemia  K 3.0   -replacement protocol     Chronic essential tremor  -Resume home Xanax when verified     CAD s/p PCI  Hypertension  Hyperlipidemia  -Resume home aspirin, losartan, metoprolol, Brilinta, statin when verified     Diabetes mellitus type 2  -glucose only 75  -Sliding scale insulin AC/HS     Anxiety/depression  -Continue home Prozac, Xanax when verified        DVT prophylaxis:  Mechanical DVT prophylaxis orders are present.     CODE STATUS:    Level Of Support Discussed With: Patient  Code Status (Patient has no pulse and is not breathing): CPR (Attempt to  Resuscitate)  Medical Interventions (Patient has pulse or is breathing): Full Support     Admission Status:  I believe this patient meets observation status.        DVT prophylaxis:  Mechanical DVT prophylaxis orders are present.    CODE STATUS:    Level Of Support Discussed With: Patient  Code Status (Patient has no pulse and is not breathing): CPR (Attempt to Resuscitate)  Medical Interventions (Patient has pulse or is breathing): Full Support      Disposition:  I expect patient to be discharged observation.    This patient has been examined wearing appropriate Personal Protective Equipment and discussed with hospital infection control department. 08/09/22      Electronically signed by Vivian Grant MD, 08/09/22, 18:25 EDT.  [facility] Hospitalist Team

## 2022-08-09 NOTE — CONSULTS
"Diabetes Education  Assessment/Teaching    Patient Name:  Nadege Barrow  YOB: 1945  MRN: [id number]  Admit Date:  8/8/2022      Assessment Date:  8/9/2022  Flowsheet Row Most Recent Value   General Information     Referral From: A1c  [Pt seen due to A1c this adm of 8.0%.]   Height 160 cm (63\")   Weight 68.7 kg (151 lb 7.3 oz)   Weight Method Standing scale   Pregnancy Assessment    Diabetes History    What type of diabetes do you have? Type 2   Have you had diabetes education/teaching in the past? yes   When and where was your diabetes education? Pt and daughter was seen by inpatient diabetes educator at [facility] on 5/16/2022. Pt was living at [facility] on [address] [facility] at that time.   Do you test your blood sugar at home? yes   Frequency of checks 3 times/day   Meter type Accuchek Guide, meter is less than 4 years old. Pt states she is still going to update her meter to a new Guide   Who performs the test? self   Typical readings 150-200   Have you had low blood sugar? (<70mg/dl) yes   How often do you have low blood sugar? occasionally   Education Preferences    Nutrition Information    Assessment Topics    DM Goals           Flowsheet Row Most Recent Value   DM Education Needs    Meter Has own   Meter Type Accuchek   Frequency of Testing 3 times a day  [Discussed importance of recording bs and taking to PCP visits.]   Blood Glucose Target Range Discussed A1c result of 8.0%. Reviewed healthy bs range and healthy A1c target. Discussed importance of bs control.   Medication Oral, Insulin, Pen  [Pt taking Jardiance 10 mg daily and Humulin/Novolin 70/30 20 units bid]   Problem Solving Hypoglycemia, Signs, Symptoms, Treatment  [Discussed always keeping low bs tx with her]   Reducing Risks A1C testing  [Gave A1c info sheet]   Healthy Eating --  [Pt eating 2 meals plus 1 snack daily. Pt following Atkins diet right now]   Motivation Engaged   Teaching Method Explanation, Discussion, Handouts   Patient Response Verbalized " understanding            Other Comments:  Met with pt at bedside. Pt states she moved in with daughter at the end of July. She states she is preparing and injecting her insulin shots and also checking her own bs. Pt states she has been storing her unused pens at room temperature and her pen she is using in refrigerator. Discussed she should be storing unused pens in refrigerator and the pen she is using should be kept at room temperature. Pt states the pens have been out of the refrigerator since 8/1/2022. Pt states she will contact daughter and have her place the unopened pens in the refrigerator. Pt states she does have PCP visit on 9/15/2022. Pt states has insulin and bs monitoring supplies and does not need additional education at this time.         Electronically signed by:  Berna Bains RN  08/09/22 19:45 EDT

## 2022-08-09 NOTE — THERAPY EVALUATION
"Patient Name: Nadege Barrow  : 1945    MRN: [id number]                              Today's Date: 2022       Admit Date: 2022    Visit Dx:     ICD-10-CM ICD-9-CM   1. Weakness of both legs  R29.898 729.89   2. Spinal stenosis of lumbosacral region  M48.07 724.02   3. Acute right hip pain  M25.551 719.45     Patient Active Problem List   Diagnosis   • Chest pain on breathing   • Hypertension   • Acute respiratory failure with hypoxia ([facility])   • Anemia   • Atherosclerotic heart disease of native coronary artery without angina pectoris   • Back pain   • Cardiomyopathy ([facility])   • Carotid artery disease ([facility])   • Cellulitis of foot   • Acute on chronic congestive heart failure ([facility])   • Major depressive disorder, recurrent episode, moderate ([facility])   • Senile dementia, delirium, with behavioral disturbance ([facility])   • Depression, unspecified   • Disabling essential tremor   • Fatigue   • Hyperlipidemia, mixed   • Peripheral vision loss, bilateral   • Retinopathy, bilateral   • S/P right coronary artery (RCA) stent placement   • Ischemic cardiomyopathy   • Elevated LFTs   • Generalized anxiety disorder   • Benzodiazepine dependence, continuous ([facility])   • Type 2 diabetes mellitus ([facility])   • Chest pain, unspecified type   • Acute on chronic systolic CHF (congestive heart failure) ([facility])   • Weakness of both legs   • Hypokalemia   • Lumbar back pain with radiculopathy affecting lower extremity   • Spinal stenosis of lumbar region     Past Medical History:   Diagnosis Date   • Anxiety    • CHF (congestive heart failure) ([facility])    • Depression    • Diabetes mellitus ([facility])    • Hyperlipidemia    • Hypertension    • Panic disorder     \"panic attacks\"   • Tremors of nervous system     \"essential tremors\"     Past Surgical History:   Procedure Laterality Date   • CORONARY ANGIOPLASTY WITH STENT PLACEMENT        General Information     Row Name 22          Physical Therapy Time and Intention    Document Type " evaluation  -JR     Mode of Treatment physical therapy  -     Row Name 08/09/22 0912          General Information    Patient Profile Reviewed yes  -JR     Prior Level of Function min assist:;community mobility  -JR     Barriers to Rehab cognitive status  -JR     Row Name 08/09/22 0912          Cognition    Orientation Status (Cognition) oriented to;person;place  -     Row Name 08/09/22 0912          Safety Issues, Functional Mobility    Safety Issues Affecting Function (Mobility) awareness of need for assistance;safety precaution awareness;positioning of assistive device  -     Impairments Affecting Function (Mobility) balance;cognition;strength  -JR     Cognitive Impairments, Mobility Safety/Performance awareness, need for assistance;safety precaution awareness;safety precaution follow-through  -           User Key  (r) = Recorded By, (t) = Taken By, (c) = Cosigned By    Initials Name Provider Type    JR Milly Harden, PT Physical Therapist               Mobility     Row Name 08/09/22 0913          Bed Mobility    Bed Mobility bed mobility (all) activities  -     All Activities, [place] (Bed Mobility) independent;modified independence  -     Assistive Device (Bed Mobility) bed rails  -     Row Name 08/09/22 0913          Bed-Chair Transfer    Bed-Chair [place] (Transfers) supervision  -     Assistive Device (Bed-Chair Transfers) walker, front-wheeled  -     Row Name 08/09/22 0913          Sit-Stand Transfer    Sit-Stand [place] (Transfers) modified independence  -     Assistive Device (Sit-Stand Transfers) walker, front-wheeled  -     Row Name 08/09/22 0913          Gait/Stairs (Locomotion)    [place] Level (Gait) contact guard  -     Assistive Device (Gait) walker, front-wheeled  -     Distance in Feet (Gait) 2x25  -JR     Deviations/Abnormal Patterns (Gait) festinating/shuffling;base of support, narrow  -JR           User Key  (r) = Recorded By, (t) = Taken By, (c)  = Cosigned By    Initials Name Provider Type    Milly Garcia, PT Physical Therapist               Obj/Interventions     Row Name 08/09/22 0917          Range of Motion Comprehensive    General Range of Motion no range of motion deficits identified  -[facility] Name 08/09/22 0917          Strength Comprehensive (MMT)    General Manual Muscle Testing (MMT) Assessment lower extremity strength deficits identified;upper extremity strength deficits identified  -JR     Comment, General Manual Muscle Testing (MMT) Assessment generalized weakness grossly graded at 3+/5  -JR     [facility] Name 08/09/22 0917          Balance    Balance Assessment sitting static balance;standing static balance;standing dynamic balance  -JR     Static Sitting Balance independent  -JR     Static Standing Balance modified independence  -JR     Dynamic Standing Balance modified independence;standby assist  -JR           User Key  (r) = Recorded By, (t) = Taken By, (c) = Cosigned By    Initials Name Provider Type    Milly Garcia, PT Physical Therapist               Goals/Plan     Row Name 08/09/22 0933          Transfer Goal 1 (PT)    Activity/Assistive Device (Transfer Goal 1, PT) transfers, all  -JR     [place] Level/Cues Needed (Transfer Goal 1, PT) independent  -JR     Time Frame (Transfer Goal 1, PT) 2 weeks  -JR     Progress/Outcome (Transfer Goal 1, PT) continuing progress toward goal  -JR     [facility] Name 08/09/22 0933          Gait Training Goal 1 (PT)    [place] Level (Gait Training Goal 1, PT) independent  -JR     Distance (Gait Training Goal 1, PT) 50  -JR     Time Frame (Gait Training Goal 1, PT) 2 weeks  -JR     Progress/Outcome (Gait Training Goal 1, PT) continuing progress toward goal  -JR     [facility] Name 08/09/22 0933          Therapy Assessment/Plan (PT)    Planned Therapy Interventions (PT) balance training;bed mobility training;gait training;home exercise program;patient/family education;strengthening;transfer training   -JR           User Key  (r) = Recorded By, (t) = Taken By, (c) = Cosigned By    Initials Name Provider Type    JR Milly Harden, PT Physical Therapist               Clinical Impression     Row Name 08/09/22 0919          Pain    Pain Location - hip  -JR     Additional Documentation Pain Scale: FACES Pre/Post-Treatment (Group)  -JR     Row Name 08/09/22 0919          Pain Scale: FACES Pre/Post-Treatment    Pain: FACES Scale, Pretreatment 4-->hurts little more  -JR     Posttreatment Pain Rating 4-->hurts little more  -JR     Pain Location - hip  -JR     Row Name 08/09/22 0919          Plan of Care Review    Plan of Care Reviewed With patient  -JR     Progress improving  -     Outcome Evaluation Patient admitted with hip pain and weakness in legs that has been causing falls.  Is awaiting MRI to confirm diagnosis of spinal stenosis.  She is alert but disoriented at times.  Can answer simple questions but repeats several statesment over and over in conversation.  She lives with daughter in 1 br apartment and is left alone when daughter works.  She demonstrates LE weakness and balance issues that lead to increased fall risk.  Amb this date 2 x 25' with r- wx with narrow konstantin and shuffle steps and r wx,  No complains of increaed pain with ambulation in hip area.  LE grossly graded 3+/5  -     Row Name 08/09/22 0919          Therapy Assessment/Plan (PT)    Criteria for Skilled Interventions Met (PT) yes  -JR     Therapy Frequency (PT) 3 times/wk  -JR     Row Name 08/09/22 0919          Positioning and Restraints    Pre-Treatment Position in bed  -JR     Post Treatment Position bed  -JR     In Bed notified nsg;supine;call light within reach;encouraged to call for assist;exit alarm on  -JR           User Key  (r) = Recorded By, (t) = Taken By, (c) = Cosigned By    Initials Name Provider Type    JR Milly Harden, PT Physical Therapist               Outcome Measures     Row Name 08/09/22 0934 08/09/22 0830       How  much help from another person do you currently need...    Turning from your back to your side while in flat bed without using bedrails? 4  -JR 4  -MM    Moving from lying on back to sitting on the side of a flat bed without bedrails? 4  -JR 4  -MM    Moving to and from a bed to a chair (including a wheelchair)? 3  -JR 3  -MM    Standing up from a chair using your arms (e.g., wheelchair, bedside chair)? 4  -JR 3  -MM    Climbing 3-5 steps with a railing? 3  -JR 3  -MM    To walk in hospital room? 3  -JR 3  -MM    AM-PAC 6 Clicks Score (PT) 21  -JR 20  -MM    Highest level of mobility 6 --> Walked 10 steps or more  -JR 6 --> Walked 10 steps or more  -MM    Row Name 08/08/22 2152          How much help from another person do you currently need...    Turning from your back to your side while in flat bed without using bedrails? 4  -SA     Moving from lying on back to sitting on the side of a flat bed without bedrails? 4  -SA     Moving to and from a bed to a chair (including a wheelchair)? 4  -SA     Standing up from a chair using your arms (e.g., wheelchair, bedside chair)? 4  -SA     Climbing 3-5 steps with a railing? 3  -SA     To walk in hospital room? 4  -SA     AM-PAC 6 Clicks Score (PT) 23  -SA     Highest level of mobility 7 --> Walked 25 feet or more  -     Row Name 08/09/22 0934          Functional Assessment    Outcome Measure Options AM-PAC 6 Clicks Basic Mobility (PT)  -           User Key  (r) = Recorded By, (t) = Taken By, (c) = Cosigned By    Initials Name Provider Type     Mckenzie Downey RN Registered Nurse    Milly Garcia PT Physical Therapist    MM Maida Guerrero LPN Licensed Nurse                             Physical Therapy Education                 Title: PT OT SLP Therapies (Done)     Topic: Physical Therapy (Done)     Point: Mobility training (Done)     Learning Progress Summary           Patient Acceptance, E, VU by  at 8/9/2022 0935    Comment: use of rolling walker to decrease  falls - education on use                               User Key     Initials Effective Dates Name Provider Type Discipline     06/16/21 -  Milly Harden PT Physical Therapist PT              PT Recommendation and Plan  Planned Therapy Interventions (PT): balance training, bed mobility training, gait training, home exercise program, patient/family education, strengthening, transfer training  Plan of Care Reviewed With: patient  Progress: improving  Outcome Evaluation: Patient admitted with hip pain and weakness in legs that has been causing falls.  Is awaiting MRI to confirm diagnosis of spinal stenosis.  She is alert but disoriented at times.  Can answer simple questions but repeats several statesment over and over in conversation.  She lives with daughter in 1 br apartment and is left alone when daughter works.  She demonstrates LE weakness and balance issues that lead to increased fall risk.  Amb this date 2 x 25' with r- wx with narrow konstantin and shuffle steps and r wx,  No complains of increaed pain with ambulation in hip area.  LE grossly graded 3+/5     Time Calculation:    PT Charges     Row Name 08/09/22 0938             Time Calculation    Start Time 0750  -      Stop Time 0825  -      Time Calculation (min) 35 min  -      PT Received On 08/09/22  -      PT - Next Appointment 08/11/22  -      PT Goal Re-Cert Due Date 08/23/22  -            User Key  (r) = Recorded By, (t) = Taken By, (c) = Cosigned By    Initials Name Provider Type    Milly Garcia PT Physical Therapist              Therapy Charges for Today     Code Description Service Date Service Provider Modifiers Qty    [id number] HC PT EVAL MOD COMPLEXITY 4 8/9/2022 Milly Harden PT GP 1          PT G-Codes  Outcome Measure Options: AM-PAC 6 Clicks Basic Mobility (PT)  AM-PAC 6 Clicks Score (PT): 21    Milly Harden PT  8/9/2022

## 2022-08-09 NOTE — CONSULTS
Neurosurgery Consult Note      Patient: Nadege Barrow    YOB: 1945    Date of Admission: 8/8/2022 11:30 AM    Admitting Dx: Weakness of both legs [R29.898]  Spinal stenosis of lumbosacral region [M48.07]  Acute right hip pain [M25.551]    Reason for Consult: Neurogenic claudication      Subjective     HPI:  Patient is a 76 y.o. female with HTN, CAD, and a previous L4 vertebroplasty who is currently in the hospital with complaints of low back and bilateral leg pain x2 weeks.  She denies injury or inciting event.  She describes aching low back pain and severe aching muscle tightness in her legs that is worse with walking and better with sitting down.  She has no electric like symptoms in her legs and no numbness or tingling.  No bowel or bladder dysfunction and no saddle anesthesia.  Patient has no other complaints.  She has not tried any conservative therapies to this point including physical therapy.  MRI lumbar spine is reviewed below.      Current Medications:  Scheduled Meds:aspirin, 81 mg, Oral, Daily  insulin lispro, 0-7 Units, Subcutaneous, TID AC  lidocaine, 2 patch, Transdermal, Q24H  metoprolol succinate XL, 50 mg, Oral, Q24H  sodium chloride, 10 mL, Intravenous, Q12H  ticagrelor, 90 mg, Oral, BID      Continuous Infusions:   PRN Meds: •  acetaminophen **OR** acetaminophen **OR** acetaminophen  •  ALPRAZolam  •  aluminum-magnesium hydroxide-simethicone  •  dextrose  •  dextrose  •  glucagon (human recombinant)  •  hydrALAZINE  •  HYDROmorphone  •  melatonin  •  ondansetron **OR** ondansetron  •  potassium chloride  •  potassium chloride  •  [COMPLETED] Insert peripheral IV **AND** sodium chloride  •  sodium chloride    14 point review of systems was completed and is negative except for what is noted in HPI      Objective     Vitals:    08/08/22 1840 08/08/22 2152 08/09/22 0918 08/09/22 1140   BP: 147/80 142/83 133/81 123/77   BP Location:  Right arm  Left arm   Patient Position:  Lying  Lying    Pulse:  75 84 83   Resp:  18  16   Temp:  98 °F (36.7 °C)  98 °F (36.7 °C)   TempSrc:  Oral  Oral   SpO2:  97%  97%   Weight:       Height:           Physical exam    General  - WD/WN female, appears their stated age, awake, cooperative, in no acute distress  HEENT  - Normocephalic, atraumatic, PERRLA, EOM intact  Respiratory  - Normal respiratory rate and effort, no audible wheezes  Musculoskeletal  - Normal ROM throughout, no joint swelling/tenderness  Skin  - Warm and dry, no bleeding, bruising, or rash  NEUROLOGIC  - A/O x3  - Moves all extremities well with 5/5 strength  - Sensation intact throughout  - Negative clonus        RESULTS REVIEW:  Results from last 7 days   Lab Units 08/09/22  0101 08/08/22  1306   WBC 10*3/mm3 8.40 11.90*   HEMOGLOBIN g/dL 13.2 13.5   HEMATOCRIT % 40.7 42.0   PLATELETS 10*3/mm3 279 315        Results from last 7 days   Lab Units 08/09/22  0101 08/08/22  1306   SODIUM mmol/L 143 141   POTASSIUM mmol/L 3.4* 3.0*   CHLORIDE mmol/L 105 103   CO2 mmol/L 27.0 23.0   BUN mg/dL 13 14   CREATININE mg/dL 1.03* 1.06*   CALCIUM mg/dL 8.8 9.0   BILIRUBIN mg/dL  --  1.0   ALK PHOS U/L  --  107   ALT (SGPT) U/L  --  11   AST (SGOT) U/L  --  17   GLUCOSE mg/dL 160* 103*        Imaging Results (Last 48 Hours)     Procedure Component Value Units Date/Time    MRI Lumbar Spine Without Contrast [[id number]] Collected: 08/09/22 1253     Updated: 08/09/22 1304    Narrative:      MRI LUMBAR SPINE WO CONTRAST-     Date of Exam: 8/9/2022 10:40 AM     Indication: Low back pain, no red flags, no prior management;  R29.898-Other symptoms and signs involving the musculoskeletal system;  M48.07-Spinal stenosis, lumbosacral region; M25.551-Pain in right hip     Comparison: CT lumbar spine 8/8/2022.     Technique: Multiplanar multisequence images of the lumbar spine were  performed according to routine lumbar spine MRI protocol.     FINDINGS:         There is a band of edema at the inferior to the 11 vertebral  endplate,  slightly right of midline, which is included on the sagittal sequence,  suggestive of nondisplaced fracture. No bony retropulsion or subluxation  is seen.     There are chronic compression deformities of T12, L3 and L4.  Vertebroplasty changes are seen at L4. Schmorl's no protrusions are seen  at the superior inferior T12 endplates, superior L3 vertebral endplate.     No lumbar subluxation is seen. There is disc desiccation at each lumbar  level, and disc heights appear relatively well preserved. Imaged  paraspinal soft tissues are grossly within normal limits.     At T11-12, mild posterior disc osteophyte formation eccentrically to the  right with probable mild canal stenosis, included on the sagittal  sequence. Minimal right inferior neural foraminal narrowing. Left neural  foramen is patent.     At T12-L1, mild concentric disc bulge is present. Mild bilateral facet  degenerative changes. There is mild central canal stenosis. No  significant neural foraminal stenosis.     At L1-2, mild disc bulge. Mild bilateral facet arthropathy. Mild canal  stenosis. No significant neural foraminal stenosis.     At L1-2, mild disc bulge eccentric to the left subarticular to  paravertebral region. Mild bilateral facet degenerative change. Mild  canal stenosis. Mild right neural foraminal narrowing. Moderate left  neural foraminal narrowing.     At L3-4, moderate concentric disc bulge is present. Moderate bilateral  facet arthropathy with ligamentum flavum hypertrophy. Moderate to severe  central canal stenosis with AP canal diameter of approximately 9 mm.  Moderate bilateral neural foraminal stenosis.     At L4-5, mild concentric disc bulge slightly eccentric to the left  paravertebral region. Advanced facet arthropathy with ligamentum flavum  hypertrophy, and small fluid in the facet joints. Moderate to severe  central canal stenosis with AP canal diameter of approximately 8 mm.  Moderate bilateral neural foraminal  stenosis, left greater than right.     At L5-S1, mild disc bulge, without thecal sac encroachment. Advanced  bilateral facet arthropathy. No canal stenosis. Mild to moderate  bilateral neural foraminal stenosis.             Impression:      1. Suspected acute or subacute nondisplaced fracture of the inferior  endplate of T11. No bony retropulsion or subluxation.  2. Degenerative changes of the lumbar spine as described above. Severe  canal stenosis at L3-4 and L4-5.     Electronically Signed By-María aLuren MD On:8/9/2022 1:02 PM  This report was finalized on [id number] by  María Lauren MD.    CT Lumbar Spine Without Contrast [[id number]] Collected: 08/08/22 1403     Updated: 08/08/22 1416    Narrative:         DATE OF EXAM:  8/8/2022 1:41 PM     PROCEDURE:  CT LUMBAR SPINE WO CONTRAST-      INDICATIONS:   R hip pain, generalized weakness, radiculopathy down BLE     COMPARISON:   No Comparisons Available     TECHNIQUE:  3 mm noncontrast axial images through the lumbar spine. Sagittal and  coronal reformatted images were obtained. Automated exposure control and  iterative reconstruction was utilized.      FINDINGS:  Chronic L4 compression fracture with vertebroplasty. Approximately 25%  loss of vertebral body height. Vertebroplasty cement is seen in the  anterior margin of the spinal canal measuring up to 5 mm thickness. No  bony retropulsion or subluxation is seen.     There is chronic Schmorl's node protrusion with compression deformity of  the superior endplate of L3. There is 40% loss of central L3 vertebral  body height. No bony retropulsion or subluxation is seen.     There is chronic T12 compression fracture with associated superior and  inferior endplate Schmorl's no protrusions. There is 50% loss of central  vertebral body height. No bony retropulsion or subluxation is seen.     No acute lumbar spine fracture is identified. Mild degenerative changes  of the sacroiliac joints. Imaged paraspinal soft  tissues are within  normal limits.     At T11-12, posterior disc osteophyte formation is present with mild  central canal stenosis. Mild bilateral facet degenerative change. Mild  right neural foraminal stenosis. Left neural foramen appears patent.     At T12-L1, mild concentric disc bulge is present with mild bilateral  facet arthropathy. No significant canal stenosis. No significant neural  foraminal stenosis.     At L1-2, mild concentric disc bulge. Mild right greater than left facet  arthropathy. Mild canal stenosis. No significant neural foraminal  stenosis.     At L2-3, mild to moderate concentric disc bulge. Moderate right and mild  left facet arthropathy. Mild to moderate central canal stenosis. Mild  bilateral neural foraminal stenosis.     At L3-4, moderate concentric disc bulge. Moderate bilateral facet  arthropathy with advanced ligamentum flavum hypertrophy. There is severe  central canal stenosis. There is moderate bilateral neural foraminal  stenosis, left greater than right.     At L4-5, moderate concentric disc bulge is present. There is severe left  greater than right facet arthropathy with ligament flavum thickening.  There is severe central canal stenosis. There is moderate right and  moderate to severe left neural foraminal stenosis.     At L5-S1, mild broad-based disc bulge with tiny central disc protrusion  is present. There is severe bilateral facet arthropathy. No significant  central canal stenosis. Mild to moderate bilateral neural foraminal  stenosis.             Impression:      1. No acute lumbar spine fracture or subluxation is seen.  2. Chronic compression deformities of T12, L3 and L4. Prior L4  vertebroplasty.  3. Severe canal stenosis at L3-4 and L4-5.  3. Suspected moderate bilateral L3-4, moderate right and moderate to  severe left L4-5 neural foraminal stenosis.     Electronically Signed By-María Lauren MD On:8/8/2022 2:13 PM  This report was finalized on [id number] by   María Lauren MD.             Assessment     MDM: This is a 76 y.o. female currently hospital with complaints of low back pain and neurogenic claudication x2 weeks.  She has no radiculopathy and no red flag signs or symptoms for cauda equina syndrome.  Her physical exam is unremarkable.  Imaging shows chronic degenerative changes with moderate to severe stenosis throughout the lumbar spine.  Patient describes classic neurogenic claudication that is consistent with her imaging.  She does not need emergent neurosurgical intervention at this time.  I recommend patient be referred to outpatient physical therapy after discharge to work on strengthening her core musculature.  If she still has persistent pain after 6 or more sessions of physical therapy she may schedule an appointment with our outpatient neurosurgery clinic to discuss further treatment options.  I discussed my assessment and recommendations with the patient who is agreeable to this plan.        Plan   - No neurosurgical intervention necessary at this time  - Recommend outpatient referral to physical therapy to strengthen and stretch the musculature supporting her lumbar spine  - If pain persists after 6 or more sessions with PT, she may follow-up outpatient in our neurosurgery clinic  - Medical management and pain control per primary team  - Neurosurgery will sign off at this time  - Call with any questions or concerns    I discussed my assessment and recommendations with Dr. Shaniqua Ramirez PA-C  8/9/2022  16:36 EDT      EMR Dragon/Transcription disclaimer:   Much of this encounter note is an electronic transcription/translation of spoken language to printed text. The electronic translation of spoken language may permit erroneous, or at times, nonsensical words or phrases to be inadvertently transcribed; Although I have reviewed the note for such errors, some may still exist.

## 2022-08-09 NOTE — PLAN OF CARE
Goal Outcome Evaluation:              Outcome Evaluation: Pt admitted to floor.  Admission completed.  No falls or other issues this shift.  Will continue to monitor.

## 2022-08-10 ENCOUNTER — READMISSION MANAGEMENT (OUTPATIENT)
Dept: CALL CENTER | Facility: HOSPITAL | Age: 77
End: 2022-08-10

## 2022-08-10 VITALS
RESPIRATION RATE: 18 BRPM | BODY MASS INDEX: 29.38 KG/M2 | WEIGHT: 165.79 LBS | DIASTOLIC BLOOD PRESSURE: 75 MMHG | SYSTOLIC BLOOD PRESSURE: 137 MMHG | HEIGHT: 63 IN | OXYGEN SATURATION: 97 % | HEART RATE: 75 BPM | TEMPERATURE: 98.3 F

## 2022-08-10 LAB
GLUCOSE BLDC GLUCOMTR-MCNC: 162 MG/DL (ref 70–105)
GLUCOSE BLDC GLUCOMTR-MCNC: 199 MG/DL (ref 70–105)
POTASSIUM SERPL-SCNC: 4 MMOL/L (ref 3.5–5.2)

## 2022-08-10 PROCEDURE — 63710000001 INSULIN LISPRO (HUMAN) PER 5 UNITS: Performed by: NURSE PRACTITIONER

## 2022-08-10 PROCEDURE — G0378 HOSPITAL OBSERVATION PER HR: HCPCS

## 2022-08-10 PROCEDURE — 96376 TX/PRO/DX INJ SAME DRUG ADON: CPT

## 2022-08-10 PROCEDURE — 82962 GLUCOSE BLOOD TEST: CPT

## 2022-08-10 PROCEDURE — 99217 PR OBSERVATION CARE DISCHARGE MANAGEMENT: CPT | Performed by: HOSPITALIST

## 2022-08-10 PROCEDURE — 84132 ASSAY OF SERUM POTASSIUM: CPT | Performed by: HOSPITALIST

## 2022-08-10 PROCEDURE — 25010000002 HYDROMORPHONE 1 MG/ML SOLUTION: Performed by: HOSPITALIST

## 2022-08-10 RX ORDER — HYDROCODONE BITARTRATE AND ACETAMINOPHEN 5; 325 MG/1; MG/1
1 TABLET ORAL EVERY 6 HOURS PRN
Qty: 10 TABLET | Refills: 0 | Status: SHIPPED | OUTPATIENT
Start: 2022-08-10 | End: 2022-09-30

## 2022-08-10 RX ADMIN — ASPIRIN 81 MG CHEWABLE TABLET 81 MG: 81 TABLET CHEWABLE at 09:12

## 2022-08-10 RX ADMIN — Medication 10 ML: at 09:12

## 2022-08-10 RX ADMIN — HYDROMORPHONE HYDROCHLORIDE 0.5 MG: 1 INJECTION, SOLUTION INTRAMUSCULAR; INTRAVENOUS; SUBCUTANEOUS at 13:45

## 2022-08-10 RX ADMIN — INSULIN LISPRO 2 UNITS: 100 INJECTION, SOLUTION INTRAVENOUS; SUBCUTANEOUS at 12:00

## 2022-08-10 RX ADMIN — TICAGRELOR 90 MG: 90 TABLET ORAL at 09:12

## 2022-08-10 RX ADMIN — HYDROMORPHONE HYDROCHLORIDE 0.5 MG: 1 INJECTION, SOLUTION INTRAMUSCULAR; INTRAVENOUS; SUBCUTANEOUS at 02:03

## 2022-08-10 RX ADMIN — ALPRAZOLAM 1 MG: 1 TABLET ORAL at 13:45

## 2022-08-10 RX ADMIN — INSULIN LISPRO 2 UNITS: 100 INJECTION, SOLUTION INTRAVENOUS; SUBCUTANEOUS at 09:12

## 2022-08-10 RX ADMIN — ALPRAZOLAM 1 MG: 1 TABLET ORAL at 09:12

## 2022-08-10 RX ADMIN — METOPROLOL SUCCINATE 50 MG: 50 TABLET, EXTENDED RELEASE ORAL at 09:12

## 2022-08-10 NOTE — CASE MANAGEMENT/SOCIAL WORK
Continued Stay Note   [facility]     Patient Name: Nadege Barrow  MRN: [id number]  Today's Date: 8/10/2022    Admit Date: 8/8/2022     Discharge Plan     Row Name 08/10/22 1600       Plan    Plan DC plan: Routine home with daughter.    Provided Post Acute Provider List? Yes    Post Acute Provider List Home Health    Delivered To Patient    Method of Delivery In person    Plan Comments Met with patient at bedside and discussed neurosurgery's recommendation of outpatient PT vs. PT's recommendation of home healthcare. Pt stated she would need to talk to her daughter because she'd be the one providing transportation. [facility] agency list provided to her. CM contact info given for patient to call with her decision/choice. Have not received return phone call at this time.              Met with patient in room wearing PPE: mask, face shield/goggles.      Maintained distance greater than six feet and spent less than 15 minutes in the room.      Megan Naegele, RN      Office Phone: [phone]  Office Cell: [phone]

## 2022-08-10 NOTE — PLAN OF CARE
Goal Outcome Evaluation:              Outcome Evaluation: Patient did well during the night.  Did have some complaints of pain in the middle of the back.  Patient stable at this time.

## 2022-08-10 NOTE — DISCHARGE SUMMARY
[facility] Medicine Services  DISCHARGE SUMMARY    Patient Name: Nadege Barrow  : 1945  MRN: [id number]    Date of Admission: 2022  Discharge Diagnosis:   Date of Discharge:  8/10/2022  Primary Care Physician: Maggie Irving APRN      Presenting Problem:   Weakness of both legs [R29.898]  Spinal stenosis of lumbosacral region [M48.07]  Acute right hip pain [M25.551]    Active and Resolved Hospital Problems:  Active Hospital Problems    Diagnosis POA   • **Lumbar back pain with radiculopathy affecting lower extremity [M54.16] Yes   • Weakness of both legs [R29.898] Yes   • Hypokalemia [E87.6] Yes   • Spinal stenosis of lumbar region [M48.061] Yes   • Hypertension [I10] Unknown   • Depression, unspecified [F32.A] Yes   • Disabling essential tremor [G25.0] Yes   • S/P right coronary artery (RCA) stent placement [Z95.5] Not Applicable   • Hyperlipidemia, mixed [E78.2] Yes      Resolved Hospital Problems   No resolved problems to display.         Hospital Course     Hospital Course by problem list    Lumbar pain with radiculopathy  Severe spinal stenosis  -CT lumbar: no acute lumbar spine fracture or subluxation.  Chronic compression deformities of T12, L3 and L4, prior L4 vertebroplasty.  Severe canal stenosis at L3-4 and L4-5.  Suspected moderate bilateral L3-4, moderate right and moderate to severe left L4-5 neuroforaminal stenosis.  -prn tylenol, lidocaine patch  -BETO consulted  -PT evaluation   MRI lumbar region reviewed,   Consulted spine surgery .... advised conservative management with outpatient follow up.     Hypokalemia replaced as per protocol     Chronic essential tremor  -Resume home Xanax when verified     CAD s/p PCI  Hypertension  Hyperlipidemia  -Resume home aspirin, losartan, metoprolol, Brilinta, statin when verified     Diabetes mellitus type 2  -glucose only 75  -Sliding scale insulin AC/HS     Anxiety/depression  -Continue home Prozac, Xanax when  verified     Condition on discharge stable.    DISCHARGE Follow Up with PCP in a week time.  Follow up with cardiology service in four week time.      Reasons For Change In Medications and Indications for New Medications:      Day of Discharge     Vital Signs:  Temp:  [97 °F (36.1 °C)-98.3 °F (36.8 °C)] 98.3 °F (36.8 °C)  Heart Rate:  [72-76] 75  Resp:  [16-18] 18  BP: (126-137)/(75-78) 137/75    Physical Exam:  Physical Exam  Vitals and nursing note reviewed.   Constitutional:       General: She is not in acute distress.     Appearance: Normal appearance. She is well-developed. She is not ill-appearing, toxic-appearing or diaphoretic.   HENT:      Head: Normocephalic and atraumatic.      Right Ear: Ear canal and external ear normal.      Left Ear: Ear canal and external ear normal.      Nose: Nose normal. No congestion or rhinorrhea.      Mouth/Throat:      Mouth: Mucous membranes are moist.      Pharynx: No oropharyngeal exudate.   Eyes:      General: No scleral icterus.        Right eye: No discharge.         Left eye: No discharge.      Extraocular Movements: Extraocular movements intact.      Conjunctiva/sclera: Conjunctivae normal.      Pupils: Pupils are equal, round, and reactive to light.   Neck:      Thyroid: No thyromegaly.      Vascular: No carotid bruit or JVD.      Trachea: No tracheal deviation.   Cardiovascular:      Rate and Rhythm: Normal rate and regular rhythm.      Pulses: Normal pulses.      Heart sounds: Normal heart sounds. No murmur heard.    No friction rub. No gallop.   Pulmonary:      Effort: Pulmonary effort is normal. No respiratory distress.      Breath sounds: Normal breath sounds. No stridor. No wheezing, rhonchi or rales.   Chest:      Chest wall: No tenderness.   Abdominal:      General: Bowel sounds are normal. There is no distension.      Palpations: Abdomen is soft. There is no mass.      Tenderness: There is no abdominal tenderness. There is no guarding or rebound.      Hernia:  No hernia is present.   Musculoskeletal:         General: No swelling, tenderness, deformity or signs of injury. Normal range of motion.      Cervical back: Normal range of motion and neck supple. No rigidity. No muscular tenderness.      Right lower leg: No edema.      Left lower leg: No edema.   Lymphadenopathy:      Cervical: No cervical adenopathy.   Skin:     General: Skin is warm and dry.      Coloration: Skin is not jaundiced or pale.      Findings: No bruising, erythema or rash.   Neurological:      General: No focal deficit present.      Mental Status: She is alert and oriented to person, place, and time. Mental status is at baseline.      Cranial Nerves: No cranial nerve deficit.      Sensory: No sensory deficit.      Motor: No weakness or abnormal muscle tone.      Coordination: Coordination normal.   Psychiatric:         Mood and Affect: Mood normal.         Behavior: Behavior normal.         Thought Content: Thought content normal.         Judgment: Judgment normal.                Pertinent  and/or Most Recent Results     LAB RESULTS:      Lab 08/09/22  0101 08/08/22  1306   WBC 8.40 11.90*   HEMOGLOBIN 13.2 13.5   HEMATOCRIT 40.7 42.0   PLATELETS 279 315   NEUTROS ABS 5.00 7.60*   LYMPHS ABS 2.10 2.80   MONOS ABS 1.00* 1.20*   EOS ABS 0.20 0.10   MCV 86.5 85.8         Lab 08/10/22  0031 08/09/22  0101 08/08/22  1306   SODIUM  --  143 141   POTASSIUM 4.0 3.4* 3.0*   CHLORIDE  --  105 103   CO2  --  27.0 23.0   ANION GAP  --  11.0 15.0   BUN  --  13 14   CREATININE  --  1.03* 1.06*   EGFR  --  56.5* 54.6*   GLUCOSE  --  160* 103*   CALCIUM  --  8.8 9.0   MAGNESIUM  --   --  1.8   HEMOGLOBIN A1C  --   --  8.0*   TSH  --   --  1.630         Lab 08/08/22  1306   TOTAL PROTEIN 6.2   ALBUMIN 3.70   GLOBULIN 2.5   ALT (SGPT) 11   AST (SGOT) 17   BILIRUBIN 1.0   ALK PHOS 107         Lab 08/08/22  1306   TROPONIN T <0.010                 Brief Urine Lab Results  (Last result in the past 365 days)      Color    Clarity   Blood   Leuk Est   Nitrite   Protein   CREAT   Urine HCG        02/27/22 0749 Yellow   Clear   Trace   Trace   Negative   Negative               Microbiology Results (last 10 days)     Procedure Component Value - Date/Time    CANDIDA AURIS SCREEN - Swab, Axilla Right, Axilla Left and Groin [[id number]]  (Normal) Collected: 08/08/22 2159    Lab Status: Preliminary result Specimen: Swab from Axilla Right, Axilla Left and Groin Updated: 08/10/22 0332     Candida Auris Screen Culture No Candida auris isolated at 24 hours    COVID PRE-OP / PRE-PROCEDURE SCREENING ORDER (NO ISOLATION) - Swab, Nasopharynx [[id number]]  (Normal) Collected: 08/08/22 1523    Lab Status: Final result Specimen: Swab from Nasopharynx Updated: 08/08/22 1608    Narrative:      The following orders were created for panel order COVID PRE-OP / PRE-PROCEDURE SCREENING ORDER (NO ISOLATION) - Swab, Nasopharynx.  Procedure                               Abnormality         Status                     ---------                               -----------         ------                     COVID-19,CEPHEID/RODRIGUE,CO...[[id number]]  Normal              Final result                 Please view results for these tests on the individual orders.    COVID-19,CEPHEID/RODRIGUE,COR/[facility]/PAD/KATIE IN-HOUSE(OR EMERGENT/ADD-ON),NP SWAB IN TRANSPORT MEDIA 3-4 HR TAT, RT-PCR - Swab, Nasopharynx [[id number]]  (Normal) Collected: 08/08/22 1523    Lab Status: Final result Specimen: Swab from Nasopharynx Updated: 08/08/22 1608     COVID19 Not Detected    Narrative:      Fact sheet for providers: https://www.fda.gov/media/570216/download     Fact sheet for patients: https://www.fda.gov/media/610027/download  Fact sheet for providers: https://www.fda.gov/media/781106/download     Fact sheet for patients: https://www.fda.gov/media/463357/download          CT Lumbar Spine Without Contrast    Result Date: 8/8/2022  Impression: 1. No acute lumbar spine fracture or subluxation is seen.  2. Chronic compression deformities of T12, L3 and L4. Prior L4 vertebroplasty. 3. Severe canal stenosis at L3-4 and L4-5. 3. Suspected moderate bilateral L3-4, moderate right and moderate to severe left L4-5 neural foraminal stenosis.  Electronically Signed By-María Lauren MD On:8/8/2022 2:13 PM This report was finalized on [id number] by  María Lauren MD.    MRI Lumbar Spine Without Contrast    Result Date: 8/9/2022  Impression: 1. Suspected acute or subacute nondisplaced fracture of the inferior endplate of T11. No bony retropulsion or subluxation. 2. Degenerative changes of the lumbar spine as described above. Severe canal stenosis at L3-4 and L4-5.  Electronically Signed By-María Lauren MD On:8/9/2022 1:02 PM This report was finalized on [id number] by  María Lauren MD.              Results for orders placed during the hospital encounter of 02/26/22    Adult Transthoracic Echo Complete w/ Color, Spectral and Contrast if necessary per protocol    Interpretation Summary  · Left ventricular ejection fraction appears to be 41 - 45%.  · The right ventricular cavity is mildly dilated.  · Mild aortic valve stenosis is present.  · Moderate tricuspid valve regurgitation is present.  · No pericardial effusion noted      Labs Pending at Discharge:  Pending Labs     Order Current Status    CANDIDA AURIS SCREEN - Swab, Axilla Right, Axilla Left and Groin Preliminary result          Procedures Performed           Consults:   Consults     Date and Time Order Name Status Description    8/8/2022  5:30 PM Inpatient Neurosurgery Consult Completed     8/8/2022  3:08 PM Hospitalist (on-call MD unless specified)              Discharge Details        Discharge Medications      New Medications      Instructions Start Date   HYDROcodone-acetaminophen 5-325 MG per tablet  Commonly known as: Norco   1 tablet, Oral, Every 6 Hours PRN         Continue These Medications      Instructions Start Date   ALPRAZolam 1 MG  tablet  Commonly known as: Xanax   1 mg, Oral, 3 Times Daily PRN      atorvastatin 40 MG tablet  Commonly known as: LIPITOR   40 mg, Oral, Daily      fludrocortisone 0.1 MG tablet   0.1 mg, Oral, Daily      FLUoxetine 20 MG capsule  Commonly known as: PROzac   20 mg, Oral, Daily      furosemide 20 MG tablet  Commonly known as: LASIX   20 mg, Oral, Daily      insulin NPH-insulin regular (70-30) 100 UNIT/ML injection  Commonly known as: humuLIN 70/30,novoLIN 70/30   20 Units, Subcutaneous, 2 Times Daily With Meals      Jardiance 10 MG tablet tablet  Generic drug: empagliflozin   1 tablet, Oral, Daily      linaclotide 145 MCG capsule capsule  Commonly known as: LINZESS   145 mcg, Oral, Every Morning Before Breakfast      losartan 25 MG tablet  Commonly known as: COZAAR   25 mg, Oral, Every 24 Hours Scheduled      metoprolol succinate XL 50 MG 24 hr tablet  Commonly known as: TOPROL-XL   50 mg, Oral, Daily      nitroglycerin 0.4 MG SL tablet  Commonly known as: NITROSTAT   0.4 mg, Sublingual, Every 5 Minutes PRN, Take no more than 3 doses in 15 minutes.      potassium chloride ER 20 MEQ tablet controlled-release ER tablet  Commonly known as: K-TAB   20 mEq, Oral, Daily      ticagrelor 90 MG tablet tablet  Commonly known as: BRILINTA   90 mg, Oral, 2 Times Daily         Stop These Medications    aspirin 81 MG EC tablet     benzonatate 100 MG capsule  Commonly known as: TESSALON     guaiFENesin-dextromethorphan 100-10 MG/5ML syrup  Commonly known as: ROBITUSSIN DM     nystatin 250669 UNIT/GM powder  Commonly known as: MYCOSTATIN     predniSONE 20 MG tablet  Commonly known as: DELTASONE     tiZANidine 2 MG half tablet  Commonly known as: ZANAFLEX            Allergies   Allergen Reactions   • Gabapentin Hallucinations   • Morphine Hallucinations         Discharge Disposition:   Home or Self Care    Diet:  Hospital:  Diet Order   Procedures   • Diet Diabetic/Consistent Carbs; Diabetic - Consistent Carb         Discharge  Activity:         CODE STATUS:  Code Status and Medical Interventions:   Ordered at: 08/08/22 1731     Level Of Support Discussed With:    Patient     Code Status (Patient has no pulse and is not breathing):    CPR (Attempt to Resuscitate)     Medical Interventions (Patient has pulse or is breathing):    Full Support         Future Appointments   Date Time Provider Department Center   9/8/2022 10:15 AM Cristal Alvarez MD [facility]       Additional Instructions for the Follow-ups that You Need to Schedule     Discharge Follow-up with PCP   As directed       Currently Documented PCP:    Maggie Irving APRN    PCP Phone Number:    [phone]     Follow Up Details: one week time.               Time spent on Discharge including face to face service 33 minutes    This patient has been examined wearing appropriate Personal Protective Equipment and discussed with hospital infection control department. 08/10/22      Signature:

## 2022-08-11 NOTE — CASE MANAGEMENT/SOCIAL WORK
Case Management Discharge Note      Final Note: Routine home.      Selected Continued Care - Discharged on 8/10/2022 Admission date: 8/8/2022 - Discharge disposition: Home or Self Care     Transportation Services  Private: Car    Final Discharge Disposition Code: 01 - home or self-care

## 2022-08-11 NOTE — OUTREACH NOTE
Prep Survey    Flowsheet Row Responses   [facility] discharged from? [facility]   Is LACE score < 7 ? No   Emergency Room discharge w/ pulse ox? No   Eligibility Readm Mgmt   Discharge diagnosis Lumbar back pain with radiculopathy affecting lower extremity    Does the patient have one of the following disease processes/diagnoses(primary or secondary)? Other   Does the patient have Home health ordered? No   Is there a DME ordered? No   Prep survey completed? Yes          KUSH GRANADO - Registered Nurse

## 2022-08-12 LAB — QT INTERVAL: 458 MS

## 2022-08-14 LAB — BACTERIA ISLT: NORMAL

## 2022-08-15 ENCOUNTER — READMISSION MANAGEMENT (OUTPATIENT)
Dept: CALL CENTER | Facility: HOSPITAL | Age: 77
End: 2022-08-15

## 2022-08-15 NOTE — OUTREACH NOTE
Medical Week 1 Survey    Flowsheet Row Responses   [facility] patient discharged from? [facility]   Does the patient have one of the following disease processes/diagnoses(primary or secondary)? Other   Week 1 attempt successful? Yes   Call start time 1201   Call end time 1207   Discharge diagnosis Lumbar back pain with radiculopathy affecting lower extremity    Person spoke with today (if not patient) and relationship patient   Meds reviewed with patient/caregiver? Yes   Is the patient having any side effects they believe may be caused by any medication additions or changes? No   Does the patient have all medications ordered at discharge? Yes   Is the patient taking all medications as directed (includes completed medication regime)? Yes   Does the patient have a primary care provider?  Yes   Does the patient have an appointment with their PCP within 7 days of discharge? No   Comments regarding PCP Maggie Irving   What is preventing the patient from scheduling follow up appointments within 7 days of discharge? Haven't had time   Nursing Interventions Advised patient to make appointment   Has the patient kept scheduled appointments due by today? N/A   Psychosocial issues? No   Did the patient receive a copy of their discharge instructions? Yes   Nursing interventions Reviewed instructions with patient   What is the patient's perception of their health status since discharge? Improving   Is the patient/caregiver able to teach back the hierarchy of who to call/visit for symptoms/problems? PCP, Specialist, Home health nurse, Urgent Care, ED, 911 Yes   If the patient is a current smoker, are they able to teach back resources for cessation? Not a smoker   Additional teach back comments Pt is living with daughter in her apartment   Week 1 call completed? Yes   Wrap up additional comments Pt reports she is doing well.  her daughter schedules her appts and will be scheduling with her PCP.          MARK PENA - Registered Nurse

## 2022-08-24 ENCOUNTER — READMISSION MANAGEMENT (OUTPATIENT)
Dept: CALL CENTER | Facility: HOSPITAL | Age: 77
End: 2022-08-24

## 2022-08-24 NOTE — OUTREACH NOTE
Medical Week 2 Survey    Flowsheet Row Responses   [facility] facility patient discharged from? [facility]   Does the patient have one of the following disease processes/diagnoses(primary or secondary)? Other   Week 2 attempt successful? No   Unsuccessful attempts Attempt 1          ERNESTINA FUNK - Licensed Nurse

## 2022-09-07 ENCOUNTER — READMISSION MANAGEMENT (OUTPATIENT)
Dept: CALL CENTER | Facility: HOSPITAL | Age: 77
End: 2022-09-07

## 2022-09-07 NOTE — OUTREACH NOTE
Medical Week 4 Survey    Flowsheet Row Responses   [facility] patient discharged from? [facility]   Does the patient have one of the following disease processes/diagnoses(primary or secondary)? Other   Week 4 attempt successful? Yes   Call start time 1445   Call end time 1446   Discharge diagnosis Lumbar back pain with radiculopathy affecting lower extremity    Meds reviewed with patient/caregiver? Yes   Is the patient taking all medications as directed (includes completed medication regime)? Yes   Has the patient kept scheduled appointments due by today? N/A   Is the patient still receiving Home Health Services? N/A   Psychosocial issues? No   What is the patient's perception of their health status since discharge? Improving   Is the patient/caregiver able to teach back the hierarchy of who to call/visit for symptoms/problems? PCP, Specialist, Home health nurse, Urgent Care, ED, 911 Yes   Week 4 Call Completed? Yes   Would the patient like one additional call? No   Graduated Yes   Is the patient interested in additional calls from an ambulatory ?  NOTE:  applies to high risk patients requiring additional follow-up. No   Did the patient feel the follow up calls were helpful during their recovery period? Yes   Was the number of calls appropriate? Yes          MESFIN BARTH - Registered Nurse

## 2022-09-13 ENCOUNTER — APPOINTMENT (OUTPATIENT)
Dept: GENERAL RADIOLOGY | Facility: HOSPITAL | Age: 77
End: 2022-09-13

## 2022-09-13 ENCOUNTER — HOSPITAL ENCOUNTER (OUTPATIENT)
Facility: HOSPITAL | Age: 77
Setting detail: OBSERVATION
Discharge: HOME | End: 2022-09-16
Attending: EMERGENCY MEDICINE | Admitting: HOSPITALIST

## 2022-09-13 DIAGNOSIS — R06.00 DYSPNEA, UNSPECIFIED TYPE: ICD-10-CM

## 2022-09-13 DIAGNOSIS — I50.9 ACUTE CONGESTIVE HEART FAILURE, UNSPECIFIED HEART FAILURE TYPE: Primary | ICD-10-CM

## 2022-09-13 DIAGNOSIS — R53.83 FATIGUE, UNSPECIFIED TYPE: ICD-10-CM

## 2022-09-13 DIAGNOSIS — U07.1 COVID-19 VIRUS DETECTED: ICD-10-CM

## 2022-09-13 PROBLEM — E87.6 HYPOKALEMIA: Status: ACTIVE | Noted: 2022-09-13

## 2022-09-13 LAB
ALBUMIN SERPL-MCNC: 3.7 G/DL (ref 3.5–5.2)
ALBUMIN/GLOB SERPL: 1.3 G/DL
ALP SERPL-CCNC: 77 U/L (ref 39–117)
ALT SERPL W P-5'-P-CCNC: 12 U/L (ref 1–33)
ANION GAP SERPL CALCULATED.3IONS-SCNC: 14 MMOL/L (ref 5–15)
AST SERPL-CCNC: 22 U/L (ref 1–32)
B PARAPERT DNA SPEC QL NAA+PROBE: NOT DETECTED
B PERT DNA SPEC QL NAA+PROBE: NOT DETECTED
BASOPHILS # BLD AUTO: 0.1 10*3/MM3 (ref 0–0.2)
BASOPHILS NFR BLD AUTO: 1.1 % (ref 0–1.5)
BILIRUB SERPL-MCNC: 0.9 MG/DL (ref 0–1.2)
BUN SERPL-MCNC: 15 MG/DL (ref 8–23)
BUN/CREAT SERPL: 17.4 (ref 7–25)
C PNEUM DNA NPH QL NAA+NON-PROBE: NOT DETECTED
CALCIUM SPEC-SCNC: 8.4 MG/DL (ref 8.6–10.5)
CHLORIDE SERPL-SCNC: 103 MMOL/L (ref 98–107)
CO2 SERPL-SCNC: 25 MMOL/L (ref 22–29)
CREAT SERPL-MCNC: 0.86 MG/DL (ref 0.57–1)
DEPRECATED RDW RBC AUTO: 44.6 FL (ref 37–54)
EGFRCR SERPLBLD CKD-EPI 2021: 70.1 ML/MIN/1.73
EOSINOPHIL # BLD AUTO: 0.2 10*3/MM3 (ref 0–0.4)
EOSINOPHIL NFR BLD AUTO: 3.3 % (ref 0.3–6.2)
ERYTHROCYTE [DISTWIDTH] IN BLOOD BY AUTOMATED COUNT: 14.4 % (ref 12.3–15.4)
FLUAV SUBTYP SPEC NAA+PROBE: NOT DETECTED
FLUBV RNA ISLT QL NAA+PROBE: NOT DETECTED
GLOBULIN UR ELPH-MCNC: 2.8 GM/DL
GLUCOSE SERPL-MCNC: 161 MG/DL (ref 65–99)
HADV DNA SPEC NAA+PROBE: NOT DETECTED
HCOV 229E RNA SPEC QL NAA+PROBE: NOT DETECTED
HCOV HKU1 RNA SPEC QL NAA+PROBE: NOT DETECTED
HCOV NL63 RNA SPEC QL NAA+PROBE: NOT DETECTED
HCOV OC43 RNA SPEC QL NAA+PROBE: NOT DETECTED
HCT VFR BLD AUTO: 38 % (ref 34–46.6)
HGB BLD-MCNC: 12.4 G/DL (ref 12–15.9)
HMPV RNA NPH QL NAA+NON-PROBE: NOT DETECTED
HPIV1 RNA ISLT QL NAA+PROBE: NOT DETECTED
HPIV2 RNA SPEC QL NAA+PROBE: NOT DETECTED
HPIV3 RNA NPH QL NAA+PROBE: NOT DETECTED
HPIV4 P GENE NPH QL NAA+PROBE: NOT DETECTED
LYMPHOCYTES # BLD AUTO: 1.9 10*3/MM3 (ref 0.7–3.1)
LYMPHOCYTES NFR BLD AUTO: 27.4 % (ref 19.6–45.3)
M PNEUMO IGG SER IA-ACNC: NOT DETECTED
MAGNESIUM SERPL-MCNC: 1.7 MG/DL (ref 1.6–2.4)
MCH RBC QN AUTO: 29.3 PG (ref 26.6–33)
MCHC RBC AUTO-ENTMCNC: 32.6 G/DL (ref 31.5–35.7)
MCV RBC AUTO: 89.7 FL (ref 79–97)
MONOCYTES # BLD AUTO: 0.5 10*3/MM3 (ref 0.1–0.9)
MONOCYTES NFR BLD AUTO: 7.5 % (ref 5–12)
NEUTROPHILS NFR BLD AUTO: 4.3 10*3/MM3 (ref 1.7–7)
NEUTROPHILS NFR BLD AUTO: 60.7 % (ref 42.7–76)
NRBC BLD AUTO-RTO: 0.1 /100 WBC (ref 0–0.2)
NT-PROBNP SERPL-MCNC: ABNORMAL PG/ML (ref 0–1800)
PLATELET # BLD AUTO: 267 10*3/MM3 (ref 140–450)
PMV BLD AUTO: 8.9 FL (ref 6–12)
POTASSIUM SERPL-SCNC: 3.1 MMOL/L (ref 3.5–5.2)
PROT SERPL-MCNC: 6.5 G/DL (ref 6–8.5)
RBC # BLD AUTO: 4.24 10*6/MM3 (ref 3.77–5.28)
RHINOVIRUS RNA SPEC NAA+PROBE: NOT DETECTED
RSV RNA NPH QL NAA+NON-PROBE: NOT DETECTED
SARS-COV-2 RNA NPH QL NAA+NON-PROBE: DETECTED
SODIUM SERPL-SCNC: 142 MMOL/L (ref 136–145)
TROPONIN T SERPL-MCNC: <0.01 NG/ML (ref 0–0.03)
WBC NRBC COR # BLD: 7.1 10*3/MM3 (ref 3.4–10.8)

## 2022-09-13 PROCEDURE — 96374 THER/PROPH/DIAG INJ IV PUSH: CPT

## 2022-09-13 PROCEDURE — 83880 ASSAY OF NATRIURETIC PEPTIDE: CPT | Performed by: PHYSICIAN ASSISTANT

## 2022-09-13 PROCEDURE — 99285 EMERGENCY DEPT VISIT HI MDM: CPT

## 2022-09-13 PROCEDURE — 25010000002 FUROSEMIDE PER 20 MG: Performed by: PHYSICIAN ASSISTANT

## 2022-09-13 PROCEDURE — 85025 COMPLETE CBC W/AUTO DIFF WBC: CPT | Performed by: PHYSICIAN ASSISTANT

## 2022-09-13 PROCEDURE — 0202U NFCT DS 22 TRGT SARS-COV-2: CPT | Performed by: PHYSICIAN ASSISTANT

## 2022-09-13 PROCEDURE — G0378 HOSPITAL OBSERVATION PER HR: HCPCS

## 2022-09-13 PROCEDURE — 80053 COMPREHEN METABOLIC PANEL: CPT | Performed by: PHYSICIAN ASSISTANT

## 2022-09-13 PROCEDURE — 99220 PR INITIAL OBSERVATION CARE/DAY 70 MINUTES: CPT | Performed by: NURSE PRACTITIONER

## 2022-09-13 PROCEDURE — 93005 ELECTROCARDIOGRAM TRACING: CPT

## 2022-09-13 PROCEDURE — 83735 ASSAY OF MAGNESIUM: CPT | Performed by: PHYSICIAN ASSISTANT

## 2022-09-13 PROCEDURE — 71045 X-RAY EXAM CHEST 1 VIEW: CPT

## 2022-09-13 PROCEDURE — 84484 ASSAY OF TROPONIN QUANT: CPT | Performed by: PHYSICIAN ASSISTANT

## 2022-09-13 PROCEDURE — 99284 EMERGENCY DEPT VISIT MOD MDM: CPT

## 2022-09-13 RX ORDER — DEXTROSE MONOHYDRATE 25 G/50ML
25 INJECTION, SOLUTION INTRAVENOUS
Status: DISCONTINUED | OUTPATIENT
Start: 2022-09-13 | End: 2022-09-16 | Stop reason: HOSPADM

## 2022-09-13 RX ORDER — FUROSEMIDE 10 MG/ML
40 INJECTION INTRAMUSCULAR; INTRAVENOUS ONCE
Status: COMPLETED | OUTPATIENT
Start: 2022-09-13 | End: 2022-09-13

## 2022-09-13 RX ORDER — SODIUM CHLORIDE 0.9 % (FLUSH) 0.9 %
10 SYRINGE (ML) INJECTION EVERY 12 HOURS SCHEDULED
Status: DISCONTINUED | OUTPATIENT
Start: 2022-09-13 | End: 2022-09-16 | Stop reason: HOSPADM

## 2022-09-13 RX ORDER — NICOTINE POLACRILEX 4 MG
15 LOZENGE BUCCAL
Status: DISCONTINUED | OUTPATIENT
Start: 2022-09-13 | End: 2022-09-16 | Stop reason: HOSPADM

## 2022-09-13 RX ORDER — SODIUM CHLORIDE 0.9 % (FLUSH) 0.9 %
10 SYRINGE (ML) INJECTION AS NEEDED
Status: DISCONTINUED | OUTPATIENT
Start: 2022-09-13 | End: 2022-09-16 | Stop reason: HOSPADM

## 2022-09-13 RX ORDER — POTASSIUM CHLORIDE 20 MEQ/1
20 TABLET, EXTENDED RELEASE ORAL DAILY
Status: DISCONTINUED | OUTPATIENT
Start: 2022-09-14 | End: 2022-09-16 | Stop reason: HOSPADM

## 2022-09-13 RX ORDER — OLANZAPINE 10 MG/2ML
1 INJECTION, POWDER, LYOPHILIZED, FOR SOLUTION INTRAMUSCULAR AS NEEDED
Status: DISCONTINUED | OUTPATIENT
Start: 2022-09-13 | End: 2022-09-16 | Stop reason: HOSPADM

## 2022-09-13 RX ORDER — INSULIN LISPRO 100 [IU]/ML
0-7 INJECTION, SOLUTION INTRAVENOUS; SUBCUTANEOUS
Status: DISCONTINUED | OUTPATIENT
Start: 2022-09-14 | End: 2022-09-16 | Stop reason: HOSPADM

## 2022-09-13 RX ADMIN — FUROSEMIDE 40 MG: 10 INJECTION, SOLUTION INTRAMUSCULAR; INTRAVENOUS at 22:33

## 2022-09-13 RX ADMIN — Medication 10 ML: at 22:33

## 2022-09-14 LAB
ANION GAP SERPL CALCULATED.3IONS-SCNC: 13 MMOL/L (ref 5–15)
BUN SERPL-MCNC: 12 MG/DL (ref 8–23)
BUN/CREAT SERPL: 15 (ref 7–25)
CALCIUM SPEC-SCNC: 8.1 MG/DL (ref 8.6–10.5)
CHLORIDE SERPL-SCNC: 103 MMOL/L (ref 98–107)
CO2 SERPL-SCNC: 27 MMOL/L (ref 22–29)
CREAT SERPL-MCNC: 0.8 MG/DL (ref 0.57–1)
DEPRECATED RDW RBC AUTO: 43.8 FL (ref 37–54)
EGFRCR SERPLBLD CKD-EPI 2021: 76.5 ML/MIN/1.73
ERYTHROCYTE [DISTWIDTH] IN BLOOD BY AUTOMATED COUNT: 14.1 % (ref 12.3–15.4)
GLUCOSE BLDC GLUCOMTR-MCNC: 144 MG/DL (ref 70–105)
GLUCOSE BLDC GLUCOMTR-MCNC: 147 MG/DL (ref 70–105)
GLUCOSE BLDC GLUCOMTR-MCNC: 163 MG/DL (ref 70–105)
GLUCOSE BLDC GLUCOMTR-MCNC: 172 MG/DL (ref 70–105)
GLUCOSE BLDC GLUCOMTR-MCNC: 188 MG/DL (ref 70–105)
GLUCOSE SERPL-MCNC: 114 MG/DL (ref 65–99)
HBA1C MFR BLD: 7.1 % (ref 3.5–5.6)
HCT VFR BLD AUTO: 36.8 % (ref 34–46.6)
HGB BLD-MCNC: 12.3 G/DL (ref 12–15.9)
MCH RBC QN AUTO: 30.6 PG (ref 26.6–33)
MCHC RBC AUTO-ENTMCNC: 33.4 G/DL (ref 31.5–35.7)
MCV RBC AUTO: 91.8 FL (ref 79–97)
PLATELET # BLD AUTO: 261 10*3/MM3 (ref 140–450)
PMV BLD AUTO: 8.9 FL (ref 6–12)
POTASSIUM SERPL-SCNC: 3.3 MMOL/L (ref 3.5–5.2)
RBC # BLD AUTO: 4 10*6/MM3 (ref 3.77–5.28)
SODIUM SERPL-SCNC: 143 MMOL/L (ref 136–145)
TROPONIN T SERPL-MCNC: <0.01 NG/ML (ref 0–0.03)
WBC NRBC COR # BLD: 6.9 10*3/MM3 (ref 3.4–10.8)

## 2022-09-14 PROCEDURE — 83036 HEMOGLOBIN GLYCOSYLATED A1C: CPT | Performed by: NURSE PRACTITIONER

## 2022-09-14 PROCEDURE — 82962 GLUCOSE BLOOD TEST: CPT

## 2022-09-14 PROCEDURE — 85027 COMPLETE CBC AUTOMATED: CPT | Performed by: NURSE PRACTITIONER

## 2022-09-14 PROCEDURE — G0378 HOSPITAL OBSERVATION PER HR: HCPCS

## 2022-09-14 PROCEDURE — 63710000001 INSULIN LISPRO (HUMAN) PER 5 UNITS: Performed by: NURSE PRACTITIONER

## 2022-09-14 PROCEDURE — 63710000001 INSULIN ISOPHANE & REGULAR PER 5 UNITS: Performed by: HOSPITALIST

## 2022-09-14 PROCEDURE — 36415 COLL VENOUS BLD VENIPUNCTURE: CPT | Performed by: NURSE PRACTITIONER

## 2022-09-14 PROCEDURE — 80048 BASIC METABOLIC PNL TOTAL CA: CPT | Performed by: NURSE PRACTITIONER

## 2022-09-14 PROCEDURE — 84484 ASSAY OF TROPONIN QUANT: CPT | Performed by: NURSE PRACTITIONER

## 2022-09-14 RX ORDER — METOPROLOL SUCCINATE 50 MG/1
50 TABLET, EXTENDED RELEASE ORAL DAILY
Status: DISCONTINUED | OUTPATIENT
Start: 2022-09-14 | End: 2022-09-16 | Stop reason: HOSPADM

## 2022-09-14 RX ORDER — POTASSIUM CHLORIDE 1.5 G/1.77G
40 POWDER, FOR SOLUTION ORAL AS NEEDED
Status: DISCONTINUED | OUTPATIENT
Start: 2022-09-14 | End: 2022-09-16 | Stop reason: HOSPADM

## 2022-09-14 RX ORDER — FLUOXETINE HYDROCHLORIDE 20 MG/1
20 CAPSULE ORAL DAILY
Status: DISCONTINUED | OUTPATIENT
Start: 2022-09-14 | End: 2022-09-16 | Stop reason: HOSPADM

## 2022-09-14 RX ORDER — POTASSIUM CHLORIDE 20 MEQ/1
40 TABLET, EXTENDED RELEASE ORAL AS NEEDED
Status: DISCONTINUED | OUTPATIENT
Start: 2022-09-14 | End: 2022-09-16 | Stop reason: HOSPADM

## 2022-09-14 RX ORDER — ATORVASTATIN CALCIUM 40 MG/1
40 TABLET, FILM COATED ORAL NIGHTLY
Status: DISCONTINUED | OUTPATIENT
Start: 2022-09-14 | End: 2022-09-16 | Stop reason: HOSPADM

## 2022-09-14 RX ORDER — ALPRAZOLAM 1 MG/1
1 TABLET ORAL 3 TIMES DAILY PRN
Status: DISCONTINUED | OUTPATIENT
Start: 2022-09-14 | End: 2022-09-16 | Stop reason: HOSPADM

## 2022-09-14 RX ORDER — FLUDROCORTISONE ACETATE 0.1 MG/1
0.1 TABLET ORAL DAILY
Status: DISCONTINUED | OUTPATIENT
Start: 2022-09-14 | End: 2022-09-16 | Stop reason: HOSPADM

## 2022-09-14 RX ORDER — LOSARTAN POTASSIUM 25 MG/1
25 TABLET ORAL
Status: DISCONTINUED | OUTPATIENT
Start: 2022-09-14 | End: 2022-09-16 | Stop reason: HOSPADM

## 2022-09-14 RX ORDER — POTASSIUM CHLORIDE 7.45 MG/ML
10 INJECTION INTRAVENOUS
Status: DISCONTINUED | OUTPATIENT
Start: 2022-09-14 | End: 2022-09-16 | Stop reason: HOSPADM

## 2022-09-14 RX ORDER — NITROGLYCERIN 0.4 MG/1
0.4 TABLET SUBLINGUAL
Status: DISCONTINUED | OUTPATIENT
Start: 2022-09-14 | End: 2022-09-16 | Stop reason: HOSPADM

## 2022-09-14 RX ORDER — ACETAMINOPHEN 325 MG/1
650 TABLET ORAL EVERY 4 HOURS PRN
Status: DISCONTINUED | OUTPATIENT
Start: 2022-09-14 | End: 2022-09-16 | Stop reason: HOSPADM

## 2022-09-14 RX ADMIN — POTASSIUM CHLORIDE 40 MEQ: 1500 TABLET, EXTENDED RELEASE ORAL at 05:56

## 2022-09-14 RX ADMIN — Medication 10 ML: at 21:43

## 2022-09-14 RX ADMIN — INSULIN LISPRO 2 UNITS: 100 INJECTION, SOLUTION INTRAVENOUS; SUBCUTANEOUS at 13:57

## 2022-09-14 RX ADMIN — INSULIN LISPRO 2 UNITS: 100 INJECTION, SOLUTION INTRAVENOUS; SUBCUTANEOUS at 18:05

## 2022-09-14 RX ADMIN — FLUDROCORTISONE ACETATE 0.1 MG: 0.1 TABLET ORAL at 18:04

## 2022-09-14 RX ADMIN — Medication 10 ML: at 08:11

## 2022-09-14 RX ADMIN — FLUOXETINE 20 MG: 20 CAPSULE ORAL at 18:04

## 2022-09-14 RX ADMIN — POTASSIUM CHLORIDE 40 MEQ: 1500 TABLET, EXTENDED RELEASE ORAL at 08:12

## 2022-09-14 RX ADMIN — ALPRAZOLAM 1 MG: 1 TABLET ORAL at 18:04

## 2022-09-14 RX ADMIN — METOPROLOL SUCCINATE 50 MG: 50 TABLET, EXTENDED RELEASE ORAL at 18:04

## 2022-09-14 RX ADMIN — POTASSIUM CHLORIDE 40 MEQ: 1500 TABLET, EXTENDED RELEASE ORAL at 01:24

## 2022-09-14 RX ADMIN — TICAGRELOR 90 MG: 90 TABLET ORAL at 21:43

## 2022-09-14 RX ADMIN — LOSARTAN POTASSIUM 25 MG: 25 TABLET, FILM COATED ORAL at 18:04

## 2022-09-14 RX ADMIN — INSULIN HUMAN 10 UNITS: 100 INJECTION, SUSPENSION SUBCUTANEOUS at 18:05

## 2022-09-14 RX ADMIN — ATORVASTATIN CALCIUM 40 MG: 40 TABLET, FILM COATED ORAL at 21:43

## 2022-09-14 NOTE — CASE MANAGEMENT/SOCIAL WORK
Discharge Planning Assessment  [facility]     Patient Name: Nadege Barrow  MRN: [id number]  Today's Date: 9/14/2022    Admit Date: 9/13/2022     Discharge Needs Assessment     Row Name 09/14/22 1018       Living Environment    People in Home child(kimberley), adult    Name(s) of People in Home Daughter Ana    Current Living Arrangements apartment    Primary Care Provided by self    Provides Primary Care For no one    Family Caregiver if Needed child(kimberley), adult    Family Caregiver Names Daughter Ana    Quality of Family Relationships supportive;helpful    Able to Return to Prior Arrangements yes       Resource/Environmental Concerns    Resource/Environmental Concerns none    Transportation Concerns none       Transition Planning    Patient/Family Anticipates Transition to home with family    Patient/Family Anticipated Services at Transition none    Transportation Anticipated family or friend will provide  Ana - daughter       Discharge Needs Assessment    Equipment Currently Used at Home rollator;glucometer    Concerns to be Addressed denies needs/concerns at this time    Anticipated Changes Related to Illness none    Equipment Needed After Discharge none               Discharge Plan     Row Name 09/14/22 1020       Plan    Plan Home with family    Patient/Family in Agreement with Plan yes    Plan Comments  spoke with patient.She reports she is independent with ADLs. Uses rollator prn.Maggie Irving was listed as patient's PCP.She states this is incorrect.Reports it was Kaela Shah,but she has left the practice.Pt reports she is supposed to see a new provider at [facility] tomorrow,but is unsure of the name.She was enrolled in [facility], but requested to be unenrolled and medications sent to [facility]. Pharmacy updated in [email or web address].She denies dc needs and states she will go home with her daughter Ana at discharge who will provide transportation              Continued Care and Services - Admitted Since 9/13/2022     Coordination has not been started for this encounter.       Expected Discharge Date and Time     Expected Discharge Date Expected Discharge Time    Sep 15, 2022          Demographic Summary     Row Name 09/14/22 1018       General Information    Admission Type observation    Arrived From home    Required Notices Provided Observation Status Notice    Referral Source admission list    Reason for Consult discharge planning    Preferred Language English       Contact Information    Permission Granted to Share Info With                Functional Status     Row Name 09/14/22 1018       Functional Status    Usual Activity Tolerance good    Current Activity Tolerance moderate       Functional Status, IADL    Medications independent    Meal Preparation independent    Housekeeping independent    Laundry independent    Shopping independent                   Patient Forms     Row Name 09/14/22 1022       Patient Forms    Important Message from Medicare ([facility]) --  OCASIO 9/13/22 per registration    Patient Observation Letter Delivered  9/13/22 per registration              Shell Clement RN, [facility]  Office: [phone]  Fax: [phone]  [email or web address]      Phone communication or documentation only - no physical contact with patient or family.      Shell Clement RN

## 2022-09-14 NOTE — PLAN OF CARE
Problem: Adult Inpatient Plan of Care  Goal: Plan of Care Review  Outcome: Ongoing, Progressing  Flowsheets (Taken 9/14/2022 0847)  Progress: no change  Plan of Care Reviewed With: patient  Goal: Patient-Specific Goal (Individualized)  Outcome: Ongoing, Progressing  Goal: Absence of Hospital-Acquired Illness or Injury  Outcome: Ongoing, Progressing  Intervention: Identify and Manage Fall Risk  Recent Flowsheet Documentation  Taken 9/14/2022 0800 by Gladys Vaz RN  Safety Promotion/Fall Prevention:   activity supervised   assistive device/personal items within reach   clutter free environment maintained   safety round/check completed  Intervention: Prevent Skin Injury  Recent Flowsheet Documentation  Taken 9/14/2022 0800 by Gladys Vaz RN  Body Position: position changed independently  Intervention: Prevent and Manage VTE (Venous Thromboembolism) Risk  Recent Flowsheet Documentation  Taken 9/14/2022 0800 by Gladys Vaz RN  Activity Management: activity adjusted per tolerance  Goal: Optimal Comfort and Wellbeing  Outcome: Ongoing, Progressing  Goal: Readiness for Transition of Care  Outcome: Ongoing, Progressing     Problem: Fall Injury Risk  Goal: Absence of Fall and Fall-Related Injury  Outcome: Ongoing, Progressing  Intervention: Identify and Manage Contributors  Recent Flowsheet Documentation  Taken 9/14/2022 0800 by Gladys Vaz RN  Medication Review/Management: medications reviewed  Intervention: Promote Injury-Free Environment  Recent Flowsheet Documentation  Taken 9/14/2022 0800 by Gladys Vaz RN  Safety Promotion/Fall Prevention:   activity supervised   assistive device/personal items within reach   clutter free environment maintained   safety round/check completed     Problem: Adjustment to Illness (Heart Failure)  Goal: Optimal Coping  Outcome: Ongoing, Progressing     Problem: Cardiac Output Decreased (Heart Failure)  Goal: Optimal Cardiac Output  Outcome: Ongoing, Progressing      Problem: Dysrhythmia (Heart Failure)  Goal: Stable Heart Rate and Rhythm  Outcome: Ongoing, Progressing     Problem: Fluid Imbalance (Heart Failure)  Goal: Fluid Balance  Outcome: Ongoing, Progressing     Problem: Functional Ability Impaired (Heart Failure)  Goal: Optimal Functional Ability  Outcome: Ongoing, Progressing  Intervention: Optimize Functional Ability  Recent Flowsheet Documentation  Taken 9/14/2022 0800 by Gladys Vaz RN  Activity Management: activity adjusted per tolerance     Problem: Respiratory Compromise (Heart Failure)  Goal: Effective Oxygenation and Ventilation  Outcome: Ongoing, Progressing  Intervention: Promote Airway Secretion Clearance  Recent Flowsheet Documentation  Taken 9/14/2022 0800 by Gladys Vaz RN  Cough And Deep Breathing: done independently per patient     Problem: Sleep Disordered Breathing (Heart Failure)  Goal: Effective Breathing Pattern During Sleep  Outcome: Ongoing, Progressing   Goal Outcome Evaluation:  Plan of Care Reviewed With: patient        Progress: no change     Patient admitted, patient v/s stable, patient is covid positive, patient is on room air

## 2022-09-14 NOTE — PROGRESS NOTES
[facility] Medicine Services Daily Progress Note    Patient Name: Nadege Barrow  : 1945  MRN: [id number]  Primary Care Physician:  Dave Esparza  Date of admission: 2022      Subjective      Chief Complaint: Cough, weakness, shortness of air      Patient Reports     22: COVID-positive.  Vaccinated.  Tremors.  Complains of itching in her feet.    Review of Systems   All other systems reviewed and are negative.         Objective      Vitals:   Temp:  [97.6 °F (36.4 °C)-98.2 °F (36.8 °C)] 97.6 °F (36.4 °C)  Heart Rate:  [67-81] 67  Resp:  [17-28] 20  BP: (131-174)/(69-90) 131/69    Physical Exam  HENT:      Head: Normocephalic.      Mouth/Throat:      Mouth: Mucous membranes are moist.   Eyes:      Extraocular Movements: Extraocular movements intact.      Pupils: Pupils are equal, round, and reactive to light.   Cardiovascular:      Rate and Rhythm: Normal rate and regular rhythm.   Pulmonary:      Effort: Pulmonary effort is normal.   Abdominal:      General: Bowel sounds are normal.   Musculoskeletal:         General: Normal range of motion.   Skin:     General: Skin is warm.   Neurological:      Mental Status: She is alert. Mental status is at baseline.   Psychiatric:         Behavior: Behavior is cooperative.             Result Review    Result Review:  I have personally reviewed the results from the time of this admission to 2022 17:28 EDT and agree with these findings:  [x]  Laboratory  []  Microbiology  [x]  Radiology  []  EKG/Telemetry   []  Cardiology/Vascular   []  Pathology  [x]  Old records  []  Other:            Assessment & Plan      Brief Patient Summary:    insulin lispro, 0-7 Units, Subcutaneous, TID AC  potassium chloride, 20 mEq, Oral, Daily  sodium chloride, 10 mL, Intravenous, Q12H             Active Hospital Problems:  Active Hospital Problems    Diagnosis    • COVID-19 virus detected    • Hypokalemia    • Type 2 diabetes mellitus (HCC)    • Acute on  chronic systolic CHF (congestive heart failure) ([facility])    • Generalized anxiety disorder    • Ischemic cardiomyopathy    • Carotid artery disease (HCC)    • Hypertension    • Depression, unspecified    • Disabling essential tremor    • Hyperlipidemia, mixed      Acute on chronic systolic heart failure:  -Continue diuresis  -Daily weights    COVID-19:  -Vaccinated patient  -Currently on room air    Hypokalemia:  -Continue to replete    Generalized anxiety disorder:  -Controlled    CAD s/p PCI:  -Continue home medications    Diabetes mellitus:  -Hemoglobin A1c 7.1  -Continue ISS    Hypertension:  -Continue metoprolol and losartan    Depression:  -Continue home medications    Essential tremors:  -Continue home medication    Chronic back pain:  -Continue home medications      DVT prophylaxis:  Mechanical DVT prophylaxis orders are present.    CODE STATUS:    Code Status (Patient has no pulse and is not breathing): CPR (Attempt to Resuscitate)  Medical Interventions (Patient has pulse or is breathing): Full Support      Disposition:  I expect patient to be discharged defer.    This patient has been examined wearing appropriate Personal Protective Equipment and discussed with nursing. 09/14/22      Electronically signed by Anup Casanova DO, 09/14/22, 17:28 EDT.  [facility] Hospitalist Team

## 2022-09-14 NOTE — H&P
[facility] Medicine Services      Patient Name: Nadege Barrow  : 1945  MRN: [id number]  Primary Care Physician:  Maggie Irving APRN  Date of admission: 2022      Subjective      Chief Complaint: shortness of air   History of Present Illness: Nadege Barrow is a 76 y.o. female who presented to [facility] on 2022 complaining of generalized weakness and increased shortness of air since yesterday.  She reports a cough productive of clear sputum.  She reports some chills but denies any fever or congestion.  She does not use home oxygen.  She reported some mild chest pain with no exacerbating or alleviating factors.  She denies any abdominal pain nausea or vomiting but reports 1 loose stool earlier without melena or hematochezia.  She has a past medical history of coronary artery disease status post PCI.  She has a past medical history of systolic heart failure.  She reports she does not see a cardiologist.  Review of records shows that in August of this year she was evaluated here for chest pain and had a stress test which was negative for ischemia with an estimated ejection fraction of 39%.  During that admission she had a 2D echo which showed an ejection fraction of 41 to 45% mildly dilated right ventricular cavity mild aortic valve stenosis moderate tricuspid valve regurgitation.  Today she is afebrile WBC not elevated but she did test positive for COVID-19.  She reports she has been fully vaccinated and boosted.  Chest x-ray per radiology showed no acute pulmonary process.  Troponin was less than 0.010 and proBNP 10,585.  Per review of records proBNP has been anywhere from 31,002 approximately 3000.  She is on home Lasix, beta-blocker and ARB and reports she takes her home medications appropriately.  She has no bilateral lower extremity edema.  She is stable on room air.  She was given a one-time dose of 40 mg IV Lasix in the emergency department.  She has a past medical  "history of anxiety and depression and sees outpatient behavioral health services.  She has a past medical history of diabetes mellitus type 2 with a serum glucose of 161 today.  Potassium is 3.1.  She has a noticeable essential tremor which she reports she has had since childhood.  Past medical history of hyperlipidemia and hypertension.  She will be admitted for gentle diuresis, continued evaluation and treatment.      Review of Systems   Constitutional: Positive for chills and malaise/fatigue.   HENT: Negative.    Eyes: Negative.    Cardiovascular: Positive for chest pain and dyspnea on exertion.   Respiratory: Positive for cough and shortness of breath.    Endocrine: Negative.    Hematologic/Lymphatic: Negative.    Skin: Negative.    Musculoskeletal: Negative.    Gastrointestinal: Positive for diarrhea.   Genitourinary: Positive for frequency.   Neurological: Positive for tremors.   Psychiatric/Behavioral: The patient is nervous/anxious.    Allergic/Immunologic: Negative.    All other systems reviewed and are negative.      Personal History     Past Medical History:   Diagnosis Date   • Anxiety    • CHF (congestive heart failure) (HCC)    • Depression    • Diabetes mellitus (HCC)    • Hyperlipidemia    • Hypertension    • Panic disorder     \"panic attacks\"   • Tremors of nervous system     \"essential tremors\"       Past Surgical History:   Procedure Laterality Date   • CORONARY ANGIOPLASTY WITH STENT PLACEMENT         Family History: family history includes Alcohol abuse in her daughter and another family member; Depression in her sister and another family member; Suicidality in an other family member. Otherwise pertinent FHx was reviewed and not pertinent to current issue.    Social History:  reports that she has never smoked. She has never used smokeless tobacco. She reports previous alcohol use. She reports that she does not use drugs.    Home Medications:  Prior to Admission Medications     Prescriptions Last " Dose Informant Patient Reported? Taking?    ALPRAZolam (Xanax) 1 MG tablet   No No    Take 1 tablet by mouth 3 (Three) Times a Day As Needed for Anxiety.    atorvastatin (LIPITOR) 40 MG tablet   Yes No    Take 40 mg by mouth Daily.    Empagliflozin (Jardiance) 10 MG tablet   Yes No    Take 1 tablet by mouth Daily.    fludrocortisone 0.1 MG tablet   Yes No    Take 0.1 mg by mouth Daily.    FLUoxetine (PROzac) 20 MG capsule   Yes No    Take 20 mg by mouth Daily.    furosemide (LASIX) 20 MG tablet   Yes No    Take 20 mg by mouth Daily.    HYDROcodone-acetaminophen (Norco) 5-325 MG per tablet   No No    Take 1 tablet by mouth Every 6 (Six) Hours As Needed for Moderate Pain .    insulin NPH-insulin regular (humuLIN 70/30,novoLIN 70/30) (70-30) 100 UNIT/ML injection   Yes No    Inject 20 Units under the skin into the appropriate area as directed 2 (Two) Times a Day With Meals.    linaclotide (LINZESS) 145 MCG capsule capsule   Yes No    Take 145 mcg by mouth Every Morning Before Breakfast.    losartan (COZAAR) 25 MG tablet   No No    Take 1 tablet by mouth Daily.    metoprolol succinate XL (TOPROL-XL) 50 MG 24 hr tablet   Yes No    Take 50 mg by mouth Daily.    nitroglycerin (NITROSTAT) 0.4 MG SL tablet   No No    Place 1 tablet under the tongue Every 5 (Five) Minutes As Needed for Chest Pain. Take no more than 3 doses in 15 minutes.    potassium chloride ER (K-TAB) 20 MEQ tablet controlled-release ER tablet   Yes No    Take 20 mEq by mouth Daily.    ticagrelor (BRILINTA) 90 MG tablet tablet   Yes No    Take 90 mg by mouth 2 (Two) Times a Day.            Allergies:  Allergies   Allergen Reactions   • Gabapentin Hallucinations   • Morphine Hallucinations       Objective      Vitals:   Temp:  [98 °F (36.7 °C)] 98 °F (36.7 °C)  Heart Rate:  [69-75] 72  Resp:  [17] 17  BP: (163-174)/(72-90) 174/90    Physical Exam  Vitals reviewed.   Constitutional:       Appearance: Normal appearance.   HENT:      Head: Normocephalic and  "atraumatic.      Right Ear: External ear normal.      Left Ear: External ear normal.      Nose: Nose normal.   Eyes:      Extraocular Movements: Extraocular movements intact.   Cardiovascular:      Rate and Rhythm: Normal rate and regular rhythm.      Pulses: Normal pulses.      Heart sounds: Normal heart sounds.   Pulmonary:      Effort: Pulmonary effort is normal.      Breath sounds: Normal breath sounds.   Abdominal:      Palpations: Abdomen is soft.   Genitourinary:     Comments: deferred  Musculoskeletal:         General: Normal range of motion.      Cervical back: Normal range of motion and neck supple.   Skin:     General: Skin is warm and dry.   Neurological:      General: No focal deficit present.      Mental Status: She is alert and oriented to person, place, and time.      Comments: Chronic tremors   Psychiatric:         Mood and Affect: Mood normal.         Behavior: Behavior normal.         Thought Content: Thought content normal.         Judgment: Judgment normal.          Result Review    Result Review:  I have personally reviewed the results from the time of this admission to 9/14/2022 01:34 EDT and agree with these findings:  [x]  Laboratory  [x]  Microbiology  [x]  Radiology  [x]  EKG/Telemetry   []  Cardiology/Vascular   []  Pathology  [x]  Old records  []  Other:  Most notable findings include: proBNP 10,585, troponin less than 0.010 glucose 161 potassium 3.1 WBC 7.10, magnesium 1.7, COVID-19 positive, EKG reviewed sinus rhythm heart rate 74 left bundle branch block seen on previous EKG          CXR  \"IMPRESSION:  1.     No acute pulmonary process.  2.     Old fracture deformity left clavicle.\"    Echo 2/27/22    \"· Left ventricular ejection fraction appears to be 41 - 45%.  · The right ventricular cavity is mildly dilated.  · Mild aortic valve stenosis is present.  · Moderate tricuspid valve regurgitation is present.  · No pericardial effusion noted\"      Assessment & Plan        Active Hospital " Problems:  Active Hospital Problems    Diagnosis    • Acute on chronic systolic CHF (congestive heart failure) ([facility])    • COVID-19 virus detected    • Hypokalemia    • Type 2 diabetes mellitus ([facility])    • Generalized anxiety disorder    • Ischemic cardiomyopathy    • Carotid artery disease ([facility])    • Hypertension    • Depression, unspecified    • Disabling essential tremor    • Hyperlipidemia, mixed      Plan:    Acute on chronic systolic heart failure EF 39% per stress test in August and 41 to 45% per echo, mild aortic stenosis moderate tricuspid valve regurg right ventricular cavity mildly dilated, proBNP 10,585, one-time dose 40 mg IV Lasix in ED renal function compromised on admission in August, consider increase in home Lasix from 20 mg to 40 mg twice daily, was seen by cardiology in August has not followed up, consider cardiology consult, home meds unverified at this time but was on home Lasix, beta-blocker and ARB    COVID-19 virus detected, fully boosted afebrile WBC not elevated chest x-ray no acute process per radiology stable on room air, continue to monitor    Hypokalemia potassium 3.1 potassium protocol in place Home meds unverified at this time but was on home potassium with Lasix    Type 2 diabetes mellitus, mildly controlled serum glucose 161 Home meds unverified at this time reorder pending verification pharmacy low concentrated sweet diet low-sodium diet, add SSI as needed with Accu-Cheks ACH S A1c in a.m.    Generalized anxiety disorder, followed by behavioral health as outpatient, home meds unverified at this time reorder pending verification from pharmacy    Ischemic cardiomyopathy, see plan above for systolic heart failure acute on chronic    Carotid artery disease status post PCI remotely, troponin less than 0.010, EKG sinus rhythm, rate normal, left bundle branch block seen on previous EKG, continuous cardiac monitoring repeat troponin in a.m., one-time chest pain at home without exacerbating  factors, reports nitroglycerin did not relieve, was on home aspirin and Brilinta reorder pending verification    Hypertension, controlled on home meds Home meds unverified at this time was on home Lasix, metoprolol and losartan reorder pending verification from pharmacy    Depression, moderate stable since followed by outpatient behavioral health, home meds unverified at this time reorder pending verification pharmacy    Disabling essential tremor, chronic home meds unverified at this time reorder pending verification pharmacy does not appear to be on any medication for tremors per last home med list    Hyperlipidemia, was on home statin reorder pending verification from pharmacy      DVT prophylaxis:  Mechanical DVT prophylaxis orders are present.    CODE STATUS:    Code Status (Patient has no pulse and is not breathing): CPR (Attempt to Resuscitate)  Medical Interventions (Patient has pulse or is breathing): Full Support    Admission Status:  I believe this patient meets observation status.    I discussed the patient's findings and my recommendations with patient.    This patient has been examined wearing appropriate Personal Protective Equipment . 09/14/22      Signature: Electronically signed by PAM Gramajo, 09/14/22, 1:52 AM EDT.

## 2022-09-14 NOTE — ED PROVIDER NOTES
Subjective   PIT    Patient is a 76-year-old female presents to the ED with complaints of generalized weakness, intermittent productive cough, shortness of breath over the past few days.  Patient reports clear phlegm without hemoptysis.  Patient states her shortness of breath is intermittent.  She does report increased anxiety.  Patient states she had some chest pain when she was sitting in her recliner earlier today took a nitro which did not help she does not have any currently.  Patient denies any abdominal pain nausea or vomiting.  She does report one episode of loose stool earlier today without any hematochezia or melena.  Patient reports increased frequency with urination without any dysuria hematuria or hesitancy.  She denies any fever body aches or chills.  No known sick contacts.  No lower extremity edema or heart palpitations.  Patient rates her symptoms as moderate in severity.  Denies any other alleviating or exacerbating factors.  Patient is fully COVID vaccinated.      History provided by:  Patient      Review of Systems   Constitutional: Positive for fatigue. Negative for activity change, appetite change, chills, diaphoresis, fever and unexpected weight change.   HENT: Negative.    Eyes: Negative.    Respiratory: Positive for cough and shortness of breath. Negative for apnea, choking, chest tightness, wheezing and stridor.    Cardiovascular: Positive for chest pain. Negative for palpitations and leg swelling.   Gastrointestinal: Negative for abdominal distention, abdominal pain, constipation, diarrhea, nausea and vomiting.   Genitourinary: Positive for frequency. Negative for decreased urine volume, difficulty urinating, dysuria, flank pain, hematuria and urgency.   Musculoskeletal: Negative for back pain, neck pain and neck stiffness.   Skin: Negative.    Neurological: Negative.    Hematological: Negative.        Past Medical History:   Diagnosis Date   • Anxiety    • CHF (congestive heart failure)  "([facility])    • Depression    • Diabetes mellitus ([facility])    • Hyperlipidemia    • Hypertension    • Panic disorder     \"panic attacks\"   • Tremors of nervous system     \"essential tremors\"       Allergies   Allergen Reactions   • Gabapentin Hallucinations   • Morphine Hallucinations       Past Surgical History:   Procedure Laterality Date   • CORONARY ANGIOPLASTY WITH STENT PLACEMENT         Family History   Problem Relation Age of Onset   • Depression Sister    • Suicidality Other         suicided   • Alcohol abuse Other    • Alcohol abuse Daughter    • Depression Other        Social History     Socioeconomic History   • Marital status:    Tobacco Use   • Smoking status: Never Smoker   • Smokeless tobacco: Never Used   Vaping Use   • Vaping Use: Never used   Substance and Sexual Activity   • Alcohol use: Not Currently   • Drug use: No   • Sexual activity: Defer           Objective   Physical Exam  Vitals and nursing note reviewed.   Constitutional:       General: She is not in acute distress.     Appearance: Normal appearance. She is well-developed. She is not ill-appearing, toxic-appearing or diaphoretic.   HENT:      Head: Normocephalic and atraumatic.      Mouth/Throat:      Mouth: Mucous membranes are moist.      Pharynx: Oropharynx is clear.   Eyes:      General: No scleral icterus.     Extraocular Movements: Extraocular movements intact.      Pupils: Pupils are equal, round, and reactive to light.   Cardiovascular:      Rate and Rhythm: Normal rate and regular rhythm.      Pulses: Normal pulses.      Heart sounds: No murmur heard.    No friction rub. No gallop.   Pulmonary:      Effort: Pulmonary effort is normal. No respiratory distress.      Breath sounds: No stridor. Examination of the right-lower field reveals rhonchi. Examination of the left-lower field reveals rhonchi. Rhonchi present. No wheezing or rales.   Chest:      Chest wall: No tenderness.   Abdominal:      General: Bowel sounds are normal. " "There is no distension. There are no signs of injury.      Palpations: Abdomen is soft.      Tenderness: There is no abdominal tenderness. There is no right CVA tenderness, left CVA tenderness, guarding or rebound.      Hernia: No hernia is present.   Musculoskeletal:      Cervical back: Normal range of motion and neck supple. No rigidity.   Skin:     General: Skin is warm.      Capillary Refill: Capillary refill takes less than 2 seconds.      Coloration: Skin is not cyanotic, jaundiced or pale.      Findings: No rash.   Neurological:      General: No focal deficit present.      Mental Status: She is alert. Mental status is at baseline.      GCS: GCS eye subscore is 4. GCS verbal subscore is 5. GCS motor subscore is 6.   Psychiatric:         Mood and Affect: Mood normal.         Behavior: Behavior normal.         Procedures           ED Course    /80   Pulse 69   Temp 98 °F (36.7 °C) (Oral)   Resp 17   Ht 160 cm (63\")   Wt 68.9 kg (152 lb)   SpO2 98%   BMI 26.93 kg/m²   Medications   sodium chloride 0.9 % flush 10 mL (10 mL Intravenous Given 9/13/22 2233)   potassium chloride (K-DUR,KLOR-CON) CR tablet 20 mEq (has no administration in time range)   furosemide (LASIX) injection 40 mg (40 mg Intravenous Given 9/13/22 2233)     Labs Reviewed   RESPIRATORY PANEL PCR W/ COVID-19 (SARS-COV-2) BEBA/WILFRED/CELIA/PAD/COR/MAD/MIGUEL IN-HOUSE, NP SWAB IN UTM/VTP, 3-4 HR TAT - Abnormal; Notable for the following components:       Result Value    COVID19 Detected (*)     All other components within normal limits    Narrative:     In the setting of a positive respiratory panel with a viral infection PLUS a negative procalcitonin without other underlying concern for bacterial infection, consider observing off antibiotics or discontinuation of antibiotics and continue supportive care. If the respiratory panel is positive for atypical bacterial infection (Bordetella pertussis, Chlamydophila pneumoniae, or Mycoplasma " pneumoniae), consider antibiotic de-escalation to target atypical bacterial infection.   COMPREHENSIVE METABOLIC PANEL - Abnormal; Notable for the following components:    Glucose 161 (*)     Potassium 3.1 (*)     Calcium 8.4 (*)     All other components within normal limits    Narrative:     GFR Normal >60  Chronic Kidney Disease <60  Kidney Failure <15     BNP (IN-HOUSE) - Abnormal; Notable for the following components:    proBNP 10,585.0 (*)     All other components within normal limits    Narrative:     Among patients with dyspnea, NT-proBNP is highly sensitive for the detection of acute congestive heart failure. In addition NT-proBNP of <300 pg/ml effectively rules out acute congestive heart failure with 99% negative predictive value.    Results may be falsely decreased if patient taking Biotin.     TROPONIN (IN-HOUSE) - Normal    Narrative:     Troponin T Reference Range:  <= 0.03 ng/mL-   Negative for AMI  >0.03 ng/mL-     Abnormal for myocardial necrosis.  Clinicians would have to utilize clinical acumen, EKG, Troponin and serial changes to determine if it is an Acute Myocardial Infarction or myocardial injury due to an underlying chronic condition.       Results may be falsely decreased if patient taking Biotin.     CBC WITH AUTO DIFFERENTIAL - Normal   MAGNESIUM - Normal   URINALYSIS W/ CULTURE IF INDICATED   CBC AND DIFFERENTIAL    Narrative:     The following orders were created for panel order CBC & Differential.  Procedure                               Abnormality         Status                     ---------                               -----------         ------                     CBC Auto Differential[[id number]]        Normal              Final result                 Please view results for these tests on the individual orders.     XR Chest 1 View    Result Date: 9/13/2022  1.     No acute pulmonary process. 2.     Old fracture deformity left clavicle.  Electronically Signed By-Munir Cote MD  On:9/13/2022 8:52 PM This report was finalized on [id number] by  Munir Cote MD.                                         MDM  Number of Diagnoses or Management Options  Acute congestive heart failure, unspecified heart failure type (HCC)  COVID-19 virus detected  Dyspnea, unspecified type  Fatigue, unspecified type  Diagnosis management comments: Chart Review:  Comorbidity: As per past medical history  Differentials: Pneumonia bronchitis URI viral illness CHF      ;this list is not all inclusive and does not constitute the entirety of considered causes  ECG: Interpreted by myself and Dr. Taylor shows sinus rhythm rate 74 left bundle branch block that was seen on previous EKG from 8/8/2022.  Labs: As above  Imaging: Was interpreted by physician and reviewed by myself:  XR Chest 1 View   Final Result    1.     No acute pulmonary process.    2.     Old fracture deformity left clavicle.         Electronically Signed By-Munir Cote MD On:9/13/2022 8:52 PM    This report was finalized on [id number] by  Munir Cote MD.     Disposition/Treatment:  Appropriate PPE was worn during exam and throughout all encounters with the patient.  While in the ED IV was placed and labs were obtained patient was placed on proper monitor she was afebrile and appeared nontoxic was not Tachycardic or hypoxic.  EKG showed no acute changes.  Lab results were significant for COVID-19 detected on respiratory panel.  CBC was unremarkable patient was hemodynamically stable.  Troponin was within normal limits.  Magnesium was normal.  Metabolic panel showed potassium 3.1 which was replaced while in the ED.  Otherwise no signs of severe electrolyte abnormality or dehydration.  BNP was found to be elevated at 10,585 which is new.  No signs of pulmonary edema on chest x-ray however she is complaining of shortness of breath.  Patient symptoms likely secondary to acute on chronic CHF and COVID-19.  Patient reports she is fully COVID vaccinated.   Patient was given Lasix while in the ED per CHF protocol.    Lab results and findings were discussed with the patient at bedside voiced understand admission for further evaluation and management she was in agreement with plan.  Spoke to Rae BENJAMIN who agreed for admission with hospitalist group.     Case was discussed with attending Dr. Engle who was in agreement with plan.       Amount and/or Complexity of Data Reviewed  Clinical lab tests: reviewed  Tests in the radiology section of CPT®: reviewed  Tests in the medicine section of CPT®: reviewed        Final diagnoses:   Acute congestive heart failure, unspecified heart failure type (HCC)   Dyspnea, unspecified type   COVID-19 virus detected   Fatigue, unspecified type       ED Disposition  ED Disposition     ED Disposition   Decision to Admit    Condition   --    Comment   --             No follow-up provider specified.       Medication List      No changes were made to your prescriptions during this visit.          Clifton Fung PA  09/13/22 [id number]

## 2022-09-15 LAB
ANION GAP SERPL CALCULATED.3IONS-SCNC: 12 MMOL/L (ref 5–15)
BASOPHILS # BLD AUTO: 0 10*3/MM3 (ref 0–0.2)
BASOPHILS NFR BLD AUTO: 0.4 % (ref 0–1.5)
BUN SERPL-MCNC: 14 MG/DL (ref 8–23)
BUN/CREAT SERPL: 16.9 (ref 7–25)
CALCIUM SPEC-SCNC: 9.1 MG/DL (ref 8.6–10.5)
CHLORIDE SERPL-SCNC: 105 MMOL/L (ref 98–107)
CO2 SERPL-SCNC: 24 MMOL/L (ref 22–29)
CREAT SERPL-MCNC: 0.83 MG/DL (ref 0.57–1)
DEPRECATED RDW RBC AUTO: 44.2 FL (ref 37–54)
EGFRCR SERPLBLD CKD-EPI 2021: 73.2 ML/MIN/1.73
EOSINOPHIL # BLD AUTO: 0.3 10*3/MM3 (ref 0–0.4)
EOSINOPHIL NFR BLD AUTO: 3 % (ref 0.3–6.2)
ERYTHROCYTE [DISTWIDTH] IN BLOOD BY AUTOMATED COUNT: 14.4 % (ref 12.3–15.4)
GLUCOSE BLDC GLUCOMTR-MCNC: 137 MG/DL (ref 70–105)
GLUCOSE BLDC GLUCOMTR-MCNC: 189 MG/DL (ref 70–105)
GLUCOSE BLDC GLUCOMTR-MCNC: 228 MG/DL (ref 70–105)
GLUCOSE SERPL-MCNC: 116 MG/DL (ref 65–99)
HCT VFR BLD AUTO: 36.9 % (ref 34–46.6)
HGB BLD-MCNC: 12.6 G/DL (ref 12–15.9)
LYMPHOCYTES # BLD AUTO: 2.3 10*3/MM3 (ref 0.7–3.1)
LYMPHOCYTES NFR BLD AUTO: 27.1 % (ref 19.6–45.3)
MCH RBC QN AUTO: 31.6 PG (ref 26.6–33)
MCHC RBC AUTO-ENTMCNC: 34.2 G/DL (ref 31.5–35.7)
MCV RBC AUTO: 92.4 FL (ref 79–97)
MONOCYTES # BLD AUTO: 0.8 10*3/MM3 (ref 0.1–0.9)
MONOCYTES NFR BLD AUTO: 9.2 % (ref 5–12)
NEUTROPHILS NFR BLD AUTO: 5.1 10*3/MM3 (ref 1.7–7)
NEUTROPHILS NFR BLD AUTO: 60.3 % (ref 42.7–76)
NRBC BLD AUTO-RTO: 0.2 /100 WBC (ref 0–0.2)
PLATELET # BLD AUTO: 303 10*3/MM3 (ref 140–450)
PMV BLD AUTO: 9.1 FL (ref 6–12)
POTASSIUM SERPL-SCNC: 3.6 MMOL/L (ref 3.5–5.2)
QT INTERVAL: 469 MS
RBC # BLD AUTO: 4 10*6/MM3 (ref 3.77–5.28)
SODIUM SERPL-SCNC: 141 MMOL/L (ref 136–145)
WBC NRBC COR # BLD: 8.4 10*3/MM3 (ref 3.4–10.8)

## 2022-09-15 PROCEDURE — 63710000001 INSULIN LISPRO (HUMAN) PER 5 UNITS: Performed by: NURSE PRACTITIONER

## 2022-09-15 PROCEDURE — G0378 HOSPITAL OBSERVATION PER HR: HCPCS

## 2022-09-15 PROCEDURE — 25010000002 DIPHENHYDRAMINE PER 50 MG: Performed by: HOSPITALIST

## 2022-09-15 PROCEDURE — 96375 TX/PRO/DX INJ NEW DRUG ADDON: CPT

## 2022-09-15 PROCEDURE — 82962 GLUCOSE BLOOD TEST: CPT

## 2022-09-15 PROCEDURE — 85025 COMPLETE CBC W/AUTO DIFF WBC: CPT | Performed by: HOSPITALIST

## 2022-09-15 PROCEDURE — 96376 TX/PRO/DX INJ SAME DRUG ADON: CPT

## 2022-09-15 PROCEDURE — 25010000002 FUROSEMIDE PER 20 MG: Performed by: HOSPITALIST

## 2022-09-15 PROCEDURE — 63710000001 INSULIN ISOPHANE & REGULAR PER 5 UNITS: Performed by: HOSPITALIST

## 2022-09-15 PROCEDURE — 80048 BASIC METABOLIC PNL TOTAL CA: CPT | Performed by: HOSPITALIST

## 2022-09-15 RX ORDER — FUROSEMIDE 10 MG/ML
40 INJECTION INTRAMUSCULAR; INTRAVENOUS EVERY 12 HOURS SCHEDULED
Status: DISCONTINUED | OUTPATIENT
Start: 2022-09-15 | End: 2022-09-16 | Stop reason: HOSPADM

## 2022-09-15 RX ORDER — DIPHENHYDRAMINE HYDROCHLORIDE 50 MG/ML
25 INJECTION INTRAMUSCULAR; INTRAVENOUS ONCE
Status: COMPLETED | OUTPATIENT
Start: 2022-09-15 | End: 2022-09-15

## 2022-09-15 RX ADMIN — INSULIN LISPRO 3 UNITS: 100 INJECTION, SOLUTION INTRAVENOUS; SUBCUTANEOUS at 12:23

## 2022-09-15 RX ADMIN — METOPROLOL SUCCINATE 50 MG: 50 TABLET, EXTENDED RELEASE ORAL at 08:34

## 2022-09-15 RX ADMIN — INSULIN HUMAN 10 UNITS: 100 INJECTION, SUSPENSION SUBCUTANEOUS at 08:34

## 2022-09-15 RX ADMIN — ALPRAZOLAM 1 MG: 1 TABLET ORAL at 08:41

## 2022-09-15 RX ADMIN — FUROSEMIDE 40 MG: 10 INJECTION, SOLUTION INTRAMUSCULAR; INTRAVENOUS at 12:23

## 2022-09-15 RX ADMIN — INSULIN LISPRO 2 UNITS: 100 INJECTION, SOLUTION INTRAVENOUS; SUBCUTANEOUS at 17:41

## 2022-09-15 RX ADMIN — FLUDROCORTISONE ACETATE 0.1 MG: 0.1 TABLET ORAL at 08:40

## 2022-09-15 RX ADMIN — INSULIN HUMAN 10 UNITS: 100 INJECTION, SUSPENSION SUBCUTANEOUS at 17:42

## 2022-09-15 RX ADMIN — DIPHENHYDRAMINE HYDROCHLORIDE 25 MG: 50 INJECTION INTRAMUSCULAR; INTRAVENOUS at 11:00

## 2022-09-15 RX ADMIN — ATORVASTATIN CALCIUM 40 MG: 40 TABLET, FILM COATED ORAL at 22:51

## 2022-09-15 RX ADMIN — Medication 10 ML: at 08:34

## 2022-09-15 RX ADMIN — TICAGRELOR 90 MG: 90 TABLET ORAL at 22:51

## 2022-09-15 RX ADMIN — POTASSIUM CHLORIDE 20 MEQ: 1500 TABLET, EXTENDED RELEASE ORAL at 08:34

## 2022-09-15 RX ADMIN — FLUOXETINE 20 MG: 20 CAPSULE ORAL at 08:34

## 2022-09-15 RX ADMIN — TICAGRELOR 90 MG: 90 TABLET ORAL at 08:34

## 2022-09-15 RX ADMIN — FUROSEMIDE 40 MG: 10 INJECTION, SOLUTION INTRAMUSCULAR; INTRAVENOUS at 22:52

## 2022-09-15 RX ADMIN — Medication 10 ML: at 22:53

## 2022-09-15 RX ADMIN — LOSARTAN POTASSIUM 25 MG: 25 TABLET, FILM COATED ORAL at 08:34

## 2022-09-15 NOTE — PROGRESS NOTES
[facility] Medicine Services Daily Progress Note    Patient Name: Nadege Barrow  : 1945  MRN: [id number]  Primary Care Physician:  Dave Esparza  Date of admission: 2022      Subjective      Chief Complaint: Cough, weakness, shortness of air      Patient Reports     22: COVID-positive.  Vaccinated.  Tremors.  Complains of itching in her feet.  9/15/22: Complains of pruritus.  Rash on back.  Ordered Benadryl.  On room air.  Lives with her daughter.    Review of Systems   All other systems reviewed and are negative.         Objective      Vitals:   Temp:  [97.6 °F (36.4 °C)-97.8 °F (36.6 °C)] 97.8 °F (36.6 °C)  Heart Rate:  [73-79] 73  Resp:  [19-21] 19  BP: (136-165)/(74-84) 136/74    Physical Exam  HENT:      Head: Normocephalic.      Mouth/Throat:      Mouth: Mucous membranes are moist.   Eyes:      Extraocular Movements: Extraocular movements intact.      Pupils: Pupils are equal, round, and reactive to light.   Cardiovascular:      Rate and Rhythm: Normal rate and regular rhythm.   Pulmonary:      Effort: Pulmonary effort is normal.   Abdominal:      General: Bowel sounds are normal.   Musculoskeletal:         General: Normal range of motion.   Skin:     General: Skin is warm.   Neurological:      Mental Status: She is alert. Mental status is at baseline.   Psychiatric:         Behavior: Behavior is cooperative.             Result Review    Result Review:  I have personally reviewed the results from the time of this admission to 9/15/2022 15:51 EDT and agree with these findings:  [x]  Laboratory  []  Microbiology  [x]  Radiology  []  EKG/Telemetry   []  Cardiology/Vascular   []  Pathology  [x]  Old records  []  Other:            Assessment & Plan      Brief Patient Summary:    atorvastatin, 40 mg, Oral, Nightly  fludrocortisone, 0.1 mg, Oral, Daily  FLUoxetine, 20 mg, Oral, Daily  furosemide, 40 mg, Intravenous, Q12H  insulin lispro, 0-7 Units, Subcutaneous, TID AC  insulin  NPH-insulin regular, 10 Units, Subcutaneous, BID With Meals  losartan, 25 mg, Oral, Q24H  metoprolol succinate XL, 50 mg, Oral, Daily  potassium chloride, 20 mEq, Oral, Daily  sodium chloride, 10 mL, Intravenous, Q12H  ticagrelor, 90 mg, Oral, BID             Active Hospital Problems:  Active Hospital Problems    Diagnosis    • COVID-19 virus detected    • Hypokalemia    • Type 2 diabetes mellitus (HCC)    • Acute on chronic systolic CHF (congestive heart failure) ([facility])    • Generalized anxiety disorder    • Ischemic cardiomyopathy    • Carotid artery disease (HCC)    • Hypertension    • Depression, unspecified    • Disabling essential tremor    • Hyperlipidemia, mixed      Acute on chronic systolic heart failure:  -Continue diuresis  -Daily weights    COVID-19:  -Vaccinated patient  -Currently on room air    Hypokalemia:  -Continue to replete    Generalized anxiety disorder:  -Controlled    CAD s/p PCI:  -Continue home medications    Diabetes mellitus:  -Hemoglobin A1c 7.1  -Continue ISS    Hypertension:  -Continue metoprolol and losartan    Depression:  -Continue home medications    Essential tremors:  -Continue home medication    Chronic back pain:  -Continue home medications      DVT prophylaxis:  Mechanical DVT prophylaxis orders are present.    CODE STATUS:    Code Status (Patient has no pulse and is not breathing): CPR (Attempt to Resuscitate)  Medical Interventions (Patient has pulse or is breathing): Full Support      Disposition:  I expect patient to be discharged defer.    This patient has been examined wearing appropriate Personal Protective Equipment and discussed with nursing. 09/15/22      Electronically signed by Anup Casanova DO, 09/15/22, 15:51 EDT.  [facility] Hospitalist Team

## 2022-09-15 NOTE — PLAN OF CARE
Goal Outcome Evaluation:              Outcome Evaluation: Pt continues with covid isolation.  Pt has no issues this shift. Will continue to monitor

## 2022-09-16 ENCOUNTER — READMISSION MANAGEMENT (OUTPATIENT)
Dept: CALL CENTER | Facility: HOSPITAL | Age: 77
End: 2022-09-16

## 2022-09-16 VITALS
HEIGHT: 63 IN | RESPIRATION RATE: 18 BRPM | TEMPERATURE: 98.1 F | WEIGHT: 152.34 LBS | SYSTOLIC BLOOD PRESSURE: 138 MMHG | OXYGEN SATURATION: 97 % | HEART RATE: 64 BPM | BODY MASS INDEX: 26.99 KG/M2 | DIASTOLIC BLOOD PRESSURE: 72 MMHG

## 2022-09-16 PROBLEM — D89.831 CYTOKINE RELEASE SYNDROME, GRADE 1: Status: ACTIVE | Noted: 2022-09-16

## 2022-09-16 LAB
GLUCOSE BLDC GLUCOMTR-MCNC: 143 MG/DL (ref 70–105)
GLUCOSE BLDC GLUCOMTR-MCNC: 194 MG/DL (ref 70–105)

## 2022-09-16 PROCEDURE — 82962 GLUCOSE BLOOD TEST: CPT

## 2022-09-16 PROCEDURE — G0378 HOSPITAL OBSERVATION PER HR: HCPCS

## 2022-09-16 PROCEDURE — 96376 TX/PRO/DX INJ SAME DRUG ADON: CPT

## 2022-09-16 PROCEDURE — 63710000001 INSULIN LISPRO (HUMAN) PER 5 UNITS: Performed by: NURSE PRACTITIONER

## 2022-09-16 PROCEDURE — 63710000001 INSULIN ISOPHANE & REGULAR PER 5 UNITS: Performed by: HOSPITALIST

## 2022-09-16 PROCEDURE — 25010000002 FUROSEMIDE PER 20 MG: Performed by: HOSPITALIST

## 2022-09-16 RX ORDER — FUROSEMIDE 20 MG/1
20 TABLET ORAL DAILY
Qty: 60 TABLET | Refills: 0 | Status: ON HOLD | OUTPATIENT
Start: 2022-09-16 | End: 2022-10-07

## 2022-09-16 RX ADMIN — INSULIN LISPRO 2 UNITS: 100 INJECTION, SOLUTION INTRAVENOUS; SUBCUTANEOUS at 13:00

## 2022-09-16 RX ADMIN — ALPRAZOLAM 1 MG: 1 TABLET ORAL at 10:08

## 2022-09-16 RX ADMIN — POTASSIUM CHLORIDE 20 MEQ: 1500 TABLET, EXTENDED RELEASE ORAL at 09:56

## 2022-09-16 RX ADMIN — Medication 10 ML: at 09:57

## 2022-09-16 RX ADMIN — INSULIN HUMAN 10 UNITS: 100 INJECTION, SUSPENSION SUBCUTANEOUS at 09:57

## 2022-09-16 RX ADMIN — METOPROLOL SUCCINATE 50 MG: 50 TABLET, EXTENDED RELEASE ORAL at 09:56

## 2022-09-16 RX ADMIN — TICAGRELOR 90 MG: 90 TABLET ORAL at 09:56

## 2022-09-16 RX ADMIN — LOSARTAN POTASSIUM 25 MG: 25 TABLET, FILM COATED ORAL at 09:56

## 2022-09-16 RX ADMIN — FLUOXETINE 20 MG: 20 CAPSULE ORAL at 09:56

## 2022-09-16 RX ADMIN — FLUDROCORTISONE ACETATE 0.1 MG: 0.1 TABLET ORAL at 09:57

## 2022-09-16 RX ADMIN — ACETAMINOPHEN 650 MG: 325 TABLET, FILM COATED ORAL at 04:18

## 2022-09-16 RX ADMIN — FUROSEMIDE 40 MG: 10 INJECTION, SOLUTION INTRAMUSCULAR; INTRAVENOUS at 09:57

## 2022-09-16 NOTE — PLAN OF CARE
Problem: Adult Inpatient Plan of Care  Goal: Readiness for Transition of Care  9/16/2022 1539 by Izabela Lake LPN  Outcome: Adequate for Care Transition  9/16/2022 0145 by Izabela Lake LPN  Outcome: Ongoing, Not Progressing     Problem: Fall Injury Risk  Goal: Absence of Fall and Fall-Related Injury  9/16/2022 1539 by Izabela Lake LPN  Outcome: Adequate for Care Transition  9/16/2022 0145 by Izabela Lake LPN  Outcome: Ongoing, Not Progressing  Intervention: Identify and Manage Contributors  Recent Flowsheet Documentation  Taken 9/16/2022 1445 by Izabela Lake LPN  Medication Review/Management:   medications reviewed   high-risk medications identified  Taken 9/16/2022 0241 by Izabela Lake LPN  Medication Review/Management:   medications reviewed   high-risk medications identified  Intervention: Promote Injury-Free Environment  Recent Flowsheet Documentation  Taken 9/16/2022 1445 by Izabela Lake LPN  Safety Promotion/Fall Prevention:   assistive device/personal items within reach   clutter free environment maintained   nonskid shoes/slippers when out of bed   room organization consistent   safety round/check completed  Taken 9/16/2022 0241 by Izabela Lake LPN  Safety Promotion/Fall Prevention:   assistive device/personal items within reach   clutter free environment maintained   nonskid shoes/slippers when out of bed   room organization consistent   safety round/check completed   Goal Outcome Evaluation:  Plan of Care Reviewed With: patient        Progress: improving   Alert and oriented x 4. Able to verbalize needs and wants. Takes medication whole and tolerates well. Continent of bowels. External catheter removed. Independent for transfers/ambulation. Does not require O2 therapy at this time. No tracheal deviation/neck vein distention noted. No s/s of clubbing/cyanosis noted. No decrease in appetite noted. Discharge orders in place and patient agreeable to POC. No c/o pain/discomfort/SOB  noted. Currently ambulating independently in room, call bell in reach.

## 2022-09-16 NOTE — PROGRESS NOTES
[facility] Medicine Services Daily Progress Note    Patient Name: Nadege Barrow  : 1945  MRN: [id number]  Primary Care Physician:  Dave Esparza  Date of admission: 2022      Subjective      Chief Complaint: Cough, weakness, shortness of air      Patient Reports     22: COVID-positive.  Vaccinated.  Tremors.  Complains of itching in her feet.  9/15/22: Complains of pruritus.  Rash on back.  Ordered Benadryl.  On room air.  Lives with her daughter.    22: Discharged home on Lasix 20 mg p.o. twice daily.    Review of Systems   All other systems reviewed and are negative.         Objective      Vitals:   Temp:  [97.6 °F (36.4 °C)-98.1 °F (36.7 °C)] 98.1 °F (36.7 °C)  Heart Rate:  [64-75] 64  Resp:  [18] 18  BP: (127-148)/(72-81) 138/72    Physical Exam  HENT:      Head: Normocephalic.      Mouth/Throat:      Mouth: Mucous membranes are moist.   Eyes:      Extraocular Movements: Extraocular movements intact.      Pupils: Pupils are equal, round, and reactive to light.   Cardiovascular:      Rate and Rhythm: Normal rate and regular rhythm.   Pulmonary:      Effort: Pulmonary effort is normal.   Abdominal:      General: Bowel sounds are normal.   Musculoskeletal:         General: Normal range of motion.   Skin:     General: Skin is warm.   Neurological:      Mental Status: She is alert. Mental status is at baseline.   Psychiatric:         Behavior: Behavior is cooperative.             Result Review    Result Review:  I have personally reviewed the results from the time of this admission to 2022 13:38 EDT and agree with these findings:  [x]  Laboratory  []  Microbiology  [x]  Radiology  []  EKG/Telemetry   []  Cardiology/Vascular   []  Pathology  [x]  Old records  []  Other:            Assessment & Plan      Brief Patient Summary:    atorvastatin, 40 mg, Oral, Nightly  fludrocortisone, 0.1 mg, Oral, Daily  FLUoxetine, 20 mg, Oral, Daily  furosemide, 40 mg, Intravenous,  Q12H  insulin lispro, 0-7 Units, Subcutaneous, TID AC  insulin NPH-insulin regular, 10 Units, Subcutaneous, BID With Meals  losartan, 25 mg, Oral, Q24H  metoprolol succinate XL, 50 mg, Oral, Daily  potassium chloride, 20 mEq, Oral, Daily  sodium chloride, 10 mL, Intravenous, Q12H  ticagrelor, 90 mg, Oral, BID             Active Hospital Problems:  Active Hospital Problems    Diagnosis    • Cytokine release syndrome, grade 1    • COVID-19 virus detected    • Hypokalemia    • Type 2 diabetes mellitus (HCC)    • Acute on chronic systolic CHF (congestive heart failure) (HCC)    • Generalized anxiety disorder    • Ischemic cardiomyopathy    • Carotid artery disease (HCC)    • Hypertension    • Depression, unspecified    • Disabling essential tremor    • Hyperlipidemia, mixed      Acute on chronic systolic heart failure:  -Continue diuresis  -Daily weights    COVID-19:  -Vaccinated patient  -Currently on room air    Hypokalemia:  -Continue to replete    Generalized anxiety disorder:  -Controlled    CAD s/p PCI:  -Continue home medications    Diabetes mellitus:  -Hemoglobin A1c 7.1  -Continue ISS    Hypertension:  -Continue metoprolol and losartan    Depression:  -Continue home medications    Essential tremors:  -Continue home medication    Chronic back pain:  -Continue home medications      DVT prophylaxis:  Mechanical DVT prophylaxis orders are present.    CODE STATUS:    Code Status (Patient has no pulse and is not breathing): CPR (Attempt to Resuscitate)  Medical Interventions (Patient has pulse or is breathing): Full Support      Disposition:  I expect patient to be discharged defer.    This patient has been examined wearing appropriate Personal Protective Equipment and discussed with nursing. 09/16/22      Electronically signed by Anup Casanova DO, 09/16/22, 13:38 EDT.  [facility] Hospitalist Team

## 2022-09-16 NOTE — PLAN OF CARE
Problem: Adult Inpatient Plan of Care  Goal: Absence of Hospital-Acquired Illness or Injury  Intervention: Identify and Manage Fall Risk  Recent Flowsheet Documentation  Taken 9/16/2022 0045 by Izabela Lake LPN  Safety Promotion/Fall Prevention:   assistive device/personal items within reach   clutter free environment maintained   nonskid shoes/slippers when out of bed   room organization consistent   safety round/check completed  Taken 9/15/2022 2205 by Izabela Lake LPN  Safety Promotion/Fall Prevention:   assistive device/personal items within reach   clutter free environment maintained   nonskid shoes/slippers when out of bed   safety round/check completed   room organization consistent  Taken 9/15/2022 2045 by Izabela Lake LPN  Safety Promotion/Fall Prevention:   assistive device/personal items within reach   clutter free environment maintained   nonskid shoes/slippers when out of bed   room organization consistent   safety round/check completed  Taken 9/15/2022 1819 by Izabela Lake LPN  Safety Promotion/Fall Prevention:   assistive device/personal items within reach   clutter free environment maintained   nonskid shoes/slippers when out of bed   room organization consistent   safety round/check completed  Taken 9/15/2022 1611 by Izabela Lake LPN  Safety Promotion/Fall Prevention:   assistive device/personal items within reach   clutter free environment maintained   nonskid shoes/slippers when out of bed   room organization consistent   safety round/check completed  Taken 9/15/2022 1405 by Izabela Lake LPN  Safety Promotion/Fall Prevention:   assistive device/personal items within reach   clutter free environment maintained   fall prevention program maintained   nonskid shoes/slippers when out of bed   room organization consistent   safety round/check completed   Goal Outcome Evaluation:  Plan of Care Reviewed With: patient        Progress: no change  Alert and oriented x 4. Able to verbalize  needs and wants. Takes medication whole and tolerates well. No c/o pain/discomfort/SOB noted. Does not require O2 therapy at this time. No clubbing/cyanosis noted. No tracheal deviation, neck vein distention noted. No decrease appetite noted. External catheter in place and patent. Currently in bed, eyes closed, rise and fall of chest observed. Call bell in reach

## 2022-09-17 PROBLEM — I50.23 ACUTE ON CHRONIC SYSTOLIC CHF (CONGESTIVE HEART FAILURE): Status: RESOLVED | Noted: 2022-05-13 | Resolved: 2022-09-17

## 2022-09-17 PROBLEM — E87.6 HYPOKALEMIA: Status: RESOLVED | Noted: 2022-09-13 | Resolved: 2022-09-17

## 2022-09-17 NOTE — CASE MANAGEMENT/SOCIAL WORK
Case Management Discharge Note      Final Note: Routine home.         Selected Continued Care - Discharged on 9/16/2022 Admission date: 9/13/2022 - Discharge disposition: Home or Self Care     Transportation Services  Private: Car    Final Discharge Disposition Code: 01 - home or self-care

## 2022-09-17 NOTE — DISCHARGE SUMMARY
[facility]  DISCHARGE SUMMARY    Patient Name: Nadege Barrow  : 1945  MRN: [id number]    Date of Admission: 2022  Date of Discharge:  2022  Primary Care Physician: Dave Esparza      Presenting Problem:   Fatigue, unspecified type [R53.83]  Dyspnea, unspecified type [R06.00]  Acute congestive heart failure, unspecified heart failure type (HCC) [I50.9]  COVID-19 virus detected [U07.1]    Active and Resolved Hospital Problems:  Active Hospital Problems    Diagnosis POA   • COVID-19 virus detected [U07.1] Yes     Priority: High   • Cytokine release syndrome, grade 1 [D89.831] No     Priority: Medium   • Type 2 diabetes mellitus (HCC) [E11.9] Yes     Priority: Medium   • Generalized anxiety disorder [F41.1] Yes     Priority: Medium   • Ischemic cardiomyopathy [I25.5] Yes     Priority: Medium   • Carotid artery disease (HCC) [I77.9] Yes     Priority: Medium   • Depression, unspecified [F32.A] Yes     Priority: Medium   • Disabling essential tremor [G25.0] Yes     Priority: Medium   • Hyperlipidemia, mixed [E78.2] Yes     Priority: Medium   • Hypertension [I10] Yes      Resolved Hospital Problems    Diagnosis POA   • **Acute on chronic systolic CHF (congestive heart failure) (HCC) [I50.23] Yes     Priority: High   • Hypokalemia [E87.6] Yes     Priority: High         Hospital Course     Hospital Course:    The patient is a very pleasant 76-year-old vaccinated female with history of CAD, hypertension, depression, essential tremors, chronic back pain and anxiety that presented to the ED on 2022 complaining of shortness of air and generalized weakness.  ED work-up revealed mild acute systolic heart failure and was found to be COVID 19 positive.  The patient was then placed on observation and was diuresed with IV Lasix.  She did not require supplemental oxygen during the hospitalization.  Shortness of breath resolved during the hospitalization due to  aggressive diuresis.  The patient had pruritic rash which resolved after being given Benadryl.  She was discharged home on 9/16/2022.        DISCHARGE Follow Up Recommendations for labs and diagnostics:       Reasons For Change In Medications and Indications for New Medications:      Day of Discharge     Vital Signs:       Physical Exam:  Physical Exam  HENT:      Head: Normocephalic.      Mouth/Throat:      Mouth: Mucous membranes are moist.   Eyes:      Extraocular Movements: Extraocular movements intact.      Pupils: Pupils are equal, round, and reactive to light.   Cardiovascular:      Rate and Rhythm: Normal rate and regular rhythm.   Pulmonary:      Effort: Pulmonary effort is normal.   Abdominal:      General: Bowel sounds are normal.   Musculoskeletal:         General: Normal range of motion.      Cervical back: Normal range of motion.   Skin:     General: Skin is warm.   Neurological:      Mental Status: She is alert. Mental status is at baseline.   Psychiatric:         Mood and Affect: Mood normal.          Pertinent  and/or Most Recent Results     LAB RESULTS:      Lab 09/15/22  0804 09/14/22  0525 09/13/22  2031   WBC 8.40 6.90 7.10   HEMOGLOBIN 12.6 12.3 12.4   HEMATOCRIT 36.9 36.8 38.0   PLATELETS 303 261 267   NEUTROS ABS 5.10  --  4.30   LYMPHS ABS 2.30  --  1.90   MONOS ABS 0.80  --  0.50   EOS ABS 0.30  --  0.20   MCV 92.4 91.8 89.7         Lab 09/15/22  0804 09/14/22  0525 09/13/22  2031   SODIUM 141 143 142   POTASSIUM 3.6 3.3* 3.1*   CHLORIDE 105 103 103   CO2 24.0 27.0 25.0   ANION GAP 12.0 13.0 14.0   BUN 14 12 15   CREATININE 0.83 0.80 0.86   EGFR 73.2 76.5 70.1   GLUCOSE 116* 114* 161*   CALCIUM 9.1 8.1* 8.4*   MAGNESIUM  --   --  1.7   HEMOGLOBIN A1C  --  7.1*  --          Lab 09/13/22 2031   TOTAL PROTEIN 6.5   ALBUMIN 3.70   GLOBULIN 2.8   ALT (SGPT) 12   AST (SGOT) 22   BILIRUBIN 0.9   ALK PHOS 77         Lab 09/14/22 0525 09/13/22 2031   PROBNP  --  10,585.0*   TROPONIN T <0.010  <0.010                 Brief Urine Lab Results  (Last result in the past 365 days)      Color   Clarity   Blood   Leuk Est   Nitrite   Protein   CREAT   Urine HCG        02/27/22 0749 Yellow   Clear   Trace   Trace   Negative   Negative               Microbiology Results (last 10 days)     Procedure Component Value - Date/Time    Respiratory Panel PCR w/COVID-19(SARS-CoV-2) [facility] In-House, NP Swab in UTM/VTM, 3-4 HR TAT - Swab, Nasopharynx [[id number]]  (Abnormal) Collected: 09/13/22 2031    Lab Status: Final result Specimen: Swab from Nasopharynx Updated: 09/13/22 2136     ADENOVIRUS, PCR Not Detected     Coronavirus 229E Not Detected     Coronavirus HKU1 Not Detected     Coronavirus NL63 Not Detected     Coronavirus OC43 Not Detected     COVID19 Detected     Human Metapneumovirus Not Detected     Human Rhinovirus/Enterovirus Not Detected     Influenza A PCR Not Detected     Influenza B PCR Not Detected     Parainfluenza Virus 1 Not Detected     Parainfluenza Virus 2 Not Detected     Parainfluenza Virus 3 Not Detected     Parainfluenza Virus 4 Not Detected     RSV, PCR Not Detected     Bordetella pertussis pcr Not Detected     Bordetella parapertussis PCR Not Detected     Chlamydophila pneumoniae PCR Not Detected     Mycoplasma pneumo by PCR Not Detected    Narrative:      In the setting of a positive respiratory panel with a viral infection PLUS a negative procalcitonin without other underlying concern for bacterial infection, consider observing off antibiotics or discontinuation of antibiotics and continue supportive care. If the respiratory panel is positive for atypical bacterial infection (Bordetella pertussis, Chlamydophila pneumoniae, or Mycoplasma pneumoniae), consider antibiotic de-escalation to target atypical bacterial infection.          XR Chest 1 View    Result Date: 9/13/2022  Impression: 1.     No acute pulmonary process. 2.     Old fracture deformity left clavicle.   Electronically Signed By-Munir Cote MD On:9/13/2022 8:52 PM This report was finalized on [id number] by  Munir Cote MD.              Results for orders placed during the hospital encounter of 02/26/22    Adult Transthoracic Echo Complete w/ Color, Spectral and Contrast if necessary per protocol    Interpretation Summary  · Left ventricular ejection fraction appears to be 41 - 45%.  · The right ventricular cavity is mildly dilated.  · Mild aortic valve stenosis is present.  · Moderate tricuspid valve regurgitation is present.  · No pericardial effusion noted      Labs Pending at Discharge:      Procedures Performed           Consults:   Consults     No orders found from 8/15/2022 to 9/14/2022.            Discharge Details        Discharge Medications      Continue These Medications      Instructions Start Date   ALPRAZolam 1 MG tablet  Commonly known as: Xanax   1 mg, Oral, 3 Times Daily PRN      atorvastatin 40 MG tablet  Commonly known as: LIPITOR   40 mg, Oral, Daily      fludrocortisone 0.1 MG tablet   0.1 mg, Oral, Daily      FLUoxetine 20 MG capsule  Commonly known as: PROzac   20 mg, Oral, Daily      furosemide 20 MG tablet  Commonly known as: LASIX   20 mg, Oral, Daily      HYDROcodone-acetaminophen 5-325 MG per tablet  Commonly known as: Norco   1 tablet, Oral, Every 6 Hours PRN      insulin NPH-insulin regular (70-30) 100 UNIT/ML injection  Commonly known as: humuLIN 70/30,novoLIN 70/30   20 Units, Subcutaneous, 2 Times Daily With Meals      Jardiance 10 MG tablet tablet  Generic drug: empagliflozin   1 tablet, Oral, Daily      linaclotide 145 MCG capsule capsule  Commonly known as: LINZESS   145 mcg, Oral, Every Morning Before Breakfast      losartan 25 MG tablet  Commonly known as: COZAAR   25 mg, Oral, Every 24 Hours Scheduled      metoprolol succinate XL 50 MG 24 hr tablet  Commonly known as: TOPROL-XL   50 mg, Oral, Daily      nitroglycerin 0.4 MG SL tablet  Commonly known as: NITROSTAT   0.4  mg, Sublingual, Every 5 Minutes PRN, Take no more than 3 doses in 15 minutes.      potassium chloride ER 20 MEQ tablet controlled-release ER tablet  Commonly known as: K-TAB   20 mEq, Oral, Daily      ticagrelor 90 MG tablet tablet  Commonly known as: BRILINTA   90 mg, Oral, 2 Times Daily             Allergies   Allergen Reactions   • Gabapentin Hallucinations   • Morphine Hallucinations         Discharge Disposition:   Home or Self Care    Diet:  Hospital:No active diet order        Discharge Activity: As tolerated      Discharge Condition: Hemodynamically stable      CODE STATUS:  Code Status and Medical Interventions:   Ordered at: 09/13/22 9803     Code Status (Patient has no pulse and is not breathing):    CPR (Attempt to Resuscitate)     Medical Interventions (Patient has pulse or is breathing):    Full Support         No future appointments.    Additional Instructions for the Follow-ups that You Need to Schedule     Discharge Follow-up with PCP   As directed       Currently Documented PCP:    Dave Esparza    PCP Phone Number:    [phone]     Follow Up Details: 2 weeks               Time spent on Discharge including face to face service:  15 minutes    This patient has been examined wearing appropriate Personal Protective Equipment and discussed with Nursing. 09/17/22      Signature: Electronically signed by Anup Casanova DO, 09/17/22, 6:11 PM EDT.

## 2022-09-17 NOTE — OUTREACH NOTE
Prep Survey    Flowsheet Row Responses   [facility] facility patient discharged from? [facility]   Is LACE score < 7 ? No   Emergency Room discharge w/ pulse ox? No   Eligibility TCM   Hospital [facility]   Date of Admission 09/13/22   Date of Discharge 09/16/22   Discharge Disposition Home or Self Care   Discharge diagnosis A/C CHF, COVID-19, hypokalemia, T2DM,    Does the patient have one of the following disease processes/diagnoses(primary or secondary)? COVID-19   Does the patient have Home health ordered? No   Is there a DME ordered? No   Prep survey completed? Yes          TIANA BARTH - Registered Nurse

## 2022-09-19 ENCOUNTER — TRANSITIONAL CARE MANAGEMENT TELEPHONE ENCOUNTER (OUTPATIENT)
Dept: CALL CENTER | Facility: HOSPITAL | Age: 77
End: 2022-09-19

## 2022-09-19 NOTE — OUTREACH NOTE
Call Center TCM Note    Flowsheet Row Responses   [facility] patient discharged from? [facility]   Does the patient have one of the following disease processes/diagnoses(primary or secondary)? COVID-19   COVID-19 underlying condition? CHF   TCM attempt successful? Yes   Call start time 1344   Discharge diagnosis A/C CHF, COVID-19, hypokalemia, T2DM,    Person spoke with today (if not patient) and relationship patient   Meds reviewed with patient/caregiver? Yes   Is the patient having any side effects they believe may be caused by any medication additions or changes? No   Does the patient have all medications ordered at discharge? Yes   Is the patient taking all medications as directed (includes completed medication regime)? Yes   Comments This patient does not have a [facility] PCP, so unable to schedule a followup . She will call and schedule with her PCP herself.    Psychosocial issues? No   Did the patient receive a copy of their discharge instructions? Yes   Did the patient receive a copy of COVID-19 specific instructions? Yes   Nursing interventions Reviewed instructions with patient   What is the patient's perception of their health status since discharge? Improving   Does the patient have any of the following symptoms? None  [Patient reports she mostly has fatigue and body aches. ]   Pulse Ox monitoring None   Is the patient/caregiver able to teach back steps to recovery at home? Set small, achievable goals for return to baseline health, Rest and rebuild strength, gradually increase activity, Make a list of questions for provider's appointment   If the patient is a current smoker, are they able to teach back resources for cessation? Not a smoker   Is the patient/caregiver able to teach back the hierarchy of who to call/visit for symptoms/problems? PCP, Specialist, Home health nurse, Urgent Care, ED, 911 Yes   Is the patient able to teach back Heart Failure diet management? Yes   Is the patient able to teach  back Heart Failure Zones? Yes   Is the patient able to teach back signs and symptoms of worsening condition? (i.e. weight gain, shortness of air, etc.) Yes   TCM call completed? Yes   Wrap up additional comments Both patient and daughter both have Covid. She denies any needs or concerns.           Huber Rich RN    9/19/2022, 13:56 EDT

## 2022-09-21 ENCOUNTER — READMISSION MANAGEMENT (OUTPATIENT)
Dept: CALL CENTER | Facility: HOSPITAL | Age: 77
End: 2022-09-21

## 2022-09-21 NOTE — OUTREACH NOTE
COVID-19 Week 1 Survey    Flowsheet Row Responses   [facility] patient discharged from? [facility]   Does the patient have one of the following disease processes/diagnoses(primary or secondary)? COVID-19   COVID-19 underlying condition? CHF   Call Number Call 2   Week 1 Call successful? Yes   Call start time 0933   Call end time 0950   Discharge diagnosis A/C CHF, COVID-19, hypokalemia, T2DM,    Person spoke with today (if not patient) and relationship patient   Meds reviewed with patient/caregiver? Yes   Is the patient having any side effects they believe may be caused by any medication additions or changes? No   Does the patient have all medications ordered at discharge? Yes   Is the patient taking all medications as directed (includes completed medication regime)? Yes   Does the patient have a primary care provider?  Yes   Does the patient have an appointment with their PCP or specialist within 7 days of discharge? No   What is preventing the patient from scheduling follow up appointments within 7 days of discharge? Haven't had time   Nursing Interventions Educated patient on importance of making appointment   Has the patient kept scheduled appointments due by today? N/A   Psychosocial issues? No   Did the patient receive a copy of their discharge instructions? Yes   Did the patient receive a copy of COVID-19 specific instructions? Yes   Nursing interventions Reviewed instructions with patient   What is the patient's perception of their health status since discharge? Improving   Does the patient have any of the following symptoms? None   Nursing Interventions Nurse provided patient education   Pulse Ox monitoring None   Is the patient/caregiver able to teach back steps to recovery at home? Set small, achievable goals for return to baseline health, Rest and rebuild strength, gradually increase activity, Make a list of questions for provider's appointment   If the patient is a current smoker, are they able to  teach back resources for cessation? Not a smoker   Is the patient/caregiver able to teach back the hierarchy of who to call/visit for symptoms/problems? PCP, Specialist, Home health nurse, Urgent Care, ED, 911 Yes   Is the patient able to teach back Heart Failure diet management? Yes   Is the patient able to teach back Heart Failure Zones? Yes   Is the patient able to teach back signs and symptoms of worsening condition? (i.e. weight gain, shortness of air, etc.) Yes   COVID-19 call completed? Yes   Wrap up additional comments Both patient and daughter both have Covid. She denies any needs or concerns. They are still weak,           AYLIN GRANADO - Registered Nurse

## 2022-09-26 DIAGNOSIS — F41.1 GENERALIZED ANXIETY DISORDER: Chronic | ICD-10-CM

## 2022-09-26 NOTE — TELEPHONE ENCOUNTER
Pt daughter LVM that pt missed appt because she was in the hospital with Covid and heart failure.  Last fill 8-15-22; inspect printed

## 2022-09-27 RX ORDER — ALPRAZOLAM 1 MG/1
TABLET ORAL
Qty: 90 TABLET | Refills: 0 | Status: SHIPPED | OUTPATIENT
Start: 2022-09-27 | End: 2022-11-15

## 2022-09-28 ENCOUNTER — READMISSION MANAGEMENT (OUTPATIENT)
Dept: CALL CENTER | Facility: HOSPITAL | Age: 77
End: 2022-09-28

## 2022-09-28 NOTE — OUTREACH NOTE
COVID-19 Week 2 Survey    Flowsheet Row Responses   [facility] patient discharged from? [facility]   Does the patient have one of the following disease processes/diagnoses(primary or secondary)? COVID-19   COVID-19 underlying condition? CHF   Call Number Call 1   COVID-19 Week 2: Call 1 attempt successful? Yes   Call start time 1408   Call end time 1422   Discharge diagnosis A/C CHF, COVID-19, hypokalemia, T2DM,    Person spoke with today (if not patient) and relationship patient   Meds reviewed with patient/caregiver? Yes   Does the patient have all medications ordered at discharge? N/A   Prescription comments No new medications ordered at discharge--reports she had been without her Xanax for a few days so anxiety has been an issue, has now restarted   Is the patient taking all medications as directed (includes completed medication regime)? Yes   Does the patient have a primary care provider?  Yes   Does the patient have an appointment with their PCP or specialist within 7 days of discharge? Yes   Has the patient kept scheduled appointments due by today? Yes   Has home health visited the patient within 72 hours of discharge? N/A   Psychosocial issues? No   Did the patient receive a copy of their discharge instructions? Yes   Did the patient receive a copy of COVID-19 specific instructions? Yes   Nursing interventions Reviewed instructions with patient   What is the patient's perception of their health status since discharge? Improving  [reports she is doing better with covid dx--still some concerns with CHF diagnosis.]   Does the patient have any of the following symptoms? None  [not much of an appetite]   Nursing Interventions Nurse provided patient education   Pulse Ox monitoring None   Is the patient/caregiver able to teach back steps to recovery at home? Eat a well-balance diet, Rest and rebuild strength, gradually increase activity   If the patient is a current smoker, are they able to teach back resources  for cessation? Not a smoker   Is the patient able to teach back Heart Failure diet management? Yes   Is the patient able to teach back Heart Failure Zones? No   Is the patient able to teach back signs and symptoms of worsening condition? (i.e. weight gain, shortness of air, etc.) Yes   COVID-19 call completed? Yes          AIMEE DELCID - Registered Nurse

## 2022-09-29 ENCOUNTER — HOSPITAL ENCOUNTER (OUTPATIENT)
Facility: HOSPITAL | Age: 77
Discharge: HOME | End: 2022-09-30
Attending: EMERGENCY MEDICINE | Admitting: EMERGENCY MEDICINE

## 2022-09-29 ENCOUNTER — APPOINTMENT (OUTPATIENT)
Dept: GENERAL RADIOLOGY | Facility: HOSPITAL | Age: 77
End: 2022-09-29

## 2022-09-29 ENCOUNTER — READMISSION MANAGEMENT (OUTPATIENT)
Dept: CALL CENTER | Facility: HOSPITAL | Age: 77
End: 2022-09-29

## 2022-09-29 DIAGNOSIS — S60.221A CONTUSION OF RIGHT HAND, INITIAL ENCOUNTER: ICD-10-CM

## 2022-09-29 DIAGNOSIS — S63.501A WRIST SPRAIN, RIGHT, INITIAL ENCOUNTER: ICD-10-CM
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
## 2022-09-29 PROCEDURE — 73090 X-RAY EXAM OF FOREARM: CPT

## 2022-09-29 PROCEDURE — G0463 HOSPITAL OUTPT CLINIC VISIT: HCPCS | Performed by: EMERGENCY MEDICINE

## 2022-09-29 PROCEDURE — 99213 OFFICE O/P EST LOW 20 MIN: CPT | Performed by: EMERGENCY MEDICINE

## 2022-09-29 PROCEDURE — 73110 X-RAY EXAM OF WRIST: CPT

## 2022-09-29 RX ORDER — DIAPER,BRIEF,INFANT-TODD,DISP
1 EACH MISCELLANEOUS ONCE
Status: COMPLETED | OUTPATIENT
Start: 2022-09-29 | End: 2022-09-29

## 2022-09-29 RX ADMIN — Medication 1 APPLICATION: at 21:53

## 2022-09-30 VITALS
TEMPERATURE: 98 F | SYSTOLIC BLOOD PRESSURE: 158 MMHG | WEIGHT: 150 LBS | HEIGHT: 63 IN | DIASTOLIC BLOOD PRESSURE: 76 MMHG | BODY MASS INDEX: 26.58 KG/M2 | HEART RATE: 65 BPM | RESPIRATION RATE: 17 BRPM | OXYGEN SATURATION: 98 %

## 2022-09-30 RX ORDER — PEN NEEDLE, DIABETIC, SAFETY 30 GX3/16"
NEEDLE, DISPOSABLE MISCELLANEOUS
COMMUNITY
Start: 2022-07-22 | End: 2022-12-09

## 2022-09-30 RX ORDER — HUMAN INSULIN 100 [IU]/ML
INJECTION, SUSPENSION SUBCUTANEOUS
Status: ON HOLD | COMMUNITY
Start: 2022-08-05 | End: 2022-10-07

## 2022-09-30 RX ORDER — TIZANIDINE 2 MG/1
TABLET ORAL
COMMUNITY
Start: 2022-07-08 | End: 2022-12-09

## 2022-09-30 RX ORDER — POTASSIUM CHLORIDE 20 MEQ/1
20 TABLET, EXTENDED RELEASE ORAL DAILY
COMMUNITY
End: 2022-12-09

## 2022-09-30 RX ORDER — POTASSIUM CHLORIDE 20 MEQ/1
TABLET, EXTENDED RELEASE ORAL
COMMUNITY
Start: 2022-09-09 | End: 2022-12-09

## 2022-09-30 RX ORDER — BLOOD SUGAR DIAGNOSTIC
STRIP MISCELLANEOUS
COMMUNITY
Start: 2022-09-20 | End: 2022-12-09

## 2022-09-30 RX ORDER — ATORVASTATIN CALCIUM 40 MG/1
1 TABLET, FILM COATED ORAL
Status: ON HOLD | COMMUNITY
Start: 2022-08-05 | End: 2022-10-07

## 2022-09-30 RX ORDER — LOSARTAN POTASSIUM 25 MG/1
25 TABLET ORAL DAILY
Status: ON HOLD | COMMUNITY
Start: 2022-05-17 | End: 2022-10-07

## 2022-09-30 RX ORDER — POTASSIUM CHLORIDE 20 MEQ/1
1 TABLET, EXTENDED RELEASE ORAL EVERY MORNING
COMMUNITY
Start: 2022-08-15

## 2022-09-30 RX ORDER — FLUDROCORTISONE ACETATE 0.1 MG/1
1 TABLET ORAL EVERY MORNING
Status: ON HOLD | COMMUNITY
Start: 2022-08-05 | End: 2022-10-07

## 2022-09-30 RX ORDER — ALPRAZOLAM 1 MG/1
1 TABLET ORAL
Status: ON HOLD | COMMUNITY
Start: 2022-06-07 | End: 2022-10-07

## 2022-09-30 RX ORDER — FLUOXETINE HYDROCHLORIDE 20 MG/1
1 CAPSULE ORAL EVERY MORNING
COMMUNITY
Start: 2022-08-15 | End: 2022-12-09

## 2022-09-30 NOTE — OUTREACH NOTE
COVID-19 Week 3 Survey    Flowsheet Row Responses   [facility] facility patient discharged from? [facility]   Does the patient have one of the following disease processes/diagnoses(primary or secondary)? COVID-19   COVID-19 underlying condition? CHF   Call Number Call 1   COVID-19 Week 3: Call 1 attempt successful? No   Revoke Readmitted   Discharge diagnosis A/C CHF, COVID-19, hypokalemia, T2DM,           SUZANNE T - Registered Nurse

## 2022-10-06 ENCOUNTER — APPOINTMENT (OUTPATIENT)
Dept: CT IMAGING | Facility: HOSPITAL | Age: 77
End: 2022-10-06

## 2022-10-06 ENCOUNTER — HOSPITAL ENCOUNTER (OUTPATIENT)
Facility: HOSPITAL | Age: 77
Setting detail: OBSERVATION
Discharge: HOME HEALTH | End: 2022-10-11
Attending: EMERGENCY MEDICINE | Admitting: HOSPITALIST

## 2022-10-06 DIAGNOSIS — R29.898 WEAKNESS OF BOTH LEGS: ICD-10-CM

## 2022-10-06 DIAGNOSIS — N39.0 URINARY TRACT INFECTION WITHOUT HEMATURIA, SITE UNSPECIFIED: ICD-10-CM

## 2022-10-06 DIAGNOSIS — R19.7 DIARRHEA, UNSPECIFIED TYPE: Primary | ICD-10-CM

## 2022-10-06 DIAGNOSIS — M54.16 LUMBAR BACK PAIN WITH RADICULOPATHY AFFECTING LOWER EXTREMITY: ICD-10-CM

## 2022-10-06 DIAGNOSIS — E87.6 HYPOKALEMIA: ICD-10-CM

## 2022-10-06 LAB
ALBUMIN SERPL-MCNC: 3.2 G/DL (ref 3.5–5.2)
ALBUMIN SERPL-MCNC: 3.58 G/DL (ref 3.5–5.2)
ALBUMIN/GLOB SERPL: 1.2 G/DL
ALBUMIN/GLOB SERPL: 1.3 G/DL
ALP SERPL-CCNC: 67 U/L (ref 39–117)
ALP SERPL-CCNC: 77 U/L (ref 39–117)
ALT SERPL W P-5'-P-CCNC: 5 U/L (ref 1–33)
ALT SERPL W P-5'-P-CCNC: 6 U/L (ref 1–33)
ANION GAP SERPL CALCULATED.3IONS-SCNC: 11 MMOL/L (ref 5–15)
ANION GAP SERPL CALCULATED.3IONS-SCNC: 13.8 MMOL/L (ref 5–15)
AST SERPL-CCNC: 12 U/L (ref 1–32)
AST SERPL-CCNC: 15 U/L (ref 1–32)
B PARAPERT DNA SPEC QL NAA+PROBE: NOT DETECTED
B PERT DNA SPEC QL NAA+PROBE: NOT DETECTED
BACTERIA UR QL AUTO: ABNORMAL /HPF
BASOPHILS # BLD AUTO: 0 10*3/MM3 (ref 0–0.2)
BASOPHILS # BLD AUTO: 0.02 10*3/MM3 (ref 0–0.2)
BASOPHILS NFR BLD AUTO: 0.2 % (ref 0–1.5)
BASOPHILS NFR BLD AUTO: 0.2 % (ref 0–1.5)
BILIRUB SERPL-MCNC: 0.7 MG/DL (ref 0–1.2)
BILIRUB SERPL-MCNC: 1.2 MG/DL (ref 0–1.2)
BILIRUB UR QL STRIP: NEGATIVE
BUN SERPL-MCNC: 12 MG/DL (ref 8–23)
BUN SERPL-MCNC: 13 MG/DL (ref 8–23)
BUN/CREAT SERPL: 13.5 (ref 7–25)
BUN/CREAT SERPL: 13.8 (ref 7–25)
C PNEUM DNA NPH QL NAA+NON-PROBE: NOT DETECTED
CALCIUM SPEC-SCNC: 8.4 MG/DL (ref 8.6–10.5)
CALCIUM SPEC-SCNC: 8.8 MG/DL (ref 8.6–10.5)
CHLORIDE SERPL-SCNC: 104 MMOL/L (ref 98–107)
CHLORIDE SERPL-SCNC: 104 MMOL/L (ref 98–107)
CLARITY UR: ABNORMAL
CO2 SERPL-SCNC: 19.2 MMOL/L (ref 22–29)
CO2 SERPL-SCNC: 25 MMOL/L (ref 22–29)
COLOR UR: YELLOW
CORTIS SERPL-MCNC: 8.7 MCG/DL
CREAT SERPL-MCNC: 0.89 MG/DL (ref 0.57–1)
CREAT SERPL-MCNC: 0.94 MG/DL (ref 0.57–1)
D-LACTATE SERPL-SCNC: 1.6 MMOL/L (ref 0.5–2)
D-LACTATE SERPL-SCNC: 1.9 MMOL/L (ref 0.5–2)
DEPRECATED RDW RBC AUTO: 43.2 FL (ref 37–54)
DEPRECATED RDW RBC AUTO: 43.8 FL (ref 37–54)
EGFRCR SERPLBLD CKD-EPI 2021: 63 ML/MIN/1.73
EGFRCR SERPLBLD CKD-EPI 2021: 67.3 ML/MIN/1.73
EOSINOPHIL # BLD AUTO: 0 10*3/MM3 (ref 0–0.4)
EOSINOPHIL # BLD AUTO: 0.02 10*3/MM3 (ref 0–0.4)
EOSINOPHIL NFR BLD AUTO: 0.1 % (ref 0.3–6.2)
EOSINOPHIL NFR BLD AUTO: 0.2 % (ref 0.3–6.2)
ERYTHROCYTE [DISTWIDTH] IN BLOOD BY AUTOMATED COUNT: 13.3 % (ref 12.3–15.4)
ERYTHROCYTE [DISTWIDTH] IN BLOOD BY AUTOMATED COUNT: 14.2 % (ref 12.3–15.4)
FLUAV SUBTYP SPEC NAA+PROBE: NOT DETECTED
FLUBV RNA ISLT QL NAA+PROBE: NOT DETECTED
GLOBULIN UR ELPH-MCNC: 2.6 GM/DL
GLOBULIN UR ELPH-MCNC: 2.7 GM/DL
GLUCOSE BLDC GLUCOMTR-MCNC: 136 MG/DL (ref 70–130)
GLUCOSE BLDC GLUCOMTR-MCNC: 162 MG/DL (ref 70–105)
GLUCOSE BLDC GLUCOMTR-MCNC: 163 MG/DL (ref 70–105)
GLUCOSE BLDC GLUCOMTR-MCNC: 198 MG/DL (ref 70–105)
GLUCOSE SERPL-MCNC: 154 MG/DL (ref 65–99)
GLUCOSE SERPL-MCNC: 233 MG/DL (ref 65–99)
GLUCOSE UR STRIP-MCNC: ABNORMAL MG/DL
HADV DNA SPEC NAA+PROBE: NOT DETECTED
HCOV 229E RNA SPEC QL NAA+PROBE: NOT DETECTED
HCOV HKU1 RNA SPEC QL NAA+PROBE: NOT DETECTED
HCOV NL63 RNA SPEC QL NAA+PROBE: NOT DETECTED
HCOV OC43 RNA SPEC QL NAA+PROBE: NOT DETECTED
HCT VFR BLD AUTO: 34.3 % (ref 34–46.6)
HCT VFR BLD AUTO: 37.3 % (ref 34–46.6)
HGB BLD-MCNC: 10.9 G/DL (ref 12–15.9)
HGB BLD-MCNC: 12 G/DL (ref 12–15.9)
HGB UR QL STRIP.AUTO: ABNORMAL
HMPV RNA NPH QL NAA+NON-PROBE: NOT DETECTED
HOLD SPECIMEN: NORMAL
HOLD SPECIMEN: NORMAL
HPIV1 RNA ISLT QL NAA+PROBE: NOT DETECTED
HPIV2 RNA SPEC QL NAA+PROBE: NOT DETECTED
HPIV3 RNA NPH QL NAA+PROBE: NOT DETECTED
HPIV4 P GENE NPH QL NAA+PROBE: NOT DETECTED
HYALINE CASTS UR QL AUTO: ABNORMAL /LPF
IMM GRANULOCYTES # BLD AUTO: 0.03 10*3/MM3 (ref 0–0.05)
IMM GRANULOCYTES NFR BLD AUTO: 0.2 % (ref 0–0.5)
INR PPP: 1.08 (ref 0.93–1.1)
KETONES UR QL STRIP: ABNORMAL
LEUKOCYTE ESTERASE UR QL STRIP.AUTO: ABNORMAL
LIPASE SERPL-CCNC: 27 U/L (ref 13–60)
LYMPHOCYTES # BLD AUTO: 0.96 10*3/MM3 (ref 0.7–3.1)
LYMPHOCYTES # BLD AUTO: 1.5 10*3/MM3 (ref 0.7–3.1)
LYMPHOCYTES NFR BLD AUTO: 16.7 % (ref 19.6–45.3)
LYMPHOCYTES NFR BLD AUTO: 7.8 % (ref 19.6–45.3)
M PNEUMO IGG SER IA-ACNC: NOT DETECTED
MAGNESIUM SERPL-MCNC: 1.5 MG/DL (ref 1.6–2.4)
MAGNESIUM SERPL-MCNC: 1.9 MG/DL (ref 1.6–2.4)
MCH RBC QN AUTO: 28.1 PG (ref 26.6–33)
MCH RBC QN AUTO: 29 PG (ref 26.6–33)
MCHC RBC AUTO-ENTMCNC: 31.9 G/DL (ref 31.5–35.7)
MCHC RBC AUTO-ENTMCNC: 32.2 G/DL (ref 31.5–35.7)
MCV RBC AUTO: 87.4 FL (ref 79–97)
MCV RBC AUTO: 90.8 FL (ref 79–97)
MONOCYTES # BLD AUTO: 0.64 10*3/MM3 (ref 0.1–0.9)
MONOCYTES # BLD AUTO: 0.8 10*3/MM3 (ref 0.1–0.9)
MONOCYTES NFR BLD AUTO: 5.2 % (ref 5–12)
MONOCYTES NFR BLD AUTO: 8.6 % (ref 5–12)
NEUTROPHILS NFR BLD AUTO: 10.7 10*3/MM3 (ref 1.7–7)
NEUTROPHILS NFR BLD AUTO: 6.9 10*3/MM3 (ref 1.7–7)
NEUTROPHILS NFR BLD AUTO: 74.4 % (ref 42.7–76)
NEUTROPHILS NFR BLD AUTO: 86.4 % (ref 42.7–76)
NITRITE UR QL STRIP: POSITIVE
NRBC BLD AUTO-RTO: 0 /100 WBC (ref 0–0.2)
NT-PROBNP SERPL-MCNC: 8988 PG/ML (ref 0–1800)
PH UR STRIP.AUTO: 6 [PH] (ref 5–8)
PHOSPHATE SERPL-MCNC: 4 MG/DL (ref 2.5–4.5)
PLATELET # BLD AUTO: 183 10*3/MM3 (ref 140–450)
PLATELET # BLD AUTO: 224 10*3/MM3 (ref 140–450)
PMV BLD AUTO: 11.5 FL (ref 6–12)
PMV BLD AUTO: 9.3 FL (ref 6–12)
POTASSIUM SERPL-SCNC: 2.6 MMOL/L (ref 3.5–5.2)
POTASSIUM SERPL-SCNC: 3.1 MMOL/L (ref 3.5–5.2)
PROCALCITONIN SERPL-MCNC: 0.27 NG/ML (ref 0–0.25)
PROT SERPL-MCNC: 5.8 G/DL (ref 6–8.5)
PROT SERPL-MCNC: 6.3 G/DL (ref 6–8.5)
PROT UR QL STRIP: ABNORMAL
PROTHROMBIN TIME: 11.1 SECONDS (ref 9.6–11.7)
RBC # BLD AUTO: 3.78 10*6/MM3 (ref 3.77–5.28)
RBC # BLD AUTO: 4.27 10*6/MM3 (ref 3.77–5.28)
RBC # UR STRIP: ABNORMAL /HPF
REF LAB TEST METHOD: ABNORMAL
RHINOVIRUS RNA SPEC NAA+PROBE: NOT DETECTED
RSV RNA NPH QL NAA+NON-PROBE: NOT DETECTED
SARS-COV-2 RNA NPH QL NAA+NON-PROBE: DETECTED
SODIUM SERPL-SCNC: 137 MMOL/L (ref 136–145)
SODIUM SERPL-SCNC: 140 MMOL/L (ref 136–145)
SP GR UR STRIP: 1.01 (ref 1–1.03)
SQUAMOUS #/AREA URNS HPF: ABNORMAL /HPF
TROPONIN T SERPL-MCNC: <0.01 NG/ML (ref 0–0.03)
TSH SERPL DL<=0.05 MIU/L-ACNC: 1.61 UIU/ML (ref 0.27–4.2)
UROBILINOGEN UR QL STRIP: ABNORMAL
WBC # UR STRIP: ABNORMAL /HPF
WBC NRBC COR # BLD: 12.37 10*3/MM3 (ref 3.4–10.8)
WBC NRBC COR # BLD: 9.2 10*3/MM3 (ref 3.4–10.8)
WHOLE BLOOD HOLD SPECIMEN: NORMAL

## 2022-10-06 PROCEDURE — G0378 HOSPITAL OBSERVATION PER HR: HCPCS

## 2022-10-06 PROCEDURE — 25010000002 HEPARIN (PORCINE) PER 1000 UNITS: Performed by: INTERNAL MEDICINE

## 2022-10-06 PROCEDURE — 96367 TX/PROPH/DG ADDL SEQ IV INF: CPT

## 2022-10-06 PROCEDURE — 96375 TX/PRO/DX INJ NEW DRUG ADDON: CPT

## 2022-10-06 PROCEDURE — 97162 PT EVAL MOD COMPLEX 30 MIN: CPT

## 2022-10-06 PROCEDURE — 25010000002 MAGNESIUM SULFATE IN D5W 1G/100ML (PREMIX) 1-5 GM/100ML-% SOLUTION: Performed by: EMERGENCY MEDICINE

## 2022-10-06 PROCEDURE — 74176 CT ABD & PELVIS W/O CONTRAST: CPT

## 2022-10-06 PROCEDURE — 83605 ASSAY OF LACTIC ACID: CPT | Performed by: INTERNAL MEDICINE

## 2022-10-06 PROCEDURE — 84145 PROCALCITONIN (PCT): CPT | Performed by: INTERNAL MEDICINE

## 2022-10-06 PROCEDURE — 83880 ASSAY OF NATRIURETIC PEPTIDE: CPT | Performed by: EMERGENCY MEDICINE

## 2022-10-06 PROCEDURE — 25010000002 CEFTRIAXONE PER 250 MG: Performed by: EMERGENCY MEDICINE

## 2022-10-06 PROCEDURE — 80053 COMPREHEN METABOLIC PANEL: CPT | Performed by: INTERNAL MEDICINE

## 2022-10-06 PROCEDURE — 87040 BLOOD CULTURE FOR BACTERIA: CPT | Performed by: INTERNAL MEDICINE

## 2022-10-06 PROCEDURE — 83735 ASSAY OF MAGNESIUM: CPT | Performed by: INTERNAL MEDICINE

## 2022-10-06 PROCEDURE — 82962 GLUCOSE BLOOD TEST: CPT

## 2022-10-06 PROCEDURE — 99283 EMERGENCY DEPT VISIT LOW MDM: CPT | Performed by: EMERGENCY MEDICINE

## 2022-10-06 PROCEDURE — 83735 ASSAY OF MAGNESIUM: CPT | Performed by: EMERGENCY MEDICINE

## 2022-10-06 PROCEDURE — 0 POTASSIUM CHLORIDE 10 MEQ/100ML SOLUTION: Performed by: EMERGENCY MEDICINE

## 2022-10-06 PROCEDURE — 83690 ASSAY OF LIPASE: CPT | Performed by: EMERGENCY MEDICINE

## 2022-10-06 PROCEDURE — 96365 THER/PROPH/DIAG IV INF INIT: CPT

## 2022-10-06 PROCEDURE — 99285 EMERGENCY DEPT VISIT HI MDM: CPT

## 2022-10-06 PROCEDURE — 84484 ASSAY OF TROPONIN QUANT: CPT | Performed by: EMERGENCY MEDICINE

## 2022-10-06 PROCEDURE — 82533 TOTAL CORTISOL: CPT | Performed by: INTERNAL MEDICINE

## 2022-10-06 PROCEDURE — 0202U NFCT DS 22 TRGT SARS-COV-2: CPT | Performed by: INTERNAL MEDICINE

## 2022-10-06 PROCEDURE — 25010000002 ONDANSETRON PER 1 MG: Performed by: EMERGENCY MEDICINE

## 2022-10-06 PROCEDURE — 85610 PROTHROMBIN TIME: CPT | Performed by: INTERNAL MEDICINE

## 2022-10-06 PROCEDURE — 80053 COMPREHEN METABOLIC PANEL: CPT | Performed by: EMERGENCY MEDICINE

## 2022-10-06 PROCEDURE — 93010 ELECTROCARDIOGRAM REPORT: CPT | Performed by: EMERGENCY MEDICINE

## 2022-10-06 PROCEDURE — 63710000001 INSULIN LISPRO (HUMAN) PER 5 UNITS: Performed by: INTERNAL MEDICINE

## 2022-10-06 PROCEDURE — 96372 THER/PROPH/DIAG INJ SC/IM: CPT

## 2022-10-06 PROCEDURE — 85025 COMPLETE CBC W/AUTO DIFF WBC: CPT | Performed by: INTERNAL MEDICINE

## 2022-10-06 PROCEDURE — 82570 ASSAY OF URINE CREATININE: CPT | Performed by: INTERNAL MEDICINE

## 2022-10-06 PROCEDURE — 83605 ASSAY OF LACTIC ACID: CPT | Performed by: EMERGENCY MEDICINE

## 2022-10-06 PROCEDURE — 84133 ASSAY OF URINE POTASSIUM: CPT | Performed by: INTERNAL MEDICINE

## 2022-10-06 PROCEDURE — 81001 URINALYSIS AUTO W/SCOPE: CPT | Performed by: EMERGENCY MEDICINE

## 2022-10-06 PROCEDURE — 96361 HYDRATE IV INFUSION ADD-ON: CPT

## 2022-10-06 PROCEDURE — 84443 ASSAY THYROID STIM HORMONE: CPT | Performed by: INTERNAL MEDICINE

## 2022-10-06 PROCEDURE — 93005 ELECTROCARDIOGRAM TRACING: CPT | Performed by: EMERGENCY MEDICINE

## 2022-10-06 PROCEDURE — 85025 COMPLETE CBC W/AUTO DIFF WBC: CPT | Performed by: EMERGENCY MEDICINE

## 2022-10-06 PROCEDURE — 84100 ASSAY OF PHOSPHORUS: CPT | Performed by: INTERNAL MEDICINE

## 2022-10-06 RX ORDER — ONDANSETRON 2 MG/ML
4 INJECTION INTRAMUSCULAR; INTRAVENOUS EVERY 6 HOURS PRN
Status: DISCONTINUED | OUTPATIENT
Start: 2022-10-06 | End: 2022-10-11 | Stop reason: HOSPADM

## 2022-10-06 RX ORDER — MAGNESIUM SULFATE HEPTAHYDRATE 500 MG/ML
1 INJECTION, SOLUTION INTRAMUSCULAR; INTRAVENOUS ONCE
Status: DISCONTINUED | OUTPATIENT
Start: 2022-10-06 | End: 2022-10-06

## 2022-10-06 RX ORDER — ACETAMINOPHEN 325 MG/1
650 TABLET ORAL EVERY 6 HOURS PRN
Status: DISCONTINUED | OUTPATIENT
Start: 2022-10-06 | End: 2022-10-11 | Stop reason: HOSPADM

## 2022-10-06 RX ORDER — CALCIUM GLUCONATE 20 MG/ML
1 INJECTION, SOLUTION INTRAVENOUS AS NEEDED
Status: DISCONTINUED | OUTPATIENT
Start: 2022-10-06 | End: 2022-10-11 | Stop reason: HOSPADM

## 2022-10-06 RX ORDER — HYDRALAZINE HYDROCHLORIDE 20 MG/ML
5 INJECTION INTRAMUSCULAR; INTRAVENOUS EVERY 6 HOURS PRN
Status: DISCONTINUED | OUTPATIENT
Start: 2022-10-06 | End: 2022-10-11 | Stop reason: HOSPADM

## 2022-10-06 RX ORDER — PROCHLORPERAZINE EDISYLATE 5 MG/ML
5 INJECTION INTRAMUSCULAR; INTRAVENOUS EVERY 6 HOURS PRN
Status: DISCONTINUED | OUTPATIENT
Start: 2022-10-06 | End: 2022-10-11 | Stop reason: HOSPADM

## 2022-10-06 RX ORDER — MAGNESIUM SULFATE HEPTAHYDRATE 40 MG/ML
4 INJECTION, SOLUTION INTRAVENOUS AS NEEDED
Status: DISCONTINUED | OUTPATIENT
Start: 2022-10-06 | End: 2022-10-11 | Stop reason: HOSPADM

## 2022-10-06 RX ORDER — SODIUM CHLORIDE 0.9 % (FLUSH) 0.9 %
10 SYRINGE (ML) INJECTION AS NEEDED
Status: DISCONTINUED | OUTPATIENT
Start: 2022-10-06 | End: 2022-10-11 | Stop reason: HOSPADM

## 2022-10-06 RX ORDER — ONDANSETRON 2 MG/ML
4 INJECTION INTRAMUSCULAR; INTRAVENOUS ONCE
Status: COMPLETED | OUTPATIENT
Start: 2022-10-06 | End: 2022-10-06

## 2022-10-06 RX ORDER — OLANZAPINE 10 MG/2ML
1 INJECTION, POWDER, LYOPHILIZED, FOR SOLUTION INTRAMUSCULAR
Status: DISCONTINUED | OUTPATIENT
Start: 2022-10-06 | End: 2022-10-11 | Stop reason: HOSPADM

## 2022-10-06 RX ORDER — SODIUM CHLORIDE 0.9 % (FLUSH) 0.9 %
10 SYRINGE (ML) INJECTION EVERY 12 HOURS SCHEDULED
Status: DISCONTINUED | OUTPATIENT
Start: 2022-10-06 | End: 2022-10-11 | Stop reason: HOSPADM

## 2022-10-06 RX ORDER — INSULIN LISPRO 100 [IU]/ML
0-9 INJECTION, SOLUTION INTRAVENOUS; SUBCUTANEOUS
Status: DISCONTINUED | OUTPATIENT
Start: 2022-10-06 | End: 2022-10-11 | Stop reason: HOSPADM

## 2022-10-06 RX ORDER — POTASSIUM CHLORIDE 750 MG/1
40 TABLET, FILM COATED, EXTENDED RELEASE ORAL ONCE
Status: COMPLETED | OUTPATIENT
Start: 2022-10-06 | End: 2022-10-06

## 2022-10-06 RX ORDER — HEPARIN SODIUM 5000 [USP'U]/ML
5000 INJECTION, SOLUTION INTRAVENOUS; SUBCUTANEOUS EVERY 8 HOURS SCHEDULED
Status: DISCONTINUED | OUTPATIENT
Start: 2022-10-06 | End: 2022-10-06

## 2022-10-06 RX ORDER — LABETALOL HYDROCHLORIDE 5 MG/ML
10 INJECTION, SOLUTION INTRAVENOUS EVERY 6 HOURS PRN
Status: DISCONTINUED | OUTPATIENT
Start: 2022-10-06 | End: 2022-10-11 | Stop reason: HOSPADM

## 2022-10-06 RX ORDER — POTASSIUM CHLORIDE 7.45 MG/ML
10 INJECTION INTRAVENOUS ONCE
Status: COMPLETED | OUTPATIENT
Start: 2022-10-06 | End: 2022-10-06

## 2022-10-06 RX ORDER — MAGNESIUM SULFATE HEPTAHYDRATE 40 MG/ML
2 INJECTION, SOLUTION INTRAVENOUS AS NEEDED
Status: DISCONTINUED | OUTPATIENT
Start: 2022-10-06 | End: 2022-10-11 | Stop reason: HOSPADM

## 2022-10-06 RX ORDER — CALCIUM GLUCONATE 20 MG/ML
2 INJECTION, SOLUTION INTRAVENOUS AS NEEDED
Status: DISCONTINUED | OUTPATIENT
Start: 2022-10-06 | End: 2022-10-11 | Stop reason: HOSPADM

## 2022-10-06 RX ORDER — POTASSIUM CHLORIDE 1.5 G/1.77G
40 POWDER, FOR SOLUTION ORAL AS NEEDED
Status: DISCONTINUED | OUTPATIENT
Start: 2022-10-06 | End: 2022-10-11 | Stop reason: HOSPADM

## 2022-10-06 RX ORDER — NICOTINE POLACRILEX 4 MG
15 LOZENGE BUCCAL
Status: DISCONTINUED | OUTPATIENT
Start: 2022-10-06 | End: 2022-10-11 | Stop reason: HOSPADM

## 2022-10-06 RX ORDER — OXYCODONE HYDROCHLORIDE 5 MG/1
5 TABLET ORAL EVERY 4 HOURS PRN
Status: DISCONTINUED | OUTPATIENT
Start: 2022-10-06 | End: 2022-10-11 | Stop reason: HOSPADM

## 2022-10-06 RX ORDER — DOCUSATE SODIUM 100 MG/1
100 CAPSULE, LIQUID FILLED ORAL 2 TIMES DAILY PRN
Status: DISCONTINUED | OUTPATIENT
Start: 2022-10-06 | End: 2022-10-11 | Stop reason: HOSPADM

## 2022-10-06 RX ORDER — POLYETHYLENE GLYCOL 3350 17 G/17G
17 POWDER, FOR SOLUTION ORAL DAILY PRN
Status: DISCONTINUED | OUTPATIENT
Start: 2022-10-06 | End: 2022-10-11 | Stop reason: HOSPADM

## 2022-10-06 RX ORDER — MAGNESIUM SULFATE 1 G/100ML
1 INJECTION INTRAVENOUS ONCE
Status: COMPLETED | OUTPATIENT
Start: 2022-10-06 | End: 2022-10-06

## 2022-10-06 RX ORDER — POTASSIUM CHLORIDE 20 MEQ/1
40 TABLET, EXTENDED RELEASE ORAL AS NEEDED
Status: DISCONTINUED | OUTPATIENT
Start: 2022-10-06 | End: 2022-10-11 | Stop reason: HOSPADM

## 2022-10-06 RX ORDER — POTASSIUM CHLORIDE 20 MEQ/1
40 TABLET, EXTENDED RELEASE ORAL ONCE
Status: COMPLETED | OUTPATIENT
Start: 2022-10-06 | End: 2022-10-06

## 2022-10-06 RX ORDER — MAGNESIUM SULFATE HEPTAHYDRATE 40 MG/ML
2 INJECTION, SOLUTION INTRAVENOUS ONCE
Status: DISCONTINUED | OUTPATIENT
Start: 2022-10-06 | End: 2022-10-06

## 2022-10-06 RX ORDER — ALPRAZOLAM 1 MG/1
1 TABLET ORAL 3 TIMES DAILY PRN
Status: DISCONTINUED | OUTPATIENT
Start: 2022-10-06 | End: 2022-10-11 | Stop reason: HOSPADM

## 2022-10-06 RX ORDER — SODIUM CHLORIDE 0.9 % (FLUSH) 0.9 %
10 SYRINGE (ML) INJECTION EVERY 12 HOURS SCHEDULED
Status: DISCONTINUED | OUTPATIENT
Start: 2022-10-06 | End: 2022-10-11

## 2022-10-06 RX ORDER — HEPARIN SODIUM 5000 [USP'U]/ML
5000 INJECTION, SOLUTION INTRAVENOUS; SUBCUTANEOUS EVERY 8 HOURS SCHEDULED
Status: DISCONTINUED | OUTPATIENT
Start: 2022-10-06 | End: 2022-10-11 | Stop reason: HOSPADM

## 2022-10-06 RX ORDER — FAMOTIDINE 10 MG/ML
20 INJECTION, SOLUTION INTRAVENOUS ONCE
Status: COMPLETED | OUTPATIENT
Start: 2022-10-06 | End: 2022-10-06

## 2022-10-06 RX ORDER — DEXTROSE MONOHYDRATE 25 G/50ML
25 INJECTION, SOLUTION INTRAVENOUS
Status: DISCONTINUED | OUTPATIENT
Start: 2022-10-06 | End: 2022-10-11 | Stop reason: HOSPADM

## 2022-10-06 RX ADMIN — HEPARIN SODIUM 5000 UNITS: 5000 INJECTION INTRAVENOUS; SUBCUTANEOUS at 15:57

## 2022-10-06 RX ADMIN — SODIUM CHLORIDE 1000 ML: 9 INJECTION, SOLUTION INTRAVENOUS at 01:45

## 2022-10-06 RX ADMIN — CEFTRIAXONE 1 G: 1 INJECTION, POWDER, FOR SOLUTION INTRAMUSCULAR; INTRAVENOUS at 02:59

## 2022-10-06 RX ADMIN — Medication 10 ML: at 20:40

## 2022-10-06 RX ADMIN — ALPRAZOLAM 1 MG: 1 TABLET ORAL at 20:24

## 2022-10-06 RX ADMIN — POTASSIUM CHLORIDE 40 MEQ: 750 TABLET, EXTENDED RELEASE ORAL at 02:59

## 2022-10-06 RX ADMIN — FAMOTIDINE 20 MG: 10 INJECTION INTRAVENOUS at 02:56

## 2022-10-06 RX ADMIN — INSULIN LISPRO 2 UNITS: 100 INJECTION, SOLUTION INTRAVENOUS; SUBCUTANEOUS at 17:35

## 2022-10-06 RX ADMIN — POTASSIUM CHLORIDE 40 MEQ: 1500 TABLET, EXTENDED RELEASE ORAL at 19:06

## 2022-10-06 RX ADMIN — ONDANSETRON 4 MG: 2 INJECTION INTRAMUSCULAR; INTRAVENOUS at 01:45

## 2022-10-06 RX ADMIN — OXYCODONE 5 MG: 5 TABLET ORAL at 19:07

## 2022-10-06 RX ADMIN — HEPARIN SODIUM 5000 UNITS: 5000 INJECTION INTRAVENOUS; SUBCUTANEOUS at 23:26

## 2022-10-06 RX ADMIN — Medication 10 ML: at 13:22

## 2022-10-06 RX ADMIN — POTASSIUM CHLORIDE 10 MEQ: 7.46 INJECTION, SOLUTION INTRAVENOUS at 08:07

## 2022-10-06 RX ADMIN — MAGNESIUM SULFATE 1 G: 1 INJECTION INTRAVENOUS at 05:40

## 2022-10-06 NOTE — THERAPY EVALUATION
"Patient Name: Nadege Barrow  : 1945    MRN: [id number]                              Today's Date: 10/6/2022       Admit Date: 10/6/2022    Visit Dx:     ICD-10-CM ICD-9-CM   1. Diarrhea, unspecified type  R19.7 787.91   2. Hypokalemia  E87.6 276.8   3. Urinary tract infection without hematuria, site unspecified  N39.0 599.0     Patient Active Problem List   Diagnosis   • Chest pain on breathing   • Hypertension   • Acute respiratory failure with hypoxia ([facility])   • Anemia   • Atherosclerotic heart disease of native coronary artery without angina pectoris   • Back pain   • Cardiomyopathy ([facility])   • Carotid artery disease ([facility])   • Cellulitis of foot   • Acute on chronic congestive heart failure ([facility])   • Major depressive disorder, recurrent episode, moderate ([facility])   • Senile dementia, delirium, with behavioral disturbance   • Depression, unspecified   • Disabling essential tremor   • Fatigue   • Hyperlipidemia, mixed   • Peripheral vision loss, bilateral   • Retinopathy, bilateral   • S/P right coronary artery (RCA) stent placement   • Ischemic cardiomyopathy   • Elevated LFTs   • Generalized anxiety disorder   • Benzodiazepine dependence, continuous ([facility])   • Type 2 diabetes mellitus ([facility])   • Chest pain, unspecified type   • Weakness of both legs   • Hypokalemia   • Lumbar back pain with radiculopathy affecting lower extremity   • Spinal stenosis of lumbar region   • Acute congestive heart failure, unspecified heart failure type ([facility])   • COVID-19 virus detected   • Cytokine release syndrome, grade 1   • Diarrhea, unspecified type     Past Medical History:   Diagnosis Date   • Anxiety    • CHF (congestive heart failure) ([facility])    • Depression    • Diabetes mellitus ([facility])    • Hyperlipidemia    • Hypertension    • Panic disorder     \"panic attacks\"   • Tremors of nervous system     \"essential tremors\"     Past Surgical History:   Procedure Laterality Date   • CORONARY ANGIOPLASTY WITH STENT PLACEMENT        " General Information     Row Name 10/06/22 1631          Physical Therapy Time and Intention    Document Type evaluation  -OD     Mode of Treatment physical therapy  -OD     Row Name 10/06/22 1631          General Information    Patient Profile Reviewed yes  -OD     Prior Level of Function independent:;ADL's;dressing;bathing;all household mobility;max assist:;driving;cooking;shopping  -OD     Barriers to Rehab none identified  -OD     Row Name 10/06/22 1631          Living Environment    People in Home child(kimberley), adult  -OD     Row Name 10/06/22 1631          Home Main Entrance    Number of Stairs, Main Entrance six  -OD     Stair Railings, Main Entrance railings safe and in good condition  -OD     Row Name 10/06/22 1631          Stairs Within Home, Primary    Stairs, Within Home, Primary Patient has one railing, and she uses bilat UE on rail to help walk upstairs  -OD     Row Name 10/06/22 1631          Cognition    Orientation Status (Cognition) oriented x 4  -OD     Row Name 10/06/22 1631          Safety Issues, Functional Mobility    Impairments Affecting Function (Mobility) balance;endurance/activity tolerance;motor control;strength  -OD           User Key  (r) = Recorded By, (t) = Taken By, (c) = Cosigned By    Initials Name Provider Type    OD Molly Thornton, PT Student PT Student               Mobility     Row Name 10/06/22 1633          Bed Mobility    Bed Mobility bed mobility (all) activities  -OD     All Activities, Ida (Bed Mobility) modified independence  -OD     Assistive Device (Bed Mobility) bed rails  -OD     Row Name 10/06/22 1633          Bed-Chair Transfer    Bed-Chair Ida (Transfers) standby assist  -OD     Row Name 10/06/22 1633          Sit-Stand Transfer    Sit-Stand Ida (Transfers) standby assist  -OD     Row Name 10/06/22 1633          Gait/Stairs (Locomotion)    Ida Level (Gait) contact guard  -OD     Distance in Feet (Gait) 20 feet  -OD      Deviations/Abnormal Patterns (Gait) base of support, narrow;stride length decreased;other (see comments)  Variability of gait observed  -OD     Bilateral Gait Deviations heel strike decreased  -OD     Comment, (Gait/Stairs) Patient does not typically use AD at home, and she reports gait being slightly off from baseline. Pt has varying step lengths, narrow RICHAR, and postural sway with gait.  -OD           User Key  (r) = Recorded By, (t) = Taken By, (c) = Cosigned By    Initials Name Provider Type    OD Molly Thornton PT Student PT Student               Obj/Interventions     Row Name 10/06/22 1635          Range of Motion Comprehensive    General Range of Motion bilateral lower extremity ROM WFL  -OD     Row Name 10/06/22 1635          Strength Comprehensive (MMT)    General Manual Muscle Testing (MMT) Assessment lower extremity strength deficits identified  -OD     Comment, General Manual Muscle Testing (MMT) Assessment Pt demo 4/5 BLE grossly  -OD     Row Name 10/06/22 1635          Balance    Balance Assessment sitting static balance;sitting dynamic balance;standing static balance;standing dynamic balance  -OD     Static Sitting Balance independent  -OD     Dynamic Sitting Balance independent  -OD     Position, Sitting Balance unsupported  -OD     Static Standing Balance standby assist  -OD     Dynamic Standing Balance minimal assist;standby assist  -OD     Balance Interventions standing;dynamic;tandem standing;narrowed base of support;eyes closed during activity  -OD     Comment, Balance Pt demonstrates increased difficulty with narrowed RICHAR, and L>R tandem stance but only able to hold <10 sec  -OD     Row Name 10/06/22 1635          Sensory Assessment (Somatosensory)    Sensory Assessment (Somatosensory) sensation intact;other (see comments)  Patient reports neuropathy bilat feet  -OD           User Key  (r) = Recorded By, (t) = Taken By, (c) = Cosigned By    Initials Name Provider Type    Molly Winters, PT  Student PT Student               Goals/Plan     Row Name 10/06/22 1644          Transfer Goal 1 (PT)    Activity/Assistive Device (Transfer Goal 1, PT) transfers, all  -OD     [place] Level/Cues Needed (Transfer Goal 1, PT) independent  -OD     Time Frame (Transfer Goal 1, PT) long term goal (LTG);2 weeks  -OD     Row Name 10/06/22 1644          Gait Training Goal 1 (PT)    Activity/Assistive Device (Gait Training Goal 1, PT) gait (walking locomotion);diminish gait deviation;improve balance and speed;increase endurance/gait distance  -OD     [place] Level (Gait Training Goal 1, PT) independent  -OD     Distance (Gait Training Goal 1, PT) 100 feet  -OD     Time Frame (Gait Training Goal 1, PT) long term goal (LTG);2 weeks  -OD     Row Name 10/06/22 1644          Stairs Goal 1 (PT)    Activity/Assistive Device (Stairs Goal 1, PT) stairs, all skills;ascending stairs;descending stairs;using handrail, right  -OD     [place] Level/Cues Needed (Stairs Goal 1, PT) modified independence  -OD     Number of Stairs (Stairs Goal 1, PT) 6  -OD     Time Frame (Stairs Goal 1, PT) long term goal (LTG);2 weeks  -OD     Row Name 10/06/22 1644          Therapy Assessment/Plan (PT)    Planned Therapy Interventions (PT) balance training;bed mobility training;gait training;home exercise program;patient/family education;strengthening;neuromuscular re-education;transfer training  -OD           User Key  (r) = Recorded By, (t) = Taken By, (c) = Cosigned By    Initials Name Provider Type    OD Molly Thornton, PT Student PT Student               Clinical Impression     Row Name 10/06/22 1636          Pain    Additional Documentation Pain Scale: FACES Pre/Post-Treatment (Group)  -OD     Row Name 10/06/22 1636          Pain Scale: FACES Pre/Post-Treatment    Pain: FACES Scale, Pretreatment 2-->hurts little bit  -OD     Posttreatment Pain Rating 2-->hurts little bit  -OD     Pain Location - Side/Orientation Bilateral  -OD     Pain  Location - urethral meatus  -OD     Row Name 10/06/22 1636          Plan of Care Review    Plan of Care Reviewed With patient  -OD     Progress improving  -OD     Outcome Evaluation Patient [facility] to [facility] after episode of diarrhea and vomitting. Patient is diagnosed with a UTI and diarrhea with PMH of essential tremor and anxiety. Patient's prior level of function is living in apartment with daughter, where she receives assist for driving, shopping, and cooking, and she is independent with household mobility and ADL's. There are 5 steps to get into her apartment, and 6 steps once inside. Patient reports falling last week by slipping on rug and scraping her wrist, but she denies other injuries. She does not use an AD with gait, but she does own a rollator at home if need be. Patient currently is modified independent with bed mobility, SBA with sit to stands and gait, and requires CGA to [facility] for dynamic balance. Edu patient on managment of essential tremor through mindfulness meditation and yoga in addition to her medication. Recommending home with home health and family assist due to being near baseline but still demosntrating strength and balance deficits. Will continue to follow throughout her length of stay.  -OD     Row Name 10/06/22 1636          Therapy Assessment/Plan (PT)    Rehab Potential (PT) good, to achieve stated therapy goals  -OD     Criteria for Skilled Interventions Met (PT) yes;skilled treatment is necessary  -OD     Therapy Frequency (PT) 3 times/wk  -OD     Row Name 10/06/22 1636          Vital Signs    O2 Delivery Pre Treatment room air  -OD     O2 Delivery Intra Treatment room air  -OD     O2 Delivery Post Treatment room air  -OD     Pre Patient Position Supine  -OD     Intra Patient Position Standing  -OD     Post Patient Position Supine  -OD     Row Name 10/06/22 1636          Positioning and Restraints    Pre-Treatment Position in bed  -OD     Post Treatment Position bed  -OD     In Bed  notified nsg;supine;encouraged to call for assist;exit alarm on;call light within reach  -OD           User Key  (r) = Recorded By, (t) = Taken By, (c) = Cosigned By    Initials Name Provider Type    Molly Winters, PT Student PT Student               Outcome Measures     Row Name 10/06/22 1645 10/06/22 1248       How much help from another person do you currently need...    Turning from your back to your side while in flat bed without using bedrails? 4  -OD 4  -AH    Moving from lying on back to sitting on the side of a flat bed without bedrails? 4  -OD 4  -AH    Moving to and from a bed to a chair (including a wheelchair)? 4  -OD 4  -AH    Standing up from a chair using your arms (e.g., wheelchair, bedside chair)? 4  -OD 4  -AH    Climbing 3-5 steps with a railing? 3  -OD 3  -AH    To walk in hospital room? 3  -OD 3  -AH    AM-PAC 6 Clicks Score (PT) 22  -OD 22  -AH    Highest level of mobility 7 --> Walked 25 feet or more  -OD 7 --> Walked 25 feet or more  -AH    Row Name 10/06/22 1645          Functional Assessment    Outcome Measure Options AM-PAC 6 Clicks Basic Mobility (PT)  -OD           User Key  (r) = Recorded By, (t) = Taken By, (c) = Cosigned By    Initials Name Provider Type     Fredi Bonds, RN Registered Nurse    Molly Winters, PT Student PT Student                             Physical Therapy Education                 Title: PT OT SLP Therapies (Done)     Topic: Physical Therapy (Done)     Point: Mobility training (Done)     Learning Progress Summary           Patient Acceptance, E, VU by OD at 10/6/2022 1646                   Point: Home exercise program (Done)     Learning Progress Summary           Patient Acceptance, E, VU by OD at 10/6/2022 1646                   Point: Body mechanics (Done)     Learning Progress Summary           Patient Acceptance, E, VU by OD at 10/6/2022 1646                   Point: Precautions (Done)     Learning Progress Summary           Patient Acceptance,  LIZET, VU by OD at 10/6/2022 4649                               User Key     Initials Effective Dates Name Provider Type Discipline    OD 08/29/22 -  Molly Thornton, PT Student PT Student PT              PT Recommendation and Plan  Planned Therapy Interventions (PT): balance training, bed mobility training, gait training, home exercise program, patient/family education, strengthening, neuromuscular re-education, transfer training  Plan of Care Reviewed With: patient  Progress: improving  Outcome Evaluation: Patient presents to [facility] after episode of diarrhea and vomitting. Patient is diagnosed with a UTI and diarrhea with PMH of essential tremor and anxiety. Patient's prior level of function is living in apartment with daughter, where she receives assist for driving, shopping, and cooking, and she is independent with household mobility and ADL's. There are 5 steps to get into her apartment, and 6 steps once inside. Patient reports falling last week by slipping on rug and scraping her wrist, but she denies other injuries. She does not use an AD with gait, but she does own a rollator at home if need be. Patient currently is modified independent with bed mobility, SBA with sit to stands and gait, and requires CGA to [place] for dynamic balance. Edu patient on managment of essential tremor through mindfulness meditation and yoga in addition to her medication. Recommending home with home health and family assist due to being near baseline but still demosntrating strength and balance deficits. Will continue to follow throughout her length of stay.     Time Calculation:    PT Charges     Row Name 10/06/22 6626             Time Calculation    Start Time 1514  -OD      Stop Time 1555  -OD      Time Calculation (min) 41 min  -OD      PT Received On 10/06/22  -OD      PT - Next Appointment 10/07/22  -OD      PT Goal Re-Cert Due Date 10/20/22  -OD              Time Calculation- PT    Total Timed Code Minutes- PT 0 minute(s)  -OD             User Key  (r) = Recorded By, (t) = Taken By, (c) = Cosigned By    Initials Name Provider Type    OD Molly Thornton, PT Student PT Student              Therapy Charges for Today     Code Description Service Date Service Provider Modifiers Qty    [id number] HC PT EVAL MOD COMPLEXITY 4 10/6/2022 Molly Thornton, PT Student GP 1          PT G-Codes  Outcome Measure Options: AM-PAC 6 Clicks Basic Mobility (PT)  AM-PAC 6 Clicks Score (PT): 22    Molly Thornton, PT Student  10/6/2022

## 2022-10-06 NOTE — CONSULTS
"    NEPHROLOGY CONSULTATION-----[facility]    [facility]  [phone]  Patricio Danielson MD    Patient Care Team:  Dave Esparza as PCP - General (Preventative Medicine)  Patricio Danielson MD as Consulting Physician (Nephrology)    CC/REASON FOR CONSULTATION: Hypokalemia    PHYSICIAN REQUESTING CONSULTATION: Dr. Meade anxiety CHF hypertension    History of Present Illness  76-year-old female with past medical history of hypertension, CHF, presented to the ER with complaints of vomiting diarrhea.  She was noted have a potassium of 2.6 on admission.  She was replaced with potassium.  Her potassium is still low at 3.1 now.  Her CO2 was initially low at 19 now improved.  She says her diarrhea is improving.  She is also had some dysuria.  No chest pain or shortness of breath.  She was on losartan, Lasix and oral potassium prior to admission.    Review of Systems   As noted above otherwise 10 systems reviewed and were negative.    Past Medical History:   Diagnosis Date   • Anxiety    • CHF (congestive heart failure) (Formerly [facility])    • Depression    • Diabetes mellitus (HCC)    • Hyperlipidemia    • Hypertension    • Panic disorder     \"panic attacks\"   • Tremors of nervous system     \"essential tremors\"       Past Surgical History:   Procedure Laterality Date   • CORONARY ANGIOPLASTY WITH STENT PLACEMENT         Family History   Problem Relation Age of Onset   • Depression Sister    • Suicidality Other         suicided   • Alcohol abuse Other    • Alcohol abuse Daughter    • Depression Other        Social History     Tobacco Use   • Smoking status: Never Smoker   • Smokeless tobacco: Never Used   Vaping Use   • Vaping Use: Never used   Substance Use Topics   • Alcohol use: Not Currently   • Drug use: No       Home Meds:   Medications Prior to Admission   Medication Sig Dispense Refill Last Dose   • Accu-Chek Guide test strip       • ALPRAZolam (XANAX) 1 MG tablet TAKE 1 TABLET BY " MOUTH THREE TIMES DAILY AS NEEDED FOR ANXIETY (BOTTLE) 90 tablet 0    • ALPRAZolam (XANAX) 1 MG tablet Take 1 mg by mouth.      • atorvastatin (LIPITOR) 40 MG tablet Take 40 mg by mouth Daily.      • atorvastatin (LIPITOR) 40 MG tablet Take 1 tablet by mouth every night at bedtime.      • Empagliflozin (Jardiance) 10 MG tablet Take 1 tablet by mouth Daily.      • fludrocortisone 0.1 MG tablet Take 0.1 mg by mouth Daily.      • fludrocortisone 0.1 MG tablet Take 1 tablet by mouth Every Morning.      • FLUoxetine (PROzac) 20 MG capsule Take 20 mg by mouth Daily.      • FLUoxetine (PROzac) 20 MG capsule Take 1 capsule by mouth Every Morning.      • furosemide (LASIX) 20 MG tablet Take 1 tablet by mouth Daily. 60 tablet 0    • glucose blood (Accu-Chek Guide) test strip       • insulin NPH-insulin regular (humuLIN 70/30,novoLIN 70/30) (70-30) 100 UNIT/ML injection Inject 20 Units under the skin into the appropriate area as directed 2 (Two) Times a Day With Meals.      • linaclotide (LINZESS) 145 MCG capsule capsule Take 145 mcg by mouth Daily.      • losartan (COZAAR) 25 MG tablet Take 1 tablet by mouth Daily. 30 tablet 0    • losartan (COZAAR) 25 MG tablet Take 25 mg by mouth Daily.      • metoprolol succinate XL (TOPROL-XL) 50 MG 24 hr tablet Take 50 mg by mouth Daily.      • nitroglycerin (NITROSTAT) 0.4 MG SL tablet Place 1 tablet under the tongue Every 5 (Five) Minutes As Needed for Chest Pain. Take no more than 3 doses in 15 minutes. 25 tablet 0    • NovoLIN 70/30 FlexPen (70-30) 100 UNIT/ML suspension pen-injector       • nystatin (MYCOSTATIN) 100,000 unit/mL suspension Take 500,000 Units by mouth 4 (Four) Times a Day.      • potassium chloride (K-DUR,KLOR-CON) 20 MEQ CR tablet Take 1 tablet by mouth Every Morning.      • potassium chloride (K-DUR,KLOR-CON) 20 MEQ CR tablet Take 20 mEq by mouth Daily.      • potassium chloride (K-DUR,KLOR-CON) 20 MEQ CR tablet       • potassium chloride ER (K-TAB) 20 MEQ tablet  controlled-release ER tablet Take 20 mEq by mouth Daily.      • ticagrelor (BRILINTA) 90 MG tablet tablet Take 90 mg by mouth 2 (Two) Times a Day.      • tiZANidine (ZANAFLEX) 2 MG tablet       • Unifine SafeControl Pen Needle 30G X 5 MM misc           Scheduled Meds:  [START ON 10/7/2022] cefTRIAXone (ROCEPHIN) 1GM IVPB in 100 mL NS (MBP), 1 g, Intravenous, Q24H  heparin (porcine), 5,000 Units, Subcutaneous, Q8H  insulin lispro, 0-9 Units, Subcutaneous, TID AC  sodium chloride, 10 mL, Intravenous, Q12H  sodium chloride, 10 mL, Intravenous, Q12H        Continuous Infusions:       PRN Meds:  •  acetaminophen  •  Calcium Gluconate-NaCl **AND** calcium gluconate **AND** Calcium, Ionized  •  dextrose  •  dextrose  •  docusate sodium  •  glucagon (human recombinant)  •  hydrALAZINE  •  labetalol  •  magnesium sulfate **OR** magnesium sulfate **OR** magnesium sulfate  •  ondansetron  •  oxyCODONE  •  polyethylene glycol  •  potassium & sodium phosphates **OR** potassium & sodium phosphates  •  potassium chloride  •  potassium chloride  •  prochlorperazine  •  sodium chloride  •  sodium chloride  •  sodium chloride    Allergies:  Morphine    OBJECTIVE    Vital Signs  Temp:  [98.4 °F (36.9 °C)-98.8 °F (37.1 °C)] 98.6 °F (37 °C)  Heart Rate:  [72-96] 96  Resp:  [18-20] 18  BP: (135-183)/(59-78) 145/78    I/O this shift:  In: 725 [P.O.:625; IV Piggyback:100]  Out: -   No intake/output data recorded.    Physical Exam:  General Appearance: alert, appears stated age and cooperative  Head: normocephalic, without obvious abnormality and atraumatic  Eyes: conjunctivae and sclerae normal and no icterus  Neck: supple and no JVD  Lungs: clear to auscultation and respirations regular  Heart: regular rhythm & normal rate and normal S1, S2  Chest Wall: no abnormalities observed  Abdomen: normal bowel sounds and soft, nontender  Extremities: moves extremities well, no edema, no cyanosis  Skin: no bleeding, bruising or rash  Neurologic:  Alert, and oriented. No focal deficits    Results Review:    I reviewed the patient's new clinical results.    WBC WBC   Date Value Ref Range Status   10/06/2022 9.20 3.40 - 10.80 10*3/mm3 Final   10/06/2022 12.37 (H) 3.40 - 10.80 10*3/mm3 Final      HGB Hemoglobin   Date Value Ref Range Status   10/06/2022 10.9 (L) 12.0 - 15.9 g/dL Final   10/06/2022 12.0 12.0 - 15.9 g/dL Final      HCT Hematocrit   Date Value Ref Range Status   10/06/2022 34.3 34.0 - 46.6 % Final   10/06/2022 37.3 34.0 - 46.6 % Final      Platelets No results found for: LABPLAT   MCV MCV   Date Value Ref Range Status   10/06/2022 90.8 79.0 - 97.0 fL Final   10/06/2022 87.4 79.0 - 97.0 fL Final          Sodium Sodium   Date Value Ref Range Status   10/06/2022 140 136 - 145 mmol/L Final   10/06/2022 137 136 - 145 mmol/L Final      Potassium Potassium   Date Value Ref Range Status   10/06/2022 3.1 (L) 3.5 - 5.2 mmol/L Final   10/06/2022 2.6 (C) 3.5 - 5.2 mmol/L Final      Chloride Chloride   Date Value Ref Range Status   10/06/2022 104 98 - 107 mmol/L Final   10/06/2022 104 98 - 107 mmol/L Final      CO2 CO2   Date Value Ref Range Status   10/06/2022 25.0 22.0 - 29.0 mmol/L Final   10/06/2022 19.2 (L) 22.0 - 29.0 mmol/L Final      BUN BUN   Date Value Ref Range Status   10/06/2022 12 8 - 23 mg/dL Final   10/06/2022 13 8 - 23 mg/dL Final      Creatinine Creatinine   Date Value Ref Range Status   10/06/2022 0.89 0.57 - 1.00 mg/dL Final   10/06/2022 0.94 0.57 - 1.00 mg/dL Final      Calcium Calcium   Date Value Ref Range Status   10/06/2022 8.4 (L) 8.6 - 10.5 mg/dL Final   10/06/2022 8.8 8.6 - 10.5 mg/dL Final      PO4 No results found for: CAPO4   Albumin Albumin   Date Value Ref Range Status   10/06/2022 3.20 (L) 3.50 - 5.20 g/dL Final   10/06/2022 3.58 3.50 - 5.20 g/dL Final      Magnesium Magnesium   Date Value Ref Range Status   10/06/2022 1.9 1.6 - 2.4 mg/dL Final   10/06/2022 1.5 (L) 1.6 - 2.4 mg/dL Final      Uric Acid No results found for:  URICACID       Imaging Results (Last 72 Hours)     Procedure Component Value Units Date/Time    CT Abdomen Pelvis Without Contrast [[id number]] Collected: 10/06/22 0244     Updated: 10/06/22 0252    Narrative:      Exam: CT abdomen and pelvis without contrast    Date: October 6, 2022    History: Abdominal pain, vomiting    Comparison: June 14, 2018, August 18, 2022    Technique: Contiguous axial CT images obtained of the abdomen and pelvis without contrast. Additionally, sagittal and coronal reformatted images were obtained.  CT dose lowering techniques were used, to include: automated exposure control, adjustment for   patient size, and or use of iterative reconstruction.    Findings:    Lung bases: The heart size is prominent. There is an element of interlobular septal thickening. There are small bilateral pleural effusions.    Liver: The liver is mildly hypodense.    Spleen: There are numerous calcified splenic granulomas.    Pancreas: Normal.    Adrenal glands: Normal.    Gallbladder: Normal.    Kidneys: There is no hydronephrosis or obstructive uropathy.    Abdominal mesentery: There are nonenlarged lymph nodes in the central abdominal mesentery. There is no pathologic intra-abdominal mass or adenopathy.    Bowel loops: There are scattered colonic diverticula. The appendix is normal. There is no small bowel obstruction.    CT PELVIS:  There is a small fat-containing right inguinal hernia. There is edematous urinary bladder wall thickening.    Abdominal aorta: There are moderate atherosclerotic changes. There is no aneurysm.    Osseous structures: The osseous structures are demineralized. There is redemonstration of chronic appearing compression deformities, unchanged. There is vertebroplasty change within L4. There are moderate degenerative changes involving both hips. No   acute fractures are identified.      Impression:      1. Borderline cardiomegaly with an element of pulmonary edema and small bilateral  pleural effusions. Correlate for fluid overload or congestive heart failure.  2. Diverticulosis.  3. Edematous urinary bladder wall thickening. Correlate for potential cystitis.  4. Osteopenia with redemonstration of multiple compression deformities in the spine.  5. Mild hepatic steatosis.  6. Additional incidental and chronic findings as above.    Slot 63    Electronically signed by:  Nikita Gibbs M.D.    10/6/2022 12:51 AM            Results for orders placed during the hospital encounter of 09/29/22    XR Forearm 2 View Right    Narrative  DATE OF EXAM:  9/29/2022 11:13 PM    PROCEDURE:  XR FOREARM 2 VW RIGHT-    INDICATIONS:  fall    COMPARISON:  No Comparisons Available    TECHNIQUE:  A minimum of two routine standard radiographic views were obtained of  the right forearm.      FINDINGS:  Evidence of prior right distal ulnar fracture. There is diffuse  osteopenia. No acute fracture or dislocation is identified. Degenerative  changes at the elbow and wrist.    Impression  1. Prior distal ulnar fracture, but no acute fracture or dislocation.    Electronically Signed By-Kwesi Machado MD On:9/30/2022 7:41 AM  This report was finalized on [id number] by  Kwesi Machado MD.      XR Wrist 3+ View Right    Narrative  DATE OF EXAM:  9/29/2022 7:46 PM    PROCEDURE:  XR WRIST 3+ VW RIGHT-    INDICATIONS:  injury    COMPARISON:  No Comparisons Available    TECHNIQUE:  A minimum of three radiologic views of the right wrist were obtained.    FINDINGS:  Distal radius and ulna are intact. The bones are osteopenic. Negative  for fracture. No dislocation. 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    Impression  Negative for fracture.    Electronically Signed By-Finn Ashton MD On:9/29/2022 9:01 PM  This report was finalized on [id number] by  Finn Ashton MD.      Results for orders placed during the hospital encounter of 09/13/22    XR Chest 1 View    Narrative  DATE OF EXAM:  9/13/2022 8:20  PM    PROCEDURE:  XR CHEST 1 VW-    INDICATIONS:  shortness of breath    COMPARISON:  05/25/2022    TECHNIQUE:  Single radiographic AP view of the chest was obtained.    FINDINGS:  The heart is not definitely enlarged. The lungs seem clear. There are no  pleural effusions. There is an old fracture deformity of the left  clavicle.    Impression  1.     No acute pulmonary process.  2.     Old fracture deformity left clavicle.    Electronically Signed By-Munir Cote MD On:9/13/2022 8:52 PM  This report was finalized on [id number] by  Munir Cote MD.            ASSESSMENT / PLAN      Diarrhea, unspecified type      · Hypokalemia-likely due to renal wasting and ongoing diarrhea.  Her potassium got quite low despite losartan and oral potassium.  We will check for renal wasting.  · Metabolic acidosis-due to GI loss.  Bicarb is improved now.  · Hypertension  · Diarrhea  · History congestive heart failure-appears compensated  · UTI-on Rocephin.  Cultures pending  · Diabetes    Replace potassium check random urine potassium and creatinine. Check serum magnesium.  Renal function stable  Closing monitor electrolytes  Once potassium repleted, would resume her diuretics.      I discussed the patient's findings and my recommendations with patient, nursing staff and consulting provider    Will follow along closely. Thank you for allowing us to see this patient in renal consultation.    [facility]  [phone]  MD Patricio Luna MD  10/06/22  18:17 EDT

## 2022-10-06 NOTE — PLAN OF CARE
Goal Outcome Evaluation:           Progress: no change  Outcome Evaluation: Patient admitted for hypokalemia and UTI. Patient complaining of pain in perineum and dysuria. Patient is very excoriated in perineum. She lives at home with her daughter. She gets her medicine through [facility]. Replacing electrolytes and giving antibiotics for UTI. Patient recently had covid in september. She is on room air and does not wear oxygen at home. Alert and oriented x4. will continue to monitor.

## 2022-10-06 NOTE — ED NOTES
Pt reports to the Ed for c/o abd pain and vomiting x24 hours.  Pt states that she has been having multiple episodes of vomiting.  Also c/o of dysuria x1 week.  Told provider that she has also had diarrhea as well.  abd is non tender.  Pt has known tremor during assessment.

## 2022-10-06 NOTE — PLAN OF CARE
Goal Outcome Evaluation:  Plan of Care Reviewed With: patient        Progress: improving  Outcome Evaluation: Patient presents to [facility] after episode of diarrhea and vomitting. Patient is diagnosed with a UTI and diarrhea with PMH of essential tremor and anxiety. Patient's prior level of function is living in apartment with daughter, where she receives assist for driving, shopping, and cooking, and she is independent with household mobility and ADL's. There are 5 steps to get into her apartment, and 6 steps once inside. Patient reports falling last week by slipping on rug and scraping her wrist, but she denies other injuries. She does not use an AD with gait, but she does own a rollator at home if need be. Patient currently is modified independent with bed mobility, SBA with sit to stands and gait, and requires CGA to [place] for dynamic balance. Edu patient on managment of essential tremor through mindfulness meditation and yoga in addition to her medication. Recommending home with home health and family assist due to being near baseline but still demosntrating strength and balance deficits. Will continue to follow throughout her length of stay.

## 2022-10-06 NOTE — ED PROVIDER NOTES
"Subjective   History of Present Illness  76 yof complains of vomiting, diarrhea all day today. Pt also notes she has had dysuria for the past few days as well. Pt denies fever, chills, sick contacts or bad food exposure.         Review of Systems   Constitutional: Negative.    Respiratory: Negative.    Cardiovascular: Negative.    Gastrointestinal: Positive for diarrhea and vomiting.   Genitourinary: Positive for dysuria.   Musculoskeletal: Negative.    Neurological: Positive for headaches.   All other systems reviewed and are negative.      Past Medical History:   Diagnosis Date   • Anxiety    • CHF (congestive heart failure) (Formerly [facility])    • Depression    • Diabetes mellitus ([facility])    • Hyperlipidemia    • Hypertension    • Panic disorder     \"panic attacks\"   • Tremors of nervous system     \"essential tremors\"       Allergies   Allergen Reactions   • Gabapentin Hallucinations   • Morphine Hallucinations       Past Surgical History:   Procedure Laterality Date   • CORONARY ANGIOPLASTY WITH STENT PLACEMENT         Family History   Problem Relation Age of Onset   • Depression Sister    • Suicidality Other         suicided   • Alcohol abuse Other    • Alcohol abuse Daughter    • Depression Other        Social History     Socioeconomic History   • Marital status:    Tobacco Use   • Smoking status: Never Smoker   • Smokeless tobacco: Never Used   Vaping Use   • Vaping Use: Never used   Substance and Sexual Activity   • Alcohol use: Not Currently   • Drug use: No   • Sexual activity: Defer           Objective   Physical Exam  Vitals reviewed.   Constitutional:       Appearance: Normal appearance.   HENT:      Head: Normocephalic and atraumatic.      Nose: Nose normal.      Mouth/Throat:      Mouth: Mucous membranes are moist.      Pharynx: Oropharynx is clear.   Eyes:      Extraocular Movements: Extraocular movements intact.      Pupils: Pupils are equal, round, and reactive to light.   Cardiovascular:      Rate and " Rhythm: Normal rate and regular rhythm.      Pulses: Normal pulses.      Heart sounds: Normal heart sounds.   Pulmonary:      Effort: Pulmonary effort is normal.      Breath sounds: Normal breath sounds.   Abdominal:      General: Abdomen is flat. Bowel sounds are normal.      Palpations: Abdomen is soft.   Musculoskeletal:         General: Normal range of motion.      Cervical back: Normal range of motion and neck supple.   Skin:     General: Skin is warm and dry.      Capillary Refill: Capillary refill takes less than 2 seconds.   Neurological:      General: No focal deficit present.      Mental Status: She is alert and oriented to person, place, and time.   Psychiatric:         Mood and Affect: Mood normal.         ECG 12 Lead      Date/Time: 10/6/2022 2:22 AM  Performed by: Sb Haque MD  Authorized by: Sb Haque MD   Interpreted by physician  Comparison: compared with previous ECG from 9/17/2022  Similar to previous ECG  Rhythm: sinus rhythm  Rate: normal  QRS axis: left  Conduction: left bundle branch block  Clinical impression: abnormal ECG                 ED Course    Pt to be admitted for hypokalemia, diarrhea, and UTI. Pt and family are agreeable with plan.                                        MDM    Final diagnoses:   Diarrhea, unspecified type   Hypokalemia   Urinary tract infection without hematuria, site unspecified       ED Disposition  ED Disposition     ED Disposition   Decision to Admit    Condition   --    Comment   Level of Care: Telemetry [5]   Admitting Physician: MARLA GALLARDO [[id number]]   Attending Physician: SB HAQUE [[id number]]               No follow-up provider specified.       Medication List      No changes were made to your prescriptions during this visit.          Sb Haque MD  10/06/22 0704

## 2022-10-06 NOTE — H&P
"    [facility] Medicine Services      Patient Name: Nadege Barrow  : 1945  MRN: [id number]  Primary Care Physician:  Dave Esparza  Date of admission: 10/6/2022      Subjective      Chief Complaint:  Vomiting and diarrhea    History of Present Illness: Nadege Barrow is a 76 y.o. female who presented to [facility] on 10/6/2022 complaining of vomiting and diarrhea for the last 24 hours  Patient also complains of dysuria without fever chills.  She reports headache.As well  She is chronic medical problems including CHF depression diabetes hypertension panic disorder essential tremors    Work-up in the ER showed hypokalemia with potassium 2.6 hyperglycemia 233 elevated proBNP 8988, suspect metabolic acidosis with bicarb of 19 magnesium 1.5 normal lipase lactate and elevated white count 12.3 with neutrophilic predominance.  Urinalysis shows 500 glucose 40 ketones moderate blood 30 protein trace leukocyte CT abdomen pelvis shows edematous urinary bladder wall thickening with potential cystitis, osteopenia with multiple compression fractures of the spine, hepatic steatosis, diverticulosis, borderline cardiomegaly and pulmonary edema small bilateral pleural effusion small fat containing right inguinal hernia  Of note the patient denies any recent antibiotic use and denies any abdominal pain except some cramps on and off      Patient was given IV Rocephin Pepcid magnesium Zofran oral potassium admitted for further care      Review of Systems   All other systems reviewed and are negative.        Personal History     Past Medical History:   Diagnosis Date   • Anxiety    • CHF (congestive heart failure) (HCC)    • Depression    • Diabetes mellitus (HCC)    • Hyperlipidemia    • Hypertension    • Panic disorder     \"panic attacks\"   • Tremors of nervous system     \"essential tremors\"       Past Surgical History:   Procedure Laterality Date   • CORONARY ANGIOPLASTY WITH STENT PLACEMENT         Family " History: family history includes Alcohol abuse in her daughter and another family member; Depression in her sister and another family member; Suicidality in an other family member. Otherwise pertinent FHx was reviewed and not pertinent to current issue.    Social History:  reports that she has never smoked. She has never used smokeless tobacco. She reports previous alcohol use. She reports that she does not use drugs.    Home Medications:  Prior to Admission Medications     Prescriptions Last Dose Informant Patient Reported? Taking?    Accu-Chek Guide test strip   Yes No    ALPRAZolam (XANAX) 1 MG tablet   No No    TAKE 1 TABLET BY MOUTH THREE TIMES DAILY AS NEEDED FOR ANXIETY (BOTTLE)    ALPRAZolam (XANAX) 1 MG tablet   Yes No    Take 1 mg by mouth.    atorvastatin (LIPITOR) 40 MG tablet   Yes No    Take 40 mg by mouth Daily.    atorvastatin (LIPITOR) 40 MG tablet   Yes No    Take 1 tablet by mouth every night at bedtime.    Empagliflozin (Jardiance) 10 MG tablet   Yes No    Take 1 tablet by mouth Daily.    fludrocortisone 0.1 MG tablet   Yes No    Take 0.1 mg by mouth Daily.    fludrocortisone 0.1 MG tablet   Yes No    Take 1 tablet by mouth Every Morning.    FLUoxetine (PROzac) 20 MG capsule   Yes No    Take 1 capsule by mouth Every Morning.    FLUoxetine (PROzac) 20 MG capsule   Yes No    Take 20 mg by mouth Daily.    furosemide (LASIX) 20 MG tablet   No No    Take 1 tablet by mouth Daily.    glucose blood (Accu-Chek Guide) test strip   Yes No    insulin NPH-insulin regular (humuLIN 70/30,novoLIN 70/30) (70-30) 100 UNIT/ML injection   Yes No    Inject 20 Units under the skin into the appropriate area as directed 2 (Two) Times a Day With Meals.    linaclotide (LINZESS) 145 MCG capsule capsule   Yes No    Take 145 mcg by mouth Daily.    losartan (COZAAR) 25 MG tablet   No No    Take 1 tablet by mouth Daily.    losartan (COZAAR) 25 MG tablet   Yes No    Take 25 mg by mouth Daily.    metoprolol succinate XL  (TOPROL-XL) 50 MG 24 hr tablet   Yes No    Take 50 mg by mouth Daily.    nitroglycerin (NITROSTAT) 0.4 MG SL tablet   No No    Place 1 tablet under the tongue Every 5 (Five) Minutes As Needed for Chest Pain. Take no more than 3 doses in 15 minutes.    NovoLIN 70/30 FlexPen (70-30) 100 UNIT/ML suspension pen-injector   Yes No    nystatin (MYCOSTATIN) 100,000 unit/mL suspension   Yes No    Take 500,000 Units by mouth 4 (Four) Times a Day.    potassium chloride (K-DUR,KLOR-CON) 20 MEQ CR tablet   Yes No    Take 1 tablet by mouth Every Morning.    potassium chloride (K-DUR,KLOR-CON) 20 MEQ CR tablet   Yes No    Take 20 mEq by mouth Daily.    potassium chloride (K-DUR,KLOR-CON) 20 MEQ CR tablet   Yes No    potassium chloride ER (K-TAB) 20 MEQ tablet controlled-release ER tablet   Yes No    Take 20 mEq by mouth Daily.    ticagrelor (BRILINTA) 90 MG tablet tablet   Yes No    Take 90 mg by mouth 2 (Two) Times a Day.    tiZANidine (ZANAFLEX) 2 MG tablet   Yes No    Unifine SafeControl Pen Needle 30G X 5 MM misc   Yes No            Allergies:  Allergies   Allergen Reactions   • Gabapentin Hallucinations   • Morphine Hallucinations       Objective      Vitals:   Temp:  [98.6 °F (37 °C)] 98.6 °F (37 °C)  Heart Rate:  [72-86] 73  Resp:  [20] 20  BP: (135-183)/(59-75) 135/75  Flow (L/min):  [2] 2    Physical Exam  Vitals and nursing note reviewed.   Constitutional:       General: She is not in acute distress.     Appearance: Normal appearance. She is well-developed. She is not ill-appearing, toxic-appearing or diaphoretic.   HENT:      Head: Normocephalic and atraumatic.      Right Ear: Ear canal and external ear normal.      Left Ear: Ear canal and external ear normal.      Nose: Nose normal. No congestion or rhinorrhea.      Mouth/Throat:      Mouth: Mucous membranes are moist.      Pharynx: No oropharyngeal exudate.   Eyes:      General: No scleral icterus.        Right eye: No discharge.         Left eye: No discharge.       Extraocular Movements: Extraocular movements intact.      Conjunctiva/sclera: Conjunctivae normal.      Pupils: Pupils are equal, round, and reactive to light.   Neck:      Thyroid: No thyromegaly.      Vascular: No carotid bruit or JVD.      Trachea: No tracheal deviation.   Cardiovascular:      Rate and Rhythm: Normal rate and regular rhythm.      Pulses: Normal pulses.      Heart sounds: Normal heart sounds. No murmur heard.    No friction rub. No gallop.   Pulmonary:      Effort: Pulmonary effort is normal. No respiratory distress.      Breath sounds: Normal breath sounds. No stridor. No wheezing, rhonchi or rales.   Chest:      Chest wall: No tenderness.   Abdominal:      General: Bowel sounds are normal. There is no distension.      Palpations: Abdomen is soft. There is no mass.      Tenderness: There is no abdominal tenderness. There is no guarding or rebound.      Hernia: No hernia is present.   Musculoskeletal:         General: No swelling, tenderness, deformity or signs of injury. Normal range of motion.      Cervical back: Normal range of motion and neck supple. No rigidity. No muscular tenderness.      Right lower leg: No edema.      Left lower leg: No edema.   Lymphadenopathy:      Cervical: No cervical adenopathy.   Skin:     General: Skin is warm and dry.      Coloration: Skin is not jaundiced or pale.      Findings: No bruising, erythema or rash.   Neurological:      General: No focal deficit present.      Mental Status: She is alert and oriented to person, place, and time. Mental status is at baseline.      Cranial Nerves: No cranial nerve deficit.      Sensory: No sensory deficit.      Motor: No weakness or abnormal muscle tone.      Coordination: Coordination normal.   Psychiatric:         Mood and Affect: Mood normal.         Behavior: Behavior normal.         Thought Content: Thought content normal.         Judgment: Judgment normal.           Result Review    Result Review:  I have personally  reviewed the results from the time of this admission to 10/6/2022 08:38 EDT and agree with these findings:  [x]  Laboratory  [x]  Microbiology  [x]  Radiology  [x]  EKG/Telemetry   [x]  Cardiology/Vascular   []  Pathology  []  Old records  []  Other:  Most notable findings include: As above      Assessment & Plan        Active Hospital Problems:  There are no active hospital problems to display for this patient.    Plan:   Severe hypokalemiaWith old left bundle branch block on EKG  Replace electrolytes and to reassess      Gastroenteritis possibly viral with nausea vomiting and diarrhea  GI panel/C. difficile ordered given leukocytosis  Mild leukocytosis noted    Suspect metabolic acidosis secondary to diarrhea  May consider oral bicarb plan potassium level improved    Dysuria with possible UTI started on Rocephin pending cultures  Continue Rocephin  Follow cultures    History of CHF with moderately reduced ejection fraction 40 to 45% on echo February 2022 with mild aortic stenosis and moderate tricuspid regurgitation  Avoid IV fluids unless diarrhea is overwhelming  Encourage oral intake if possible  Elevated proBNP noted    DM2 with hyperglycemia  Start sliding scale insulin  Resume home regimen lower dose to avoid hypoglycemia      Panic disorder  Essential tremors  Depression  Home medications reconciliation pending      Full code  DVT prophylaxis: Heparin subcutaneous  No DVT prophylaxis order currently exists.    CODE STATUS:       Admission Status:  I believe this patient meets observation status.    I discussed the patient's findings and my recommendations with patient and nursing staff.    This patient has been examined wearing appropriate Personal Protective Equipment and discussed with hospital infection control department. 10/06/22      Signature: Electronically signed by Juanpablo Meade MD, 10/06/22, 1:53 PM EDT.  [facility] Hospitalist Team

## 2022-10-07 LAB
ALBUMIN SERPL-MCNC: 3 G/DL (ref 3.5–5.2)
ALBUMIN/GLOB SERPL: 1.2 G/DL
ALP SERPL-CCNC: 62 U/L (ref 39–117)
ALT SERPL W P-5'-P-CCNC: <5 U/L (ref 1–33)
ANION GAP SERPL CALCULATED.3IONS-SCNC: 12 MMOL/L (ref 5–15)
AST SERPL-CCNC: 12 U/L (ref 1–32)
BACTERIA UR QL AUTO: ABNORMAL /HPF
BASOPHILS # BLD AUTO: 0 10*3/MM3 (ref 0–0.2)
BASOPHILS NFR BLD AUTO: 0.2 % (ref 0–1.5)
BILIRUB SERPL-MCNC: 0.6 MG/DL (ref 0–1.2)
BILIRUB UR QL STRIP: NEGATIVE
BUN SERPL-MCNC: 10 MG/DL (ref 8–23)
BUN/CREAT SERPL: 12.2 (ref 7–25)
CALCIUM SPEC-SCNC: 8.4 MG/DL (ref 8.6–10.5)
CHLORIDE SERPL-SCNC: 102 MMOL/L (ref 98–107)
CK SERPL-CCNC: 51 U/L (ref 20–180)
CLARITY UR: CLEAR
CO2 SERPL-SCNC: 23 MMOL/L (ref 22–29)
COLOR UR: YELLOW
CREAT SERPL-MCNC: 0.82 MG/DL (ref 0.57–1)
CREAT UR-MCNC: 53.5 MG/DL
CREAT UR-MCNC: 54 MG/DL
DEPRECATED RDW RBC AUTO: 44.2 FL (ref 37–54)
EGFRCR SERPLBLD CKD-EPI 2021: 74.2 ML/MIN/1.73
EOSINOPHIL # BLD AUTO: 0.1 10*3/MM3 (ref 0–0.4)
EOSINOPHIL NFR BLD AUTO: 1.3 % (ref 0.3–6.2)
ERYTHROCYTE [DISTWIDTH] IN BLOOD BY AUTOMATED COUNT: 14.1 % (ref 12.3–15.4)
GLOBULIN UR ELPH-MCNC: 2.6 GM/DL
GLUCOSE BLDC GLUCOMTR-MCNC: 118 MG/DL (ref 70–105)
GLUCOSE BLDC GLUCOMTR-MCNC: 122 MG/DL (ref 70–105)
GLUCOSE BLDC GLUCOMTR-MCNC: 124 MG/DL (ref 70–105)
GLUCOSE BLDC GLUCOMTR-MCNC: 169 MG/DL (ref 70–105)
GLUCOSE SERPL-MCNC: 144 MG/DL (ref 65–99)
GLUCOSE UR STRIP-MCNC: NEGATIVE MG/DL
HBA1C MFR BLD: 7 % (ref 3.5–5.6)
HCT VFR BLD AUTO: 32.1 % (ref 34–46.6)
HGB BLD-MCNC: 10.3 G/DL (ref 12–15.9)
HGB UR QL STRIP.AUTO: ABNORMAL
HYALINE CASTS UR QL AUTO: ABNORMAL /LPF
INR PPP: 1.09 (ref 0.93–1.1)
KETONES UR QL STRIP: NEGATIVE
LEUKOCYTE ESTERASE UR QL STRIP.AUTO: ABNORMAL
LYMPHOCYTES # BLD AUTO: 1.8 10*3/MM3 (ref 0.7–3.1)
LYMPHOCYTES NFR BLD AUTO: 23.7 % (ref 19.6–45.3)
MAGNESIUM SERPL-MCNC: 1.8 MG/DL (ref 1.6–2.4)
MCH RBC QN AUTO: 29.5 PG (ref 26.6–33)
MCHC RBC AUTO-ENTMCNC: 32.1 G/DL (ref 31.5–35.7)
MCV RBC AUTO: 91.9 FL (ref 79–97)
MONOCYTES # BLD AUTO: 0.7 10*3/MM3 (ref 0.1–0.9)
MONOCYTES NFR BLD AUTO: 9.9 % (ref 5–12)
NEUTROPHILS NFR BLD AUTO: 4.9 10*3/MM3 (ref 1.7–7)
NEUTROPHILS NFR BLD AUTO: 64.9 % (ref 42.7–76)
NITRITE UR QL STRIP: NEGATIVE
NRBC BLD AUTO-RTO: 0 /100 WBC (ref 0–0.2)
PH UR STRIP.AUTO: 6 [PH] (ref 5–8)
PHOSPHATE SERPL-MCNC: 2.5 MG/DL (ref 2.5–4.5)
PLATELET # BLD AUTO: 160 10*3/MM3 (ref 140–450)
PMV BLD AUTO: 10.1 FL (ref 6–12)
POTASSIUM SERPL-SCNC: 3.5 MMOL/L (ref 3.5–5.2)
POTASSIUM UR-SCNC: 16.9 MMOL/L
PROT SERPL-MCNC: 5.6 G/DL (ref 6–8.5)
PROT UR QL STRIP: NEGATIVE
PROTHROMBIN TIME: 11.2 SECONDS (ref 9.6–11.7)
RBC # BLD AUTO: 3.5 10*6/MM3 (ref 3.77–5.28)
RBC # UR STRIP: ABNORMAL /HPF
REF LAB TEST METHOD: ABNORMAL
SODIUM SERPL-SCNC: 137 MMOL/L (ref 136–145)
SP GR UR STRIP: 1.01 (ref 1–1.03)
SQUAMOUS #/AREA URNS HPF: ABNORMAL /HPF
STARCH GRANULES URNS QL MICRO: ABNORMAL /HPF
T4 FREE SERPL-MCNC: 1.04 NG/DL (ref 0.93–1.7)
TSH SERPL DL<=0.05 MIU/L-ACNC: 3.86 UIU/ML (ref 0.27–4.2)
UROBILINOGEN UR QL STRIP: ABNORMAL
WBC # UR STRIP: ABNORMAL /HPF
WBC NRBC COR # BLD: 7.5 10*3/MM3 (ref 3.4–10.8)
YEAST URNS QL MICRO: ABNORMAL /HPF

## 2022-10-07 PROCEDURE — 80053 COMPREHEN METABOLIC PANEL: CPT | Performed by: INTERNAL MEDICINE

## 2022-10-07 PROCEDURE — 85610 PROTHROMBIN TIME: CPT | Performed by: INTERNAL MEDICINE

## 2022-10-07 PROCEDURE — 63710000001 INSULIN LISPRO (HUMAN) PER 5 UNITS: Performed by: INTERNAL MEDICINE

## 2022-10-07 PROCEDURE — 84439 ASSAY OF FREE THYROXINE: CPT | Performed by: INTERNAL MEDICINE

## 2022-10-07 PROCEDURE — 25010000002 HEPARIN (PORCINE) PER 1000 UNITS: Performed by: INTERNAL MEDICINE

## 2022-10-07 PROCEDURE — 25010000002 CEFTRIAXONE PER 250 MG: Performed by: INTERNAL MEDICINE

## 2022-10-07 PROCEDURE — 82962 GLUCOSE BLOOD TEST: CPT

## 2022-10-07 PROCEDURE — 63710000001 DIPHENHYDRAMINE PER 50 MG: Performed by: INTERNAL MEDICINE

## 2022-10-07 PROCEDURE — 83735 ASSAY OF MAGNESIUM: CPT | Performed by: INTERNAL MEDICINE

## 2022-10-07 PROCEDURE — 83036 HEMOGLOBIN GLYCOSYLATED A1C: CPT | Performed by: INTERNAL MEDICINE

## 2022-10-07 PROCEDURE — 84443 ASSAY THYROID STIM HORMONE: CPT | Performed by: INTERNAL MEDICINE

## 2022-10-07 PROCEDURE — 25010000002 HYDRALAZINE PER 20 MG: Performed by: INTERNAL MEDICINE

## 2022-10-07 PROCEDURE — 87086 URINE CULTURE/COLONY COUNT: CPT | Performed by: INTERNAL MEDICINE

## 2022-10-07 PROCEDURE — 81001 URINALYSIS AUTO W/SCOPE: CPT | Performed by: INTERNAL MEDICINE

## 2022-10-07 PROCEDURE — 82550 ASSAY OF CK (CPK): CPT | Performed by: INTERNAL MEDICINE

## 2022-10-07 PROCEDURE — 85025 COMPLETE CBC W/AUTO DIFF WBC: CPT | Performed by: INTERNAL MEDICINE

## 2022-10-07 PROCEDURE — G0378 HOSPITAL OBSERVATION PER HR: HCPCS

## 2022-10-07 PROCEDURE — 84100 ASSAY OF PHOSPHORUS: CPT | Performed by: INTERNAL MEDICINE

## 2022-10-07 PROCEDURE — 96372 THER/PROPH/DIAG INJ SC/IM: CPT

## 2022-10-07 PROCEDURE — 96375 TX/PRO/DX INJ NEW DRUG ADDON: CPT

## 2022-10-07 PROCEDURE — 63710000001 INSULIN ISOPHANE & REGULAR PER 5 UNITS: Performed by: INTERNAL MEDICINE

## 2022-10-07 PROCEDURE — 96366 THER/PROPH/DIAG IV INF ADDON: CPT

## 2022-10-07 RX ORDER — NITROGLYCERIN 0.4 MG/1
0.4 TABLET SUBLINGUAL
Status: DISCONTINUED | OUTPATIENT
Start: 2022-10-07 | End: 2022-10-11 | Stop reason: HOSPADM

## 2022-10-07 RX ORDER — DIPHENHYDRAMINE HCL 25 MG
25 CAPSULE ORAL EVERY 6 HOURS PRN
Status: DISCONTINUED | OUTPATIENT
Start: 2022-10-07 | End: 2022-10-08

## 2022-10-07 RX ORDER — METOPROLOL SUCCINATE 50 MG/1
50 TABLET, EXTENDED RELEASE ORAL DAILY
Status: DISCONTINUED | OUTPATIENT
Start: 2022-10-07 | End: 2022-10-11 | Stop reason: HOSPADM

## 2022-10-07 RX ORDER — TIZANIDINE 4 MG/1
2 TABLET ORAL EVERY 8 HOURS PRN
Status: DISCONTINUED | OUTPATIENT
Start: 2022-10-07 | End: 2022-10-07

## 2022-10-07 RX ORDER — ATORVASTATIN CALCIUM 40 MG/1
40 TABLET, FILM COATED ORAL DAILY
Status: DISCONTINUED | OUTPATIENT
Start: 2022-10-07 | End: 2022-10-07 | Stop reason: SDUPTHER

## 2022-10-07 RX ORDER — FLUOXETINE HYDROCHLORIDE 20 MG/1
20 CAPSULE ORAL DAILY
Status: DISCONTINUED | OUTPATIENT
Start: 2022-10-07 | End: 2022-10-11 | Stop reason: HOSPADM

## 2022-10-07 RX ORDER — FLUDROCORTISONE ACETATE 0.1 MG/1
0.1 TABLET ORAL DAILY
Status: DISCONTINUED | OUTPATIENT
Start: 2022-10-07 | End: 2022-10-11 | Stop reason: HOSPADM

## 2022-10-07 RX ORDER — POTASSIUM CHLORIDE 20 MEQ/1
20 TABLET, EXTENDED RELEASE ORAL EVERY MORNING
Status: DISCONTINUED | OUTPATIENT
Start: 2022-10-07 | End: 2022-10-10

## 2022-10-07 RX ORDER — FUROSEMIDE 20 MG/1
20 TABLET ORAL DAILY
Status: DISCONTINUED | OUTPATIENT
Start: 2022-10-07 | End: 2022-10-11 | Stop reason: HOSPADM

## 2022-10-07 RX ORDER — ATORVASTATIN CALCIUM 40 MG/1
40 TABLET, FILM COATED ORAL DAILY
Status: DISCONTINUED | OUTPATIENT
Start: 2022-10-07 | End: 2022-10-11 | Stop reason: HOSPADM

## 2022-10-07 RX ORDER — FLUDROCORTISONE ACETATE 0.1 MG/1
100 TABLET ORAL EVERY MORNING
Status: DISCONTINUED | OUTPATIENT
Start: 2022-10-07 | End: 2022-10-07

## 2022-10-07 RX ORDER — ALPRAZOLAM 1 MG/1
1 TABLET ORAL NIGHTLY PRN
Status: DISCONTINUED | OUTPATIENT
Start: 2022-10-07 | End: 2022-10-11 | Stop reason: HOSPADM

## 2022-10-07 RX ORDER — LOSARTAN POTASSIUM 50 MG/1
50 TABLET ORAL
Status: DISCONTINUED | OUTPATIENT
Start: 2022-10-07 | End: 2022-10-07

## 2022-10-07 RX ORDER — LOSARTAN POTASSIUM 25 MG/1
25 TABLET ORAL
Status: DISCONTINUED | OUTPATIENT
Start: 2022-10-07 | End: 2022-10-10

## 2022-10-07 RX ADMIN — OXYCODONE 5 MG: 5 TABLET ORAL at 16:01

## 2022-10-07 RX ADMIN — Medication 10 ML: at 19:39

## 2022-10-07 RX ADMIN — Medication 10 ML: at 09:30

## 2022-10-07 RX ADMIN — INSULIN HUMAN 5 UNITS: 100 INJECTION, SUSPENSION SUBCUTANEOUS at 12:44

## 2022-10-07 RX ADMIN — DIPHENHYDRAMINE HYDROCHLORIDE 25 MG: 25 CAPSULE ORAL at 16:32

## 2022-10-07 RX ADMIN — LOSARTAN POTASSIUM 25 MG: 25 TABLET, FILM COATED ORAL at 12:44

## 2022-10-07 RX ADMIN — ALPRAZOLAM 1 MG: 1 TABLET ORAL at 17:38

## 2022-10-07 RX ADMIN — FLUOXETINE 20 MG: 20 CAPSULE ORAL at 12:45

## 2022-10-07 RX ADMIN — HEPARIN SODIUM 5000 UNITS: 5000 INJECTION INTRAVENOUS; SUBCUTANEOUS at 21:09

## 2022-10-07 RX ADMIN — INSULIN LISPRO 2 UNITS: 100 INJECTION, SOLUTION INTRAVENOUS; SUBCUTANEOUS at 12:33

## 2022-10-07 RX ADMIN — ALPRAZOLAM 1 MG: 1 TABLET ORAL at 09:30

## 2022-10-07 RX ADMIN — FUROSEMIDE 20 MG: 20 TABLET ORAL at 12:33

## 2022-10-07 RX ADMIN — OXYCODONE 5 MG: 5 TABLET ORAL at 02:59

## 2022-10-07 RX ADMIN — INSULIN HUMAN 5 UNITS: 100 INJECTION, SUSPENSION SUBCUTANEOUS at 18:17

## 2022-10-07 RX ADMIN — HEPARIN SODIUM 5000 UNITS: 5000 INJECTION INTRAVENOUS; SUBCUTANEOUS at 05:47

## 2022-10-07 RX ADMIN — LOSARTAN POTASSIUM 50 MG: 50 TABLET, FILM COATED ORAL at 09:30

## 2022-10-07 RX ADMIN — METOPROLOL SUCCINATE 50 MG: 50 TABLET, EXTENDED RELEASE ORAL at 12:45

## 2022-10-07 RX ADMIN — TICAGRELOR 90 MG: 90 TABLET ORAL at 12:46

## 2022-10-07 RX ADMIN — ATORVASTATIN CALCIUM 40 MG: 40 TABLET, FILM COATED ORAL at 12:45

## 2022-10-07 RX ADMIN — Medication 10 ML: at 09:31

## 2022-10-07 RX ADMIN — FLUDROCORTISONE ACETATE 0.1 MG: 0.1 TABLET ORAL at 12:45

## 2022-10-07 RX ADMIN — HEPARIN SODIUM 5000 UNITS: 5000 INJECTION INTRAVENOUS; SUBCUTANEOUS at 14:51

## 2022-10-07 RX ADMIN — POTASSIUM CHLORIDE 20 MEQ: 1500 TABLET, EXTENDED RELEASE ORAL at 12:46

## 2022-10-07 RX ADMIN — TICAGRELOR 90 MG: 90 TABLET ORAL at 19:40

## 2022-10-07 RX ADMIN — HYDRALAZINE HYDROCHLORIDE 5 MG: 20 INJECTION INTRAMUSCULAR; INTRAVENOUS at 19:39

## 2022-10-07 RX ADMIN — CEFTRIAXONE 1 G: 1 INJECTION, POWDER, FOR SOLUTION INTRAMUSCULAR; INTRAVENOUS at 03:00

## 2022-10-07 RX ADMIN — OXYCODONE 5 MG: 5 TABLET ORAL at 09:29

## 2022-10-07 RX ADMIN — ACETAMINOPHEN 650 MG: 325 TABLET, FILM COATED ORAL at 19:40

## 2022-10-07 NOTE — PLAN OF CARE
Goal Outcome Evaluation:               Patient has rested quietly this shift. Continues with C/O several perineal discomfort related to redness and dysuria related to UTI. Awaiting urine culture results currently.

## 2022-10-07 NOTE — PLAN OF CARE
Problem: Adult Inpatient Plan of Care  Goal: Absence of Hospital-Acquired Illness or Injury  Intervention: Prevent Skin Injury  Recent Flowsheet Documentation  Taken 10/7/2022 0835 by Izabela Lake LPN  Body Position: supine     Problem: Adult Inpatient Plan of Care  Goal: Absence of Hospital-Acquired Illness or Injury  Intervention: Prevent Infection  Recent Flowsheet Documentation  Taken 10/7/2022 1601 by Izabela Lake LPN  Infection Prevention:   environmental surveillance performed   hand hygiene promoted   personal protective equipment utilized   single patient room provided   visitors restricted/screened  Taken 10/7/2022 1428 by Izabela Lake LPN  Infection Prevention:   environmental surveillance performed   hand hygiene promoted   rest/sleep promoted   personal protective equipment utilized   single patient room provided  Taken 10/7/2022 1247 by Izabela Lake LPN  Infection Prevention:   environmental surveillance performed   hand hygiene promoted   rest/sleep promoted   single patient room provided  Taken 10/7/2022 1052 by Izabela Lake LPN  Infection Prevention:   environmental surveillance performed   hand hygiene promoted   personal protective equipment utilized   single patient room provided  Taken 10/7/2022 1038 by Izabela Lake LPN  Infection Prevention:   environmental surveillance performed   hand hygiene promoted   single patient room provided  Taken 10/7/2022 0835 by Izabela Lake LPN  Infection Prevention:   environmental surveillance performed   hand hygiene promoted   single patient room provided   personal protective equipment utilized   Goal Outcome Evaluation:  Plan of Care Reviewed With: patient, daughter        Progress: no change   Alert and oriented x 4. Able to verbalize needs and wants. Takes medication whole and tolerates well. C/O vaginal pain and burning during urination. Observed open areas to lips of vagina. Wound nurse consulted. PO Oxycodone administered with  Attempted to call mother, states that I can not leave a message and asks to try again later.   minimal effect. Urology consulted. C/O pruritis to vagina, PO Benadryl administered with positive effect. PRN Xanax administered for symptoms r/t anxiety. Continues to receive IV Rocephin, no s/s of adverse/allergic reaction noted. Currently in bed, awake. Daughter at bedside. Call bell in reach. Does not require O2 therapy at this time. No s/s of hyper/hypoglycemia noted. Tremors noted to all extremities. No tremors noted during sleep.

## 2022-10-07 NOTE — CASE MANAGEMENT/SOCIAL WORK
Discharge Planning Assessment   [place]     Patient Name: Nadege Barrow  MRN: [id number]  Today's Date: 10/7/2022    Admit Date: 10/6/2022    Plan: Return home with daughter. Possible HHC.   Discharge Needs Assessment     Row Name 10/07/22 1429       Living Environment    People in Home child(kimberley), adult    Name(s) of People in Home DaughterEros    Current Living Arrangements home    Primary Care Provided by self;child(kimberley)    Provides Primary Care For no one    Family Caregiver if Needed child(kimberley), adult    Family Caregiver Names Daughter-Ana    Quality of Family Relationships helpful;involved;supportive    Able to Return to Prior Arrangements yes       Resource/Environmental Concerns    Resource/Environmental Concerns none    Transportation Concerns none       Transition Planning    Patient/Family Anticipates Transition to home with family;home with help/services    Patient/Family Anticipated Services at Transition home health care    Transportation Anticipated family or friend will provide       Discharge Needs Assessment    Readmission Within the Last 30 Days no previous admission in last 30 days    Equipment Currently Used at Home rollator;shower chair;glucometer    Concerns to be Addressed care coordination/care conferences;discharge planning    Anticipated Changes Related to Illness none    Equipment Needed After Discharge none    Outpatient/Agency/Support Group Needs homecare agency    Discharge Facility/Level of Care Needs home with home health               Discharge Plan     Row Name 10/07/22 1447       Plan    Plan Return home with daughter. Possible HHC.    Patient/Family in Agreement with Plan yes    Provided Post Acute Provider List? Yes    Post Acute Provider List Home Health    Provided Post Acute Provider Quality & Resource List? N/A    Delivered To Patient    Method of Delivery In person    Plan Comments Met with patient at bedside. Pt reports she lives with her daughter Ana. She does not drive and  is independent with ADL's. PCP and pharmacy confirmed. Pt uses a rollator, shower chair, and glucometer at home. Discussed PT's recommendation of home healthcare but pt is undecided at this time. Will provide [facility] agency list for pt to review.              Expected Discharge Date and Time     Expected Discharge Date Expected Discharge Time    Oct 9, 2022          Demographic Summary     Row Name 10/07/22 1417       General Information    Admission Type observation    Arrived From emergency department    Referral Source admission list    Reason for Consult discharge planning;care coordination/care conference    Preferred Language English       Contact Information    Permission Granted to Share Info With                Functional Status     Row Name 10/07/22 1429       Functional Status    Usual Activity Tolerance moderate    Current Activity Tolerance moderate       Functional Status, IADL    Medications assistive person    Meal Preparation assistive person    Housekeeping assistive person    Laundry assistive person    Shopping assistive person               Patient Forms     Row Name 10/07/22 1445       Patient Forms    Patient Observation Letter Delivered  Spoke with patient via room phone. Patient verbalized understanding and requested copy be left at bedside.    Delivered to Patient    Method of delivery Telephone              Met with patient in room wearing PPE: mask, face shield/goggles, gloves, gown.      Maintained distance greater than six feet and spent less than 15 minutes in the room.      Megan Naegele, RN      Office Phone: [phone]  Office Cell: [phone]

## 2022-10-07 NOTE — PROGRESS NOTES
"NEPHROLOGY PROGRESS NOTE------[facility]    [facility]  [phone]  Patricio Danielson MD      Patient Care Team:  Dave Esparza as PCP - General (Preventative Medicine)  Patricio Danielson MD as Consulting Physician (Nephrology)      Provider:  Patricio Danielson MD  Patient Name: Nadege Barrow  :  1945    SUBJECTIVE:   F/U Hypokalemia  Feels weak, no chest pain or SOA    Medication:  cefTRIAXone (ROCEPHIN) 1GM IVPB in 100 mL NS (MBP), 1 g, Intravenous, Q24H  heparin (porcine), 5,000 Units, Subcutaneous, Q8H  insulin lispro, 0-9 Units, Subcutaneous, TID AC  sodium chloride, 10 mL, Intravenous, Q12H  sodium chloride, 10 mL, Intravenous, Q12H           OBJECTIVE    Vital Sign Min/Max for last 24 hours  Temp  Min: 98.2 °F (36.8 °C)  Max: 99.2 °F (37.3 °C)   BP  Min: 114/71  Max: 158/79   Pulse  Min: 73  Max: 96   Resp  Min: 16  Max: 18   SpO2  Min: 90 %  Max: 98 %   No data recorded   Weight  Min: 69.8 kg (153 lb 14.1 oz)  Max: 69.8 kg (153 lb 14.1 oz)     Flowsheet Rows    Flowsheet Row First Filed Value   Admission Height 160 cm (63\") Documented at 10/06/2022 012   Admission Weight 67.9 kg (149 lb 11.2 oz) Documented at 10/06/2022 0125          No intake/output data recorded.  I/O last 3 completed shifts:  In: 725 [P.O.:625; IV Piggyback:100]  Out: 0     Physical Exam:  General Appearance: alert, appears stated age and cooperative  Head: normocephalic, without obvious abnormality and atraumatic  Eyes: conjunctivae and sclerae normal and no icterus  Neck: supple and no JVD  Lungs: clear to auscultation and respirations regular  Heart: regular rhythm & normal rate and normal S1, S2  Chest: Wall no abnormalities observed  Abdomen: normal bowel sounds and soft, nontender  Extremities: moves extremities well, no edema, no cyanosis and no redness  Skin: no bleeding, bruising or rash, turgor normal, color normal and no lesions noted  Neurologic: Alert, and oriented. " No focal deficits    Labs:    WBC WBC   Date Value Ref Range Status   10/07/2022 7.50 3.40 - 10.80 10*3/mm3 Final   10/06/2022 9.20 3.40 - 10.80 10*3/mm3 Final   10/06/2022 12.37 (H) 3.40 - 10.80 10*3/mm3 Final      HGB Hemoglobin   Date Value Ref Range Status   10/07/2022 10.3 (L) 12.0 - 15.9 g/dL Final   10/06/2022 10.9 (L) 12.0 - 15.9 g/dL Final   10/06/2022 12.0 12.0 - 15.9 g/dL Final      HCT Hematocrit   Date Value Ref Range Status   10/07/2022 32.1 (L) 34.0 - 46.6 % Final   10/06/2022 34.3 34.0 - 46.6 % Final   10/06/2022 37.3 34.0 - 46.6 % Final      Platelets No results found for: LABPLAT   MCV MCV   Date Value Ref Range Status   10/07/2022 91.9 79.0 - 97.0 fL Final   10/06/2022 90.8 79.0 - 97.0 fL Final   10/06/2022 87.4 79.0 - 97.0 fL Final          Sodium Sodium   Date Value Ref Range Status   10/07/2022 137 136 - 145 mmol/L Final   10/06/2022 140 136 - 145 mmol/L Final   10/06/2022 137 136 - 145 mmol/L Final      Potassium Potassium   Date Value Ref Range Status   10/07/2022 3.5 3.5 - 5.2 mmol/L Final   10/06/2022 3.1 (L) 3.5 - 5.2 mmol/L Final   10/06/2022 2.6 (C) 3.5 - 5.2 mmol/L Final      Chloride Chloride   Date Value Ref Range Status   10/07/2022 102 98 - 107 mmol/L Final   10/06/2022 104 98 - 107 mmol/L Final   10/06/2022 104 98 - 107 mmol/L Final      CO2 CO2   Date Value Ref Range Status   10/07/2022 23.0 22.0 - 29.0 mmol/L Final   10/06/2022 25.0 22.0 - 29.0 mmol/L Final   10/06/2022 19.2 (L) 22.0 - 29.0 mmol/L Final      BUN BUN   Date Value Ref Range Status   10/07/2022 10 8 - 23 mg/dL Final   10/06/2022 12 8 - 23 mg/dL Final   10/06/2022 13 8 - 23 mg/dL Final      Creatinine Creatinine   Date Value Ref Range Status   10/07/2022 0.82 0.57 - 1.00 mg/dL Final   10/06/2022 0.89 0.57 - 1.00 mg/dL Final   10/06/2022 0.94 0.57 - 1.00 mg/dL Final      Calcium Calcium   Date Value Ref Range Status   10/07/2022 8.4 (L) 8.6 - 10.5 mg/dL Final   10/06/2022 8.4 (L) 8.6 - 10.5 mg/dL Final   10/06/2022  8.8 8.6 - 10.5 mg/dL Final      PO4 No components found for: PO4   Albumin Albumin   Date Value Ref Range Status   10/07/2022 3.00 (L) 3.50 - 5.20 g/dL Final   10/06/2022 3.20 (L) 3.50 - 5.20 g/dL Final   10/06/2022 3.58 3.50 - 5.20 g/dL Final      Magnesium Magnesium   Date Value Ref Range Status   10/07/2022 1.8 1.6 - 2.4 mg/dL Final   10/06/2022 1.9 1.6 - 2.4 mg/dL Final   10/06/2022 1.5 (L) 1.6 - 2.4 mg/dL Final      Uric Acid No components found for: URIC ACID     Imaging Results (Last 72 Hours)     Procedure Component Value Units Date/Time    CT Abdomen Pelvis Without Contrast [[id number]] Collected: 10/06/22 0244     Updated: 10/06/22 0252    Narrative:      Exam: CT abdomen and pelvis without contrast    Date: October 6, 2022    History: Abdominal pain, vomiting    Comparison: June 14, 2018, August 18, 2022    Technique: Contiguous axial CT images obtained of the abdomen and pelvis without contrast. Additionally, sagittal and coronal reformatted images were obtained.  CT dose lowering techniques were used, to include: automated exposure control, adjustment for   patient size, and or use of iterative reconstruction.    Findings:    Lung bases: The heart size is prominent. There is an element of interlobular septal thickening. There are small bilateral pleural effusions.    Liver: The liver is mildly hypodense.    Spleen: There are numerous calcified splenic granulomas.    Pancreas: Normal.    Adrenal glands: Normal.    Gallbladder: Normal.    Kidneys: There is no hydronephrosis or obstructive uropathy.    Abdominal mesentery: There are nonenlarged lymph nodes in the central abdominal mesentery. There is no pathologic intra-abdominal mass or adenopathy.    Bowel loops: There are scattered colonic diverticula. The appendix is normal. There is no small bowel obstruction.    CT PELVIS:  There is a small fat-containing right inguinal hernia. There is edematous urinary bladder wall thickening.    Abdominal aorta:  There are moderate atherosclerotic changes. There is no aneurysm.    Osseous structures: The osseous structures are demineralized. There is redemonstration of chronic appearing compression deformities, unchanged. There is vertebroplasty change within L4. There are moderate degenerative changes involving both hips. No   acute fractures are identified.      Impression:      1. Borderline cardiomegaly with an element of pulmonary edema and small bilateral pleural effusions. Correlate for fluid overload or congestive heart failure.  2. Diverticulosis.  3. Edematous urinary bladder wall thickening. Correlate for potential cystitis.  4. Osteopenia with redemonstration of multiple compression deformities in the spine.  5. Mild hepatic steatosis.  6. Additional incidental and chronic findings as above.    Slot 63    Electronically signed by:  Nikita Gibbs M.D.    10/6/2022 12:51 AM          Results for orders placed during the hospital encounter of 09/29/22    XR Forearm 2 View Right    Narrative  DATE OF EXAM:  9/29/2022 11:13 PM    PROCEDURE:  XR FOREARM 2 VW RIGHT-    INDICATIONS:  fall    COMPARISON:  No Comparisons Available    TECHNIQUE:  A minimum of two routine standard radiographic views were obtained of  the right forearm.      FINDINGS:  Evidence of prior right distal ulnar fracture. There is diffuse  osteopenia. No acute fracture or dislocation is identified. Degenerative  changes at the elbow and wrist.    Impression  1. Prior distal ulnar fracture, but no acute fracture or dislocation.    Electronically Signed By-Kwesi Machado MD On:9/30/2022 7:41 AM  This report was finalized on [id number] by  Kwesi Machado MD.      XR Wrist 3+ View Right    Narrative  DATE OF EXAM:  9/29/2022 7:46 PM    PROCEDURE:  XR WRIST 3+ VW RIGHT-    INDICATIONS:  injury    COMPARISON:  No Comparisons Available    TECHNIQUE:  A minimum of three radiologic views of the right wrist were obtained.    FINDINGS:  Distal radius and  ulna are intact. The bones are osteopenic. Negative  for fracture. No dislocation. 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    Impression  Negative for fracture.    Electronically Signed By-Finn Ashton MD On:9/29/2022 9:01 PM  This report was finalized on [id number] by  Finn Ashton MD.      Results for orders placed during the hospital encounter of 09/13/22    XR Chest 1 View    Narrative  DATE OF EXAM:  9/13/2022 8:20 PM    PROCEDURE:  XR CHEST 1 VW-    INDICATIONS:  shortness of breath    COMPARISON:  05/25/2022    TECHNIQUE:  Single radiographic AP view of the chest was obtained.    FINDINGS:  The heart is not definitely enlarged. The lungs seem clear. There are no  pleural effusions. There is an old fracture deformity of the left  clavicle.    Impression  1.     No acute pulmonary process.  2.     Old fracture deformity left clavicle.    Electronically Signed By-Munir Cote MD On:9/13/2022 8:52 PM  This report was finalized on [id number] by  Munir Cote MD.            ASSESSMENT / PLAN      Diarrhea, unspecified type    · Hypokalemia-likely due to renal wasting and ongoing diarrhea.  Her potassium got quite low despite losartan and oral potassium.  We will check for renal wasting.  · Metabolic acidosis-due to GI loss.  Bicarb is improved now.  · Hypertension  · Diarrhea  · History congestive heart failure-appears compensated  · UTI-on Rocephin.  Cultures pending  · Diabetes     Potasium better, Urine K 16 meq/dL, urine CR pending  Suspect low K related to diarrhea  Will resume Losartan/ Lasix 20 mg PO daily        Patricio Danielson MD  [facility]  [phone]  10/07/22  08:18 EDT

## 2022-10-07 NOTE — CONSULTS
"     [facility]      Patient Identification:  NAME:  Nadege Barrow  Age:  76 y.o.   Sex:  female   :  1945   MRN:  [id number]       Chief complaint/Reason for consult: Cystitis    History of present illness:  76 y.o. female new to the hospital CT scan showed cystitis urinalysis was positive but no culture was sent.  She is complaining of perineal pain and discomfort.  No hematuria.  We are consulted for recurrent UTIs.  No  history per chart review or records.  No fevers.      Past medical history:  Past Medical History:   Diagnosis Date   • Anxiety    • CHF (congestive heart failure) ([facility])    • Depression    • Diabetes mellitus ([facility])    • Hyperlipidemia    • Hypertension    • Panic disorder     \"panic attacks\"   • Tremors of nervous system     \"essential tremors\"       Past surgical history:  Past Surgical History:   Procedure Laterality Date   • CORONARY ANGIOPLASTY WITH STENT PLACEMENT         Allergies:  Morphine    Home medications:  Medications Prior to Admission   Medication Sig Dispense Refill Last Dose   • ticagrelor (BRILINTA) 90 MG tablet tablet Take 90 mg by mouth 1 (One) Time.   Patient Taking Differently at 0900   • Accu-Chek Guide test strip       • ALPRAZolam (XANAX) 1 MG tablet TAKE 1 TABLET BY MOUTH THREE TIMES DAILY AS NEEDED FOR ANXIETY (BOTTLE) 90 tablet 0 10/5/2022 at 1200   • atorvastatin (LIPITOR) 40 MG tablet Take 40 mg by mouth Daily.   10/5/2022 at 0900   • Empagliflozin (Jardiance) 10 MG tablet Take 1 tablet by mouth Daily.   Unknown at Unknown time   • FLUoxetine (PROzac) 20 MG capsule Take 20 mg by mouth Daily.   10/5/2022 at 0900   • FLUoxetine (PROzac) 20 MG capsule Take 1 capsule by mouth Every Morning.      • glucose blood (Accu-Chek Guide) test strip       • insulin NPH-insulin regular (humuLIN 70/30,novoLIN 70/30) (70-30) 100 UNIT/ML injection Inject 20 Units under the skin into the appropriate area as directed 2 (Two) Times a Day With Meals.   10/5/2022 at 0900 "   • linaclotide (LINZESS) 145 MCG capsule capsule Take 145 mcg by mouth Daily.   Unknown at Unknown time   • losartan (COZAAR) 25 MG tablet Take 1 tablet by mouth Daily. 30 tablet 0 Unknown at Unknown time   • metoprolol succinate XL (TOPROL-XL) 50 MG 24 hr tablet Take 50 mg by mouth Daily.   10/5/2022 at 0900   • nitroglycerin (NITROSTAT) 0.4 MG SL tablet Place 1 tablet under the tongue Every 5 (Five) Minutes As Needed for Chest Pain. Take no more than 3 doses in 15 minutes. 25 tablet 0 Unknown at Unknown time   • nystatin (MYCOSTATIN) 100,000 unit/mL suspension Take 500,000 Units by mouth 4 (Four) Times a Day.      • potassium chloride (K-DUR,KLOR-CON) 20 MEQ CR tablet Take 1 tablet by mouth Every Morning.   Unknown at Unknown time   • potassium chloride (K-DUR,KLOR-CON) 20 MEQ CR tablet Take 20 mEq by mouth Daily.      • potassium chloride (K-DUR,KLOR-CON) 20 MEQ CR tablet       • potassium chloride ER (K-TAB) 20 MEQ tablet controlled-release ER tablet Take 20 mEq by mouth Daily.      • tiZANidine (ZANAFLEX) 2 MG tablet    Unknown at Unknown time   • Unifine SafeControl Pen Needle 30G X 5 MM misc            Hospital medications:  atorvastatin, 40 mg, Oral, Daily  atorvastatin, 40 mg, Oral, Daily  cefTRIAXone (ROCEPHIN) 1GM IVPB in 100 mL NS (MBP), 1 g, Intravenous, Q24H  fludrocortisone, 0.1 mg, Oral, Daily  FLUoxetine, 20 mg, Oral, Daily  furosemide, 20 mg, Oral, Daily  heparin (porcine), 5,000 Units, Subcutaneous, Q8H  insulin lispro, 0-9 Units, Subcutaneous, TID AC  insulin NPH-insulin regular, 5 Units, Subcutaneous, BID With Meals  losartan, 25 mg, Oral, Q24H  metoprolol succinate XL, 50 mg, Oral, Daily  potassium chloride, 20 mEq, Oral, QAM  sodium chloride, 10 mL, Intravenous, Q12H  sodium chloride, 10 mL, Intravenous, Q12H  ticagrelor, 90 mg, Oral, BID         •  acetaminophen  •  ALPRAZolam  •  ALPRAZolam  •  Calcium Gluconate-NaCl **AND** calcium gluconate **AND** Calcium, Ionized  •  dextrose  •   dextrose  •  docusate sodium  •  glucagon (human recombinant)  •  hydrALAZINE  •  influenza vaccine  •  labetalol  •  magnesium sulfate **OR** magnesium sulfate **OR** magnesium sulfate  •  nitroglycerin  •  ondansetron  •  oxyCODONE  •  polyethylene glycol  •  potassium & sodium phosphates **OR** potassium & sodium phosphates  •  potassium chloride  •  potassium chloride  •  prochlorperazine  •  sodium chloride  •  sodium chloride  •  sodium chloride  •  tiZANidine    Family history:  Family History   Problem Relation Age of Onset   • Depression Sister    • Suicidality Other         suicided   • Alcohol abuse Other    • Alcohol abuse Daughter    • Depression Other        Social history:  Social History     Tobacco Use   • Smoking status: Never Smoker   • Smokeless tobacco: Never Used   Vaping Use   • Vaping Use: Never used   Substance Use Topics   • Alcohol use: Not Currently   • Drug use: No       REVIEW OF SYSTEMS:  Constitutional - Negative for fevers, chills, lethargy  Eyes/Ears/Nose/Mouth/Throat - Negative for changes in vision or hearing  Cardiovascular - Negative for increased edema or cyanosis  Respiratory - Negative for dyspnea or wheezing  Gastrointestinal - Negative for nausea or vomiting  Genitourinary - Negative except as stated in HPI  Hematologic/Lymphatic - Negative for bruising or cyanosis  Skin - Negative for erythema or rash  Psych - Negative     Objective:  TMax 24 hours:   Temp (24hrs), Av.7 °F (37.1 °C), Min:98.2 °F (36.8 °C), Max:99.2 °F (37.3 °C)      Vitals Ranges:   Temp:  [98.2 °F (36.8 °C)-99.2 °F (37.3 °C)] 98.4 °F (36.9 °C)  Heart Rate:  [73-96] 80  Resp:  [16-18] 16  BP: (114-158)/(71-87) 158/79    Intake/Output Last 3 shifts:  I/O last 3 completed shifts:  In: 725 [P.O.:625; IV Piggyback:100]  Out: 0      Physical Exam:    General Appearance:    Alert, cooperative, NAD   HEENT:    No trauma, hearing intact   Lungs:     Respirations unlabored, no audible wheezing    Heart:    No  cyanosis, No significant edema   Abdomen:     Soft, ND    :    No suprapubic distention or tenderness   Extremities:   No significant edema, no deformity   Lymphatic:   No neck or groin LAD   Skin:   No bleeding, bruising or rashes   Neuro/Psych:   Mood/affect pleasant, no focal findings       Results review:   I reviewed the patient's new clinical results.    Data review:  Lab Results (last 24 hours)     Procedure Component Value Units Date/Time    POC Glucose Once [[id number]]  (Abnormal) Collected: 10/07/22 1136    Specimen: Blood Updated: 10/07/22 1137     Glucose 169 mg/dL      Comment: Serial Number: [id number]:  [id number]       Creatinine, Urine, Random - Urine, Clean Catch [[id number]] Collected: 10/06/22 2335    Specimen: Urine, Clean Catch Updated: 10/07/22 1052     Creatinine, Urine 53.5 mg/dL     Narrative:      Reference intervals for random urine have not been established.  Clinical usage is dependent upon physician's interpretation in combination with other laboratory tests.       Creatinine, Urine, Random - Urine, Clean Catch [[id number]] Collected: 10/06/22 2335    Specimen: Urine, Clean Catch Updated: 10/07/22 0845    POC Glucose Once [[id number]]  (Abnormal) Collected: 10/07/22 0710    Specimen: Blood Updated: 10/07/22 0713     Glucose 118 mg/dL      Comment: Serial Number: [id number]:  [id number]       Phosphorus [[id number]]  (Normal) Collected: 10/07/22 0044    Specimen: Blood Updated: 10/07/22 0324     Phosphorus 2.5 mg/dL     Comprehensive Metabolic Panel [[id number]]  (Abnormal) Collected: 10/07/22 0044    Specimen: Blood Updated: 10/07/22 0323     Glucose 144 mg/dL      BUN 10 mg/dL      Creatinine 0.82 mg/dL      Sodium 137 mmol/L      Potassium 3.5 mmol/L      Chloride 102 mmol/L      CO2 23.0 mmol/L      Calcium 8.4 mg/dL      Total Protein 5.6 g/dL      Albumin 3.00 g/dL      ALT (SGPT) <5 U/L      AST (SGOT) 12 U/L      Alkaline Phosphatase 62 U/L      Total Bilirubin 0.6  mg/dL      Globulin 2.6 gm/dL      A/G Ratio 1.2 g/dL      BUN/Creatinine Ratio 12.2     Anion Gap 12.0 mmol/L      eGFR 74.2 mL/min/1.73      Comment: National Kidney Foundation and American Society of Nephrology (ASN) Task Force recommended calculation based on the Chronic Kidney Disease Epidemiology Collaboration (CKD-EPI) equation refit without adjustment for race.       Narrative:      GFR Normal >60  Chronic Kidney Disease <60  Kidney Failure <15      T4, Free [[id number]]  (Normal) Collected: 10/07/22 0044    Specimen: Blood Updated: 10/07/22 0319     Free T4 1.04 ng/dL     Narrative:      Results may be falsely increased if patient taking Biotin.      TSH [[id number]]  (Normal) Collected: 10/07/22 0044    Specimen: Blood Updated: 10/07/22 0319     TSH 3.860 uIU/mL     CK [[id number]]  (Normal) Collected: 10/07/22 0044    Specimen: Blood Updated: 10/07/22 0306     Creatine Kinase 51 U/L     Magnesium [[id number]]  (Normal) Collected: 10/07/22 0044    Specimen: Blood Updated: 10/07/22 0306     Magnesium 1.8 mg/dL     Protime-INR [[id number]]  (Normal) Collected: 10/07/22 0044    Specimen: Blood Updated: 10/07/22 0240     Protime 11.2 Seconds      INR 1.09    CBC & Differential [[id number]]  (Abnormal) Collected: 10/07/22 0044    Specimen: Blood Updated: 10/07/22 0232    Narrative:      The following orders were created for panel order CBC & Differential.  Procedure                               Abnormality         Status                     ---------                               -----------         ------                     CBC Auto Differential[[id number]]        Abnormal            Final result                 Please view results for these tests on the individual orders.    CBC Auto Differential [[id number]]  (Abnormal) Collected: 10/07/22 0044    Specimen: Blood Updated: 10/07/22 0232     WBC 7.50 10*3/mm3      RBC 3.50 10*6/mm3      Hemoglobin 10.3 g/dL      Hematocrit 32.1 %      MCV 91.9 fL      MCH 29.5 pg       MCHC 32.1 g/dL      RDW 14.1 %      RDW-SD 44.2 fl      MPV 10.1 fL      Platelets 160 10*3/mm3      Neutrophil % 64.9 %      Lymphocyte % 23.7 %      Monocyte % 9.9 %      Eosinophil % 1.3 %      Basophil % 0.2 %      Neutrophils, Absolute 4.90 10*3/mm3      Lymphocytes, Absolute 1.80 10*3/mm3      Monocytes, Absolute 0.70 10*3/mm3      Eosinophils, Absolute 0.10 10*3/mm3      Basophils, Absolute 0.00 10*3/mm3      nRBC 0.0 /100 WBC     Hemoglobin A1c [[id number]] Collected: 10/07/22 0044    Specimen: Blood Updated: 10/07/22 0228    Potassium, Urine, Random - Urine, Clean Catch [[id number]] Collected: 10/06/22 2335    Specimen: Urine, Clean Catch Updated: 10/07/22 0033     Potassium, Urine 16.9 mmol/L     Narrative:      Reference intervals for random urine have not been established.  Clinical usage is dependent upon physician's interpretation in combination with other laboratory tests.       Lactic Acid, Plasma [[id number]]  (Normal) Collected: 10/06/22 2111    Specimen: Blood Updated: 10/06/22 2158     Lactate 1.6 mmol/L     Blood Culture - Blood, Arm, Left [[id number]] Collected: 10/06/22 2111    Specimen: Blood from Arm, Left Updated: 10/06/22 2142    Blood Culture - Blood, Arm, Right [[id number]] Collected: 10/06/22 2111    Specimen: Blood from Arm, Right Updated: 10/06/22 2142    POC Glucose Once [[id number]]  (Abnormal) Collected: 10/06/22 2021    Specimen: Blood Updated: 10/06/22 2022     Glucose 163 mg/dL      Comment: Serial Number: [id number]:  [id number]       Gastrointestinal Panel, PCR - Stool, Per Rectum [[id number]] Updated: 10/06/22 1942    Specimen: Stool from Per Rectum     Clostridioides difficile EIA - Stool, Per Rectum [[id number]] Updated: 10/06/22 1942    Specimen: Stool from Per Rectum     Respiratory Panel PCR w/COVID-19(SARS-CoV-2) BEBA/WILFRED/CELIA/PAD/COR/MAD/MIGUEL In-House, NP Swab in [facility], 3-4 HR TAT - Swab, Nasopharynx [[id number]]  (Abnormal) Collected: 10/06/22 1322    Specimen: Swab  from Nasopharynx Updated: 10/06/22 1738     ADENOVIRUS, PCR Not Detected     Coronavirus 229E Not Detected     Coronavirus HKU1 Not Detected     Coronavirus NL63 Not Detected     Coronavirus OC43 Not Detected     COVID19 Detected     Human Metapneumovirus Not Detected     Human Rhinovirus/Enterovirus Not Detected     Influenza A PCR Not Detected     Influenza B PCR Not Detected     Parainfluenza Virus 1 Not Detected     Parainfluenza Virus 2 Not Detected     Parainfluenza Virus 3 Not Detected     Parainfluenza Virus 4 Not Detected     RSV, PCR Not Detected     Bordetella pertussis pcr Not Detected     Bordetella parapertussis PCR Not Detected     Chlamydophila pneumoniae PCR Not Detected     Mycoplasma pneumo by PCR Not Detected    Narrative:      In the setting of a positive respiratory panel with a viral infection PLUS a negative procalcitonin without other underlying concern for bacterial infection, consider observing off antibiotics or discontinuation of antibiotics and continue supportive care. If the respiratory panel is positive for atypical bacterial infection (Bordetella pertussis, Chlamydophila pneumoniae, or Mycoplasma pneumoniae), consider antibiotic de-escalation to target atypical bacterial infection.    POC Glucose Once [[id number]]  (Abnormal) Collected: 10/06/22 1640    Specimen: Blood Updated: 10/06/22 1641     Glucose 198 mg/dL      Comment: Serial Number: [id number]:  [id number]       Urinalysis, Microscopic Only - Urine, Clean Catch [[id number]]  (Abnormal) Collected: 10/06/22 0218    Specimen: Urine, Clean Catch Updated: 10/06/22 1352     RBC, UA 6-12 /HPF      Comment: Appended report. These results have been appended to a previously final verified report.        WBC, UA 3-5 /HPF      Comment: Appended report. These results have been appended to a previously final verified report.        Bacteria, UA 3+ /HPF      Comment: Appended report. These results have been appended to a previously  "final verified report.        Squamous Epithelial Cells, UA 3-6 /HPF      Comment: Appended report. These results have been appended to a previously final verified report.        Hyaline Casts, UA None Seen /LPF      Comment: Appended report. These results have been appended to a previously final verified report.        Methodology Automated Microscopy    Urinalysis With Microscopic If Indicated (No Culture) - Urine, Clean Catch [[id number]]  (Abnormal) Collected: 10/06/22 0218    Specimen: Urine, Clean Catch Updated: 10/06/22 1352     Color, UA Yellow     Appearance, UA Slightly Cloudy     pH, UA 6.0     Specific Gravity, UA 1.015     Glucose,  mg/dL (2+)     Ketones, UA 40 mg/dL (2+)     Bilirubin, UA Negative     Blood, UA Moderate (2+)     Protein, UA 30 mg/dL (1+)     Leuk Esterase, UA Trace     Nitrite, UA Positive     Urobilinogen, UA 0.2 E.U./dL    Magnesium [[id number]]  (Normal) Collected: 10/06/22 1214    Specimen: Blood Updated: 10/06/22 1300     Magnesium 1.9 mg/dL     Procalcitonin [[id number]]  (Abnormal) Collected: 10/06/22 1214    Specimen: Blood Updated: 10/06/22 1255     Procalcitonin 0.27 ng/mL     Narrative:      As a Marker for Sepsis (Non-Neonates):    1. <0.5 ng/mL represents a low risk of severe sepsis and/or septic shock.  2. >2 ng/mL represents a high risk of severe sepsis and/or septic shock.    As a Marker for Lower Respiratory Tract Infections that require antibiotic therapy:    PCT on Admission    Antibiotic Therapy       6-12 Hrs later    >0.5                Strongly Recommended  >0.25 - <0.5        Recommended   0.1 - 0.25          Discouraged              Remeasure/reassess PCT  <0.1                Strongly Discouraged     Remeasure/reassess PCT    As 28 day mortality risk marker: \"Change in Procalcitonin Result\" (>80% or <=80%) if Day 0 (or Day 1) and Day 4 values are available. Refer to http://[email or web address]-pct-[email or web address]    Change in PCT <=80%  A decrease of PCT levels below " or equal to 80% defines a positive change in PCT test result representing a higher risk for 28-day all-cause mortality of patients diagnosed with severe sepsis for septic shock.    Change in PCT >80%  A decrease of PCT levels of more than 80% defines a negative change in PCT result representing a lower risk for 28-day all-cause mortality of patients diagnosed with severe sepsis or septic shock.       TSH [[id number]]  (Normal) Collected: 10/06/22 1214    Specimen: Blood Updated: 10/06/22 1255     TSH 1.610 uIU/mL     Cortisol [[id number]] Collected: 10/06/22 1214    Specimen: Blood Updated: 10/06/22 1255     Cortisol 8.70 mcg/dL     Narrative:      Cortisol Reference Ranges:    Cortisol 6AM - 10AM Range: 6.02-18.40 mcg/dl  Cortisol 4PM - 8PM Range: 2.68-10.50 mcg/dl      Results may be falsely increased if patient taking Biotin.      Comprehensive Metabolic Panel [[id number]]  (Abnormal) Collected: 10/06/22 1214    Specimen: Blood Updated: 10/06/22 1250     Glucose 154 mg/dL      BUN 12 mg/dL      Creatinine 0.89 mg/dL      Sodium 140 mmol/L      Potassium 3.1 mmol/L      Chloride 104 mmol/L      CO2 25.0 mmol/L      Calcium 8.4 mg/dL      Total Protein 5.8 g/dL      Albumin 3.20 g/dL      ALT (SGPT) 5 U/L      AST (SGOT) 12 U/L      Alkaline Phosphatase 67 U/L      Total Bilirubin 0.7 mg/dL      Globulin 2.6 gm/dL      A/G Ratio 1.2 g/dL      BUN/Creatinine Ratio 13.5     Anion Gap 11.0 mmol/L      eGFR 67.3 mL/min/1.73      Comment: National Kidney Foundation and American Society of Nephrology (ASN) Task Force recommended calculation based on the Chronic Kidney Disease Epidemiology Collaboration (CKD-EPI) equation refit without adjustment for race.       Narrative:      GFR Normal >60  Chronic Kidney Disease <60  Kidney Failure <15      Phosphorus [[id number]]  (Normal) Collected: 10/06/22 1214    Specimen: Blood Updated: 10/06/22 1250     Phosphorus 4.0 mg/dL     Protime-INR [[id number]]  (Normal) Collected: 10/06/22  1214    Specimen: Blood Updated: 10/06/22 1235     Protime 11.1 Seconds      INR 1.08    POC Glucose Once [[id number]]  (Abnormal) Collected: 10/06/22 1231    Specimen: Blood Updated: 10/06/22 1232     Glucose 162 mg/dL      Comment: Serial Number: [id number]:  [id number]       CBC & Differential [[id number]]  (Abnormal) Collected: 10/06/22 1214    Specimen: Blood Updated: 10/06/22 1228    Narrative:      The following orders were created for panel order CBC & Differential.  Procedure                               Abnormality         Status                     ---------                               -----------         ------                     CBC Auto Differential[[id number]]        Abnormal            Final result                 Please view results for these tests on the individual orders.    CBC Auto Differential [[id number]]  (Abnormal) Collected: 10/06/22 1214    Specimen: Blood Updated: 10/06/22 1228     WBC 9.20 10*3/mm3      RBC 3.78 10*6/mm3      Hemoglobin 10.9 g/dL      Hematocrit 34.3 %      MCV 90.8 fL      MCH 29.0 pg      MCHC 31.9 g/dL      RDW 14.2 %      RDW-SD 43.8 fl      MPV 9.3 fL      Platelets 183 10*3/mm3      Neutrophil % 74.4 %      Lymphocyte % 16.7 %      Monocyte % 8.6 %      Eosinophil % 0.1 %      Basophil % 0.2 %      Neutrophils, Absolute 6.90 10*3/mm3      Lymphocytes, Absolute 1.50 10*3/mm3      Monocytes, Absolute 0.80 10*3/mm3      Eosinophils, Absolute 0.00 10*3/mm3      Basophils, Absolute 0.00 10*3/mm3      nRBC 0.0 /100 WBC            Imaging:  Imaging Results (Last 24 Hours)     ** No results found for the last 24 hours. **             Assessment:       Diarrhea, unspecified type      Cystitis    Plan:     CT images reviewed evidence for mild bladder wall thickening urinalysis grossly positive but no culture sent  Repeat urinalysis with culture ordered and pending  Plan antibiotics based on culture results  We will plan to see her in the office in 2 weeks to check  urinalysis to ensure this infection clears we will subsequently plan cystoscopy once infection is fully cleared    Juan Pérez MD  [facility]  [address], [address]  [place] [id number]  Office: [phone]  Cell: [phone]  Also available via Epic Secure Chat  10/07/22  11:46 EDT      Birth Control Pills Pregnancy And Lactation Text: This medication should be avoided if pregnant and for the first 30 days post-partum.

## 2022-10-07 NOTE — PROGRESS NOTES
[facility] Medicine Services Daily Progress Note    Patient Name: Nadege Barrow  : 1945  MRN: [id number]  Primary Care Physician:  Dave Esparza  Date of admission: 10/6/2022      Subjective      Chief Complaint:   Dysuria vomiting diarrhea      Patient Reports   10/7  Seen and examined  Without new events or symptoms  Potassium better    Review of Systems   Constitutional: Negative.   HENT: Negative.    Eyes: Negative.    Cardiovascular: Negative.    Respiratory: Negative.    Endocrine: Negative.    Hematologic/Lymphatic: Negative.    Skin: Negative.    Musculoskeletal: Negative.    Gastrointestinal: Negative.    Genitourinary: Negative.    Neurological: Negative.    Psychiatric/Behavioral: Negative.    Allergic/Immunologic: Negative.    All other systems reviewed and are negative.           Objective      Vitals:   Temp:  [98.2 °F (36.8 °C)-99.2 °F (37.3 °C)] 98.4 °F (36.9 °C)  Heart Rate:  [73-96] 80  Resp:  [16-18] 16  BP: (114-158)/(65-87) 158/79  Flow (L/min):  [2] 2    Physical Exam  Vitals and nursing note reviewed.   Constitutional:       General: She is not in acute distress.     Appearance: Normal appearance. She is well-developed. She is not ill-appearing, toxic-appearing or diaphoretic.   HENT:      Head: Normocephalic and atraumatic.      Right Ear: Ear canal and external ear normal.      Left Ear: Ear canal and external ear normal.      Nose: Nose normal. No congestion or rhinorrhea.      Mouth/Throat:      Mouth: Mucous membranes are moist.      Pharynx: No oropharyngeal exudate.   Eyes:      General: No scleral icterus.        Right eye: No discharge.         Left eye: No discharge.      Extraocular Movements: Extraocular movements intact.      Conjunctiva/sclera: Conjunctivae normal.      Pupils: Pupils are equal, round, and reactive to light.   Neck:      Thyroid: No thyromegaly.      Vascular: No carotid bruit or JVD.      Trachea: No tracheal deviation.    Cardiovascular:      Rate and Rhythm: Normal rate and regular rhythm.      Pulses: Normal pulses.      Heart sounds: Normal heart sounds. No murmur heard.    No friction rub. No gallop.   Pulmonary:      Effort: Pulmonary effort is normal. No respiratory distress.      Breath sounds: Normal breath sounds. No stridor. No wheezing, rhonchi or rales.   Chest:      Chest wall: No tenderness.   Abdominal:      General: Bowel sounds are normal. There is no distension.      Palpations: Abdomen is soft. There is no mass.      Tenderness: There is no abdominal tenderness. There is no guarding or rebound.      Hernia: No hernia is present.   Musculoskeletal:         General: No swelling, tenderness, deformity or signs of injury. Normal range of motion.      Cervical back: Normal range of motion and neck supple. No rigidity. No muscular tenderness.      Right lower leg: No edema.      Left lower leg: No edema.   Lymphadenopathy:      Cervical: No cervical adenopathy.   Skin:     General: Skin is warm and dry.      Coloration: Skin is not jaundiced or pale.      Findings: No bruising, erythema or rash.   Neurological:      General: No focal deficit present.      Mental Status: She is alert and oriented to person, place, and time. Mental status is at baseline.      Cranial Nerves: No cranial nerve deficit.      Sensory: No sensory deficit.      Motor: No weakness or abnormal muscle tone.      Coordination: Coordination normal.   Psychiatric:         Mood and Affect: Mood normal.         Behavior: Behavior normal.         Thought Content: Thought content normal.         Judgment: Judgment normal.               Result Review    Result Review:  I have personally reviewed the results from the time of this admission to 10/7/2022 09:23 EDT and agree with these findings:  [x]  Laboratory  [x]  Microbiology  []  Radiology  []  EKG/Telemetry   []  Cardiology/Vascular   []  Pathology  []  Old records  []  Other:  Most notable findings  include: As above          Assessment & Plan      Brief Patient Summary:  Nadege Barrow is a 76 y.o. female who         cefTRIAXone (ROCEPHIN) 1GM IVPB in 100 mL NS (MBP), 1 g, Intravenous, Q24H  heparin (porcine), 5,000 Units, Subcutaneous, Q8H  insulin lispro, 0-9 Units, Subcutaneous, TID AC  losartan, 50 mg, Oral, Q24H  sodium chloride, 10 mL, Intravenous, Q12H  sodium chloride, 10 mL, Intravenous, Q12H             Active Hospital Problems:  Active Hospital Problems    Diagnosis    • Diarrhea, unspecified type      History of Present Illness: Nadege Barrow is a 76 y.o. female who presented to [facility] on 10/6/2022 complaining of vomiting and diarrhea for the last 24 hours  Patient also complains of dysuria without fever chills.  She reports headache.As well  She is chronic medical problems including CHF depression diabetes hypertension panic disorder essential tremors     Work-up in the ER showed hypokalemia with potassium 2.6 hyperglycemia 233 elevated proBNP 8988, suspect metabolic acidosis with bicarb of 19 magnesium 1.5 normal lipase lactate and elevated white count 12.3 with neutrophilic predominance.  Urinalysis shows 500 glucose 40 ketones moderate blood 30 protein trace leukocyte CT abdomen pelvis shows edematous urinary bladder wall thickening with potential cystitis, osteopenia with multiple compression fractures of the spine, hepatic steatosis, diverticulosis, borderline cardiomegaly and pulmonary edema small bilateral pleural effusion small fat containing right inguinal hernia  Of note the patient denies any recent antibiotic use and denies any abdominal pain except some cramps on and off     Plan:       Severe hypokalemiaWith old left bundle branch block on EKG  Replace electrolytes and to reassess        Gastroenteritis possibly viral with nausea vomiting and diarrhea  GI panel/C. difficile ordered given leukocytosis  Mild leukocytosis noted  Stop Linzess  CT abdomen pelvis showed. Borderline  cardiomegaly with an element of pulmonary edema and small bilateral pleural effusions. Correlate for fluid overload or congestive heart failure.  2. Diverticulosis.  3. Edematous urinary bladder wall thickening. Correlate for potential cystitis.  4. Osteopenia with redemonstration of multiple compression deformities     Suspect metabolic acidosis secondary to diarrhea  May consider oral bicarb plan potassium level improved     Dysuria with possible UTI started on Rocephin pending cultures  Continue Rocephin  Follow cultures  Consider stopping Jardiance  No available urine culture    History of CHF with moderately reduced ejection fraction 40 to 45% on echo February 2022 with mild aortic stenosis and moderate tricuspid regurgitation  On Toprol XL.Losartan   Avoid IV fluids unless diarrhea is overwhelming  Encourage oral intake if possible  Elevated proBNP noted     DM2 with hyperglycemia  Start sliding scale insulin  NPH 7030.  Use a smaller dose  Resume home regimen lower dose to avoid hypoglycemia    Abdomen insufficiency on fludrocortisone     CAD s/p PCI:  -Continue home medications  Memantine aspirin and statin and beta-blocker    Panic disorder on Prozac/Xanax  Essential tremors nothing in medications  Depression  Home medications reconciliation pending  DVT prophylaxis:  Medical DVT prophylaxis orders are present.    CODE STATUS:    Code Status (Patient has no pulse and is not breathing): CPR (Attempt to Resuscitate)  Medical Interventions (Patient has pulse or is breathing): Full Support  Release to patient: Routine Release      Disposition:  I expect patient to be discharged home.    This patient has been examined wearing appropriate Personal Protective Equipment and discussed with hospital infection control department. 10/07/22      Electronically signed by Juanpablo Meade MD, 10/07/22, 09:23 EDT.  [facility] Hospitalist Team

## 2022-10-08 LAB
ALBUMIN SERPL-MCNC: 3.4 G/DL (ref 3.5–5.2)
ALBUMIN SERPL-MCNC: 3.6 G/DL (ref 3.5–5.2)
ALBUMIN/GLOB SERPL: 1.2 G/DL
ALBUMIN/GLOB SERPL: 1.3 G/DL
ALP SERPL-CCNC: 76 U/L (ref 39–117)
ALP SERPL-CCNC: 81 U/L (ref 39–117)
ALT SERPL W P-5'-P-CCNC: 6 U/L (ref 1–33)
ALT SERPL W P-5'-P-CCNC: 7 U/L (ref 1–33)
ANION GAP SERPL CALCULATED.3IONS-SCNC: 14 MMOL/L (ref 5–15)
ANION GAP SERPL CALCULATED.3IONS-SCNC: 14 MMOL/L (ref 5–15)
AST SERPL-CCNC: 17 U/L (ref 1–32)
AST SERPL-CCNC: 18 U/L (ref 1–32)
BASOPHILS # BLD AUTO: 0 10*3/MM3 (ref 0–0.2)
BASOPHILS # BLD AUTO: 0 10*3/MM3 (ref 0–0.2)
BASOPHILS NFR BLD AUTO: 0.4 % (ref 0–1.5)
BASOPHILS NFR BLD AUTO: 0.5 % (ref 0–1.5)
BILIRUB SERPL-MCNC: 0.8 MG/DL (ref 0–1.2)
BILIRUB SERPL-MCNC: 1.1 MG/DL (ref 0–1.2)
BUN SERPL-MCNC: 10 MG/DL (ref 8–23)
BUN SERPL-MCNC: 8 MG/DL (ref 8–23)
BUN/CREAT SERPL: 10 (ref 7–25)
BUN/CREAT SERPL: 10.8 (ref 7–25)
CALCIUM SPEC-SCNC: 8.8 MG/DL (ref 8.6–10.5)
CALCIUM SPEC-SCNC: 9.2 MG/DL (ref 8.6–10.5)
CHLORIDE SERPL-SCNC: 100 MMOL/L (ref 98–107)
CHLORIDE SERPL-SCNC: 100 MMOL/L (ref 98–107)
CO2 SERPL-SCNC: 22 MMOL/L (ref 22–29)
CO2 SERPL-SCNC: 22 MMOL/L (ref 22–29)
CREAT SERPL-MCNC: 0.8 MG/DL (ref 0.57–1)
CREAT SERPL-MCNC: 0.93 MG/DL (ref 0.57–1)
DEPRECATED RDW RBC AUTO: 43.8 FL (ref 37–54)
DEPRECATED RDW RBC AUTO: 44.2 FL (ref 37–54)
EGFRCR SERPLBLD CKD-EPI 2021: 63.8 ML/MIN/1.73
EGFRCR SERPLBLD CKD-EPI 2021: 76.5 ML/MIN/1.73
EOSINOPHIL # BLD AUTO: 0.1 10*3/MM3 (ref 0–0.4)
EOSINOPHIL # BLD AUTO: 0.2 10*3/MM3 (ref 0–0.4)
EOSINOPHIL NFR BLD AUTO: 2.2 % (ref 0.3–6.2)
EOSINOPHIL NFR BLD AUTO: 3.1 % (ref 0.3–6.2)
ERYTHROCYTE [DISTWIDTH] IN BLOOD BY AUTOMATED COUNT: 14.2 % (ref 12.3–15.4)
ERYTHROCYTE [DISTWIDTH] IN BLOOD BY AUTOMATED COUNT: 14.2 % (ref 12.3–15.4)
GLOBULIN UR ELPH-MCNC: 2.7 GM/DL
GLOBULIN UR ELPH-MCNC: 3.1 GM/DL
GLUCOSE BLDC GLUCOMTR-MCNC: 103 MG/DL (ref 70–105)
GLUCOSE BLDC GLUCOMTR-MCNC: 109 MG/DL (ref 70–105)
GLUCOSE BLDC GLUCOMTR-MCNC: 115 MG/DL (ref 70–105)
GLUCOSE BLDC GLUCOMTR-MCNC: 151 MG/DL (ref 70–105)
GLUCOSE BLDC GLUCOMTR-MCNC: 81 MG/DL (ref 70–105)
GLUCOSE SERPL-MCNC: 126 MG/DL (ref 65–99)
GLUCOSE SERPL-MCNC: 71 MG/DL (ref 65–99)
HCT VFR BLD AUTO: 33.1 % (ref 34–46.6)
HCT VFR BLD AUTO: 37.5 % (ref 34–46.6)
HGB BLD-MCNC: 10.7 G/DL (ref 12–15.9)
HGB BLD-MCNC: 11.9 G/DL (ref 12–15.9)
INR PPP: 1.04 (ref 0.93–1.1)
LYMPHOCYTES # BLD AUTO: 1.4 10*3/MM3 (ref 0.7–3.1)
LYMPHOCYTES # BLD AUTO: 1.7 10*3/MM3 (ref 0.7–3.1)
LYMPHOCYTES NFR BLD AUTO: 24.9 % (ref 19.6–45.3)
LYMPHOCYTES NFR BLD AUTO: 25.4 % (ref 19.6–45.3)
MAGNESIUM SERPL-MCNC: 1.5 MG/DL (ref 1.6–2.4)
MCH RBC QN AUTO: 28.6 PG (ref 26.6–33)
MCH RBC QN AUTO: 29.8 PG (ref 26.6–33)
MCHC RBC AUTO-ENTMCNC: 31.6 G/DL (ref 31.5–35.7)
MCHC RBC AUTO-ENTMCNC: 32.2 G/DL (ref 31.5–35.7)
MCV RBC AUTO: 90.6 FL (ref 79–97)
MCV RBC AUTO: 92.5 FL (ref 79–97)
MONOCYTES # BLD AUTO: 0.6 10*3/MM3 (ref 0.1–0.9)
MONOCYTES # BLD AUTO: 0.6 10*3/MM3 (ref 0.1–0.9)
MONOCYTES NFR BLD AUTO: 11.2 % (ref 5–12)
MONOCYTES NFR BLD AUTO: 9.1 % (ref 5–12)
NEUTROPHILS NFR BLD AUTO: 3.4 10*3/MM3 (ref 1.7–7)
NEUTROPHILS NFR BLD AUTO: 4.1 10*3/MM3 (ref 1.7–7)
NEUTROPHILS NFR BLD AUTO: 61.3 % (ref 42.7–76)
NEUTROPHILS NFR BLD AUTO: 61.9 % (ref 42.7–76)
NRBC BLD AUTO-RTO: 0 /100 WBC (ref 0–0.2)
NRBC BLD AUTO-RTO: 0.1 /100 WBC (ref 0–0.2)
PHOSPHATE SERPL-MCNC: 2.6 MG/DL (ref 2.5–4.5)
PLATELET # BLD AUTO: 173 10*3/MM3 (ref 140–450)
PLATELET # BLD AUTO: 195 10*3/MM3 (ref 140–450)
PMV BLD AUTO: 10.1 FL (ref 6–12)
PMV BLD AUTO: 9.7 FL (ref 6–12)
POTASSIUM SERPL-SCNC: 3.5 MMOL/L (ref 3.5–5.2)
POTASSIUM SERPL-SCNC: 3.7 MMOL/L (ref 3.5–5.2)
PROT SERPL-MCNC: 6.1 G/DL (ref 6–8.5)
PROT SERPL-MCNC: 6.7 G/DL (ref 6–8.5)
PROTHROMBIN TIME: 10.7 SECONDS (ref 9.6–11.7)
RBC # BLD AUTO: 3.58 10*6/MM3 (ref 3.77–5.28)
RBC # BLD AUTO: 4.14 10*6/MM3 (ref 3.77–5.28)
SODIUM SERPL-SCNC: 136 MMOL/L (ref 136–145)
SODIUM SERPL-SCNC: 136 MMOL/L (ref 136–145)
WBC NRBC COR # BLD: 5.6 10*3/MM3 (ref 3.4–10.8)
WBC NRBC COR # BLD: 6.7 10*3/MM3 (ref 3.4–10.8)

## 2022-10-08 PROCEDURE — 97116 GAIT TRAINING THERAPY: CPT

## 2022-10-08 PROCEDURE — 63710000001 INSULIN LISPRO (HUMAN) PER 5 UNITS: Performed by: INTERNAL MEDICINE

## 2022-10-08 PROCEDURE — 63710000001 DIPHENHYDRAMINE PER 50 MG: Performed by: INTERNAL MEDICINE

## 2022-10-08 PROCEDURE — 84100 ASSAY OF PHOSPHORUS: CPT | Performed by: INTERNAL MEDICINE

## 2022-10-08 PROCEDURE — 83735 ASSAY OF MAGNESIUM: CPT | Performed by: INTERNAL MEDICINE

## 2022-10-08 PROCEDURE — G0378 HOSPITAL OBSERVATION PER HR: HCPCS

## 2022-10-08 PROCEDURE — 96372 THER/PROPH/DIAG INJ SC/IM: CPT

## 2022-10-08 PROCEDURE — 25010000002 HYDRALAZINE PER 20 MG: Performed by: INTERNAL MEDICINE

## 2022-10-08 PROCEDURE — 97530 THERAPEUTIC ACTIVITIES: CPT

## 2022-10-08 PROCEDURE — 96376 TX/PRO/DX INJ SAME DRUG ADON: CPT

## 2022-10-08 PROCEDURE — 85025 COMPLETE CBC W/AUTO DIFF WBC: CPT | Performed by: INTERNAL MEDICINE

## 2022-10-08 PROCEDURE — 25010000002 CEFTRIAXONE PER 250 MG: Performed by: INTERNAL MEDICINE

## 2022-10-08 PROCEDURE — 82962 GLUCOSE BLOOD TEST: CPT

## 2022-10-08 PROCEDURE — 80053 COMPREHEN METABOLIC PANEL: CPT | Performed by: INTERNAL MEDICINE

## 2022-10-08 PROCEDURE — 25010000002 HEPARIN (PORCINE) PER 1000 UNITS: Performed by: INTERNAL MEDICINE

## 2022-10-08 RX ORDER — HYDROXYZINE HYDROCHLORIDE 25 MG/1
25 TABLET, FILM COATED ORAL 3 TIMES DAILY PRN
Status: DISCONTINUED | OUTPATIENT
Start: 2022-10-08 | End: 2022-10-11 | Stop reason: HOSPADM

## 2022-10-08 RX ADMIN — Medication 400 MG: at 11:24

## 2022-10-08 RX ADMIN — FLUDROCORTISONE ACETATE 0.1 MG: 0.1 TABLET ORAL at 08:30

## 2022-10-08 RX ADMIN — HYDRALAZINE HYDROCHLORIDE 5 MG: 20 INJECTION INTRAMUSCULAR; INTRAVENOUS at 08:31

## 2022-10-08 RX ADMIN — POTASSIUM CHLORIDE 20 MEQ: 1500 TABLET, EXTENDED RELEASE ORAL at 06:00

## 2022-10-08 RX ADMIN — Medication 10 ML: at 20:46

## 2022-10-08 RX ADMIN — TICAGRELOR 90 MG: 90 TABLET ORAL at 20:44

## 2022-10-08 RX ADMIN — ACETAMINOPHEN 650 MG: 325 TABLET, FILM COATED ORAL at 11:24

## 2022-10-08 RX ADMIN — HYDROXYZINE HYDROCHLORIDE 25 MG: 25 TABLET, FILM COATED ORAL at 13:38

## 2022-10-08 RX ADMIN — OXYCODONE 5 MG: 5 TABLET ORAL at 13:00

## 2022-10-08 RX ADMIN — METOPROLOL SUCCINATE 50 MG: 50 TABLET, EXTENDED RELEASE ORAL at 08:31

## 2022-10-08 RX ADMIN — DIPHENHYDRAMINE HYDROCHLORIDE 25 MG: 25 CAPSULE ORAL at 11:24

## 2022-10-08 RX ADMIN — INSULIN LISPRO 2 UNITS: 100 INJECTION, SOLUTION INTRAVENOUS; SUBCUTANEOUS at 17:38

## 2022-10-08 RX ADMIN — HEPARIN SODIUM 5000 UNITS: 5000 INJECTION INTRAVENOUS; SUBCUTANEOUS at 05:45

## 2022-10-08 RX ADMIN — TICAGRELOR 90 MG: 90 TABLET ORAL at 08:30

## 2022-10-08 RX ADMIN — HEPARIN SODIUM 5000 UNITS: 5000 INJECTION INTRAVENOUS; SUBCUTANEOUS at 13:00

## 2022-10-08 RX ADMIN — LOSARTAN POTASSIUM 25 MG: 25 TABLET, FILM COATED ORAL at 08:30

## 2022-10-08 RX ADMIN — FUROSEMIDE 20 MG: 20 TABLET ORAL at 08:30

## 2022-10-08 RX ADMIN — OXYCODONE 5 MG: 5 TABLET ORAL at 08:31

## 2022-10-08 RX ADMIN — ALPRAZOLAM 1 MG: 1 TABLET ORAL at 05:50

## 2022-10-08 RX ADMIN — Medication 10 ML: at 08:31

## 2022-10-08 RX ADMIN — FLUOXETINE 20 MG: 20 CAPSULE ORAL at 08:30

## 2022-10-08 RX ADMIN — CEFTRIAXONE 1 G: 1 INJECTION, POWDER, FOR SOLUTION INTRAMUSCULAR; INTRAVENOUS at 01:04

## 2022-10-08 RX ADMIN — HEPARIN SODIUM 5000 UNITS: 5000 INJECTION INTRAVENOUS; SUBCUTANEOUS at 20:45

## 2022-10-08 RX ADMIN — ALPRAZOLAM 1 MG: 1 TABLET ORAL at 20:44

## 2022-10-08 RX ADMIN — ATORVASTATIN CALCIUM 40 MG: 40 TABLET, FILM COATED ORAL at 08:31

## 2022-10-08 NOTE — PLAN OF CARE
"Assessment: Nadege Barrow presents with functional mobility impairments which indicate the need for skilled intervention. Tolerating session today without incident. Pt reports she typically does not use AD for amb, and initially hesitant to use RW; however, pt agreed to use RW this date and demonstrated improved gait mechanics and steadiness compared to initial eval. Will continue to follow and progress as tolerated.     Plan/Recommendations:   Low Intensity Therapy recommended post-acute care - This is recommended as therapy feels this patient would require 2-3 visits per week. OP or HH would be the best option depending on patient's home bound status. Consider, if the patient has other  \"skilled\" needs such as wounds, IV antibiotics, etc. Combined with \"low intensity\" could also equate to a SNF. If patient is medically complex, consider LTAC.. Pt requires no DME at discharge.     Pt desires Home with family assist and and Home Health at discharge. Pt cooperative; agreeable to therapeutic recommendations and plan of care.   "

## 2022-10-08 NOTE — PLAN OF CARE
Goal Outcome Evaluation:              Outcome Evaluation: Pt resting well thoughout night. Continue to monitor.

## 2022-10-08 NOTE — PROGRESS NOTES
[facility] Medicine Services Daily Progress Note    Patient Name: Nadege Barrow  : 1945  MRN: [id number]  Primary Care Physician:  Dave Esparza  Date of admission: 10/6/2022      Subjective      Chief Complaint:   Dysuria vomiting diarrhea      Patient Reports   10/  Seen and examined  Without new events or symptoms  Potassium better    10/8  Still buining in groin area  Feels dizzy      Review of Systems   Constitutional: Negative.   HENT: Negative.    Eyes: Negative.    Cardiovascular: Negative.    Respiratory: Negative.    Endocrine: Negative.    Hematologic/Lymphatic: Negative.    Skin: Negative.    Musculoskeletal: Negative.    Gastrointestinal: Negative.    Genitourinary: Negative.    Neurological: Negative.    Psychiatric/Behavioral: Negative.    Allergic/Immunologic: Negative.    All other systems reviewed and are negative.           Objective      Vitals:   Temp:  [97.8 °F (36.6 °C)-99.6 °F (37.6 °C)] 98.6 °F (37 °C)  Heart Rate:  [80-91] 83  Resp:  [16-18] 18  BP: (148-192)/(76-98) 192/98  Flow (L/min):  [2] 2    Physical Exam  Vitals and nursing note reviewed.   Constitutional:       General: She is not in acute distress.     Appearance: Normal appearance. She is well-developed. She is not ill-appearing, toxic-appearing or diaphoretic.   HENT:      Head: Normocephalic and atraumatic.      Right Ear: Ear canal and external ear normal.      Left Ear: Ear canal and external ear normal.      Nose: Nose normal. No congestion or rhinorrhea.      Mouth/Throat:      Mouth: Mucous membranes are moist.      Pharynx: No oropharyngeal exudate.   Eyes:      General: No scleral icterus.        Right eye: No discharge.         Left eye: No discharge.      Extraocular Movements: Extraocular movements intact.      Conjunctiva/sclera: Conjunctivae normal.      Pupils: Pupils are equal, round, and reactive to light.   Neck:      Thyroid: No thyromegaly.      Vascular: No carotid bruit or JVD.       Trachea: No tracheal deviation.   Cardiovascular:      Rate and Rhythm: Normal rate and regular rhythm.      Pulses: Normal pulses.      Heart sounds: Normal heart sounds. No murmur heard.    No friction rub. No gallop.   Pulmonary:      Effort: Pulmonary effort is normal. No respiratory distress.      Breath sounds: Normal breath sounds. No stridor. No wheezing, rhonchi or rales.   Chest:      Chest wall: No tenderness.   Abdominal:      General: Bowel sounds are normal. There is no distension.      Palpations: Abdomen is soft. There is no mass.      Tenderness: There is no abdominal tenderness. There is no guarding or rebound.      Hernia: No hernia is present.   Musculoskeletal:         General: No swelling, tenderness, deformity or signs of injury. Normal range of motion.      Cervical back: Normal range of motion and neck supple. No rigidity. No muscular tenderness.      Right lower leg: No edema.      Left lower leg: No edema.   Lymphadenopathy:      Cervical: No cervical adenopathy.   Skin:     General: Skin is warm and dry.      Coloration: Skin is not jaundiced or pale.      Findings: No bruising, erythema or rash.   Neurological:      General: No focal deficit present.      Mental Status: She is alert and oriented to person, place, and time. Mental status is at baseline.      Cranial Nerves: No cranial nerve deficit.      Sensory: No sensory deficit.      Motor: No weakness or abnormal muscle tone.      Coordination: Coordination normal.   Psychiatric:         Mood and Affect: Mood normal.         Behavior: Behavior normal.         Thought Content: Thought content normal.         Judgment: Judgment normal.               Result Review    Result Review:  I have personally reviewed the results from the time of this admission to 10/8/2022 10:34 EDT and agree with these findings:  [x]  Laboratory  [x]  Microbiology  []  Radiology  []  EKG/Telemetry   []  Cardiology/Vascular   []  Pathology  []  Old records  []   Other:  Most notable findings include: As above    Wounds (last 24 hours)     LDA Wound     Row Name 10/07/22 2000 10/07/22 1429          Wound 10/07/22 1429 Bilateral upper labia Abrasion    Wound - Properties Group Placement Date: 10/07/22  - Placement Time: 1429  -AH Present on Hospital Admission: Y  -AH Side: Bilateral  -AH Orientation: upper  -AH Location: labia  -AH Primary Wound Type: Abrasion  -AH    Dressing Appearance -- no drainage  -AH     Closure Open to air  -CS Open to air  -AH     Base yellow;moist  -CS yellow;moist  -AH     Periwound -- intact  -AH     Periwound Temperature -- warm  -AH     Periwound Skin Turgor -- soft  -AH     Edges -- irregular  -AH     Drainage Amount none  -CS none  -AH     Care, Wound -- cleansed with;soap and water  -AH     Dressing Care -- skin barrier agent applied  -AH     Retired Wound - Properties Group Placement Date: 10/07/22  - Placement Time: 1429  -AH Present on Hospital Admission: Y  -AH Side: Bilateral  -AH Orientation: upper  -AH Location: labia  -AH Primary Wound Type: Abrasion  -AH    Retired Wound - Properties Group Date first assessed: 10/07/22  - Time first assessed: 1429  -AH Present on Hospital Admission: Y  -AH Side: Bilateral  -AH Location: labia  -AH Primary Wound Type: Abrasion  -AH          User Key  (r) = Recorded By, (t) = Taken By, (c) = Cosigned By    Initials Name Provider Type    Zofia Meade, RN Registered Nurse    Izabela Tijerina LPN Licensed Nurse                  Assessment & Plan      Brief Patient Summary:  Nadege Barrow is a 76 y.o. female who         atorvastatin, 40 mg, Oral, Daily  cefTRIAXone (ROCEPHIN) 1GM IVPB in 100 mL NS (MBP), 1 g, Intravenous, Q24H  fludrocortisone, 0.1 mg, Oral, Daily  FLUoxetine, 20 mg, Oral, Daily  furosemide, 20 mg, Oral, Daily  heparin (porcine), 5,000 Units, Subcutaneous, Q8H  insulin lispro, 0-9 Units, Subcutaneous, TID AC  insulin NPH-insulin regular, 5 Units, Subcutaneous, BID With  Meals  losartan, 25 mg, Oral, Q24H  metoprolol succinate XL, 50 mg, Oral, Daily  potassium chloride, 20 mEq, Oral, QAM  sodium chloride, 10 mL, Intravenous, Q12H  sodium chloride, 10 mL, Intravenous, Q12H  ticagrelor, 90 mg, Oral, BID             Active Hospital Problems:  Active Hospital Problems    Diagnosis    • Diarrhea, unspecified type      History of Present Illness: Nadege Barrow is a 76 y.o. female who presented to [facility] on 10/6/2022 complaining of vomiting and diarrhea for the last 24 hours  Patient also complains of dysuria without fever chills.  She reports headache.As well  She is chronic medical problems including CHF depression diabetes hypertension panic disorder essential tremors     Work-up in the ER showed hypokalemia with potassium 2.6 hyperglycemia 233 elevated proBNP 8988, suspect metabolic acidosis with bicarb of 19 magnesium 1.5 normal lipase lactate and elevated white count 12.3 with neutrophilic predominance.  Urinalysis shows 500 glucose 40 ketones moderate blood 30 protein trace leukocyte CT abdomen pelvis shows edematous urinary bladder wall thickening with potential cystitis, osteopenia with multiple compression fractures of the spine, hepatic steatosis, diverticulosis, borderline cardiomegaly and pulmonary edema small bilateral pleural effusion small fat containing right inguinal hernia  Of note the patient denies any recent antibiotic use and denies any abdominal pain except some cramps on and off     Plan:       Severe hypokalemiaWith old left bundle branch block on EKG  Replace electrolytes and to reassess        Gastroenteritis possibly viral with nausea vomiting and diarrhea  GI panel/C. difficile ordered given leukocytosis  Mild leukocytosis noted  Stop Linzess  CT abdomen pelvis showed. Borderline cardiomegaly with an element of pulmonary edema and small bilateral pleural effusions. Correlate for fluid overload or congestive heart failure.  2. Diverticulosis.  3.  Edematous urinary bladder wall thickening. Correlate for potential cystitis.  4. Osteopenia with redemonstration of multiple compression deformities     Suspect metabolic acidosis secondary to diarrhea  May consider oral bicarb plan potassium level improved     Dysuria with possible UTI started on Rocephin pending cultures  Continue Rocephin  Follow cultures  Consider stopping Jardiance  No available urine culture    Itching and burning groin and vullva  Could not evalute dt cream initiated by RN  Will try to remove   Get wound care consult  Urology consulted    History of CHF with moderately reduced ejection fraction 40 to 45% on echo February 2022 with mild aortic stenosis and moderate tricuspid regurgitation  On Toprol XL.Losartan   Avoid IV fluids unless diarrhea is overwhelming  Encourage oral intake if possible  Elevated proBNP noted     DM2 with hyperglycemia  Start sliding scale insulin  NPH 7030.  Use a smaller dose  Resume home regimen lower dose to avoid hypoglycemia    Abdomen insufficiency on fludrocortisone     CAD s/p PCI:  -Continue home medications  Memantine aspirin and statin and beta-blocker    Panic disorder on Prozac/Xanax  Essential tremors nothing in medications  Depression  Home medications reconciliation         DVT prophylaxis:heparin  Medical DVT prophylaxis orders are present.    CODE STATUS:    Code Status (Patient has no pulse and is not breathing): CPR (Attempt to Resuscitate)  Medical Interventions (Patient has pulse or is breathing): Full Support  Release to patient: Routine Release      Disposition:  I expect patient to be discharged home.    This patient has been examined wearing appropriate Personal Protective Equipment and discussed with hospital infection control department. 10/08/22      Electronically signed by Juanpablo Meade MD, 10/08/22, 10:34 EDT.  [facility] Hospitalist Team

## 2022-10-08 NOTE — PROGRESS NOTES
"NEPHROLOGY PROGRESS NOTE------[facility]    [facility]  [phone]  Patricio Danielson MD      Patient Care Team:  Dave Esparza as PCP - General (Preventative Medicine)  Patricio Danielson MD as Consulting Physician (Nephrology)      Provider:  Patricio Danielson MD  Patient Name: Nadege Barrow  :  1945    SUBJECTIVE:   F/U Hypokalemia  Feels weak, no chest pain or SOA    Medication:  atorvastatin, 40 mg, Oral, Daily  cefTRIAXone (ROCEPHIN) 1GM IVPB in 100 mL NS (MBP), 1 g, Intravenous, Q24H  fludrocortisone, 0.1 mg, Oral, Daily  FLUoxetine, 20 mg, Oral, Daily  furosemide, 20 mg, Oral, Daily  heparin (porcine), 5,000 Units, Subcutaneous, Q8H  insulin lispro, 0-9 Units, Subcutaneous, TID AC  insulin NPH-insulin regular, 5 Units, Subcutaneous, BID With Meals  losartan, 25 mg, Oral, Q24H  magnesium oxide, 400 mg, Oral, Daily  metoprolol succinate XL, 50 mg, Oral, Daily  potassium chloride, 20 mEq, Oral, QAM  sodium chloride, 10 mL, Intravenous, Q12H  sodium chloride, 10 mL, Intravenous, Q12H  ticagrelor, 90 mg, Oral, BID           OBJECTIVE    Vital Sign Min/Max for last 24 hours  Temp  Min: 98.1 °F (36.7 °C)  Max: 99.6 °F (37.6 °C)   BP  Min: 149/82  Max: 192/98   Pulse  Min: 80  Max: 91   Resp  Min: 18  Max: 20   SpO2  Min: 93 %  Max: 96 %   No data recorded   No data recorded     Flowsheet Rows    Flowsheet Row First Filed Value   Admission Height 160 cm (63\") Documented at 10/06/2022 0125   Admission Weight 67.9 kg (149 lb 11.2 oz) Documented at 10/06/2022 0125          I/O this shift:  In: 480 [P.O.:480]  Out: -   I/O last 3 completed shifts:  In: 480 [P.O.:480]  Out: 0     Physical Exam:  General Appearance: alert, appears stated age and cooperative  Head: normocephalic, without obvious abnormality and atraumatic  Eyes: conjunctivae and sclerae normal and no icterus  Neck: supple and no JVD  Lungs: clear to auscultation and respirations regular  Heart: " regular rhythm & normal rate and normal S1, S2  Chest: Wall no abnormalities observed  Abdomen: normal bowel sounds and soft, nontender  Extremities: moves extremities well, no edema, no cyanosis and no redness  Skin: no bleeding, bruising or rash, turgor normal, color normal and no lesions noted  Neurologic: Alert, and oriented. No focal deficits    Labs:    WBC WBC   Date Value Ref Range Status   10/08/2022 5.60 3.40 - 10.80 10*3/mm3 Final   10/07/2022 6.70 3.40 - 10.80 10*3/mm3 Final   10/07/2022 7.50 3.40 - 10.80 10*3/mm3 Final   10/06/2022 9.20 3.40 - 10.80 10*3/mm3 Final   10/06/2022 12.37 (H) 3.40 - 10.80 10*3/mm3 Final      HGB Hemoglobin   Date Value Ref Range Status   10/08/2022 11.9 (L) 12.0 - 15.9 g/dL Final   10/07/2022 10.7 (L) 12.0 - 15.9 g/dL Final   10/07/2022 10.3 (L) 12.0 - 15.9 g/dL Final   10/06/2022 10.9 (L) 12.0 - 15.9 g/dL Final   10/06/2022 12.0 12.0 - 15.9 g/dL Final      HCT Hematocrit   Date Value Ref Range Status   10/08/2022 37.5 34.0 - 46.6 % Final   10/07/2022 33.1 (L) 34.0 - 46.6 % Final   10/07/2022 32.1 (L) 34.0 - 46.6 % Final   10/06/2022 34.3 34.0 - 46.6 % Final   10/06/2022 37.3 34.0 - 46.6 % Final      Platelets No results found for: LABPLAT   MCV MCV   Date Value Ref Range Status   10/08/2022 90.6 79.0 - 97.0 fL Final   10/07/2022 92.5 79.0 - 97.0 fL Final   10/07/2022 91.9 79.0 - 97.0 fL Final   10/06/2022 90.8 79.0 - 97.0 fL Final   10/06/2022 87.4 79.0 - 97.0 fL Final          Sodium Sodium   Date Value Ref Range Status   10/08/2022 136 136 - 145 mmol/L Final   10/07/2022 136 136 - 145 mmol/L Final   10/07/2022 137 136 - 145 mmol/L Final   10/06/2022 140 136 - 145 mmol/L Final   10/06/2022 137 136 - 145 mmol/L Final      Potassium Potassium   Date Value Ref Range Status   10/08/2022 3.5 3.5 - 5.2 mmol/L Final   10/07/2022 3.7 3.5 - 5.2 mmol/L Final   10/07/2022 3.5 3.5 - 5.2 mmol/L Final   10/06/2022 3.1 (L) 3.5 - 5.2 mmol/L Final   10/06/2022 2.6 (C) 3.5 - 5.2 mmol/L Final       Chloride Chloride   Date Value Ref Range Status   10/08/2022 100 98 - 107 mmol/L Final   10/07/2022 100 98 - 107 mmol/L Final   10/07/2022 102 98 - 107 mmol/L Final   10/06/2022 104 98 - 107 mmol/L Final   10/06/2022 104 98 - 107 mmol/L Final      CO2 CO2   Date Value Ref Range Status   10/08/2022 22.0 22.0 - 29.0 mmol/L Final   10/07/2022 22.0 22.0 - 29.0 mmol/L Final   10/07/2022 23.0 22.0 - 29.0 mmol/L Final   10/06/2022 25.0 22.0 - 29.0 mmol/L Final   10/06/2022 19.2 (L) 22.0 - 29.0 mmol/L Final      BUN BUN   Date Value Ref Range Status   10/08/2022 8 8 - 23 mg/dL Final   10/07/2022 10 8 - 23 mg/dL Final   10/07/2022 10 8 - 23 mg/dL Final   10/06/2022 12 8 - 23 mg/dL Final   10/06/2022 13 8 - 23 mg/dL Final      Creatinine Creatinine   Date Value Ref Range Status   10/08/2022 0.80 0.57 - 1.00 mg/dL Final   10/07/2022 0.93 0.57 - 1.00 mg/dL Final   10/07/2022 0.82 0.57 - 1.00 mg/dL Final   10/06/2022 0.89 0.57 - 1.00 mg/dL Final   10/06/2022 0.94 0.57 - 1.00 mg/dL Final      Calcium Calcium   Date Value Ref Range Status   10/08/2022 9.2 8.6 - 10.5 mg/dL Final   10/07/2022 8.8 8.6 - 10.5 mg/dL Final   10/07/2022 8.4 (L) 8.6 - 10.5 mg/dL Final   10/06/2022 8.4 (L) 8.6 - 10.5 mg/dL Final   10/06/2022 8.8 8.6 - 10.5 mg/dL Final      PO4 No components found for: PO4   Albumin Albumin   Date Value Ref Range Status   10/08/2022 3.60 3.50 - 5.20 g/dL Final   10/07/2022 3.40 (L) 3.50 - 5.20 g/dL Final   10/07/2022 3.00 (L) 3.50 - 5.20 g/dL Final   10/06/2022 3.20 (L) 3.50 - 5.20 g/dL Final   10/06/2022 3.58 3.50 - 5.20 g/dL Final      Magnesium Magnesium   Date Value Ref Range Status   10/07/2022 1.5 (L) 1.6 - 2.4 mg/dL Final   10/07/2022 1.8 1.6 - 2.4 mg/dL Final   10/06/2022 1.9 1.6 - 2.4 mg/dL Final   10/06/2022 1.5 (L) 1.6 - 2.4 mg/dL Final      Uric Acid No components found for: URIC ACID     Imaging Results (Last 72 Hours)     Procedure Component Value Units Date/Time    CT Abdomen Pelvis Without Contrast  [[id number]] Collected: 10/06/22 0244     Updated: 10/06/22 0252    Narrative:      Exam: CT abdomen and pelvis without contrast    Date: October 6, 2022    History: Abdominal pain, vomiting    Comparison: June 14, 2018, August 18, 2022    Technique: Contiguous axial CT images obtained of the abdomen and pelvis without contrast. Additionally, sagittal and coronal reformatted images were obtained.  CT dose lowering techniques were used, to include: automated exposure control, adjustment for   patient size, and or use of iterative reconstruction.    Findings:    Lung bases: The heart size is prominent. There is an element of interlobular septal thickening. There are small bilateral pleural effusions.    Liver: The liver is mildly hypodense.    Spleen: There are numerous calcified splenic granulomas.    Pancreas: Normal.    Adrenal glands: Normal.    Gallbladder: Normal.    Kidneys: There is no hydronephrosis or obstructive uropathy.    Abdominal mesentery: There are nonenlarged lymph nodes in the central abdominal mesentery. There is no pathologic intra-abdominal mass or adenopathy.    Bowel loops: There are scattered colonic diverticula. The appendix is normal. There is no small bowel obstruction.    CT PELVIS:  There is a small fat-containing right inguinal hernia. There is edematous urinary bladder wall thickening.    Abdominal aorta: There are moderate atherosclerotic changes. There is no aneurysm.    Osseous structures: The osseous structures are demineralized. There is redemonstration of chronic appearing compression deformities, unchanged. There is vertebroplasty change within L4. There are moderate degenerative changes involving both hips. No   acute fractures are identified.      Impression:      1. Borderline cardiomegaly with an element of pulmonary edema and small bilateral pleural effusions. Correlate for fluid overload or congestive heart failure.  2. Diverticulosis.  3. Edematous urinary bladder wall  thickening. Correlate for potential cystitis.  4. Osteopenia with redemonstration of multiple compression deformities in the spine.  5. Mild hepatic steatosis.  6. Additional incidental and chronic findings as above.    Slot 63    Electronically signed by:  Nikita Gibbs M.D.    10/6/2022 12:51 AM          Results for orders placed during the hospital encounter of 09/29/22    XR Forearm 2 View Right    Narrative  DATE OF EXAM:  9/29/2022 11:13 PM    PROCEDURE:  XR FOREARM 2 VW RIGHT-    INDICATIONS:  fall    COMPARISON:  No Comparisons Available    TECHNIQUE:  A minimum of two routine standard radiographic views were obtained of  the right forearm.      FINDINGS:  Evidence of prior right distal ulnar fracture. There is diffuse  osteopenia. No acute fracture or dislocation is identified. Degenerative  changes at the elbow and wrist.    Impression  1. Prior distal ulnar fracture, but no acute fracture or dislocation.    Electronically Signed By-Kwesi Machado MD On:9/30/2022 7:41 AM  This report was finalized on [id number] by  Kwesi Machado MD.      XR Wrist 3+ View Right    Narrative  DATE OF EXAM:  9/29/2022 7:46 PM    PROCEDURE:  XR WRIST 3+ VW RIGHT-    INDICATIONS:  injury    COMPARISON:  No Comparisons Available    TECHNIQUE:  A minimum of three radiologic views of the right wrist were obtained.    FINDINGS:  Distal radius and ulna are intact. The bones are osteopenic. Negative  for fracture. No dislocation. 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    Impression  Negative for fracture.    Electronically Signed By-Finn Ashton MD On:9/29/2022 9:01 PM  This report was finalized on [id number] by  Finn Ashton MD.      Results for orders placed during the hospital encounter of 09/13/22    XR Chest 1 View    Narrative  DATE OF EXAM:  9/13/2022 8:20 PM    PROCEDURE:  XR CHEST 1 VW-    INDICATIONS:  shortness of breath    COMPARISON:  05/25/2022    TECHNIQUE:  Single radiographic AP view of  the chest was obtained.    FINDINGS:  The heart is not definitely enlarged. The lungs seem clear. There are no  pleural effusions. There is an old fracture deformity of the left  clavicle.    Impression  1.     No acute pulmonary process.  2.     Old fracture deformity left clavicle.    Electronically Signed By-Munir Cote MD On:9/13/2022 8:52 PM  This report was finalized on [id number] by  Munir Cote MD.            ASSESSMENT / PLAN      Diarrhea, unspecified type    · Hypokalemia-likely due to renal wasting and ongoing diarrhea.  Her potassium got quite low despite losartan and oral potassium.  We will check for renal wasting.  · Metabolic acidosis-due to GI loss.  Bicarb is improved now.  · Hypertension  · Diarrhea  · History congestive heart failure-appears compensated  · UTI-on Rocephin.    · Diabetes     Suspect low K related to diarrhea  Creatinine stable potassium okay  Continue losartan Lasix  Renal wise stable will sign off.  Available if any further renal questions        Patricio Danielson MD  [facility]  [phone]  10/08/22  13:06 EDT

## 2022-10-08 NOTE — THERAPY TREATMENT NOTE
"Subjective: Pt agreeable to therapeutic plan of care.     Objective:     Bed mobility - Modified-Independent     Transfers - Supervision and with rolling walker     Ambulation - 150 feet CGA and with rolling walker. Pt amb with improved gait mechanics and steadiness using RW this date.     Vitals: WNL    Pain: 3 VAS anal soreness when sitting at EOB.     Education: Provided education on importance of mobility and skilled verbal / tactile cueing throughout intervention.     Assessment: Nadege Barrow presents with functional mobility impairments which indicate the need for skilled intervention. Tolerating session today without incident. Pt reports she typically does not use AD for amb, and initially hesitant to use RW; however, pt agreed to use RW this date and demonstrated improved gait mechanics and steadiness compared to initial eval. Will continue to follow and progress as tolerated.     Plan/Recommendations:   Low Intensity Therapy recommended post-acute care - This is recommended as therapy feels this patient would require 2-3 visits per week. OP or HH would be the best option depending on patient's home bound status. Consider, if the patient has other  \"skilled\" needs such as wounds, IV antibiotics, etc. Combined with \"low intensity\" could also equate to a SNF. If patient is medically complex, consider LTAC.. Pt requires no DME at discharge.     Pt desires Home with family assist and and Home Health at discharge. Pt cooperative; agreeable to therapeutic recommendations and plan of care.         Basic Mobility 6-click:  Rollin = Total, A lot = 2, A little = 3; 4 = None  Supine>Sit:   1 = Total, A lot = 2, A little = 3; 4 = None   Sit>Stand with arms:  1 = Total, A lot = 2, A little = 3; 4 = None  Bed>Chair:   1 = Total, A lot = 2, A little = 3; 4 = None  Ambulate in room:  1 = Total, A lot = 2, A little = 3; 4 = None  3-5 Steps with railin = Total, A lot = 2, A little = 3; 4 = None  Score: " 22    Modified [place]: N/A = No pre-op stroke/TIA    Post-Tx Position: Up in Chair, Alarms activated and Call light and personal items within reach  PPE: mask, gloves and eye protection

## 2022-10-09 LAB
ALBUMIN SERPL-MCNC: 3.3 G/DL (ref 3.5–5.2)
ALBUMIN/GLOB SERPL: 1.3 G/DL
ALP SERPL-CCNC: 76 U/L (ref 39–117)
ALT SERPL W P-5'-P-CCNC: 6 U/L (ref 1–33)
ANION GAP SERPL CALCULATED.3IONS-SCNC: 16 MMOL/L (ref 5–15)
AST SERPL-CCNC: 15 U/L (ref 1–32)
BACTERIA SPEC AEROBE CULT: NO GROWTH
BASOPHILS # BLD AUTO: 0 10*3/MM3 (ref 0–0.2)
BASOPHILS NFR BLD AUTO: 0.2 % (ref 0–1.5)
BILIRUB SERPL-MCNC: 0.7 MG/DL (ref 0–1.2)
BUN SERPL-MCNC: 14 MG/DL (ref 8–23)
BUN/CREAT SERPL: 14.9 (ref 7–25)
CALCIUM SPEC-SCNC: 8.6 MG/DL (ref 8.6–10.5)
CHLORIDE SERPL-SCNC: 99 MMOL/L (ref 98–107)
CO2 SERPL-SCNC: 20 MMOL/L (ref 22–29)
CREAT SERPL-MCNC: 0.94 MG/DL (ref 0.57–1)
DEPRECATED RDW RBC AUTO: 43.8 FL (ref 37–54)
EGFRCR SERPLBLD CKD-EPI 2021: 63 ML/MIN/1.73
EOSINOPHIL # BLD AUTO: 0.1 10*3/MM3 (ref 0–0.4)
EOSINOPHIL NFR BLD AUTO: 1.9 % (ref 0.3–6.2)
ERYTHROCYTE [DISTWIDTH] IN BLOOD BY AUTOMATED COUNT: 14.3 % (ref 12.3–15.4)
GLOBULIN UR ELPH-MCNC: 2.5 GM/DL
GLUCOSE BLDC GLUCOMTR-MCNC: 129 MG/DL (ref 70–105)
GLUCOSE BLDC GLUCOMTR-MCNC: 196 MG/DL (ref 70–105)
GLUCOSE BLDC GLUCOMTR-MCNC: 211 MG/DL (ref 70–105)
GLUCOSE BLDC GLUCOMTR-MCNC: 230 MG/DL (ref 70–105)
GLUCOSE BLDC GLUCOMTR-MCNC: 83 MG/DL (ref 70–105)
GLUCOSE SERPL-MCNC: 205 MG/DL (ref 65–99)
HCT VFR BLD AUTO: 32.7 % (ref 34–46.6)
HGB BLD-MCNC: 10.5 G/DL (ref 12–15.9)
LYMPHOCYTES # BLD AUTO: 0.9 10*3/MM3 (ref 0.7–3.1)
LYMPHOCYTES NFR BLD AUTO: 17.6 % (ref 19.6–45.3)
MAGNESIUM SERPL-MCNC: 1.4 MG/DL (ref 1.6–2.4)
MCH RBC QN AUTO: 29.2 PG (ref 26.6–33)
MCHC RBC AUTO-ENTMCNC: 32.1 G/DL (ref 31.5–35.7)
MCV RBC AUTO: 91.1 FL (ref 79–97)
MONOCYTES # BLD AUTO: 0.5 10*3/MM3 (ref 0.1–0.9)
MONOCYTES NFR BLD AUTO: 11 % (ref 5–12)
NEUTROPHILS NFR BLD AUTO: 3.4 10*3/MM3 (ref 1.7–7)
NEUTROPHILS NFR BLD AUTO: 69.3 % (ref 42.7–76)
NRBC BLD AUTO-RTO: 0 /100 WBC (ref 0–0.2)
PHOSPHATE SERPL-MCNC: 3.6 MG/DL (ref 2.5–4.5)
PLATELET # BLD AUTO: 167 10*3/MM3 (ref 140–450)
PMV BLD AUTO: 9.9 FL (ref 6–12)
POTASSIUM SERPL-SCNC: 3.4 MMOL/L (ref 3.5–5.2)
POTASSIUM SERPL-SCNC: 3.4 MMOL/L (ref 3.5–5.2)
PROT SERPL-MCNC: 5.8 G/DL (ref 6–8.5)
RBC # BLD AUTO: 3.59 10*6/MM3 (ref 3.77–5.28)
SODIUM SERPL-SCNC: 135 MMOL/L (ref 136–145)
WBC NRBC COR # BLD: 4.9 10*3/MM3 (ref 3.4–10.8)

## 2022-10-09 PROCEDURE — 25010000002 CEFTRIAXONE PER 250 MG: Performed by: INTERNAL MEDICINE

## 2022-10-09 PROCEDURE — G0378 HOSPITAL OBSERVATION PER HR: HCPCS

## 2022-10-09 PROCEDURE — 86695 HERPES SIMPLEX TYPE 1 TEST: CPT | Performed by: OBSTETRICS & GYNECOLOGY

## 2022-10-09 PROCEDURE — 63710000001 INSULIN LISPRO (HUMAN) PER 5 UNITS: Performed by: INTERNAL MEDICINE

## 2022-10-09 PROCEDURE — 25010000002 MAGNESIUM SULFATE 2 GM/50ML SOLUTION: Performed by: INTERNAL MEDICINE

## 2022-10-09 PROCEDURE — 86696 HERPES SIMPLEX TYPE 2 TEST: CPT | Performed by: OBSTETRICS & GYNECOLOGY

## 2022-10-09 PROCEDURE — 96366 THER/PROPH/DIAG IV INF ADDON: CPT

## 2022-10-09 PROCEDURE — 63710000001 INSULIN ISOPHANE & REGULAR PER 5 UNITS: Performed by: INTERNAL MEDICINE

## 2022-10-09 PROCEDURE — 25010000002 ONDANSETRON PER 1 MG: Performed by: INTERNAL MEDICINE

## 2022-10-09 PROCEDURE — 84132 ASSAY OF SERUM POTASSIUM: CPT | Performed by: INTERNAL MEDICINE

## 2022-10-09 PROCEDURE — 87529 HSV DNA AMP PROBE: CPT | Performed by: OBSTETRICS & GYNECOLOGY

## 2022-10-09 PROCEDURE — 25010000002 HEPARIN (PORCINE) PER 1000 UNITS: Performed by: INTERNAL MEDICINE

## 2022-10-09 PROCEDURE — 96376 TX/PRO/DX INJ SAME DRUG ADON: CPT

## 2022-10-09 PROCEDURE — 82962 GLUCOSE BLOOD TEST: CPT

## 2022-10-09 PROCEDURE — 96372 THER/PROPH/DIAG INJ SC/IM: CPT

## 2022-10-09 RX ORDER — LIDOCAINE HYDROCHLORIDE 20 MG/ML
JELLY TOPICAL EVERY 4 HOURS PRN
Status: DISCONTINUED | OUTPATIENT
Start: 2022-10-09 | End: 2022-10-11 | Stop reason: HOSPADM

## 2022-10-09 RX ORDER — ACYCLOVIR 50 MG/G
1 OINTMENT TOPICAL
Status: DISCONTINUED | OUTPATIENT
Start: 2022-10-09 | End: 2022-10-11

## 2022-10-09 RX ORDER — LIDOCAINE HYDROCHLORIDE 20 MG/ML
JELLY TOPICAL EVERY 4 HOURS PRN
Status: DISCONTINUED | OUTPATIENT
Start: 2022-10-09 | End: 2022-10-09

## 2022-10-09 RX ORDER — HYDROCODONE BITARTRATE AND ACETAMINOPHEN 7.5; 325 MG/1; MG/1
1 TABLET ORAL EVERY 6 HOURS PRN
Status: DISCONTINUED | OUTPATIENT
Start: 2022-10-09 | End: 2022-10-11 | Stop reason: HOSPADM

## 2022-10-09 RX ORDER — DOCOSANOL 100 MG/G
1 CREAM TOPICAL
Status: DISCONTINUED | OUTPATIENT
Start: 2022-10-09 | End: 2022-10-09

## 2022-10-09 RX ADMIN — INSULIN HUMAN 7 UNITS: 100 INJECTION, SUSPENSION SUBCUTANEOUS at 16:55

## 2022-10-09 RX ADMIN — TICAGRELOR 90 MG: 90 TABLET ORAL at 20:42

## 2022-10-09 RX ADMIN — CEFTRIAXONE 1 G: 1 INJECTION, POWDER, FOR SOLUTION INTRAMUSCULAR; INTRAVENOUS at 01:33

## 2022-10-09 RX ADMIN — ATORVASTATIN CALCIUM 40 MG: 40 TABLET, FILM COATED ORAL at 08:58

## 2022-10-09 RX ADMIN — Medication 10 ML: at 20:43

## 2022-10-09 RX ADMIN — POTASSIUM CHLORIDE 40 MEQ: 20 TABLET, EXTENDED RELEASE ORAL at 08:58

## 2022-10-09 RX ADMIN — HEPARIN SODIUM 5000 UNITS: 5000 INJECTION INTRAVENOUS; SUBCUTANEOUS at 21:44

## 2022-10-09 RX ADMIN — ACYCLOVIR 1 APPLICATION: 50 OINTMENT TOPICAL at 16:58

## 2022-10-09 RX ADMIN — INSULIN LISPRO 4 UNITS: 100 INJECTION, SOLUTION INTRAVENOUS; SUBCUTANEOUS at 16:56

## 2022-10-09 RX ADMIN — Medication 400 MG: at 08:58

## 2022-10-09 RX ADMIN — POTASSIUM CHLORIDE 40 MEQ: 20 TABLET, EXTENDED RELEASE ORAL at 14:19

## 2022-10-09 RX ADMIN — MAGNESIUM SULFATE HEPTAHYDRATE 2 G: 2 INJECTION, SOLUTION INTRAVENOUS at 14:19

## 2022-10-09 RX ADMIN — ONDANSETRON 4 MG: 2 INJECTION INTRAMUSCULAR; INTRAVENOUS at 08:01

## 2022-10-09 RX ADMIN — HEPARIN SODIUM 5000 UNITS: 5000 INJECTION INTRAVENOUS; SUBCUTANEOUS at 05:24

## 2022-10-09 RX ADMIN — FUROSEMIDE 20 MG: 20 TABLET ORAL at 08:58

## 2022-10-09 RX ADMIN — LIDOCAINE HYDROCHLORIDE: 20 JELLY TOPICAL at 14:19

## 2022-10-09 RX ADMIN — INSULIN LISPRO 4 UNITS: 100 INJECTION, SOLUTION INTRAVENOUS; SUBCUTANEOUS at 08:59

## 2022-10-09 RX ADMIN — ACYCLOVIR 1 APPLICATION: 50 OINTMENT TOPICAL at 21:44

## 2022-10-09 RX ADMIN — ALPRAZOLAM 1 MG: 1 TABLET ORAL at 20:42

## 2022-10-09 RX ADMIN — POTASSIUM CHLORIDE 20 MEQ: 1500 TABLET, EXTENDED RELEASE ORAL at 05:24

## 2022-10-09 RX ADMIN — Medication 10 ML: at 20:42

## 2022-10-09 RX ADMIN — METOPROLOL SUCCINATE 50 MG: 50 TABLET, EXTENDED RELEASE ORAL at 08:58

## 2022-10-09 RX ADMIN — MAGNESIUM SULFATE HEPTAHYDRATE 2 G: 2 INJECTION, SOLUTION INTRAVENOUS at 16:55

## 2022-10-09 RX ADMIN — OXYCODONE 5 MG: 5 TABLET ORAL at 07:29

## 2022-10-09 RX ADMIN — HEPARIN SODIUM 5000 UNITS: 5000 INJECTION INTRAVENOUS; SUBCUTANEOUS at 14:19

## 2022-10-09 RX ADMIN — FLUDROCORTISONE ACETATE 0.1 MG: 0.1 TABLET ORAL at 08:59

## 2022-10-09 RX ADMIN — Medication 10 ML: at 08:01

## 2022-10-09 RX ADMIN — TICAGRELOR 90 MG: 90 TABLET ORAL at 08:58

## 2022-10-09 RX ADMIN — MAGNESIUM SULFATE HEPTAHYDRATE 2 G: 2 INJECTION, SOLUTION INTRAVENOUS at 10:37

## 2022-10-09 RX ADMIN — LOSARTAN POTASSIUM 25 MG: 25 TABLET, FILM COATED ORAL at 08:58

## 2022-10-09 RX ADMIN — LIDOCAINE HYDROCHLORIDE: 20 JELLY TOPICAL at 20:42

## 2022-10-09 RX ADMIN — FLUOXETINE 20 MG: 20 CAPSULE ORAL at 08:58

## 2022-10-09 NOTE — PROGRESS NOTES
[facility] Medicine Services Daily Progress Note    Patient Name: Nadege Barrow  : 1945  MRN: [id number]  Primary Care Physician:  Dave Esparza  Date of admission: 10/6/2022      Subjective      Chief Complaint:   Dysuria vomiting diarrhea      Patient Reports   10/  Seen and examined  Without new events or symptoms  Potassium better    10/8  Still buining in groin area  Feels dizzy  10/9  Still complaining of vulvar pain  Will ask OB/GYN for evaluation  Add Norco and local lidocaine gel      Review of Systems   Constitutional: Negative.   HENT: Negative.    Eyes: Negative.    Cardiovascular: Negative.    Respiratory: Negative.    Endocrine: Negative.    Hematologic/Lymphatic: Negative.    Skin: Negative.    Musculoskeletal: Negative.    Gastrointestinal: Negative.    Genitourinary: Negative.    Neurological: Negative.    Psychiatric/Behavioral: Negative.    Allergic/Immunologic: Negative.    All other systems reviewed and are negative.           Objective      Vitals:   Temp:  [97.6 °F (36.4 °C)-99.4 °F (37.4 °C)] 98.4 °F (36.9 °C)  Heart Rate:  [76-93] 93  Resp:  [16-20] 16  BP: (138-164)/(72-91) 164/91  Flow (L/min):  [2] 2    Physical Exam  Vitals and nursing note reviewed.   Constitutional:       General: She is not in acute distress.     Appearance: Normal appearance. She is well-developed. She is not ill-appearing, toxic-appearing or diaphoretic.   HENT:      Head: Normocephalic and atraumatic.      Right Ear: Ear canal and external ear normal.      Left Ear: Ear canal and external ear normal.      Nose: Nose normal. No congestion or rhinorrhea.      Mouth/Throat:      Mouth: Mucous membranes are moist.      Pharynx: No oropharyngeal exudate.   Eyes:      General: No scleral icterus.        Right eye: No discharge.         Left eye: No discharge.      Extraocular Movements: Extraocular movements intact.      Conjunctiva/sclera: Conjunctivae normal.      Pupils: Pupils are equal,  round, and reactive to light.   Neck:      Thyroid: No thyromegaly.      Vascular: No carotid bruit or JVD.      Trachea: No tracheal deviation.   Cardiovascular:      Rate and Rhythm: Normal rate and regular rhythm.      Pulses: Normal pulses.      Heart sounds: Normal heart sounds. No murmur heard.    No friction rub. No gallop.   Pulmonary:      Effort: Pulmonary effort is normal. No respiratory distress.      Breath sounds: Normal breath sounds. No stridor. No wheezing, rhonchi or rales.   Chest:      Chest wall: No tenderness.   Abdominal:      General: Bowel sounds are normal. There is no distension.      Palpations: Abdomen is soft. There is no mass.      Tenderness: There is no abdominal tenderness. There is no guarding or rebound.      Hernia: No hernia is present.   Musculoskeletal:         General: No swelling, tenderness, deformity or signs of injury. Normal range of motion.      Cervical back: Normal range of motion and neck supple. No rigidity. No muscular tenderness.      Right lower leg: No edema.      Left lower leg: No edema.   Lymphadenopathy:      Cervical: No cervical adenopathy.   Skin:     General: Skin is warm and dry.      Coloration: Skin is not jaundiced or pale.      Findings: No bruising, erythema or rash.   Neurological:      General: No focal deficit present.      Mental Status: She is alert and oriented to person, place, and time. Mental status is at baseline.      Cranial Nerves: No cranial nerve deficit.      Sensory: No sensory deficit.      Motor: No weakness or abnormal muscle tone.      Coordination: Coordination normal.   Psychiatric:         Mood and Affect: Mood normal.         Behavior: Behavior normal.         Thought Content: Thought content normal.         Judgment: Judgment normal.               Result Review    Result Review:  I have personally reviewed the results from the time of this admission to 10/9/2022 09:29 EDT and agree with these findings:  [x]   Laboratory  [x]  Microbiology  []  Radiology  []  EKG/Telemetry   []  Cardiology/Vascular   []  Pathology  []  Old records  []  Other:  Most notable findings include: As above    Wounds (last 24 hours)     LDA Wound     Row Name 10/09/22 0729             Wound 10/07/22 1429 Bilateral upper labia Abrasion    Wound - Properties Group Placement Date: 10/07/22  - Placement Time: 1429  -AH Present on Hospital Admission: Y  -AH Side: Bilateral  -AH Orientation: upper  -AH Location: labia  -AH Primary Wound Type: Abrasion  -AH    Care, Wound irrigated with;water  -MB      Retired Wound - Properties Group Placement Date: 10/07/22  - Placement Time: 1429  -AH Present on Hospital Admission: Y  -AH Side: Bilateral  -AH Orientation: upper  -AH Location: labia  -AH Primary Wound Type: Abrasion  -AH    Retired Wound - Properties Group Date first assessed: 10/07/22  - Time first assessed: 1429  -AH Present on Hospital Admission: Y  -AH Side: Bilateral  -AH Location: labia  -AH Primary Wound Type: Abrasion  -AH          User Key  (r) = Recorded By, (t) = Taken By, (c) = Cosigned By    Initials Name Provider Type    Izabela Tijerina LPN Licensed Nurse    Ela Friedman LPN Licensed Nurse                  Assessment & Plan      Brief Patient Summary:  Nadege Barrow is a 76 y.o. female who         atorvastatin, 40 mg, Oral, Daily  fludrocortisone, 0.1 mg, Oral, Daily  FLUoxetine, 20 mg, Oral, Daily  furosemide, 20 mg, Oral, Daily  heparin (porcine), 5,000 Units, Subcutaneous, Q8H  insulin lispro, 0-9 Units, Subcutaneous, TID AC  insulin NPH-insulin regular, 5 Units, Subcutaneous, BID With Meals  losartan, 25 mg, Oral, Q24H  magnesium oxide, 400 mg, Oral, Daily  metoprolol succinate XL, 50 mg, Oral, Daily  potassium chloride, 20 mEq, Oral, QAM  sodium chloride, 10 mL, Intravenous, Q12H  sodium chloride, 10 mL, Intravenous, Q12H  ticagrelor, 90 mg, Oral, BID             Active Hospital Problems:  Active Hospital Problems     Diagnosis    • Diarrhea, unspecified type      History of Present Illness: Nadege Barrow is a 76 y.o. female who presented [facility]2022 complaining of vomiting and diarrhea for the last 24 hours  Patient also complains of dysuria without fever chills.  She reports headache.As well  She is chronic medical problems including CHF depression diabetes hypertension panic disorder essential tremors     Work-up in the ER showed hypokalemia with potassium 2.6 hyperglycemia 233 elevated proBNP 8988, suspect metabolic acidosis with bicarb of 19 magnesium 1.5 normal lipase lactate and elevated white count 12.3 with neutrophilic predominance.  Urinalysis shows 500 glucose 40 ketones moderate blood 30 protein trace leukocyte CT abdomen pelvis shows edematous urinary bladder wall thickening with potential cystitis, osteopenia with multiple compression fractures of the spine, hepatic steatosis, diverticulosis, borderline cardiomegaly and pulmonary edema small bilateral pleural effusion small fat containing right inguinal hernia  Of note the patient denies any recent antibiotic use and denies any abdominal pain except some cramps on and off     Plan:       Severe hypokalemiaWith old left bundle branch block on EKG  Replace electrolytes and to reassess  Resolved     Gastroenteritis possibly viral with nausea vomiting and diarrhea  GI panel/C. difficile ordered given leukocytosis  Mild leukocytosis noted  Stop Linzess  CT abdomen pelvis showed. Borderline cardiomegaly with an element of pulmonary edema and small bilateral pleural effusions. Correlate for fluid overload or congestive heart failure.  2. Diverticulosis.  3. Edematous urinary bladder wall thickening. Correlate for potential cystitis.  4. Osteopenia with redemonstration of multiple compression deformities     Suspect metabolic acidosis secondary to diarrhea  May consider oral bicarb plan potassium level improved  Improved  Dysuria with possible UTI  started on Rocephin pending cultures  Continue Rocephin  Follow cultures  Consider stopping Jardiance  No available urine culture    Itching and burning groin and vullva  Could not evalute dt cream initiated by RN  Will try to remove   Get wound care consult  Urology consulted  OB/GYN consulted and they are recommending local acyclovirAnd swab sent for PCR for genital herpes simplex      History of CHF with moderately reduced ejection fraction 40 to 45% on echo February 2022 with mild aortic stenosis and moderate tricuspid regurgitation  On Toprol XL.Losartan, Brilinta  Avoid IV fluids unless diarrhea is overwhelming  Encourage oral intake if possible  Elevated proBNP noted     DM2 with hyperglycemia  Start sliding scale insulin  NPH 7030.  Use a smaller dose  Resume home regimen lower dose to avoid hypoglycemia    Adrenal insufficiency on fludrocortisone      CAD s/p PCI:  -Continue home medications  aspirin and statin and beta-blocker, Brilinta    Panic disorder on Prozac/Xanax  Essential tremors nothing in medications  Depression  Home medications reconciliation         DVT prophylaxis:heparin  Medical DVT prophylaxis orders are present.    CODE STATUS:    Code Status (Patient has no pulse and is not breathing): CPR (Attempt to Resuscitate)  Medical Interventions (Patient has pulse or is breathing): Full Support  Release to patient: Routine Release      Disposition:  I expect patient to be discharged home.    This patient has been examined wearing appropriate Personal Protective Equipment and discussed with hospital infection control department. 10/09/22      Electronically signed by Juanpablo Meade MD, 10/09/22, 09:29 EDT.  [facility] Hospitalist Team

## 2022-10-09 NOTE — PLAN OF CARE
Goal Outcome Evaluation:              Outcome Evaluation: pt resting well thoughout shift with no complaints of pain. Continue IV antibiotics. Continue to monitor.

## 2022-10-09 NOTE — CONSULTS
GYN CONSULT-   Vulvar lesions    Painful lesions past week  Hurts to void over lesions    Exam:    Pattern and appearance c/w herpes                 Atopic blisters                 Cover vulva anterior and posterior,  Right and left of midline    No prior Hx genital or oral herpes    Rec beginning topical treatment for herpes simplex          Confirm diagnosis with PCR swab and serum IgG Types I and II            Start systemic acyclovir if diagnosis confirmed by labs    Alternative diagnosis      Atopic Dermatitis  Which should respond to supportive care

## 2022-10-10 LAB
ALBUMIN SERPL-MCNC: 3.2 G/DL (ref 3.5–5.2)
ANION GAP SERPL CALCULATED.3IONS-SCNC: 12 MMOL/L (ref 5–15)
BUN SERPL-MCNC: 14 MG/DL (ref 8–23)
BUN/CREAT SERPL: 16.5 (ref 7–25)
CALCIUM SPEC-SCNC: 8.4 MG/DL (ref 8.6–10.5)
CHLORIDE SERPL-SCNC: 106 MMOL/L (ref 98–107)
CO2 SERPL-SCNC: 23 MMOL/L (ref 22–29)
CREAT SERPL-MCNC: 0.85 MG/DL (ref 0.57–1)
EGFRCR SERPLBLD CKD-EPI 2021: 71.1 ML/MIN/1.73
GLUCOSE BLDC GLUCOMTR-MCNC: 147 MG/DL (ref 70–105)
GLUCOSE BLDC GLUCOMTR-MCNC: 149 MG/DL (ref 70–105)
GLUCOSE BLDC GLUCOMTR-MCNC: 170 MG/DL (ref 70–105)
GLUCOSE BLDC GLUCOMTR-MCNC: 243 MG/DL (ref 70–105)
GLUCOSE BLDC GLUCOMTR-MCNC: 91 MG/DL (ref 70–105)
GLUCOSE SERPL-MCNC: 78 MG/DL (ref 65–99)
MAGNESIUM SERPL-MCNC: 2.3 MG/DL (ref 1.6–2.4)
PHOSPHATE SERPL-MCNC: 3.3 MG/DL (ref 2.5–4.5)
POTASSIUM SERPL-SCNC: 3.1 MMOL/L (ref 3.5–5.2)
SODIUM SERPL-SCNC: 141 MMOL/L (ref 136–145)

## 2022-10-10 PROCEDURE — 25010000002 HEPARIN (PORCINE) PER 1000 UNITS: Performed by: INTERNAL MEDICINE

## 2022-10-10 PROCEDURE — 96375 TX/PRO/DX INJ NEW DRUG ADDON: CPT

## 2022-10-10 PROCEDURE — 96372 THER/PROPH/DIAG INJ SC/IM: CPT

## 2022-10-10 PROCEDURE — 83735 ASSAY OF MAGNESIUM: CPT | Performed by: INTERNAL MEDICINE

## 2022-10-10 PROCEDURE — 63710000001 INSULIN LISPRO (HUMAN) PER 5 UNITS: Performed by: INTERNAL MEDICINE

## 2022-10-10 PROCEDURE — 84100 ASSAY OF PHOSPHORUS: CPT | Performed by: INTERNAL MEDICINE

## 2022-10-10 PROCEDURE — G0378 HOSPITAL OBSERVATION PER HR: HCPCS

## 2022-10-10 PROCEDURE — 25010000002 KETOROLAC TROMETHAMINE PER 15 MG: Performed by: INTERNAL MEDICINE

## 2022-10-10 PROCEDURE — 85025 COMPLETE CBC W/AUTO DIFF WBC: CPT | Performed by: INTERNAL MEDICINE

## 2022-10-10 PROCEDURE — 97116 GAIT TRAINING THERAPY: CPT

## 2022-10-10 PROCEDURE — 63710000001 INSULIN ISOPHANE & REGULAR PER 5 UNITS: Performed by: INTERNAL MEDICINE

## 2022-10-10 PROCEDURE — 80053 COMPREHEN METABOLIC PANEL: CPT | Performed by: INTERNAL MEDICINE

## 2022-10-10 PROCEDURE — 82962 GLUCOSE BLOOD TEST: CPT

## 2022-10-10 RX ORDER — KETOROLAC TROMETHAMINE 30 MG/ML
30 INJECTION, SOLUTION INTRAMUSCULAR; INTRAVENOUS EVERY 12 HOURS
Status: DISCONTINUED | OUTPATIENT
Start: 2022-10-10 | End: 2022-10-11 | Stop reason: HOSPADM

## 2022-10-10 RX ORDER — POTASSIUM CHLORIDE 20 MEQ/1
40 TABLET, EXTENDED RELEASE ORAL EVERY MORNING
Status: DISCONTINUED | OUTPATIENT
Start: 2022-10-11 | End: 2022-10-11

## 2022-10-10 RX ORDER — LOSARTAN POTASSIUM 50 MG/1
50 TABLET ORAL
Status: DISCONTINUED | OUTPATIENT
Start: 2022-10-11 | End: 2022-10-11 | Stop reason: HOSPADM

## 2022-10-10 RX ADMIN — TICAGRELOR 90 MG: 90 TABLET ORAL at 20:05

## 2022-10-10 RX ADMIN — TICAGRELOR 90 MG: 90 TABLET ORAL at 09:12

## 2022-10-10 RX ADMIN — ACYCLOVIR 1 APPLICATION: 50 OINTMENT TOPICAL at 17:22

## 2022-10-10 RX ADMIN — ACYCLOVIR 1 APPLICATION: 50 OINTMENT TOPICAL at 11:49

## 2022-10-10 RX ADMIN — KETOROLAC TROMETHAMINE 30 MG: 30 INJECTION, SOLUTION INTRAMUSCULAR at 12:00

## 2022-10-10 RX ADMIN — FLUDROCORTISONE ACETATE 0.1 MG: 0.1 TABLET ORAL at 09:12

## 2022-10-10 RX ADMIN — POTASSIUM CHLORIDE 40 MEQ: 20 TABLET, EXTENDED RELEASE ORAL at 05:08

## 2022-10-10 RX ADMIN — LOSARTAN POTASSIUM 25 MG: 25 TABLET, FILM COATED ORAL at 09:12

## 2022-10-10 RX ADMIN — Medication 10 ML: at 09:12

## 2022-10-10 RX ADMIN — OXYCODONE 5 MG: 5 TABLET ORAL at 15:40

## 2022-10-10 RX ADMIN — HEPARIN SODIUM 5000 UNITS: 5000 INJECTION INTRAVENOUS; SUBCUTANEOUS at 20:05

## 2022-10-10 RX ADMIN — INSULIN HUMAN 7 UNITS: 100 INJECTION, SUSPENSION SUBCUTANEOUS at 09:12

## 2022-10-10 RX ADMIN — Medication 10 ML: at 20:05

## 2022-10-10 RX ADMIN — INSULIN LISPRO 2 UNITS: 100 INJECTION, SOLUTION INTRAVENOUS; SUBCUTANEOUS at 11:48

## 2022-10-10 RX ADMIN — ACYCLOVIR 1 APPLICATION: 50 OINTMENT TOPICAL at 05:06

## 2022-10-10 RX ADMIN — INSULIN HUMAN 7 UNITS: 100 INJECTION, SUSPENSION SUBCUTANEOUS at 17:21

## 2022-10-10 RX ADMIN — METOPROLOL SUCCINATE 50 MG: 50 TABLET, EXTENDED RELEASE ORAL at 09:12

## 2022-10-10 RX ADMIN — ATORVASTATIN CALCIUM 40 MG: 40 TABLET, FILM COATED ORAL at 09:12

## 2022-10-10 RX ADMIN — INSULIN LISPRO 4 UNITS: 100 INJECTION, SOLUTION INTRAVENOUS; SUBCUTANEOUS at 17:21

## 2022-10-10 RX ADMIN — FLUOXETINE 20 MG: 20 CAPSULE ORAL at 09:12

## 2022-10-10 RX ADMIN — HEPARIN SODIUM 5000 UNITS: 5000 INJECTION INTRAVENOUS; SUBCUTANEOUS at 15:18

## 2022-10-10 RX ADMIN — HEPARIN SODIUM 5000 UNITS: 5000 INJECTION INTRAVENOUS; SUBCUTANEOUS at 05:06

## 2022-10-10 RX ADMIN — FUROSEMIDE 20 MG: 20 TABLET ORAL at 09:13

## 2022-10-10 RX ADMIN — ACYCLOVIR 1 APPLICATION: 50 OINTMENT TOPICAL at 20:05

## 2022-10-10 RX ADMIN — POTASSIUM CHLORIDE 20 MEQ: 1500 TABLET, EXTENDED RELEASE ORAL at 05:06

## 2022-10-10 RX ADMIN — Medication 10 ML: at 09:18

## 2022-10-10 RX ADMIN — ACYCLOVIR 1 APPLICATION: 50 OINTMENT TOPICAL at 15:18

## 2022-10-10 RX ADMIN — Medication 400 MG: at 09:13

## 2022-10-10 RX ADMIN — OXYCODONE 5 MG: 5 TABLET ORAL at 20:04

## 2022-10-10 NOTE — NURSING NOTE
WOCN note:    WOCN consult received and noted. Chart reviewed. Patient has had a GYN consult with treatment orders. We will defer to those orders at this time..

## 2022-10-10 NOTE — THERAPY TREATMENT NOTE
Subjective: Pt agreeable to therapeutic plan of care.    Objective:     Bed mobility - Modified-Independent  Transfers - Modified-Independent  Ambulation - 20 feet x's 2 CGA and with rolling walker    Vitals: WNL    Pain: 10 VAS vaginal area  Education: Provided education on importance of mobility and skilled verbal / tactile cueing throughout intervention.     Assessment: Nadege Barrow presents with functional mobility impairments which indicate the need for skilled intervention. Pt making good effort to mobilize but pt very painful limiting tolerance to activity today.  Tolerating session today without incident. Will continue to follow and progress as tolerated.     Plan/Recommendations:   Low Intensity Therapy recommended post-acute care - This is recommended as therapy feels this patient would require 2-3 visits per week.  HHPT would be the best option.  Pt requires rolling walker at discharge.     Pt desires Home with family assist and and Home Health at discharge. Pt cooperative; agreeable to therapeutic recommendations and plan of care.     Basic Mobility 6-click:  Rollin = Total, A lot = 2, A little = 3; 4 = None  Supine>Sit:   1 = Total, A lot = 2, A little = 3; 4 = None   Sit>Stand with arms:  1 = Total, A lot = 2, A little = 3; 4 = None  Bed>Chair:   1 = Total, A lot = 2, A little = 3; 4 = None  Ambulate in room:  1 = Total, A lot = 2, A little = 3; 4 = None  3-5 Steps with railin = Total, A lot = 2, A little = 3; 4 = None  Score: 22    Post-Tx Position: Call light and personal items within reach in right side-lying with bed flat  PPE: mask, gloves and eye protection

## 2022-10-10 NOTE — PROGRESS NOTES
[facility] Medicine Services Daily Progress Note    Patient Name: Nadege Barrow  : 1945  MRN: [id number]  Primary Care Physician:  Dave Esparza  Date of admission: 10/6/2022      Subjective      Chief Complaint:   Dysuria vomiting diarrhea      Patient Reports   10/7  Seen and examined  Without new events or symptoms  Potassium better    10/8  Still buining in groin area  Feels dizzy  10/9  Still complaining of vulvar pain  Will ask OB/GYN for evaluation  Add Norco and local lidocaine gel    10/10  Blood pressure still uncontrolled  Increase losartan  Potassium continues to be low  Monitor potassium level  Increase potassium replacement  HSV 1/2 pending    Complain of severe burning sensation and pain  Toradol every 12x2 only ordered  She feels that opioids not helping her      Review of Systems   Constitutional: Negative.   HENT: Negative.    Eyes: Negative.    Cardiovascular: Negative.    Respiratory: Negative.    Endocrine: Negative.    Hematologic/Lymphatic: Negative.    Skin: Negative.    Musculoskeletal: Negative.    Gastrointestinal: Negative.    Genitourinary: Negative.    Neurological: Negative.    Psychiatric/Behavioral: Negative.    Allergic/Immunologic: Negative.    All other systems reviewed and are negative.           Objective      Vitals:   Temp:  [97.9 °F (36.6 °C)-98.2 °F (36.8 °C)] 97.9 °F (36.6 °C)  Heart Rate:  [68-91] 76  Resp:  [16-19] 16  BP: (134-159)/(77-85) 158/85    Physical Exam  Vitals and nursing note reviewed.   Constitutional:       General: She is not in acute distress.     Appearance: Normal appearance. She is well-developed. She is not ill-appearing, toxic-appearing or diaphoretic.   HENT:      Head: Normocephalic and atraumatic.      Right Ear: Ear canal and external ear normal.      Left Ear: Ear canal and external ear normal.      Nose: Nose normal. No congestion or rhinorrhea.      Mouth/Throat:      Mouth: Mucous membranes are moist.      Pharynx:  No oropharyngeal exudate.   Eyes:      General: No scleral icterus.        Right eye: No discharge.         Left eye: No discharge.      Extraocular Movements: Extraocular movements intact.      Conjunctiva/sclera: Conjunctivae normal.      Pupils: Pupils are equal, round, and reactive to light.   Neck:      Thyroid: No thyromegaly.      Vascular: No carotid bruit or JVD.      Trachea: No tracheal deviation.   Cardiovascular:      Rate and Rhythm: Normal rate and regular rhythm.      Pulses: Normal pulses.      Heart sounds: Normal heart sounds. No murmur heard.    No friction rub. No gallop.   Pulmonary:      Effort: Pulmonary effort is normal. No respiratory distress.      Breath sounds: Normal breath sounds. No stridor. No wheezing, rhonchi or rales.   Chest:      Chest wall: No tenderness.   Abdominal:      General: Bowel sounds are normal. There is no distension.      Palpations: Abdomen is soft. There is no mass.      Tenderness: There is no abdominal tenderness. There is no guarding or rebound.      Hernia: No hernia is present.   Musculoskeletal:         General: No swelling, tenderness, deformity or signs of injury. Normal range of motion.      Cervical back: Normal range of motion and neck supple. No rigidity. No muscular tenderness.      Right lower leg: No edema.      Left lower leg: No edema.   Lymphadenopathy:      Cervical: No cervical adenopathy.   Skin:     General: Skin is warm and dry.      Coloration: Skin is not jaundiced or pale.      Findings: No bruising, erythema or rash.   Neurological:      General: No focal deficit present.      Mental Status: She is alert and oriented to person, place, and time. Mental status is at baseline.      Cranial Nerves: No cranial nerve deficit.      Sensory: No sensory deficit.      Motor: No weakness or abnormal muscle tone.      Coordination: Coordination normal.   Psychiatric:         Mood and Affect: Mood normal.         Behavior: Behavior normal.          Thought Content: Thought content normal.         Judgment: Judgment normal.               Result Review    Result Review:  I have personally reviewed the results from the time of this admission to 10/10/2022 10:24 EDT and agree with these findings:  [x]  Laboratory  [x]  Microbiology  []  Radiology  []  EKG/Telemetry   []  Cardiology/Vascular   []  Pathology  []  Old records  []  Other:  Most notable findings include: As above    Wounds (last 24 hours)     LDA Wound     Row Name 10/10/22 0004             Wound 10/07/22 1429 Bilateral upper labia Abrasion    Wound - Properties Group Placement Date: 10/07/22  - Placement Time: 1429  -AH Present on Hospital Admission: Y  -AH Side: Bilateral  -AH Orientation: upper  -AH Location: labia  -AH Primary Wound Type: Abrasion  -AH    Closure Open to air  -      Retired Wound - Properties Group Placement Date: 10/07/22  - Placement Time: 1429  -AH Present on Hospital Admission: Y  -AH Side: Bilateral  -AH Orientation: upper  -AH Location: labia  -AH Primary Wound Type: Abrasion  -AH    Retired Wound - Properties Group Date first assessed: 10/07/22  - Time first assessed: 1429  -AH Present on Hospital Admission: Y  -AH Side: Bilateral  -AH Location: labia  -AH Primary Wound Type: Abrasion  -AH          User Key  (r) = Recorded By, (t) = Taken By, (c) = Cosigned By    Initials Name Provider Type    Tia Worrell RN Registered Nurse    Izabela Tijerina LPN Licensed Nurse                  Assessment & Plan      Brief Patient Summary:  Nadege Barrow is a 76 y.o. female who         acyclovir, 1 application, Topical, 5x Daily  atorvastatin, 40 mg, Oral, Daily  fludrocortisone, 0.1 mg, Oral, Daily  FLUoxetine, 20 mg, Oral, Daily  furosemide, 20 mg, Oral, Daily  heparin (porcine), 5,000 Units, Subcutaneous, Q8H  insulin lispro, 0-9 Units, Subcutaneous, TID AC  insulin NPH-insulin regular, 7 Units, Subcutaneous, BID With Meals  [START ON 10/11/2022] losartan, 50 mg, Oral,  Q24H  magnesium oxide, 400 mg, Oral, Daily  metoprolol succinate XL, 50 mg, Oral, Daily  [START ON 10/11/2022] potassium chloride, 40 mEq, Oral, QAM  sodium chloride, 10 mL, Intravenous, Q12H  sodium chloride, 10 mL, Intravenous, Q12H  ticagrelor, 90 mg, Oral, BID             Active Hospital Problems:  Active Hospital Problems    Diagnosis    • Diarrhea, unspecified type      History of Present Illness: Nadege Barrow is a 76 y.o. female who presented to [facility] on 10/6/2022 complaining of vomiting and diarrhea for the last 24 hours  Patient also complains of dysuria without fever chills.  She reports headache.As well  She is chronic medical problems including CHF depression diabetes hypertension panic disorder essential tremors     Work-up in the ER showed hypokalemia with potassium 2.6 hyperglycemia 233 elevated proBNP 8988, suspect metabolic acidosis with bicarb of 19 magnesium 1.5 normal lipase lactate and elevated white count 12.3 with neutrophilic predominance.  Urinalysis shows 500 glucose 40 ketones moderate blood 30 protein trace leukocyte CT abdomen pelvis shows edematous urinary bladder wall thickening with potential cystitis, osteopenia with multiple compression fractures of the spine, hepatic steatosis, diverticulosis, borderline cardiomegaly and pulmonary edema small bilateral pleural effusion small fat containing right inguinal hernia  Of note the patient denies any recent antibiotic use and denies any abdominal pain except some cramps on and off     Plan:       Severe hypokalemiaWith old left bundle branch block on EKG  Replace electrolytes and to reassess  Resolved      Gastroenteritis possibly viral with nausea vomiting and diarrhea  GI panel/C. difficile ordered given leukocytosis  Mild leukocytosis noted  Stop Linzess  CT abdomen pelvis showed. Borderline cardiomegaly with an element of pulmonary edema and small bilateral pleural effusions. Correlate for fluid overload or congestive  heart failure.  2. Diverticulosis.  3. Edematous urinary bladder wall thickening. Correlate for potential cystitis.  4. Osteopenia with redemonstration of multiple compression deformities     Suspect metabolic acidosis secondary to diarrhea  Resolved    Dysuria with possible UTI started on Rocephin  Urine culture negative.  Discontinue antibiotics.  Consider stopping Jardiance    Vulvovaginal inflammation  Itching and burning groin and vullva  Seen by urology and OB/GYN  Local lidocaine not helping  Urology consulted  OB/GYN consulted and they are recommending local acyclovirAnd swab sent for PCR for genital herpes simplex.  Start systemic acyclovir if positive swab.  Limited dose Toradol    History of CHF with moderately reduced ejection fraction 40 to 45% on echo February 2022 with mild aortic stenosis and moderate tricuspid regurgitation  On Toprol XL.Losartan, Brilinta  Avoid IV fluids unless diarrhea is overwhelming  Encourage oral intake if possible  Elevated proBNP noted     DM2 with hyperglycemia  Start sliding scale insulin  NPH 70/30.  Use a smaller dose  Resume home regimen lower dose to avoid hypoglycemia    Adrenal insufficiency on fludrocortisone  Consider decreasing dose elevated blood pressure.  To be discussed with primary endocrinologist      CAD s/p PCI:  -Continue home medications  aspirin and statin and beta-blocker, Brilinta    Panic disorder on Prozac/Xanax  Essential tremors nothing in medications  Depression On Prozac      DVT prophylaxis:heparin  Medical DVT prophylaxis orders are present.    CODE STATUS:    Code Status (Patient has no pulse and is not breathing): CPR (Attempt to Resuscitate)  Medical Interventions (Patient has pulse or is breathing): Full Support  Release to patient: Routine Release      Disposition:  I expect patient to be discharged home.    This patient has been examined wearing appropriate Personal Protective Equipment and discussed with hospital infection control  department. 10/10/22      Electronically signed by Juanpablo Meade MD, 10/10/22, 10:24 EDT.  [place] Hospitalist Team

## 2022-10-10 NOTE — PROGRESS NOTES
"Nutrition Services    Patient Name: Nadege Barrow  YOB: 1945  MRN: [id number]  Admission date: 10/6/2022    PPE Documentation        PPE Worn By Provider mask, gloves and eye protection   PPE Worn By Patient  None      NUTRITION SCREENING      Encounter Information: Pt assessed due to concern for MST score of 3. Pt remains able to take PO, and intake improving since admission. Has had some downtrend in weight over the last three months, but does not currently meet criteria for malnutrition. Will continue current diet, which is adequate to meet needs.       PO Diet: Diet Diabetic/Consistent Carbs; Diabetic - Consistent Carb   PO Supplements: None ordered   PO Intake:  Intake improving since admission; now 50-75% at recent meals        Labs (reviewed below): Reviewed; management per attending        GI Function:  Stool Output  Stool (mL): 0 mL (10/07/22 0348)          Skin: No pressure injuries       Weight Hx Review: Estimated body mass index is 27.26 kg/m² as calculated from the following:    Height as of this encounter: 160 cm (63\").    Weight as of this encounter: 69.8 kg (153 lb 14.1 oz).  Downtrend in weight in the last three months, but not significant with malnutrition presently. Will continue to follow.        Nutrition Intervention: Continue consistent carbohydrate diet as tolerated.       Results from last 7 days   Lab Units 10/09/22  1827 10/08/22  2305 10/08/22  1104 10/07/22  2258   SODIUM mmol/L  --  135* 136 136   POTASSIUM mmol/L 3.4* 3.4* 3.5 3.7   CHLORIDE mmol/L  --  99 100 100   CO2 mmol/L  --  20.0* 22.0 22.0   BUN mg/dL  --  14 8 10   CREATININE mg/dL  --  0.94 0.80 0.93   CALCIUM mg/dL  --  8.6 9.2 8.8   BILIRUBIN mg/dL  --  0.7 1.1 0.8   ALK PHOS U/L  --  76 81 76   ALT (SGPT) U/L  --  6 7 6   AST (SGOT) U/L  --  15 17 18   GLUCOSE mg/dL  --  205* 126* 71     Results from last 7 days   Lab Units 10/08/22  2305 10/08/22  1104 10/07/22  2258 10/07/22  0044   MAGNESIUM mg/dL 1.4*  -- "  1.5* 1.8   PHOSPHORUS mg/dL 3.6  --  2.6 2.5   HEMOGLOBIN g/dL 10.5*   < > 10.7* 10.3*   HEMATOCRIT % 32.7*   < > 33.1* 32.1*    < > = values in this interval not displayed.     COVID19   Date Value Ref Range Status   10/06/2022 Detected (C) Not Detected - Ref. Range Final     Lab Results   Component Value Date    HGBA1C 7.0 (H) 10/07/2022     RD to follow up per protocol.    Electronically signed by:  Shaneka Ramos, TITI  10/09/22

## 2022-10-10 NOTE — PLAN OF CARE
Goal Outcome Evaluation:     Bed mobility - Modified-Independent  Transfers - Modified-Independent  Ambulation - 20 feet x's 2 CGA and with rolling walker    Low Intensity Therapy recommended post-acute care - This is recommended as therapy feels this patient would require 2-3 visits per week.  HHPT would be the best option.  Pt requires rolling walker at discharge.     Pt desires Home with family assist and and Home Health at discharge. Pt cooperative; agreeable to therapeutic recommendations and plan of care.

## 2022-10-10 NOTE — PLAN OF CARE
Problem: Adult Inpatient Plan of Care  Goal: Absence of Hospital-Acquired Illness or Injury  Intervention: Identify and Manage Fall Risk  Recent Flowsheet Documentation  Taken 10/10/2022 1600 by Leidy Ulrich LPN  Safety Promotion/Fall Prevention: safety round/check completed  Taken 10/10/2022 1400 by Leidy Ulrich LPN  Safety Promotion/Fall Prevention: safety round/check completed  Taken 10/10/2022 1000 by Leidy Ulrich LPN  Safety Promotion/Fall Prevention: safety round/check completed  Taken 10/10/2022 0803 by Leidy Ulrich LPN  Safety Promotion/Fall Prevention: safety round/check completed  Intervention: Prevent Skin Injury  Recent Flowsheet Documentation  Taken 10/10/2022 0803 by Leidy Ulrich LPN  Body Position: position changed independently  Skin Protection: adhesive use limited  Intervention: Prevent and Manage VTE (Venous Thromboembolism) Risk  Recent Flowsheet Documentation  Taken 10/10/2022 0803 by Leidy Ulrich LPN  Activity Management: activity adjusted per tolerance  Intervention: Prevent Infection  Recent Flowsheet Documentation  Taken 10/10/2022 1600 by Leidy Ulrich LPN  Infection Prevention:   single patient room provided   rest/sleep promoted   hand hygiene promoted   equipment surfaces disinfected  Taken 10/10/2022 1400 by Leidy Ulrich LPN  Infection Prevention:   rest/sleep promoted   single patient room provided   hand hygiene promoted  Taken 10/10/2022 1200 by Leidy Ulrich LPN  Infection Prevention:   single patient room provided   rest/sleep promoted   hand hygiene promoted   equipment surfaces disinfected  Taken 10/10/2022 0803 by Leidy Ulrich LPN  Infection Prevention:   single patient room provided   rest/sleep promoted   hand hygiene promoted   equipment surfaces disinfected  Goal: Optimal Comfort and Wellbeing  Intervention: Provide Person-Centered Care  Recent Flowsheet Documentation  Taken 10/10/2022 0803 by Leidy Ulrich LPN  Trust  Relationship/Rapport:   care explained   choices provided   questions encouraged   questions answered     Problem: Fall Injury Risk  Goal: Absence of Fall and Fall-Related Injury  Intervention: Identify and Manage Contributors  Recent Flowsheet Documentation  Taken 10/10/2022 1600 by Leidy Ulrich LPN  Medication Review/Management: medications reviewed  Taken 10/10/2022 1400 by Leidy Ulrich LPN  Medication Review/Management: medications reviewed  Taken 10/10/2022 1000 by Leidy Ulrich LPN  Medication Review/Management: medications reviewed  Taken 10/10/2022 0900 by Leidy Ulrich LPN  Medication Review/Management: medications reviewed  Taken 10/10/2022 0803 by Leidy Ulrich LPN  Medication Review/Management: medications reviewed  Intervention: Promote Injury-Free Environment  Recent Flowsheet Documentation  Taken 10/10/2022 1600 by Leidy Ulrich LPN  Safety Promotion/Fall Prevention: safety round/check completed  Taken 10/10/2022 1400 by Leidy Ulrich LPN  Safety Promotion/Fall Prevention: safety round/check completed  Taken 10/10/2022 1000 by Leidy Ulrich LPN  Safety Promotion/Fall Prevention: safety round/check completed  Taken 10/10/2022 0803 by Leidy Ulrich LPN  Safety Promotion/Fall Prevention: safety round/check completed     Problem: Skin Injury Risk Increased  Goal: Skin Health and Integrity  Intervention: Optimize Skin Protection  Recent Flowsheet Documentation  Taken 10/10/2022 0803 by Leidy Ulrich LPN  Head of Bed (HOB) Positioning: HOB elevated  Skin Protection: adhesive use limited   Goal Outcome Evaluation:         Patient is resting in bed with family at bedside. She has no complaints of pain and the call light is in reach.

## 2022-10-11 ENCOUNTER — READMISSION MANAGEMENT (OUTPATIENT)
Dept: CALL CENTER | Facility: HOSPITAL | Age: 77
End: 2022-10-11

## 2022-10-11 VITALS
HEART RATE: 78 BPM | WEIGHT: 153.44 LBS | OXYGEN SATURATION: 96 % | BODY MASS INDEX: 27.19 KG/M2 | SYSTOLIC BLOOD PRESSURE: 126 MMHG | HEIGHT: 63 IN | DIASTOLIC BLOOD PRESSURE: 60 MMHG | RESPIRATION RATE: 16 BRPM | TEMPERATURE: 98 F

## 2022-10-11 LAB
ALBUMIN SERPL-MCNC: 3.3 G/DL (ref 3.5–5.2)
ALBUMIN/GLOB SERPL: 1.2 G/DL
ALP SERPL-CCNC: 76 U/L (ref 39–117)
ALT SERPL W P-5'-P-CCNC: 10 U/L (ref 1–33)
ANION GAP SERPL CALCULATED.3IONS-SCNC: 10 MMOL/L (ref 5–15)
AST SERPL-CCNC: 22 U/L (ref 1–32)
BACTERIA SPEC AEROBE CULT: NORMAL
BACTERIA SPEC AEROBE CULT: NORMAL
BASOPHILS # BLD AUTO: 0 10*3/MM3 (ref 0–0.2)
BASOPHILS NFR BLD AUTO: 0.4 % (ref 0–1.5)
BILIRUB SERPL-MCNC: 0.7 MG/DL (ref 0–1.2)
BUN SERPL-MCNC: 15 MG/DL (ref 8–23)
BUN/CREAT SERPL: 17 (ref 7–25)
CALCIUM SPEC-SCNC: 8.4 MG/DL (ref 8.6–10.5)
CHLORIDE SERPL-SCNC: 102 MMOL/L (ref 98–107)
CO2 SERPL-SCNC: 22 MMOL/L (ref 22–29)
CREAT SERPL-MCNC: 0.88 MG/DL (ref 0.57–1)
DEPRECATED RDW RBC AUTO: 44.2 FL (ref 37–54)
EGFRCR SERPLBLD CKD-EPI 2021: 68.2 ML/MIN/1.73
EOSINOPHIL # BLD AUTO: 0.1 10*3/MM3 (ref 0–0.4)
EOSINOPHIL NFR BLD AUTO: 0.7 % (ref 0.3–6.2)
ERYTHROCYTE [DISTWIDTH] IN BLOOD BY AUTOMATED COUNT: 14.5 % (ref 12.3–15.4)
GLOBULIN UR ELPH-MCNC: 2.7 GM/DL
GLUCOSE BLDC GLUCOMTR-MCNC: 141 MG/DL (ref 70–105)
GLUCOSE BLDC GLUCOMTR-MCNC: 81 MG/DL (ref 70–105)
GLUCOSE SERPL-MCNC: 96 MG/DL (ref 65–99)
HCT VFR BLD AUTO: 32 % (ref 34–46.6)
HGB BLD-MCNC: 10.2 G/DL (ref 12–15.9)
HSV1 IGG SER IA-ACNC: <0.91 INDEX (ref 0–0.9)
HSV2 IGG SER IA-ACNC: <0.91 INDEX (ref 0–0.9)
LYMPHOCYTES # BLD AUTO: 2 10*3/MM3 (ref 0.7–3.1)
LYMPHOCYTES NFR BLD AUTO: 21.5 % (ref 19.6–45.3)
MAGNESIUM SERPL-MCNC: 1.8 MG/DL (ref 1.6–2.4)
MCH RBC QN AUTO: 28.5 PG (ref 26.6–33)
MCHC RBC AUTO-ENTMCNC: 31.8 G/DL (ref 31.5–35.7)
MCV RBC AUTO: 89.5 FL (ref 79–97)
MONOCYTES # BLD AUTO: 0.9 10*3/MM3 (ref 0.1–0.9)
MONOCYTES NFR BLD AUTO: 9.4 % (ref 5–12)
NEUTROPHILS NFR BLD AUTO: 6.3 10*3/MM3 (ref 1.7–7)
NEUTROPHILS NFR BLD AUTO: 68 % (ref 42.7–76)
NRBC BLD AUTO-RTO: 0 /100 WBC (ref 0–0.2)
PHOSPHATE SERPL-MCNC: 3 MG/DL (ref 2.5–4.5)
PLATELET # BLD AUTO: 187 10*3/MM3 (ref 140–450)
PMV BLD AUTO: 9.9 FL (ref 6–12)
POTASSIUM SERPL-SCNC: 3.4 MMOL/L (ref 3.5–5.2)
PROT SERPL-MCNC: 6 G/DL (ref 6–8.5)
RBC # BLD AUTO: 3.58 10*6/MM3 (ref 3.77–5.28)
SODIUM SERPL-SCNC: 134 MMOL/L (ref 136–145)
WBC NRBC COR # BLD: 9.2 10*3/MM3 (ref 3.4–10.8)

## 2022-10-11 PROCEDURE — 82962 GLUCOSE BLOOD TEST: CPT

## 2022-10-11 PROCEDURE — 63710000001 INSULIN ISOPHANE & REGULAR PER 5 UNITS: Performed by: INTERNAL MEDICINE

## 2022-10-11 PROCEDURE — 25010000002 HEPARIN (PORCINE) PER 1000 UNITS: Performed by: INTERNAL MEDICINE

## 2022-10-11 PROCEDURE — 25010000002 KETOROLAC TROMETHAMINE PER 15 MG: Performed by: INTERNAL MEDICINE

## 2022-10-11 PROCEDURE — 96376 TX/PRO/DX INJ SAME DRUG ADON: CPT

## 2022-10-11 PROCEDURE — 96372 THER/PROPH/DIAG INJ SC/IM: CPT

## 2022-10-11 PROCEDURE — G0378 HOSPITAL OBSERVATION PER HR: HCPCS

## 2022-10-11 RX ORDER — FUROSEMIDE 20 MG/1
20 TABLET ORAL DAILY
Qty: 10 TABLET | Refills: 0 | Status: SHIPPED | OUTPATIENT
Start: 2022-10-12 | End: 2022-10-11 | Stop reason: SDUPTHER

## 2022-10-11 RX ORDER — ACYCLOVIR 200 MG/1
400 CAPSULE ORAL 3 TIMES DAILY
Qty: 42 CAPSULE | Refills: 0 | Status: SHIPPED | OUTPATIENT
Start: 2022-10-11 | End: 2022-10-11 | Stop reason: SDUPTHER

## 2022-10-11 RX ORDER — POTASSIUM CHLORIDE 20 MEQ/1
40 TABLET, EXTENDED RELEASE ORAL EVERY MORNING
Status: DISCONTINUED | OUTPATIENT
Start: 2022-10-11 | End: 2022-10-11 | Stop reason: HOSPADM

## 2022-10-11 RX ORDER — ACYCLOVIR 400 MG/1
400 TABLET ORAL 3 TIMES DAILY
Qty: 21 TABLET | Refills: 0 | Status: SHIPPED | OUTPATIENT
Start: 2022-10-11 | End: 2022-10-18

## 2022-10-11 RX ORDER — ACYCLOVIR 200 MG/1
400 CAPSULE ORAL 3 TIMES DAILY
Status: DISCONTINUED | OUTPATIENT
Start: 2022-10-11 | End: 2022-10-11 | Stop reason: HOSPADM

## 2022-10-11 RX ORDER — FUROSEMIDE 20 MG/1
20 TABLET ORAL DAILY
Qty: 10 TABLET | Refills: 0 | Status: SHIPPED | OUTPATIENT
Start: 2022-10-12

## 2022-10-11 RX ADMIN — FLUOXETINE 20 MG: 20 CAPSULE ORAL at 08:58

## 2022-10-11 RX ADMIN — METOPROLOL SUCCINATE 50 MG: 50 TABLET, EXTENDED RELEASE ORAL at 08:57

## 2022-10-11 RX ADMIN — ATORVASTATIN CALCIUM 40 MG: 40 TABLET, FILM COATED ORAL at 08:57

## 2022-10-11 RX ADMIN — ACYCLOVIR 400 MG: 200 CAPSULE ORAL at 11:16

## 2022-10-11 RX ADMIN — FUROSEMIDE 20 MG: 20 TABLET ORAL at 08:57

## 2022-10-11 RX ADMIN — POTASSIUM CHLORIDE 40 MEQ: 20 TABLET, EXTENDED RELEASE ORAL at 08:57

## 2022-10-11 RX ADMIN — HEPARIN SODIUM 5000 UNITS: 5000 INJECTION INTRAVENOUS; SUBCUTANEOUS at 05:31

## 2022-10-11 RX ADMIN — LOSARTAN POTASSIUM 50 MG: 50 TABLET, FILM COATED ORAL at 08:57

## 2022-10-11 RX ADMIN — Medication 400 MG: at 08:58

## 2022-10-11 RX ADMIN — Medication 10 ML: at 08:58

## 2022-10-11 RX ADMIN — TICAGRELOR 90 MG: 90 TABLET ORAL at 08:57

## 2022-10-11 RX ADMIN — FLUDROCORTISONE ACETATE 0.1 MG: 0.1 TABLET ORAL at 08:57

## 2022-10-11 RX ADMIN — INSULIN HUMAN 7 UNITS: 100 INJECTION, SUSPENSION SUBCUTANEOUS at 08:58

## 2022-10-11 RX ADMIN — POTASSIUM CHLORIDE 40 MEQ: 20 TABLET, EXTENDED RELEASE ORAL at 04:33

## 2022-10-11 RX ADMIN — ACYCLOVIR 1 APPLICATION: 50 OINTMENT TOPICAL at 05:31

## 2022-10-11 RX ADMIN — KETOROLAC TROMETHAMINE 30 MG: 30 INJECTION, SOLUTION INTRAMUSCULAR at 01:10

## 2022-10-11 NOTE — DISCHARGE SUMMARY
[facility] Medicine Services  DISCHARGE SUMMARY    Patient Name: Nadege Barrow  : 1945  MRN: [id number]    Date of Admission: 10/6/2022  Discharge Diagnosis: Gastroenteritis  Date of Discharge: 10/11/2022  Primary Care Physician: Dave Esparza      Presenting Problem:   Hypokalemia [E87.6]  Urinary tract infection without hematuria, site unspecified [N39.0]  Diarrhea, unspecified type [R19.7]    Active and Resolved Hospital Problems:  Active Hospital Problems    Diagnosis POA   • **Diarrhea, unspecified type [R19.7] Yes      Resolved Hospital Problems   No resolved problems to display.         Hospital Course     Hospital Course:  Chief Complaint:  Vomiting and diarrhea     History of Present Illness: Nadege Barrow is a 76 y.o. female who presented to [facility] on 10/6/2022 complaining of vomiting and diarrhea for the last 24 hours  Patient also complains of dysuria without fever chills.  She reports headache.As well  She is chronic medical problems including CHF depression diabetes hypertension panic disorder essential tremors     Work-up in the ER showed hypokalemia with potassium 2.6 hyperglycemia 233 elevated proBNP 8988, suspect metabolic acidosis with bicarb of 19 magnesium 1.5 normal lipase lactate and elevated white count 12.3 with neutrophilic predominance.  Urinalysis shows 500 glucose 40 ketones moderate blood 30 protein trace leukocyte CT abdomen pelvis shows edematous urinary bladder wall thickening with potential cystitis, osteopenia with multiple compression fractures of the spine, hepatic steatosis, diverticulosis, borderline cardiomegaly and pulmonary edema small bilateral pleural effusion small fat containing right inguinal hernia  Of note the patient denies any recent antibiotic use and denies any abdominal pain except some cramps on and off        Patient was given IV Rocephin Pepcid magnesium Zofran oral potassium admitted for further care    Hospital  course and problem list    Gastroenteritis  Causing diarrhea and vomiting initially  All resolved right now  Treated with IV fluids and symptomatic management  GI panel negative C. difficile negative  All symptoms have resolved now  Linzess has been stopped  CT abdomen pelvis showed some pulmonary edema and pleural effusions bilaterally edematous urinary wall thickening diverticulosis    Urinary bladder wall thickening  Treated with antibiotics here, micro came back negative  Follow-up with urology recommended  Asymptomatic urine wise here    Vaginal pain  Diagnosed with herpetic infection  Started on acyclovir per gynecology service    CHF  Chronic with decreased EF  Appears euvolemic at the time of discharge  Continue Lasix continue losartan continue beta-blocker     Hypokalemia  Replaced now around normal  Continue supplementation of potassium  Evaluated by nephrology    Diabetes  Continue home insulin regimen    Adrenal insufficiency  Continue fludrocortisone    CAD  Status post PCI  Continue Brilinta statin beta-blocker    Psych  Continue home meds    DVT PUD prophylaxis    Plan discharge home stable condition follow-up with nephrology GI urology and PCP        Reasons For Change In Medications and Indications for New Medications:      Day of Discharge     Vital Signs:  Temp:  [97.3 °F (36.3 °C)-98.8 °F (37.1 °C)] 98 °F (36.7 °C)  Heart Rate:  [72-96] 78  Resp:  [16-18] 16  BP: (126-162)/(60-86) 126/60    Physical Exam:  Physical Exam   Physical Exam   Constitutional:  oriented to person, place, and time. No distress.   HENT:   Head: Normocephalic and atraumatic.   Eyes: Conjunctivae and EOM are normal. Pupils are equal, round, and reactive to light.   Neck: No JVD present. No thyromegaly present.   Cardiovascular: Normal rate, regular rhythm, normal heart sounds and intact distal pulses. Exam reveals no gallop and no friction rub.   No murmur heard.  Pulmonary/Chest: Effort normal and breath sounds normal. No  stridor. No respiratory distress.  has no wheezes.  has no rales.  exhibits no tenderness.   Abdominal: Soft. Bowel sounds are normal.  no distension and no mass. There is no tenderness. There is no rebound and no guarding. No hernia.   Musculoskeletal: Normal range of motion.   Lymphadenopathy:     no cervical adenopathy.   Neurological:  alert and oriented to person, place, and time. No cranial nerve deficit or sensory deficit. exhibits normal muscle tone.   Skin: No rash noted.  not diaphoretic.   Psychiatric:  normal mood and affect.   Vitals reviewed.        Pertinent  and/or Most Recent Results     LAB RESULTS:      Lab 10/10/22  2332 10/08/22  2305 10/08/22  1104 10/07/22  2258 10/07/22  0044 10/06/22  2111 10/06/22  1214 10/06/22  0130   WBC 9.20 4.90 5.60 6.70 7.50  --  9.20 12.37*   HEMOGLOBIN 10.2* 10.5* 11.9* 10.7* 10.3*  --  10.9* 12.0   HEMATOCRIT 32.0* 32.7* 37.5 33.1* 32.1*  --  34.3 37.3   PLATELETS 187 167 195 173 160  --  183 224   NEUTROS ABS 6.30 3.40 3.40 4.10 4.90  --  6.90 10.70*   IMMATURE GRANS (ABS)  --   --   --   --   --   --   --  0.03   LYMPHS ABS 2.00 0.90 1.40 1.70 1.80  --  1.50 0.96   MONOS ABS 0.90 0.50 0.60 0.60 0.70  --  0.80 0.64   EOS ABS 0.10 0.10 0.10 0.20 0.10  --  0.00 0.02   MCV 89.5 91.1 90.6 92.5 91.9  --  90.8 87.4   PROCALCITONIN  --   --   --   --   --   --  0.27*  --    LACTATE  --   --   --   --   --  1.6  --  1.9   PROTIME  --   --   --  10.7 11.2  --  11.1  --          Lab 10/10/22  2332 10/10/22  0313 10/09/22  1827 10/08/22  2305 10/08/22  1104 10/07/22  2258 10/07/22  0044 10/06/22  1214   SODIUM 134* 141  --  135* 136 136 137 140   POTASSIUM 3.4* 3.1* 3.4* 3.4* 3.5 3.7 3.5 3.1*   CHLORIDE 102 106  --  99 100 100 102 104   CO2 22.0 23.0  --  20.0* 22.0 22.0 23.0 25.0   ANION GAP 10.0 12.0  --  16.0* 14.0 14.0 12.0 11.0   BUN 15 14  --  14 8 10 10 12   CREATININE 0.88 0.85  --  0.94 0.80 0.93 0.82 0.89   EGFR 68.2 71.1  --  63.0 76.5 63.8 74.2 67.3   GLUCOSE 96  78  --  205* 126* 71 144* 154*   CALCIUM 8.4* 8.4*  --  8.6 9.2 8.8 8.4* 8.4*   MAGNESIUM 1.8 2.3  --  1.4*  --  1.5* 1.8 1.9   PHOSPHORUS 3.0 3.3  --  3.6  --  2.6 2.5 4.0   HEMOGLOBIN A1C  --   --   --   --   --   --  7.0*  --    TSH  --   --   --   --   --   --  3.860 1.610         Lab 10/10/22  2332 10/10/22  0313 10/08/22  2305 10/08/22  1104 10/07/22  2258 10/07/22  0044 10/06/22  1214 10/06/22  0130   TOTAL PROTEIN 6.0  --  5.8* 6.7 6.1 5.6*   < > 6.3   ALBUMIN 3.30* 3.20* 3.30* 3.60 3.40* 3.00*   < > 3.58   GLOBULIN 2.7  --  2.5 3.1 2.7 2.6   < > 2.7   ALT (SGPT) 10  --  6 7 6 <5   < > 6   AST (SGOT) 22  --  15 17 18 12   < > 15   BILIRUBIN 0.7  --  0.7 1.1 0.8 0.6   < > 1.2   ALK PHOS 76  --  76 81 76 62   < > 77   LIPASE  --   --   --   --   --   --   --  27    < > = values in this interval not displayed.         Lab 10/07/22  2258 10/07/22  0044 10/06/22  1214 10/06/22  0130   PROBNP  --   --   --  8,988.0*   TROPONIN T  --   --   --  <0.010   PROTIME 10.7 11.2 11.1  --    INR 1.04 1.09 1.08  --                  Brief Urine Lab Results  (Last result in the past 365 days)      Color   Clarity   Blood   Leuk Est   Nitrite   Protein   CREAT   Urine HCG        10/07/22 2250 Yellow   Clear   Trace   Large (3+)   Negative   Negative               Microbiology Results (last 10 days)     Procedure Component Value - Date/Time    Urine Culture - Urine, Urine, Clean Catch [[id number]]  (Normal) Collected: 10/07/22 2250    Lab Status: Final result Specimen: Urine, Clean Catch Updated: 10/09/22 1311     Urine Culture No growth    Blood Culture - Blood, Arm, Left [[id number]]  (Normal) Collected: 10/06/22 2111    Lab Status: Preliminary result Specimen: Blood from Arm, Left Updated: 10/10/22 2146     Blood Culture No growth at 4 days    Blood Culture - Blood, Arm, Right [[id number]]  (Normal) Collected: 10/06/22 2111    Lab Status: Preliminary result Specimen: Blood from Arm, Right Updated: 10/10/22 2146     Blood Culture  No growth at 4 days    Respiratory Panel PCR w/COVID-19(SARS-CoV-2) [facility] In-House, NP Swab in UTM/VTM, 3-4 HR TAT - Swab, Nasopharynx [[id number]]  (Abnormal) Collected: 10/06/22 1322    Lab Status: Final result Specimen: Swab from Nasopharynx Updated: 10/06/22 4395     ADENOVIRUS, PCR Not Detected     Coronavirus 229E Not Detected     Coronavirus HKU1 Not Detected     Coronavirus NL63 Not Detected     Coronavirus OC43 Not Detected     COVID19 Detected     Human Metapneumovirus Not Detected     Human Rhinovirus/Enterovirus Not Detected     Influenza A PCR Not Detected     Influenza B PCR Not Detected     Parainfluenza Virus 1 Not Detected     Parainfluenza Virus 2 Not Detected     Parainfluenza Virus 3 Not Detected     Parainfluenza Virus 4 Not Detected     RSV, PCR Not Detected     Bordetella pertussis pcr Not Detected     Bordetella parapertussis PCR Not Detected     Chlamydophila pneumoniae PCR Not Detected     Mycoplasma pneumo by PCR Not Detected    Narrative:      In the setting of a positive respiratory panel with a viral infection PLUS a negative procalcitonin without other underlying concern for bacterial infection, consider observing off antibiotics or discontinuation of antibiotics and continue supportive care. If the respiratory panel is positive for atypical bacterial infection (Bordetella pertussis, Chlamydophila pneumoniae, or Mycoplasma pneumoniae), consider antibiotic de-escalation to target atypical bacterial infection.          CT Abdomen Pelvis Without Contrast    Result Date: 10/6/2022  Impression: 1. Borderline cardiomegaly with an element of pulmonary edema and small bilateral pleural effusions. Correlate for fluid overload or congestive heart failure. 2. Diverticulosis. 3. Edematous urinary bladder wall thickening. Correlate for potential cystitis. 4. Osteopenia with redemonstration of multiple compression deformities in the spine. 5. Mild hepatic steatosis. 6.  Additional incidental and chronic findings as above. Slot 63 Electronically signed by:  Nikita Gibbs M.D.  10/6/2022 12:51 AM    XR Forearm 2 View Right    Result Date: 9/30/2022  Impression:  1. Prior distal ulnar fracture, but no acute fracture or dislocation.  Electronically Signed By-Kwesi Machado MD On:9/30/2022 7:41 AM This report was finalized on [id number] by  Kwesi Machado MD.    XR Wrist 3+ View Right    Result Date: 9/29/2022  Impression: Negative for fracture.  Electronically Signed By-Finn Ashton MD On:9/29/2022 9:01 PM This report was finalized on [id number] by  Finn Ashton MD.    XR Chest 1 View    Result Date: 9/13/2022  Impression: 1.     No acute pulmonary process. 2.     Old fracture deformity left clavicle.  Electronically Signed By-Munir Cote MD On:9/13/2022 8:52 PM This report was finalized on [id number] by  Munir Cote MD.              Results for orders placed during the hospital encounter of 02/26/22    Adult Transthoracic Echo Complete w/ Color, Spectral and Contrast if necessary per protocol    Interpretation Summary  · Left ventricular ejection fraction appears to be 41 - 45%.  · The right ventricular cavity is mildly dilated.  · Mild aortic valve stenosis is present.  · Moderate tricuspid valve regurgitation is present.  · No pericardial effusion noted      Labs Pending at Discharge:  Pending Labs     Order Current Status    HSV 1/2, PCR (Non-CSF) - Swab, Vulva In process    Blood Culture - Blood, Arm, Left Preliminary result    Blood Culture - Blood, Arm, Right Preliminary result          Procedures Performed           Consults:   Consults     Date and Time Order Name Status Description    10/9/2022  9:29 AM Inpatient Obstetrics / Gynecology Consult Completed     10/7/2022 10:32 AM Inpatient Urology Consult Completed     10/6/2022 12:17 PM Inpatient Nephrology Consult Completed             Discharge Details        Discharge Medications      New Medications       Instructions Start Date   acyclovir 200 MG capsule  Commonly known as: ZOVIRAX   400 mg, Oral, 3 Times Daily      furosemide 20 MG tablet  Commonly known as: LASIX   20 mg, Oral, Daily   Start Date: October 12, 2022        Continue These Medications      Instructions Start Date   Accu-Chek Guide test strip  Generic drug: glucose blood   No dose, route, or frequency recorded.      Accu-Chek Guide test strip  Generic drug: glucose blood   No dose, route, or frequency recorded.      ALPRAZolam 1 MG tablet  Commonly known as: XANAX   TAKE 1 TABLET BY MOUTH THREE TIMES DAILY AS NEEDED FOR ANXIETY (BOTTLE)      atorvastatin 40 MG tablet  Commonly known as: LIPITOR   40 mg, Oral, Daily      FLUoxetine 20 MG capsule  Commonly known as: PROzac   1 capsule, Oral, Every Morning      FLUoxetine 20 MG capsule  Commonly known as: PROzac   20 mg, Oral, Daily      insulin NPH-insulin regular (70-30) 100 UNIT/ML injection  Commonly known as: humuLIN 70/30,novoLIN 70/30   20 Units, Subcutaneous, 2 Times Daily With Meals      Jardiance 10 MG tablet tablet  Generic drug: empagliflozin   1 tablet, Oral, Daily      losartan 25 MG tablet  Commonly known as: COZAAR   25 mg, Oral, Every 24 Hours Scheduled      metoprolol succinate XL 50 MG 24 hr tablet  Commonly known as: TOPROL-XL   50 mg, Oral, Daily      nitroglycerin 0.4 MG SL tablet  Commonly known as: NITROSTAT   0.4 mg, Sublingual, Every 5 Minutes PRN, Take no more than 3 doses in 15 minutes.      nystatin 100,000 unit/mL suspension  Commonly known as: MYCOSTATIN   500,000 Units, Oral, 4 Times Daily      potassium chloride 20 MEQ CR tablet  Commonly known as: K-DUR,KLOR-CON   20 mEq, Oral, Daily      potassium chloride 20 MEQ CR tablet  Commonly known as: K-DUR,KLOR-CON   1 tablet, Oral, Every Morning      potassium chloride 20 MEQ CR tablet  Commonly known as: K-DUR,KLOR-CON   No dose, route, or frequency recorded.      potassium chloride ER 20 MEQ tablet controlled-release ER  tablet  Commonly known as: K-TAB   20 mEq, Oral, Daily      ticagrelor 90 MG tablet tablet  Commonly known as: BRILINTA   90 mg, Oral, Once      tiZANidine 2 MG tablet  Commonly known as: ZANAFLEX   No dose, route, or frequency recorded.      Unifine SafeControl Pen Needle 30G X 5 MM misc  Generic drug: Insulin Pen Needle   No dose, route, or frequency recorded.         Stop These Medications    linaclotide 145 MCG capsule capsule  Commonly known as: LINZESS            Allergies   Allergen Reactions   • Morphine Hallucinations         Discharge Disposition: Discharged home stable condition  [place] [facility] [place]:  Hospital:  Diet Order   Procedures   • Diet Diabetic/Consistent Carbs; Diabetic - Consistent Carb         Discharge Activity:   Activity Instructions     Activity as Tolerated              CODE STATUS:  Code Status and Medical Interventions:   Ordered at: 10/06/22 0845     Code Status (Patient has no pulse and is not breathing):    CPR (Attempt to Resuscitate)     Medical Interventions (Patient has pulse or is breathing):    Full Support     Release to patient:    Routine Release         Future Appointments   Date Time Provider Department Center   1/17/2023  2:15 PM Cristal Alvarez MD [facility]       Additional Instructions for the Follow-ups that You Need to Schedule     Discharge Follow-up with PCP   As directed       Currently Documented PCP:    Dave Esparza    PCP Phone Number:    [phone]     Follow Up Details: one week         Discharge Follow-up with Specialty: nephrology one week, gi one week , gyn one week   As directed      Specialty: nephrology one week, gi one week , gyn one week               Time spent on Discharge including face to face service: 38 minutes      Signature:   Electronically signed by Kerry Kimbrough MD, 10/11/22, 10:00 AM EDT.

## 2022-10-11 NOTE — PLAN OF CARE
Problem: Adult Inpatient Plan of Care  Goal: Absence of Hospital-Acquired Illness or Injury  Intervention: Identify and Manage Fall Risk  Recent Flowsheet Documentation  Taken 10/11/2022 0800 by Leidy Ulrich LPN  Safety Promotion/Fall Prevention: safety round/check completed  Intervention: Prevent Skin Injury  Recent Flowsheet Documentation  Taken 10/11/2022 0800 by Leidy Ulrich LPN  Body Position: position changed independently  Skin Protection: adhesive use limited  Intervention: Prevent and Manage VTE (Venous Thromboembolism) Risk  Recent Flowsheet Documentation  Taken 10/11/2022 0800 by Leidy Ulrich LPN  Activity Management: activity adjusted per tolerance  Intervention: Prevent Infection  Recent Flowsheet Documentation  Taken 10/11/2022 0800 by Leidy Ulrich LPN  Infection Prevention:   single patient room provided   rest/sleep promoted   hand hygiene promoted   equipment surfaces disinfected  Goal: Optimal Comfort and Wellbeing  Intervention: Provide Person-Centered Care  Recent Flowsheet Documentation  Taken 10/11/2022 0800 by Leidy Ulrich LPN  Trust Relationship/Rapport:   care explained   choices provided   questions answered   questions encouraged     Problem: Fall Injury Risk  Goal: Absence of Fall and Fall-Related Injury  Intervention: Identify and Manage Contributors  Recent Flowsheet Documentation  Taken 10/11/2022 0800 by Leidy Ulrich LPN  Medication Review/Management: medications reviewed  Intervention: Promote Injury-Free Environment  Recent Flowsheet Documentation  Taken 10/11/2022 0800 by Leidy Ulrich LPN  Safety Promotion/Fall Prevention: safety round/check completed     Problem: Skin Injury Risk Increased  Goal: Skin Health and Integrity  Intervention: Optimize Skin Protection  Recent Flowsheet Documentation  Taken 10/11/2022 0800 by Leidy Ulrich LPN  Pressure Reduction Techniques: frequent weight shift encouraged  Head of Bed (HOB) Positioning: HOB  elevated  Pressure Reduction Devices: pressure-redistributing mattress utilized  Skin Protection: adhesive use limited   Goal Outcome Evaluation:

## 2022-10-11 NOTE — DISCHARGE PLACEMENT REQUEST
"Nadege Barrow (76 y.o. Female)     Date of Birth   1945    Social Security Number       Address   [address] [place] [address] [address] [place] [address]    Home Phone   [phone]    MRN   [id number]       Mormon   Muslim    Marital Status                               Admission Date   10/6/22    Admission Type   Emergency    Admitting Provider   Aliza Cervantes MD    Attending Provider   Kerry Kimbrough MD    Department, Room/Bed   [facility] 3C MEDICAL INPATIENT, 373/1       Discharge Date       [place] Dis[place] [facility] [place] Destination                               Attending Provider: Kerry Kimbrough MD    Allergies: Morphine    Isolation: None   Infection: MRSA No Isolation this Admit (05/16/22), COVID (History) (10/06/22)   Code Status: CPR    Ht: 160 cm (63\")   Wt: 69.6 kg (153 lb 7 oz)    Admission Cmt: None   Principal Problem: Diarrhea, unspecified type [R19.7]                 Active Insurance as of 10/6/2022     Primary Coverage     Payor Plan Insurance Group Employer/Plan Group    HUMANA MEDICARE REPLACEMENT HUMANA MEDICARE REPLACEMENT [id number]     Payor Plan Address Payor Plan Phone Number Payor Plan Fax Number Effective Dates    [address] [address] [phone]  1/1/2016 - None Entered    [facility] [address]       Subscriber Name Subscriber Birth Date Member ID       NADEGE BARROW 1945 [id number]           Secondary Coverage     Payor Plan Insurance Group Employer/Plan Group    [place]      Payor Plan Address Payor Plan Phone Number Payor Plan Fax Number Effective Dates    [address]   3/7/2022 - None Entered    [place] [address]       Subscriber Name Subscriber Birth Date Member ID       NADEGE BARROW 1945 [id number]                 Emergency Contacts      (Rel.) Home Phone Work Phone Mobile Phone    Ana Barrow (Daughter) [phone] -- [phone]        "

## 2022-10-11 NOTE — CASE MANAGEMENT/SOCIAL WORK
Continued Stay Note  [facility]     Patient Name: Nadege Barrow  MRN: [id number]  Today's Date: 10/11/2022    Admit Date: 10/6/2022    Plan: Home with family and [facility] (accepted, order in).   Discharge Plan     Row Name 10/11/22 1642       Plan    Plan Home with family and [facility] (accepted, order in).    Patient/Family in Agreement with Plan yes    Plan Comments Met with patient and sister in law [place] at bedside. Discussed PT's recommendations of [facility] and pt is agreeable. She does not have preference in agency. Pt also reports needing an elevated commode seat. Message sent to Ofelia with [facility] to confirm the type of order that would be needed. Spoke to Dr Kimbrough on the phone and requested orders to be placed. Referral sent to [facility] and lialuca Neff. She reports they cannot accept d/t staffing. CM sent referral to [facility] next, lialuca Dow. She confirmed acceptance later this afternoon.              Met with patient in room wearing PPE: mask, face shield/goggles.      Maintained distance greater than six feet and spent less than 15 minutes in the room.      Megan Naegele, RN      Office Phone: [phone]  Office Cell: [phone]

## 2022-10-12 NOTE — OUTREACH NOTE
Prep Survey    Flowsheet Row Responses   [facility] facility patient discharged from? [facility]   Is LACE score < 7 ? No   Emergency Room discharge w/ pulse ox? No   Eligibility Readm Mgmt   Discharge diagnosis Diarrhea   Does the patient have one of the following disease processes/diagnoses(primary or secondary)? Other   Does the patient have Home health ordered? Yes   What is the Home health agency?  [facility]   Is there a DME ordered? Yes   What DME was ordered? [facility] for DME   Prep survey completed? Yes          MARK PENA - Registered Nurse

## 2022-10-12 NOTE — CASE MANAGEMENT/SOCIAL WORK
Case Management Discharge Note      Final Note: [facility].    Selected Continued Care - Discharged on 10/11/2022 Admission date: 10/6/2022 - Discharge disposition: Home-Health Care Svc        Durable Medical Equipment Coordination complete.    Service Provider Selected Services Address Phone Fax Patient Preferred    [facility] [address], [facility] [address] [phone] --              Home Medical Care Coordination complete.    Service Provider Selected Services Address Phone Fax Patient Preferred    [facility] [address] [place] [address] [phone] --           Transportation Services  Private: Car    Final Discharge Disposition Code: 06 - home with home health care

## 2022-10-13 LAB
HSV1 DNA SPEC QL NAA+PROBE: POSITIVE
HSV2 DNA SPEC QL NAA+PROBE: NEGATIVE

## 2022-10-16 LAB — QT INTERVAL: 452 MS

## 2022-10-17 ENCOUNTER — HOME HEALTH ADMISSION (OUTPATIENT)
Dept: HOME HEALTH SERVICES | Facility: HOME HEALTHCARE | Age: 77
End: 2022-10-17

## 2022-10-20 ENCOUNTER — READMISSION MANAGEMENT (OUTPATIENT)
Dept: CALL CENTER | Facility: HOSPITAL | Age: 77
End: 2022-10-20

## 2022-10-20 NOTE — OUTREACH NOTE
Medical Week 2 Survey    Flowsheet Row Responses   [facility] facility patient discharged from? [facility]   Does the patient have one of the following disease processes/diagnoses(primary or secondary)? Other   Week 2 attempt successful? No   Unsuccessful attempts Attempt 1          ANN-MARIE Combs Registered Nurse

## 2022-10-25 ENCOUNTER — READMISSION MANAGEMENT (OUTPATIENT)
Dept: CALL CENTER | Facility: HOSPITAL | Age: 77
End: 2022-10-25

## 2022-10-25 NOTE — OUTREACH NOTE
Medical Week 2 Survey    Flowsheet Row Responses   [facility] facility patient discharged from? [facility]   Does the patient have one of the following disease processes/diagnoses(primary or secondary)? Other   Week 2 attempt successful? No   Unsuccessful attempts Attempt 2          TREY MORALES - Registered Nurse

## 2022-10-26 ENCOUNTER — HOSPITAL ENCOUNTER (EMERGENCY)
Facility: HOSPITAL | Age: 77
Discharge: HOME | End: 2022-10-26
Attending: EMERGENCY MEDICINE | Admitting: EMERGENCY MEDICINE

## 2022-10-26 VITALS
BODY MASS INDEX: 27.11 KG/M2 | HEART RATE: 80 BPM | OXYGEN SATURATION: 98 % | WEIGHT: 153 LBS | DIASTOLIC BLOOD PRESSURE: 88 MMHG | TEMPERATURE: 97.8 F | RESPIRATION RATE: 16 BRPM | HEIGHT: 63 IN | SYSTOLIC BLOOD PRESSURE: 158 MMHG

## 2022-10-26 DIAGNOSIS — L29.9 PRURITIC DERMATITIS: Primary | ICD-10-CM

## 2022-10-26 DIAGNOSIS — K13.0 INTERTRIGO LABIALIS: ICD-10-CM

## 2022-10-26 PROCEDURE — 99283 EMERGENCY DEPT VISIT LOW MDM: CPT | Performed by: EMERGENCY MEDICINE

## 2022-10-26 PROCEDURE — 25010000002 DIPHENHYDRAMINE PER 50 MG: Performed by: EMERGENCY MEDICINE

## 2022-10-26 PROCEDURE — 25010000002 METHYLPREDNISOLONE PER 125 MG: Performed by: EMERGENCY MEDICINE

## 2022-10-26 PROCEDURE — 99282 EMERGENCY DEPT VISIT SF MDM: CPT

## 2022-10-26 PROCEDURE — 96372 THER/PROPH/DIAG INJ SC/IM: CPT

## 2022-10-26 RX ORDER — DIPHENHYDRAMINE HYDROCHLORIDE 50 MG/ML
50 INJECTION INTRAMUSCULAR; INTRAVENOUS ONCE
Status: COMPLETED | OUTPATIENT
Start: 2022-10-26 | End: 2022-10-26

## 2022-10-26 RX ORDER — METHYLPREDNISOLONE SODIUM SUCCINATE 125 MG/2ML
80 INJECTION, POWDER, LYOPHILIZED, FOR SOLUTION INTRAMUSCULAR; INTRAVENOUS ONCE
Status: COMPLETED | OUTPATIENT
Start: 2022-10-26 | End: 2022-10-26

## 2022-10-26 RX ORDER — NYSTATIN 100000 [USP'U]/G
POWDER TOPICAL 3 TIMES DAILY
Qty: 60 G | Refills: 0 | Status: SHIPPED | OUTPATIENT
Start: 2022-10-26 | End: 2022-11-02

## 2022-10-26 RX ADMIN — DIPHENHYDRAMINE HYDROCHLORIDE 50 MG: 50 INJECTION, SOLUTION INTRAMUSCULAR; INTRAVENOUS at 22:37

## 2022-10-26 RX ADMIN — METHYLPREDNISOLONE SODIUM SUCCINATE 80 MG: 125 INJECTION, POWDER, FOR SOLUTION INTRAMUSCULAR; INTRAVENOUS at 22:37

## 2022-11-03 ENCOUNTER — READMISSION MANAGEMENT (OUTPATIENT)
Dept: CALL CENTER | Facility: HOSPITAL | Age: 77
End: 2022-11-03

## 2022-11-03 NOTE — OUTREACH NOTE
Medical Week 3 Survey    Flowsheet Row Responses   [facility], [place] operated by [facility] patient discharged from? [facility]   Does the patient have one of the following disease processes/diagnoses(primary or secondary)? Other   Week 3 attempt successful? Yes   Call start time 1157   Call end time 1201   Discharge diagnosis Diarrhea   Is patient permission given to speak with other caregiver? Yes   Person spoke with today (if not patient) and relationship Ana   Meds reviewed with patient/caregiver? Yes   Is the patient having any side effects they believe may be caused by any medication additions or changes? No   Does the patient have all medications ordered at discharge? N/A   Is the patient taking all medications as directed (includes completed medication regime)? Yes   Does the patient have a primary care provider?  Yes   Does the patient have an appointment with their PCP within 7 days of discharge? Yes   Has the patient kept scheduled appointments due by today? Yes   What is the Home health agency?  [facility]   What DME was ordered? [facility] for DME   Psychosocial issues? No   Did the patient receive a copy of their discharge instructions? Yes   Nursing interventions Reviewed instructions with patient   What is the patient's perception of their health status since discharge? Improving   Is the patient/caregiver able to teach back signs and symptoms related to disease process for when to call PCP? Yes   Is the patient/caregiver able to teach back signs and symptoms related to disease process for when to call 911? Yes   Is the patient/caregiver able to teach back the hierarchy of who to call/visit for symptoms/problems? PCP, Specialist, Home health nurse, Urgent Care, ED, 911 Yes   If the patient is a current smoker, are they able to teach back resources for cessation? Not a smoker   Additional teach back comments Dtr is concerned with her rash and the itching. Will reach back out to MD.   Week 3 Call Completed? Yes   Wrap up additional  comments Improving.          PIO SHETH - Registered Nurse

## 2022-11-15 DIAGNOSIS — F41.1 GENERALIZED ANXIETY DISORDER: Chronic | ICD-10-CM

## 2022-11-15 RX ORDER — ALPRAZOLAM 1 MG/1
TABLET ORAL
Qty: 90 TABLET | Refills: 0 | Status: SHIPPED | OUTPATIENT
Start: 2022-11-15 | End: 2022-12-01 | Stop reason: SDUPTHER

## 2022-11-15 NOTE — TELEPHONE ENCOUNTER
Rx Refill Note  Requested Prescriptions     Pending Prescriptions Disp Refills   • ALPRAZolam (XANAX) 1 MG tablet [Pharmacy Med Name: ALPRAZOLAM 1MG^] 90 tablet 0     Sig: TAKE 1 TABLET BY MOUTH THREE TIMES DAILY AS NEEDED FOR ANXIETY (BOTTLE)      Last office visit with prescribing clinician: 6/7/2022      Next office visit with prescribing clinician: 12/1/2022   Office Visit with Cristal Alvarez MD (06/07/2022)           Ashley Bermudez MA  11/15/22, 14:13 EST     Pt is out and feeling shakey; inspect printed; last fill 9-27

## 2022-12-01 ENCOUNTER — OFFICE VISIT (OUTPATIENT)
Dept: PSYCHIATRY | Facility: CLINIC | Age: 77
End: 2022-12-01

## 2022-12-01 DIAGNOSIS — F41.1 GENERALIZED ANXIETY DISORDER: Chronic | ICD-10-CM

## 2022-12-01 DIAGNOSIS — F33.1 MAJOR DEPRESSIVE DISORDER, RECURRENT EPISODE, MODERATE: Primary | Chronic | ICD-10-CM

## 2022-12-01 DIAGNOSIS — F13.20 BENZODIAZEPINE DEPENDENCE, CONTINUOUS: Chronic | ICD-10-CM

## 2022-12-01 PROCEDURE — 99214 OFFICE O/P EST MOD 30 MIN: CPT | Performed by: PSYCHIATRY & NEUROLOGY

## 2022-12-01 RX ORDER — ALPRAZOLAM 1 MG/1
1 TABLET ORAL 3 TIMES DAILY PRN
Qty: 90 TABLET | Refills: 1 | Status: SHIPPED | OUTPATIENT
Start: 2022-12-01 | End: 2023-02-07

## 2022-12-01 RX ORDER — FLUOXETINE HYDROCHLORIDE 20 MG/1
20 CAPSULE ORAL DAILY
Qty: 30 CAPSULE | Refills: 1 | Status: SHIPPED | OUTPATIENT
Start: 2022-12-01 | End: 2023-01-18 | Stop reason: DRUGHIGH

## 2022-12-01 RX ORDER — SERTRALINE HYDROCHLORIDE 25 MG/1
25 TABLET, FILM COATED ORAL DAILY
Qty: 30 TABLET | Refills: 2 | Status: SHIPPED | OUTPATIENT
Start: 2022-12-01 | End: 2023-01-18

## 2022-12-01 NOTE — PROGRESS NOTES
"Subjective   Nadege Barrow is a 77 y.o. female who presents today for follow up    Chief Complaint:  Tremor, anxiety     History of Present Illness: the pt suffers from tremor since she was   Young and was not related to parkinson   The pt was treated by Dr Engle who prescribed xanax, it was effective, she was feeling \"normal\" , was able to work . The pt missed few appts and was d/c and xanax was d/c  She was started on buspirone that had side effects , was shaking \"inside\"   The pt lives in assisted living , difficult to be there because people are dying , no friends there   Depression is rated as 6/10 , almost every day,   She likes to talk to people but can not do that due to anxiety   Denied AVH/SI/HI , denied feeling paranoid   Sleep - fragmented   No hx of ami or hypomania     The pt moved in with  her daughter in August 2022    Today the pt c/o increased anxiety, over minor little things, \"not like other people\" , still worries about events that happened in the past, worried even why her parents got    Depression is rated as 6/10, still more days when depressed than not     Anxiety is intense and severe , severe tremor when she is anxious     The following portions of the patient's history were reviewed and updated as appropriate: allergies, current medications, past family history, past medical history, past social history, past surgical history and problem list.    PAST PSYCHIATRIC HISTORY  Graham I  inpt at [facility] more than 2 years ago   No hx of SAs   Axis II  Defer     PAST OUTPATIENT TREATMENT  Diagnosis treated:  Anxiety/Panic Disorder  Treatment Type:    Medication Management  Prior Psychiatric Medications:  prozac - now, somewhat effective   Xanax for 20 years from Dr Engle and effective    Clonazepam - not effective   neurontin - hallucinations     effexor - not effective   Hydroxyzine - not effective   Buspirone - side effects , could not talk        Support Groups:  None " "  Sequelae Of Mental Disorder:  social isolation, emotional distress          Interval History  No changes     Side Effects  Denied        Past Medical History:  Past Medical History:   Diagnosis Date   • Anxiety    • CHF (congestive heart failure) (Formerly [facility])    • Depression    • Diabetes mellitus (Formerly [facility])    • Hyperlipidemia    • Hypertension    • Panic disorder     \"panic attacks\"   • Tremors of nervous system     \"essential tremors\"       Social History:  Social History     Socioeconomic History   • Marital status:    Tobacco Use   • Smoking status: Never   • Smokeless tobacco: Never   Vaping Use   • Vaping Use: Never used   Substance and Sexual Activity   • Alcohol use: Not Currently   • Drug use: No   • Sexual activity: Defer       Family History:  Family History   Problem Relation Age of Onset   • Depression Sister    • Suicidality Other         suicided   • Alcohol abuse Other    • Alcohol abuse Daughter    • Depression Other        Past Surgical History:  Past Surgical History:   Procedure Laterality Date   • CORONARY ANGIOPLASTY WITH STENT PLACEMENT         Problem List:  Patient Active Problem List   Diagnosis   • Chest pain on breathing   • Hypertension   • Acute respiratory failure with hypoxia (Formerly [facility])   • Anemia   • Atherosclerotic heart disease of native coronary artery without angina pectoris   • Back pain   • Cardiomyopathy (Formerly [facility])   • Carotid artery disease (Formerly [facility])   • Cellulitis of foot   • Acute on chronic congestive heart failure (Formerly [facility])   • Major depressive disorder, recurrent episode, moderate (Formerly [facility])   • Senile dementia, delirium, with behavioral disturbance   • Depression, unspecified   • Disabling essential tremor   • Fatigue   • Hyperlipidemia, mixed   • Peripheral vision loss, bilateral   • Retinopathy, bilateral   • S/P right coronary artery (RCA) stent placement   • Ischemic cardiomyopathy   • Elevated LFTs   • Generalized anxiety disorder   • Benzodiazepine dependence, continuous (Formerly [facility])   • Type 2 " diabetes mellitus (HCC)   • Chest pain, unspecified type   • Weakness of both legs   • Hypokalemia   • Lumbar back pain with radiculopathy affecting lower extremity   • Spinal stenosis of lumbar region   • Acute congestive heart failure, unspecified heart failure type (HCC)   • COVID-19 virus detected   • Cytokine release syndrome, grade 1   • Diarrhea, unspecified type       Allergy:   Allergies   Allergen Reactions   • Morphine Hallucinations        Discontinued Medications:  Medications Discontinued During This Encounter   Medication Reason   • ALPRAZolam (XANAX) 1 MG tablet Reorder   • FLUoxetine (PROzac) 20 MG capsule Reorder       Current Medications:   Current Outpatient Medications   Medication Sig Dispense Refill   • ALPRAZolam (XANAX) 1 MG tablet Take 1 tablet by mouth 3 (Three) Times a Day As Needed for Anxiety. 90 tablet 1   • FLUoxetine (PROzac) 20 MG capsule Take 1 capsule by mouth Daily. 30 capsule 1   • Accu-Chek Guide test strip      • atorvastatin (LIPITOR) 40 MG tablet Take 40 mg by mouth Daily.     • Empagliflozin (Jardiance) 10 MG tablet Take 1 tablet by mouth Daily.     • FLUoxetine (PROzac) 20 MG capsule Take 1 capsule by mouth Every Morning.     • furosemide (LASIX) 20 MG tablet Take 1 tablet by mouth Daily. 10 tablet 0   • glucose blood (Accu-Chek Guide) test strip      • insulin NPH-insulin regular (humuLIN 70/30,novoLIN 70/30) (70-30) 100 UNIT/ML injection Inject 20 Units under the skin into the appropriate area as directed 2 (Two) Times a Day With Meals.     • losartan (COZAAR) 25 MG tablet Take 1 tablet by mouth Daily. 30 tablet 0   • metoprolol succinate XL (TOPROL-XL) 50 MG 24 hr tablet Take 50 mg by mouth Daily.     • nitroglycerin (NITROSTAT) 0.4 MG SL tablet Place 1 tablet under the tongue Every 5 (Five) Minutes As Needed for Chest Pain. Take no more than 3 doses in 15 minutes. 25 tablet 0   • nystatin (MYCOSTATIN) 100,000 unit/mL suspension Take 500,000 Units by mouth 4 (Four)  Times a Day.     • potassium chloride (K-DUR,KLOR-CON) 20 MEQ CR tablet Take 1 tablet by mouth Every Morning.     • potassium chloride (K-DUR,KLOR-CON) 20 MEQ CR tablet Take 20 mEq by mouth Daily.     • potassium chloride (K-DUR,KLOR-CON) 20 MEQ CR tablet      • potassium chloride ER (K-TAB) 20 MEQ tablet controlled-release ER tablet Take 20 mEq by mouth Daily.     • sertraline (Zoloft) 25 MG tablet Take 1 tablet by mouth Daily. 30 tablet 2   • ticagrelor (BRILINTA) 90 MG tablet tablet Take 90 mg by mouth 1 (One) Time.     • tiZANidine (ZANAFLEX) 2 MG tablet      • Unifine SafeControl Pen Needle 30G X 5 MM misc        No current facility-administered medications for this visit.         Psychological ROS: positive for - anxiety, depression and tremor   negative for - disorientation, hallucinations, obsessive thoughts or suicidal ideation      Physical Exam:   There were no vitals taken for this visit.    Mental Status Exam:   Hygiene:   good  Cooperation:  Cooperative  Eye Contact:  Fair  Psychomotor Behavior:  tremulous   Affect:  anxious,   congruent   Mood: depressed and anxious  Hopelessness: Denies  Speech:  Normal  Thought Process:  Goal directed and Linear  Thought Content:  Mood congruent  Suicidal:  None  Homicidal:  None  Hallucinations:  None  Delusion:  None  Memory:  fair   Orientation:  Person, Place, Time and Situation  Reliability:  fair  Insight:  Fair  Judgement:  Fair  Impulse Control:  limited   Physical/Medical Issues:  Yes       MSE from 6/7/2022 reviewed and accepted with changes   PHQ-9 Depression Screening    Little interest or pleasure in doing things? 2-->more than half the days   Feeling down, depressed, or hopeless? 2-->more than half the days   Trouble falling or staying asleep, or sleeping too much? 1-->several days   Feeling tired or having little energy? 2-->more than half the days   Poor appetite or overeating? 0-->not at all   Feeling bad about yourself - or that you are a failure  or have let yourself or your family down? 1-->several days   Trouble concentrating on things, such as reading the newspaper or watching television? 1-->several days   Moving or speaking so slowly that other people could have noticed? Or the opposite - being so fidgety or restless that you have been moving around a lot more than usual? 1-->several days   Thoughts that you would be better off dead, or of hurting yourself in some way? 0-->not at all   PHQ-9 Total Score 10   If you checked off any problems, how difficult have these problems made it for you to do your work, take care of things at home, or get along with other people? very difficult            Never smoker    I advised Nadege of the risks of tobacco use.     Lab Results:   No results displayed because visit has over 200 results.      Admission on 09/13/2022, Discharged on 09/16/2022   Component Date Value Ref Range Status   • QT Interval 09/13/2022 469  ms Final   • Glucose 09/13/2022 161 (H)  65 - 99 mg/dL Final   • BUN 09/13/2022 15  8 - 23 mg/dL Final   • Creatinine 09/13/2022 0.86  0.57 - 1.00 mg/dL Final   • Sodium 09/13/2022 142  136 - 145 mmol/L Final   • Potassium 09/13/2022 3.1 (L)  3.5 - 5.2 mmol/L Final    Slight hemolysis detected by analyzer. Results may be affected.   • Chloride 09/13/2022 103  98 - 107 mmol/L Final   • CO2 09/13/2022 25.0  22.0 - 29.0 mmol/L Final   • Calcium 09/13/2022 8.4 (L)  8.6 - 10.5 mg/dL Final   • Total Protein 09/13/2022 6.5  6.0 - 8.5 g/dL Final   • Albumin 09/13/2022 3.70  3.50 - 5.20 g/dL Final   • ALT (SGPT) 09/13/2022 12  1 - 33 U/L Final   • AST (SGOT) 09/13/2022 22  1 - 32 U/L Final   • Alkaline Phosphatase 09/13/2022 77  39 - 117 U/L Final   • Total Bilirubin 09/13/2022 0.9  0.0 - 1.2 mg/dL Final   • Globulin 09/13/2022 2.8  gm/dL Final   • A/G Ratio 09/13/2022 1.3  g/dL Final   • BUN/Creatinine Ratio 09/13/2022 17.4  7.0 - 25.0 Final   • Anion Gap 09/13/2022 14.0  5.0 - 15.0 mmol/L Final   • eGFR 09/13/2022  70.1  >60.0 mL/min/1.73 Final    National Kidney Foundation and American Society of Nephrology (ASN) Task Force recommended calculation based on the Chronic Kidney Disease Epidemiology Collaboration (CKD-EPI) equation refit without adjustment for race.   • proBNP 09/13/2022 10,585.0 (H)  0.0 - 1,800.0 pg/mL Final   • Troponin T 09/13/2022 <0.010  0.000 - 0.030 ng/mL Final   • ADENOVIRUS, PCR 09/13/2022 Not Detected  Not Detected Final   • Coronavirus 229E 09/13/2022 Not Detected  Not Detected Final   • Coronavirus HKU1 09/13/2022 Not Detected  Not Detected Final   • Coronavirus NL63 09/13/2022 Not Detected  Not Detected Final   • Coronavirus OC43 09/13/2022 Not Detected  Not Detected Final   • COVID19 09/13/2022 Detected (C)  Not Detected - Ref. Range Final   • Human Metapneumovirus 09/13/2022 Not Detected  Not Detected Final   • Human Rhinovirus/Enterovirus 09/13/2022 Not Detected  Not Detected Final   • Influenza A PCR 09/13/2022 Not Detected  Not Detected Final   • Influenza B PCR 09/13/2022 Not Detected  Not Detected Final   • Parainfluenza Virus 1 09/13/2022 Not Detected  Not Detected Final   • Parainfluenza Virus 2 09/13/2022 Not Detected  Not Detected Final   • Parainfluenza Virus 3 09/13/2022 Not Detected  Not Detected Final   • Parainfluenza Virus 4 09/13/2022 Not Detected  Not Detected Final   • RSV, PCR 09/13/2022 Not Detected  Not Detected Final   • Bordetella pertussis pcr 09/13/2022 Not Detected  Not Detected Final   • Bordetella parapertussis PCR 09/13/2022 Not Detected  Not Detected Final   • Chlamydophila pneumoniae PCR 09/13/2022 Not Detected  Not Detected Final   • Mycoplasma pneumo by PCR 09/13/2022 Not Detected  Not Detected Final   • WBC 09/13/2022 7.10  3.40 - 10.80 10*3/mm3 Final   • RBC 09/13/2022 4.24  3.77 - 5.28 10*6/mm3 Final   • Hemoglobin 09/13/2022 12.4  12.0 - 15.9 g/dL Final   • Hematocrit 09/13/2022 38.0  34.0 - 46.6 % Final   • MCV 09/13/2022 89.7  79.0 - 97.0 fL Final   • MCH  09/13/2022 29.3  26.6 - 33.0 pg Final   • MCHC 09/13/2022 32.6  31.5 - 35.7 g/dL Final   • RDW 09/13/2022 14.4  12.3 - 15.4 % Final   • RDW-SD 09/13/2022 44.6  37.0 - 54.0 fl Final   • MPV 09/13/2022 8.9  6.0 - 12.0 fL Final   • Platelets 09/13/2022 267  140 - 450 10*3/mm3 Final   • Neutrophil % 09/13/2022 60.7  42.7 - 76.0 % Final   • Lymphocyte % 09/13/2022 27.4  19.6 - 45.3 % Final   • Monocyte % 09/13/2022 7.5  5.0 - 12.0 % Final   • Eosinophil % 09/13/2022 3.3  0.3 - 6.2 % Final   • Basophil % 09/13/2022 1.1  0.0 - 1.5 % Final   • Neutrophils, Absolute 09/13/2022 4.30  1.70 - 7.00 10*3/mm3 Final   • Lymphocytes, Absolute 09/13/2022 1.90  0.70 - 3.10 10*3/mm3 Final   • Monocytes, Absolute 09/13/2022 0.50  0.10 - 0.90 10*3/mm3 Final   • Eosinophils, Absolute 09/13/2022 0.20  0.00 - 0.40 10*3/mm3 Final   • Basophils, Absolute 09/13/2022 0.10  0.00 - 0.20 10*3/mm3 Final   • nRBC 09/13/2022 0.1  0.0 - 0.2 /100 WBC Final   • Magnesium 09/13/2022 1.7  1.6 - 2.4 mg/dL Final   • Hemoglobin A1C 09/14/2022 7.1 (H)  3.5 - 5.6 % Final   • Glucose 09/14/2022 114 (H)  65 - 99 mg/dL Final   • BUN 09/14/2022 12  8 - 23 mg/dL Final   • Creatinine 09/14/2022 0.80  0.57 - 1.00 mg/dL Final   • Sodium 09/14/2022 143  136 - 145 mmol/L Final   • Potassium 09/14/2022 3.3 (L)  3.5 - 5.2 mmol/L Final   • Chloride 09/14/2022 103  98 - 107 mmol/L Final   • CO2 09/14/2022 27.0  22.0 - 29.0 mmol/L Final   • Calcium 09/14/2022 8.1 (L)  8.6 - 10.5 mg/dL Final   • BUN/Creatinine Ratio 09/14/2022 15.0  7.0 - 25.0 Final   • Anion Gap 09/14/2022 13.0  5.0 - 15.0 mmol/L Final   • eGFR 09/14/2022 76.5  >60.0 mL/min/1.73 Final    National Kidney Foundation and American Society of Nephrology (ASN) Task Force recommended calculation based on the Chronic Kidney Disease Epidemiology Collaboration (CKD-EPI) equation refit without adjustment for race.   • WBC 09/14/2022 6.90  3.40 - 10.80 10*3/mm3 Final   • RBC 09/14/2022 4.00  3.77 - 5.28 10*6/mm3 Final    • Hemoglobin 09/14/2022 12.3  12.0 - 15.9 g/dL Final   • Hematocrit 09/14/2022 36.8  34.0 - 46.6 % Final   • MCV 09/14/2022 91.8  79.0 - 97.0 fL Final   • MCH 09/14/2022 30.6  26.6 - 33.0 pg Final   • MCHC 09/14/2022 33.4  31.5 - 35.7 g/dL Final   • RDW 09/14/2022 14.1  12.3 - 15.4 % Final   • RDW-SD 09/14/2022 43.8  37.0 - 54.0 fl Final   • MPV 09/14/2022 8.9  6.0 - 12.0 fL Final   • Platelets 09/14/2022 261  140 - 450 10*3/mm3 Final   • Troponin T 09/14/2022 <0.010  0.000 - 0.030 ng/mL Final   • Glucose 09/14/2022 163 (H)  70 - 105 mg/dL Final    Serial Number: [id number]:  [id number]   • Glucose 09/14/2022 147 (H)  70 - 105 mg/dL Final    Serial Number: [id number]:  [id number]   • Glucose 09/14/2022 188 (H)  70 - 105 mg/dL Final    Serial Number: [id number]:  [id number]   • Glucose 09/14/2022 172 (H)  70 - 105 mg/dL Final    Serial Number: [id number]:  [id number]   • Glucose 09/14/2022 144 (H)  70 - 105 mg/dL Final    Serial Number: [id number]:  [id number]   • Glucose 09/15/2022 116 (H)  65 - 99 mg/dL Final   • BUN 09/15/2022 14  8 - 23 mg/dL Final   • Creatinine 09/15/2022 0.83  0.57 - 1.00 mg/dL Final   • Sodium 09/15/2022 141  136 - 145 mmol/L Final   • Potassium 09/15/2022 3.6  3.5 - 5.2 mmol/L Final   • Chloride 09/15/2022 105  98 - 107 mmol/L Final   • CO2 09/15/2022 24.0  22.0 - 29.0 mmol/L Final   • Calcium 09/15/2022 9.1  8.6 - 10.5 mg/dL Final   • BUN/Creatinine Ratio 09/15/2022 16.9  7.0 - 25.0 Final   • Anion Gap 09/15/2022 12.0  5.0 - 15.0 mmol/L Final   • eGFR 09/15/2022 73.2  >60.0 mL/min/1.73 Final    National Kidney Foundation and American Society of Nephrology (ASN) Task Force recommended calculation based on the Chronic Kidney Disease Epidemiology Collaboration (CKD-EPI) equation refit without adjustment for race.   • WBC 09/15/2022 8.40  3.40 - 10.80 10*3/mm3 Final   • RBC 09/15/2022 4.00  3.77 - 5.28 10*6/mm3 Final   • Hemoglobin 09/15/2022 12.6  12.0 - 15.9  g/dL Final   • Hematocrit 09/15/2022 36.9  34.0 - 46.6 % Final   • MCV 09/15/2022 92.4  79.0 - 97.0 fL Final   • MCH 09/15/2022 31.6  26.6 - 33.0 pg Final   • MCHC 09/15/2022 34.2  31.5 - 35.7 g/dL Final   • RDW 09/15/2022 14.4  12.3 - 15.4 % Final   • RDW-SD 09/15/2022 44.2  37.0 - 54.0 fl Final   • MPV 09/15/2022 9.1  6.0 - 12.0 fL Final   • Platelets 09/15/2022 303  140 - 450 10*3/mm3 Final   • Neutrophil % 09/15/2022 60.3  42.7 - 76.0 % Final   • Lymphocyte % 09/15/2022 27.1  19.6 - 45.3 % Final   • Monocyte % 09/15/2022 9.2  5.0 - 12.0 % Final   • Eosinophil % 09/15/2022 3.0  0.3 - 6.2 % Final   • Basophil % 09/15/2022 0.4  0.0 - 1.5 % Final   • Neutrophils, Absolute 09/15/2022 5.10  1.70 - 7.00 10*3/mm3 Final   • Lymphocytes, Absolute 09/15/2022 2.30  0.70 - 3.10 10*3/mm3 Final   • Monocytes, Absolute 09/15/2022 0.80  0.10 - 0.90 10*3/mm3 Final   • Eosinophils, Absolute 09/15/2022 0.30  0.00 - 0.40 10*3/mm3 Final   • Basophils, Absolute 09/15/2022 0.00  0.00 - 0.20 10*3/mm3 Final   • nRBC 09/15/2022 0.2  0.0 - 0.2 /100 WBC Final   • Glucose 09/15/2022 137 (H)  70 - 105 mg/dL Final    Serial Number: [id number]:  [id number]   • Glucose 09/15/2022 228 (H)  70 - 105 mg/dL Final    Serial Number: [id number]:  [id number]   • Glucose 09/15/2022 189 (H)  70 - 105 mg/dL Final    Serial Number: [id number]:  [id number]   • Glucose 09/16/2022 143 (H)  70 - 105 mg/dL Final    Serial Number: [id number]:  [id number]   • Glucose 09/16/2022 194 (H)  70 - 105 mg/dL Final    Serial Number: [id number]:  [id number]       Assessment & Plan   Problems Addressed this Visit        Mental Health    Major depressive disorder, recurrent episode, moderate (HCC) - Primary (Chronic)    Relevant Medications    sertraline (Zoloft) 25 MG tablet    ALPRAZolam (XANAX) 1 MG tablet    FLUoxetine (PROzac) 20 MG capsule    Generalized anxiety disorder (Chronic)    Relevant Medications    sertraline (Zoloft) 25 MG tablet     ALPRAZolam (XANAX) 1 MG tablet    FLUoxetine (PROzac) 20 MG capsule    Benzodiazepine dependence, continuous (HCC) (Chronic)   Diagnoses       Codes Comments    Major depressive disorder, recurrent episode, moderate (HCC)    -  Primary ICD-10-CM: F33.1  ICD-9-CM: 296.32     Generalized anxiety disorder     ICD-10-CM: F41.1  ICD-9-CM: 300.02     Benzodiazepine dependence, continuous (HCC)     ICD-10-CM: F13.20  ICD-9-CM: 304.11           Visit Diagnoses:    ICD-10-CM ICD-9-CM   1. Major depressive disorder, recurrent episode, moderate (HCC)  F33.1 296.32   2. Generalized anxiety disorder  F41.1 300.02   3. Benzodiazepine dependence, continuous (HCC)  F13.20 304.11       TREATMENT PLAN/GOALS: Continue supportive psychotherapy efforts and medications as indicated. Treatment and medication options discussed during today's visit. Patient ackowledged and verbally consented to continue with current treatment plan and was educated on the importance of compliance with treatment and follow-up appointments.    MEDICATION ISSUES:  INSPECT reviewed as expected- xanax 1 mg TID  11/16/2022 # 90   PHQ scored 10  LYNDSAY 7 scored 11     Patient screened positive for depression based on a PHQ-9 score of 10 on 12/1/2022. Follow-up recommendations include: Prescribed antidepressant medication treatment.      1. LYNDSAY - trials of sertraline 25 mg , cont prozac 20 mg for now and if sertraline is effective- will d/c prozac   The pt was on xanax  2 mg TID from Dr Engle , now off and experiences rebound anxiety, severe tremor that makes anxiety worse   Cont  xanax   1 mg BID - TID PRN for anxiety, she is not taking all 3 per day, evidenced by INSPECT     The pt needs to see neurologist for tremor,  Psychotherapy with [facility] for anxiety  Recommended, but she wants to hold on for now     Issues with benzo long term use discussed with the pt and her daughter, they verbalized understanding     2. MDD - sertraline 25 mg ,  prozac 20 mg  And  psychotherapy     Na level WNL on 9/13/2022     3. Benzodiazepine dependence - cont  xanxa   1 mg TID PRN     4. Tremor - neurology referral , did not do yet       Discussed medication options and treatment plan of prescribed medication as well as the risks, benefits, and side effects including potential falls, possible impaired driving and metabolic adversities among others. Patient is agreeable to call the office with any worsening of symptoms or onset of side effects. Patient is agreeable to call 911 or go to the nearest ER should he/she begin having SI/HI. No medication side effects or related complaints today.     MEDS ORDERED DURING VISIT:  New Medications Ordered This Visit   Medications   • sertraline (Zoloft) 25 MG tablet     Sig: Take 1 tablet by mouth Daily.     Dispense:  30 tablet     Refill:  2   • ALPRAZolam (XANAX) 1 MG tablet     Sig: Take 1 tablet by mouth 3 (Three) Times a Day As Needed for Anxiety.     Dispense:  90 tablet     Refill:  1     Please dispense when it is due   • FLUoxetine (PROzac) 20 MG capsule     Sig: Take 1 capsule by mouth Daily.     Dispense:  30 capsule     Refill:  1       Return in about 3 months (around 3/1/2023).         This document has been electronically signed by Cristal Alvarez MD  December 1, 2022 09:10 EST    Part of this note may be an electronic transcription/translation of spoken language to printed text using the Dragon Dictation System.

## 2022-12-02 ENCOUNTER — HOSPITAL ENCOUNTER (EMERGENCY)
Facility: HOSPITAL | Age: 77
Discharge: HOME | End: 2022-12-03
Attending: EMERGENCY MEDICINE | Admitting: EMERGENCY MEDICINE
Payer: MEDICARE

## 2022-12-02 ENCOUNTER — APPOINTMENT (OUTPATIENT)
Dept: GENERAL RADIOLOGY | Facility: HOSPITAL | Age: 77
End: 2022-12-02
Payer: MEDICARE

## 2022-12-02 VITALS
TEMPERATURE: 97.6 F | WEIGHT: 140 LBS | OXYGEN SATURATION: 99 % | HEART RATE: 83 BPM | RESPIRATION RATE: 24 BRPM | SYSTOLIC BLOOD PRESSURE: 167 MMHG | BODY MASS INDEX: 24.8 KG/M2 | HEIGHT: 63 IN | DIASTOLIC BLOOD PRESSURE: 99 MMHG

## 2022-12-02 DIAGNOSIS — R10.84 GENERALIZED ABDOMINAL PAIN: ICD-10-CM

## 2022-12-02 DIAGNOSIS — R19.7 DIARRHEA, UNSPECIFIED TYPE: ICD-10-CM

## 2022-12-02 DIAGNOSIS — E86.0 DEHYDRATION: Primary | ICD-10-CM

## 2022-12-02 DIAGNOSIS — N30.90 CYSTITIS: ICD-10-CM

## 2022-12-02 DIAGNOSIS — R73.9 HYPERGLYCEMIA: ICD-10-CM

## 2022-12-02 LAB
ALBUMIN SERPL-MCNC: 4.15 G/DL (ref 3.5–5.2)
ALBUMIN/GLOB SERPL: 1.6 G/DL
ALP SERPL-CCNC: 121 U/L (ref 39–117)
ALT SERPL W P-5'-P-CCNC: 49 U/L (ref 1–33)
ANION GAP SERPL CALCULATED.3IONS-SCNC: 13.8 MMOL/L (ref 5–15)
AST SERPL-CCNC: 41 U/L (ref 1–32)
BASOPHILS # BLD AUTO: 0.01 10*3/MM3 (ref 0–0.2)
BASOPHILS NFR BLD AUTO: 0.1 % (ref 0–1.5)
BILIRUB SERPL-MCNC: 0.7 MG/DL (ref 0–1.2)
BILIRUB UR QL STRIP: NEGATIVE
BUN SERPL-MCNC: 24 MG/DL (ref 8–23)
BUN/CREAT SERPL: 22.6 (ref 7–25)
CALCIUM SPEC-SCNC: 8.5 MG/DL (ref 8.6–10.5)
CHLORIDE SERPL-SCNC: 102 MMOL/L (ref 98–107)
CLARITY UR: ABNORMAL
CO2 SERPL-SCNC: 19.2 MMOL/L (ref 22–29)
COLOR UR: ABNORMAL
CREAT SERPL-MCNC: 1.06 MG/DL (ref 0.57–1)
DEPRECATED RDW RBC AUTO: 48.7 FL (ref 37–54)
EGFRCR SERPLBLD CKD-EPI 2021: 54.2 ML/MIN/1.73
EOSINOPHIL # BLD AUTO: 0.01 10*3/MM3 (ref 0–0.4)
EOSINOPHIL NFR BLD AUTO: 0.1 % (ref 0.3–6.2)
ERYTHROCYTE [DISTWIDTH] IN BLOOD BY AUTOMATED COUNT: 15 % (ref 12.3–15.4)
FLUAV SUBTYP SPEC NAA+PROBE: NOT DETECTED
FLUBV RNA ISLT QL NAA+PROBE: NOT DETECTED
GLOBULIN UR ELPH-MCNC: 2.7 GM/DL
GLUCOSE SERPL-MCNC: 385 MG/DL (ref 65–99)
GLUCOSE UR STRIP-MCNC: ABNORMAL MG/DL
HCT VFR BLD AUTO: 41.3 % (ref 34–46.6)
HGB BLD-MCNC: 13 G/DL (ref 12–15.9)
HGB UR QL STRIP.AUTO: ABNORMAL
IMM GRANULOCYTES # BLD AUTO: 0.02 10*3/MM3 (ref 0–0.05)
IMM GRANULOCYTES NFR BLD AUTO: 0.1 % (ref 0–0.5)
KETONES UR QL STRIP: NEGATIVE
LEUKOCYTE ESTERASE UR QL STRIP.AUTO: ABNORMAL
LYMPHOCYTES # BLD AUTO: 2.31 10*3/MM3 (ref 0.7–3.1)
LYMPHOCYTES NFR BLD AUTO: 17.2 % (ref 19.6–45.3)
MCH RBC QN AUTO: 27.3 PG (ref 26.6–33)
MCHC RBC AUTO-ENTMCNC: 31.5 G/DL (ref 31.5–35.7)
MCV RBC AUTO: 86.8 FL (ref 79–97)
MONOCYTES # BLD AUTO: 1.21 10*3/MM3 (ref 0.1–0.9)
MONOCYTES NFR BLD AUTO: 9 % (ref 5–12)
NEUTROPHILS NFR BLD AUTO: 73.5 % (ref 42.7–76)
NEUTROPHILS NFR BLD AUTO: 9.89 10*3/MM3 (ref 1.7–7)
NITRITE UR QL STRIP: NEGATIVE
PH UR STRIP.AUTO: 6 [PH] (ref 5–8)
PLATELET # BLD AUTO: 296 10*3/MM3 (ref 140–450)
PMV BLD AUTO: 11.8 FL (ref 6–12)
POTASSIUM SERPL-SCNC: 3.1 MMOL/L (ref 3.5–5.2)
PROT SERPL-MCNC: 6.8 G/DL (ref 6–8.5)
PROT UR QL STRIP: NEGATIVE
RBC # BLD AUTO: 4.76 10*6/MM3 (ref 3.77–5.28)
SARS-COV-2 RNA RESP QL NAA+PROBE: NOT DETECTED
SODIUM SERPL-SCNC: 135 MMOL/L (ref 136–145)
SP GR UR STRIP: 1.01 (ref 1–1.03)
TROPONIN T SERPL-MCNC: <0.01 NG/ML (ref 0–0.03)
UROBILINOGEN UR QL STRIP: ABNORMAL
WBC NRBC COR # BLD: 13.45 10*3/MM3 (ref 3.4–10.8)

## 2022-12-02 PROCEDURE — 85025 COMPLETE CBC W/AUTO DIFF WBC: CPT

## 2022-12-02 PROCEDURE — 87086 URINE CULTURE/COLONY COUNT: CPT | Performed by: EMERGENCY MEDICINE

## 2022-12-02 PROCEDURE — 71045 X-RAY EXAM CHEST 1 VIEW: CPT

## 2022-12-02 PROCEDURE — 99283 EMERGENCY DEPT VISIT LOW MDM: CPT

## 2022-12-02 PROCEDURE — 36415 COLL VENOUS BLD VENIPUNCTURE: CPT

## 2022-12-02 PROCEDURE — 84484 ASSAY OF TROPONIN QUANT: CPT | Performed by: EMERGENCY MEDICINE

## 2022-12-02 PROCEDURE — 96365 THER/PROPH/DIAG IV INF INIT: CPT

## 2022-12-02 PROCEDURE — 87147 CULTURE TYPE IMMUNOLOGIC: CPT | Performed by: EMERGENCY MEDICINE

## 2022-12-02 PROCEDURE — 81001 URINALYSIS AUTO W/SCOPE: CPT | Performed by: EMERGENCY MEDICINE

## 2022-12-02 PROCEDURE — 87636 SARSCOV2 & INF A&B AMP PRB: CPT | Performed by: EMERGENCY MEDICINE

## 2022-12-02 PROCEDURE — 80053 COMPREHEN METABOLIC PANEL: CPT | Performed by: EMERGENCY MEDICINE

## 2022-12-02 RX ORDER — SODIUM CHLORIDE 0.9 % (FLUSH) 0.9 %
10 SYRINGE (ML) INJECTION AS NEEDED
Status: DISCONTINUED | OUTPATIENT
Start: 2022-12-02 | End: 2022-12-03 | Stop reason: HOSPADM

## 2022-12-02 RX ADMIN — SODIUM CHLORIDE 1000 ML: 9 INJECTION, SOLUTION INTRAVENOUS at 23:40

## 2022-12-03 ENCOUNTER — APPOINTMENT (OUTPATIENT)
Dept: CT IMAGING | Facility: HOSPITAL | Age: 77
End: 2022-12-03
Payer: MEDICARE

## 2022-12-03 LAB
BACTERIA UR QL AUTO: ABNORMAL /HPF
GLUCOSE BLDC GLUCOMTR-MCNC: 211 MG/DL (ref 70–130)
HYALINE CASTS UR QL AUTO: ABNORMAL /LPF
RBC # UR STRIP: ABNORMAL /HPF
REF LAB TEST METHOD: ABNORMAL
SQUAMOUS #/AREA URNS HPF: ABNORMAL /HPF
WBC # UR STRIP: ABNORMAL /HPF

## 2022-12-03 PROCEDURE — 82962 GLUCOSE BLOOD TEST: CPT

## 2022-12-03 PROCEDURE — 96365 THER/PROPH/DIAG IV INF INIT: CPT

## 2022-12-03 PROCEDURE — 99284 EMERGENCY DEPT VISIT MOD MDM: CPT | Performed by: EMERGENCY MEDICINE

## 2022-12-03 PROCEDURE — 25010000002 CEFTRIAXONE PER 250 MG: Performed by: EMERGENCY MEDICINE

## 2022-12-03 PROCEDURE — 74177 CT ABD & PELVIS W/CONTRAST: CPT

## 2022-12-03 PROCEDURE — 0 IOPAMIDOL PER 1 ML: Performed by: EMERGENCY MEDICINE

## 2022-12-03 RX ORDER — POTASSIUM CHLORIDE 750 MG/1
20 TABLET, FILM COATED, EXTENDED RELEASE ORAL ONCE
Status: COMPLETED | OUTPATIENT
Start: 2022-12-03 | End: 2022-12-03

## 2022-12-03 RX ORDER — INSULIN LISPRO 100 [IU]/ML
8 INJECTION, SOLUTION INTRAVENOUS; SUBCUTANEOUS ONCE
Status: DISCONTINUED | OUTPATIENT
Start: 2022-12-03 | End: 2022-12-03

## 2022-12-03 RX ORDER — CEPHALEXIN 500 MG/1
500 CAPSULE ORAL 3 TIMES DAILY
Qty: 21 CAPSULE | Refills: 0 | Status: SHIPPED | OUTPATIENT
Start: 2022-12-03 | End: 2022-12-11 | Stop reason: HOSPADM

## 2022-12-03 RX ADMIN — IOPAMIDOL 100 ML: 755 INJECTION, SOLUTION INTRAVENOUS at 00:38

## 2022-12-03 RX ADMIN — POTASSIUM CHLORIDE 20 MEQ: 750 TABLET, EXTENDED RELEASE ORAL at 02:10

## 2022-12-03 RX ADMIN — CEFTRIAXONE 1 G: 1 INJECTION, POWDER, FOR SOLUTION INTRAMUSCULAR; INTRAVENOUS at 01:08

## 2022-12-03 NOTE — DISCHARGE INSTRUCTIONS
Please take Keflex as prescribed for presumed urinary tract infection.  Monitor blood sugar closely.  Drink plenty fluids to stay hydrated. Seek immediate medial attention if you develop right upper abdominal pain, fevers, vomiting or any concerns.

## 2022-12-03 NOTE — FSED PROVIDER NOTE
Subjective   History of Present Illness  Patient is a pleasant 77-year-old woman was brought in by daughter for evaluation of generalized weakness and decreased activity level and decreased oral intake.  Patient's symptoms began approximate 1 week ago when she began having loose stools.  Daughter states that she checked her urine strep which was positive for leuk esterase.  Patient and daughter were thinking that she may have a UTI.  Patient's symptoms with generalized weakness persisted and blood sugar levels were elevated.  Patient noticed she began having increased urinary symptoms with increased thirst dry mouth and decreased activity level.  At times the patient does have abdominal discomfort stating that when she eats she feels bloated.  No nausea or vomiting or back pain chest pain or shortness of breath or cough or throat pain or nasal congestion or fevers.  No recent antibiotic use.  No blood in the stool.        Review of Systems   Constitutional: Positive for activity change, appetite change and fatigue. Negative for fever.   HENT: Negative for postnasal drip and rhinorrhea.    Respiratory: Negative for cough and shortness of breath.    Cardiovascular: Negative for chest pain.   Gastrointestinal: Positive for abdominal pain and diarrhea. Negative for blood in stool, nausea and vomiting.   Genitourinary: Positive for frequency and urgency. Negative for flank pain.   Musculoskeletal: Negative for myalgias.   Neurological: Negative for headaches.   All other systems reviewed and are negative.      Past Medical History:   Diagnosis Date   • Anxiety    • CHF (congestive heart failure) (HCC)    • Depression    • Diabetes mellitus (HCC)    • Hyperlipidemia    • Hypertension    • Panic disorder     \"panic attacks\"   • Tremors of nervous system     \"essential tremors\"       Allergies   Allergen Reactions   • Morphine Hallucinations       Past Surgical History:   Procedure Laterality Date   • CORONARY ANGIOPLASTY  WITH STENT PLACEMENT         Family History   Problem Relation Age of Onset   • Depression Sister    • Suicidality Other         suicided   • Alcohol abuse Other    • Alcohol abuse Daughter    • Depression Other        Social History     Socioeconomic History   • Marital status:    Tobacco Use   • Smoking status: Never   • Smokeless tobacco: Never   Vaping Use   • Vaping Use: Never used   Substance and Sexual Activity   • Alcohol use: Not Currently   • Drug use: No   • Sexual activity: Defer           Objective   Physical Exam  Vitals and nursing note reviewed.   Constitutional:       General: She is in acute distress.      Appearance: Normal appearance. She is not ill-appearing or toxic-appearing.   HENT:      Head: Normocephalic and atraumatic.      Nose: Nose normal.      Mouth/Throat:      Mouth: Mucous membranes are dry.      Pharynx: Oropharynx is clear. No posterior oropharyngeal erythema.   Eyes:      Extraocular Movements: Extraocular movements intact.      Pupils: Pupils are equal, round, and reactive to light.   Cardiovascular:      Rate and Rhythm: Normal rate and regular rhythm.      Pulses: Normal pulses.      Heart sounds: Normal heart sounds.   Pulmonary:      Effort: Pulmonary effort is normal.      Breath sounds: Normal breath sounds.   Abdominal:      Palpations: Abdomen is soft.      Tenderness: There is no abdominal tenderness. There is no right CVA tenderness or left CVA tenderness.      Comments: Abdomen is soft and nontender at this time.   Musculoskeletal:         General: No swelling. Normal range of motion.      Cervical back: Normal range of motion and neck supple.      Right lower leg: No edema.      Left lower leg: No edema.   Skin:     General: Skin is warm and dry.      Capillary Refill: Capillary refill takes less than 2 seconds.      Coloration: Skin is not pale.   Neurological:      General: No focal deficit present.      Mental Status: She is alert.      Sensory: No sensory  deficit.      Motor: No weakness.         Procedures           ED Course      0145: Patient resting comfortably.  Patient updated on labs and CT findings.  CT does show large gallbladder with pericholecystic fluid.  Patient does not have any right upper quadrant pain has not had any nausea or vomiting.  At this time do not suspect the patient has acute cholecystitis.  Patient has been advised that if she does develop any upper abdominal pain to the right upper quadrant fevers or nausea and vomiting she is to return or seek immediate medical attention for reevaluation.  Patient will be treated for UTI.                                     MDM  Patient is a pleasant 77-year-old woman who is brought in by her daughter for evaluation of generalized weakness and decreased activity level decreased oral intake with associated loose stools and urinary frequency and elevations in blood sugar.  She had a home urine test which was positive for leuk esterase approximate 1 week ago.  She has had no fevers or vomiting or cough or URI symptoms.  Patient does have slightly elevated white count and glucose is 385 patient does appear dehydrated.  Suspect the patient has UTI and/or viral diarrhea causing elevations of her blood glucose and dehydration.  Patient given IV fluids and insulin and IV Rocephin.  Patient was describing at times having abdominal discomfort and bloated feeling with eating.  Patient underwent CT abdomen pelvis.  CT demonstrates dilated gallbladder with pericholecystic fluid.  The patient does not have any right upper quadrant pain.  At times the patient has been having epigastric discomfort and bloating type sensation after eating.  Do not suspect the patient has cholecystitis.  The patient has been advised that if she were to develop any fevers or nausea or vomiting or right upper quadrant pain she is to return to seek immediate medical attention.  Patient will be treated for UTI with Keflex.  Final diagnoses:    Dehydration   Hyperglycemia   Cystitis   Diarrhea, unspecified type   Generalized abdominal pain       ED Disposition  ED Disposition     ED Disposition   Discharge    Condition   Stable    Comment   --             Isaias, Dave  [address]  [place] [id number] [phone]    In 1 week      [place] [address] [address] [place] [id number] [phone]    If symptoms worsen         Medication List      New Prescriptions    cephalexin 500 MG capsule  Commonly known as: KEFLEX  Take 1 capsule by mouth 3 (Three) Times a Day for 7 days.           Where to Get Your Medications      These medications were sent to [facility] - [place], [place] - [address] [facility] - [phone]  - [phone] FX  [address] [facility] [place] [address]    Phone: [phone]   · cephalexin 500 MG capsule

## 2022-12-04 LAB — BACTERIA SPEC AEROBE CULT: ABNORMAL

## 2022-12-09 ENCOUNTER — APPOINTMENT (OUTPATIENT)
Dept: ULTRASOUND IMAGING | Facility: HOSPITAL | Age: 77
End: 2022-12-09

## 2022-12-09 ENCOUNTER — HOSPITAL ENCOUNTER (OUTPATIENT)
Facility: HOSPITAL | Age: 77
Setting detail: OBSERVATION
Discharge: HOME | End: 2022-12-11
Attending: EMERGENCY MEDICINE | Admitting: INTERNAL MEDICINE

## 2022-12-09 DIAGNOSIS — E87.6 HYPOKALEMIA: ICD-10-CM

## 2022-12-09 DIAGNOSIS — R10.9 ABDOMINAL PAIN, UNSPECIFIED ABDOMINAL LOCATION: Primary | ICD-10-CM

## 2022-12-09 DIAGNOSIS — N39.0 URINARY TRACT INFECTION WITHOUT HEMATURIA, SITE UNSPECIFIED: ICD-10-CM

## 2022-12-09 DIAGNOSIS — R74.8 ELEVATED LIPASE: ICD-10-CM

## 2022-12-09 DIAGNOSIS — E16.2 HYPOGLYCEMIA: ICD-10-CM

## 2022-12-09 LAB
ALBUMIN SERPL-MCNC: 3.4 G/DL (ref 3.5–5.2)
ALBUMIN/GLOB SERPL: 1.3 G/DL
ALP SERPL-CCNC: 83 U/L (ref 39–117)
ALT SERPL W P-5'-P-CCNC: 15 U/L (ref 1–33)
ANION GAP SERPL CALCULATED.3IONS-SCNC: 15 MMOL/L (ref 5–15)
AST SERPL-CCNC: 17 U/L (ref 1–32)
BACTERIA UR QL AUTO: ABNORMAL /HPF
BASOPHILS # BLD AUTO: 0 10*3/MM3 (ref 0–0.2)
BASOPHILS # BLD AUTO: 0.1 10*3/MM3 (ref 0–0.2)
BASOPHILS NFR BLD AUTO: 0.3 % (ref 0–1.5)
BASOPHILS NFR BLD AUTO: 0.7 % (ref 0–1.5)
BILIRUB SERPL-MCNC: 0.5 MG/DL (ref 0–1.2)
BILIRUB UR QL STRIP: NEGATIVE
BUN SERPL-MCNC: 16 MG/DL (ref 8–23)
BUN/CREAT SERPL: 16.2 (ref 7–25)
CALCIUM SPEC-SCNC: 8.3 MG/DL (ref 8.6–10.5)
CHLORIDE SERPL-SCNC: 99 MMOL/L (ref 98–107)
CLARITY UR: ABNORMAL
CO2 SERPL-SCNC: 23 MMOL/L (ref 22–29)
COLOR UR: YELLOW
CREAT SERPL-MCNC: 0.99 MG/DL (ref 0.57–1)
D-LACTATE SERPL-SCNC: 1.1 MMOL/L (ref 0.5–2)
DEPRECATED RDW RBC AUTO: 50.8 FL (ref 37–54)
DEPRECATED RDW RBC AUTO: 51.2 FL (ref 37–54)
EGFRCR SERPLBLD CKD-EPI 2021: 58.8 ML/MIN/1.73
EOSINOPHIL # BLD AUTO: 0.1 10*3/MM3 (ref 0–0.4)
EOSINOPHIL # BLD AUTO: 0.1 10*3/MM3 (ref 0–0.4)
EOSINOPHIL NFR BLD AUTO: 0.8 % (ref 0.3–6.2)
EOSINOPHIL NFR BLD AUTO: 1.5 % (ref 0.3–6.2)
ERYTHROCYTE [DISTWIDTH] IN BLOOD BY AUTOMATED COUNT: 16.4 % (ref 12.3–15.4)
ERYTHROCYTE [DISTWIDTH] IN BLOOD BY AUTOMATED COUNT: 16.4 % (ref 12.3–15.4)
GLOBULIN UR ELPH-MCNC: 2.7 GM/DL
GLUCOSE BLDC GLUCOMTR-MCNC: 173 MG/DL (ref 70–105)
GLUCOSE BLDC GLUCOMTR-MCNC: 50 MG/DL (ref 70–105)
GLUCOSE BLDC GLUCOMTR-MCNC: 76 MG/DL (ref 70–105)
GLUCOSE BLDC GLUCOMTR-MCNC: 78 MG/DL (ref 70–105)
GLUCOSE BLDC GLUCOMTR-MCNC: 89 MG/DL (ref 70–105)
GLUCOSE BLDC GLUCOMTR-MCNC: 89 MG/DL (ref 70–105)
GLUCOSE SERPL-MCNC: 65 MG/DL (ref 65–99)
GLUCOSE UR STRIP-MCNC: ABNORMAL MG/DL
HCT VFR BLD AUTO: 35.1 % (ref 34–46.6)
HCT VFR BLD AUTO: 36.8 % (ref 34–46.6)
HGB BLD-MCNC: 11.6 G/DL (ref 12–15.9)
HGB BLD-MCNC: 12.1 G/DL (ref 12–15.9)
HGB UR QL STRIP.AUTO: ABNORMAL
HYALINE CASTS UR QL AUTO: ABNORMAL /LPF
KETONES UR QL STRIP: NEGATIVE
LEUKOCYTE ESTERASE UR QL STRIP.AUTO: ABNORMAL
LIPASE SERPL-CCNC: 169 U/L (ref 13–60)
LYMPHOCYTES # BLD AUTO: 1.4 10*3/MM3 (ref 0.7–3.1)
LYMPHOCYTES # BLD AUTO: 2 10*3/MM3 (ref 0.7–3.1)
LYMPHOCYTES NFR BLD AUTO: 14.2 % (ref 19.6–45.3)
LYMPHOCYTES NFR BLD AUTO: 35 % (ref 19.6–45.3)
MAGNESIUM SERPL-MCNC: 1.6 MG/DL (ref 1.6–2.4)
MCH RBC QN AUTO: 27.6 PG (ref 26.6–33)
MCH RBC QN AUTO: 27.9 PG (ref 26.6–33)
MCHC RBC AUTO-ENTMCNC: 32.9 G/DL (ref 31.5–35.7)
MCHC RBC AUTO-ENTMCNC: 33.1 G/DL (ref 31.5–35.7)
MCV RBC AUTO: 84 FL (ref 79–97)
MCV RBC AUTO: 84.5 FL (ref 79–97)
MONOCYTES # BLD AUTO: 0.9 10*3/MM3 (ref 0.1–0.9)
MONOCYTES # BLD AUTO: 1.3 10*3/MM3 (ref 0.1–0.9)
MONOCYTES NFR BLD AUTO: 13.4 % (ref 5–12)
MONOCYTES NFR BLD AUTO: 15 % (ref 5–12)
MUCOUS THREADS URNS QL MICRO: ABNORMAL /HPF
NEUTROPHILS NFR BLD AUTO: 2.8 10*3/MM3 (ref 1.7–7)
NEUTROPHILS NFR BLD AUTO: 48.2 % (ref 42.7–76)
NEUTROPHILS NFR BLD AUTO: 6.9 10*3/MM3 (ref 1.7–7)
NEUTROPHILS NFR BLD AUTO: 70.9 % (ref 42.7–76)
NITRITE UR QL STRIP: NEGATIVE
NRBC BLD AUTO-RTO: 0.2 /100 WBC (ref 0–0.2)
NRBC BLD AUTO-RTO: 0.2 /100 WBC (ref 0–0.2)
PH UR STRIP.AUTO: 6 [PH] (ref 5–8)
PLATELET # BLD AUTO: 236 10*3/MM3 (ref 140–450)
PLATELET # BLD AUTO: 263 10*3/MM3 (ref 140–450)
PMV BLD AUTO: 9.1 FL (ref 6–12)
PMV BLD AUTO: 9.1 FL (ref 6–12)
POTASSIUM SERPL-SCNC: 2.6 MMOL/L (ref 3.5–5.2)
PROT SERPL-MCNC: 6.1 G/DL (ref 6–8.5)
PROT UR QL STRIP: ABNORMAL
RBC # BLD AUTO: 4.15 10*6/MM3 (ref 3.77–5.28)
RBC # BLD AUTO: 4.38 10*6/MM3 (ref 3.77–5.28)
RBC # UR STRIP: ABNORMAL /HPF
REF LAB TEST METHOD: ABNORMAL
SODIUM SERPL-SCNC: 137 MMOL/L (ref 136–145)
SP GR UR STRIP: 1.02 (ref 1–1.03)
SQUAMOUS #/AREA URNS HPF: ABNORMAL /HPF
UROBILINOGEN UR QL STRIP: ABNORMAL
WBC # UR STRIP: ABNORMAL /HPF
WBC NRBC COR # BLD: 5.8 10*3/MM3 (ref 3.4–10.8)
WBC NRBC COR # BLD: 9.7 10*3/MM3 (ref 3.4–10.8)

## 2022-12-09 PROCEDURE — 76705 ECHO EXAM OF ABDOMEN: CPT

## 2022-12-09 PROCEDURE — 96365 THER/PROPH/DIAG IV INF INIT: CPT

## 2022-12-09 PROCEDURE — 83690 ASSAY OF LIPASE: CPT | Performed by: EMERGENCY MEDICINE

## 2022-12-09 PROCEDURE — 99285 EMERGENCY DEPT VISIT HI MDM: CPT

## 2022-12-09 PROCEDURE — 87086 URINE CULTURE/COLONY COUNT: CPT | Performed by: EMERGENCY MEDICINE

## 2022-12-09 PROCEDURE — 82962 GLUCOSE BLOOD TEST: CPT

## 2022-12-09 PROCEDURE — 81001 URINALYSIS AUTO W/SCOPE: CPT | Performed by: EMERGENCY MEDICINE

## 2022-12-09 PROCEDURE — G0378 HOSPITAL OBSERVATION PER HR: HCPCS

## 2022-12-09 PROCEDURE — 96372 THER/PROPH/DIAG INJ SC/IM: CPT

## 2022-12-09 PROCEDURE — 83735 ASSAY OF MAGNESIUM: CPT | Performed by: PHYSICIAN ASSISTANT

## 2022-12-09 PROCEDURE — 96366 THER/PROPH/DIAG IV INF ADDON: CPT

## 2022-12-09 PROCEDURE — 87040 BLOOD CULTURE FOR BACTERIA: CPT | Performed by: PHYSICIAN ASSISTANT

## 2022-12-09 PROCEDURE — 80053 COMPREHEN METABOLIC PANEL: CPT | Performed by: EMERGENCY MEDICINE

## 2022-12-09 PROCEDURE — 25010000002 ENOXAPARIN PER 10 MG: Performed by: PHYSICIAN ASSISTANT

## 2022-12-09 PROCEDURE — 0 POTASSIUM CHLORIDE 10 MEQ/100ML SOLUTION: Performed by: EMERGENCY MEDICINE

## 2022-12-09 PROCEDURE — 83735 ASSAY OF MAGNESIUM: CPT | Performed by: EMERGENCY MEDICINE

## 2022-12-09 PROCEDURE — 96367 TX/PROPH/DG ADDL SEQ IV INF: CPT

## 2022-12-09 PROCEDURE — 25010000002 CEFTRIAXONE PER 250 MG: Performed by: EMERGENCY MEDICINE

## 2022-12-09 PROCEDURE — 85025 COMPLETE CBC W/AUTO DIFF WBC: CPT | Performed by: PHYSICIAN ASSISTANT

## 2022-12-09 PROCEDURE — 96375 TX/PRO/DX INJ NEW DRUG ADDON: CPT

## 2022-12-09 PROCEDURE — 96376 TX/PRO/DX INJ SAME DRUG ADON: CPT

## 2022-12-09 PROCEDURE — 85025 COMPLETE CBC W/AUTO DIFF WBC: CPT | Performed by: EMERGENCY MEDICINE

## 2022-12-09 PROCEDURE — 83605 ASSAY OF LACTIC ACID: CPT | Performed by: PHYSICIAN ASSISTANT

## 2022-12-09 PROCEDURE — 36415 COLL VENOUS BLD VENIPUNCTURE: CPT | Performed by: PHYSICIAN ASSISTANT

## 2022-12-09 RX ORDER — AMOXICILLIN 250 MG
2 CAPSULE ORAL 2 TIMES DAILY
Status: DISCONTINUED | OUTPATIENT
Start: 2022-12-09 | End: 2022-12-11 | Stop reason: HOSPADM

## 2022-12-09 RX ORDER — DEXTROSE MONOHYDRATE 25 G/50ML
INJECTION, SOLUTION INTRAVENOUS
Status: COMPLETED
Start: 2022-12-09 | End: 2022-12-09

## 2022-12-09 RX ORDER — BISACODYL 5 MG/1
5 TABLET, DELAYED RELEASE ORAL DAILY PRN
Status: DISCONTINUED | OUTPATIENT
Start: 2022-12-09 | End: 2022-12-11 | Stop reason: HOSPADM

## 2022-12-09 RX ORDER — POLYETHYLENE GLYCOL 3350 17 G/17G
17 POWDER, FOR SOLUTION ORAL DAILY PRN
Status: DISCONTINUED | OUTPATIENT
Start: 2022-12-09 | End: 2022-12-11 | Stop reason: HOSPADM

## 2022-12-09 RX ORDER — DEXTROSE MONOHYDRATE 25 G/50ML
25 INJECTION, SOLUTION INTRAVENOUS
Status: DISCONTINUED | OUTPATIENT
Start: 2022-12-09 | End: 2022-12-11 | Stop reason: HOSPADM

## 2022-12-09 RX ORDER — BISACODYL 10 MG
10 SUPPOSITORY, RECTAL RECTAL DAILY PRN
Status: DISCONTINUED | OUTPATIENT
Start: 2022-12-09 | End: 2022-12-11 | Stop reason: HOSPADM

## 2022-12-09 RX ORDER — ACETAMINOPHEN 650 MG/1
650 SUPPOSITORY RECTAL EVERY 4 HOURS PRN
Status: DISCONTINUED | OUTPATIENT
Start: 2022-12-09 | End: 2022-12-11 | Stop reason: HOSPADM

## 2022-12-09 RX ORDER — SODIUM CHLORIDE 0.9 % (FLUSH) 0.9 %
10 SYRINGE (ML) INJECTION AS NEEDED
Status: DISCONTINUED | OUTPATIENT
Start: 2022-12-09 | End: 2022-12-11 | Stop reason: HOSPADM

## 2022-12-09 RX ORDER — POTASSIUM CHLORIDE 1.5 G/1.77G
40 POWDER, FOR SOLUTION ORAL AS NEEDED
Status: DISCONTINUED | OUTPATIENT
Start: 2022-12-09 | End: 2022-12-11 | Stop reason: HOSPADM

## 2022-12-09 RX ORDER — OLANZAPINE 10 MG/2ML
1 INJECTION, POWDER, LYOPHILIZED, FOR SOLUTION INTRAMUSCULAR
Status: DISCONTINUED | OUTPATIENT
Start: 2022-12-09 | End: 2022-12-10 | Stop reason: SDUPTHER

## 2022-12-09 RX ORDER — POTASSIUM CHLORIDE 20 MEQ/1
40 TABLET, EXTENDED RELEASE ORAL AS NEEDED
Status: DISCONTINUED | OUTPATIENT
Start: 2022-12-09 | End: 2022-12-11 | Stop reason: HOSPADM

## 2022-12-09 RX ORDER — NITROGLYCERIN 0.4 MG/1
0.4 TABLET SUBLINGUAL
Status: DISCONTINUED | OUTPATIENT
Start: 2022-12-09 | End: 2022-12-11 | Stop reason: HOSPADM

## 2022-12-09 RX ORDER — HUMAN INSULIN 100 [IU]/ML
20 INJECTION, SUSPENSION SUBCUTANEOUS 2 TIMES DAILY WITH MEALS
COMMUNITY

## 2022-12-09 RX ORDER — ACETAMINOPHEN 325 MG/1
650 TABLET ORAL EVERY 4 HOURS PRN
Status: DISCONTINUED | OUTPATIENT
Start: 2022-12-09 | End: 2022-12-11 | Stop reason: HOSPADM

## 2022-12-09 RX ORDER — ONDANSETRON 4 MG/1
4 TABLET, FILM COATED ORAL EVERY 6 HOURS PRN
Status: DISCONTINUED | OUTPATIENT
Start: 2022-12-09 | End: 2022-12-11 | Stop reason: HOSPADM

## 2022-12-09 RX ORDER — NICOTINE POLACRILEX 4 MG
15 LOZENGE BUCCAL
Status: DISCONTINUED | OUTPATIENT
Start: 2022-12-09 | End: 2022-12-10 | Stop reason: SDUPTHER

## 2022-12-09 RX ORDER — DEXTROSE MONOHYDRATE 25 G/50ML
25 INJECTION, SOLUTION INTRAVENOUS ONCE
Status: COMPLETED | OUTPATIENT
Start: 2022-12-09 | End: 2022-12-09

## 2022-12-09 RX ORDER — MAGNESIUM SULFATE 1 G/100ML
1 INJECTION INTRAVENOUS AS NEEDED
Status: DISCONTINUED | OUTPATIENT
Start: 2022-12-09 | End: 2022-12-11 | Stop reason: HOSPADM

## 2022-12-09 RX ORDER — ACETAMINOPHEN 160 MG/5ML
650 SOLUTION ORAL EVERY 4 HOURS PRN
Status: DISCONTINUED | OUTPATIENT
Start: 2022-12-09 | End: 2022-12-11 | Stop reason: HOSPADM

## 2022-12-09 RX ORDER — POTASSIUM CHLORIDE 7.45 MG/ML
10 INJECTION INTRAVENOUS
Status: DISCONTINUED | OUTPATIENT
Start: 2022-12-09 | End: 2022-12-11 | Stop reason: HOSPADM

## 2022-12-09 RX ORDER — ONDANSETRON 2 MG/ML
4 INJECTION INTRAMUSCULAR; INTRAVENOUS EVERY 6 HOURS PRN
Status: DISCONTINUED | OUTPATIENT
Start: 2022-12-09 | End: 2022-12-11 | Stop reason: HOSPADM

## 2022-12-09 RX ORDER — POLYETHYLENE GLYCOL 3350 17 G/17G
17 POWDER, FOR SOLUTION ORAL DAILY
Status: DISCONTINUED | OUTPATIENT
Start: 2022-12-09 | End: 2022-12-11 | Stop reason: HOSPADM

## 2022-12-09 RX ORDER — FLUDROCORTISONE ACETATE 0.1 MG/1
0.1 TABLET ORAL EVERY MORNING
COMMUNITY

## 2022-12-09 RX ORDER — ALUMINA, MAGNESIA, AND SIMETHICONE 2400; 2400; 240 MG/30ML; MG/30ML; MG/30ML
15 SUSPENSION ORAL EVERY 6 HOURS PRN
Status: DISCONTINUED | OUTPATIENT
Start: 2022-12-09 | End: 2022-12-11 | Stop reason: HOSPADM

## 2022-12-09 RX ORDER — SODIUM CHLORIDE 0.9 % (FLUSH) 0.9 %
10 SYRINGE (ML) INJECTION EVERY 12 HOURS SCHEDULED
Status: DISCONTINUED | OUTPATIENT
Start: 2022-12-09 | End: 2022-12-11 | Stop reason: HOSPADM

## 2022-12-09 RX ORDER — CHOLECALCIFEROL (VITAMIN D3) 125 MCG
5 CAPSULE ORAL NIGHTLY PRN
Status: DISCONTINUED | OUTPATIENT
Start: 2022-12-09 | End: 2022-12-11 | Stop reason: HOSPADM

## 2022-12-09 RX ORDER — ALPRAZOLAM 1 MG/1
1 TABLET ORAL ONCE
Status: COMPLETED | OUTPATIENT
Start: 2022-12-09 | End: 2022-12-09

## 2022-12-09 RX ORDER — ENOXAPARIN SODIUM 100 MG/ML
40 INJECTION SUBCUTANEOUS DAILY
Status: DISCONTINUED | OUTPATIENT
Start: 2022-12-09 | End: 2022-12-11 | Stop reason: HOSPADM

## 2022-12-09 RX ORDER — MAGNESIUM SULFATE HEPTAHYDRATE 40 MG/ML
2 INJECTION, SOLUTION INTRAVENOUS AS NEEDED
Status: DISCONTINUED | OUTPATIENT
Start: 2022-12-09 | End: 2022-12-11 | Stop reason: HOSPADM

## 2022-12-09 RX ORDER — SODIUM CHLORIDE 9 MG/ML
40 INJECTION, SOLUTION INTRAVENOUS AS NEEDED
Status: DISCONTINUED | OUTPATIENT
Start: 2022-12-09 | End: 2022-12-11 | Stop reason: HOSPADM

## 2022-12-09 RX ADMIN — DEXTROSE MONOHYDRATE 25 ML: 25 INJECTION, SOLUTION INTRAVENOUS at 05:19

## 2022-12-09 RX ADMIN — SENNOSIDES AND DOCUSATE SODIUM 2 TABLET: 50; 8.6 TABLET ORAL at 21:47

## 2022-12-09 RX ADMIN — CEFTRIAXONE 1 G: 1 INJECTION, POWDER, FOR SOLUTION INTRAMUSCULAR; INTRAVENOUS at 05:22

## 2022-12-09 RX ADMIN — DEXTROSE MONOHYDRATE 25 G: 25 INJECTION, SOLUTION INTRAVENOUS at 06:48

## 2022-12-09 RX ADMIN — POTASSIUM CHLORIDE 10 MEQ: 7.46 INJECTION, SOLUTION INTRAVENOUS at 06:07

## 2022-12-09 RX ADMIN — ENOXAPARIN SODIUM 40 MG: 100 INJECTION SUBCUTANEOUS at 16:59

## 2022-12-09 RX ADMIN — POTASSIUM CHLORIDE 10 MEQ: 7.46 INJECTION, SOLUTION INTRAVENOUS at 08:39

## 2022-12-09 RX ADMIN — Medication 10 ML: at 09:58

## 2022-12-09 RX ADMIN — POLYETHYLENE GLYCOL 3350 17 G: 17 POWDER, FOR SOLUTION ORAL at 14:12

## 2022-12-09 RX ADMIN — ALPRAZOLAM 1 MG: 1 TABLET ORAL at 05:05

## 2022-12-09 RX ADMIN — POTASSIUM CHLORIDE 10 MEQ: 7.46 INJECTION, SOLUTION INTRAVENOUS at 05:11

## 2022-12-09 RX ADMIN — POTASSIUM CHLORIDE 10 MEQ: 7.46 INJECTION, SOLUTION INTRAVENOUS at 07:16

## 2022-12-09 RX ADMIN — SENNOSIDES AND DOCUSATE SODIUM 2 TABLET: 50; 8.6 TABLET ORAL at 13:08

## 2022-12-09 RX ADMIN — POTASSIUM CHLORIDE 10 MEQ: 7.46 INJECTION, SOLUTION INTRAVENOUS at 09:58

## 2022-12-09 RX ADMIN — Medication 10 ML: at 21:48

## 2022-12-09 NOTE — H&P
"    [facility] Medicine Services      Patient Name: Nadege Barrow  : 1945  MRN: [id number]  Primary Care Physician:  Dave Esparza  Date of admission: 2022      Subjective      Chief Complaint: generalized weakness    History of Present Illness: Nadege Barrow is a 77 y.o. female with a past medical history to include DM2, hyperlipidemia, hypertension, MDD and LYNDSAY who presented to [facility] on 2022 complaining of generalized weakness and \"food going through me when I eat\".  Patient reports that she has been feeling not well for about a week.  She states that when she eats, the food goes right through her and its not really diarrhea but it did towards her from eating.  She denies having any melena or hematochezia.  She states that that when she has a BM, she feels as though there are sores in her colon.  She feels she has lost approximately 25 pounds over the last few months due to not eating.  Review of record shows she was in the hospital in October and treated for gastroenteritis, GI panel was negative and C. difficile was negative as well.  It is noted that Linzess was stopped.  She currently reports that she does have some lightheadedness and denies syncopal episodes.  She denies having any fever, vomiting or nausea.  She denies having any chest pains or shortness of air.  She does report right-sided abdomen pain, this is not new and ongoing.  She reports burning with urination and frequency.  She denies any lower extremity complaints.  She reports that she ambulates at home where she lives with her daughter.  She uses a walker when in public.    Emergency department work-up afebrile, vital signs stable.  UA trace bacteria, culture pending glucose 65.  Potassium 2.6.  Calcium 6.3.  Albumin 3.4.  Lipase 169.  WBC 9.70.  Hemoglobin 12.01.  Platelets 263.  Magnesium 1.6.     Ultrasound abdomen 2022 No evidence of cholelithiasis, cholecystitis, or biliary obstruction. " "Normal ultrasound appearance of the liver and right kidney.    CT AB 12/2/22 There is marked pericholecystic edema, new since prior exam suggestive of cholecystitis.  If there are additional signs or symptoms of cholecystitis consider ultrasound for confirmation. Fatty replacement of liver.     Patient's glucose dropped to 50 she was given an amp of D50 in the emergency department.  She was also given Rocephin for UTI.      Review of Systems   Constitutional: Negative for chills and fever.   HENT: Negative.    Eyes: Negative.    Cardiovascular: Negative for chest pain, dyspnea on exertion and syncope.   Respiratory: Negative for cough and shortness of breath.    Musculoskeletal: Negative.    Gastrointestinal: Positive for abdominal pain and diarrhea. Negative for constipation, nausea and vomiting.   Genitourinary: Positive for dysuria and frequency.   Neurological: Positive for excessive daytime sleepiness and light-headedness.   Psychiatric/Behavioral: Positive for depression.        Somnolent          Personal History     Past Medical History:   Diagnosis Date   • Anxiety    • CHF (congestive heart failure) (HCC)    • Depression    • Diabetes mellitus (HCC)    • Hyperlipidemia    • Hypertension    • Panic disorder     \"panic attacks\"   • Tremors of nervous system     \"essential tremors\"       Past Surgical History:   Procedure Laterality Date   • CORONARY ANGIOPLASTY WITH STENT PLACEMENT         Family History: family history includes Alcohol abuse in her daughter and another family member; Depression in her sister and another family member; Suicidality in an other family member. Otherwise pertinent FHx was reviewed and not pertinent to current issue.    Social History:  reports that she has never smoked. She has never used smokeless tobacco. She reports that she does not currently use alcohol. She reports that she does not use drugs.    Home Medications:  Prior to Admission Medications     Prescriptions Last Dose " Informant Patient Reported? Taking?    Accu-Chek Guide test strip   Yes No    ALPRAZolam (XANAX) 1 MG tablet   No No    Take 1 tablet by mouth 3 (Three) Times a Day As Needed for Anxiety.    atorvastatin (LIPITOR) 40 MG tablet   Yes No    Take 40 mg by mouth Daily.    cephalexin (KEFLEX) 500 MG capsule   No No    Take 1 capsule by mouth 3 (Three) Times a Day for 7 days.    Empagliflozin (Jardiance) 10 MG tablet   Yes No    Take 1 tablet by mouth Daily.    FLUoxetine (PROzac) 20 MG capsule   Yes No    Take 1 capsule by mouth Every Morning.    FLUoxetine (PROzac) 20 MG capsule   No No    Take 1 capsule by mouth Daily.    furosemide (LASIX) 20 MG tablet   No No    Take 1 tablet by mouth Daily.    glucose blood (Accu-Chek Guide) test strip   Yes No    insulin NPH-insulin regular (humuLIN 70/30,novoLIN 70/30) (70-30) 100 UNIT/ML injection   Yes No    Inject 20 Units under the skin into the appropriate area as directed 2 (Two) Times a Day With Meals.    losartan (COZAAR) 25 MG tablet   No No    Take 1 tablet by mouth Daily.    metoprolol succinate XL (TOPROL-XL) 50 MG 24 hr tablet   Yes No    Take 50 mg by mouth Daily.    nitroglycerin (NITROSTAT) 0.4 MG SL tablet   No No    Place 1 tablet under the tongue Every 5 (Five) Minutes As Needed for Chest Pain. Take no more than 3 doses in 15 minutes.    nystatin (MYCOSTATIN) 100,000 unit/mL suspension   Yes No    Take 500,000 Units by mouth 4 (Four) Times a Day.    potassium chloride (K-DUR,KLOR-CON) 20 MEQ CR tablet   Yes No    Take 1 tablet by mouth Every Morning.    potassium chloride (K-DUR,KLOR-CON) 20 MEQ CR tablet   Yes No    Take 20 mEq by mouth Daily.    potassium chloride (K-DUR,KLOR-CON) 20 MEQ CR tablet   Yes No    potassium chloride ER (K-TAB) 20 MEQ tablet controlled-release ER tablet   Yes No    Take 20 mEq by mouth Daily.    sertraline (Zoloft) 25 MG tablet   No No    Take 1 tablet by mouth Daily.    ticagrelor (BRILINTA) 90 MG tablet tablet  Self Yes No    Take  90 mg by mouth 1 (One) Time.    tiZANidine (ZANAFLEX) 2 MG tablet   Yes No    Unifine SafeControl Pen Needle 30G X 5 MM misc   Yes No            Allergies:  Allergies   Allergen Reactions   • Morphine Hallucinations       Objective      Vitals:   Temp:  [98.1 °F (36.7 °C)] 98.1 °F (36.7 °C)  Heart Rate:  [69-91] 70  Resp:  [18] 18  BP: (135-169)/(65-98) 142/75    Physical Exam  Constitutional:       General: She is not in acute distress.  HENT:      Head: Normocephalic and atraumatic.   Cardiovascular:      Rate and Rhythm: Normal rate and regular rhythm.      Pulses: Normal pulses.   Pulmonary:      Effort: Pulmonary effort is normal. No respiratory distress.   Abdominal:      General: Bowel sounds are normal.      Palpations: Abdomen is soft.      Tenderness: There is no abdominal tenderness.   Musculoskeletal:         General: Normal range of motion.      Cervical back: Normal range of motion and neck supple.   Skin:     General: Skin is warm and dry.   Neurological:      Mental Status: She is alert.      Comments: Lethargy, arouses when spoken to            Result Review    Result Review:  I have personally reviewed the results from the time of this admission to 12/9/2022 09:57 EST and agree with these findings:  [x]  Laboratory  [x]  Microbiology  [x]  Radiology  [x]  EKG/Telemetry   []  Cardiology/Vascular   []  Pathology  []  Old records  []  Other:        Assessment & Plan        Active Hospital Problems:  Active Hospital Problems    Diagnosis    • **Abdominal pain, unspecified abdominal location      Plan:     Home meds not verified at the time of assessment and plan    Hypokalemia  -Potassium 2.6  -Replacement given in ED  -Continue to monitor    UTI  -WBC 9.7  -UA, culture pending  -Rocephin given in ED, continue    Abdomen pain  -Lipase 169  -WBC 9.7  -Ultrasound abdomen 12/9/2022 No evidence of cholelithiasis, cholecystitis, or biliary obstruction. Normal ultrasound appearance of the liver and right  kidney.  -CT AB 12/2/22 There is marked pericholecystic edema, new since prior exam suggestive of cholecystitis.  If there are additional signs or symptoms of cholecystitis consider ultrasound for confirmation. Fatty replacement of liver.   -History of gastroenteritis, GI consult  -N.p.o. until patient seen  -Lactic pending    DM2 with hypoglycemia  -Current glucose 65  -Amp of D5 given in emergency department with improvement  -Continue to monitor glucose q2h  -Hold home diabetic medicine  -SSI when appropriate  -Blood Glucose AC/HS    Hypertension/CAD/Hyperlipidemia  -Continue losartan, metoprolol, Brilinta once verified  -Continue atorvastatin once verified    Anxiety/panic disorder/depression  -Continue home meds as verified    DVT prophylaxis:  Medical DVT prophylaxis orders are present.    CODE STATUS:    Level Of Support Discussed With: Patient  Code Status (Patient has no pulse and is not breathing): CPR (Attempt to Resuscitate)  Medical Interventions (Patient has pulse or is breathing): Full Support    Admission Status:  I believe this patient meets inpatient status.    I discussed the patient's findings and my recommendations with patient.      Signature: Electronically signed by Kirstie Gaston PA-C, 12/09/22, 9:57 AM EST.

## 2022-12-09 NOTE — PLAN OF CARE
Goal Outcome Evaluation:  Plan of Care Reviewed With: patient           Outcome Evaluation: pt abed at present. Denies pain. Speech clear, able to voice needs. No acute s/sx of distress observed. VSS call light in reach, plan of care on going.

## 2022-12-09 NOTE — ED PROVIDER NOTES
"Subjective   History of Present Illness  Chief complaint: Abdominal pain    77-year-old female presents with right-sided abdominal pain.  Patient states she has been having issues for several weeks.  She states she feels like her food just goes right through her and she is not digesting her food.  She has difficulty explaining exactly what she means.  She denies fever.  She has had no vomiting.  She states she has had mild intermittent diarrhea.  She was seen in a separate emergency room a few days ago and had a CT of her abdomen pelvis which showed some pericholecystic fluid.  She was not having right upper quadrant abdominal pain at that time and they had low concern for cholecystitis.  Patient and family are concerned that she is having gallbladder issues and they feel like she needs an ultrasound.    History provided by:  Patient      Review of Systems   Constitutional: Negative for fever.   HENT: Negative for congestion.    Eyes: Negative for redness.   Respiratory: Negative for cough and shortness of breath.    Cardiovascular: Negative for chest pain.   Gastrointestinal: Positive for abdominal pain and diarrhea. Negative for vomiting.   Genitourinary: Negative for dysuria.   Musculoskeletal: Negative for back pain.   Skin: Negative for rash.   Neurological: Negative for dizziness and headaches.   Psychiatric/Behavioral: Negative for confusion.       Past Medical History:   Diagnosis Date   • Anxiety    • CHF (congestive heart failure) ([facility])    • Depression    • Diabetes mellitus (HCC)    • Hyperlipidemia    • Hypertension    • Panic disorder     \"panic attacks\"   • Tremors of nervous system     \"essential tremors\"       Allergies   Allergen Reactions   • Morphine Hallucinations       Past Surgical History:   Procedure Laterality Date   • CORONARY ANGIOPLASTY WITH STENT PLACEMENT         Family History   Problem Relation Age of Onset   • Depression Sister    • Suicidality Other         suicided   • Alcohol " "abuse Other    • Alcohol abuse Daughter    • Depression Other        Social History     Socioeconomic History   • Marital status:    Tobacco Use   • Smoking status: Never   • Smokeless tobacco: Never   Vaping Use   • Vaping Use: Never used   Substance and Sexual Activity   • Alcohol use: Not Currently   • Drug use: No   • Sexual activity: Defer       /84   Pulse 77   Temp 98.1 °F (36.7 °C) (Oral)   Resp 18   Ht 160 cm (63\")   Wt 63.5 kg (140 lb)   SpO2 94%   BMI 24.80 kg/m²       Objective   Physical Exam  Vitals and nursing note reviewed.   Constitutional:       Appearance: She is well-developed.   HENT:      Head: Normocephalic and atraumatic.   Eyes:      Pupils: Pupils are equal, round, and reactive to light.   Cardiovascular:      Rate and Rhythm: Normal rate and regular rhythm.      Heart sounds: Normal heart sounds.   Pulmonary:      Effort: Pulmonary effort is normal. No respiratory distress.      Breath sounds: Normal breath sounds.   Abdominal:      General: Bowel sounds are normal.      Palpations: Abdomen is soft.      Tenderness: There is abdominal tenderness in the right upper quadrant and right lower quadrant. There is no guarding or rebound. Negative signs include Pradhan's sign.   Musculoskeletal:         General: Normal range of motion.      Cervical back: Normal range of motion and neck supple.   Skin:     General: Skin is warm and dry.   Neurological:      Mental Status: She is alert and oriented to person, place, and time.         Procedures           ED Course      Results for orders placed or performed during the hospital encounter of 12/09/22   Comprehensive Metabolic Panel    Specimen: Blood   Result Value Ref Range    Glucose 65 65 - 99 mg/dL    BUN 16 8 - 23 mg/dL    Creatinine 0.99 0.57 - 1.00 mg/dL    Sodium 137 136 - 145 mmol/L    Potassium 2.6 (C) 3.5 - 5.2 mmol/L    Chloride 99 98 - 107 mmol/L    CO2 23.0 22.0 - 29.0 mmol/L    Calcium 8.3 (L) 8.6 - 10.5 mg/dL    " Total Protein 6.1 6.0 - 8.5 g/dL    Albumin 3.40 (L) 3.50 - 5.20 g/dL    ALT (SGPT) 15 1 - 33 U/L    AST (SGOT) 17 1 - 32 U/L    Alkaline Phosphatase 83 39 - 117 U/L    Total Bilirubin 0.5 0.0 - 1.2 mg/dL    Globulin 2.7 gm/dL    A/G Ratio 1.3 g/dL    BUN/Creatinine Ratio 16.2 7.0 - 25.0    Anion Gap 15.0 5.0 - 15.0 mmol/L    eGFR 58.8 (L) >60.0 mL/min/1.73   Lipase    Specimen: Blood   Result Value Ref Range    Lipase 169 (H) 13 - 60 U/L   Urinalysis With Culture If Indicated - Urine, Clean Catch    Specimen: Urine, Clean Catch   Result Value Ref Range    Color, UA Yellow Yellow, Straw    Appearance, UA Cloudy (A) Clear    pH, UA 6.0 5.0 - 8.0    Specific Gravity, UA 1.025 1.005 - 1.030    Glucose, UA >=1000 mg/dL (3+) (A) Negative    Ketones, UA Negative Negative    Bilirubin, UA Negative Negative    Blood, UA Moderate (2+) (A) Negative    Protein, UA 30 mg/dL (1+) (A) Negative    Leuk Esterase, UA Moderate (2+) (A) Negative    Nitrite, UA Negative Negative    Urobilinogen, UA 1.0 E.U./dL 0.2 - 1.0 E.U./dL   CBC Auto Differential    Specimen: Blood   Result Value Ref Range    WBC 9.70 3.40 - 10.80 10*3/mm3    RBC 4.38 3.77 - 5.28 10*6/mm3    Hemoglobin 12.1 12.0 - 15.9 g/dL    Hematocrit 36.8 34.0 - 46.6 %    MCV 84.0 79.0 - 97.0 fL    MCH 27.6 26.6 - 33.0 pg    MCHC 32.9 31.5 - 35.7 g/dL    RDW 16.4 (H) 12.3 - 15.4 %    RDW-SD 50.8 37.0 - 54.0 fl    MPV 9.1 6.0 - 12.0 fL    Platelets 263 140 - 450 10*3/mm3    Neutrophil % 70.9 42.7 - 76.0 %    Lymphocyte % 14.2 (L) 19.6 - 45.3 %    Monocyte % 13.4 (H) 5.0 - 12.0 %    Eosinophil % 0.8 0.3 - 6.2 %    Basophil % 0.7 0.0 - 1.5 %    Neutrophils, Absolute 6.90 1.70 - 7.00 10*3/mm3    Lymphocytes, Absolute 1.40 0.70 - 3.10 10*3/mm3    Monocytes, Absolute 1.30 (H) 0.10 - 0.90 10*3/mm3    Eosinophils, Absolute 0.10 0.00 - 0.40 10*3/mm3    Basophils, Absolute 0.10 0.00 - 0.20 10*3/mm3    nRBC 0.2 0.0 - 0.2 /100 WBC   Urinalysis, Microscopic Only - Urine, Clean Catch     Specimen: Urine, Clean Catch   Result Value Ref Range    RBC, UA 3-5 (A) None Seen /HPF    WBC, UA Too Numerous to Count (A) None Seen /HPF    Bacteria, UA Trace (A) None Seen /HPF    Squamous Epithelial Cells, UA 0-2 None Seen, 0-2 /HPF    Hyaline Casts, UA 0-2 None Seen /LPF    Mucus, UA Small/1+ (A) None Seen, Trace /HPF    Methodology Manual Light Microscopy    Magnesium    Specimen: Blood   Result Value Ref Range    Magnesium 1.6 1.6 - 2.4 mg/dL   POC Glucose Once    Specimen: Blood   Result Value Ref Range    Glucose 76 70 - 105 mg/dL   POC Glucose Once    Specimen: Blood   Result Value Ref Range    Glucose 50 (L) 70 - 105 mg/dL     US Abdomen Limited    Result Date: 12/9/2022   1. No evidence of cholelithiasis, cholecystitis, or biliary obstruction 2. Normal ultrasound appearance of the liver and right kidney  Electronically Signed By-Ole Alexis On:12/9/2022 7:47 AM This report was finalized on [id number] by  Ole Alexis, .                                         MDM   Patient had the above evaluation.  Results were discussed with the patient and family.  Right upper quadrant ultrasound showed no acute abnormality.  LFTs are normal.  Potassium is low at 2.6 and she was started on potassium replacement.  Lipase is elevated at 169.  Urinalysis shows urinary tract infection with moderate leukocyte esterase, too numerous to count white blood cells, trace bacteria.  She was started on Rocephin.  Blood sugar has been as low as 50 in the emergency room.  She was given an amp of D50.  I discussed with the hospitalist and the patient will be admitted for further evaluation and management.      Final diagnoses:   Abdominal pain, unspecified abdominal location   Urinary tract infection without hematuria, site unspecified   Hypokalemia   Hypoglycemia   Elevated lipase       ED Disposition  ED Disposition     ED Disposition   Decision to Admit    Condition   --    Comment   Level of Care: Telemetry [5]    Admitting Physician: HERMELINDA CARDENAS [[id number]]               No follow-up provider specified.       Medication List      No changes were made to your prescriptions during this visit.          Issa Taylor MD  12/09/22 0806

## 2022-12-09 NOTE — CONSULTS
"GI CONSULT  NOTE:    Referring Provider:  Dr. Luis    Chief complaint: Abdominal pain, diarrhea    Subjective . \"  I have not felt well for months\"    History of present illness: Nadege Barrow is a 77 y.o. female who has a history of diabetes, heart failure, tremor and anxiety/panic attacks.  She presents from the assisted living facility with numerous concerns.  She has felt poorly for the last couple months and she reports not getting good care at the facility.  She says she was told that she was there to \"steal their men\".  She reports altered bowel habits with constipation followed by diarrhea.  She has a bowel movement every 3 days and often has to disimpact herself with a glove.  No melena or rectal bleeding.  She does not take bowel regimen routinely.  She feels like \"it passes right through her after she eats\".  She is afraid to eat as she is scared to be unable to have a bowel movement as she reports \"she has a weird thing about bowel movements since her mom was impacted when she \".  She denies nausea, vomiting, heartburn or difficulty swallowing.  No early satiety but has lost 25 pounds recently as she is scared to eat.  Previously on Linzess but reports it caused diarrhea.  She has been having some right-sided abdominal pain intermittently for a while as well.  No fevers reported    Endo History:  She reports unremarkable colonoscopy at 50 and negative Cologuard at age 60    Past Medical History:  Past Medical History:   Diagnosis Date   • Anxiety    • CHF (congestive heart failure) (HCC)    • Depression    • Diabetes mellitus (HCC)    • Hyperlipidemia    • Hypertension    • Panic disorder     \"panic attacks\"   • Tremors of nervous system     \"essential tremors\"       Past Surgical History:  Past Surgical History:   Procedure Laterality Date   • CORONARY ANGIOPLASTY WITH STENT PLACEMENT         Social History:  Social History     Tobacco Use   • Smoking status: Never   • Smokeless tobacco: Never   Vaping " Use   • Vaping Use: Never used   Substance Use Topics   • Alcohol use: Not Currently   • Drug use: No       Family History:  Family History   Problem Relation Age of Onset   • Depression Sister    • Suicidality Other         suicided   • Alcohol abuse Other    • Alcohol abuse Daughter    • Depression Other        Medications:  (Not in a hospital admission)      Scheduled Meds:[START ON 12/10/2022] cefTRIAXone, 1 g, Intravenous, Q24H  enoxaparin, 40 mg, Subcutaneous, Daily  senna-docusate sodium, 2 tablet, Oral, BID  sodium chloride, 10 mL, Intravenous, Q12H      Continuous Infusions:   PRN Meds:.•  acetaminophen **OR** acetaminophen **OR** acetaminophen  •  aluminum-magnesium hydroxide-simethicone  •  senna-docusate sodium **AND** polyethylene glycol **AND** bisacodyl **AND** bisacodyl  •  dextrose  •  dextrose  •  glucagon (human recombinant)  •  magnesium sulfate **OR** magnesium sulfate in D5W 1g/100mL (PREMIX)  •  melatonin  •  nitroglycerin  •  ondansetron **OR** ondansetron  •  potassium chloride  •  potassium chloride  •  potassium chloride  •  [COMPLETED] Insert Peripheral IV **AND** sodium chloride  •  sodium chloride  •  sodium chloride    ALLERGIES:  Morphine    ROS:  The following systems were reviewed   Constitution:  No fevers, chills, no unintentional weight loss  Skin: no rash, no jaundice  Eyes:  No blurry vision, no eye pain  HENT:  No change in hearing or smell  Resp:  No dyspnea or cough  CV:  No chest pain or palpitations  :  No dysuria, hematuria  Musculoskeletal:  No leg cramps or arthralgias  Neuro:  No tremor, no numbness  Psych:  No depression or confusion, anxiety, fear of eating    Objective Resting in bed, no acute distress, no family present    Vital Signs:   Vitals:    12/09/22 0744 12/09/22 0759 12/09/22 0930 12/09/22 1101   BP: 136/75  142/75 162/77   BP Location:   Left arm Left arm   Patient Position:   Lying Lying   Pulse: 69 73 70 70   Resp:   18 17   Temp:       TempSrc:        SpO2: 94% 95% 95% 95%   Weight:       Height:           Physical Exam:       General Appearance:    Awake and alert, in no acute distress   Head:    Normocephalic, without obvious abnormality, atraumatic   Throat:   No oral lesions, no thrush, oral mucosa moist   Lungs:     Respirations regular, even and unlabored   Chest Wall:    No abnormalities observed   Abdomen:     Soft, non-tender, nondistended   Rectal:     Deferred   Extremities:   Moves all extremities,  no cyanosis       Skin:   No rash, no jaundice, normal palpation       Neurologic:   Cranial nerves 2 - 12 grossly intact, tremors       Results Review:   I reviewed the patient's labs and imaging.  CBC    Results from last 7 days   Lab Units 12/09/22  0413 12/02/22  2304   WBC 10*3/mm3 9.70 13.45*   HEMOGLOBIN g/dL 12.1 13.0   PLATELETS 10*3/mm3 263 296     CMP   Results from last 7 days   Lab Units 12/09/22  0413 12/02/22  2304   SODIUM mmol/L 137 135*   POTASSIUM mmol/L 2.6* 3.1*   CHLORIDE mmol/L 99 102   CO2 mmol/L 23.0 19.2*   BUN mg/dL 16 24*   CREATININE mg/dL 0.99 1.06*   GLUCOSE mg/dL 65 385*   ALBUMIN g/dL 3.40* 4.15   BILIRUBIN mg/dL 0.5 0.7   ALK PHOS U/L 83 121*   AST (SGOT) U/L 17 41*   ALT (SGPT) U/L 15 49*   MAGNESIUM mg/dL 1.6  --    LIPASE U/L 169*  --      Cr Clearance Estimated Creatinine Clearance: 42.7 mL/min (by C-G formula based on SCr of 0.99 mg/dL).  Coag     HbA1C   Lab Results   Component Value Date    HGBA1C 7.0 (H) 10/07/2022    HGBA1C 7.1 (H) 09/14/2022    HGBA1C 8.0 (H) 08/08/2022     Blood Glucose   Glucose   Date/Time Value Ref Range Status   12/09/2022 0942 89 70 - 105 mg/dL Final     Comment:     Serial Number: [id number]:  [id number]   12/09/2022 0645 50 (L) 70 - 105 mg/dL Final     Comment:     Serial Number: [id number]:  [id number]   12/09/2022 0542 76 70 - 105 mg/dL Final     Comment:     Serial Number: [id number]:  [id number]     Infection   Results from last 7 days   Lab Units 12/02/22  8477    URINECX  >100,000 CFU/mL Streptococcus agalactiae (Group B)*     UA    Results from last 7 days   Lab Units 12/09/22  0428 12/02/22  2304   NITRITE UA  Negative Negative   WBC UA /HPF Too Numerous to Count* Too Numerous to Count*   BACTERIA UA /HPF Trace* None Seen   SQUAM EPITHEL UA /HPF 0-2 0-2   URINECX   --  >100,000 CFU/mL Streptococcus agalactiae (Group B)*     Radiology(recent) US Abdomen Limited    Result Date: 12/9/2022   1. No evidence of cholelithiasis, cholecystitis, or biliary obstruction 2. Normal ultrasound appearance of the liver and right kidney  Electronically Signed By-Ole Alexis On:12/9/2022 7:47 AM This report was finalized on [id number] by  Ole Alexis, .         ASSESSMENT:  Altered bowel habits/constipation  Right-sided abdominal pain with abnormal CT of the gallbladder  UTI  Unintentional weight loss  Hypokalemia  UTI  DMII  Anxiety    PLAN:  Patient presents with constipation with need to disimpact herself often followed by diarrhea.  CT showed marked pericholecystic edema with concern for cholecystitis and she has been having intermittent right-sided abdominal pain, consider surgical evaluation.  She is afraid to eat as she is afraid of inability to defecate.  Suspect her anxiety is contributing to some of her GI symptoms.  Would recommend starting MiraLAX and Benefiber daily, adjust as needed, for bowel regimen. TSH recently normal.  Would recommend outpatient screening colonoscopy as well.  Currently on Rocephin for UTI.  Electrolyte replacement per primary.  Continue supportive care.    I discussed the patients findings and my recommendations with the patient.    We appreciate the referral    Electronically signed by PAM Smith, 12/09/22, 12:13 PM EST.

## 2022-12-10 LAB
ANION GAP SERPL CALCULATED.3IONS-SCNC: 9 MMOL/L (ref 5–15)
BACTERIA SPEC AEROBE CULT: NORMAL
BUN SERPL-MCNC: 10 MG/DL (ref 8–23)
BUN/CREAT SERPL: 11.8 (ref 7–25)
CALCIUM SPEC-SCNC: 8.6 MG/DL (ref 8.6–10.5)
CHLORIDE SERPL-SCNC: 103 MMOL/L (ref 98–107)
CO2 SERPL-SCNC: 26 MMOL/L (ref 22–29)
CREAT SERPL-MCNC: 0.85 MG/DL (ref 0.57–1)
EGFRCR SERPLBLD CKD-EPI 2021: 70.7 ML/MIN/1.73
GLUCOSE BLDC GLUCOMTR-MCNC: 129 MG/DL (ref 70–105)
GLUCOSE BLDC GLUCOMTR-MCNC: 168 MG/DL (ref 70–105)
GLUCOSE BLDC GLUCOMTR-MCNC: 187 MG/DL (ref 70–105)
GLUCOSE BLDC GLUCOMTR-MCNC: 192 MG/DL (ref 70–105)
GLUCOSE BLDC GLUCOMTR-MCNC: 235 MG/DL (ref 70–105)
GLUCOSE SERPL-MCNC: 161 MG/DL (ref 65–99)
MAGNESIUM SERPL-MCNC: 1.8 MG/DL (ref 1.6–2.4)
MAGNESIUM SERPL-MCNC: 1.8 MG/DL (ref 1.6–2.4)
POTASSIUM SERPL-SCNC: 3.3 MMOL/L (ref 3.5–5.2)
SODIUM SERPL-SCNC: 138 MMOL/L (ref 136–145)

## 2022-12-10 PROCEDURE — 82962 GLUCOSE BLOOD TEST: CPT

## 2022-12-10 PROCEDURE — 25010000002 CEFTRIAXONE PER 250 MG: Performed by: PHYSICIAN ASSISTANT

## 2022-12-10 PROCEDURE — 25010000002 ENOXAPARIN PER 10 MG: Performed by: PHYSICIAN ASSISTANT

## 2022-12-10 PROCEDURE — 83036 HEMOGLOBIN GLYCOSYLATED A1C: CPT | Performed by: INTERNAL MEDICINE

## 2022-12-10 PROCEDURE — G0378 HOSPITAL OBSERVATION PER HR: HCPCS

## 2022-12-10 PROCEDURE — 63710000001 INSULIN LISPRO (HUMAN) PER 5 UNITS: Performed by: INTERNAL MEDICINE

## 2022-12-10 PROCEDURE — 83735 ASSAY OF MAGNESIUM: CPT | Performed by: INTERNAL MEDICINE

## 2022-12-10 PROCEDURE — 80053 COMPREHEN METABOLIC PANEL: CPT | Performed by: INTERNAL MEDICINE

## 2022-12-10 PROCEDURE — 96372 THER/PROPH/DIAG INJ SC/IM: CPT

## 2022-12-10 RX ORDER — INSULIN LISPRO 100 [IU]/ML
2-7 INJECTION, SOLUTION INTRAVENOUS; SUBCUTANEOUS
Status: DISCONTINUED | OUTPATIENT
Start: 2022-12-10 | End: 2022-12-11 | Stop reason: HOSPADM

## 2022-12-10 RX ORDER — DEXTROSE MONOHYDRATE 25 G/50ML
25 INJECTION, SOLUTION INTRAVENOUS
Status: DISCONTINUED | OUTPATIENT
Start: 2022-12-10 | End: 2022-12-11 | Stop reason: HOSPADM

## 2022-12-10 RX ORDER — FLUDROCORTISONE ACETATE 0.1 MG/1
0.1 TABLET ORAL EVERY MORNING
Status: DISCONTINUED | OUTPATIENT
Start: 2022-12-11 | End: 2022-12-11 | Stop reason: HOSPADM

## 2022-12-10 RX ORDER — LOSARTAN POTASSIUM 25 MG/1
25 TABLET ORAL
Status: DISCONTINUED | OUTPATIENT
Start: 2022-12-10 | End: 2022-12-11 | Stop reason: HOSPADM

## 2022-12-10 RX ORDER — ATORVASTATIN CALCIUM 40 MG/1
40 TABLET, FILM COATED ORAL NIGHTLY
Status: DISCONTINUED | OUTPATIENT
Start: 2022-12-10 | End: 2022-12-11 | Stop reason: HOSPADM

## 2022-12-10 RX ORDER — ALPRAZOLAM 1 MG/1
1 TABLET ORAL 3 TIMES DAILY PRN
Status: DISCONTINUED | OUTPATIENT
Start: 2022-12-10 | End: 2022-12-11 | Stop reason: HOSPADM

## 2022-12-10 RX ORDER — SERTRALINE HYDROCHLORIDE 25 MG/1
25 TABLET, FILM COATED ORAL DAILY
Status: DISCONTINUED | OUTPATIENT
Start: 2022-12-10 | End: 2022-12-11 | Stop reason: HOSPADM

## 2022-12-10 RX ORDER — OLANZAPINE 10 MG/2ML
1 INJECTION, POWDER, LYOPHILIZED, FOR SOLUTION INTRAMUSCULAR
Status: DISCONTINUED | OUTPATIENT
Start: 2022-12-10 | End: 2022-12-11 | Stop reason: HOSPADM

## 2022-12-10 RX ORDER — METOPROLOL SUCCINATE 50 MG/1
50 TABLET, EXTENDED RELEASE ORAL EVERY MORNING
Status: DISCONTINUED | OUTPATIENT
Start: 2022-12-11 | End: 2022-12-11 | Stop reason: HOSPADM

## 2022-12-10 RX ORDER — NICOTINE POLACRILEX 4 MG
15 LOZENGE BUCCAL
Status: DISCONTINUED | OUTPATIENT
Start: 2022-12-10 | End: 2022-12-11 | Stop reason: HOSPADM

## 2022-12-10 RX ADMIN — TICAGRELOR 90 MG: 90 TABLET ORAL at 22:32

## 2022-12-10 RX ADMIN — POTASSIUM CHLORIDE 40 MEQ: 1500 TABLET, EXTENDED RELEASE ORAL at 05:46

## 2022-12-10 RX ADMIN — SENNOSIDES AND DOCUSATE SODIUM 2 TABLET: 50; 8.6 TABLET ORAL at 22:32

## 2022-12-10 RX ADMIN — Medication 10 ML: at 09:38

## 2022-12-10 RX ADMIN — CEFTRIAXONE 1 G: 1 INJECTION, POWDER, FOR SOLUTION INTRAMUSCULAR; INTRAVENOUS at 09:38

## 2022-12-10 RX ADMIN — LOSARTAN POTASSIUM 25 MG: 25 TABLET, FILM COATED ORAL at 14:52

## 2022-12-10 RX ADMIN — ALPRAZOLAM 1 MG: 1 TABLET ORAL at 17:49

## 2022-12-10 RX ADMIN — Medication 5 MG: at 22:46

## 2022-12-10 RX ADMIN — ENOXAPARIN SODIUM 40 MG: 100 INJECTION SUBCUTANEOUS at 16:57

## 2022-12-10 RX ADMIN — POLYETHYLENE GLYCOL 3350 17 G: 17 POWDER, FOR SOLUTION ORAL at 09:38

## 2022-12-10 RX ADMIN — SERTRALINE 25 MG: 25 TABLET, FILM COATED ORAL at 14:52

## 2022-12-10 RX ADMIN — SENNOSIDES AND DOCUSATE SODIUM 2 TABLET: 50; 8.6 TABLET ORAL at 09:38

## 2022-12-10 RX ADMIN — INSULIN LISPRO 3 UNITS: 100 INJECTION, SOLUTION INTRAVENOUS; SUBCUTANEOUS at 17:49

## 2022-12-10 RX ADMIN — Medication 10 ML: at 22:33

## 2022-12-10 RX ADMIN — ATORVASTATIN CALCIUM 40 MG: 40 TABLET, FILM COATED ORAL at 22:32

## 2022-12-10 NOTE — PROGRESS NOTES
[facility] Services Daily Progress Note    Patient Name: Nadege Barrow  : 1945  MRN: [id number]  Primary Care Physician:  Dave Esparza  Date of admission: 2022      Subjective      Chief Complaint: Generalized body weakness    Patient Reports    12/10/2022  Patient seen and examined  Complain of generalized body weakness  Also complaining of right upper quadrant abdominal pain    Review of Systems   Constitutional: Negative for chills and fever.   HENT: Negative for congestion.    Eyes: Negative for blurred vision.   Cardiovascular: Negative for chest pain.   Respiratory: Negative for shortness of breath.    Endocrine: Negative for cold intolerance and heat intolerance.   Musculoskeletal: Positive for arthritis.   Gastrointestinal: Positive for abdominal pain. Negative for nausea and vomiting.   Genitourinary: Negative for flank pain.   Neurological: Positive for weakness.   Psychiatric/Behavioral: Negative for altered mental status.        Objective      Vitals:   Temp:  [97.5 °F (36.4 °C)-97.9 °F (36.6 °C)] 97.7 °F (36.5 °C)  Heart Rate:  [68-79] 79  Resp:  [16-17] 16  BP: (114-164)/(61-87) 129/69    Physical Exam  Vitals reviewed.   Constitutional:       General: She is not in acute distress.  HENT:      Head: Normocephalic.      Nose: Nose normal.      Mouth/Throat:      Mouth: Mucous membranes are moist.   Eyes:      Extraocular Movements: Extraocular movements intact.      Conjunctiva/sclera: Conjunctivae normal.      Pupils: Pupils are equal, round, and reactive to light.   Cardiovascular:      Rate and Rhythm: Normal rate and regular rhythm.   Pulmonary:      Effort: No respiratory distress.      Breath sounds: Normal breath sounds.   Abdominal:      General: Bowel sounds are normal. There is no distension.      Palpations: Abdomen is soft.      Tenderness: There is no abdominal tenderness (No appreciable tenderness to deep palpation).   Musculoskeletal:      Cervical  back: Neck supple.      Comments: Degenerative changes   Skin:     General: Skin is warm and dry.   Neurological:      Mental Status: She is alert and oriented to person, place, and time.   Psychiatric:         Mood and Affect: Mood normal.            Result Review    Result Review:  I have personally reviewed the results from the time of this admission to 12/10/2022 13:14 EST and agree with these findings:  [x]  Laboratory  [x]  Microbiology  [x]  Radiology  []  EKG/Telemetry   []  Cardiology/Vascular   []  Pathology  [x]  Old records  []  Other:  Most notable findings include:           Assessment & Plan      Brief Patient Summary:    77-year-old female with medical history significant for diabetes mellitus type 2, hypertension and generalized anxiety disorder.  She is a resident of a local assisted living facility  Presented to [facility] ED on 12/9/2022 with complaints of generally feeling unwell for the last couple of days.  Related having abdominal cramps, constipation and generalized body weakness.    Of note is that patient has constipation predominant bowel habits.  Relates being constipated for 3 to 4 days after which she will have a blowouts pattern with multiple bowel movements and crampy abdominal pain.  Due to bowel pattern, patient is afraid to eat.    After evaluating in the ED, GI was consulted and patient admitted for further care.    atorvastatin, 40 mg, Oral, Nightly  cefTRIAXone, 1 g, Intravenous, Q24H  [START ON 12/11/2022] empagliflozin, 10 mg, Oral, QAM  enoxaparin, 40 mg, Subcutaneous, Daily  [START ON 12/11/2022] fludrocortisone, 0.1 mg, Oral, QAM  insulin lispro, 2-7 Units, Subcutaneous, TID With Meals  Insulin NPH Isophane & Regular, 10 Units, Subcutaneous, BID With Meals  losartan, 25 mg, Oral, Q24H  [START ON 12/11/2022] metoprolol succinate XL, 50 mg, Oral, QAM  polyethylene glycol, 17 g, Oral, Daily  senna-docusate sodium, 2 tablet, Oral, BID  sertraline, 25 mg, Oral,  Daily  sodium chloride, 10 mL, Intravenous, Q12H  ticagrelor, 90 mg, Oral, BID             Active Hospital Problems:  Active Hospital Problems    Diagnosis    • **Abdominal pain, unspecified abdominal location      Plan:       Hypokalemia  Serum magnesium okay  On replacement protocol  Monitor    Possible UTI  On empirical antibiotics with Rocephin while awaiting final urine culture    Altered bowel movements  Constipation  Right-sided abdominal pain  CT abdomen 12/2/2022- marked pericholecystic edema-possible cholecystitis  Abdominal ultrasound 12/9/2022-no evidence of cholecystitis, cholelithiasis no biliary obstruction  Patient seen by GI who recommended bowel regimen with MiraLAX and Benefiber   Linzess to be added if needed  Per GI if bowel movement pattern becomes regular and patient continues to have symptom, HIDA scan and surgical evaluation of gallbladder would be considered.  Outpatient screening colonoscopy at discharge  Supportive care    Diabetes mellitus type 2 with hyperglycemia  Check A1c  Resume home insulin regimen- NPH twice daily  Hold Jardiance  Monitor blood sugar and adjust insulin as needed    Hyperlipidemia-on statin    Hypertension  CAD-no active angina  Blood pressure controlled  On losartan, metoprolol, statin and Brilinta      Anxiety/depression  On Zoloft  Hold home medication of Prozac for now        DVT prophylaxis:  Medical DVT prophylaxis orders are present.    CODE STATUS:    Level Of Support Discussed With: Patient  Code Status (Patient has no pulse and is not breathing): CPR (Attempt to Resuscitate)  Medical Interventions (Patient has pulse or is breathing): Full Support      Disposition: Pending clinical progress    This patient has been examined with appropriate PPE. 12/10/22      Electronically signed by Kraig Luis MD, 12/10/22, 13:14 EST.  [facility] Hospitalist Team

## 2022-12-10 NOTE — PLAN OF CARE
Goal Outcome Evaluation:      Pt is able to make needs known. No c/o pain throughout shift. Pt is ambulatory with stand by assist. Education is complete, call light is in reach, and no other needs at this time. Will continue to monitor.

## 2022-12-10 NOTE — PLAN OF CARE
Goal Outcome Evaluation:      No complaints throughout the shift. Potassium this morning is slight low, replaced per protocol. Call light is within reach and is able to make his needs known.

## 2022-12-11 ENCOUNTER — READMISSION MANAGEMENT (OUTPATIENT)
Dept: CALL CENTER | Facility: HOSPITAL | Age: 77
End: 2022-12-11

## 2022-12-11 VITALS
BODY MASS INDEX: 24.8 KG/M2 | DIASTOLIC BLOOD PRESSURE: 92 MMHG | TEMPERATURE: 97.5 F | OXYGEN SATURATION: 96 % | RESPIRATION RATE: 16 BRPM | SYSTOLIC BLOOD PRESSURE: 151 MMHG | WEIGHT: 140 LBS | HEART RATE: 81 BPM | HEIGHT: 63 IN

## 2022-12-11 LAB
ALBUMIN SERPL-MCNC: 3 G/DL (ref 3.5–5.2)
ALBUMIN/GLOB SERPL: 1.2 G/DL
ALP SERPL-CCNC: 91 U/L (ref 39–117)
ALT SERPL W P-5'-P-CCNC: 10 U/L (ref 1–33)
ANION GAP SERPL CALCULATED.3IONS-SCNC: 12 MMOL/L (ref 5–15)
AST SERPL-CCNC: 16 U/L (ref 1–32)
BASOPHILS # BLD AUTO: 0 10*3/MM3 (ref 0–0.2)
BASOPHILS NFR BLD AUTO: 0.4 % (ref 0–1.5)
BILIRUB SERPL-MCNC: 0.3 MG/DL (ref 0–1.2)
BUN SERPL-MCNC: 13 MG/DL (ref 8–23)
BUN/CREAT SERPL: 14.3 (ref 7–25)
CALCIUM SPEC-SCNC: 8.2 MG/DL (ref 8.6–10.5)
CHLORIDE SERPL-SCNC: 102 MMOL/L (ref 98–107)
CO2 SERPL-SCNC: 21 MMOL/L (ref 22–29)
CREAT SERPL-MCNC: 0.91 MG/DL (ref 0.57–1)
DEPRECATED RDW RBC AUTO: 50.8 FL (ref 37–54)
EGFRCR SERPLBLD CKD-EPI 2021: 65.1 ML/MIN/1.73
EOSINOPHIL # BLD AUTO: 0.1 10*3/MM3 (ref 0–0.4)
EOSINOPHIL NFR BLD AUTO: 1.8 % (ref 0.3–6.2)
ERYTHROCYTE [DISTWIDTH] IN BLOOD BY AUTOMATED COUNT: 16.8 % (ref 12.3–15.4)
GLOBULIN UR ELPH-MCNC: 2.6 GM/DL
GLUCOSE BLDC GLUCOMTR-MCNC: 116 MG/DL (ref 70–105)
GLUCOSE BLDC GLUCOMTR-MCNC: 199 MG/DL (ref 70–105)
GLUCOSE BLDC GLUCOMTR-MCNC: 75 MG/DL (ref 70–105)
GLUCOSE SERPL-MCNC: 239 MG/DL (ref 65–99)
HCT VFR BLD AUTO: 34.8 % (ref 34–46.6)
HGB BLD-MCNC: 11 G/DL (ref 12–15.9)
LYMPHOCYTES # BLD AUTO: 2.4 10*3/MM3 (ref 0.7–3.1)
LYMPHOCYTES NFR BLD AUTO: 35.2 % (ref 19.6–45.3)
MCH RBC QN AUTO: 27.2 PG (ref 26.6–33)
MCHC RBC AUTO-ENTMCNC: 31.7 G/DL (ref 31.5–35.7)
MCV RBC AUTO: 85.8 FL (ref 79–97)
MONOCYTES # BLD AUTO: 0.9 10*3/MM3 (ref 0.1–0.9)
MONOCYTES NFR BLD AUTO: 13.2 % (ref 5–12)
NEUTROPHILS NFR BLD AUTO: 3.3 10*3/MM3 (ref 1.7–7)
NEUTROPHILS NFR BLD AUTO: 49.4 % (ref 42.7–76)
NRBC BLD AUTO-RTO: 0.1 /100 WBC (ref 0–0.2)
PLATELET # BLD AUTO: 252 10*3/MM3 (ref 140–450)
PMV BLD AUTO: 9.2 FL (ref 6–12)
POTASSIUM SERPL-SCNC: 3.9 MMOL/L (ref 3.5–5.2)
PROT SERPL-MCNC: 5.6 G/DL (ref 6–8.5)
RBC # BLD AUTO: 4.06 10*6/MM3 (ref 3.77–5.28)
SODIUM SERPL-SCNC: 135 MMOL/L (ref 136–145)
WBC NRBC COR # BLD: 6.7 10*3/MM3 (ref 3.4–10.8)

## 2022-12-11 PROCEDURE — 82962 GLUCOSE BLOOD TEST: CPT

## 2022-12-11 PROCEDURE — 25010000002 CEFTRIAXONE PER 250 MG: Performed by: PHYSICIAN ASSISTANT

## 2022-12-11 PROCEDURE — G0378 HOSPITAL OBSERVATION PER HR: HCPCS

## 2022-12-11 PROCEDURE — 63710000001 INSULIN ISOPHANE & REGULAR PER 5 UNITS: Performed by: INTERNAL MEDICINE

## 2022-12-11 PROCEDURE — 85025 COMPLETE CBC W/AUTO DIFF WBC: CPT | Performed by: INTERNAL MEDICINE

## 2022-12-11 PROCEDURE — 63710000001 INSULIN LISPRO (HUMAN) PER 5 UNITS: Performed by: INTERNAL MEDICINE

## 2022-12-11 PROCEDURE — 97161 PT EVAL LOW COMPLEX 20 MIN: CPT

## 2022-12-11 RX ORDER — POLYETHYLENE GLYCOL 3350 17 G/17G
17 POWDER, FOR SOLUTION ORAL DAILY
Qty: 30 PACKET | Refills: 0 | Status: SHIPPED | OUTPATIENT
Start: 2022-12-12 | End: 2023-01-12 | Stop reason: SDUPTHER

## 2022-12-11 RX ADMIN — Medication 10 ML: at 09:37

## 2022-12-11 RX ADMIN — POLYETHYLENE GLYCOL 3350 17 G: 17 POWDER, FOR SOLUTION ORAL at 09:36

## 2022-12-11 RX ADMIN — LOSARTAN POTASSIUM 25 MG: 25 TABLET, FILM COATED ORAL at 09:36

## 2022-12-11 RX ADMIN — CEFTRIAXONE 1 G: 1 INJECTION, POWDER, FOR SOLUTION INTRAMUSCULAR; INTRAVENOUS at 09:36

## 2022-12-11 RX ADMIN — TICAGRELOR 90 MG: 90 TABLET ORAL at 09:36

## 2022-12-11 RX ADMIN — SERTRALINE 25 MG: 25 TABLET, FILM COATED ORAL at 09:36

## 2022-12-11 RX ADMIN — FLUDROCORTISONE ACETATE 0.1 MG: 0.1 TABLET ORAL at 07:22

## 2022-12-11 RX ADMIN — INSULIN HUMAN 10 UNITS: 100 INJECTION, SUSPENSION SUBCUTANEOUS at 07:21

## 2022-12-11 RX ADMIN — SENNOSIDES AND DOCUSATE SODIUM 2 TABLET: 50; 8.6 TABLET ORAL at 09:36

## 2022-12-11 RX ADMIN — EMPAGLIFLOZIN 10 MG: 10 TABLET, FILM COATED ORAL at 07:21

## 2022-12-11 RX ADMIN — INSULIN LISPRO 2 UNITS: 100 INJECTION, SOLUTION INTRAVENOUS; SUBCUTANEOUS at 07:21

## 2022-12-11 RX ADMIN — METOPROLOL SUCCINATE 50 MG: 50 TABLET, EXTENDED RELEASE ORAL at 07:21

## 2022-12-11 NOTE — PLAN OF CARE
Goal Outcome Evaluation:      Complaints of minimal abdominal pain, but required no pain medication. Bowel sound present. No other complaints. Call light is within reach.

## 2022-12-11 NOTE — THERAPY EVALUATION
"Patient Name: Nadege Barrow  : 1945    MRN: [id number]                              Today's Date: 2022       Admit Date: 2022    Visit Dx:     ICD-10-CM ICD-9-CM   1. Abdominal pain, unspecified abdominal location  R10.9 789.00   2. Urinary tract infection without hematuria, site unspecified  N39.0 599.0   3. Hypokalemia  E87.6 276.8   4. Hypoglycemia  E16.2 251.2   5. Elevated lipase  R74.8 790.5     Patient Active Problem List   Diagnosis   • Chest pain on breathing   • Hypertension   • Acute respiratory failure with hypoxia ([facility])   • Anemia   • Atherosclerotic heart disease of native coronary artery without angina pectoris   • Back pain   • Cardiomyopathy ([facility])   • Carotid artery disease ([facility])   • Cellulitis of foot   • Acute on chronic congestive heart failure ([facility])   • Major depressive disorder, recurrent episode, moderate ([facility])   • Senile dementia, delirium, with behavioral disturbance   • Depression, unspecified   • Disabling essential tremor   • Fatigue   • Hyperlipidemia, mixed   • Peripheral vision loss, bilateral   • Retinopathy, bilateral   • S/P right coronary artery (RCA) stent placement   • Ischemic cardiomyopathy   • Elevated LFTs   • Generalized anxiety disorder   • Benzodiazepine dependence, continuous ([facility])   • Type 2 diabetes mellitus ([facility])   • Chest pain, unspecified type   • Weakness of both legs   • Hypokalemia   • Lumbar back pain with radiculopathy affecting lower extremity   • Spinal stenosis of lumbar region   • Acute congestive heart failure, unspecified heart failure type ([facility])   • COVID-19 virus detected   • Cytokine release syndrome, grade 1   • Diarrhea, unspecified type   • Abdominal pain, unspecified abdominal location     Past Medical History:   Diagnosis Date   • Anxiety    • CHF (congestive heart failure) ([facility])    • Depression    • Diabetes mellitus ([facility])    • Hyperlipidemia    • Hypertension    • Panic disorder     \"panic attacks\"   • Tremors of nervous system  " "   \"essential tremors\"     Past Surgical History:   Procedure Laterality Date   • CORONARY ANGIOPLASTY WITH STENT PLACEMENT        General Information     Row Name 12/11/22 1250          Physical Therapy Time and Intention    Document Type evaluation  -     Mode of Treatment physical therapy  -     Row Name 12/11/22 1250          General Information    Patient Profile Reviewed yes  -     Prior Level of Function independent:;ADL's;all household mobility;community mobility  uses rollator due to L knee pain  -     Existing Precautions/Restrictions no known precautions/restrictions  -     Barriers to Rehab none identified  -     Row Name 12/11/22 1250          Living Environment    People in Home child(kimberley), adult  -     Row Name 12/11/22 1250          Safety Issues, Functional Mobility    Impairments Affecting Function (Mobility) balance  -           User Key  (r) = Recorded By, (t) = Taken By, (c) = Cosigned By    Initials Name Provider Type     Shell Livingston, PT Physical Therapist               Mobility     Row Name 12/11/22 1250          Bed Mobility    Bed Mobility bed mobility (all) activities  -     All Activities, [place] (Bed Mobility) independent  -     Row Name 12/11/22 1250          Bed-Chair Transfer    Bed-Chair [place] (Transfers) standby assist  -     Row Name 12/11/22 1250          Sit-Stand Transfer    Sit-Stand [place] (Transfers) independent  -     Row Name 12/11/22 1250          Gait/Stairs (Locomotion)    [place] Level (Gait) contact guard  -     Distance in Feet (Gait) 150'  -     Comment, (Gait/Stairs) initially pt with mild LOB with scissoring gait and CGA to regain balance.  Pt did not use AD as she does not have her rollator here and does not like using fixed wheel RW.  did wll initially with HHA and then supervision only as she had been up longer.  -           User Key  (r) = Recorded By, (t) = Taken By, (c) = Cosigned By    Initials Name " Provider Type    Shell Terry PT Physical Therapist               Obj/Interventions     Row Name 12/11/22 1257          Range of Motion Comprehensive    General Range of Motion bilateral upper extremity ROM WFL;bilateral lower extremity ROM WFL  -JH     Row Name 12/11/22 1257          Strength Comprehensive (MMT)    General Manual Muscle Testing (MMT) Assessment no strength deficits identified  -[facility] 12/11/22 1257          Balance    Balance Assessment sitting static balance;sitting dynamic balance;standing static balance;standing dynamic balance  -     Static Sitting Balance independent  -     Dynamic Sitting Balance independent  -     Static Standing Balance independent  -     Dynamic Standing Balance standby assist  -[facility] 12/11/22 1257          Sensory Assessment (Somatosensory)    Sensory Assessment (Somatosensory) sensation intact  -           User Key  (r) = Recorded By, (t) = Taken By, (c) = Cosigned By    Initials Name Provider Type    Shell Terry PT Physical Therapist               Goals/Plan    No documentation.                Clinical Impression     Row Name 12/11/22 1258          Pain    Pretreatment Pain Rating 0/10 - no pain  -     Posttreatment Pain Rating 0/10 - no pain  -J[facility]     Row Name 12/11/22 1258          Plan of Care Review    Plan of Care Reviewed With patient  -     Outcome Evaluation 78 yo female admitted with weakness and generalized abd pain.  Pt lives with her daughter and uses a rollator as needed due to knee OA.  Pt feels much better today, eager to home reporting she has an ortho MD appt 12/12 for and injection.  Pt did well with mobility, safe for d/c home with no further therpay needs.  -[facility] 12/11/22 125          Therapy Assessment/Plan (PT)    Criteria for Skilled Interventions Met (PT) no;does not meet criteria for skilled intervention  -     Therapy Frequency (PT) evaluation only  -J[facility]     Row Name 12/11/22 1257           Vital Signs    O2 Delivery Pre Treatment room air  -     O2 Delivery Intra Treatment room air  -     O2 Delivery Post Treatment room air  -     Row Name 12/11/22 1258          Positioning and Restraints    Pre-Treatment Position in bed  -     Post Treatment Position chair  -     In Chair notified nsg;call light within reach;encouraged to call for assist  -           User Key  (r) = Recorded By, (t) = Taken By, (c) = Cosigned By    Initials Name Provider Type    Shell Terry, PT Physical Therapist               Outcome Measures     Row Name 12/11/22 0801          How much help from another person do you currently need...    Turning from your back to your side while in flat bed without using bedrails? 4  -MS     Moving from lying on back to sitting on the side of a flat bed without bedrails? 4  -MS     Moving to and from a bed to a chair (including a wheelchair)? 3  -MS     Standing up from a chair using your arms (e.g., wheelchair, bedside chair)? 3  -MS     Climbing 3-5 steps with a railing? 3  -MS     To walk in hospital room? 3  -MS     AM-PAC 6 Clicks Score (PT) 20  -MS     Highest level of mobility 6 --> Walked 10 steps or more  -MS           User Key  (r) = Recorded By, (t) = Taken By, (c) = Cosigned By    Initials Name Provider Type    Marybel Trujillo LPN Licensed Nurse                             Physical Therapy Education     Title: PT OT SLP Therapies (Resolved)     Topic: Physical Therapy (Resolved)     Point: Mobility training (Resolved)     Learner Progress:  Not documented in this visit.          Point: Home exercise program (Resolved)     Learner Progress:  Not documented in this visit.          Point: Body mechanics (Resolved)     Learner Progress:  Not documented in this visit.          Point: Precautions (Resolved)     Learner Progress:  Not documented in this visit.                          PT Recommendation and Plan     Plan of Care Reviewed With: patient  Outcome Evaluation: 77  yo female admitted with weakness and generalized abd pain.  Pt lives with her daughter and uses a rollator as needed due to knee OA.  Pt feels much better today, eager to home reporting she has an ortho MD appt 12/12 for and injection.  Pt did well with mobility, safe for d/c home with no further therpay needs.     Time Calculation:    PT Charges     Row Name 12/11/22 1300             Time Calculation    Start Time 0904  -      Stop Time 0916  -      Time Calculation (min) 12 min  -      PT Received On 12/11/22  -         Time Calculation- PT    Total Timed Code Minutes- PT 0 minute(s)  -            User Key  (r) = Recorded By, (t) = Taken By, (c) = Cosigned By    Initials Name Provider Type    Shell Terry PT Physical Therapist              Therapy Charges for Today     Code Description Service Date Service Provider Modifiers Qty    01839030136 HC PT EVAL LOW COMPLEXITY 3 12/11/2022 Shell Livingston PT GP 1          PT G-Codes  AM-PAC 6 Clicks Score (PT): 20  PT Discharge Summary  Anticipated Discharge Disposition (PT): home with assist    Shell Livingston PT  12/11/2022

## 2022-12-11 NOTE — PLAN OF CARE
Goal Outcome Evaluation:  Plan of Care Reviewed With: patient           Outcome Evaluation: 76 yo female admitted with weakness and generalized abd pain.  Pt lives with her daughter and uses a rollator as needed due to knee OA.  Pt feels much better today, eager to home reporting she has an ortho MD appt 12/12 for and injection.  Pt did well with mobility, safe for d/c home with no further therpay needs.

## 2022-12-11 NOTE — DISCHARGE SUMMARY
[facility] Medicine Services  DISCHARGE SUMMARY    Patient Name: Nadege Barrow  : 1945  MRN: [id number]    Date of Admission: 2022  Discharge Diagnosis:       Hypokalemia  Serum magnesium okay  Replaced     Possible UTI  On empirical antibiotics with Rocephin while awaiting final urine culture  Urine culture-no growth     Altered bowel movements  Constipation  Right-sided abdominal pain  CT abdomen 2022- marked pericholecystic edema-possible cholecystitis  Abdominal ultrasound 2022-no evidence of cholecystitis, cholelithiasis no biliary obstruction  Patient seen by GI who recommended bowel regimen with MiraLAX and Benefiber   Linzess to be added if needed  Per GI if bowel movement pattern becomes regular and patient continues to have symptom, HIDA scan and surgical evaluation of gallbladder would be considered.  Outpatient screening colonoscopy at discharge  Supportive care     Diabetes mellitus type 2 with hyperglycemia  A1c pending at discharge  Resume home insulin regimen- NPH twice daily  And Jardiance       Hyperlipidemia-on statin     Hypertension  CAD-no active angina  Blood pressure controlled  On losartan, metoprolol, statin and Brilinta.    Anxiety/depression  Continue home medication          Date of Discharge: 2022    Primary Care Physician: Dave Esparza      Presenting Problem:   Hypokalemia [E87.6]  Hypoglycemia [E16.2]  Elevated lipase [R74.8]  Abdominal pain, unspecified abdominal location [R10.9]  Urinary tract infection without hematuria, site unspecified [N39.0]    Active and Resolved Hospital Problems:  Active Hospital Problems    Diagnosis POA   • **Abdominal pain, unspecified abdominal location [R10.9] Yes      Resolved Hospital Problems   No resolved problems to display.         Hospital Course     Hospital Course:  77-year-old female with medical history significant for diabetes mellitus type 2, hypertension and generalized anxiety  disorder.  Presented to [facility] ED on 12/9/2022 with complaints of generally feeling unwell for the last couple of days.  Related having abdominal cramps, constipation and generalized body weakness.     Of note is that patient has constipation predominant bowel habits.  Relates being constipated for 3 to 4 days after which she will have a blowouts pattern with multiple bowel movements and crampy abdominal pain.  Due to bowel pattern, patient is afraid to eat.     After evaluating in the ED, GI was consulted and patient admitted for further care.    Conditions addressed while inpatient are as stated below    Hypokalemia  Serum magnesium okay  Replaced     Possible UTI  On empirical antibiotics with Rocephin while awaiting final urine culture  Urine culture-no growth     Altered bowel movements  Constipation  Right-sided abdominal pain  CT abdomen 12/2/2022- marked pericholecystic edema-possible cholecystitis  Abdominal ultrasound 12/9/2022-no evidence of cholecystitis, cholelithiasis no biliary obstruction  Patient seen by GI who recommended bowel regimen with MiraLAX and Benefiber   Linzess to be added if needed  Per GI if bowel movement pattern becomes regular and patient continues to have symptom, HIDA scan and surgical evaluation of gallbladder would be considered.  Outpatient screening colonoscopy at discharge  Supportive care     Diabetes mellitus type 2 with hyperglycemia  A1c pending at discharge  Resume home insulin regimen- NPH twice daily  And Jardiance       Hyperlipidemia-on statin     Hypertension  CAD-no active angina  Blood pressure controlled  On losartan, metoprolol, statin and Brilinta.    Anxiety/depression  Continue home medication    Condition at discharge-stable    DISCHARGE Follow Up Recommendations for labs and diagnostics:       Reasons For Change In Medications and Indications for New Medications:      Day of Discharge     Vital Signs:  Temp:  [97.5 °F (36.4 °C)-98 °F (36.7  °C)] 97.5 °F (36.4 °C)  Heart Rate:  [79-81] 81  Resp:  [16] 16  BP: (129-151)/(69-92) 151/92    Physical Exam:  Physical Exam  Vitals reviewed.   Constitutional:       General: She is not in acute distress.  HENT:      Head: Normocephalic.      Nose: Nose normal.      Mouth/Throat:      Mouth: Mucous membranes are moist.   Eyes:      Extraocular Movements: Extraocular movements intact.      Conjunctiva/sclera: Conjunctivae normal.      Pupils: Pupils are equal, round, and reactive to light.   Cardiovascular:      Rate and Rhythm: Normal rate and regular rhythm.   Pulmonary:      Effort: No respiratory distress.   Abdominal:      General: Bowel sounds are normal.      Palpations: Abdomen is soft.      Tenderness: There is no abdominal tenderness.   Musculoskeletal:         General: Normal range of motion.   Skin:     General: Skin is warm and dry.   Neurological:      Mental Status: She is alert and oriented to person, place, and time.   Psychiatric:         Mood and Affect: Mood normal.          Pertinent  and/or Most Recent Results     LAB RESULTS:      Lab 12/11/22  0316 12/09/22 2312 12/09/22  1036 12/09/22  0413   WBC 6.70 5.80  --  9.70   HEMOGLOBIN 11.0* 11.6*  --  12.1   HEMATOCRIT 34.8 35.1  --  36.8   PLATELETS 252 236  --  263   NEUTROS ABS 3.30 2.80  --  6.90   LYMPHS ABS 2.40 2.00  --  1.40   MONOS ABS 0.90 0.90  --  1.30*   EOS ABS 0.10 0.10  --  0.10   MCV 85.8 84.5  --  84.0   LACTATE  --   --  1.1  --          Lab 12/10/22  2325 12/10/22  1810 12/09/22  2312 12/09/22  0413   SODIUM 135*  --  138 137   POTASSIUM 3.9  --  3.3* 2.6*   CHLORIDE 102  --  103 99   CO2 21.0*  --  26.0 23.0   ANION GAP 12.0  --  9.0 15.0   BUN 13  --  10 16   CREATININE 0.91  --  0.85 0.99   EGFR 65.1  --  70.7 58.8*   GLUCOSE 239*  --  161* 65   CALCIUM 8.2*  --  8.6 8.3*   MAGNESIUM  --  1.8 1.8 1.6         Lab 12/10/22  2325 12/09/22  0413   TOTAL PROTEIN 5.6* 6.1   ALBUMIN 3.00* 3.40*   GLOBULIN 2.6 2.7   ALT (SGPT) 10  15   AST (SGOT) 16 17   BILIRUBIN 0.3 0.5   ALK PHOS 91 83   LIPASE  --  169*                     Brief Urine Lab Results  (Last result in the past 365 days)      Color   Clarity   Blood   Leuk Est   Nitrite   Protein   CREAT   Urine HCG        12/09/22 0428 Yellow   Cloudy   Moderate (2+)   Moderate (2+)   Negative   30 mg/dL (1+)               Microbiology Results (last 10 days)     Procedure Component Value - Date/Time    Blood Culture - Blood, Hand, Right [[id number]]  (Normal) Collected: 12/09/22 1038    Lab Status: Preliminary result Specimen: Blood from Hand, Right Updated: 12/11/22 1101     Blood Culture No growth at 2 days    Narrative:      Less than seven (7) mL's of blood was collected.  Insufficient quantity may yield false negative results.    Blood Culture - Blood, Hand, Left [[id number]]  (Normal) Collected: 12/09/22 1036    Lab Status: Preliminary result Specimen: Blood from Hand, Left Updated: 12/11/22 1101     Blood Culture No growth at 2 days    Narrative:      Less than seven (7) mL's of blood was collected.  Insufficient quantity may yield false negative results.    Urine Culture - Urine, Urine, Clean Catch [[id number]] Collected: 12/09/22 0428    Lab Status: Final result Specimen: Urine, Clean Catch Updated: 12/10/22 1326     Urine Culture <25,000 CFU/mL Mixed Delmis Isolated    Narrative:      Specimen contains mixed organisms of questionable pathogenicity suggestive of contamination. If symptoms persist, suggest recollection.  Colonization of the urinary tract without infection is common. Treatment is discouraged unless the patient is symptomatic, pregnant, or undergoing an invasive urologic procedure.    COVID-19 and FLU A/B PCR - Swab, Nasopharynx [[id number]]  (Normal) Collected: 12/02/22 2315    Lab Status: Final result Specimen: Swab from Nasopharynx Updated: 12/02/22 2337     COVID19 Not Detected     Influenza A PCR Not Detected     Influenza B PCR Not Detected    Narrative:      Fact sheet  for providers: https://www.fda.gov/media/320075/download    Fact sheet for patients: https://www.fda.gov/media/301062/download    Test performed by PCR.    Urine Culture - Urine, Urine, Clean Catch [[id number]]  (Abnormal) Collected: 12/02/22 2304    Lab Status: Final result Specimen: Urine, Clean Catch Updated: 12/04/22 1514     Urine Culture >100,000 CFU/mL Streptococcus agalactiae (Group B)     Comment:   This organism is considered to be universally susceptible to penicillin.  No further antibiotic testing will be performed.       Narrative:      Colonization of the urinary tract without infection is common. Treatment is discouraged unless the patient is symptomatic, pregnant, or undergoing an invasive urologic procedure.          CT Abdomen Pelvis With Contrast    Result Date: 12/3/2022  Impression: 1.  There is marked pericholecystic edema, new since prior exam suggestive of cholecystitis.  If there are additional signs or symptoms of cholecystitis consider ultrasound for confirmation. 2.  Fatty replacement of liver. Electronically signed by:  Kwesi Hernandez M.D.  12/2/2022 11:39 PM Mountain Time    XR Chest 1 View    Result Date: 12/3/2022  Impression: No active disease.  Electronically Signed By-Beau Blair MD On:12/3/2022 8:37 AM This report was finalized on [id number] by  Beau Blair MD.    US Abdomen Limited    Result Date: 12/9/2022  Impression:  1. No evidence of cholelithiasis, cholecystitis, or biliary obstruction 2. Normal ultrasound appearance of the liver and right kidney  Electronically Signed By-Ole Alexis On:12/9/2022 7:47 AM This report was finalized on [id number] by  Ole Alexis, .              Results for orders placed during the hospital encounter of 02/26/22    Adult Transthoracic Echo Complete w/ Color, Spectral and Contrast if necessary per protocol    Interpretation Summary  · Left ventricular ejection fraction appears to be 41 - 45%.  · The right ventricular cavity is  mildly dilated.  · Mild aortic valve stenosis is present.  · Moderate tricuspid valve regurgitation is present.  · No pericardial effusion noted      Labs Pending at Discharge:  Pending Labs     Order Current Status    Hemoglobin A1c In process    Blood Culture - Blood, Hand, Left Preliminary result    Blood Culture - Blood, Hand, Right Preliminary result          Procedures Performed           Consults:   Consults     Date and Time Order Name Status Description    12/9/2022 10:00 AM Inpatient Gastroenterology Consult Completed     12/9/2022  7:50 AM Hospitalist (on-call MD unless specified)              Discharge Details        Discharge Medications      New Medications      Instructions Start Date   polyethylene glycol 17 g packet  Commonly known as: MIRALAX   17 g, Oral, Daily   Start Date: December 12, 2022        Continue These Medications      Instructions Start Date   ALPRAZolam 1 MG tablet  Commonly known as: XANAX   1 mg, Oral, 3 Times Daily PRN      atorvastatin 40 MG tablet  Commonly known as: LIPITOR   40 mg, Oral, Nightly      fludrocortisone 0.1 MG tablet   0.1 mg, Oral, Every Morning      FLUoxetine 20 MG capsule  Commonly known as: PROzac   20 mg, Oral, Daily      furosemide 20 MG tablet  Commonly known as: LASIX   20 mg, Oral, Daily      Jardiance 10 MG tablet tablet  Generic drug: empagliflozin   1 tablet, Oral, Every Morning      losartan 25 MG tablet  Commonly known as: COZAAR   25 mg, Oral, Every 24 Hours Scheduled      metoprolol succinate XL 50 MG 24 hr tablet  Commonly known as: TOPROL-XL   50 mg, Oral, Every Morning, Hold for blood pressure less than 100 or pulse less than 60      nitroglycerin 0.4 MG SL tablet  Commonly known as: NITROSTAT   0.4 mg, Sublingual, Every 5 Minutes PRN, Take no more than 3 doses in 15 minutes.      NovoLIN 70/30 FlexPen (70-30) 100 UNIT/ML suspension pen-injector  Generic drug: Insulin NPH Isophane & Regular   20 Units, Subcutaneous, 2 Times Daily With Meals       potassium chloride 20 MEQ CR tablet  Commonly known as: K-DUR,KLOR-CON   1 tablet, Oral, Every Morning      sertraline 25 MG tablet  Commonly known as: Zoloft   25 mg, Oral, Daily      ticagrelor 90 MG tablet tablet  Commonly known as: BRILINTA   90 mg, Oral, 2 Times Daily         Stop These Medications    cephalexin 500 MG capsule  Commonly known as: KEFLEX            Allergies   Allergen Reactions   • Morphine Hallucinations         Discharge Disposition:  Home or Self Care    Diet:  Hospital:  Diet Order   Procedures   • Diet: Regular/House Diet; Texture: Regular Texture (IDDSI 7); Fluid Consistency: Thin (IDDSI 0)         Discharge Activity:   Activity Instructions     Gradually Increase Activity Until at Pre-Hospitalization Level              CODE STATUS:  Code Status and Medical Interventions:   Ordered at: 12/09/22 0956     Level Of Support Discussed With:    Patient     Code Status (Patient has no pulse and is not breathing):    CPR (Attempt to Resuscitate)     Medical Interventions (Patient has pulse or is breathing):    Full Support         Future Appointments   Date Time Provider Department Center   3/22/2023 11:15 AM Cristal Alvarez MD [facility]       Additional Instructions for the Follow-ups that You Need to Schedule     Discharge Follow-up with PCP   As directed       Currently Documented PCP:    Dave Esparza    PCP Phone Number:    [phone]     Follow Up Details: Follow-up with PCP as needed         Discharge Follow-up with Specified Provider: Follow-up with GI as needed   As directed      To: Follow-up with GI as needed               Time spent on Discharge including face to face service:  31 minutes    This patient has been examined with appropriate PPE . 12/11/22      Signature: Electronically signed by Kraig Luis MD, 12/11/22, 11:32 AM EST.

## 2022-12-12 LAB — HBA1C MFR BLD: 7.9 % (ref 3.5–5.6)

## 2022-12-12 NOTE — CASE MANAGEMENT/SOCIAL WORK
Case Management Discharge Note      Final Note: home         Selected Continued Care - Discharged on 12/11/2022 Admission date: 12/9/2022 - Discharge disposition: Home or Self Care                     Transportation Services  Private: Car    Final Discharge Disposition Code: 01 - home or self-care

## 2022-12-12 NOTE — OUTREACH NOTE
Prep Survey    Flowsheet Row Responses   [facility] patient discharged from? [facility]   Is LACE score < 7 ? No   Emergency Room discharge w/ pulse ox? No   Eligibility Readm Mgmt   Discharge diagnosis abdominal pain and Hypokalemia    Does the patient have one of the following disease processes/diagnoses(primary or secondary)? Other   Does the patient have Home health ordered? No   Is there a DME ordered? No   Prep survey completed? Yes          LAMBERTO FUNK - Registered Nurse

## 2022-12-14 LAB
BACTERIA SPEC AEROBE CULT: NORMAL
BACTERIA SPEC AEROBE CULT: NORMAL

## 2022-12-16 ENCOUNTER — READMISSION MANAGEMENT (OUTPATIENT)
Dept: CALL CENTER | Facility: HOSPITAL | Age: 77
End: 2022-12-16

## 2022-12-16 NOTE — OUTREACH NOTE
Medical Week 1 Survey    Flowsheet Row Responses   [facility] facility patient discharged from? [facility]   Does the patient have one of the following disease processes/diagnoses(primary or secondary)? Other   Week 1 attempt successful? No   Unsuccessful attempts Attempt 1          AIMEE Combs Registered Nurse

## 2022-12-19 ENCOUNTER — HOSPITAL ENCOUNTER (OUTPATIENT)
Facility: HOSPITAL | Age: 77
LOS: 1 days | Discharge: HOME HEALTH | End: 2022-12-22
Attending: EMERGENCY MEDICINE | Admitting: HOSPITALIST

## 2022-12-19 ENCOUNTER — APPOINTMENT (OUTPATIENT)
Dept: GENERAL RADIOLOGY | Facility: HOSPITAL | Age: 77
End: 2022-12-19

## 2022-12-19 ENCOUNTER — READMISSION MANAGEMENT (OUTPATIENT)
Dept: CALL CENTER | Facility: HOSPITAL | Age: 77
End: 2022-12-19

## 2022-12-19 ENCOUNTER — APPOINTMENT (OUTPATIENT)
Dept: ULTRASOUND IMAGING | Facility: HOSPITAL | Age: 77
End: 2022-12-19

## 2022-12-19 DIAGNOSIS — K81.0 ACUTE CHOLECYSTITIS: Primary | ICD-10-CM

## 2022-12-19 DIAGNOSIS — R10.11 RIGHT UPPER QUADRANT ABDOMINAL PAIN: ICD-10-CM

## 2022-12-19 DIAGNOSIS — R10.9 ABDOMINAL PAIN, UNSPECIFIED ABDOMINAL LOCATION: ICD-10-CM

## 2022-12-19 LAB
ALBUMIN SERPL-MCNC: 3.7 G/DL (ref 3.5–5.2)
ALBUMIN/GLOB SERPL: 1.4 G/DL
ALP SERPL-CCNC: 97 U/L (ref 39–117)
ALT SERPL W P-5'-P-CCNC: 63 U/L (ref 1–33)
ANION GAP SERPL CALCULATED.3IONS-SCNC: 11 MMOL/L (ref 5–15)
ANION GAP SERPL CALCULATED.3IONS-SCNC: 12.1 MMOL/L (ref 5–15)
AST SERPL-CCNC: 118 U/L (ref 1–32)
BASOPHILS # BLD AUTO: 0 10*3/MM3 (ref 0–0.2)
BASOPHILS # BLD AUTO: 0.01 10*3/MM3 (ref 0–0.2)
BASOPHILS NFR BLD AUTO: 0.1 % (ref 0–1.5)
BASOPHILS NFR BLD AUTO: 0.6 % (ref 0–1.5)
BILIRUB SERPL-MCNC: 1.3 MG/DL (ref 0–1.2)
BUN SERPL-MCNC: 12 MG/DL (ref 8–23)
BUN SERPL-MCNC: 13 MG/DL (ref 8–23)
BUN/CREAT SERPL: 13 (ref 7–25)
BUN/CREAT SERPL: 13.7 (ref 7–25)
CALCIUM SPEC-SCNC: 8.5 MG/DL (ref 8.6–10.5)
CALCIUM SPEC-SCNC: 8.8 MG/DL (ref 8.6–10.5)
CHLORIDE SERPL-SCNC: 100 MMOL/L (ref 98–107)
CHLORIDE SERPL-SCNC: 106 MMOL/L (ref 98–107)
CO2 SERPL-SCNC: 19.9 MMOL/L (ref 22–29)
CO2 SERPL-SCNC: 24 MMOL/L (ref 22–29)
CREAT SERPL-MCNC: 0.92 MG/DL (ref 0.57–1)
CREAT SERPL-MCNC: 0.95 MG/DL (ref 0.57–1)
D-LACTATE SERPL-SCNC: 2.1 MMOL/L (ref 0.5–2)
DEPRECATED RDW RBC AUTO: 48.7 FL (ref 37–54)
DEPRECATED RDW RBC AUTO: 53.8 FL (ref 37–54)
EGFRCR SERPLBLD CKD-EPI 2021: 61.8 ML/MIN/1.73
EGFRCR SERPLBLD CKD-EPI 2021: 64.3 ML/MIN/1.73
EOSINOPHIL # BLD AUTO: 0.02 10*3/MM3 (ref 0–0.4)
EOSINOPHIL # BLD AUTO: 0.1 10*3/MM3 (ref 0–0.4)
EOSINOPHIL NFR BLD AUTO: 0.3 % (ref 0.3–6.2)
EOSINOPHIL NFR BLD AUTO: 2.1 % (ref 0.3–6.2)
ERYTHROCYTE [DISTWIDTH] IN BLOOD BY AUTOMATED COUNT: 15.7 % (ref 12.3–15.4)
ERYTHROCYTE [DISTWIDTH] IN BLOOD BY AUTOMATED COUNT: 17 % (ref 12.3–15.4)
FLUAV SUBTYP SPEC NAA+PROBE: NOT DETECTED
FLUAV SUBTYP SPEC NAA+PROBE: NOT DETECTED
FLUBV RNA ISLT QL NAA+PROBE: NOT DETECTED
FLUBV RNA ISLT QL NAA+PROBE: NOT DETECTED
GLOBULIN UR ELPH-MCNC: 2.6 GM/DL
GLUCOSE BLDC GLUCOMTR-MCNC: 277 MG/DL (ref 70–130)
GLUCOSE SERPL-MCNC: 167 MG/DL (ref 65–99)
GLUCOSE SERPL-MCNC: 335 MG/DL (ref 65–99)
HCT VFR BLD AUTO: 34.7 % (ref 34–46.6)
HCT VFR BLD AUTO: 35 % (ref 34–46.6)
HGB BLD-MCNC: 11.3 G/DL (ref 12–15.9)
HGB BLD-MCNC: 11.3 G/DL (ref 12–15.9)
IMM GRANULOCYTES # BLD AUTO: 0.01 10*3/MM3 (ref 0–0.05)
IMM GRANULOCYTES NFR BLD AUTO: 0.1 % (ref 0–0.5)
LIPASE SERPL-CCNC: 26 U/L (ref 13–60)
LYMPHOCYTES # BLD AUTO: 1.85 10*3/MM3 (ref 0.7–3.1)
LYMPHOCYTES # BLD AUTO: 2 10*3/MM3 (ref 0.7–3.1)
LYMPHOCYTES NFR BLD AUTO: 25 % (ref 19.6–45.3)
LYMPHOCYTES NFR BLD AUTO: 31.3 % (ref 19.6–45.3)
MCH RBC QN AUTO: 27.4 PG (ref 26.6–33)
MCH RBC QN AUTO: 27.7 PG (ref 26.6–33)
MCHC RBC AUTO-ENTMCNC: 32.3 G/DL (ref 31.5–35.7)
MCHC RBC AUTO-ENTMCNC: 32.6 G/DL (ref 31.5–35.7)
MCV RBC AUTO: 84.2 FL (ref 79–97)
MCV RBC AUTO: 85.8 FL (ref 79–97)
MONOCYTES # BLD AUTO: 0.56 10*3/MM3 (ref 0.1–0.9)
MONOCYTES # BLD AUTO: 0.6 10*3/MM3 (ref 0.1–0.9)
MONOCYTES NFR BLD AUTO: 7.6 % (ref 5–12)
MONOCYTES NFR BLD AUTO: 8.8 % (ref 5–12)
NEUTROPHILS NFR BLD AUTO: 3.7 10*3/MM3 (ref 1.7–7)
NEUTROPHILS NFR BLD AUTO: 4.94 10*3/MM3 (ref 1.7–7)
NEUTROPHILS NFR BLD AUTO: 57.2 % (ref 42.7–76)
NEUTROPHILS NFR BLD AUTO: 66.9 % (ref 42.7–76)
NRBC BLD AUTO-RTO: 0.4 /100 WBC (ref 0–0.2)
PLATELET # BLD AUTO: 258 10*3/MM3 (ref 140–450)
PLATELET # BLD AUTO: 296 10*3/MM3 (ref 140–450)
PMV BLD AUTO: 11.3 FL (ref 6–12)
PMV BLD AUTO: 9.6 FL (ref 6–12)
POTASSIUM SERPL-SCNC: 3.6 MMOL/L (ref 3.5–5.2)
POTASSIUM SERPL-SCNC: 3.6 MMOL/L (ref 3.5–5.2)
PROT SERPL-MCNC: 6.3 G/DL (ref 6–8.5)
QT INTERVAL: 453 MS
RBC # BLD AUTO: 4.07 10*6/MM3 (ref 3.77–5.28)
RBC # BLD AUTO: 4.12 10*6/MM3 (ref 3.77–5.28)
RSV RNA NPH QL NAA+NON-PROBE: NOT DETECTED
SARS-COV-2 RNA RESP QL NAA+PROBE: NOT DETECTED
SODIUM SERPL-SCNC: 132 MMOL/L (ref 136–145)
SODIUM SERPL-SCNC: 141 MMOL/L (ref 136–145)
TROPONIN T SERPL-MCNC: <0.01 NG/ML (ref 0–0.03)
WBC NRBC COR # BLD: 6.5 10*3/MM3 (ref 3.4–10.8)
WBC NRBC COR # BLD: 7.39 10*3/MM3 (ref 3.4–10.8)

## 2022-12-19 PROCEDURE — 87635 SARS-COV-2 COVID-19 AMP PRB: CPT | Performed by: EMERGENCY MEDICINE

## 2022-12-19 PROCEDURE — 36415 COLL VENOUS BLD VENIPUNCTURE: CPT

## 2022-12-19 PROCEDURE — 85025 COMPLETE CBC W/AUTO DIFF WBC: CPT | Performed by: EMERGENCY MEDICINE

## 2022-12-19 PROCEDURE — 85025 COMPLETE CBC W/AUTO DIFF WBC: CPT

## 2022-12-19 PROCEDURE — 84484 ASSAY OF TROPONIN QUANT: CPT | Performed by: EMERGENCY MEDICINE

## 2022-12-19 PROCEDURE — 93005 ELECTROCARDIOGRAM TRACING: CPT | Performed by: EMERGENCY MEDICINE

## 2022-12-19 PROCEDURE — 76705 ECHO EXAM OF ABDOMEN: CPT

## 2022-12-19 PROCEDURE — 99284 EMERGENCY DEPT VISIT MOD MDM: CPT | Performed by: EMERGENCY MEDICINE

## 2022-12-19 PROCEDURE — 83605 ASSAY OF LACTIC ACID: CPT | Performed by: EMERGENCY MEDICINE

## 2022-12-19 PROCEDURE — 80053 COMPREHEN METABOLIC PANEL: CPT | Performed by: EMERGENCY MEDICINE

## 2022-12-19 PROCEDURE — 71045 X-RAY EXAM CHEST 1 VIEW: CPT

## 2022-12-19 PROCEDURE — 87631 RESP VIRUS 3-5 TARGETS: CPT | Performed by: EMERGENCY MEDICINE

## 2022-12-19 PROCEDURE — 82962 GLUCOSE BLOOD TEST: CPT

## 2022-12-19 PROCEDURE — 82248 BILIRUBIN DIRECT: CPT | Performed by: EMERGENCY MEDICINE

## 2022-12-19 PROCEDURE — 82607 VITAMIN B-12: CPT | Performed by: EMERGENCY MEDICINE

## 2022-12-19 PROCEDURE — 99284 EMERGENCY DEPT VISIT MOD MDM: CPT

## 2022-12-19 PROCEDURE — 25010000002 PIPERACILLIN SOD-TAZOBACTAM PER 1 G: Performed by: EMERGENCY MEDICINE

## 2022-12-19 PROCEDURE — 83690 ASSAY OF LIPASE: CPT | Performed by: EMERGENCY MEDICINE

## 2022-12-19 PROCEDURE — 84443 ASSAY THYROID STIM HORMONE: CPT | Performed by: EMERGENCY MEDICINE

## 2022-12-19 PROCEDURE — G0378 HOSPITAL OBSERVATION PER HR: HCPCS

## 2022-12-19 PROCEDURE — 93010 ELECTROCARDIOGRAM REPORT: CPT | Performed by: EMERGENCY MEDICINE

## 2022-12-19 RX ORDER — ONDANSETRON 2 MG/ML
4 INJECTION INTRAMUSCULAR; INTRAVENOUS EVERY 6 HOURS PRN
Status: DISCONTINUED | OUTPATIENT
Start: 2022-12-19 | End: 2022-12-22 | Stop reason: SDUPTHER

## 2022-12-19 RX ORDER — SODIUM CHLORIDE 0.9 % (FLUSH) 0.9 %
10 SYRINGE (ML) INJECTION EVERY 12 HOURS SCHEDULED
Status: DISCONTINUED | OUTPATIENT
Start: 2022-12-19 | End: 2022-12-22 | Stop reason: HOSPADM

## 2022-12-19 RX ORDER — ACETAMINOPHEN 325 MG/1
650 TABLET ORAL EVERY 4 HOURS PRN
Status: DISCONTINUED | OUTPATIENT
Start: 2022-12-19 | End: 2022-12-19

## 2022-12-19 RX ORDER — POLYETHYLENE GLYCOL 3350 17 G/17G
17 POWDER, FOR SOLUTION ORAL DAILY PRN
Status: DISCONTINUED | OUTPATIENT
Start: 2022-12-19 | End: 2022-12-22 | Stop reason: HOSPADM

## 2022-12-19 RX ORDER — ACETAMINOPHEN 160 MG/5ML
325 SOLUTION ORAL EVERY 6 HOURS PRN
Status: DISCONTINUED | OUTPATIENT
Start: 2022-12-19 | End: 2022-12-22 | Stop reason: HOSPADM

## 2022-12-19 RX ORDER — CHOLECALCIFEROL (VITAMIN D3) 125 MCG
5 CAPSULE ORAL NIGHTLY PRN
Status: DISCONTINUED | OUTPATIENT
Start: 2022-12-19 | End: 2022-12-22 | Stop reason: HOSPADM

## 2022-12-19 RX ORDER — BISACODYL 10 MG
10 SUPPOSITORY, RECTAL RECTAL DAILY PRN
Status: DISCONTINUED | OUTPATIENT
Start: 2022-12-19 | End: 2022-12-22 | Stop reason: HOSPADM

## 2022-12-19 RX ORDER — ACETAMINOPHEN 650 MG/1
650 SUPPOSITORY RECTAL EVERY 4 HOURS PRN
Status: DISCONTINUED | OUTPATIENT
Start: 2022-12-19 | End: 2022-12-19

## 2022-12-19 RX ORDER — ACETAMINOPHEN 650 MG/1
325 SUPPOSITORY RECTAL EVERY 6 HOURS PRN
Status: DISCONTINUED | OUTPATIENT
Start: 2022-12-19 | End: 2022-12-22 | Stop reason: HOSPADM

## 2022-12-19 RX ORDER — ONDANSETRON 4 MG/1
4 TABLET, FILM COATED ORAL EVERY 6 HOURS PRN
Status: DISCONTINUED | OUTPATIENT
Start: 2022-12-19 | End: 2022-12-22 | Stop reason: SDUPTHER

## 2022-12-19 RX ORDER — AMOXICILLIN 250 MG
2 CAPSULE ORAL 2 TIMES DAILY
Status: DISCONTINUED | OUTPATIENT
Start: 2022-12-19 | End: 2022-12-22 | Stop reason: HOSPADM

## 2022-12-19 RX ORDER — ACETAMINOPHEN 160 MG/5ML
650 SOLUTION ORAL EVERY 4 HOURS PRN
Status: DISCONTINUED | OUTPATIENT
Start: 2022-12-19 | End: 2022-12-19

## 2022-12-19 RX ORDER — INSULIN LISPRO 100 [IU]/ML
2-9 INJECTION, SOLUTION INTRAVENOUS; SUBCUTANEOUS EVERY 6 HOURS SCHEDULED
Status: DISCONTINUED | OUTPATIENT
Start: 2022-12-20 | End: 2022-12-21

## 2022-12-19 RX ORDER — DEXTROSE MONOHYDRATE 25 G/50ML
25 INJECTION, SOLUTION INTRAVENOUS
Status: DISCONTINUED | OUTPATIENT
Start: 2022-12-19 | End: 2022-12-22 | Stop reason: HOSPADM

## 2022-12-19 RX ORDER — SODIUM CHLORIDE 0.9 % (FLUSH) 0.9 %
10 SYRINGE (ML) INJECTION AS NEEDED
Status: DISCONTINUED | OUTPATIENT
Start: 2022-12-19 | End: 2022-12-22 | Stop reason: HOSPADM

## 2022-12-19 RX ORDER — SODIUM CHLORIDE 9 MG/ML
75 INJECTION, SOLUTION INTRAVENOUS CONTINUOUS
Status: DISPENSED | OUTPATIENT
Start: 2022-12-19 | End: 2022-12-20

## 2022-12-19 RX ORDER — BISACODYL 5 MG/1
5 TABLET, DELAYED RELEASE ORAL DAILY PRN
Status: DISCONTINUED | OUTPATIENT
Start: 2022-12-19 | End: 2022-12-22 | Stop reason: HOSPADM

## 2022-12-19 RX ORDER — ACETAMINOPHEN 325 MG/1
325 TABLET ORAL EVERY 6 HOURS PRN
Status: DISCONTINUED | OUTPATIENT
Start: 2022-12-19 | End: 2022-12-22 | Stop reason: HOSPADM

## 2022-12-19 RX ORDER — OLANZAPINE 10 MG/2ML
1 INJECTION, POWDER, LYOPHILIZED, FOR SOLUTION INTRAMUSCULAR
Status: DISCONTINUED | OUTPATIENT
Start: 2022-12-19 | End: 2022-12-22 | Stop reason: HOSPADM

## 2022-12-19 RX ORDER — NICOTINE POLACRILEX 4 MG
15 LOZENGE BUCCAL
Status: DISCONTINUED | OUTPATIENT
Start: 2022-12-19 | End: 2022-12-22 | Stop reason: HOSPADM

## 2022-12-19 RX ORDER — SODIUM CHLORIDE 9 MG/ML
40 INJECTION, SOLUTION INTRAVENOUS AS NEEDED
Status: DISCONTINUED | OUTPATIENT
Start: 2022-12-19 | End: 2022-12-22 | Stop reason: HOSPADM

## 2022-12-19 RX ADMIN — SODIUM CHLORIDE 1000 ML: 9 INJECTION, SOLUTION INTRAVENOUS at 15:30

## 2022-12-19 RX ADMIN — PIPERACILLIN AND TAZOBACTAM 3.38 G: 3; .375 INJECTION, POWDER, FOR SOLUTION INTRAVENOUS at 19:29

## 2022-12-19 RX ADMIN — SODIUM CHLORIDE 75 ML/HR: 9 INJECTION, SOLUTION INTRAVENOUS at 22:43

## 2022-12-19 RX ADMIN — Medication 10 ML: at 22:43

## 2022-12-19 NOTE — FSED PROVIDER NOTE
[facility] FREE-STANDING ED / URGENT CARE    Patient: Nadege Barrow 1945 [id number]  Physician: Jena Islas MD  DOS: 12/19/2022    [facility]  History of Present Illness  77-year-old woman with a history of CHF, hypertension, diabetes presents with progressive fatigue, shortness of breath with exertion, productive cough, rhinorrhea, sore throat, headache, nausea.  Recent exposure to URI and her daughter.  She states she does not have much of an appetite and that food sometimes hurts her tongue.  She has been advised to start taking B12 supplements, but her daughter has not gotten around to that yet.  Patient was discharged from the hospital about a week ago where she was worked up for fatigue, found to have some mild gallbladder abnormalities.  Discharge notes indicate if her symptoms persist, to consider HIDA scan.        ROS  As reviewed in HPI above.    Prior to Admission medications    Medication Sig Start Date End Date Taking? Authorizing Provider   ALPRAZolam (XANAX) 1 MG tablet Take 1 tablet by mouth 3 (Three) Times a Day As Needed for Anxiety. 12/1/22   Cristal Alvarez MD   atorvastatin (LIPITOR) 40 MG tablet Take 40 mg by mouth Every Night.    ProviderShira MD   Empagliflozin (Jardiance) 10 MG tablet Take 1 tablet by mouth Every Morning.    ProviderShira MD   fludrocortisone 0.1 MG tablet Take 0.1 mg by mouth Every Morning.    ProviderShira MD   FLUoxetine (PROzac) 20 MG capsule Take 1 capsule by mouth Daily. 12/1/22   Cristal Alvarez MD   furosemide (LASIX) 20 MG tablet Take 1 tablet by mouth Daily. 10/12/22   Kerry Kimbrough MD   Insulin NPH Isophane & Regular (NovoLIN 70/30 FlexPen) (70-30) 100 UNIT/ML suspension pen-injector Inject 20 Units under the skin into the appropriate area as directed 2 (Two) Times a Day With Meals.    ProviderShira MD   losartan (COZAAR) 25 MG tablet Take 1 tablet by mouth Daily. 5/17/22   Niya Lopez MD   metoprolol succinate XL  "(TOPROL-XL) 50 MG 24 hr tablet Take 50 mg by mouth Every Morning. Hold for blood pressure less than 100 or pulse less than 60    ProviderShira MD   nitroglycerin (NITROSTAT) 0.4 MG SL tablet Place 1 tablet under the tongue Every 5 (Five) Minutes As Needed for Chest Pain. Take no more than 3 doses in 15 minutes. 5/16/22   Niya Lopez MD   polyethylene glycol (MIRALAX) 17 g packet Take 17 g by mouth Daily for 30 days. 12/12/22 1/11/23  Kraig Luis MD   potassium chloride (K-DUR,KLOR-CON) 20 MEQ CR tablet Take 1 tablet by mouth Every Morning. 8/15/22   ProviderShira MD   sertraline (Zoloft) 25 MG tablet Take 1 tablet by mouth Daily. 12/1/22 12/1/23  Cristal Alvarez MD   ticagrelor (BRILINTA) 90 MG tablet tablet Take 90 mg by mouth 2 (Two) Times a Day.    Provider, MD Shira     Past Medical History:   Diagnosis Date   • Anxiety    • CHF (congestive heart failure) (HCC)    • Depression    • Diabetes mellitus (HCC)    • Hyperlipidemia    • Hypertension    • Panic disorder     \"panic attacks\"   • Tremors of nervous system     \"essential tremors\"     Past Surgical History:   Procedure Laterality Date   • CORONARY ANGIOPLASTY WITH STENT PLACEMENT       Family History   Problem Relation Age of Onset   • Depression Sister    • Suicidality Other         suicided   • Alcohol abuse Other    • Alcohol abuse Daughter    • Depression Other      Social History     Socioeconomic History   • Marital status:    Tobacco Use   • Smoking status: Never   • Smokeless tobacco: Never   Vaping Use   • Vaping Use: Never used   Substance and Sexual Activity   • Alcohol use: Not Currently   • Drug use: No   • Sexual activity: Defer     Allergies   Allergen Reactions   • Morphine Hallucinations       PHYSICAL EXAM  Vital Signs (last day)     Date/Time Temp Temp src Pulse Resp BP Patient Position SpO2    12/19/22 1410 98 (36.7) Oral 78 18 161/99 Lying 99        Physical Exam  Vitals and nursing note reviewed. "   Constitutional:       General: She is not in acute distress.     Appearance: She is well-developed. She is ill-appearing. She is not toxic-appearing.      Comments: Appears fatigued   HENT:      Head: Normocephalic and atraumatic.      Mouth/Throat:      Mouth: Mucous membranes are dry.   Eyes:      Extraocular Movements: Extraocular movements intact.   Cardiovascular:      Rate and Rhythm: Normal rate and regular rhythm.      Heart sounds: No murmur heard.  Pulmonary:      Effort: Pulmonary effort is normal. No tachypnea.      Breath sounds: Normal breath sounds.   Abdominal:      Tenderness: There is no abdominal tenderness.   Musculoskeletal:         General: Normal range of motion.      Cervical back: Neck supple.      Right lower leg: No edema.      Left lower leg: No edema.      Comments: No pain with range of motion of the right hip.   Skin:     General: Skin is warm and dry.   Neurological:      General: No focal deficit present.      Mental Status: She is alert and oriented to person, place, and time.      Comments: Full strength in arms and legs.  Some slurred speech, but this is commensurate to her dry mouth.   Psychiatric:         Mood and Affect: Mood normal.         Behavior: Behavior normal.           DIAGNOSTICS  US Gallbladder    Result Date: 12/19/2022  1. Diffuse abnormal gallbladder wall edema with pericholecystic fluid. This is new since the 12/9/2022 ultrasound. FINDINGS suggest the presence of acalculous cholecystitis. Correlate with clinical symptoms.  Electronically Signed By-María Lauren MD On:12/19/2022 4:33 PM This report was finalized on [id number] by  María Lauren MD.    XR Chest 1 View    Result Date: 12/19/2022  No acute process.  Electronically Signed By-Finn Ashton MD On:12/19/2022 3:29 PM This report was finalized on [id number] by  Finn Ashton MD.      Labs Reviewed   COMPREHENSIVE METABOLIC PANEL - Abnormal; Notable for the following components:       Result Value     Glucose 335 (*)     Sodium 132 (*)     CO2 19.9 (*)     Calcium 8.5 (*)     ALT (SGPT) 63 (*)     AST (SGOT) 118 (*)     Total Bilirubin 1.3 (*)     All other components within normal limits    Narrative:     GFR Normal >60  Chronic Kidney Disease <60  Kidney Failure <15    The GFR formula is only valid for adults with stable renal function between ages 18 and 70.   CBC WITH AUTO DIFFERENTIAL - Abnormal; Notable for the following components:    Hemoglobin 11.3 (*)     RDW 15.7 (*)     All other components within normal limits   POCT GLUCOSE FINGERSTICK - Abnormal; Notable for the following components:    Glucose 277 (*)     All other components within normal limits   COVID-19 AND FLU A/B, NP SWAB IN TRANSPORT MEDIA 8-12 HR TAT - Normal    Narrative:     Fact sheet for providers: https://www.fda.gov/media/202499/download    Fact sheet for patients: https://www.fda.gov/media/411766/download    Test performed by PCR.   LIPASE - Normal   TROPONIN (IN-HOUSE) - Normal    Narrative:     Troponin T Reference Range:  <= 0.03 ng/mL-   Negative for AMI  >0.03 ng/mL-     Abnormal for myocardial necrosis.  Clinicians would have to utilize clinical acumen, EKG, Troponin and serial changes to determine if it is an Acute Myocardial Infarction or myocardial injury due to an underlying chronic condition.       Results may be falsely decreased if patient taking Biotin.     INFLUENZA A/B, RSV PCR  - Normal   VITAMIN B12   TSH RFX ON ABNORMAL TO FREE T4   LACTIC ACID, PLASMA   CBC AND DIFFERENTIAL    Narrative:     The following orders were created for panel order CBC & Differential.  Procedure                               Abnormality         Status                     ---------                               -----------         ------                     CBC Auto Differential[[id number]]        Abnormal            Final result                 Please view results for these tests on the individual orders.         MDM  Number of  Diagnoses or Management Options  Acute cholecystitis  Diagnosis management comments: Patient presents with generalized malaise, new hyperbilirubinemia, mild transaminitis.  Gallbladder ultrasound shows signs of a calculus cholecystitis.  Patient does not appear septic.  Vital signs within normal limits.  Minimally tender in the right upper quadrant.  Afebrile.  Patient will be admitted to hospitalist with surgery consulting, started Zosyn.  Patient is fairly well-appearing, able to walk around the department.       Amount and/or Complexity of Data Reviewed  Decide to obtain previous medical records or to obtain history from someone other than the patient: yes        ED Course as of 12/19/22 1922   Mon Dec 19, 2022   1447 ECG 12 Lead Chest Pain  EKG interpretation: Performed at 2:13 PM.  In contrast to machine read, this appears to be normal sinus rhythm at 79 bpm, left bundle branch block is seen, this is not new in comparison to prior EKG.  Regular rate and rhythm.  No signs of acute ischemia. [LR]   [id number] Dr Meade paged [LR]   [phone] Spoke to Dr. Meade, hospitalist, who would like me to talk to the surgeon for recs before accepting for admission. [LR]   [phone] Surgery paged [LR]      ED Course User Index  [LR] Jena Rondon MD       New Medications Ordered This Visit   Medications   • sodium chloride 0.9 % bolus 1,000 mL   • piperacillin-tazobactam (ZOSYN) IVPB 3.375 g in 100 mL NS (CD)       Procedures    Final diagnoses:   Acute cholecystitis       No follow-up provider specified.    ED Disposition     ED Disposition   Decision to Admit    Condition   --    Comment   Level of Care: Med/Surg [1]   Admitting Physician: MARY MEADE [[id number]]   Attending Physician: JENA RONDON [[id number]]   Patient Class: Inpatient [101]               Jena Rondon MD

## 2022-12-20 ENCOUNTER — APPOINTMENT (OUTPATIENT)
Dept: NUCLEAR MEDICINE | Facility: HOSPITAL | Age: 77
End: 2022-12-20

## 2022-12-20 LAB
ALBUMIN SERPL-MCNC: 3.3 G/DL (ref 3.5–5.2)
ALBUMIN SERPL-MCNC: 3.3 G/DL (ref 3.5–5.2)
ALBUMIN/GLOB SERPL: 1.1 G/DL
ALP SERPL-CCNC: 95 U/L (ref 39–117)
ALP SERPL-CCNC: 99 U/L (ref 39–117)
ALT SERPL W P-5'-P-CCNC: 73 U/L (ref 1–33)
ALT SERPL W P-5'-P-CCNC: 77 U/L (ref 1–33)
ANION GAP SERPL CALCULATED.3IONS-SCNC: 14 MMOL/L (ref 5–15)
AST SERPL-CCNC: 120 U/L (ref 1–32)
AST SERPL-CCNC: 86 U/L (ref 1–32)
BASOPHILS # BLD AUTO: 0 10*3/MM3 (ref 0–0.2)
BASOPHILS NFR BLD AUTO: 0.6 % (ref 0–1.5)
BILIRUB CONJ SERPL-MCNC: 0.6 MG/DL (ref 0–0.3)
BILIRUB INDIRECT SERPL-MCNC: 0.8 MG/DL
BILIRUB SERPL-MCNC: 1.3 MG/DL (ref 0–1.2)
BILIRUB SERPL-MCNC: 1.4 MG/DL (ref 0–1.2)
BUN SERPL-MCNC: 11 MG/DL (ref 8–23)
BUN/CREAT SERPL: 13.6 (ref 7–25)
CALCIUM SPEC-SCNC: 8.6 MG/DL (ref 8.6–10.5)
CHLORIDE SERPL-SCNC: 107 MMOL/L (ref 98–107)
CO2 SERPL-SCNC: 21 MMOL/L (ref 22–29)
CREAT SERPL-MCNC: 0.81 MG/DL (ref 0.57–1)
D-LACTATE SERPL-SCNC: 1.6 MMOL/L (ref 0.5–2)
DEPRECATED RDW RBC AUTO: 52.9 FL (ref 37–54)
EGFRCR SERPLBLD CKD-EPI 2021: 74.9 ML/MIN/1.73
EOSINOPHIL # BLD AUTO: 0.1 10*3/MM3 (ref 0–0.4)
EOSINOPHIL NFR BLD AUTO: 1.7 % (ref 0.3–6.2)
ERYTHROCYTE [DISTWIDTH] IN BLOOD BY AUTOMATED COUNT: 17.3 % (ref 12.3–15.4)
GLOBULIN UR ELPH-MCNC: 3 GM/DL
GLUCOSE BLDC GLUCOMTR-MCNC: 147 MG/DL (ref 70–105)
GLUCOSE BLDC GLUCOMTR-MCNC: 152 MG/DL (ref 70–105)
GLUCOSE BLDC GLUCOMTR-MCNC: 166 MG/DL (ref 70–105)
GLUCOSE BLDC GLUCOMTR-MCNC: 262 MG/DL (ref 70–105)
GLUCOSE SERPL-MCNC: 161 MG/DL (ref 65–99)
HAV IGM SERPL QL IA: NORMAL
HBV CORE IGM SERPL QL IA: NORMAL
HBV SURFACE AG SERPL QL IA: NORMAL
HCT VFR BLD AUTO: 38.6 % (ref 34–46.6)
HCV AB SER DONR QL: NORMAL
HGB BLD-MCNC: 11.9 G/DL (ref 12–15.9)
LYMPHOCYTES # BLD AUTO: 1.2 10*3/MM3 (ref 0.7–3.1)
LYMPHOCYTES NFR BLD AUTO: 19.8 % (ref 19.6–45.3)
MCH RBC QN AUTO: 26.9 PG (ref 26.6–33)
MCHC RBC AUTO-ENTMCNC: 30.8 G/DL (ref 31.5–35.7)
MCV RBC AUTO: 87.3 FL (ref 79–97)
MONOCYTES # BLD AUTO: 0.4 10*3/MM3 (ref 0.1–0.9)
MONOCYTES NFR BLD AUTO: 7.6 % (ref 5–12)
NEUTROPHILS NFR BLD AUTO: 4.1 10*3/MM3 (ref 1.7–7)
NEUTROPHILS NFR BLD AUTO: 70.3 % (ref 42.7–76)
NRBC BLD AUTO-RTO: 0 /100 WBC (ref 0–0.2)
PLATELET # BLD AUTO: 280 10*3/MM3 (ref 140–450)
PMV BLD AUTO: 9.1 FL (ref 6–12)
POTASSIUM SERPL-SCNC: 3.6 MMOL/L (ref 3.5–5.2)
PROT SERPL-MCNC: 6 G/DL (ref 6–8.5)
PROT SERPL-MCNC: 6.3 G/DL (ref 6–8.5)
RBC # BLD AUTO: 4.42 10*6/MM3 (ref 3.77–5.28)
SODIUM SERPL-SCNC: 142 MMOL/L (ref 136–145)
TSH SERPL DL<=0.05 MIU/L-ACNC: 1.62 UIU/ML (ref 0.27–4.2)
VIT B12 BLD-MCNC: 518 PG/ML (ref 211–946)
WBC NRBC COR # BLD: 5.9 10*3/MM3 (ref 3.4–10.8)

## 2022-12-20 PROCEDURE — 63710000001 SENNOSIDES-DOCUSATE 8.6-50 MG TABLET: Performed by: INTERNAL MEDICINE

## 2022-12-20 PROCEDURE — 78226 HEPATOBILIARY SYSTEM IMAGING: CPT

## 2022-12-20 PROCEDURE — 0 TECHNETIUM TC 99M MEBROFENIN KIT: Performed by: HOSPITALIST

## 2022-12-20 PROCEDURE — A9270 NON-COVERED ITEM OR SERVICE: HCPCS | Performed by: INTERNAL MEDICINE

## 2022-12-20 PROCEDURE — A9537 TC99M MEBROFENIN: HCPCS | Performed by: HOSPITALIST

## 2022-12-20 PROCEDURE — 80053 COMPREHEN METABOLIC PANEL: CPT | Performed by: HOSPITALIST

## 2022-12-20 PROCEDURE — G0378 HOSPITAL OBSERVATION PER HR: HCPCS

## 2022-12-20 PROCEDURE — 63710000001 INSULIN LISPRO (HUMAN) PER 5 UNITS

## 2022-12-20 PROCEDURE — 82962 GLUCOSE BLOOD TEST: CPT

## 2022-12-20 PROCEDURE — 85025 COMPLETE CBC W/AUTO DIFF WBC: CPT | Performed by: HOSPITALIST

## 2022-12-20 PROCEDURE — 25010000002 PIPERACILLIN SOD-TAZOBACTAM PER 1 G: Performed by: HOSPITALIST

## 2022-12-20 PROCEDURE — 83880 ASSAY OF NATRIURETIC PEPTIDE: CPT | Performed by: NURSE PRACTITIONER

## 2022-12-20 PROCEDURE — 99203 OFFICE O/P NEW LOW 30 MIN: CPT | Performed by: STUDENT IN AN ORGANIZED HEALTH CARE EDUCATION/TRAINING PROGRAM

## 2022-12-20 PROCEDURE — 96375 TX/PRO/DX INJ NEW DRUG ADDON: CPT

## 2022-12-20 PROCEDURE — 25010000002 HYDRALAZINE PER 20 MG

## 2022-12-20 PROCEDURE — 25010000002 HYDRALAZINE PER 20 MG: Performed by: INTERNAL MEDICINE

## 2022-12-20 PROCEDURE — 80074 ACUTE HEPATITIS PANEL: CPT | Performed by: HOSPITALIST

## 2022-12-20 RX ORDER — KIT FOR THE PREPARATION OF TECHNETIUM TC 99M MEBROFENIN 45 MG/10ML
1 INJECTION, POWDER, LYOPHILIZED, FOR SOLUTION INTRAVENOUS
Status: COMPLETED | OUTPATIENT
Start: 2022-12-20 | End: 2022-12-20

## 2022-12-20 RX ORDER — HYDRALAZINE HYDROCHLORIDE 20 MG/ML
10 INJECTION INTRAMUSCULAR; INTRAVENOUS EVERY 6 HOURS PRN
Status: DISCONTINUED | OUTPATIENT
Start: 2022-12-20 | End: 2022-12-22 | Stop reason: HOSPADM

## 2022-12-20 RX ORDER — ALPRAZOLAM 1 MG/1
1 TABLET ORAL 3 TIMES DAILY PRN
Status: DISCONTINUED | OUTPATIENT
Start: 2022-12-20 | End: 2022-12-22 | Stop reason: HOSPADM

## 2022-12-20 RX ADMIN — SENNOSIDES AND DOCUSATE SODIUM 2 TABLET: 50; 8.6 TABLET ORAL at 08:55

## 2022-12-20 RX ADMIN — SENNOSIDES AND DOCUSATE SODIUM 2 TABLET: 50; 8.6 TABLET ORAL at 20:39

## 2022-12-20 RX ADMIN — PIPERACILLIN AND TAZOBACTAM 3.38 G: 3; .375 INJECTION, POWDER, FOR SOLUTION INTRAVENOUS at 15:16

## 2022-12-20 RX ADMIN — METOPROLOL TARTRATE 25 MG: 25 TABLET, FILM COATED ORAL at 15:16

## 2022-12-20 RX ADMIN — PIPERACILLIN AND TAZOBACTAM 3.38 G: 3; .375 INJECTION, POWDER, FOR SOLUTION INTRAVENOUS at 20:39

## 2022-12-20 RX ADMIN — INSULIN LISPRO 6 UNITS: 100 INJECTION, SOLUTION INTRAVENOUS; SUBCUTANEOUS at 18:19

## 2022-12-20 RX ADMIN — ALPRAZOLAM 1 MG: 1 TABLET ORAL at 17:15

## 2022-12-20 RX ADMIN — HYDRALAZINE HYDROCHLORIDE 10 MG: 20 INJECTION INTRAMUSCULAR; INTRAVENOUS at 01:23

## 2022-12-20 RX ADMIN — MEBROFENIN 1 DOSE: 45 INJECTION, POWDER, LYOPHILIZED, FOR SOLUTION INTRAVENOUS at 10:50

## 2022-12-20 RX ADMIN — Medication 10 ML: at 08:54

## 2022-12-20 NOTE — OUTREACH NOTE
Medical Week 1 Survey    Flowsheet Row Responses   [facility] facility patient discharged from? [facility]   Does the patient have one of the following disease processes/diagnoses(primary or secondary)? Other   Week 1 attempt successful? No   Unsuccessful attempts Attempt 1   Revoke Readmitted          KIERAN SHETH - Registered Nurse

## 2022-12-20 NOTE — PLAN OF CARE
Goal Outcome Evaluation:  Plan of Care Reviewed With: patient        Progress: no change  Outcome Evaluation: Direct admit from [facility] for acute kenneth. Patient without complaints of pain. Daughter at bedside.  NPO with ice chips. Possible surgery 12/20/2022. Elevated BP noted, PRN medicaiton ordered. Otherwise vital signs stable. Call light within reach. Care plan on going.

## 2022-12-20 NOTE — SIGNIFICANT NOTE
12/20/22 0377   OTHER   Discipline physical therapist   Rehab Time/Intention   Session Not Performed other (see comments);patient unavailable for evaluation  (Pt having Hyda Scan today; surgical consult pending; will check on pt in pm)   Therapy Assessment/Plan (PT)   Criteria for Skilled Interventions Met (PT) yes;meets criteria   Recommendation   PT - Next Appointment 12/21/22

## 2022-12-20 NOTE — H&P
"    [facility]      Patient Name: Nadege Barrow  : 1945  MRN: [id number]  Primary Care Physician:  Dave Esparza  Date of admission: 2022      Subjective      Chief Complaint: Fatigue, shortness of breath, nausea, headache, decreased appetite    History of Present Illness: Nadege Barrow is a 77 y.o. female with a past medical history of depression with anxiety, CHF, CAD, diabetes, hyperlipidemia, hypertension, essential tremors, and panic disorder who presented to [facility] ED on 2022 complaining of fatigue, shortness of breath, nausea, headache, decreased appetite.  She also reports abdominal pain in the right upper quadrant.    At [facility], troponin is negative, glucose 335, sodium 132, potassium 3.6, lactate 2.1, lipase 26, WBC 7.39.  All other labs are unremarkable.  Gallbladder ultrasound showed diffuse abnormal edema with pericholecystic fluid which is new from 2022.  Findings suggest presence of a calculus cholecystitis.  She is afebrile, all vital signs are stable.  She was given Zosyn and normal saline 1 L bolus.  Hospitalist was consulted for admission to [facility].  General surgery was also contacted per [facility] provider and will consult.      Review of Systems   Constitutional: Positive for malaise/fatigue.   HENT: Negative.    Eyes: Negative.    Cardiovascular: Negative.    Respiratory: Positive for shortness of breath.    Hematologic/Lymphatic: Negative.    Skin: Negative.    Musculoskeletal: Negative.    Gastrointestinal: Positive for abdominal pain and nausea.   Genitourinary: Negative.    Neurological: Negative.    Psychiatric/Behavioral: Negative.    Allergic/Immunologic: Negative.        Personal History     Past Medical History:   Diagnosis Date   • Anxiety    • CHF (congestive heart failure) (HCC)    • Depression    • Diabetes mellitus (HCC)    • Hyperlipidemia    • Hypertension    • Panic disorder     \"panic " "attacks\"   • Tremors of nervous system     \"essential tremors\"       Past Surgical History:   Procedure Laterality Date   • CORONARY ANGIOPLASTY WITH STENT PLACEMENT         Family History: family history includes Alcohol abuse in her daughter and another family member; Depression in her sister and another family member; Suicidality in an other family member. Otherwise pertinent FHx was reviewed and not pertinent to current issue.    Social History:  reports that she has never smoked. She has never used smokeless tobacco. She reports that she does not currently use alcohol. She reports that she does not use drugs.    Home Medications:  Prior to Admission Medications     Prescriptions Last Dose Informant Patient Reported? Taking?    ALPRAZolam (XANAX) 1 MG tablet   No No    Take 1 tablet by mouth 3 (Three) Times a Day As Needed for Anxiety.    atorvastatin (LIPITOR) 40 MG tablet   Yes No    Take 40 mg by mouth Every Night.    Empagliflozin (Jardiance) 10 MG tablet   Yes No    Take 1 tablet by mouth Every Morning.    fludrocortisone 0.1 MG tablet   Yes No    Take 0.1 mg by mouth Every Morning.    FLUoxetine (PROzac) 20 MG capsule   No No    Take 1 capsule by mouth Daily.    furosemide (LASIX) 20 MG tablet   No No    Take 1 tablet by mouth Daily.    Insulin NPH Isophane & Regular (NovoLIN 70/30 FlexPen) (70-30) 100 UNIT/ML suspension pen-injector   Yes No    Inject 20 Units under the skin into the appropriate area as directed 2 (Two) Times a Day With Meals.    losartan (COZAAR) 25 MG tablet   No No    Take 1 tablet by mouth Daily.    metoprolol succinate XL (TOPROL-XL) 50 MG 24 hr tablet   Yes No    Take 50 mg by mouth Every Morning. Hold for blood pressure less than 100 or pulse less than 60    nitroglycerin (NITROSTAT) 0.4 MG SL tablet   No No    Place 1 tablet under the tongue Every 5 (Five) Minutes As Needed for Chest Pain. Take no more than 3 doses in 15 minutes.    polyethylene glycol (MIRALAX) 17 g packet   No " No    Take 17 g by mouth Daily for 30 days.    potassium chloride (K-DUR,KLOR-CON) 20 MEQ CR tablet   Yes No    Take 1 tablet by mouth Every Morning.    sertraline (Zoloft) 25 MG tablet   No No    Take 1 tablet by mouth Daily.    ticagrelor (BRILINTA) 90 MG tablet tablet   Yes No    Take 90 mg by mouth 2 (Two) Times a Day.            Allergies:  Allergies   Allergen Reactions   • Morphine Hallucinations       Objective      Vitals:   Temp:  [98 °F (36.7 °C)-98.2 °F (36.8 °C)] 98.2 °F (36.8 °C)  Heart Rate:  [77-78] 77  Resp:  [18] 18  BP: (141-161)/(85-99) 141/85    Physical Exam  Vitals and nursing note reviewed.   Constitutional:       General: She is awake.      Appearance: Normal appearance.   HENT:      Head: Normocephalic and atraumatic.      Nose: Nose normal.      Mouth/Throat:      Mouth: Mucous membranes are moist.   Eyes:      Extraocular Movements: Extraocular movements intact.      Pupils: Pupils are equal, round, and reactive to light.   Cardiovascular:      Rate and Rhythm: Normal rate and regular rhythm.      Pulses: Normal pulses.      Heart sounds: Normal heart sounds.   Pulmonary:      Effort: Pulmonary effort is normal.      Breath sounds: Normal breath sounds.   Abdominal:      General: Bowel sounds are normal.      Palpations: Abdomen is soft.      Tenderness: There is abdominal tenderness in the right lower quadrant.   Musculoskeletal:         General: Normal range of motion.      Cervical back: Normal range of motion.   Skin:     General: Skin is warm and dry.   Neurological:      General: No focal deficit present.      Mental Status: She is alert and oriented to person, place, and time. Mental status is at baseline.   Psychiatric:         Mood and Affect: Mood normal.         Behavior: Behavior normal. Behavior is cooperative.         Result Review    Result Review:  I have personally reviewed the results from the time of this admission to 12/19/2022 21:25 EST and agree with these  findings:  [x]  Laboratory  []  Microbiology  [x]  Radiology  []  EKG/Telemetry   []  Cardiology/Vascular   []  Pathology  []  Old records  []  Other:  Most notable findings include: as above    Assessment & Plan        Active Hospital Problems:  Active Hospital Problems    Diagnosis    • **Acute cholecystitis    • Type 2 diabetes mellitus (HCC)    • Generalized anxiety disorder    • Ischemic cardiomyopathy    • Senile dementia, delirium, with behavioral disturbance    • Hypertension    • Depression, unspecified    • Atherosclerotic heart disease of native coronary artery without angina pectoris    • Disabling essential tremor    • Major depressive disorder, recurrent episode, moderate (HCC)    • Hyperlipidemia, mixed      Plan:     Home medications not verified at the time of assessment and plan    Acute cholecystitis  -CT of the abdomen and pelvis reviewed  -Lactate 2.1, monitor  -WBC 7.39, monitor  -Conservative fluid resuscitation due to CHF   -Zosyn given at [facility]  -General surgery consulted  -Pain medication and antiemetics ordered    Essential Hypertension  Chronic CAD  Ischemic cardiomyopathy  Diastolic CHF  -EF 45% on 2/27/2020  -BP controlled  -Monitor BP  -Continue home Lasix, losartan, metoprolol, Brilinta    Depression with anxiety  Senile dementia  -Chronic, Stable  -Continue home Xanax, Prozac, Zoloft     Diabetes Mellitus Type 2  -Blood glucose 335  -Accu-Cheks AC at bedtime  -SSI ordered  -Hold home prandial insulin while n.p.o.  -Hold home Jardiance      DVT prophylaxis:  Mechanical DVT prophylaxis orders are present.    CODE STATUS:    Code Status (Patient has no pulse and is not breathing): CPR (Attempt to Resuscitate)  Medical Interventions (Patient has pulse or is breathing): Full Support    Admission Status:  I believe this patient meets inpatient status.    I discussed the patient's findings and my recommendations with patient and family.    This patient has been examined wearing  appropriate Personal Protective Imwavjimt22/19/22      Signature: Electronically signed by Tosin Cramer DNP, PAM, 12/19/22, 9:25 PM EST.

## 2022-12-20 NOTE — PLAN OF CARE
Goal Outcome Evaluation:               Pt had HIDA scan today, awaiting surgery consult, Dr. Gonzales ordered cardiology clearance due to pt hx. Diet changed to clear liquids. Pt c/o significant anxiety, Xanax ordered by MD and provided to pt with relief per pt. Call light within reach.

## 2022-12-20 NOTE — PROGRESS NOTES
[facility]   Daily Progress Note    Patient Name: Nadege Barrow  : 1945  MRN: [id number]  Primary Care Physician:  Dave Esparza  Date of admission: 2022  Date and Time of Service:  at       Subjective      Patient seen after HIDA scan.  No abdominal pain when at rest  Denies any chills or sweats  Has right lower sided pain in the abdomen/hip  has some nausea but no vomiting  Reports that prior to presentation was having exertional dyspnea and occasional chest pain  Also was feeling congested but denies any orthopnea or lower extremity edema.      Objective      Vitals:   Temp:  [97.5 °F (36.4 °C)-98.2 °F (36.8 °C)] 97.8 °F (36.6 °C)  Heart Rate:  [60-77] 74  Resp:  [14-16] 15  BP: (141-167)/() 157/74    Physical Exam   General: No acute distress  HEENT: neck supple, normal oral mucosa, no masses, no lymphadenopathy  Lungs: Clear bilaterally, no wheezing, No crackles, No Rhonchi. Equal excursions.   CV - Normal S1/S2, + systolic murmur RUSB and LLSB,  Regular rate and rhythm   Abdomin - Soft, non-tender, non-distended, normal bowel sounds, +Pradhan sign  Extremities - no edema, no erythema  Neuro - No focal weakness, normal sensation  Psych - Alert and oriented x3  Skin - no wounds or lesions.          Result Review    Result Review:  I have personally reviewed the results from the time of this admission to 2022 16:28 EST and agree with these findings:  [x]  Laboratory  [x]  Microbiology  [x]  Radiology  [x]  EKG/Telemetry   [x]  Cardiology/Vascular   []  Pathology  [x]  Old records  []  Other:  Most notable findings include:           Assessment & Plan      Brief Patient Summary:  Nadege Barrow is a 77 y.o. female who       insulin lispro, 2-9 Units, Subcutaneous, Q6H  metoprolol tartrate, 25 mg, Oral, Q12H  piperacillin-tazobactam, 3.375 g, Intravenous, Q8H  senna-docusate sodium, 2 tablet, Oral, BID  sodium chloride, 10 mL, Intravenous, Q12H             Active  Hospital Problems:  Active Hospital Problems    Diagnosis    • **Acute cholecystitis    • Type 2 diabetes mellitus (HCC)    • Generalized anxiety disorder    • Ischemic cardiomyopathy    • Senile dementia, delirium, with behavioral disturbance    • Hypertension    • Depression, unspecified    • Atherosclerotic heart disease of native coronary artery without angina pectoris    • Disabling essential tremor    • Major depressive disorder, recurrent episode, moderate (HCC)    • Hyperlipidemia, mixed      Plan:     Acute cholecystitis  - CT with marked pericholecystic edema  - Elevated Bilirubin and Liver enzymes  - HIDA Normal. Pradhan positive   - Continue IV Abx - Zosyn  - Discussed with Surgery  - GI consult.   - Check Hepatitis panel     CAD s/p RCA stent 5/2018  - Chronic systolic heart failure - EF 40-45% - 2/2022  - Aortic stenosis, Tricuspid regurg   - Hold ASA and Brilinta in case patient needs surgery - Stress 2/2022 no ischemia   - Patient has been complaining of exertional dyspnea.   - Will consult Cardiology for risk stratification in case Surgery needed  - Continue Statin and BB and Losartan when able.   - Repeat Echo      Depression with anxiety  Senile dementia  -Chronic, Stable  -Continue home Xanax, Prozac, Zoloft      DM-2 with Hyperglycemia  -SSI ordered  -Hold home Jardiance      DVT prophylaxis:  Mechanical DVT prophylaxis orders are present.    CODE STATUS:    Code Status (Patient has no pulse and is not breathing): CPR (Attempt to Resuscitate)  Medical Interventions (Patient has pulse or is breathing): Full Support      Disposition:  I expect patient to be discharged .    This patient has been and discussed with . 12/20/22      Electronically signed by Aracely Gonzales MD, 12/20/22, 16:28 EST.  [facility] Hospitalist Team

## 2022-12-20 NOTE — CASE MANAGEMENT/SOCIAL WORK
Discharge Planning Assessment   [facility]     Patient Name: Nadege Barrow  MRN: [id number]  Today's Date: 12/20/2022    Admit Date: 12/19/2022    Plan: home with family   Discharge Needs Assessment     Row Name 12/20/22 1457       Living Environment    People in Home child(kimberley), adult    Current Living Arrangements home    Primary Care Provided by self    Provides Primary Care For no one    Family Caregiver if Needed child(kimberley), adult    Family Caregiver Names Divya    Quality of Family Relationships helpful    Able to Return to Prior Arrangements yes       Resource/Environmental Concerns    Resource/Environmental Concerns none    Transportation Concerns none       Transition Planning    Patient/Family Anticipates Transition to home with family    Patient/Family Anticipated Services at Transition none    Transportation Anticipated family or friend will provide       Discharge Needs Assessment    Equipment Currently Used at Home walker, rolling    Concerns to be Addressed discharge planning    Anticipated Changes Related to Illness none    Equipment Needed After Discharge none    Discharge Facility/Level of Care Needs other (see comments)  to be determined    Provided Post Acute Provider List? N/A    N/A Provider List Comment denies dc needs at this time    Provided Post Acute Provider Quality & Resource List? N/A               Discharge Plan     Row Name 12/20/22 1458       Plan    Plan home with family    Patient/Family in Agreement with Plan yes    Plan Comments Denies dc needs at this time. PCP and pharmacy verified. Barriers to dc: consults pending              Continued Care and Services - Admitted Since 12/19/2022    Coordination has not been started for this encounter.     Selected Continued Care - Prior Encounters Includes continued care and service providers with selected services from prior encounters from 9/20/2022 to 12/20/2022    Discharged on 10/11/2022 Admission date: 10/6/2022 - Discharge disposition:  Home-Health Care Svc    Durable Medical Equipment     Service Provider Selected Services Address Phone Fax Patient Preferred    [facility] [address], [place] [address] [phone] --          Home Medical Care     Service Provider Selected Services Address Phone Fax Patient Preferred    [facility] [address] [address] [phone] --                    Expected Discharge Date and Time     Expected Discharge Date Expected Discharge Time    Dec 21, 2022          Demographic Summary     Row Name 12/20/22 1457       General Information    Admission Type observation    Arrived From emergency department    Required Notices Provided Observation Status Notice    Referral Source admission list    Reason for Consult discharge planning    Preferred Language English               Functional Status     Row Name 12/20/22 1457       Functional Status    Usual Activity Tolerance moderate    Current Activity Tolerance moderate       Functional Status, IADL    Medications independent    Meal Preparation independent    Housekeeping independent    Laundry independent    Shopping independent       Mental Status    General Appearance WDL WDL       Mental Status Summary    Recent Changes in Mental Status/Cognitive Functioning no changes                         Octavia Liu, RN

## 2022-12-21 ENCOUNTER — APPOINTMENT (OUTPATIENT)
Dept: GENERAL RADIOLOGY | Facility: HOSPITAL | Age: 77
End: 2022-12-21

## 2022-12-21 ENCOUNTER — APPOINTMENT (OUTPATIENT)
Dept: MRI IMAGING | Facility: HOSPITAL | Age: 77
End: 2022-12-21

## 2022-12-21 ENCOUNTER — APPOINTMENT (OUTPATIENT)
Dept: CARDIOLOGY | Facility: HOSPITAL | Age: 77
End: 2022-12-21

## 2022-12-21 PROBLEM — R10.11 RIGHT UPPER QUADRANT ABDOMINAL PAIN: Status: ACTIVE | Noted: 2022-12-21

## 2022-12-21 LAB
ALBUMIN SERPL-MCNC: 3.3 G/DL (ref 3.5–5.2)
ALBUMIN/GLOB SERPL: 1.3 G/DL
ALP SERPL-CCNC: 100 U/L (ref 39–117)
ALPHA1 GLOB MFR UR ELPH: 178 MG/DL (ref 90–200)
ALT SERPL W P-5'-P-CCNC: 76 U/L (ref 1–33)
ANION GAP SERPL CALCULATED.3IONS-SCNC: 10 MMOL/L (ref 5–15)
AST SERPL-CCNC: 79 U/L (ref 1–32)
BASOPHILS # BLD AUTO: 0.1 10*3/MM3 (ref 0–0.2)
BASOPHILS NFR BLD AUTO: 0.7 % (ref 0–1.5)
BILIRUB SERPL-MCNC: 0.7 MG/DL (ref 0–1.2)
BUN SERPL-MCNC: 13 MG/DL (ref 8–23)
BUN/CREAT SERPL: 10.9 (ref 7–25)
CALCIUM SPEC-SCNC: 8.6 MG/DL (ref 8.6–10.5)
CERULOPLASMIN SERPL-MCNC: 33 MG/DL (ref 19–39)
CHLORIDE SERPL-SCNC: 104 MMOL/L (ref 98–107)
CO2 SERPL-SCNC: 24 MMOL/L (ref 22–29)
CREAT SERPL-MCNC: 1.19 MG/DL (ref 0.57–1)
DEPRECATED RDW RBC AUTO: 53.8 FL (ref 37–54)
EGFRCR SERPLBLD CKD-EPI 2021: 47.2 ML/MIN/1.73
EOSINOPHIL # BLD AUTO: 0.2 10*3/MM3 (ref 0–0.4)
EOSINOPHIL NFR BLD AUTO: 2.9 % (ref 0.3–6.2)
ERYTHROCYTE [DISTWIDTH] IN BLOOD BY AUTOMATED COUNT: 16.9 % (ref 12.3–15.4)
GLOBULIN UR ELPH-MCNC: 2.6 GM/DL
GLUCOSE BLDC GLUCOMTR-MCNC: 153 MG/DL (ref 70–105)
GLUCOSE BLDC GLUCOMTR-MCNC: 156 MG/DL (ref 70–105)
GLUCOSE BLDC GLUCOMTR-MCNC: 160 MG/DL (ref 70–105)
GLUCOSE BLDC GLUCOMTR-MCNC: 167 MG/DL (ref 70–105)
GLUCOSE BLDC GLUCOMTR-MCNC: 194 MG/DL (ref 70–105)
GLUCOSE SERPL-MCNC: 191 MG/DL (ref 65–99)
HCT VFR BLD AUTO: 35.8 % (ref 34–46.6)
HGB BLD-MCNC: 11.6 G/DL (ref 12–15.9)
IRON 24H UR-MRATE: 23 MCG/DL (ref 37–145)
IRON SATN MFR SERPL: 7 % (ref 20–50)
LYMPHOCYTES # BLD AUTO: 1.6 10*3/MM3 (ref 0.7–3.1)
LYMPHOCYTES NFR BLD AUTO: 21.4 % (ref 19.6–45.3)
MAGNESIUM SERPL-MCNC: 1.8 MG/DL (ref 1.6–2.4)
MCH RBC QN AUTO: 28 PG (ref 26.6–33)
MCHC RBC AUTO-ENTMCNC: 32.5 G/DL (ref 31.5–35.7)
MCV RBC AUTO: 86 FL (ref 79–97)
MONOCYTES # BLD AUTO: 0.6 10*3/MM3 (ref 0.1–0.9)
MONOCYTES NFR BLD AUTO: 8.7 % (ref 5–12)
NEUTROPHILS NFR BLD AUTO: 4.8 10*3/MM3 (ref 1.7–7)
NEUTROPHILS NFR BLD AUTO: 66.3 % (ref 42.7–76)
NRBC BLD AUTO-RTO: 0.1 /100 WBC (ref 0–0.2)
NT-PROBNP SERPL-MCNC: ABNORMAL PG/ML (ref 0–1800)
PHOSPHATE SERPL-MCNC: 3.2 MG/DL (ref 2.5–4.5)
PLATELET # BLD AUTO: 293 10*3/MM3 (ref 140–450)
PMV BLD AUTO: 9.5 FL (ref 6–12)
POTASSIUM SERPL-SCNC: 3.6 MMOL/L (ref 3.5–5.2)
PROT SERPL-MCNC: 5.9 G/DL (ref 6–8.5)
RBC # BLD AUTO: 4.16 10*6/MM3 (ref 3.77–5.28)
SODIUM SERPL-SCNC: 138 MMOL/L (ref 136–145)
TIBC SERPL-MCNC: 319 MCG/DL (ref 298–536)
TRANSFERRIN SERPL-MCNC: 214 MG/DL (ref 200–360)
WBC NRBC COR # BLD: 7.3 10*3/MM3 (ref 3.4–10.8)

## 2022-12-21 PROCEDURE — 96365 THER/PROPH/DIAG IV INF INIT: CPT

## 2022-12-21 PROCEDURE — 86015 ACTIN ANTIBODY EACH: CPT | Performed by: NURSE PRACTITIONER

## 2022-12-21 PROCEDURE — 83540 ASSAY OF IRON: CPT | Performed by: NURSE PRACTITIONER

## 2022-12-21 PROCEDURE — G0378 HOSPITAL OBSERVATION PER HR: HCPCS

## 2022-12-21 PROCEDURE — 86038 ANTINUCLEAR ANTIBODIES: CPT | Performed by: NURSE PRACTITIONER

## 2022-12-21 PROCEDURE — 99214 OFFICE O/P EST MOD 30 MIN: CPT | Performed by: NURSE PRACTITIONER

## 2022-12-21 PROCEDURE — 93306 TTE W/DOPPLER COMPLETE: CPT

## 2022-12-21 PROCEDURE — 72114 X-RAY EXAM L-S SPINE BENDING: CPT

## 2022-12-21 PROCEDURE — 80053 COMPREHEN METABOLIC PANEL: CPT | Performed by: HOSPITALIST

## 2022-12-21 PROCEDURE — 96366 THER/PROPH/DIAG IV INF ADDON: CPT

## 2022-12-21 PROCEDURE — 82390 ASSAY OF CERULOPLASMIN: CPT | Performed by: NURSE PRACTITIONER

## 2022-12-21 PROCEDURE — 63710000001 INSULIN LISPRO (HUMAN) PER 5 UNITS: Performed by: HOSPITALIST

## 2022-12-21 PROCEDURE — 97162 PT EVAL MOD COMPLEX 30 MIN: CPT

## 2022-12-21 PROCEDURE — 25010000002 PIPERACILLIN SOD-TAZOBACTAM PER 1 G: Performed by: HOSPITALIST

## 2022-12-21 PROCEDURE — 84100 ASSAY OF PHOSPHORUS: CPT | Performed by: HOSPITALIST

## 2022-12-21 PROCEDURE — 83735 ASSAY OF MAGNESIUM: CPT | Performed by: HOSPITALIST

## 2022-12-21 PROCEDURE — 85025 COMPLETE CBC W/AUTO DIFF WBC: CPT | Performed by: NURSE PRACTITIONER

## 2022-12-21 PROCEDURE — 74181 MRI ABDOMEN W/O CONTRAST: CPT

## 2022-12-21 PROCEDURE — 86381 MITOCHONDRIAL ANTIBODY EACH: CPT | Performed by: NURSE PRACTITIONER

## 2022-12-21 PROCEDURE — 82103 ALPHA-1-ANTITRYPSIN TOTAL: CPT | Performed by: NURSE PRACTITIONER

## 2022-12-21 PROCEDURE — 85025 COMPLETE CBC W/AUTO DIFF WBC: CPT

## 2022-12-21 PROCEDURE — 86376 MICROSOMAL ANTIBODY EACH: CPT | Performed by: NURSE PRACTITIONER

## 2022-12-21 PROCEDURE — 82962 GLUCOSE BLOOD TEST: CPT

## 2022-12-21 PROCEDURE — 80053 COMPREHEN METABOLIC PANEL: CPT | Performed by: NURSE PRACTITIONER

## 2022-12-21 PROCEDURE — 63710000001 INSULIN LISPRO (HUMAN) PER 5 UNITS

## 2022-12-21 PROCEDURE — 93306 TTE W/DOPPLER COMPLETE: CPT | Performed by: INTERNAL MEDICINE

## 2022-12-21 PROCEDURE — 84466 ASSAY OF TRANSFERRIN: CPT | Performed by: NURSE PRACTITIONER

## 2022-12-21 RX ORDER — AMOXICILLIN AND CLAVULANATE POTASSIUM 875; 125 MG/1; MG/1
1 TABLET, FILM COATED ORAL EVERY 12 HOURS SCHEDULED
Status: DISCONTINUED | OUTPATIENT
Start: 2022-12-21 | End: 2022-12-22 | Stop reason: HOSPADM

## 2022-12-21 RX ORDER — FLUOXETINE HYDROCHLORIDE 20 MG/1
20 CAPSULE ORAL DAILY
Status: DISCONTINUED | OUTPATIENT
Start: 2022-12-21 | End: 2022-12-22 | Stop reason: HOSPADM

## 2022-12-21 RX ORDER — PANTOPRAZOLE SODIUM 40 MG/1
40 TABLET, DELAYED RELEASE ORAL
Status: DISCONTINUED | OUTPATIENT
Start: 2022-12-22 | End: 2022-12-22 | Stop reason: HOSPADM

## 2022-12-21 RX ORDER — SERTRALINE HYDROCHLORIDE 25 MG/1
25 TABLET, FILM COATED ORAL DAILY
Status: DISCONTINUED | OUTPATIENT
Start: 2022-12-21 | End: 2022-12-22 | Stop reason: HOSPADM

## 2022-12-21 RX ORDER — ATORVASTATIN CALCIUM 40 MG/1
40 TABLET, FILM COATED ORAL NIGHTLY
Status: DISCONTINUED | OUTPATIENT
Start: 2022-12-21 | End: 2022-12-22 | Stop reason: HOSPADM

## 2022-12-21 RX ORDER — FLUDROCORTISONE ACETATE 0.1 MG/1
0.1 TABLET ORAL DAILY
Status: DISCONTINUED | OUTPATIENT
Start: 2022-12-21 | End: 2022-12-22 | Stop reason: HOSPADM

## 2022-12-21 RX ORDER — INSULIN LISPRO 100 [IU]/ML
2-9 INJECTION, SOLUTION INTRAVENOUS; SUBCUTANEOUS
Status: DISCONTINUED | OUTPATIENT
Start: 2022-12-21 | End: 2022-12-22 | Stop reason: HOSPADM

## 2022-12-21 RX ORDER — HYDROXYZINE HYDROCHLORIDE 25 MG/1
25 TABLET, FILM COATED ORAL 3 TIMES DAILY PRN
Status: DISCONTINUED | OUTPATIENT
Start: 2022-12-21 | End: 2022-12-22 | Stop reason: HOSPADM

## 2022-12-21 RX ADMIN — SENNOSIDES AND DOCUSATE SODIUM 2 TABLET: 50; 8.6 TABLET ORAL at 08:14

## 2022-12-21 RX ADMIN — PIPERACILLIN AND TAZOBACTAM 3.38 G: 3; .375 INJECTION, POWDER, FOR SOLUTION INTRAVENOUS at 03:50

## 2022-12-21 RX ADMIN — Medication 5 MG: at 20:28

## 2022-12-21 RX ADMIN — INSULIN LISPRO 2 UNITS: 100 INJECTION, SOLUTION INTRAVENOUS; SUBCUTANEOUS at 00:56

## 2022-12-21 RX ADMIN — HYDROXYZINE HYDROCHLORIDE 25 MG: 25 TABLET, FILM COATED ORAL at 00:50

## 2022-12-21 RX ADMIN — FLUOXETINE 20 MG: 20 CAPSULE ORAL at 16:37

## 2022-12-21 RX ADMIN — PIPERACILLIN AND TAZOBACTAM 3.38 G: 3; .375 INJECTION, POWDER, FOR SOLUTION INTRAVENOUS at 13:47

## 2022-12-21 RX ADMIN — SERTRALINE 25 MG: 25 TABLET, FILM COATED ORAL at 16:37

## 2022-12-21 RX ADMIN — INSULIN LISPRO 2 UNITS: 100 INJECTION, SOLUTION INTRAVENOUS; SUBCUTANEOUS at 18:41

## 2022-12-21 RX ADMIN — ATORVASTATIN CALCIUM 40 MG: 40 TABLET, FILM COATED ORAL at 20:27

## 2022-12-21 RX ADMIN — AMOXICILLIN AND CLAVULANATE POTASSIUM 1 TABLET: 875; 125 TABLET, FILM COATED ORAL at 20:27

## 2022-12-21 RX ADMIN — Medication 10 ML: at 08:14

## 2022-12-21 RX ADMIN — SENNOSIDES AND DOCUSATE SODIUM 2 TABLET: 50; 8.6 TABLET ORAL at 20:27

## 2022-12-21 RX ADMIN — FLUDROCORTISONE ACETATE 0.1 MG: 0.1 TABLET ORAL at 16:37

## 2022-12-21 RX ADMIN — METOPROLOL TARTRATE 25 MG: 25 TABLET, FILM COATED ORAL at 03:50

## 2022-12-21 RX ADMIN — TICAGRELOR 90 MG: 90 TABLET ORAL at 20:27

## 2022-12-21 RX ADMIN — INSULIN LISPRO 2 UNITS: 100 INJECTION, SOLUTION INTRAVENOUS; SUBCUTANEOUS at 13:47

## 2022-12-21 RX ADMIN — METOPROLOL TARTRATE 25 MG: 25 TABLET, FILM COATED ORAL at 20:27

## 2022-12-21 RX ADMIN — Medication 10 ML: at 20:28

## 2022-12-21 RX ADMIN — INSULIN LISPRO 2 UNITS: 100 INJECTION, SOLUTION INTRAVENOUS; SUBCUTANEOUS at 08:13

## 2022-12-21 RX ADMIN — METOPROLOL TARTRATE 25 MG: 25 TABLET, FILM COATED ORAL at 08:14

## 2022-12-21 RX ADMIN — ALPRAZOLAM 1 MG: 1 TABLET ORAL at 20:28

## 2022-12-21 NOTE — PROGRESS NOTES
[facility] Medicine Services   Daily Progress Note    Patient Name: Nadege Barrow  : 1945  MRN: [id number]  Primary Care Physician:  Dave Esparza  Date of admission: 2022  Date and Time of Service:  at       Subjective      Patient denies any abdominal pain today  Tolerating diet  Complaining of lower back pain with right buttock pain. Worse when she is walking  Denies any chills or sweats  has some nausea but no vomiting  No chest pain or Sob. Mild cough.         Objective      Vitals:   Temp:  [97.8 °F (36.6 °C)-98.4 °F (36.9 °C)] 98 °F (36.7 °C)  Heart Rate:  [61-79] 74  Resp:  [14-16] 15  BP: (135-157)/(59-88) 151/88    Physical Exam   General: No acute distress  HEENT: neck supple, normal oral mucosa, no masses, no lymphadenopathy  Lungs: Clear bilaterally, no wheezing, No crackles, No Rhonchi. Equal excursions.   CV - Normal S1/S2, + systolic murmur RUSB and LLSB,  Regular rate and rhythm   Abdomin - Soft, non-tender, non-distended, normal bowel sounds  Extremities - no edema, no erythema  Neuro - No focal weakness, normal sensation  Psych - Alert and oriented x3  Skin - no wounds or lesions.          Result Review    Result Review:  I have personally reviewed the results from the time of this admission to 2022 15:10 EST and agree with these findings:  [x]  Laboratory  [x]  Microbiology  [x]  Radiology  [x]  EKG/Telemetry   [x]  Cardiology/Vascular   []  Pathology  [x]  Old records  []  Other:  Most notable findings include:           Assessment & Plan      Brief Patient Summary:  Nadege Barrow is a 77 y.o. female who       amoxicillin-clavulanate, 1 tablet, Oral, Q12H  insulin lispro, 2-9 Units, Subcutaneous, 4x Daily With Meals & Nightly  metoprolol tartrate, 25 mg, Oral, Q12H  [START ON 2022] pantoprazole, 40 mg, Oral, Q AM  senna-docusate sodium, 2 tablet, Oral, BID  sodium chloride, 10 mL, Intravenous, Q12H             Active Hospital Problems:  Active Hospital Problems     Diagnosis    • **Acute cholecystitis    • Right upper quadrant abdominal pain    • Type 2 diabetes mellitus (HCC)    • Generalized anxiety disorder    • Ischemic cardiomyopathy    • Senile dementia, delirium, with behavioral disturbance    • Hypertension    • Depression, unspecified    • Atherosclerotic heart disease of native coronary artery without angina pectoris    • Disabling essential tremor    • Major depressive disorder, recurrent episode, moderate (HCC)    • Hyperlipidemia, mixed      Plan:     Acute cholecystitis  - CT with marked pericholecystic edema  - Elevated Bilirubin and Liver enzymes - trended down.   - MRCP negative, gallbladder wall edema  - HIDA Normal.    - Will switch to short course Augmentin to complete 5 days Abx  - Discussed with Surgery  - Check Hepatitis panel     CAD s/p RCA stent 5/2018  - Chronic systolic heart failure - EF 40-45% - 2/2022  - Aortic stenosis, Tricuspid regurg   - No surgery planned. Will restart ASA and Brilinta - Stress 2/2022 no ischemia   - Patient has been complaining of exertional dyspnea.   - Will consult Cardiology for risk stratification in case Surgery needed  - Continue Statin and BB   - Continue to hold losartan given SONI  - Repeat Echo     Mild SONI  - hold losartan  - monitor kidney function     Low back pain  - MRI 8/2022 with non-displaced T11 endplate fracture and severe canal stenosis  - Will get XR to r/o worsening fracture  - Needs OP follow up with Neurosurgery      Depression with anxiety  Senile dementia  -Chronic, Stable  -Continue home Xanax, Prozac, Zoloft      DM-2 with Hyperglycemia  -SSI ordered  -Hold home Jardiance      DVT prophylaxis:  Mechanical DVT prophylaxis orders are present.    CODE STATUS:    Code Status (Patient has no pulse and is not breathing): CPR (Attempt to Resuscitate)  Medical Interventions (Patient has pulse or is breathing): Full Support    PT eval    Disposition:  I expect patient to be discharged .    This patient  has been and discussed with . 12/21/22      Electronically signed by Aracely Gonzales MD, 12/21/22, 15:10 EST.  [facility] Hospitalist Team

## 2022-12-21 NOTE — CONSULTS
"GI CONSULT  NOTE:    Referring Provider:  Dr. Gonzales    Chief complaint: Elevated bilirubin    Subjective . \"  I was weak and having side pain\"    History of present illness: Nadege Barrow is a 77 y.o. female who has a history of diabetes, heart failure, tremor and anxiety/panic attacks.  Recent hospitalization for UTI and abdominal pain with unintentional weight loss.  This was thought to be secondary to underlying constipation in which she was started on MiraLAX.  She now reports improvement in her bowel habits and denies further constipation or diarrhea.  She is tolerating diet denies nausea, vomiting or difficulty swallowing.  She denies abdominal pain but does report side pain in the right flank into her hip.  Decreased oral intake with unintentional weight loss of 25 pounds.  She denies history of known liver disease, NSAID use or alcohol abuse.  She has a history of jaundice at age 5.  No new medications, exposure to chemicals or recent travel.  She does report significant osteoarthritis issues.    Endo History:  Reports unremarkable colonoscopy at age 50 and negative Cologuard at age 60    Past Medical History:  Past Medical History:   Diagnosis Date   • Anxiety    • CHF (congestive heart failure) (HCC)    • Depression    • Diabetes mellitus (HCC)    • Hyperlipidemia    • Hypertension    • Panic disorder     \"panic attacks\"   • Tremors of nervous system     \"essential tremors\"       Past Surgical History:  Past Surgical History:   Procedure Laterality Date   • CORONARY ANGIOPLASTY WITH STENT PLACEMENT         Social History:  Social History     Tobacco Use   • Smoking status: Never   • Smokeless tobacco: Never   Vaping Use   • Vaping Use: Never used   Substance Use Topics   • Alcohol use: Not Currently   • Drug use: No   None    Family History:  Family History   Problem Relation Age of Onset   • Depression Sister    • Suicidality Other         suicided   • Alcohol abuse Other    • Alcohol abuse Daughter    • " Depression Other        Medications:  Medications Prior to Admission   Medication Sig Dispense Refill Last Dose   • ALPRAZolam (XANAX) 1 MG tablet Take 1 tablet by mouth 3 (Three) Times a Day As Needed for Anxiety. 90 tablet 1    • atorvastatin (LIPITOR) 40 MG tablet Take 40 mg by mouth Every Night.      • Empagliflozin (Jardiance) 10 MG tablet Take 1 tablet by mouth Every Morning.      • fludrocortisone 0.1 MG tablet Take 0.1 mg by mouth Every Morning.      • FLUoxetine (PROzac) 20 MG capsule Take 1 capsule by mouth Daily. 30 capsule 1    • furosemide (LASIX) 20 MG tablet Take 1 tablet by mouth Daily. 10 tablet 0    • losartan (COZAAR) 25 MG tablet Take 1 tablet by mouth Daily. 30 tablet 0    • metoprolol succinate XL (TOPROL-XL) 50 MG 24 hr tablet Take 50 mg by mouth Every Morning. Hold for blood pressure less than 100 or pulse less than 60      • potassium chloride (K-DUR,KLOR-CON) 20 MEQ CR tablet Take 1 tablet by mouth Every Morning.      • sertraline (Zoloft) 25 MG tablet Take 1 tablet by mouth Daily. 30 tablet 2    • ticagrelor (BRILINTA) 90 MG tablet tablet Take 90 mg by mouth 2 (Two) Times a Day.      • Insulin NPH Isophane & Regular (NovoLIN 70/30 FlexPen) (70-30) 100 UNIT/ML suspension pen-injector Inject 20 Units under the skin into the appropriate area as directed 2 (Two) Times a Day With Meals.      • nitroglycerin (NITROSTAT) 0.4 MG SL tablet Place 1 tablet under the tongue Every 5 (Five) Minutes As Needed for Chest Pain. Take no more than 3 doses in 15 minutes. 25 tablet 0    • polyethylene glycol (MIRALAX) 17 g packet Take 17 g by mouth Daily for 30 days. 30 packet 0        Scheduled Meds:insulin lispro, 2-9 Units, Subcutaneous, 4x Daily With Meals & Nightly  metoprolol tartrate, 25 mg, Oral, Q12H  piperacillin-tazobactam, 3.375 g, Intravenous, Q8H  senna-docusate sodium, 2 tablet, Oral, BID  sodium chloride, 10 mL, Intravenous, Q12H      Continuous Infusions:   PRN Meds:.•  acetaminophen **OR**  "acetaminophen **OR** acetaminophen  •  ALPRAZolam  •  senna-docusate sodium **AND** polyethylene glycol **AND** bisacodyl **AND** bisacodyl  •  dextrose  •  dextrose  •  glucagon (human recombinant)  •  hydrALAZINE  •  hydrOXYzine  •  melatonin  •  ondansetron **OR** ondansetron  •  sodium chloride  •  sodium chloride    ALLERGIES:  Morphine    ROS:  The following systems were reviewed  Constitution:  No fevers, chills,  unintentional weight loss  Skin: no rash, no jaundice  Eyes:  No blurry vision, no eye pain  HENT:  No change in hearing or smell  Resp:  No dyspnea or cough  CV:  No chest pain or palpitations  :  No dysuria, hematuria  Musculoskeletal:  No leg cramps or arthralgias, back\hip pain  Neuro:  No tremor, no numbness  Psych:  No depression or confusion    Objective Resting in bed, chronically ill-appearing but no acute distress, nurse in room    Vital Signs:   Vitals:    12/20/22 2144 12/21/22 0434 12/21/22 0814 12/21/22 0854   BP: 135/79 153/82 143/88 143/88   BP Location: Right arm Right arm     Patient Position: Lying Lying     Pulse: 79 75 75    Resp: 16 14     Temp: 98.4 °F (36.9 °C) 98.2 °F (36.8 °C)     TempSrc: Oral Oral     SpO2: 99% 98%     Weight:    66.7 kg (147 lb)   Height:    160 cm (63\")       Physical Exam:       General Appearance:    Awake and alert, in no acute distress   Head:    Normocephalic, without obvious abnormality, atraumatic   Throat:   No oral lesions, no thrush, oral mucosa moist   Lungs:     Respirations regular, even and unlabored   Chest Wall:    No abnormalities observed   Abdomen:     Soft, right upper quadrant tenderness without rebound or guarding, nondistended   Rectal:     Deferred   Extremities:   Moves all extremities, nno cyanosis       Skin:   No rash, no jaundice, normal palpation       Neurologic:   Cranial nerves 2 - 12 grossly intact       Results Review:   I reviewed the patient's labs and imaging.  CBC    Results from last 7 days   Lab Units " 12/21/22  0021 12/20/22  1329 12/19/22  2230 12/19/22  1448   WBC 10*3/mm3 7.30 5.90 6.50 7.39   HEMOGLOBIN g/dL 11.6* 11.9* 11.3* 11.3*   PLATELETS 10*3/mm3 293 280 258 296     CMP   Results from last 7 days   Lab Units 12/21/22  0021 12/20/22  1329 12/19/22  2230 12/19/22  1448   SODIUM mmol/L 138 142 141 132*   POTASSIUM mmol/L 3.6 3.6 3.6 3.6   CHLORIDE mmol/L 104 107 106 100   CO2 mmol/L 24.0 21.0* 24.0 19.9*   BUN mg/dL 13 11 12 13   CREATININE mg/dL 1.19* 0.81 0.92 0.95   GLUCOSE mg/dL 191* 161* 167* 335*   ALBUMIN g/dL 3.30* 3.30* 3.30* 3.70   BILIRUBIN mg/dL 0.7 1.3* 1.4* 1.3*   ALK PHOS U/L 100 99 95 97   AST (SGOT) U/L 79* 86* 120* 118*   ALT (SGPT) U/L 76* 73* 77* 63*   MAGNESIUM mg/dL 1.8  --   --   --    PHOSPHORUS mg/dL 3.2  --   --   --    LIPASE U/L  --   --   --  26     Cr Clearance Estimated Creatinine Clearance: 36.3 mL/min (A) (by C-G formula based on SCr of 1.19 mg/dL (H)).  Coag     HbA1C   Lab Results   Component Value Date    HGBA1C 7.9 (H) 12/10/2022    HGBA1C 7.0 (H) 10/07/2022    HGBA1C 7.1 (H) 09/14/2022     Blood Glucose   Glucose   Date/Time Value Ref Range Status   12/21/2022 0703 160 (H) 70 - 105 mg/dL Final     Comment:     Serial Number: [id number]:  [id number]   12/21/2022 0053 194 (H) 70 - 105 mg/dL Final     Comment:     Serial Number: [id number]:  [id number]   12/20/2022 1759 262 (H) 70 - 105 mg/dL Final     Comment:     Serial Number: [id number]:  [id number]   12/20/2022 1348 166 (H) 70 - 105 mg/dL Final     Comment:     Serial Number: [id number]:  [id number]   12/20/2022 0554 152 (H) 70 - 105 mg/dL Final     Comment:     Serial Number: [id number]:  [id number]   12/20/2022 0045 147 (H) 70 - 105 mg/dL Final     Comment:     Serial Number: [id number]:  [id number]   12/19/2022 1437 277 (H) 70 - 130 mg/dL Final     Comment:     Serial Number: [id number]:  [id number]     Infection     UA      Radiology(recent) NM HIDA SCAN WITHOUT  PHARMACOLOGICAL INTERVENTION    Result Date: 12/20/2022  Normal HIDA scan. Normal gallbladder ejection fraction, 67%.  Electronically Signed By-María Lauren MD On:12/20/2022 2:03 PM This report was finalized on [id number] by  María Lauren MD.    US Gallbladder    Result Date: 12/19/2022  1. Diffuse abnormal gallbladder wall edema with pericholecystic fluid. This is new since the 12/9/2022 ultrasound. FINDINGS suggest the presence of acalculous cholecystitis. Correlate with clinical symptoms.  Electronically Signed By-María Lauren MD On:12/19/2022 4:33 PM This report was finalized on [id number] by  María Lauren MD.    XR Chest 1 View    Result Date: 12/19/2022  No acute process.  Electronically Signed By-Finn Ashton MD On:12/19/2022 3:29 PM This report was finalized on [id number] by  Finn Ashton MD.         ASSESSMENT:  Abnormal LFTs  Right upper quadrant pain  Abnormal ultrasound of the gallbladder  Right flank/posterior hip pain  Osteoarthritis  DMII  Anxiety/panic attacks    PLAN:  Patient presents from assisted living with weakness and right flank into hip pain.  Imaging showed some gallbladder edema with fluid and general surgery was consulted who did not feel like she was having cholecystitis type issues and GI was consulted.  She has had some mild elevation in her liver enzymes but denies any GI complaints and is tolerating oral nutrition.  She does have some right upper quadrant tenderness on exam.  Ultrasound with gallbladder edema which was not previously noted on imaging.  HIDA EF 67%.  We will proceed with liver work-up and MRCP to rule out choledocholithiasis.  Fatty liver noted on imaging.  Continue supportive care and further recommendations to follow work-up and imaging.    I discussed the patients findings and my recommendations with the patient.    We appreciate the referral    Electronically signed by PAM Smith, 12/21/22, 10:08 AM EST.

## 2022-12-21 NOTE — PLAN OF CARE
Goal Outcome Evaluation:              Outcome Evaluation: Pt's VSS. Some complaints of pain in her hip when she moves a certain way. Pt stated that she was very itchy so this nurse called on-call provider and obtained an order for Atarax to ease the itching. Pt states that this happens to her some times and is not unusual. Able to make needs known. Call light within reach. Plan of care ongoing. Will continue to monitor.

## 2022-12-21 NOTE — THERAPY EVALUATION
"Patient Name: Nadege Barrow  : 1945    MRN: [id number]                              Today's Date: 2022       Admit Date: 2022    Visit Dx:     ICD-10-CM ICD-9-CM   1. Acute cholecystitis  K81.0 575.0     Patient Active Problem List   Diagnosis   • Chest pain on breathing   • Hypertension   • Acute respiratory failure with hypoxia ([facility])   • Anemia   • Atherosclerotic heart disease of native coronary artery without angina pectoris   • Back pain   • Cardiomyopathy ([facility])   • Carotid artery disease ([facility])   • Cellulitis of foot   • Acute on chronic congestive heart failure ([facility])   • Major depressive disorder, recurrent episode, moderate ([facility])   • Senile dementia, delirium, with behavioral disturbance   • Depression, unspecified   • Disabling essential tremor   • Fatigue   • Hyperlipidemia, mixed   • Peripheral vision loss, bilateral   • Retinopathy, bilateral   • S/P right coronary artery (RCA) stent placement   • Ischemic cardiomyopathy   • Elevated LFTs   • Generalized anxiety disorder   • Benzodiazepine dependence, continuous ([facility])   • Type 2 diabetes mellitus ([facility])   • Chest pain, unspecified type   • Weakness of both legs   • Hypokalemia   • Lumbar back pain with radiculopathy affecting lower extremity   • Spinal stenosis of lumbar region   • Acute congestive heart failure, unspecified heart failure type ([facility])   • COVID-19 virus detected   • Cytokine release syndrome, grade 1   • Diarrhea, unspecified type   • Abdominal pain, unspecified abdominal location   • Acute cholecystitis     Past Medical History:   Diagnosis Date   • Anxiety    • CHF (congestive heart failure) ([facility])    • Depression    • Diabetes mellitus ([facility])    • Hyperlipidemia    • Hypertension    • Panic disorder     \"panic attacks\"   • Tremors of nervous system     \"essential tremors\"     Past Surgical History:   Procedure Laterality Date   • CORONARY ANGIOPLASTY WITH STENT PLACEMENT        General Information     Row Name 22 " 1258          Physical Therapy Time and Intention    Document Type evaluation  -BR     Mode of Treatment physical therapy  -BR     Row Name 12/21/22 1258          General Information    Patient Profile Reviewed yes  -BR     Prior Level of Function independent:;all household mobility  Pt uses a rollator outdoors.  -BR     Row Name 12/21/22 1258          Living Environment    People in Home child(kimberley), adult  -BR     Row Name 12/21/22 1258          Home Main Entrance    Number of Stairs, Main Entrance five  -BR     Stair Railings, Main Entrance railings safe and in good condition  -BR     Row Name 12/21/22 1304          Stairs Within Home, Primary    Number of Stairs, Within Home, Primary five  -BR     Stair Railings, Within Home, Primary railings safe and in good condition  -BR     Row Name 12/21/22 1258          Cognition    Orientation Status (Cognition) oriented x 4  -BR     Row Name 12/21/22 1258          Safety Issues, Functional Mobility    Impairments Affecting Function (Mobility) balance;endurance/activity tolerance;strength;pain  -BR           User Key  (r) = Recorded By, (t) = Taken By, (c) = Cosigned By    Initials Name Provider Type    BR Ariadna Barajas, PT Physical Therapist               Mobility     Row Name 12/21/22 1302          Bed Mobility    Bed Mobility bed mobility (all) activities  -BR     All Activities, [place] (Bed Mobility) contact guard  -BR     Assistive Device (Bed Mobility) head of bed elevated;bed rails  -BR     Row Name 12/21/22 1302          Sit-Stand Transfer    Sit-Stand [place] (Transfers) minimum assist (75% patient effort)  -BR     Assistive Device (Sit-Stand Transfers) walker, front-wheeled  -BR     Row Name 12/21/22 1302          Gait/Stairs (Locomotion)    [place] Level (Gait) minimum assist (75% patient effort)  -BR     Assistive Device (Gait) walker, front-wheeled  -BR     Distance in Feet (Gait) 35  -BR     Deviations/Abnormal Patterns (Gait) weight  shifting decreased;felix decreased  -BR     Bilateral Gait Deviations heel strike decreased;forward flexed posture  -BR           User Key  (r) = Recorded By, (t) = Taken By, (c) = Cosigned By    Initials Name Provider Type    Ariadna Hanson PT Physical Therapist               Obj/Interventions     Row Name 12/21/22 1303          Range of Motion Comprehensive    General Range of Motion bilateral lower extremity ROM WFL  -BR     Row Name 12/21/22 1303          Strength Comprehensive (MMT)    Comment, General Manual Muscle Testing (MMT) Assessment grossly 3+/5 BLE  -BR     Row Name 12/21/22 1303          Balance    Balance Assessment sitting static balance;standing static balance  -BR     Static Sitting Balance supervision  -BR     Position, Sitting Balance unsupported;sitting edge of bed  -BR     Static Standing Balance minimal assist  -BR     Position/Device Used, Standing Balance walker, front-wheeled  -BR     Row Name 12/21/22 1303          Sensory Assessment (Somatosensory)    Sensory Assessment (Somatosensory) LE sensation intact  -BR           User Key  (r) = Recorded By, (t) = Taken By, (c) = Cosigned By    Initials Name Provider Type    Ariadna Hanson PT Physical Therapist               Goals/Plan     Row Name 12/21/22 1311          Bed Mobility Goal 1 (PT)    Activity/Assistive Device (Bed Mobility Goal 1, PT) bed mobility activities, all  -BR     [place] Level/Cues Needed (Bed Mobility Goal 1, PT) modified independence  -BR     Time Frame (Bed Mobility Goal 1, PT) long term goal (LTG);2 weeks  -BR     Row Name 12/21/22 1311          Transfer Goal 1 (PT)    Activity/Assistive Device (Transfer Goal 1, PT) transfers, all  -BR     [place] Level/Cues Needed (Transfer Goal 1, PT) modified independence  -BR     Time Frame (Transfer Goal 1, PT) long term goal (LTG);2 weeks  -BR     Row Name 12/21/22 1311          Gait Training Goal 1 (PT)    Activity/Assistive Device (Gait Training Goal 1,  PT) gait (walking locomotion);walker, rolling  -BR     [place] Level (Gait Training Goal 1, PT) standby assist  -BR     Distance (Gait Training Goal 1, PT) 100  -BR     Time Frame (Gait Training Goal 1, PT) long term goal (LTG);2 weeks  -BR     Row Name 12/21/22 1311          Therapy Assessment/Plan (PT)    Planned Therapy Interventions (PT) balance training;bed mobility training;neuromuscular re-education;gait training;patient/family education;stair training;strengthening  -BR           User Key  (r) = Recorded By, (t) = Taken By, (c) = Cosigned By    Initials Name Provider Type    BR Ariadna Barajas, PT Physical Therapist               Clinical Impression     Row Name 12/21/22 1304          Pain    Pretreatment Pain Rating 8/10  -BR     Posttreatment Pain Rating 8/10  -BR     Pain Location - Side/Orientation Right  -BR     Pain Location - hip  -BR     Row Name 12/21/22 1311 12/21/22 1304       Plan of Care Review    Plan of Care Reviewed With -- patient  -BR    Outcome Evaluation Pt presents as a 78 y/o F admitted to [facility] on 12/19/22 with abdominal pain. Pt also c/o right hip pain. HIDA Scan was normal. CT showed marked pericholecystic edema. Pt is not requiring surgical intervention and has been referred for a GI consult. . MRI of abdomen pending. Pt reports she has been in bed a lot the past few weeks. At baseline, she lives with her daughter and is independent with household mobility; she uses a rollator outdoors. Per PT Eval, pt required min assist for transfers and 35 feet of gait tolerance with rolling walker. Pt is below her baseline for mobility.  PT recommendation is Home Health Physical Therapy.  PT will follow.  -BR --    Row Name 12/21/22 1304          Therapy Assessment/Plan (PT)    Rehab Potential (PT) good, to achieve stated therapy goals  -BR     Criteria for Skilled Interventions Met (PT) yes;meets criteria;skilled treatment is necessary  -BR     Therapy Frequency (PT) 3 times/wk  -BR      Predicted Duration of Therapy Intervention (PT) until D/C  -BR     Row Name 12/21/22 1305          Positioning and Restraints    Pre-Treatment Position in bed  -BR     Post Treatment Position chair  -BR     In Chair notified nsg;reclined;call light within reach;encouraged to call for assist  -BR           User Key  (r) = Recorded By, (t) = Taken By, (c) = Cosigned By    Initials Name Provider Type    Ariadna Hanson PT Physical Therapist               Outcome Measures     Row Name 12/21/22 1311          How much help from another person do you currently need...    Turning from your back to your side while in flat bed without using bedrails? 4  -BR     Moving from lying on back to sitting on the side of a flat bed without bedrails? 4  -BR     Moving to and from a bed to a chair (including a wheelchair)? 3  -BR     Standing up from a chair using your arms (e.g., wheelchair, bedside chair)? 3  -BR     Climbing 3-5 steps with a railing? 2  -BR     To walk in hospital room? 3  -BR     AM-PAC 6 Clicks Score (PT) 19  -BR     Highest level of mobility 6 --> Walked 10 steps or more  -BR     Row Name 12/21/22 1311          Functional Assessment    Outcome Measure Options AM-PAC 6 Clicks Basic Mobility (PT)  -BR           User Key  (r) = Recorded By, (t) = Taken By, (c) = Cosigned By    Initials Name Provider Type    Ariadna Hanson PT Physical Therapist                             Physical Therapy Education     Title: PT OT SLP Therapies (Done)     Topic: Physical Therapy (Done)     Point: Mobility training (Done)     Learning Progress Summary           Patient Acceptance, E,D, VU,DU by ELIER at 12/21/2022 1312                   Point: Home exercise program (Done)     Learning Progress Summary           Patient Acceptance, E,D, VU,DU by ELIER at 12/21/2022 1312                   Point: Body mechanics (Done)     Learning Progress Summary           Patient Acceptance, E,D, VU,DU by ELIER at 12/21/2022 1312                    Point: Precautions (Done)     Learning Progress Summary           Patient Acceptance, E,D, VU,DU by BR at 12/21/2022 1312                               User Key     Initials Effective Dates Name Provider Type Discipline     02/01/22 -  Ariadna Barajas PT Physical Therapist PT              PT Recommendation and Plan  Planned Therapy Interventions (PT): balance training, bed mobility training, neuromuscular re-education, gait training, patient/family education, stair training, strengthening  Plan of Care Reviewed With: patient  Outcome Evaluation: Pt presents as a 76 y/o F admitted to [facility] on 12/19/22 with abdominal pain. Pt also c/o right hip pain. HIDA Scan was normal. CT showed marked pericholecystic edema. Pt is not requiring surgical intervention and has been referred for a GI consult. . MRI of abdomen pending. Pt reports she has been in bed a lot the past few weeks. At baseline, she lives with her daughter and is independent with household mobility; she uses a rollator outdoors. Per PT Eval, pt required min assist for transfers and 35 feet of gait tolerance with rolling walker. Pt is below her baseline for mobility.  PT recommendation is Home Health Physical Therapy.  PT will follow.     Time Calculation:    PT Charges     Row Name 12/21/22 1313             Time Calculation    Start Time 0933  -BR      Stop Time 1010  -BR      Time Calculation (min) 37 min  -BR      PT Received On 12/21/22  -BR      PT - Next Appointment 12/22/22  -BR      PT Goal Re-Cert Due Date 01/04/23  -BR         Time Calculation- PT    Total Timed Code Minutes- PT 0 minute(s)  -BR            User Key  (r) = Recorded By, (t) = Taken By, (c) = Cosigned By    Initials Name Provider Type    BR Ariadna Barajas PT Physical Therapist              Therapy Charges for Today     Code Description Service Date Service Provider Modifiers Qty    [id number] HC PT EVAL MOD COMPLEXITY 4 12/21/2022 Ariadna Barajas, PT GP 1          PT  G-Codes  Outcome Measure Options: AM-PAC 6 Clicks Basic Mobility (PT)  AM-PAC 6 Clicks Score (PT): 19  PT Discharge Summary  Anticipated Discharge Disposition (PT): home with home health    Ariadna Barajas, PT  12/21/2022

## 2022-12-21 NOTE — PLAN OF CARE
Goal Outcome Evaluation:              Outcome Evaluation: pt states that abdominal pain is no longer present but more back pain. She had an xray of her back and is having an EGD tomorrow.

## 2022-12-21 NOTE — CONSULTS
Cardiology Consult Note      REQUESTING PHYSICIAN    Aracely Gonzales MD    PATIENT IDENTIFICATION  Name: Nadege Barrow  Age: 77 y.o.  Sex: female  :  1945  MRN: [id number]             REASON FOR CONSULTATION:  77-year-old female patient of Dr. Moses Mancilla with known history of coronary artery disease status post PCI/RCA May 2018, history of ischemic cardiomyopathy, congestive heart failure, hypertension, dyslipidemia, diabetes mellitus 2, carotid artery disease, essential tremors and panic disorder.  Echo showed EF at that time 40-45%, moderate concentric LVH, moderate LAE, mild-moderate MR, RVSP 40-45 mmHg.  Last office visit 2018    Nuclear stress testing 2022 with no evidence of ischemia, no evidence of prior myocardial injury, low risk study    TTE 2022: EF 41-45%, moderate TR, mild AS      CC:  Presurgical cardiac risk assessment    HISTORY OF PRESENT ILLNESS:   Patient presented to the emergency department [facility] 2022 with complaint of fatigue, shortness of breath, nausea, headache, decreased appetite, right upper quadrant abdominal pain.  Gallbladder ultrasound suggests presence of calculus cholecystitis.  General surgeon Dr. Quigley has consulted on the patient- no indication for cholecystectomy.  Recommend GI evaluation for elevated LFTs and portal edema, possible hepatitis.    Pulm evaluation, patient sitting in bedside chair and appears comfortable.  She reports she received something last night to help her sleep and she still feels very tired today.  She denies any chest discomfort, shortness of breath or lower extremity edema.  She denies any nausea or vomiting.  She does report some recent lower back pain but denies any abdominal pain at present.      REVIEW OF SYSTEMS:  Pertinent items are noted in HPI, all other systems reviewed and negative    OBJECTIVE   EKG-sinus rhythm with left bundle branch block- unchanged  Creatinine 1.19  Magnesium  "1.8    ASSESSMENT  Shortness of breath  Abdominal pain  Abnormal LFTs  Coronary artery disease with prior PCI  History of heart failure with reduced ejection fraction  History of LV dysfunction secondary to coronary artery disease  Diabetes mellitus 2    PLAN  TTE pending.  Prior TTE 2/27/2022 with mildly reduced LV systolic function.  Check BNP.  Chest x-ray with no evidence of pulmonary edema or pulmonary vascular congestion  She is receiving metoprolol tartrate 25 mg every 12 hours  Rhythm and rate stable, blood pressure improved from admission.  No complaints of chest discomfort or shortness of breath  Patient needs good follow-up after discharge  Further recommendations as patient's hospital course progresses        Vital Signs  Visit Vitals  /88   Pulse 75   Temp 98.2 °F (36.8 °C) (Oral)   Resp 14   Ht 160 cm (63\")   Wt 66.7 kg (147 lb)   SpO2 98%   BMI 26.04 kg/m²     Oxygen Therapy  SpO2: 98 %  Pulse Oximetry Type: Intermittent  Device (Oxygen Therapy): room air  Flowsheet Rows    Flowsheet Row First Filed Value   Admission Height 160 cm (63\") Documented at 12/19/2022 1412   Admission Weight 63.5 kg (140 lb) Documented at 12/19/2022 1412        Intake & Output (last 3 days)       12/18 0701  12/19 0700 12/19 0701  12/20 0700 12/20 0701  12/21 0700 12/21 0701  12/22 0700    P.O.   480 300    Total Intake(mL/kg)   480 (7.2) 300 (4.5)    Urine (mL/kg/hr)   600 (0.4)     Stool   0     Total Output   600     Net   -120 +300            Urine Unmeasured Occurrence  1 x 2 x 1 x    Stool Unmeasured Occurrence  1 x 1 x         Lines, Drains & Airways     Active LDAs     Name Placement date Placement time Site Days    Peripheral IV 12/19/22 1400 Left;Posterior Forearm 12/19/22  1400  Forearm  1                MEDICAL HISTORY    Past Medical History:   Diagnosis Date   • Anxiety    • CHF (congestive heart failure) (HCC)    • Depression    • Diabetes mellitus (HCC)    • Hyperlipidemia    • Hypertension    • Panic " "disorder     \"panic attacks\"   • Tremors of nervous system     \"essential tremors\"        SURGICAL HISTORY    Past Surgical History:   Procedure Laterality Date   • CORONARY ANGIOPLASTY WITH STENT PLACEMENT          FAMILY HISTORY    Family History   Problem Relation Age of Onset   • Depression Sister    • Suicidality Other         suicided   • Alcohol abuse Other    • Alcohol abuse Daughter    • Depression Other        SOCIAL HISTORY    Social History     Tobacco Use   • Smoking status: Never   • Smokeless tobacco: Never   Substance Use Topics   • Alcohol use: Not Currently        ALLERGIES    Allergies   Allergen Reactions   • Morphine Hallucinations              /88   Pulse 75   Temp 98.2 °F (36.8 °C) (Oral)   Resp 14   Ht 160 cm (63\")   Wt 66.7 kg (147 lb)   SpO2 98%   BMI 26.04 kg/m²   Intake/Output last 3 shifts:  I/O last 3 completed shifts:  In: 480 [P.O.:480]  Out: 600 [Urine:600]  Intake/Output this shift:  I/O this shift:  In: 300 [P.O.:300]  Out: -     PHYSICAL EXAM:    General: Alert, cooperative, no distress, appears stated age  Head:  Normocephalic, atraumatic, mucous membranes moist  Eyes:  Conjunctivae/corneas clear, EOM's intact     Neck:  Supple,  no adenopathy; no JVD or bruit  Lungs: Clear to auscultation bilaterally, no wheezes, rhonchi or rales are noted  Chest wall: No tenderness  Heart::  Regular rate and rhythm, S1 and S2 normal, no murmur, rub or gallop  Abdomen: Soft, nontender, nondistended, bowel sounds active  Extremities: No cyanosis, clubbing, or edema   Pulses: 2+ and symmetric all extremities  Skin:  No rashes or lesions  Neuro/psych: A&O x3. CN II through XII are grossly intact with appropriate affect      Scheduled Meds:      insulin lispro, 2-9 Units, Subcutaneous, 4x Daily With Meals & Nightly  metoprolol tartrate, 25 mg, Oral, Q12H  piperacillin-tazobactam, 3.375 g, Intravenous, Q8H  senna-docusate sodium, 2 tablet, Oral, BID  sodium chloride, 10 mL, Intravenous, " Q12H        Continuous Infusions:         PRN Meds:    •  acetaminophen **OR** acetaminophen **OR** acetaminophen  •  ALPRAZolam  •  senna-docusate sodium **AND** polyethylene glycol **AND** bisacodyl **AND** bisacodyl  •  dextrose  •  dextrose  •  glucagon (human recombinant)  •  hydrALAZINE  •  hydrOXYzine  •  melatonin  •  ondansetron **OR** ondansetron  •  sodium chloride  •  sodium chloride        Results Review:     I reviewed the patient's new clinical results.    CBC    Results from last 7 days   Lab Units 12/21/22  0021 12/20/22  1329 12/19/22  2230 12/19/22  1448   WBC 10*3/mm3 7.30 5.90 6.50 7.39   HEMOGLOBIN g/dL 11.6* 11.9* 11.3* 11.3*   PLATELETS 10*3/mm3 293 280 258 296     Cr Clearance Estimated Creatinine Clearance: 36.3 mL/min (A) (by C-G formula based on SCr of 1.19 mg/dL (H)).  Coag     HbA1C   Lab Results   Component Value Date    HGBA1C 7.9 (H) 12/10/2022    HGBA1C 7.0 (H) 10/07/2022    HGBA1C 7.1 (H) 09/14/2022     Blood Glucose   Glucose   Date/Time Value Ref Range Status   12/21/2022 0703 160 (H) 70 - 105 mg/dL Final     Comment:     Serial Number: [id number]:  [id number]   12/21/2022 0053 194 (H) 70 - 105 mg/dL Final     Comment:     Serial Number: [id number]:  [id number]   12/20/2022 1759 262 (H) 70 - 105 mg/dL Final     Comment:     Serial Number: [id number]:  [id number]   12/20/2022 1348 166 (H) 70 - 105 mg/dL Final     Comment:     Serial Number: [id number]:  [id number]   12/20/2022 0554 152 (H) 70 - 105 mg/dL Final     Comment:     Serial Number: [id number]:  [id number]   12/20/2022 0045 147 (H) 70 - 105 mg/dL Final     Comment:     Serial Number: [id number]:  [id number]   12/19/2022 1437 277 (H) 70 - 130 mg/dL Final     Comment:     Serial Number: [id number]:  [id number]     Infection     CMP   Results from last 7 days   Lab Units 12/21/22  0021 12/20/22  1329 12/19/22  2230 12/19/22  1448   SODIUM mmol/L 138 142 141 132*   POTASSIUM mmol/L  3.6 3.6 3.6 3.6   CHLORIDE mmol/L 104 107 106 100   CO2 mmol/L 24.0 21.0* 24.0 19.9*   BUN mg/dL 13 11 12 13   CREATININE mg/dL 1.19* 0.81 0.92 0.95   GLUCOSE mg/dL 191* 161* 167* 335*   ALBUMIN g/dL 3.30* 3.30* 3.30* 3.70   BILIRUBIN mg/dL 0.7 1.3* 1.4* 1.3*   ALK PHOS U/L 100 99 95 97   AST (SGOT) U/L 79* 86* 120* 118*   ALT (SGPT) U/L 76* 73* 77* 63*   LIPASE U/L  --   --   --  26     ABG      UA      DIANA  No results found for: POCMETH, POCAMPHET, POCBARBITUR, POCBENZO, POCCOCAINE, POCOPIATES, POCOXYCODO, POCPHENCYC, POCPROPOXY, POCTHC, POCTRICYC  Lysis Labs   Results from last 7 days   Lab Units 12/21/22  0021 12/20/22  1329 12/19/22  2230 12/19/22  1448   HEMOGLOBIN g/dL 11.6* 11.9* 11.3* 11.3*   PLATELETS 10*3/mm3 293 280 258 296   CREATININE mg/dL 1.19* 0.81 0.92 0.95     Radiology(recent) NM HIDA SCAN WITHOUT PHARMACOLOGICAL INTERVENTION    Result Date: 12/20/2022  Normal HIDA scan. Normal gallbladder ejection fraction, 67%.  Electronically Signed By-María Lauren MD On:12/20/2022 2:03 PM This report was finalized on [id number] by  María Lauren MD.    US Gallbladder    Result Date: 12/19/2022  1. Diffuse abnormal gallbladder wall edema with pericholecystic fluid. This is new since the 12/9/2022 ultrasound. FINDINGS suggest the presence of acalculous cholecystitis. Correlate with clinical symptoms.  Electronically Signed By-María Lauren MD On:12/19/2022 4:33 PM This report was finalized on [id number] by  María Lauren MD.    XR Chest 1 View    Result Date: 12/19/2022  No acute process.  Electronically Signed By-Finn Ashton MD On:12/19/2022 3:29 PM This report was finalized on [id number] by  Finn Ashton MD.        Results from last 7 days   Lab Units 12/19/22  1448   TROPONIN T ng/mL <0.010       Xrays, labs reviewed personally by physician.    ECG/EMG Results (most recent)     Procedure Component Value Units Date/Time    ECG 12 Lead Chest Pain [[id number]] Collected: 12/19/22 1413      Updated: 12/19/22 1702     QT Interval 453 ms     Narrative:      HEART RATE= 79  bpm  RR Interval= 762  ms  IA Interval=   ms  P Horizontal Axis=   deg  P Front Axis=   deg  QRSD Interval= 155  ms  QT Interval= 453  ms  QRS Axis= -37  deg  T Wave Axis= 95  deg  - ABNORMAL ECG -  Atrial flutter  Left bundle branch block  When compared with ECG of 06-Oct-2022 1:49:36,  Significant change in rhythm: previously sinus  New conduction abnormality  Significant repolarization change  Electronically Signed By: Jena Islas ([facility]) 19-Dec-2022 17:01:48  Date and Time of Study: 2022-12-19 14:13:53    Adult Transthoracic Echo Complete W/ Cont if Necessary Per Protocol [[id number]] Resulted: 12/21/22 0855     Updated: 12/21/22 0917     Target HR (85%) 122 bpm      Max. Pred. HR (100%) 143 bpm     SCANNED EKG [[id number]] Resulted: 12/19/22     Updated: 12/21/22 0930    SCANNED EKG [[id number]] Resulted: 12/19/22     Updated: 12/21/22 0930            Medication Review:   I have reviewed the patient's current medication list  Scheduled Meds:insulin lispro, 2-9 Units, Subcutaneous, 4x Daily With Meals & Nightly  metoprolol tartrate, 25 mg, Oral, Q12H  piperacillin-tazobactam, 3.375 g, Intravenous, Q8H  senna-docusate sodium, 2 tablet, Oral, BID  sodium chloride, 10 mL, Intravenous, Q12H      Continuous Infusions:   PRN Meds:.•  acetaminophen **OR** acetaminophen **OR** acetaminophen  •  ALPRAZolam  •  senna-docusate sodium **AND** polyethylene glycol **AND** bisacodyl **AND** bisacodyl  •  dextrose  •  dextrose  •  glucagon (human recombinant)  •  hydrALAZINE  •  hydrOXYzine  •  melatonin  •  ondansetron **OR** ondansetron  •  sodium chloride  •  sodium chloride    Imaging:  Imaging Results (Last 72 Hours)     Procedure Component Value Units Date/Time    NM HIDA SCAN WITHOUT PHARMACOLOGICAL INTERVENTION [[id number]] Collected: 12/20/22 1402     Updated: 12/20/22 1405    Narrative:      DATE OF EXAM:  12/20/2022 10:49 AM      PROCEDURE:  NM HIDA SCAN WITHOUT PHARMACOLOGICAL INTERVENTION-     INDICATIONS:  evaluate for cystic duct obstruction; K81.0-Acute cholecystitis     COMPARISON:  No Comparisons Available     TECHNIQUE:   5.7mCi of technetium 99m tagged Choletec were administered  intravenously. Imaging were obtained per protocol.   Gallbladder stimulation was performed after ingestion of Boost.     .        FINDINGS:  Normal radiopharmaceutical uptake in the liver. Normal gallbladder  visualization within the first 30 minutes of imaging, indicating patency  of the cystic duct. No scintigraphic evidence of acute or chronic  cholecystitis. Progression of her deforms the colon to the small bowel  indicates patency of the common bile duct. The gallbladder ejection  fraction is normal, 67%.        Impression:      Normal HIDA scan. Normal gallbladder ejection fraction, 67%.     Electronically Signed By-María Lauren MD On:12/20/2022 2:03 PM  This report was finalized on [id number] by  María Lauren MD.    US Gallbladder [[id number]] Collected: 12/19/22 1631     Updated: 12/19/22 1635    Narrative:      US GALLBLADDER-     Date of Exam: 12/19/2022 3:58 PM     Indication: mild hyperbili, transaminitis, fatigue.     Comparison: Right upper quadrant abdominal ultrasound 12/9/2022. CT  abdomen pelvis 12/3/2022.     Technique: Transverse and sagittal ultrasound images of the right upper  quadrant were obtained. Doppler evaluation was also conducted.     FINDINGS:  The gallbladder is abnormal. There is diffuse bladder wall edema and  gallbladder wall thickening up to 12 mm. Small quantity pericholecystic  fluid is present. No gallstones or gallbladder sludge is evident.     The liver size is borderline enlarged at 18.8 cm. The liver maintains  normal echotexture without focal abnormality.     No abnormal intrahepatic right hepatic biliary ductal dilation is seen.  Common bile duct measures 3 mm. No gross ascites is identified.     Main  "portal vein demonstrates normal color flow.     Right kidney measures 10.2 cm in length without focal cortical lesion,  shadowing stone or hydronephrosis. IVC and abdominal aorta have an  unremarkable grayscale appearance.             Impression:      1. Diffuse abnormal gallbladder wall edema with pericholecystic fluid.  This is new since the 12/9/2022 ultrasound. FINDINGS suggest the  presence of acalculous cholecystitis. Correlate with clinical symptoms.     Electronically Signed By-María Lauren MD On:12/19/2022 4:33 PM  This report was finalized on [id number] by  María Lauren MD.    XR Chest 1 View [[id number]] Collected: 12/19/22 1529     Updated: 12/19/22 1531    Narrative:         DATE OF EXAM:   12/19/2022 3:25 PM     PROCEDURE:   XR CHEST 1 VW-     INDICATIONS:   cough, fatigue     COMPARISON:  12/02/2022     TECHNIQUE:   Portable chest radiograph.     FINDINGS:    The cardiomediastinal silhouette is within normal limits. The lungs are  clear. There is no pneumothorax, focal consolidation, or large pleural  effusion. Osseous structures grossly intact. Chronic healed left  midclavicular fracture.       Impression:      No acute process.      Electronically Signed By-Finn Ashton MD On:12/19/2022 3:29 PM  This report was finalized on [id number] by  Finn Ashton MD.            PAM Thorpe  12/21/22  10:05 EST       EMR Dragon/Transcription:   \"Dictated utilizing Dragon dictation\".                 Electronically signed by PAM Thorpe, 12/21/22, 10:05 AM EST.    "

## 2022-12-21 NOTE — CONSULTS
"  General Surgery Consult Note      Name: Nadege Barrow ADMIT: 2022   : 1945  PCP: IsaiasDave    MRN: [id number] LOS: 1 days   AGE/SEX: 77 y.o. female  ROOM: [facility]/[address] [place] [address]      Patient Care Team:  Dave Esparza as PCP - General (Preventative Medicine)  Patricio Danielson MD as Consulting Physician (Nephrology)  Chief Complaint   Patient presents with   • Weakness - Generalized     pt c/o dizziness and weakness; c/o shortness of breath and left side chest pain; pt also c/o pain to right hip.  Pt also reports just being discharged from [facility] for the same. Sts was in the hospital for 3 days.        Subjective   77-year-old lady readmitted from [facility] for right hip pain.  History of CHF, diabetes, anxiety, arthritis, no significant past abdominal surgical history.  She states she has had decreased appetite over the past week but no nausea or vomiting, he is hungry today and wants to eat, passing flatus and having bowel movements.  She had CT scan the beginning this month showing pericholecystic edema without stones, right upper quadrant ultrasound with no stones and actually did not show gallbladder wall thickening initially.  At [facility] her ultrasound of her gallbladder did show gallbladder wall thickening but still no stones.  HIDA scan here with no cystic duct obstruction and no reduced ejection fraction.  She denies epigastric or right upper quadrant abdominal pain.    Past Medical History:   Diagnosis Date   • Anxiety    • CHF (congestive heart failure) (HCC)    • Depression    • Diabetes mellitus (HCC)    • Hyperlipidemia    • Hypertension    • Panic disorder     \"panic attacks\"   • Tremors of nervous system     \"essential tremors\"     Past Surgical History:   Procedure Laterality Date   • CORONARY ANGIOPLASTY WITH STENT PLACEMENT       Family History   Problem Relation Age of Onset   • Depression Sister    • Suicidality Other         suicided   • Alcohol abuse Other    • " Alcohol abuse Daughter    • Depression Other      Social History     Tobacco Use   • Smoking status: Never   • Smokeless tobacco: Never   Vaping Use   • Vaping Use: Never used   Substance Use Topics   • Alcohol use: Not Currently   • Drug use: No     Medications Prior to Admission   Medication Sig Dispense Refill Last Dose   • ALPRAZolam (XANAX) 1 MG tablet Take 1 tablet by mouth 3 (Three) Times a Day As Needed for Anxiety. 90 tablet 1    • atorvastatin (LIPITOR) 40 MG tablet Take 40 mg by mouth Every Night.      • Empagliflozin (Jardiance) 10 MG tablet Take 1 tablet by mouth Every Morning.      • fludrocortisone 0.1 MG tablet Take 0.1 mg by mouth Every Morning.      • FLUoxetine (PROzac) 20 MG capsule Take 1 capsule by mouth Daily. 30 capsule 1    • furosemide (LASIX) 20 MG tablet Take 1 tablet by mouth Daily. 10 tablet 0    • losartan (COZAAR) 25 MG tablet Take 1 tablet by mouth Daily. 30 tablet 0    • metoprolol succinate XL (TOPROL-XL) 50 MG 24 hr tablet Take 50 mg by mouth Every Morning. Hold for blood pressure less than 100 or pulse less than 60      • potassium chloride (K-DUR,KLOR-CON) 20 MEQ CR tablet Take 1 tablet by mouth Every Morning.      • sertraline (Zoloft) 25 MG tablet Take 1 tablet by mouth Daily. 30 tablet 2    • ticagrelor (BRILINTA) 90 MG tablet tablet Take 90 mg by mouth 2 (Two) Times a Day.      • Insulin NPH Isophane & Regular (NovoLIN 70/30 FlexPen) (70-30) 100 UNIT/ML suspension pen-injector Inject 20 Units under the skin into the appropriate area as directed 2 (Two) Times a Day With Meals.      • nitroglycerin (NITROSTAT) 0.4 MG SL tablet Place 1 tablet under the tongue Every 5 (Five) Minutes As Needed for Chest Pain. Take no more than 3 doses in 15 minutes. 25 tablet 0    • polyethylene glycol (MIRALAX) 17 g packet Take 17 g by mouth Daily for 30 days. 30 packet 0      insulin lispro, 2-9 Units, Subcutaneous, 4x Daily With Meals & Nightly  metoprolol tartrate, 25 mg, Oral,  Q12H  piperacillin-tazobactam, 3.375 g, Intravenous, Q8H  senna-docusate sodium, 2 tablet, Oral, BID  sodium chloride, 10 mL, Intravenous, Q12H         •  acetaminophen **OR** acetaminophen **OR** acetaminophen  •  ALPRAZolam  •  senna-docusate sodium **AND** polyethylene glycol **AND** bisacodyl **AND** bisacodyl  •  dextrose  •  dextrose  •  glucagon (human recombinant)  •  hydrALAZINE  •  hydrOXYzine  •  melatonin  •  ondansetron **OR** ondansetron  •  sodium chloride  •  sodium chloride  Morphine    Review of Systems   Constitutional: Negative for chills and fever.   HENT: Negative for rhinorrhea and trouble swallowing.    Eyes: Negative for blurred vision and double vision.   Respiratory: Negative for cough and shortness of breath.    Cardiovascular: Negative for chest pain and palpitations.   Gastrointestinal: Negative for abdominal distention and abdominal pain.   Musculoskeletal: Negative for back pain and myalgias.   Skin: Negative for color change and dry skin.   Neurological: Negative for headache and confusion.   Psychiatric/Behavioral: Negative for agitation and hallucinations.        Objective     Vital Signs and Labs:  Vital Signs Patient Vitals for the past 24 hrs:   BP Temp Temp src Pulse Resp SpO2   12/21/22 0434 153/82 98.2 °F (36.8 °C) Oral 75 14 98 %   12/20/22 2144 135/79 98.4 °F (36.9 °C) Oral 79 16 99 %   12/20/22 1610 157/74 97.8 °F (36.6 °C) Oral 74 15 97 %   12/20/22 1516 147/59 -- -- 61 -- --   12/20/22 1349 158/73 97.7 °F (36.5 °C) Oral 63 16 95 %   12/20/22 0738 161/75 97.6 °F (36.4 °C) Oral 60 15 96 %     I/O:  I/O last 3 completed shifts:  In: 360 [P.O.:360]  Out: 600 [Urine:600]    Physical Exam:  Physical Exam  Constitutional:       General: She is not in acute distress.     Appearance: Normal appearance. She is not ill-appearing.   HENT:      Head: Normocephalic and atraumatic.      Right Ear: External ear normal.      Left Ear: External ear normal.   Eyes:      Extraocular  Movements: Extraocular movements intact.      Conjunctiva/sclera: Conjunctivae normal.   Cardiovascular:      Rate and Rhythm: Normal rate and regular rhythm.   Pulmonary:      Effort: Pulmonary effort is normal. No respiratory distress.   Abdominal:      General: There is no distension.      Palpations: Abdomen is soft.      Tenderness: There is no abdominal tenderness.   Musculoskeletal:         General: No swelling or deformity.   Skin:     General: Skin is warm and dry.   Neurological:      Mental Status: She is alert and oriented to person, place, and time. Mental status is at baseline.         CBC    Results from last 7 days   Lab Units 12/21/22  0021 12/20/22  1329 12/19/22  2230 12/19/22  1448   WBC 10*3/mm3 7.30 5.90 6.50 7.39   HEMOGLOBIN g/dL 11.6* 11.9* 11.3* 11.3*   PLATELETS 10*3/mm3 293 280 258 296     BMP   Results from last 7 days   Lab Units 12/21/22  0021 12/20/22  1329 12/19/22 2230 12/19/22  1448   SODIUM mmol/L 138 142 141 132*   POTASSIUM mmol/L 3.6 3.6 3.6 3.6   CHLORIDE mmol/L 104 107 106 100   CO2 mmol/L 24.0 21.0* 24.0 19.9*   BUN mg/dL 13 11 12 13   CREATININE mg/dL 1.19* 0.81 0.92 0.95   GLUCOSE mg/dL 191* 161* 167* 335*   MAGNESIUM mg/dL 1.8  --   --   --    PHOSPHORUS mg/dL 3.2  --   --   --      Radiology(recent) NM HIDA SCAN WITHOUT PHARMACOLOGICAL INTERVENTION    Result Date: 12/20/2022  Normal HIDA scan. Normal gallbladder ejection fraction, 67%.  Electronically Signed By-María Lauren MD On:12/20/2022 2:03 PM This report was finalized on [id number] by  María Lauren MD.    US Gallbladder    Result Date: 12/19/2022  1. Diffuse abnormal gallbladder wall edema with pericholecystic fluid. This is new since the 12/9/2022 ultrasound. FINDINGS suggest the presence of acalculous cholecystitis. Correlate with clinical symptoms.  Electronically Signed By-María Lauren MD On:12/19/2022 4:33 PM This report was finalized on [id number] by  María Lauren MD.    XR Chest 1  View    Result Date: 12/19/2022  No acute process.  Electronically Signed By-Finn Ashton MD On:12/19/2022 3:29 PM This report was finalized on [id number] by  Finn Ashton MD.      I reviewed the patient's new clinical results.    Assessment & Plan       Acute cholecystitis    Hypertension    Atherosclerotic heart disease of native coronary artery without angina pectoris    Major depressive disorder, recurrent episode, moderate (HCC)    Senile dementia, delirium, with behavioral disturbance    Depression, unspecified    Disabling essential tremor    Hyperlipidemia, mixed    Ischemic cardiomyopathy    Generalized anxiety disorder    Type 2 diabetes mellitus (HCC)      77-year-old lady readmitted from [facility] for right hip pain.  History of CHF, diabetes, anxiety, arthritis, no significant past abdominal surgical history.  She states she has had decreased appetite over the past week but no nausea or vomiting, he is hungry today and wants to eat, passing flatus and having bowel movements.  She had CT scan the beginning this month showing pericholecystic edema without stones, right upper quadrant ultrasound with no stones and actually did not show gallbladder wall thickening initially.  At [facility] her ultrasound of her gallbladder did show gallbladder wall thickening but still no stones.  HIDA scan here with no cystic duct obstruction and no reduced ejection fraction.  She denies epigastric or right upper quadrant abdominal pain.  Not tender in her epigastric or right upper quadrant area, white blood cell count normal, LFTs were slightly elevated on admission but have improved.  Suspect imaging findings are incidental and not related to primary gallbladder process.  Given filling of gallbladder on HIDA scan with normal ejection fraction and no gallstones no indication for cholecystectomy.  Recommend GI evaluation for elevated LFTs and portal edema, possible hepatitis.  No general surgery intervention  indicated at this time, I will be available as needed please call with changes questions or concerns.      This note was created using Dragon Voice Recognition software.    Ronal Quigley MD  12/21/22  06:52 EST

## 2022-12-21 NOTE — PLAN OF CARE
Goal Outcome Evaluation:  Plan of Care Reviewed With: patient  Pt presents as a 78 y/o F admitted to [facility] on 12/19/22 with abdominal pain. Pt also c/o right hip pain. HIDA Scan was normal. CT showed marked pericholecystic edema. Pt is not requiring surgical intervention and has been referred for a GI consult. . MRI of abdomen pending. Pt reports she has been in bed a lot the past few weeks. At baseline, she lives with her daughter and is independent with household mobility; she uses a rollator outdoors. Per PT Eval, pt required min assist for transfers and 35 feet of gait tolerance with rolling walker. Pt is below her baseline for mobility.  PT recommendation is Home Health Physical Therapy.  PT will follow.

## 2022-12-21 NOTE — CONSULTS
"Nutrition Services    Patient Name: Nadege Barrow  YOB: 1945  MRN: [id number]  Admission date: 12/19/2022    Comment:    Addition of Boost Plus BID (Provides 720 kcals, 28 g protein if consumed)     PPE Documentation        PPE Worn By Provider Pt with another provider at time of RD visit, RD did not enter room this encounter   PPE Worn By Patient  N/A     CLINICAL NUTRITION ASSESSMENT      Reason for Assessment MST score of 4     H&P      Past Medical History:   Diagnosis Date   • Anxiety    • CHF (congestive heart failure) (HCC)    • Depression    • Diabetes mellitus (HCC)    • Hyperlipidemia    • Hypertension    • Panic disorder     \"panic attacks\"   • Tremors of nervous system     \"essential tremors\"       Past Surgical History:   Procedure Laterality Date   • CORONARY ANGIOPLASTY WITH STENT PLACEMENT          Current Problems   Acute cholecystitis with weakness, dizziness, decreased appetite    CHF    DM    HLD    HTN       Encounter Information        Trending Narrative     12/21: Pt with recent hospitalization for UTI and abdominal pain with unintentional wt loss. Pt with underlying constipation during last admission, pt started on miralax. Pt reporting aide pain in the right flank to her hip. Pt also reported to GI that she is expreiencing decreased oral intake with unintentional wt loss of 25#. Wt review suggests 4% wt loss in 2 months, 8% wt loss in ~7 months, and 12% wt loss in 9 months.     Anthropometrics        Current Height, Weight Height: 160 cm (63\")  Weight: 66.7 kg (147 lb 0.8 oz) (12/19/22 2128)       Ideal Body Weight (IBW) 115#   Usual Body Weight (UBW)        Trending Weight Hx     This admission: 12/21: 147#             PTA: Pt with 4% wt loss in 2 months, 8% wt loss in ~7 months, and 12% wt loss in 9 months.    Wt Readings from Last 30 Encounters:   12/19/22 2128 66.7 kg (147 lb 0.8 oz)   12/19/22 1412 63.5 kg (140 lb)   12/09/22 0341 63.5 kg (140 lb)   12/02/22 2306 63.5 kg " (140 lb)   10/26/22 2210 69.4 kg (153 lb)   10/11/22 0340 69.6 kg (153 lb 7 oz)   10/07/22 0348 69.8 kg (153 lb 14.1 oz)   10/06/22 0125 67.9 kg (149 lb 11.2 oz)   09/29/22 1918 68 kg (150 lb)   09/16/22 0549 69.1 kg (152 lb 5.4 oz)   09/13/22 1840 68.9 kg (152 lb)   08/10/22 0359 75.2 kg (165 lb 12.6 oz)   08/08/22 1128 68.7 kg (151 lb 7.3 oz)   05/25/22 1531 68 kg (150 lb)   05/16/22 0355 72.2 kg (159 lb 2.8 oz)   05/15/22 0442 72.7 kg (160 lb 4.8 oz)   05/14/22 0300 72.1 kg (159 lb)   05/13/22 0149 74.1 kg (163 lb 5.8 oz)   05/12/22 1933 74.1 kg (163 lb 5.8 oz)   05/02/22 1511 75.1 kg (165 lb 8 oz)   03/07/22 0332 79.4 kg (175 lb)   03/04/22 0500 77.5 kg (170 lb 13.7 oz)   03/03/22 0500 77.6 kg (171 lb 1.2 oz)   03/02/22 0500 77 kg (169 lb 12.1 oz)   03/01/22 0619 73.8 kg (162 lb 11.2 oz)   02/28/22 0500 72.3 kg (159 lb 6.3 oz)   02/27/22 0851 72.6 kg (160 lb)   02/26/22 2202 72.6 kg (160 lb)   09/26/21 1502 72.6 kg (160 lb)   04/11/21 0637 79 kg (174 lb 2.6 oz)   04/10/21 1154 78 kg (171 lb 15.3 oz)   04/10/21 0923 77.6 kg (171 lb)   02/01/20 1726 77.1 kg (170 lb)   03/06/17 0024 70.3 kg (155 lb)      BMI kg/m2 Body mass index is 26.05 kg/m².       Labs        Pertinent Labs Reviewed, management per attending   Results from last 7 days   Lab Units 12/21/22  0021 12/20/22  1329 12/19/22  2230   SODIUM mmol/L 138 142 141   POTASSIUM mmol/L 3.6 3.6 3.6   CHLORIDE mmol/L 104 107 106   CO2 mmol/L 24.0 21.0* 24.0   BUN mg/dL 13 11 12   CREATININE mg/dL 1.19* 0.81 0.92   CALCIUM mg/dL 8.6 8.6 8.8   BILIRUBIN mg/dL 0.7 1.3* 1.4*   ALK PHOS U/L 100 99 95   ALT (SGPT) U/L 76* 73* 77*   AST (SGOT) U/L 79* 86* 120*   GLUCOSE mg/dL 191* 161* 167*     Results from last 7 days   Lab Units 12/21/22  0021   MAGNESIUM mg/dL 1.8   PHOSPHORUS mg/dL 3.2   HEMOGLOBIN g/dL 11.6*   HEMATOCRIT % 35.8     COVID19   Date Value Ref Range Status   12/19/2022 Not Detected Not Detected - Ref. Range Final     Lab Results   Component Value Date     HGBA1C 7.9 (H) 12/10/2022        Medications    Scheduled Medications insulin lispro, 2-9 Units, Subcutaneous, 4x Daily With Meals & Nightly  metoprolol tartrate, 25 mg, Oral, Q12H  piperacillin-tazobactam, 3.375 g, Intravenous, Q8H  senna-docusate sodium, 2 tablet, Oral, BID  sodium chloride, 10 mL, Intravenous, Q12H        Infusions      PRN Medications •  acetaminophen **OR** acetaminophen **OR** acetaminophen  •  ALPRAZolam  •  senna-docusate sodium **AND** polyethylene glycol **AND** bisacodyl **AND** bisacodyl  •  dextrose  •  dextrose  •  glucagon (human recombinant)  •  hydrALAZINE  •  hydrOXYzine  •  melatonin  •  ondansetron **OR** ondansetron  •  sodium chloride  •  sodium chloride     Physical Findings        Trending Physical   Appearance, NFPE 12/21: not completed, another provider at pt's bedside at time of RD visit   --  Edema  none     Bowel Function Stool Output  Stool Unmeasured Occurrence: 1 (12/20/22 1337)  Bowel Incontinence: Yes (12/20/22 1337)  Stool Amount: moderate (12/20/22 1337)    Tubes No feeding tube     Chewing/Swallowing No known difficulty     Skin Abrasion to bilateral upper labia   --  Current Nutrition Orders & Evaluation of Intake       Oral Nutrition     Food Allergies NKFA   Current PO Diet Diet: Regular/House Diet; Texture: Regular Texture (IDDSI 7); Fluid Consistency: Thin (IDDSI 0)   Supplement none   PO Evaluation     Trending % PO Intake Pt consumed 0% of clear liquid trays. Pt ate 100% of dinner last night once Regular diet initiated   --  Nutritional Risk Screening        NRS-2002 Score          Nutrition Diagnosis         Nutrition Dx Problem 1 Suspected inadequate energy intake related to reported decreased appetite as evidenced by reported PO intakes meeting less than estimated energy needs.      Nutrition Dx Problem 2        Intervention Goal         Intervention Goal(s) PO intakes > 75%     Nutrition Intervention        RD Action Addition of Boost Plus BID  (Provides 720 kcals, 28 g protein if consumed)      Nutrition Prescription          Diet Prescription Regular   Supplement Prescription Addition of Boost Plus BID (Provides 720 kcals, 28 g protein if consumed)   --  Monitor/Evaluation        Monitor Per protocol, PO intake, Supplement intake, Pertinent labs, Weight, Skin status, GI status, POC/GOC       Electronically signed by:  Ofelia Redmond RD  12/21/22 08:03 EST

## 2022-12-22 ENCOUNTER — READMISSION MANAGEMENT (OUTPATIENT)
Dept: CALL CENTER | Facility: HOSPITAL | Age: 77
End: 2022-12-22

## 2022-12-22 ENCOUNTER — ANESTHESIA EVENT (OUTPATIENT)
Dept: GASTROENTEROLOGY | Facility: HOSPITAL | Age: 77
End: 2022-12-22

## 2022-12-22 ENCOUNTER — ANESTHESIA (OUTPATIENT)
Dept: GASTROENTEROLOGY | Facility: HOSPITAL | Age: 77
End: 2022-12-22

## 2022-12-22 VITALS
TEMPERATURE: 98.1 F | SYSTOLIC BLOOD PRESSURE: 178 MMHG | HEIGHT: 63 IN | BODY MASS INDEX: 26.05 KG/M2 | RESPIRATION RATE: 14 BRPM | HEART RATE: 73 BPM | OXYGEN SATURATION: 98 % | WEIGHT: 147 LBS | DIASTOLIC BLOOD PRESSURE: 80 MMHG

## 2022-12-22 VITALS — SYSTOLIC BLOOD PRESSURE: 149 MMHG | DIASTOLIC BLOOD PRESSURE: 77 MMHG | OXYGEN SATURATION: 100 % | HEART RATE: 72 BPM

## 2022-12-22 LAB
ALBUMIN SERPL-MCNC: 3.3 G/DL (ref 3.5–5.2)
ALBUMIN/GLOB SERPL: 1.3 G/DL
ALP SERPL-CCNC: 107 U/L (ref 39–117)
ALT SERPL W P-5'-P-CCNC: 65 U/L (ref 1–33)
ANION GAP SERPL CALCULATED.3IONS-SCNC: 11 MMOL/L (ref 5–15)
AST SERPL-CCNC: 57 U/L (ref 1–32)
BASOPHILS # BLD AUTO: 0 10*3/MM3 (ref 0–0.2)
BASOPHILS NFR BLD AUTO: 0.6 % (ref 0–1.5)
BH CV ECHO MEAS - AO MAX PG: 12.6 MMHG
BH CV ECHO MEAS - AO MEAN PG: 6.7 MMHG
BH CV ECHO MEAS - AO ROOT DIAM: 3 CM
BH CV ECHO MEAS - AO V2 MAX: 176.3 CM/SEC
BH CV ECHO MEAS - AO V2 VTI: 33.8 CM
BH CV ECHO MEAS - AVA(I,D): 1.31 CM2
BH CV ECHO MEAS - EDV(CUBED): 119 ML
BH CV ECHO MEAS - EDV(MOD-SP2): 100.8 ML
BH CV ECHO MEAS - EDV(MOD-SP4): 118.3 ML
BH CV ECHO MEAS - EF(MOD-SP2): 22.2 %
BH CV ECHO MEAS - EF(MOD-SP4): 43.7 %
BH CV ECHO MEAS - ESV(CUBED): 68.1 ML
BH CV ECHO MEAS - ESV(MOD-SP2): 78.5 ML
BH CV ECHO MEAS - ESV(MOD-SP4): 66.6 ML
BH CV ECHO MEAS - FS: 17 %
BH CV ECHO MEAS - IVS/LVPW: 1.04 CM
BH CV ECHO MEAS - IVSD: 1.02 CM
BH CV ECHO MEAS - LA A2CS (ATRIAL LENGTH): 3.1 CM
BH CV ECHO MEAS - LV MASS(C)D: 176.7 GRAMS
BH CV ECHO MEAS - LV MAX PG: 1.53 MMHG
BH CV ECHO MEAS - LV MEAN PG: 0.66 MMHG
BH CV ECHO MEAS - LV V1 MAX: 61.8 CM/SEC
BH CV ECHO MEAS - LV V1 VTI: 13.4 CM
BH CV ECHO MEAS - LVIDD: 4.9 CM
BH CV ECHO MEAS - LVIDS: 4.1 CM
BH CV ECHO MEAS - LVOT AREA: 3.3 CM2
BH CV ECHO MEAS - LVOT DIAM: 2.05 CM
BH CV ECHO MEAS - LVPWD: 0.98 CM
BH CV ECHO MEAS - MR MAX PG: 58.2 MMHG
BH CV ECHO MEAS - MR MAX VEL: 381.6 CM/SEC
BH CV ECHO MEAS - MV A MAX VEL: 91.2 CM/SEC
BH CV ECHO MEAS - MV DEC SLOPE: 639.8 CM/SEC2
BH CV ECHO MEAS - MV DEC TIME: 0.18 MSEC
BH CV ECHO MEAS - MV E MAX VEL: 118.2 CM/SEC
BH CV ECHO MEAS - MV E/A: 1.3
BH CV ECHO MEAS - MV MAX PG: 6.9 MMHG
BH CV ECHO MEAS - MV MEAN PG: 2.44 MMHG
BH CV ECHO MEAS - MV V2 VTI: 29.9 CM
BH CV ECHO MEAS - MVA(VTI): 1.48 CM2
BH CV ECHO MEAS - PA V2 MAX: 72.4 CM/SEC
BH CV ECHO MEAS - RAP SYSTOLE: 3 MMHG
BH CV ECHO MEAS - RV MAX PG: 1.33 MMHG
BH CV ECHO MEAS - RV V1 MAX: 57.6 CM/SEC
BH CV ECHO MEAS - RV V1 VTI: 11 CM
BH CV ECHO MEAS - RVDD: 3.1 CM
BH CV ECHO MEAS - RVSP: 46.1 MMHG
BH CV ECHO MEAS - SV(LVOT): 44.2 ML
BH CV ECHO MEAS - SV(MOD-SP2): 22.4 ML
BH CV ECHO MEAS - SV(MOD-SP4): 51.7 ML
BH CV ECHO MEAS - TR MAX PG: 43.1 MMHG
BH CV ECHO MEAS - TR MAX VEL: 322.7 CM/SEC
BILIRUB SERPL-MCNC: 0.5 MG/DL (ref 0–1.2)
BUN SERPL-MCNC: 17 MG/DL (ref 8–23)
BUN/CREAT SERPL: 16.3 (ref 7–25)
CALCIUM SPEC-SCNC: 8.6 MG/DL (ref 8.6–10.5)
CHLORIDE SERPL-SCNC: 106 MMOL/L (ref 98–107)
CO2 SERPL-SCNC: 21 MMOL/L (ref 22–29)
CREAT SERPL-MCNC: 1.04 MG/DL (ref 0.57–1)
DEPRECATED RDW RBC AUTO: 52.9 FL (ref 37–54)
EGFRCR SERPLBLD CKD-EPI 2021: 55.5 ML/MIN/1.73
EOSINOPHIL # BLD AUTO: 0.2 10*3/MM3 (ref 0–0.4)
EOSINOPHIL NFR BLD AUTO: 3.3 % (ref 0.3–6.2)
ERYTHROCYTE [DISTWIDTH] IN BLOOD BY AUTOMATED COUNT: 17.4 % (ref 12.3–15.4)
GLOBULIN UR ELPH-MCNC: 2.6 GM/DL
GLUCOSE BLDC GLUCOMTR-MCNC: 133 MG/DL (ref 70–105)
GLUCOSE BLDC GLUCOMTR-MCNC: 134 MG/DL (ref 70–105)
GLUCOSE BLDC GLUCOMTR-MCNC: 139 MG/DL (ref 70–105)
GLUCOSE BLDC GLUCOMTR-MCNC: 165 MG/DL (ref 70–105)
GLUCOSE SERPL-MCNC: 152 MG/DL (ref 65–99)
HCT VFR BLD AUTO: 35.7 % (ref 34–46.6)
HGB BLD-MCNC: 11.3 G/DL (ref 12–15.9)
LKM-1 AB SER-ACNC: 1.7 UNITS (ref 0–20)
LYMPHOCYTES # BLD AUTO: 2 10*3/MM3 (ref 0.7–3.1)
LYMPHOCYTES NFR BLD AUTO: 28.3 % (ref 19.6–45.3)
MAXIMAL PREDICTED HEART RATE: 143 BPM
MCH RBC QN AUTO: 27.3 PG (ref 26.6–33)
MCHC RBC AUTO-ENTMCNC: 31.5 G/DL (ref 31.5–35.7)
MCV RBC AUTO: 86.6 FL (ref 79–97)
MITOCHONDRIA M2 IGG SER-ACNC: <20 UNITS (ref 0–20)
MONOCYTES # BLD AUTO: 0.6 10*3/MM3 (ref 0.1–0.9)
MONOCYTES NFR BLD AUTO: 9 % (ref 5–12)
NEUTROPHILS NFR BLD AUTO: 4.1 10*3/MM3 (ref 1.7–7)
NEUTROPHILS NFR BLD AUTO: 58.8 % (ref 42.7–76)
NRBC BLD AUTO-RTO: 0.1 /100 WBC (ref 0–0.2)
PLATELET # BLD AUTO: 296 10*3/MM3 (ref 140–450)
PMV BLD AUTO: 9.3 FL (ref 6–12)
POTASSIUM SERPL-SCNC: 3.9 MMOL/L (ref 3.5–5.2)
PROT SERPL-MCNC: 5.9 G/DL (ref 6–8.5)
RBC # BLD AUTO: 4.12 10*6/MM3 (ref 3.77–5.28)
SMA IGG SER-ACNC: 11 UNITS (ref 0–19)
SODIUM SERPL-SCNC: 138 MMOL/L (ref 136–145)
STRESS TARGET HR: 122 BPM
WBC NRBC COR # BLD: 6.9 10*3/MM3 (ref 3.4–10.8)

## 2022-12-22 PROCEDURE — 25010000002 PROPOFOL 200 MG/20ML EMULSION: Performed by: ANESTHESIOLOGY

## 2022-12-22 PROCEDURE — 63710000001 INSULIN LISPRO (HUMAN) PER 5 UNITS: Performed by: HOSPITALIST

## 2022-12-22 PROCEDURE — 99214 OFFICE O/P EST MOD 30 MIN: CPT | Performed by: NURSE PRACTITIONER

## 2022-12-22 PROCEDURE — 97116 GAIT TRAINING THERAPY: CPT

## 2022-12-22 PROCEDURE — G0378 HOSPITAL OBSERVATION PER HR: HCPCS

## 2022-12-22 PROCEDURE — 82962 GLUCOSE BLOOD TEST: CPT

## 2022-12-22 PROCEDURE — 88305 TISSUE EXAM BY PATHOLOGIST: CPT | Performed by: INTERNAL MEDICINE

## 2022-12-22 PROCEDURE — 63710000001 FUROSEMIDE 20 MG TABLET: Performed by: INTERNAL MEDICINE

## 2022-12-22 PROCEDURE — 88342 IMHCHEM/IMCYTCHM 1ST ANTB: CPT | Performed by: INTERNAL MEDICINE

## 2022-12-22 PROCEDURE — 25010000002 HYDRALAZINE PER 20 MG

## 2022-12-22 PROCEDURE — A9270 NON-COVERED ITEM OR SERVICE: HCPCS | Performed by: INTERNAL MEDICINE

## 2022-12-22 PROCEDURE — 96376 TX/PRO/DX INJ SAME DRUG ADON: CPT

## 2022-12-22 PROCEDURE — 97110 THERAPEUTIC EXERCISES: CPT

## 2022-12-22 RX ORDER — SODIUM CHLORIDE 0.9 % (FLUSH) 0.9 %
3 SYRINGE (ML) INJECTION EVERY 12 HOURS SCHEDULED
Status: DISCONTINUED | OUTPATIENT
Start: 2022-12-22 | End: 2022-12-22

## 2022-12-22 RX ORDER — ONDANSETRON 4 MG/1
4 TABLET, FILM COATED ORAL EVERY 6 HOURS PRN
Status: DISCONTINUED | OUTPATIENT
Start: 2022-12-22 | End: 2022-12-22 | Stop reason: HOSPADM

## 2022-12-22 RX ORDER — PROPOFOL 10 MG/ML
INJECTION, EMULSION INTRAVENOUS AS NEEDED
Status: DISCONTINUED | OUTPATIENT
Start: 2022-12-22 | End: 2022-12-22 | Stop reason: SURG

## 2022-12-22 RX ORDER — SODIUM CHLORIDE 0.9 % (FLUSH) 0.9 %
3-10 SYRINGE (ML) INJECTION AS NEEDED
Status: DISCONTINUED | OUTPATIENT
Start: 2022-12-22 | End: 2022-12-22

## 2022-12-22 RX ORDER — AMOXICILLIN AND CLAVULANATE POTASSIUM 875; 125 MG/1; MG/1
1 TABLET, FILM COATED ORAL EVERY 12 HOURS SCHEDULED
Qty: 6 TABLET | Refills: 0 | Status: SHIPPED | OUTPATIENT
Start: 2022-12-22 | End: 2022-12-25

## 2022-12-22 RX ORDER — SODIUM CHLORIDE 9 MG/ML
INJECTION, SOLUTION INTRAVENOUS CONTINUOUS PRN
Status: DISCONTINUED | OUTPATIENT
Start: 2022-12-22 | End: 2022-12-22 | Stop reason: SURG

## 2022-12-22 RX ORDER — ONDANSETRON 2 MG/ML
4 INJECTION INTRAMUSCULAR; INTRAVENOUS EVERY 6 HOURS PRN
Status: DISCONTINUED | OUTPATIENT
Start: 2022-12-22 | End: 2022-12-22 | Stop reason: HOSPADM

## 2022-12-22 RX ORDER — FUROSEMIDE 20 MG/1
20 TABLET ORAL DAILY
Status: DISCONTINUED | OUTPATIENT
Start: 2022-12-22 | End: 2022-12-22 | Stop reason: HOSPADM

## 2022-12-22 RX ORDER — PANTOPRAZOLE SODIUM 40 MG/1
40 TABLET, DELAYED RELEASE ORAL
Qty: 30 TABLET | Refills: 2 | Status: SHIPPED | OUTPATIENT
Start: 2022-12-23

## 2022-12-22 RX ADMIN — METOPROLOL TARTRATE 25 MG: 25 TABLET, FILM COATED ORAL at 09:07

## 2022-12-22 RX ADMIN — FUROSEMIDE 20 MG: 20 TABLET ORAL at 15:37

## 2022-12-22 RX ADMIN — Medication 3 ML: at 09:08

## 2022-12-22 RX ADMIN — Medication 10 ML: at 09:08

## 2022-12-22 RX ADMIN — HYDRALAZINE HYDROCHLORIDE 10 MG: 20 INJECTION INTRAMUSCULAR; INTRAVENOUS at 12:44

## 2022-12-22 RX ADMIN — PROPOFOL 90 MG: 10 INJECTION, EMULSION INTRAVENOUS at 14:22

## 2022-12-22 RX ADMIN — SODIUM CHLORIDE: 0.9 INJECTION, SOLUTION INTRAVENOUS at 14:20

## 2022-12-22 RX ADMIN — INSULIN LISPRO 2 UNITS: 100 INJECTION, SOLUTION INTRAVENOUS; SUBCUTANEOUS at 09:07

## 2022-12-22 NOTE — THERAPY TREATMENT NOTE
"Subjective: Pt agreeable to therapeutic plan of care.    Objective:     Bed mobility -min/ CGA  Transfers - CGA and with rolling walker  Ambulation -125  feet CGA and with rolling walker with cues for safety and path finding    Therapeutic exercises: AROM BLE x 10 reps    Vitals: WNL    Pain: 5 VAS   Location: right hip  Intervention for pain: Repositioned, Increased Activity and Therapeutic Presence    Education: Provided education on the importance of mobility in the acute care setting, Transfer Training and Gait Training    Assessment: Nadege Barrow presents with functional mobility impairments which indicate the need for skilled intervention. Pt was easily distracted today. Pt had increased right hip /back pain with transfer to sitting. Tolerating session today without incident. Will continue to follow and progress as tolerated.     Plan/Recommendations:   Low Intensity Therapy recommended post-acute care - This is recommended as therapy feels this patient would require 2-3 visits per week. OP or HH would be the best option depending on patient's home bound status. Consider, if the patient has other  \"skilled\" needs such as wounds, IV antibiotics, etc. Combined with \"low intensity\" could also equate to a SNF. If patient is medically complex, consider LTAC.. Pt requires no DME at discharge.     Pt desires Home with Home Health at discharge. Pt cooperative; agreeable to therapeutic recommendations and plan of care.         Basic Mobility 6-click:  Rollin = Total, A lot = 2, A little = 3; 4 = None  Supine>Sit:   1 = Total, A lot = 2, A little = 3; 4 = None   Sit>Stand with arms:  1 = Total, A lot = 2, A little = 3; 4 = None  Bed>Chair:   1 = Total, A lot = 2, A little = 3; 4 = None  Ambulate in room:  1 = Total, A lot = 2, A little = 3; 4 = None  3-5 Steps with railin = Total, A lot = 2, A little = 3; 4 = None  Score: 18    Modified [place]: N/A = No pre-op stroke/TIA    Post-Tx Position: Up in Chair " and Call light and personal items within reach  PPE: gloves and surgical mask

## 2022-12-22 NOTE — CASE MANAGEMENT/SOCIAL WORK
Continued Stay Note  DAVIDE Lindo     Patient Name: Nadege Barrow  MRN: [id number]  Today's Date: 12/22/2022    Admit Date: 12/19/2022    Plan: Home with Family declined Home health   Discharge Plan     Row Name 12/22/22 1244       Plan    Plan Home with Family declined Home health    Plan Comments Met with Patient at bedside Declined Home health at this time, gave instruction on how to set up after discharge if she goes home and changes her mind. May get to d/c home today pending EGD              Met with patient at bedside wearing mask and goggles, Spent less than 15 minutes in room at greater than 6 feet distance.         Oma Ramirez RN

## 2022-12-22 NOTE — ANESTHESIA POSTPROCEDURE EVALUATION
Patient: Nadege Barrow    Procedure Summary     Date: 12/22/22 Room / Location: [facility] ENDOSCOPY 1 / [facility] ENDOSCOPY    Anesthesia Start: 1418 Anesthesia Stop: 1434    Procedure: ESOPHAGOGASTRODUODENOSCOPY with stomach biopsies Diagnosis:       Right upper quadrant abdominal pain      Abdominal pain, unspecified abdominal location      (Right upper quadrant abdominal pain [R10.11])      (Abdominal pain, unspecified abdominal location [R10.9])    Surgeons: Rick Louise MD Provider: Jessica Henderson MD    Anesthesia Type: MAC ASA Status: 3          Anesthesia Type: MAC    Vitals  Vitals Value Taken Time   /71 12/22/22 1450   Temp     Pulse 71 12/22/22 1457   Resp 13 12/22/22 1435   SpO2 97 % 12/22/22 1457   Vitals shown include unvalidated device data.        Post Anesthesia Care and Evaluation    Patient location during evaluation: PACU  Patient participation: complete - patient participated  Level of consciousness: awake and alert  Pain management: satisfactory to patient    Airway patency: patent  Anesthetic complications: No anesthetic complications  PONV Status: none  Cardiovascular status: acceptable  Respiratory status: acceptable  Hydration status: acceptable

## 2022-12-22 NOTE — PROGRESS NOTES
Cardiology Progress  Note      Patient Care Team:  Dave Esparza as PCP - General (Preventative Medicine)  Patricio Danielson MD as Consulting Physician (Nephrology)    PATIENT IDENTIFICATION  Name: Nadege Barrow  Age: 77 y.o.  Sex: female  :  1945  MRN: [id number]             REASON FOR FOLLOW-UP:  Presurgical cardiac risk assessment  Coronary artery disease  History of PCI/RCA  History of ischemic cardiomyopathy      INTERVAL HISTORY  Patient seen and examined, chart and labs reviewed.  Patient supine in bed asleep upon entry with normal respiratory effort.    Nuclear stress testing 2022 with no evidence of ischemia, no evidence of prior myocardial injury, low risk study     TTE 2022: EF 41-45%, moderate TR, mild AS      SUBJECTIVE    Patient denies any chest discomfort, shortness of breath  Continues to have lower back and right hip discomfort  Intermittent nausea without vomiting      REVIEW OF SYSTEMS:  Pertinent items are noted in HPI, all other systems reviewed and negative    OBJECTIVE   BNP 16,213    ASSESSMENT  Shortness of breath  Abdominal pain  Abnormal LFTs  Coronary artery disease with prior PCI  History of heart failure with reduced ejection fraction  History of LV dysfunction secondary to coronary artery disease  Diabetes mellitus 2      RECOMMENDATIONS  Follow-up TTE read.  Prior TTE 2022 with mildly reduced LV systolic function.  BNP elevated.  Chest x-ray with no evidence of pulmonary edema or pulmonary vascular congestion.  Currently n.p.o. for EGD today.  Will resume home dose Lasix this evening.  She is receiving metoprolol tartrate 25 mg every 12 hours  Rhythm and rate stable, blood pressure improved from admission.  No complaints of chest discomfort or shortness of breath  Patient needs good follow-up after discharge            Vital Signs  Visit Vitals  /96 (BP Location: Right arm, Patient Position: Lying)   Pulse 72   Temp 97.4 °F (36.3 °C) (Oral)  "  Resp 18   Ht 160 cm (63\")   Wt 66.7 kg (147 lb)   SpO2 98%   BMI 26.04 kg/m²     Oxygen Therapy  SpO2: 98 %  Pulse Oximetry Type: Intermittent  Device (Oxygen Therapy): room air  Flowsheet Rows    Flowsheet Row First Filed Value   Admission Height 160 cm (63\") Documented at 12/19/2022 1412   Admission Weight 63.5 kg (140 lb) Documented at 12/19/2022 1412        Intake & Output (last 3 days)       12/19 0701  12/20 0700 12/20 0701  12/21 0700 12/21 0701  12/22 0700 12/22 0701  12/23 0700    P.O.  480 720     Total Intake(mL/kg)  480 (7.2) 720 (10.8)     Urine (mL/kg/hr)  600 (0.4) 200 (0.1) 200 (1.1)    Stool  0 0     Total Output  600 200 200    Net  -120 +520 -200            Urine Unmeasured Occurrence 1 x 2 x 4 x     Stool Unmeasured Occurrence 1 x 1 x 3 x         Lines, Drains & Airways     Active LDAs     Name Placement date Placement time Site Days    Peripheral IV 12/21/22 1443 Distal;Posterior;Right Forearm 12/21/22  1443  Forearm  less than 1                       /96 (BP Location: Right arm, Patient Position: Lying)   Pulse 72   Temp 97.4 °F (36.3 °C) (Oral)   Resp 18   Ht 160 cm (63\")   Wt 66.7 kg (147 lb)   SpO2 98%   BMI 26.04 kg/m²   Intake/Output last 3 shifts:  I/O last 3 completed shifts:  In: 840 [P.O.:840]  Out: 200 [Urine:200]  Intake/Output this shift:  I/O this shift:  In: -   Out: 200 [Urine:200]    PHYSICAL EXAM:    General: Alert, cooperative, no distress, appears stated age  Head:  Normocephalic, atraumatic, mucous membranes moist  Eyes:  Conjunctivae/corneas clear, EOM's intact     Neck:  Supple,  no JVD or bruit  Lungs:   Clear to auscultation bilaterally, no wheezes, rhonchi or rales are noted  Chest wall: No tenderness  Heart::  Regular rate and rhythm, S1 and S2 normal, no murmur, rub or gallop  Abdomen: Soft, nontender, nondistended, bowel sounds active  Extremities: No cyanosis, clubbing, or edema   Pulses: 2+ and symmetric all extremities  Skin:  No rashes or " lesions  Neuro/psych: A&O x3. CN II through XII are grossly intact with appropriate affect        Scheduled Meds:      amoxicillin-clavulanate, 1 tablet, Oral, Q12H  atorvastatin, 40 mg, Oral, Nightly  fludrocortisone, 0.1 mg, Oral, Daily  FLUoxetine, 20 mg, Oral, Daily  insulin lispro, 2-9 Units, Subcutaneous, 4x Daily With Meals & Nightly  metoprolol tartrate, 25 mg, Oral, Q12H  pantoprazole, 40 mg, Oral, Q AM  senna-docusate sodium, 2 tablet, Oral, BID  sertraline, 25 mg, Oral, Daily  sodium chloride, 10 mL, Intravenous, Q12H  sodium chloride, 3 mL, Intravenous, Q12H  ticagrelor, 90 mg, Oral, BID        Continuous Infusions:         PRN Meds:    •  acetaminophen **OR** acetaminophen **OR** acetaminophen  •  ALPRAZolam  •  senna-docusate sodium **AND** polyethylene glycol **AND** bisacodyl **AND** bisacodyl  •  dextrose  •  dextrose  •  glucagon (human recombinant)  •  hydrALAZINE  •  hydrOXYzine  •  melatonin  •  ondansetron **OR** ondansetron  •  sodium chloride  •  sodium chloride  •  sodium chloride        Results Review:     I reviewed the patient's new clinical results.    CBC    Results from last 7 days   Lab Units 12/21/22  2314 12/21/22  0021 12/20/22  1329 12/19/22  2230 12/19/22  1448   WBC 10*3/mm3 6.90 7.30 5.90 6.50 7.39   HEMOGLOBIN g/dL 11.3* 11.6* 11.9* 11.3* 11.3*   PLATELETS 10*3/mm3 296 293 280 258 296     Cr Clearance Estimated Creatinine Clearance: 41.5 mL/min (A) (by C-G formula based on SCr of 1.04 mg/dL (H)).  Coag     HbA1C   Lab Results   Component Value Date    HGBA1C 7.9 (H) 12/10/2022    HGBA1C 7.0 (H) 10/07/2022    HGBA1C 7.1 (H) 09/14/2022     Blood Glucose   Glucose   Date/Time Value Ref Range Status   12/22/2022 0739 165 (H) 70 - 105 mg/dL Final     Comment:     Serial Number: [id number]:  [id number]   12/21/2022 2158 156 (H) 70 - 105 mg/dL Final     Comment:     Serial Number: [id number]:  [id number]   12/21/2022 1627 167 (H) 70 - 105 mg/dL Final     Comment:      Serial Number: [id number]:  [id number]   12/21/2022 1305 153 (H) 70 - 105 mg/dL Final     Comment:     Serial Number: [id number]:  [id number]   12/21/2022 0703 160 (H) 70 - 105 mg/dL Final     Comment:     Serial Number: [id number]:  [id number]   12/21/2022 0053 194 (H) 70 - 105 mg/dL Final     Comment:     Serial Number: [id number]:  [id number]   12/20/2022 1759 262 (H) 70 - 105 mg/dL Final     Comment:     Serial Number: [id number]:  [id number]   12/20/2022 1348 166 (H) 70 - 105 mg/dL Final     Comment:     Serial Number: [id number]:  [id number]     Infection     CMP   Results from last 7 days   Lab Units 12/21/22  2314 12/21/22  0021 12/20/22  1329 12/19/22  2230 12/19/22  1448   SODIUM mmol/L 138 138 142 141 132*   POTASSIUM mmol/L 3.9 3.6 3.6 3.6 3.6   CHLORIDE mmol/L 106 104 107 106 100   CO2 mmol/L 21.0* 24.0 21.0* 24.0 19.9*   BUN mg/dL 17 13 11 12 13   CREATININE mg/dL 1.04* 1.19* 0.81 0.92 0.95   GLUCOSE mg/dL 152* 191* 161* 167* 335*   ALBUMIN g/dL 3.30* 3.30* 3.30* 3.30* 3.70   BILIRUBIN mg/dL 0.5 0.7 1.3* 1.4* 1.3*   ALK PHOS U/L 107 100 99 95 97   AST (SGOT) U/L 57* 79* 86* 120* 118*   ALT (SGPT) U/L 65* 76* 73* 77* 63*   LIPASE U/L  --   --   --   --  26     ABG      UA      DIANA  No results found for: POCMETH, POCAMPHET, POCBARBITUR, POCBENZO, POCCOCAINE, POCOPIATES, POCOXYCODO, POCPHENCYC, POCPROPOXY, POCTHC, POCTRICYC  Lysis Labs   Results from last 7 days   Lab Units 12/21/22  2314 12/21/22  0021 12/20/22  1329 12/19/22  2230 12/19/22  1448   HEMOGLOBIN g/dL 11.3* 11.6* 11.9* 11.3* 11.3*   PLATELETS 10*3/mm3 296 293 280 258 296   CREATININE mg/dL 1.04* 1.19* 0.81 0.92 0.95     Radiology(recent) XR Spine Lumbar Complete With Flex & Ext    Result Date: 12/21/2022  Multilevel degenerative disc disease. No acute abnormality.  Electronically Signed By-Dwayne Mederos MD On:12/21/2022 4:43 PM This report was finalized on [id number] by  Dwayne Mederos MD.    RAFAT JAVIER SCAN  WITHOUT PHARMACOLOGICAL INTERVENTION    Result Date: 12/20/2022  Normal HIDA scan. Normal gallbladder ejection fraction, 67%.  Electronically Signed By-María Lauren MD On:12/20/2022 2:03 PM This report was finalized on [id number] by  María Lauren MD.    MRI abdomen wo contrast mrcp    Result Date: 12/21/2022   1. The gallbladder is contracted. There is gallbladder wall edema and trace pericholecystic fluid. No gallstones are identified. There is no abnormal biliary dilation. The gallbladder findings may be related to anasarca or hypoproteinemic state, particularly given the presence of body wall edema and small abdominal ascites. Acalculus cholecystitis can also have a similar imaging appearance. 2. There is no abnormal biliary dilation. 3. Unremarkable noncontrast appearance of liver. 4. Small bilateral pleural effusions.  Electronically Signed By-María Lauren MD On:12/21/2022 12:50 PM This report was finalized on [id number] by  María Lauren MD.        Results from last 7 days   Lab Units 12/19/22  1448   TROPONIN T ng/mL <0.010       Xrays, labs reviewed personally by physician.    ECG/EMG Results (most recent)     Procedure Component Value Units Date/Time    ECG 12 Lead Chest Pain [[id number]] Collected: 12/19/22 1413     Updated: 12/19/22 1702     QT Interval 453 ms     Narrative:      HEART RATE= 79  bpm  RR Interval= 762  ms  NE Interval=   ms  P Horizontal Axis=   deg  P Front Axis=   deg  QRSD Interval= 155  ms  QT Interval= 453  ms  QRS Axis= -37  deg  T Wave Axis= 95  deg  - ABNORMAL ECG -  Atrial flutter  Left bundle branch block  When compared with ECG of 06-Oct-2022 1:49:36,  Significant change in rhythm: previously sinus  New conduction abnormality  Significant repolarization change  Electronically Signed By: Jena Islas ([facility]) 19-Dec-2022 17:01:48  Date and Time of Study: 2022-12-19 14:13:53    SCANNED EKG [[id number]] Resulted: 12/19/22     Updated: 12/21/22 0930    SCANNED EKG  [[id number]] Resulted: 12/19/22     Updated: 12/21/22 0930    Adult Transthoracic Echo Complete W/ Cont if Necessary Per Protocol [[id number]] Resulted: 12/21/22 1053     Updated: 12/22/22 0539     Target HR (85%) 122 bpm      Max. Pred. HR (100%) 143 bpm      LVIDd 4.9 cm      LVIDs 4.1 cm      IVSd 1.02 cm      LVPWd 0.98 cm      FS 17.0 %      IVS/LVPW 1.04 cm      ESV(cubed) 68.1 ml      EDV(cubed) 119.0 ml      LVOT area 3.3 cm2      LV mass(C)d 176.7 grams      LVOT diam 2.05 cm      EDV(MOD-sp2) 100.8 ml      EDV(MOD-sp4) 118.3 ml      ESV(MOD-sp2) 78.5 ml      ESV(MOD-sp4) 66.6 ml      SV(MOD-sp2) 22.4 ml      SV(MOD-sp4) 51.7 ml      EF(MOD-sp2) 22.2 %      EF(MOD-sp4) 43.7 %      MV E max vashti 118.2 cm/sec      MV A max vashti 91.2 cm/sec      MV dec time 0.18 msec      MV E/A 1.30     SV(LVOT) 44.2 ml      RVIDd 3.1 cm      LV V1 max 61.8 cm/sec      LV V1 max PG 1.53 mmHg      LV V1 mean PG 0.66 mmHg      LV V1 VTI 13.4 cm      Ao pk vashti 176.3 cm/sec      Ao max PG 12.6 mmHg      Ao mean PG 6.7 mmHg      Ao V2 VTI 33.8 cm      RUPINDER(I,D) 1.31 cm2      MV max PG 6.9 mmHg      MV mean PG 2.44 mmHg      MV V2 VTI 29.9 cm      MVA(VTI) 1.48 cm2      MV dec slope 639.8 cm/sec2      MR max vashti 381.6 cm/sec      MR max PG 58.2 mmHg      TR max vashti 322.7 cm/sec      TR max PG 43.1 mmHg      RVSP(TR) 46.1 mmHg      RAP systole 3.0 mmHg      RV V1 max PG 1.33 mmHg      RV V1 max 57.6 cm/sec      RV V1 VTI 11.0 cm      PA V2 max 72.4 cm/sec      Ao root diam 3.0 cm      EF(MOD-bp) 44.0 %      LA length (A2C) 3.1 cm     Narrative:      •  Estimated right ventricular systolic pressure from tricuspid   regurgitation is moderately elevated (45-55 mmHg).      Impression:                  Medication Review:   I have reviewed the patient's current medication list  Scheduled Meds:amoxicillin-clavulanate, 1 tablet, Oral, Q12H  atorvastatin, 40 mg, Oral, Nightly  fludrocortisone, 0.1 mg, Oral, Daily  FLUoxetine, 20 mg, Oral,  Daily  insulin lispro, 2-9 Units, Subcutaneous, 4x Daily With Meals & Nightly  metoprolol tartrate, 25 mg, Oral, Q12H  pantoprazole, 40 mg, Oral, Q AM  senna-docusate sodium, 2 tablet, Oral, BID  sertraline, 25 mg, Oral, Daily  sodium chloride, 10 mL, Intravenous, Q12H  sodium chloride, 3 mL, Intravenous, Q12H  ticagrelor, 90 mg, Oral, BID      Continuous Infusions:   PRN Meds:.•  acetaminophen **OR** acetaminophen **OR** acetaminophen  •  ALPRAZolam  •  senna-docusate sodium **AND** polyethylene glycol **AND** bisacodyl **AND** bisacodyl  •  dextrose  •  dextrose  •  glucagon (human recombinant)  •  hydrALAZINE  •  hydrOXYzine  •  melatonin  •  ondansetron **OR** ondansetron  •  sodium chloride  •  sodium chloride  •  sodium chloride    Imaging:  Imaging Results (Last 72 Hours)     Procedure Component Value Units Date/Time    XR Spine Lumbar Complete With Flex & Ext [[id number]] Collected: 12/21/22 1638     Updated: 12/21/22 1645    Narrative:      XR SPINE LUMBAR COMPLETE W FLEX EXT-     Date of Exam: 12/21/2022 4:14 PM     Indication: [id number] back pain; K81.0-Acute cholecystitis; R10.11-Right  upper quadrant pain; R10.9-Unspecified abdominal pain.     Comparison: None available.     Technique: 5 views of the lumbar spine were obtained.     FINDINGS:  There are postprocedural changes from L4 vertebral body  augmentation with bone cement. There is slight retrolisthesis of L2 on  L3 and L3 on L4. There is posterior facet hypertrophic change at L4-5  and L5-S1. There is multilevel degenerative endplate change from the  T12-L1 level through the L3-4 level.  There is no acute bony  abnormality.       Impression:      Multilevel degenerative disc disease. No acute abnormality.     Electronically Signed By-Dwayne Mederos MD On:12/21/2022 4:43 PM  This report was finalized on [id number] by  Dwayne Mederos MD.    MRI abdomen wo contrast mrcp [[id number]] Collected: 12/21/22 1232     Updated: 12/21/22 1252    Narrative:       MRI ABDOMEN WO CONTRAST MRCP-     Date of Exam: 12/21/2022 11:40 AM     Indication: RUQ pain, elevated LFTs, gallstones; K81.0-Acute  cholecystitis.     Comparison: HIDA scan 12/20/2022. Gallbladder ultrasound 12/19/2022. CT  abdomen and pelvis 12/3/2022.     Technique:  Multiplanar multisequence MR images of the abdomen was  performed without contrast. MRCP was performed.     FINDINGS:     The gallbladder is contracted, making it difficult to evaluate for  intraluminal filling defects, although no gallstones or gallbladder  sludge is identified. There is diffuse gallbladder wall thickening and  wall edema with trace pericholecystic fluid. No abnormal intrahepatic or  extra hepatic biliary ductal dilation is seen. Pancreatic duct caliber  is normal without evidence of pancreatic divisum.     Small bilateral pleural effusions are present. Heart size is normal.  There is mild generalized body wall edema. Small ascites is seen within  the upper abdomen.     The noncontrast appearance of the liver, spleen, pancreas, adrenals, and  right kidney is within normal limits. The liver does not appear  enlarged, cirrhotic or steatotic. Probable small cyst at the left lower  renal pole. The stomach and bowel loops do not appear abnormally  dilated. There are chronic compression deformities of the superior  endplates of L3 and L4.. T2 hyperintense lesion within the T11 vertebral  body is thought to correspond to a hemangioma.          Impression:         1. The gallbladder is contracted. There is gallbladder wall edema and  trace pericholecystic fluid. No gallstones are identified. There is no  abnormal biliary dilation. The gallbladder findings may be related to  anasarca or hypoproteinemic state, particularly given the presence of  body wall edema and small abdominal ascites. Acalculus cholecystitis can  also have a similar imaging appearance.  2. There is no abnormal biliary dilation.  3. Unremarkable noncontrast appearance of  liver.  4. Small bilateral pleural effusions.     Electronically Signed By-María Lauren MD On:12/21/2022 12:50 PM  This report was finalized on [id number] by  María Lauren MD.    NM HIDA SCAN WITHOUT PHARMACOLOGICAL INTERVENTION [[id number]] Collected: 12/20/22 1402     Updated: 12/20/22 1405    Narrative:      DATE OF EXAM:  12/20/2022 10:49 AM     PROCEDURE:  NM HIDA SCAN WITHOUT PHARMACOLOGICAL INTERVENTION-     INDICATIONS:  evaluate for cystic duct obstruction; K81.0-Acute cholecystitis     COMPARISON:  No Comparisons Available     TECHNIQUE:   5.7mCi of technetium 99m tagged Choletec were administered  intravenously. Imaging were obtained per protocol.   Gallbladder stimulation was performed after ingestion of Boost.     .        FINDINGS:  Normal radiopharmaceutical uptake in the liver. Normal gallbladder  visualization within the first 30 minutes of imaging, indicating patency  of the cystic duct. No scintigraphic evidence of acute or chronic  cholecystitis. Progression of her deforms the colon to the small bowel  indicates patency of the common bile duct. The gallbladder ejection  fraction is normal, 67%.        Impression:      Normal HIDA scan. Normal gallbladder ejection fraction, 67%.     Electronically Signed By-María Lauren MD On:12/20/2022 2:03 PM  This report was finalized on [id number] by  María Lauren MD.    US Gallbladder [[id number]] Collected: 12/19/22 1631     Updated: 12/19/22 1635    Narrative:      US GALLBLADDER-     Date of Exam: 12/19/2022 3:58 PM     Indication: mild hyperbili, transaminitis, fatigue.     Comparison: Right upper quadrant abdominal ultrasound 12/9/2022. CT  abdomen pelvis 12/3/2022.     Technique: Transverse and sagittal ultrasound images of the right upper  quadrant were obtained. Doppler evaluation was also conducted.     FINDINGS:  The gallbladder is abnormal. There is diffuse bladder wall edema and  gallbladder wall thickening up to 12 mm. Small  "quantity pericholecystic  fluid is present. No gallstones or gallbladder sludge is evident.     The liver size is borderline enlarged at 18.8 cm. The liver maintains  normal echotexture without focal abnormality.     No abnormal intrahepatic right hepatic biliary ductal dilation is seen.  Common bile duct measures 3 mm. No gross ascites is identified.     Main portal vein demonstrates normal color flow.     Right kidney measures 10.2 cm in length without focal cortical lesion,  shadowing stone or hydronephrosis. IVC and abdominal aorta have an  unremarkable grayscale appearance.             Impression:      1. Diffuse abnormal gallbladder wall edema with pericholecystic fluid.  This is new since the 12/9/2022 ultrasound. FINDINGS suggest the  presence of acalculous cholecystitis. Correlate with clinical symptoms.     Electronically Signed By-María Lauren MD On:12/19/2022 4:33 PM  This report was finalized on [id number] by  María Lauren MD.    XR Chest 1 View [[id number]] Collected: 12/19/22 1529     Updated: 12/19/22 1531    Narrative:         DATE OF EXAM:   12/19/2022 3:25 PM     PROCEDURE:   XR CHEST 1 VW-     INDICATIONS:   cough, fatigue     COMPARISON:  12/02/2022     TECHNIQUE:   Portable chest radiograph.     FINDINGS:    The cardiomediastinal silhouette is within normal limits. The lungs are  clear. There is no pneumothorax, focal consolidation, or large pleural  effusion. Osseous structures grossly intact. Chronic healed left  midclavicular fracture.       Impression:      No acute process.      Electronically Signed By-Finn Ashton MD On:12/19/2022 3:29 PM  This report was finalized on [id number] by  Finn Ashton MD.            PAM Thorpe  12/22/22  09:48 EST       EMR Dragon/Transcription:   \"Dictated utilizing Dragon dictation\".       Electronically signed by APM Thorpe, 12/22/22, 9:48 AM EST.    "

## 2022-12-22 NOTE — OP NOTE
ESOPHAGOGASTRODUODENOSCOPY Procedure Report    Patient Name:  Nadege Barrow  YOB: 1945    Date of Surgery:  12/22/2022     Preoperative diagnosis:  Right upper quadrant pain  Abnormal CT of GI tract    Postoperative diagnosis:  Grade B erosive esophagitis  Small hiatal hernia  Diffuse erosive gastritis  Normal duodenum        Procedure(s):  ESOPHAGOGASTRODUODENOSCOPY with stomach biopsies    Staff:  Surgeon(s):  Rick Louise MD      Anesthesia: Monitored Anesthesia Care    Implants:    Nothing was implanted during the procedure    Specimen:        See Below    Estimated blood loss: Minimal     Complications:  None    Description of Procedure:  Informed consent was obtained for the procedure, including sedation.  Risks of perforation, hemorrhage, adverse drug reaction and aspiration were discussed.  The patient was brought into the endoscopy suite. Continuous cardiopulmonary monitoring was performed. The patient was placed in the left lateral decubitus position.  The bite block was inserted into the patient's mouth. After adequate sedation was attained, the Olympus gastroscope was inserted into the patient's mouth and advanced to the second portion of the duodenum without difficulty.  Circumferential examination was performed. A retroflex exam was performed in the patient's stomach.  On completion of the exam, the bowel was decompressed, the scope was removed from the patient, the patient tolerated the procedure well, there were no immediate post-operative complications.     Examination of the esophagus: Grade B erosive esophagitis was seen at the GE junction.  The remainder the esophagus unremarkable.  There was a 2 cm hiatal hernia noted from 35 to 37 cm.  Examination of the stomach: Diffuse congestion, erythema, erosion was noted throughout the stomach consistent with erosive gastritis.  Cold forceps biopsies were obtained for histology.  Examination of the duodenum: Normal bulb and  second portion of duodenum    Impression:  77-year-old female with right upper quadrant pain and normal bile duct evaluation and gallbladder evaluation on imaging except for edema in the portal area.  EGD shows marked chronic gastritis with mild esophagitis    Recommendations:  Advance diet as tolerated  Reassured patient of benign findings of exam  Avoid NSAIDs  Pantoprazole 40 mg daily  Follow-up on pathology  If H. pylori is positive we will treat with twice daily PPI and Pylera x14 days  Follow-up with GI in the office in 2 to 3 months, please call questions    We appreciate the referral    Electronically signed by Rick Louise MD, 12/22/22, 2:41 PM EST.

## 2022-12-22 NOTE — PLAN OF CARE
Goal Outcome Evaluation:      Pt is scheduled to have EGD today to evaluate her ABD pain. She ambulates well in the room with stand by assist. She hopes to go home today with her daughter.

## 2022-12-22 NOTE — ANESTHESIA PREPROCEDURE EVALUATION
Anesthesia Evaluation     Patient summary reviewed and Nursing notes reviewed   no history of anesthetic complications:  NPO Solid Status: > 8 hours  NPO Liquid Status: > 4 hours           Airway   Dental      Pulmonary    (-) not a smoker  Cardiovascular   Exercise tolerance: poor (<4 METS)    (+) hypertension, CAD, CHF , hyperlipidemia,  carotid artery disease    ROS comment: Interpretation Summary    ? Left ventricular ejection fraction is moderately reduced. (Calculated EF = 39%).  ? Myocardial perfusion imaging indicates a normal myocardial perfusion study with no evidence of ischemia.  ? Impressions are consistent with a low risk study.  ? LV is dilated with low EF. suggest ECHO      Neuro/Psych  (+) tremors, numbness, psychiatric history Anxiety and Depression, dementia,    GI/Hepatic/Renal/Endo    (+)   diabetes mellitus type 2,     Musculoskeletal     (+) back pain,   Abdominal    Substance History      OB/GYN          Other                        Anesthesia Plan    ASA 3     MAC     intravenous induction     Anesthetic plan, risks, benefits, and alternatives have been provided, discussed and informed consent has been obtained with: patient.        CODE STATUS:    Code Status (Patient has no pulse and is not breathing): CPR (Attempt to Resuscitate)  Medical Interventions (Patient has pulse or is breathing): Full Support

## 2022-12-22 NOTE — DISCHARGE SUMMARY
[facility] Medicine Services  Discharge Summary    Date of Service: 2022  Patient Name: Nadege Barrow  : 1945  MRN: [id number]  Patient condition: Stable    Date of Admission: 2022  Discharge Diagnosis: Gastritis. Elevated liver enzymes. 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         Hospital Course     Hospital Course:  Nadege Barrow is a 77 y.o. female Patient is a 77-year-old female who presented emergency room chief complaint of nausea, abdominal pain and fatigue.  Patient was admitted with surgery on consult as CT showed acute cholecystitis.  HIDA scan was done and was normal.  Patient had elevated bilirubin liver enzymes are trending down and resolved.  Had MRCP which was negative and showed some gallbladder wall edema.  She is tolerating diet and was treated with antibiotics will complete course with Augmentin.  I discussed with surgery and given her risk of surgery would monitor without surgery but if patient's symptoms recur then might  need cholecystectomy.  If patient has recurrent pain she can follow-up with surgery or come back to the emergency room.    Patient also has history of CAD status post stent with chronic systolic heart failure was seen by cardiology.  She had mild SONI which resolved.  Medications were resumed.  Patient also had low back pain and has chronic changes likely canal stenosis.  She will follow-up with primary and he suggested she get referred for injections as an outpatient..  Patient voiced understanding he had an EGD which showed some gastritis and esophagitis and was discharged on pantoprazole per GI and will follow up in 2 to 3 months.  Patient is stable for discharge she will go home with home health and home therapy.        DISCHARGE Follow Up Recommendations for labs and diagnostics:       Reasons For Change In Medications and Indications for New Medications:      Day of Discharge     Vital Signs:  Temp:  [97.4 °F (36.3 °C)-98.3 °F (36.8 °C)] 98.1 °F (36.7 °C)  Heart Rate:  [70-78] 73  Resp:  [11-18] 14  BP: (138-178)/() 178/80    Physical Exam:  Physical Exam   General: No acute distress  HEENT: neck supple, normal oral mucosa, no masses, no lymphadenopathy  Lungs: Clear bilaterally, no wheezing, No crackles, No Rhonchi. Equal excursions.   CV - Normal S1/S2, no murmur, Regular rate and rhythm   Abdomin - Soft, non-tender, non-distended, normal bowel sounds  Extremities - no edema, no erythema  Neuro - No focal weakness, normal sensation  Psych - Alert and oriented x3  Skin - no wounds or lesions.         Pertinent  and/or Most Recent Results     LAB RESULTS:      Lab 12/21/22  2314 12/21/22  0021 12/20/22  1329 12/19/22  2230 12/19/22  1934 12/19/22  1448   WBC 6.90 7.30 5.90 6.50  --  7.39   HEMOGLOBIN 11.3* 11.6* 11.9* 11.3*  --  11.3*   HEMATOCRIT 35.7 35.8 38.6 35.0  --  34.7   PLATELETS 296 293 280 258  --  296   NEUTROS ABS 4.10 4.80 4.10 3.70  --  4.94   IMMATURE GRANS (ABS)  --   --   --   --   --   0.01   LYMPHS ABS 2.00 1.60 1.20 2.00  --  1.85   MONOS ABS 0.60 0.60 0.40 0.60  --  0.56   EOS ABS 0.20 0.20 0.10 0.10  --  0.02   MCV 86.6 86.0 87.3 85.8  --  84.2   LACTATE  --   --   --  1.6 2.1*  --          Lab 12/21/22  2314 12/21/22  0021 12/20/22  1329 12/19/22 2230 12/19/22  1448   SODIUM 138 138 142 141 132*   POTASSIUM 3.9 3.6 3.6 3.6 3.6   CHLORIDE 106 104 107 106 100   CO2 21.0* 24.0 21.0* 24.0 19.9*   ANION GAP 11.0 10.0 14.0 11.0 12.1   BUN 17 13 11 12 13   CREATININE 1.04* 1.19* 0.81 0.92 0.95   EGFR 55.5* 47.2* 74.9 64.3 61.8   GLUCOSE 152* 191* 161* 167* 335*   CALCIUM 8.6 8.6 8.6 8.8 8.5*   MAGNESIUM  --  1.8  --   --   --    PHOSPHORUS  --  3.2  --   --   --    TSH  --   --   --   --  1.620         Lab 12/21/22  2314 12/21/22  0021 12/20/22 1329 12/19/22 2230 12/19/22  1448   TOTAL PROTEIN 5.9* 5.9* 6.3 6.0 6.3   ALBUMIN 3.30* 3.30* 3.30* 3.30* 3.70   GLOBULIN 2.6 2.6 3.0  --  2.6   ALT (SGPT) 65* 76* 73* 77* 63*   AST (SGOT) 57* 79* 86* 120* 118*   BILIRUBIN 0.5 0.7 1.3* 1.4* 1.3*   INDIRECT BILIRUBIN  --   --   --  0.8  --    BILIRUBIN DIRECT  --   --   --  0.6*  --    ALK PHOS 107 100 99 95 97   LIPASE  --   --   --   --  26         Lab 12/20/22  1329 12/19/22  1448   PROBNP 16,213.0*  --    TROPONIN T  --  <0.010             Lab 12/21/22  0021 12/19/22  1531   IRON 23*  --    IRON SATURATION 7*  --    TIBC 319  --    TRANSFERRIN 214  --    VITAMIN B 12  --  518         Brief Urine Lab Results  (Last result in the past 365 days)      Color   Clarity   Blood   Leuk Est   Nitrite   Protein   CREAT   Urine HCG        12/09/22 0428 Yellow   Cloudy   Moderate (2+)   Moderate (2+)   Negative   30 mg/dL (1+)               Microbiology Results (last 10 days)     Procedure Component Value - Date/Time    COVID-19 and FLU A/B PCR - Swab, Nasopharynx [[id number]]  (Normal) Collected: 12/19/22 1530    Lab Status: Final result Specimen: Swab from Nasopharynx Updated: 12/19/22 1556     COVID19 Not  Detected     Influenza A PCR Not Detected     Influenza B PCR Not Detected    Narrative:      Fact sheet for providers: https://www.fda.gov/media/975515/download    Fact sheet for patients: https://www.fda.gov/media/415867/download    Test performed by PCR.          XR Spine Lumbar Complete With Flex & Ext    Result Date: 12/21/2022  Impression: Multilevel degenerative disc disease. No acute abnormality.  Electronically Signed By-Dwayne Mederos MD On:12/21/2022 4:43 PM This report was finalized on [id number] by  Dwayne Mederos MD.    NM HIDA SCAN WITHOUT PHARMACOLOGICAL INTERVENTION    Result Date: 12/20/2022  Impression: Normal HIDA scan. Normal gallbladder ejection fraction, 67%.  Electronically Signed By-María Lauren MD On:12/20/2022 2:03 PM This report was finalized on [id number] by  María Lauren MD.    CT Abdomen Pelvis With Contrast    Result Date: 12/3/2022  Impression: 1.  There is marked pericholecystic edema, new since prior exam suggestive of cholecystitis.  If there are additional signs or symptoms of cholecystitis consider ultrasound for confirmation. 2.  Fatty replacement of liver. Electronically signed by:  Kwesi Hernandez M.D.  12/2/2022 11:39 PM Mountain Time    US Gallbladder    Result Date: 12/19/2022  Impression: 1. Diffuse abnormal gallbladder wall edema with pericholecystic fluid. This is new since the 12/9/2022 ultrasound. FINDINGS suggest the presence of acalculous cholecystitis. Correlate with clinical symptoms.  Electronically Signed By-María Lauren MD On:12/19/2022 4:33 PM This report was finalized on [id number] by  María Lauren MD.    XR Chest 1 View    Result Date: 12/19/2022  Impression: No acute process.  Electronically Signed By-Finn Ashton MD On:12/19/2022 3:29 PM This report was finalized on [id number] by  Finn Ashton MD.    XR Chest 1 View    Result Date: 12/3/2022  Impression: No active disease.  Electronically Signed By-Beau Blair MD On:12/3/2022 8:37 AM  This report was finalized on [id number] by  Beau Blair MD.    MRI abdomen wo contrast mrcp    Result Date: 12/21/2022  Impression:  1. The gallbladder is contracted. There is gallbladder wall edema and trace pericholecystic fluid. No gallstones are identified. There is no abnormal biliary dilation. The gallbladder findings may be related to anasarca or hypoproteinemic state, particularly given the presence of body wall edema and small abdominal ascites. Acalculus cholecystitis can also have a similar imaging appearance. 2. There is no abnormal biliary dilation. 3. Unremarkable noncontrast appearance of liver. 4. Small bilateral pleural effusions.  Electronically Signed By-María Lauren MD On:12/21/2022 12:50 PM This report was finalized on [id number] by  María Lauren MD.    US Abdomen Limited    Result Date: 12/9/2022  Impression:  1. No evidence of cholelithiasis, cholecystitis, or biliary obstruction 2. Normal ultrasound appearance of the liver and right kidney  Electronically Signed By-Ole Alexis On:12/9/2022 7:47 AM This report was finalized on [id number] by  Ole Alexis, .              Results for orders placed during the hospital encounter of 02/26/22    Adult Transthoracic Echo Complete w/ Color, Spectral and Contrast if necessary per protocol    Interpretation Summary  · Left ventricular ejection fraction appears to be 41 - 45%.  · The right ventricular cavity is mildly dilated.  · Mild aortic valve stenosis is present.  · Moderate tricuspid valve regurgitation is present.  · No pericardial effusion noted      Labs Pending at Discharge:  Pending Labs     Order Current Status    Tissue Pathology Exam Collected (12/22/22 1426)    CINTHYA In process          Procedures Performed  Procedure(s):  ESOPHAGOGASTRODUODENOSCOPY with stomach biopsies  12/22 1416 UPPER GI ENDOSCOPY      Consults:   Consults     Date and Time Order Name Status Description    12/20/2022  5:03 PM Inpatient  Gastroenterology Consult Completed     12/20/2022  1:07 PM Inpatient Cardiology Consult Completed     12/19/2022  9:27 PM Inpatient General Surgery Consult Completed     12/9/2022 10:00 AM Inpatient Gastroenterology Consult Completed             Discharge Details        Discharge Medications      New Medications      Instructions Start Date   amoxicillin-clavulanate 875-125 MG per tablet  Commonly known as: AUGMENTIN   1 tablet, Oral, Every 12 Hours Scheduled      pantoprazole 40 MG EC tablet  Commonly known as: PROTONIX   40 mg, Oral, Every Early Morning   Start Date: December 23, 2022        Continue These Medications      Instructions Start Date   ALPRAZolam 1 MG tablet  Commonly known as: XANAX   1 mg, Oral, 3 Times Daily PRN      atorvastatin 40 MG tablet  Commonly known as: LIPITOR   40 mg, Oral, Nightly      fludrocortisone 0.1 MG tablet   0.1 mg, Oral, Every Morning      FLUoxetine 20 MG capsule  Commonly known as: PROzac   20 mg, Oral, Daily      furosemide 20 MG tablet  Commonly known as: LASIX   20 mg, Oral, Daily      Jardiance 10 MG tablet tablet  Generic drug: empagliflozin   1 tablet, Oral, Every Morning      losartan 25 MG tablet  Commonly known as: COZAAR   25 mg, Oral, Every 24 Hours Scheduled      metoprolol succinate XL 50 MG 24 hr tablet  Commonly known as: TOPROL-XL   50 mg, Oral, Every Morning, Hold for blood pressure less than 100 or pulse less than 60      nitroglycerin 0.4 MG SL tablet  Commonly known as: NITROSTAT   0.4 mg, Sublingual, Every 5 Minutes PRN, Take no more than 3 doses in 15 minutes.      NovoLIN 70/30 FlexPen (70-30) 100 UNIT/ML suspension pen-injector  Generic drug: Insulin NPH Isophane & Regular   20 Units, Subcutaneous, 2 Times Daily With Meals      polyethylene glycol 17 g packet  Commonly known as: MIRALAX   17 g, Oral, Daily      potassium chloride 20 MEQ CR tablet  Commonly known as: K-DUR,KLOR-CON   1 tablet, Oral, Every Morning      sertraline 25 MG tablet  Commonly  known as: Zoloft   25 mg, Oral, Daily      ticagrelor 90 MG tablet tablet  Commonly known as: BRILINTA   90 mg, Oral, 2 Times Daily             Allergies   Allergen Reactions   • Morphine Hallucinations         Discharge Disposition: home with home health and OP follow up  Home-Health Care Svc    Diet:  Hospital:  Diet Order   Procedures   • Diet: Cardiac Diets; Healthy Heart (2-3 Na+); Texture: Regular Texture (IDDSI 7); Fluid Consistency: Thin (IDDSI 0)         Discharge Activity:         CODE STATUS:  Code Status and Medical Interventions:   Ordered at: 12/19/22 2125     Code Status (Patient has no pulse and is not breathing):    CPR (Attempt to Resuscitate)     Medical Interventions (Patient has pulse or is breathing):    Full Support         Future Appointments   Date Time Provider Department Center   3/22/2023 11:15 AM Cristal Alvarez MD [facility]           Time spent on Discharge including face to face service:  32 minutes    This patient has been  and discussed with . 12/22/22      Signature: Electronically signed by Aracely Gonzales MD, 12/22/22, 17:19 EST.  [facility]ist Team

## 2022-12-22 NOTE — PLAN OF CARE
"ssessment: Nadege Barrow presents with functional mobility impairments which indicate the need for skilled intervention. Pt was easily distracted today. Pt had increased right hip /back pain with transfer to sitting. Tolerating session today without incident. Will continue to follow and progress as tolerated.     Plan/Recommendations:   Low Intensity Therapy recommended post-acute care - This is recommended as therapy feels this patient would require 2-3 visits per week. OP or HH would be the best option depending on patient's home bound status. Consider, if the patient has other  \"skilled\" needs such as wounds, IV antibiotics, etc. Combined with \"low intensity\" could also equate to a SNF. If patient is medically complex, consider LTAC.. Pt requires no DME at discharge.     Pt desires Home with Home Health at discharge. Pt cooperative; agreeable to therapeutic recommendations and plan of care.     "

## 2022-12-22 NOTE — DISCHARGE INSTRUCTIONS
Examination of the esophagus: Grade B erosive esophagitis was seen at the GE junction.  The remainder the esophagus unremarkable.  There was a 2 cm hiatal hernia noted from 35 to 37 cm.  Examination of the stomach: Diffuse congestion, erythema, erosion was noted throughout the stomach consistent with erosive gastritis.  Cold forceps biopsies were obtained for histology.  Examination of the duodenum: Normal bulb and second portion of duodenum     Impression:  77-year-old female with right upper quadrant pain and normal bile duct evaluation and gallbladder evaluation on imaging except for edema in the portal area.  EGD shows marked chronic gastritis with mild esophagitis     Recommendations:  Advance diet as tolerated  Reassured patient of benign findings of exam  Avoid NSAIDs  Pantoprazole 40 mg daily  Follow-up on pathology  If H. pylori is positive we will treat with twice daily PPI and Pylera x14 days  Follow-up with GI in the office in 2 to 3 months, please call questions

## 2022-12-23 LAB — ANA SER QL: NEGATIVE

## 2022-12-23 NOTE — OUTREACH NOTE
Prep Survey    Flowsheet Row Responses   [facility] patient discharged from? [facility]   Is LACE score < 7 ? No   Eligibility Readm Mgmt   Discharge diagnosis Acute cholecystitis   Does the patient have one of the following disease processes/diagnoses(primary or secondary)? Other   Does the patient have Home health ordered? No   Is there a DME ordered? No   Prep survey completed? Yes          LÓPEZ BARTH - Registered Nurse

## 2022-12-23 NOTE — CASE MANAGEMENT/SOCIAL WORK
Case Management Discharge Note      Final Note: home    Provided Post Acute Provider List?: N/A  N/A Provider List Comment: denies dc needs at this time  Provided Post Acute Provider Quality & Resource List?: N/A      Final Discharge Disposition Code: 01 - home or self-care

## 2022-12-27 ENCOUNTER — NURSE TRIAGE (OUTPATIENT)
Dept: CALL CENTER | Facility: HOSPITAL | Age: 77
End: 2022-12-27

## 2022-12-27 ENCOUNTER — READMISSION MANAGEMENT (OUTPATIENT)
Dept: CALL CENTER | Facility: HOSPITAL | Age: 77
End: 2022-12-27

## 2022-12-27 LAB
LAB AP CASE REPORT: NORMAL
PATH REPORT.FINAL DX SPEC: NORMAL
PATH REPORT.GROSS SPEC: NORMAL

## 2022-12-27 NOTE — OUTREACH NOTE
Medical Week 1 Survey    Flowsheet Row Responses   [facility] facility patient discharged from? [facility]   Does the patient have one of the following disease processes/diagnoses(primary or secondary)? Other   Week 1 attempt successful? No   Unsuccessful attempts Attempt 2          SUZANNE HUSSEIN - Registered Nurse   not for 5-7 days as per MD/No

## 2022-12-27 NOTE — TELEPHONE ENCOUNTER
"  Daughter asking to have HH come to see her mother, has appt. With PCP today, told her to ask for that referral.   Reason for Disposition  • [1] Caller requesting NON-URGENT health information AND [2] PCP's office is the best resource    Additional Information  • Negative: [1] Caller is not with the adult (patient) AND [2] reporting urgent symptoms  • Negative: Lab result questions  • Negative: Medication questions  • Negative: Caller can't be reached by phone  • Negative: Caller has already spoken to PCP or another triager  • Negative: RN needs further essential information from caller in order to complete triage  • Negative: Requesting regular office appointment  • Negative: Health Information question, no triage required and triager able to answer question  • Negative: General information question, no triage required and triager able to answer question  • Negative: Question about upcoming scheduled test, no triage required and triager able to answer question  • Negative: [1] Caller is not with the adult (patient) AND [2] probable NON-URGENT symptoms    Answer Assessment - Initial Assessment Questions  1. REASON FOR CALL or QUESTION: \"What is your reason for calling today?\" or \"How can I best help you?\" or \"What question do you have that I can help answer?\"      Wanting HH to come.    Protocols used: INFORMATION ONLY CALL - NO TRIAGE-ADULT-      "

## 2022-12-30 ENCOUNTER — READMISSION MANAGEMENT (OUTPATIENT)
Dept: CALL CENTER | Facility: HOSPITAL | Age: 77
End: 2022-12-30

## 2022-12-30 NOTE — OUTREACH NOTE
Medical Week 1 Survey    Flowsheet Row Responses   [facility] patient discharged from? [facility]   Does the patient have one of the following disease processes/diagnoses(primary or secondary)? Other   Week 1 attempt successful? Yes   Call start time 1530   Call end time 1542   Discharge diagnosis Acute cholecystitis   Is patient permission given to speak with other caregiver? Yes   Person spoke with today (if not patient) and relationship Ana/Patient   Meds reviewed with patient/caregiver? Yes   Is the patient having any side effects they believe may be caused by any medication additions or changes? No   Does the patient have all medications ordered at discharge? Yes   Is the patient taking all medications as directed (includes completed medication regime)? Yes   Does the patient have a primary care provider?  Yes   Does the patient have an appointment with their PCP within 7 days of discharge? Yes   Has the patient kept scheduled appointments due by today? Yes   Has home health visited the patient within 72 hours of discharge? Yes   Psychosocial issues? No   Did the patient receive a copy of their discharge instructions? Yes   Nursing interventions Reviewed instructions with patient   What is the patient's perception of their health status since discharge? Same   Is the patient/caregiver able to teach back signs and symptoms related to disease process for when to call PCP? Yes   Is the patient/caregiver able to teach back signs and symptoms related to disease process for when to call 911? Yes   Is the patient/caregiver able to teach back the hierarchy of who to call/visit for symptoms/problems? PCP, Specialist, Home health nurse, Urgent Care, ED, 911 Yes   If the patient is a current smoker, are they able to teach back resources for cessation? Not a smoker   Additional teach back comments Dtr concerned with her imbalance. They will reach out to pharm to double ck if can cause the issue and ask about balance  therapy.   Week 1 call completed? Yes   Is the patient interested in additional calls from an ambulatory ?  NOTE:  applies to high risk patients requiring additional follow-up. No   Wrap up additional comments She has a new issue, palliative will be coming in to care for her.          PIO SHETH - Registered Nurse

## 2023-01-01 ENCOUNTER — INPATIENT HOSPITAL (OUTPATIENT)
Dept: URBAN - METROPOLITAN AREA HOSPITAL 76 | Facility: HOSPITAL | Age: 78
End: 2023-01-01

## 2023-01-01 ENCOUNTER — OFFICE (OUTPATIENT)
Dept: URBAN - METROPOLITAN AREA CLINIC 64 | Facility: CLINIC | Age: 78
End: 2023-01-01

## 2023-01-01 VITALS
HEART RATE: 77 BPM | DIASTOLIC BLOOD PRESSURE: 101 MMHG | WEIGHT: 144 LBS | HEIGHT: 63 IN | SYSTOLIC BLOOD PRESSURE: 173 MMHG

## 2023-01-01 DIAGNOSIS — K92.2 GASTROINTESTINAL HEMORRHAGE, UNSPECIFIED: ICD-10-CM

## 2023-01-01 DIAGNOSIS — K59.00 CONSTIPATION, UNSPECIFIED: ICD-10-CM

## 2023-01-01 DIAGNOSIS — K52.9 NONINFECTIVE GASTROENTERITIS AND COLITIS, UNSPECIFIED: ICD-10-CM

## 2023-01-01 DIAGNOSIS — K72.00 ACUTE AND SUBACUTE HEPATIC FAILURE WITHOUT COMA: ICD-10-CM

## 2023-01-01 DIAGNOSIS — K20.90 ESOPHAGITIS, UNSPECIFIED WITHOUT BLEEDING: ICD-10-CM

## 2023-01-01 DIAGNOSIS — R94.5 ABNORMAL RESULTS OF LIVER FUNCTION STUDIES: ICD-10-CM

## 2023-01-01 DIAGNOSIS — R10.31 RIGHT LOWER QUADRANT PAIN: ICD-10-CM

## 2023-01-01 PROCEDURE — 99232 SBSQ HOSP IP/OBS MODERATE 35: CPT | Performed by: INTERNAL MEDICINE

## 2023-01-01 PROCEDURE — 99213 OFFICE O/P EST LOW 20 MIN: CPT

## 2023-01-01 PROCEDURE — 99232 SBSQ HOSP IP/OBS MODERATE 35: CPT | Performed by: NURSE PRACTITIONER

## 2023-01-01 PROCEDURE — 99222 1ST HOSP IP/OBS MODERATE 55: CPT | Performed by: INTERNAL MEDICINE

## 2023-01-01 RX ORDER — PANTOPRAZOLE SODIUM 40 MG/1
40 TABLET, DELAYED RELEASE ORAL
Qty: 90 | Refills: 3 | Status: ACTIVE
Start: 2023-01-01

## 2023-01-09 ENCOUNTER — READMISSION MANAGEMENT (OUTPATIENT)
Dept: CALL CENTER | Facility: HOSPITAL | Age: 78
End: 2023-01-09
Payer: MEDICARE

## 2023-01-10 NOTE — OUTREACH NOTE
Medical Week 2 Survey    Flowsheet Row Responses   [facility] patient discharged from? [facility]   Does the patient have one of the following disease processes/diagnoses(primary or secondary)? Other   Week 2 attempt successful? Yes   Call start time 1942   Discharge diagnosis Acute cholecystitis   Call end time 1946   Person spoke with today (if not patient) and relationship Ana   Is the patient taking all medications as directed (includes completed medication regime)? Yes   Does the patient have a primary care provider?  Yes   Has the patient kept scheduled appointments due by today? Yes   Psychosocial issues? No   What is the patient's perception of their health status since discharge? New symptoms unrelated to diagnosis   Is the patient/caregiver able to teach back signs and symptoms related to disease process for when to call PCP? Yes   Is the patient/caregiver able to teach back signs and symptoms related to disease process for when to call 911? Yes   Is the patient/caregiver able to teach back the hierarchy of who to call/visit for symptoms/problems? PCP, Specialist, Home health nurse, Urgent Care, ED, 911 Yes   If the patient is a current smoker, are they able to teach back resources for cessation? Not a smoker   Additional teach back comments Daughter states she was started on meclizine for her vertigo but helped at first but doesn't now.  Was told that she could go to a chiroprator or PT to get therapy for the crystals in her ear.  Having trouble getting her to eat.  She is still waiting to hear from Pallative and Pain Management.     Week 2 Call Completed? Yes   Is the patient interested in additional calls from an ambulatory ?  NOTE:  applies to high risk patients requiring additional follow-up. Yes   Graduated/Revoked comments Pt to be followed by Ambulatory Care          ERNESTINA FUNK - Licensed Nurse

## 2023-01-12 ENCOUNTER — HOSPITAL ENCOUNTER (EMERGENCY)
Facility: HOSPITAL | Age: 78
Discharge: HOME | End: 2023-01-12
Attending: EMERGENCY MEDICINE | Admitting: EMERGENCY MEDICINE
Payer: MEDICARE

## 2023-01-12 VITALS
RESPIRATION RATE: 18 BRPM | SYSTOLIC BLOOD PRESSURE: 139 MMHG | HEIGHT: 63 IN | HEART RATE: 68 BPM | OXYGEN SATURATION: 97 % | BODY MASS INDEX: 26.05 KG/M2 | TEMPERATURE: 98.2 F | DIASTOLIC BLOOD PRESSURE: 83 MMHG | WEIGHT: 147 LBS

## 2023-01-12 DIAGNOSIS — K59.04 CHRONIC IDIOPATHIC CONSTIPATION: Primary | ICD-10-CM

## 2023-01-12 LAB
ALBUMIN SERPL-MCNC: 4 G/DL (ref 3.5–5.2)
ALBUMIN/GLOB SERPL: 1.4 G/DL
ALP SERPL-CCNC: 93 U/L (ref 39–117)
ALT SERPL W P-5'-P-CCNC: 11 U/L (ref 1–33)
ANION GAP SERPL CALCULATED.3IONS-SCNC: 13.8 MMOL/L (ref 5–15)
AST SERPL-CCNC: 16 U/L (ref 1–32)
BASOPHILS # BLD AUTO: 0.03 10*3/MM3 (ref 0–0.2)
BASOPHILS NFR BLD AUTO: 0.3 % (ref 0–1.5)
BILIRUB SERPL-MCNC: 1.1 MG/DL (ref 0–1.2)
BUN SERPL-MCNC: 12 MG/DL (ref 8–23)
BUN/CREAT SERPL: 12.4 (ref 7–25)
CALCIUM SPEC-SCNC: 9 MG/DL (ref 8.6–10.5)
CHLORIDE SERPL-SCNC: 103 MMOL/L (ref 98–107)
CO2 SERPL-SCNC: 22.2 MMOL/L (ref 22–29)
CREAT SERPL-MCNC: 0.97 MG/DL (ref 0.57–1)
DEPRECATED RDW RBC AUTO: 49.8 FL (ref 37–54)
EGFRCR SERPLBLD CKD-EPI 2021: 60.3 ML/MIN/1.73
EOSINOPHIL # BLD AUTO: 0.14 10*3/MM3 (ref 0–0.4)
EOSINOPHIL NFR BLD AUTO: 1.4 % (ref 0.3–6.2)
ERYTHROCYTE [DISTWIDTH] IN BLOOD BY AUTOMATED COUNT: 15.7 % (ref 12.3–15.4)
GLOBULIN UR ELPH-MCNC: 2.8 GM/DL
GLUCOSE SERPL-MCNC: 245 MG/DL (ref 65–99)
HCT VFR BLD AUTO: 42.1 % (ref 34–46.6)
HGB BLD-MCNC: 13 G/DL (ref 12–15.9)
HOLD SPECIMEN: NORMAL
HOLD SPECIMEN: NORMAL
IMM GRANULOCYTES # BLD AUTO: 0.02 10*3/MM3 (ref 0–0.05)
IMM GRANULOCYTES NFR BLD AUTO: 0.2 % (ref 0–0.5)
LIPASE SERPL-CCNC: 28 U/L (ref 13–60)
LYMPHOCYTES # BLD AUTO: 2.12 10*3/MM3 (ref 0.7–3.1)
LYMPHOCYTES NFR BLD AUTO: 20.9 % (ref 19.6–45.3)
MCH RBC QN AUTO: 26.4 PG (ref 26.6–33)
MCHC RBC AUTO-ENTMCNC: 30.9 G/DL (ref 31.5–35.7)
MCV RBC AUTO: 85.6 FL (ref 79–97)
MONOCYTES # BLD AUTO: 0.59 10*3/MM3 (ref 0.1–0.9)
MONOCYTES NFR BLD AUTO: 5.8 % (ref 5–12)
NEUTROPHILS NFR BLD AUTO: 7.24 10*3/MM3 (ref 1.7–7)
NEUTROPHILS NFR BLD AUTO: 71.4 % (ref 42.7–76)
PLATELET # BLD AUTO: 379 10*3/MM3 (ref 140–450)
PMV BLD AUTO: 10.9 FL (ref 6–12)
POTASSIUM SERPL-SCNC: 3.4 MMOL/L (ref 3.5–5.2)
PROT SERPL-MCNC: 6.8 G/DL (ref 6–8.5)
RBC # BLD AUTO: 4.92 10*6/MM3 (ref 3.77–5.28)
SODIUM SERPL-SCNC: 139 MMOL/L (ref 136–145)
WBC NRBC COR # BLD: 10.14 10*3/MM3 (ref 3.4–10.8)
WHOLE BLOOD HOLD SPECIMEN: NORMAL

## 2023-01-12 PROCEDURE — 36415 COLL VENOUS BLD VENIPUNCTURE: CPT

## 2023-01-12 PROCEDURE — 99283 EMERGENCY DEPT VISIT LOW MDM: CPT | Performed by: EMERGENCY MEDICINE

## 2023-01-12 PROCEDURE — 80053 COMPREHEN METABOLIC PANEL: CPT | Performed by: EMERGENCY MEDICINE

## 2023-01-12 PROCEDURE — 85025 COMPLETE CBC W/AUTO DIFF WBC: CPT | Performed by: EMERGENCY MEDICINE

## 2023-01-12 PROCEDURE — 99283 EMERGENCY DEPT VISIT LOW MDM: CPT

## 2023-01-12 PROCEDURE — 83690 ASSAY OF LIPASE: CPT | Performed by: EMERGENCY MEDICINE

## 2023-01-12 RX ORDER — HYDROCODONE BITARTRATE AND ACETAMINOPHEN 5; 325 MG/1; MG/1
1 TABLET ORAL ONCE AS NEEDED
Status: DISCONTINUED | OUTPATIENT
Start: 2023-01-12 | End: 2023-01-12 | Stop reason: HOSPADM

## 2023-01-12 RX ORDER — LORAZEPAM 0.5 MG/1
0.5 TABLET ORAL ONCE
Status: COMPLETED | OUTPATIENT
Start: 2023-01-12 | End: 2023-01-12

## 2023-01-12 RX ORDER — SODIUM CHLORIDE 0.9 % (FLUSH) 0.9 %
10 SYRINGE (ML) INJECTION AS NEEDED
Status: DISCONTINUED | OUTPATIENT
Start: 2023-01-12 | End: 2023-01-12 | Stop reason: HOSPADM

## 2023-01-12 RX ORDER — POLYETHYLENE GLYCOL 3350 17 G/17G
17 POWDER, FOR SOLUTION ORAL NIGHTLY
Qty: 30 PACKET | Refills: 0 | Status: SHIPPED | OUTPATIENT
Start: 2023-01-12 | End: 2023-02-11

## 2023-01-12 RX ADMIN — LORAZEPAM 0.5 MG: 0.5 TABLET ORAL at 16:57

## 2023-01-12 RX ADMIN — HYDROCODONE BITARTRATE AND ACETAMINOPHEN 1 TABLET: 5; 325 TABLET ORAL at 16:57

## 2023-01-12 RX ADMIN — MAGNESIUM HYDROXIDE 10 ML: 2400 SUSPENSION ORAL at 16:57

## 2023-01-12 NOTE — FSED PROVIDER NOTE
[facility] FREE-STANDING ED / URGENT CARE    Patient: Nadege Barrow 1945 [id number]  Physician: Jena Islas MD  DOS: 1/12/2023    HPI  History of Present Illness  77-year-old female with a history of chronic constipation presents with severe pain in her rectum, tenesmus, unable to pass stool.  Unknown last bowel movement, likely 2 to 3 days ago.  She is supposed to be on daily MiraLAX, but has forgotten to take her MiraLAX for the past several days due to medical appointments.  Denies any abdominal pain or nausea.          Prior to Admission medications    Medication Sig Start Date End Date Taking? Authorizing Provider   ALPRAZolam (XANAX) 1 MG tablet Take 1 tablet by mouth 3 (Three) Times a Day As Needed for Anxiety. 12/1/22   Cristal Alvarez MD   atorvastatin (LIPITOR) 40 MG tablet Take 40 mg by mouth Every Night.    ProviderShira MD   Empagliflozin (Jardiance) 10 MG tablet Take 1 tablet by mouth Every Morning.    Shira Rockwell MD   fludrocortisone 0.1 MG tablet Take 0.1 mg by mouth Every Morning.    Shira Rockwell MD   FLUoxetine (PROzac) 20 MG capsule Take 1 capsule by mouth Daily. 12/1/22   Cristal Alvarez MD   furosemide (LASIX) 20 MG tablet Take 1 tablet by mouth Daily. 10/12/22   Kerry Kimbrough MD   Insulin NPH Isophane & Regular (NovoLIN 70/30 FlexPen) (70-30) 100 UNIT/ML suspension pen-injector Inject 20 Units under the skin into the appropriate area as directed 2 (Two) Times a Day With Meals.    Shira Rockwell MD   losartan (COZAAR) 25 MG tablet Take 1 tablet by mouth Daily. 5/17/22   Niya Lopez MD   metoprolol succinate XL (TOPROL-XL) 50 MG 24 hr tablet Take 50 mg by mouth Every Morning. Hold for blood pressure less than 100 or pulse less than 60    Shira Rockwell MD   nitroglycerin (NITROSTAT) 0.4 MG SL tablet Place 1 tablet under the tongue Every 5 (Five) Minutes As Needed for Chest Pain. Take no more than 3 doses in 15 minutes. 5/16/22    "Niya Lopez MD   pantoprazole (PROTONIX) 40 MG EC tablet Take 1 tablet by mouth Every Morning. 12/23/22   Aracely Gonzales MD   polyethylene glycol (MIRALAX) 17 g packet Take 17 g by mouth Daily for 30 days. 12/12/22 1/11/23  Kraig Luis MD   potassium chloride (K-DUR,KLOR-CON) 20 MEQ CR tablet Take 1 tablet by mouth Every Morning. 8/15/22   ProviderShira MD   sertraline (Zoloft) 25 MG tablet Take 1 tablet by mouth Daily. 12/1/22 12/1/23  Cristal Alvarez MD   ticagrelor (BRILINTA) 90 MG tablet tablet Take 90 mg by mouth 2 (Two) Times a Day.    Provider, MD Shira     Past Medical History:   Diagnosis Date   • Anxiety    • CHF (congestive heart failure) (HCC)    • Depression    • Diabetes mellitus (HCC)    • Hyperlipidemia    • Hypertension    • Panic disorder     \"panic attacks\"   • Tremors of nervous system     \"essential tremors\"     Past Surgical History:   Procedure Laterality Date   • CORONARY ANGIOPLASTY WITH STENT PLACEMENT     • ENDOSCOPY N/A 12/22/2022    Procedure: ESOPHAGOGASTRODUODENOSCOPY with stomach biopsies;  Surgeon: Rick Louise MD;  Location: [facility] ENDOSCOPY;  Service: Gastroenterology;  Laterality: N/A;  diffuse gastritis, esophagitis, hiatal hernia     Family History   Problem Relation Age of Onset   • Depression Sister    • Suicidality Other         suicided   • Alcohol abuse Other    • Alcohol abuse Daughter    • Depression Other      Social History     Socioeconomic History   • Marital status:    Tobacco Use   • Smoking status: Never   • Smokeless tobacco: Never   Vaping Use   • Vaping Use: Never used   Substance and Sexual Activity   • Alcohol use: Not Currently   • Drug use: No   • Sexual activity: Defer     Allergies   Allergen Reactions   • Morphine Hallucinations       PHYSICAL EXAM  Vital Signs (last day)     Date/Time Temp Temp src Pulse Resp BP Patient Position SpO2    01/12/23 1625 98.2 (36.8) -- 68 18 139/83 -- 97        Physical " Exam  Vitals and nursing note reviewed.   Constitutional:       Appearance: Normal appearance. She is not ill-appearing.   Eyes:      Conjunctiva/sclera: Conjunctivae normal.   Pulmonary:      Effort: Pulmonary effort is normal.   Abdominal:      General: There is no distension.      Comments: Stool is present in the rectal vault.  Medium consistency.  Significant pain with rectal exam and attempt at disimpaction.   Skin:     Coloration: Skin is not pale.   Neurological:      Mental Status: She is alert. Mental status is at baseline.   Psychiatric:         Mood and Affect: Mood normal.         Behavior: Behavior normal.           DIAGNOSTICS  No radiology results for the last 7 days    Labs Reviewed   COMPREHENSIVE METABOLIC PANEL - Abnormal; Notable for the following components:       Result Value    Glucose 245 (*)     Potassium 3.4 (*)     All other components within normal limits    Narrative:     GFR Normal >60  Chronic Kidney Disease <60  Kidney Failure <15    The GFR formula is only valid for adults with stable renal function between ages 18 and 70.   CBC WITH AUTO DIFFERENTIAL - Abnormal; Notable for the following components:    MCH 26.4 (*)     MCHC 30.9 (*)     RDW 15.7 (*)     Neutrophils, Absolute 7.24 (*)     All other components within normal limits   LIPASE - Normal   RAINBOW DRAW    Narrative:     The following orders were created for panel order [facility] Draw.  Procedure                               Abnormality         Status                     ---------                               -----------         ------                     Green Top (Gel)[[id number]]                                  Final result               Lavender Top[[id number]]                                     Final result               Gold Top - SST[[id number]]                                   Final result               Light Blue Top[[id number]]                                                              Green Top (Gel)[[id number]]                                                                Please view results for these tests on the individual orders.   CBC AND DIFFERENTIAL    Narrative:     The following orders were created for panel order CBC & Differential.  Procedure                               Abnormality         Status                     ---------                               -----------         ------                     CBC Auto Differential[[id number]]        Abnormal            Final result                 Please view results for these tests on the individual orders.   GREEN TOP   LAVENDER TOP   GOLD TOP - SST         MDM  Number of Diagnoses or Management Options  Chronic idiopathic constipation  Diagnosis management comments: Patient presents with acutely worsened on chronic constipation, relieved here in the ED with an enema.  She produced a large bowel movement into the commode.  Emphasized to patient the importance of continuing her MiraLAX daily.  She and her daughter expressed understanding.  Discharged home.      All results from this visit have been reviewed.  ED Course as of 01/20/23 0135   Thu Jan 12, 2023   1756 Enema successfully produced a large bowel movement.  Patient discharged home with anticipatory guidance. [LR]      ED Course User Index  [LR] Jena Islas MD       New Medications Ordered This Visit   Medications   • LORazepam (ATIVAN) tablet 0.5 mg   • magnesium hydroxide (MILK OF MAGNESIA) suspension 10 mL   • polyethylene glycol (MIRALAX) 17 g packet     Sig: Take 17 g by mouth Every Night for 30 days.     Dispense:  30 packet     Refill:  0   • docusate sodium (ENEMEEZ) 283 MG enema     Sig: Insert 1 enema into the rectum As Needed for Constipation.     Dispense:  5 each     Refill:  0       Procedures    Final diagnoses:   Chronic idiopathic constipation       Isaias, Dave  [address]  [address] [id number] [phone]    Schedule an appointment as soon as possible for a visit  in 3 days  If no improvement      ED Disposition     ED Disposition   Discharge    Condition   Stable    Comment   --             Jena Islas MD

## 2023-01-12 NOTE — DISCHARGE INSTRUCTIONS
Please read the attached discharge instructions.  Come back to the emergency department for any new or concerning symptoms.    The medication list on this paperwork may not be correct. Please do not make any changes that we have not personally discussed.

## 2023-01-17 ENCOUNTER — TELEPHONE (OUTPATIENT)
Dept: PSYCHIATRY | Facility: CLINIC | Age: 78
End: 2023-01-17

## 2023-01-17 NOTE — TELEPHONE ENCOUNTER
pts daughter Ana came to  today to inform us that her mother, who lives in [facility] on [address], was having irritability and meaness on Zoloft, so she threw it away.      She says her mother has always done well on Prozac 20 mg, but thinks it should be increased to 40 mg.  Her mother has been feeling very claustrophobic, like her apartment is closing in on her.

## 2023-01-18 RX ORDER — FLUOXETINE HYDROCHLORIDE 40 MG/1
40 CAPSULE ORAL DAILY
Qty: 30 CAPSULE | Refills: 2 | Status: SHIPPED | OUTPATIENT
Start: 2023-01-18 | End: 2024-01-18

## 2023-01-19 ENCOUNTER — PATIENT OUTREACH (OUTPATIENT)
Dept: CASE MANAGEMENT | Facility: OTHER | Age: 78
End: 2023-01-19
Payer: MEDICARE

## 2023-01-19 NOTE — OUTREACH NOTE
AMBULATORY CASE MANAGEMENT NOTE    Patient Outreach  Name and Relationship of Patient/Support Person: PushpaAna - Emergency Contact    RN-ACM brief outreach completed on 2nd attempt w patient daughter Ana Barrow per BRITTNY narvaez patient's 1/12/23 ED visit for DX dehydration & constipation.   Ana states to be compliant w AVS, taking RXs as ordered & states patient has appt w/ GI today.  [facility] has appt with ENT tomorrow to evaluate & hopefully treat ongoing dizziness -  has had falls related to dizziness and has been told the dizziness is possibly d/t canaliths.    [facility] has filled new RX for prozac ordered 1/17/23 by Dr Alvarez.     On 12/29/22 patient was enrolled in [facility] program and a referral was sent to [facility].    On 1/4/23 patient's [facility] PCP Dr Esparza referred patient to [facility]  and Prosper Araujo refilled RXs on 1/6/23.    At this time Ana states they are on way to a GI HFU appt. Unable to further outreach, she thanks RN-ACM and ended call.    One month RN-ACM chart review scheduled.    SDOH:  Per Dr Alvarez's last note, patient reportedly moved in with Ana in August 2022 - she used to live at [facility] [address].    DAGO DOWNEY  Ambulatory Case Management  1/19/2023, 13:38 EST

## 2023-02-06 DIAGNOSIS — F33.1 MAJOR DEPRESSIVE DISORDER, RECURRENT EPISODE, MODERATE: Chronic | ICD-10-CM

## 2023-02-06 DIAGNOSIS — F41.1 GENERALIZED ANXIETY DISORDER: Chronic | ICD-10-CM

## 2023-02-06 NOTE — TELEPHONE ENCOUNTER
Rx Refill Note  Requested Prescriptions     Pending Prescriptions Disp Refills   • ALPRAZolam (XANAX) 1 MG tablet [Pharmacy Med Name: ALPRAZOLAM 1MG^] 90 tablet 0     Sig: TAKE ONE TABLET BY MOUTH THREE TIMES DAILY AS NEEDED FOR ANXIETY( BOTTLE PLEASE REORDER )   • sertraline (ZOLOFT) 25 MG tablet [Pharmacy Med Name: SERTRALINE 25MG] 30 tablet 1     Sig: TAKE 1 TABLET BY MOUTH DAILY (NEW MED INTO DECEMBER DISPILL)      Last office visit with prescribing clinician: 12/1/2022   Last telemedicine visit with prescribing clinician: 3/22/2023   Next office visit with prescribing clinician: 3/22/2023   Office Visit with Cristal Alvarez MD (12/01/2022)                        Would you like a call back once the refill request has been completed: [] Yes [] No    If the office needs to give you a call back, can they leave a voicemail: [] Yes [] No    Ashley Bermudez MA  02/06/23, 16:04 EST     Inspect printed; last fill 1-11

## 2023-02-07 RX ORDER — ALPRAZOLAM 1 MG/1
TABLET ORAL
Qty: 90 TABLET | Refills: 0 | Status: SHIPPED | OUTPATIENT
Start: 2023-02-07

## 2023-02-07 RX ORDER — SERTRALINE HYDROCHLORIDE 25 MG/1
TABLET, FILM COATED ORAL
Qty: 30 TABLET | Refills: 1 | Status: SHIPPED | OUTPATIENT
Start: 2023-02-07

## 2023-02-21 ENCOUNTER — PATIENT OUTREACH (OUTPATIENT)
Dept: CASE MANAGEMENT | Facility: OTHER | Age: 78
End: 2023-02-21
Payer: MEDICARE

## 2023-02-21 NOTE — OUTREACH NOTE
AMBULATORY CASE MANAGEMENT NOTE  [facility] chart review of enrolled patient completed, outreach scheduled.    DAGO DOWNEY  Ambulatory Case Management  2/21/2023, 10:06 EST

## 2023-02-23 ENCOUNTER — PATIENT OUTREACH (OUTPATIENT)
Dept: CASE MANAGEMENT | Facility: OTHER | Age: 78
End: 2023-02-23
Payer: MEDICARE

## 2023-02-23 NOTE — OUTREACH NOTE
AMBULATORY CASE MANAGEMENT NOTE    Name and Relationship of Patient/Support Person: [facility] -   States  informed the patient's PCP that [facility] did not have the  staff to meet patient needs    DAGO DOWNEY  Ambulatory Case Management  2/23/2023, 14:01 EST

## 2023-02-23 NOTE — OUTREACH NOTE
AMBULATORY CASE MANAGEMENT NOTE  Patient Outreach  Name and Relationship of Patient/Support Person: Ana Barrow - Emergency Contact  Brief outreach begun with patient daughter Ana per BRITTNY, but call cut off and Ana did not  call back.  RN-ACM LVM for Ana inviting call back when able.     DAGO DOWNEY  Ambulatory Case Management  2/23/2023, 14:38 EST

## 2023-02-23 NOTE — OUTREACH NOTE
AMBULATORY CASE MANAGEMENT NOTE    Name and Relationship of Patient/Support Person: [facility]  Izabela states patient is not enolled with [facility] or their palliative care arm [facility].    DAGO DOWNEY  Ambulatory Case Management  2/23/2023, 14:06 EST

## 2023-08-02 NOTE — PLAN OF CARE
Goal Outcome Evaluation:  Plan of Care Reviewed With: patient        Progress: no change     Problem: Fall Injury Risk  Goal: Absence of Fall and Fall-Related Injury  Outcome: Ongoing, Not Progressing  Intervention: Identify and Manage Contributors  Recent Flowsheet Documentation  Taken 9/15/2022 1611 by Izabela Lake LPN  Medication Review/Management:   medications reviewed   high-risk medications identified  Taken 9/15/2022 1405 by Izabela Lake LPN  Medication Review/Management:   medications reviewed   high-risk medications identified  Intervention: Promote Injury-Free Environment  Recent Flowsheet Documentation  Taken 9/15/2022 1611 by Izabela Lake LPN  Safety Promotion/Fall Prevention:   assistive device/personal items within reach   clutter free environment maintained   nonskid shoes/slippers when out of bed   room organization consistent   safety round/check completed  Taken 9/15/2022 1405 by Izabela Lake LPN  Safety Promotion/Fall Prevention:   assistive device/personal items within reach   clutter free environment maintained   fall prevention program maintained   nonskid shoes/slippers when out of bed   room organization consistent   safety round/check completed   Alert and oriented x 4. Able to verbalize needs and wants. Takes medication whole and tolerates well. Assist x 1 for transfers/ambulation. Continent of bowel, external catheter in place and patent. No neck vein distention/tracheal deviation noted. No clubbing/cyanosis noted. No decrease in appetite noted. Does not require O2 therapy at this time. No s/s of hyper/hypoglycemia noted. Currently in bed, awake. Call bell in reach.    Ultrasound Not Used Text: Ultrasound was not performed today due to Prescription Used: 1 Bill For Treatment (26296)?: Yes Additional Change To Daily Dosage Administered Mid Treatment?: No Fraction Number: 12 Treatment Documentation: Physician Orders: Radiotherapy Treatment Plan: Superficial Radiotherapy with Ultrasound\\nPlan: This patient has been treated today with image-guided superficial radiation therapy for non-melanoma skin cancer.  Written informed consent has been previously obtained from this patient for this treatment. This consent is documented in the patient's chart. The patient gave verbal consent to continue treatment today.\\nThe patient was treated with a specific radiation dose and setup as prescribed by the provider listed on this visit note. A Radiation Therapist performed administration of radiation under the supervision of a provider.\\nThe treatment parameters and cumulative dose are indicated above. Prior to administering the radiation, the patient underwent a verification therapeutic radiology simulation-aided field setting defining relevant normal and abnormal target anatomy and acquiring images with a separate and distinct diagnostic high-frequency ultrasound to delineate tissues and determine whether to proceed with delivery of therapeutic, in addition to retrieve data necessary to develop an optimal radiation treatment process for the patient. The field placement simulation documents any change seen in overall tumor volume documented in the patient’s record, allows the clinician to indicate any needed changes in the treatment plan and/or prescription, provides diagnostic evaluation as the basis for performing the therapeutic procedure, and clearly identifies the information needed to decide to proceed with the therapeutic procedure. This process includes verification of the treatment port(s) and proper treatment positioning. All treatment ports were photographed within electronic medical records. The patient's lead blocking along with gross tumor volume and margin was confirmed. Considering superficial radiotherapy is clinical in setup, this requires the physician and radiation therapist to clarify the location interest being treated against initial images, ultrasound, pathology, and patient anatomy. Care was taken to ensure torres treated were geometrically accurate and properly positioned using therapeutic radiology simulation-aided field setting verification per fraction. This process is also utilized to determine if any prescription or setup changes are necessary. These steps are therefore medically necessary to ensure safe and effective administration of radiation. Ongoing therapeutic radiology simulation-aided field setting verification is ordered throughout the course of therapy.  A high-frequency ultrasound image was acquired today for a two-dimensional evaluation of the tumor volume, depth, width, breadth, review of prior response to treatment, provide geometric accuracy of field placement, and determine whether to proceed with therapeutic delivery.\\nThe field placement and ultrasound imaging, per fraction, is separate and distinct from the initial simulation and is an important task in providing safe administration of superficial radiation therapy. Physician evaluation of the ultrasound information will be ongoing throughout the course of treatment and is deemed medically necessary to ensure the efficacy of treatment, whether to proceed with therapeutic delivery, and determine any necessary changes. Today's images were evaluated for determination of continuation of treatment with the current plan or with necessary changes as appropriate. According to the review of verification therapeutic radiology simulation-aided field setting and imaging, no change is required.\\nAdditionally, the use of ultrasound visualization and targeted assessment allows the patient to be able to see the cancer progress, encouraging the patient to complete and maintain compliance through the full course of prescribed radiotherapy. Per Dr. Rhina Owusu, continued ultrasound guidance and therapeutic radiology simulation-aided field setting verification per fraction is required for field placement, measurement of tumor depth, tissues evaluation, progress, acute effect monitoring, and determination for therapeutic treatment delivery is appropriate. Additional Comments (Add Customization Of Note Here): Patient is using cream daily. Energy (Kv): 100 Daily Dosage (Cgy): 275.89 Total Cumulative Dose (Cgy): 3310.68 Calculate Total Cumulative Dose Automatically Or Manually: Manually Add X Modifier?: RYAN - Unusual Non-Overlapping Service Ultrasound Used Text: High frequency ultrasound depth is 0.85 mm, which is 0.03 mm in difference from previous imaging.

## 2023-09-01 NOTE — CASE MANAGEMENT/SOCIAL WORK
"Discharge Planning Assessment   [place]     Patient Name: Nadege Barrow  MRN: [id number]  Today's Date: 5/13/2022    Admit Date: 5/12/2022     Discharge Needs Assessment     Row Name 05/13/22 1615       Living Environment    People in Home other (see comments)  [facility] [address] [facility]    Current Living Arrangements home    Primary Care Provided by self    Provides Primary Care For no one    Family Caregiver if Needed child(kimberley), adult    Family Caregiver Names Daughter - Ana    Quality of Family Relationships supportive    Able to Return to Prior Arrangements yes       Resource/Environmental Concerns    Resource/Environmental Concerns none    Transportation Concerns none       Transition Planning    Patient/Family Anticipates Transition to home    Patient/Family Anticipated Services at Transition none    Transportation Anticipated family or friend will provide       Discharge Needs Assessment    Readmission Within the Last 30 Days no previous admission in last 30 days    Equipment Currently Used at Home rollator;shower chair;grab bar    Concerns to be Addressed discharge planning    Anticipated Changes Related to Illness none    Equipment Needed After Discharge none               Discharge Plan     Row Name 05/13/22 1617       Plan    Plan DC Plan; Return to [facility]    Patient/Family in Agreement with Plan yes    Plan Comments Met with the patient at bedside. PCP and Pharmacy verified.  The patient reports that she lives alone. She reports that she has an apartment at [facility] [address] [facility]. She reports that they take care of her meds and meals for her but that she receives no other assistance. She reports that she has a rollator that she uses. She also reports that she has a shower chair and grab bars and that she is able to bathe and dress herself. The patient reported that they are \"supposed to have PT there\" but that her [employer] wouldonly cover so many day and she missed several of those " "days due to being in the hospital. She reports that she has been sick since she moved in there. She reported to this CM that she has been inside for the better part of the last 2 years ( since COVID started) and that Sunday she was \"out and about\" all day in the heat. She reported that the next day she was having trouble walking on her right knee. She reports that she has arthrits and had gotten \"gel injections\" from Dr Bae in the past on the left side.She thinks that maybe she just over did it on Mother's day and that was why her knee hurt. She reports that her cough started after Sunday as well after being out in the heat all day. She was very focused on the development of the cough. The patient did not have any reported discharge needs and plans to return to [facility] at discharge. She reports that her daughter will transport at discharge.               Demographic Summary     Row Name 05/13/22 1615       General Information    Admission Type observation    Arrived From emergency department    Referral Source admission list    Reason for Consult discharge planning    Preferred Language English       Contact Information    Permission Granted to Share Info With                Functional Status     Row Name 05/13/22 1615       Functional Status    Usual Activity Tolerance good    Current Activity Tolerance moderate       Functional Status, IADL    Medications completely dependent    Meal Preparation completely dependent    Housekeeping assistive equipment    Laundry assistive equipment    Shopping assistive equipment       Mental Status    General Appearance WDL WDL       Mental Status Summary    Recent Changes in Mental Status/Cognitive Functioning no changes       Employment/    Employment Status retired            Met with patient in room wearing PPE: mask/googles    Maintained distance greater than 6 feet and spent less than 15 minutes in the room.    Izabela Cárdenas RN   "   Office Phone: [phone]  Office Cell: [phone]   No

## 2023-11-10 NOTE — PLAN OF CARE
Goal Outcome Evaluation:  Plan of Care Reviewed With: patient        Progress: no change  Outcome Summary: Patient received pm medications, including prn xanax. Patient is on 3L nasal cannula and is resting.   Patient is here alone today.        If any information or results need to be relayed from today's visit, the best way to contact the patient is via [phone] (mobile) - Patient gives verbal permission to leave a detailed voicemail at the number provided.

## 2023-11-20 NOTE — ED NOTES
Pt says she feels like the Ativan injection has helped with her anxiety. Will keep monitoring.     Mario Pruitt RN  09/26/21 5480     Birth Control Pills Pregnancy And Lactation Text: This medication should be avoided if pregnant and for the first 30 days post-partum.

## (undated) DEVICE — PK ENDO GI 50

## (undated) DEVICE — BITEBLOCK ENDO W/STRAP 60F A/ LF DISP